# Patient Record
Sex: MALE | Race: WHITE | Employment: OTHER | URBAN - METROPOLITAN AREA
[De-identification: names, ages, dates, MRNs, and addresses within clinical notes are randomized per-mention and may not be internally consistent; named-entity substitution may affect disease eponyms.]

---

## 2019-09-06 ENCOUNTER — HOSPITAL ENCOUNTER (EMERGENCY)
Age: 74
Discharge: HOME | End: 2019-09-06
Payer: MEDICARE

## 2019-09-06 ENCOUNTER — APPOINTMENT (OUTPATIENT)
Dept: GENERAL RADIOLOGY | Age: 74
End: 2019-09-06
Payer: MEDICARE

## 2019-09-06 VITALS
SYSTOLIC BLOOD PRESSURE: 136 MMHG | BODY MASS INDEX: 38.6 KG/M2 | TEMPERATURE: 97.7 F | OXYGEN SATURATION: 95 % | HEART RATE: 88 BPM | WEIGHT: 285 LBS | HEIGHT: 72 IN | DIASTOLIC BLOOD PRESSURE: 88 MMHG | RESPIRATION RATE: 16 BRPM

## 2019-09-06 DIAGNOSIS — M25.462 EFFUSION OF LEFT KNEE JOINT: ICD-10-CM

## 2019-09-06 DIAGNOSIS — M25.562 ACUTE PAIN OF LEFT KNEE: Primary | ICD-10-CM

## 2019-09-06 DIAGNOSIS — R03.0 ELEVATED BLOOD PRESSURE READING: ICD-10-CM

## 2019-09-06 PROCEDURE — 6370000000 HC RX 637 (ALT 250 FOR IP): Performed by: NURSE PRACTITIONER

## 2019-09-06 PROCEDURE — 99283 EMERGENCY DEPT VISIT LOW MDM: CPT

## 2019-09-06 PROCEDURE — 73564 X-RAY EXAM KNEE 4 OR MORE: CPT

## 2019-09-06 RX ORDER — ATORVASTATIN CALCIUM 40 MG/1
40 TABLET, FILM COATED ORAL NIGHTLY
COMMUNITY

## 2019-09-06 RX ORDER — LOPERAMIDE HYDROCHLORIDE 2 MG/1
2 CAPSULE ORAL 4 TIMES DAILY PRN
COMMUNITY
End: 2022-01-01

## 2019-09-06 RX ORDER — INSULIN GLARGINE 100 [IU]/ML
40 INJECTION, SOLUTION SUBCUTANEOUS NIGHTLY
Status: ON HOLD | COMMUNITY
End: 2022-01-01 | Stop reason: HOSPADM

## 2019-09-06 RX ORDER — IBUPROFEN 600 MG/1
600 TABLET ORAL EVERY 8 HOURS PRN
Qty: 15 TABLET | Refills: 0 | Status: SHIPPED | OUTPATIENT
Start: 2019-09-06 | End: 2019-09-11

## 2019-09-06 RX ORDER — PANTOPRAZOLE SODIUM 40 MG/1
40 TABLET, DELAYED RELEASE ORAL DAILY
COMMUNITY

## 2019-09-06 RX ORDER — METOPROLOL SUCCINATE 25 MG/1
25 TABLET, EXTENDED RELEASE ORAL DAILY
COMMUNITY
End: 2022-01-01 | Stop reason: SDUPTHER

## 2019-09-06 RX ORDER — IBUPROFEN 800 MG/1
800 TABLET ORAL ONCE
Status: COMPLETED | OUTPATIENT
Start: 2019-09-06 | End: 2019-09-06

## 2019-09-06 RX ADMIN — IBUPROFEN 800 MG: 800 TABLET, FILM COATED ORAL at 11:44

## 2019-09-06 SDOH — HEALTH STABILITY: MENTAL HEALTH: HOW OFTEN DO YOU HAVE A DRINK CONTAINING ALCOHOL?: NEVER

## 2019-09-06 ASSESSMENT — PAIN SCALES - GENERAL
PAINLEVEL_OUTOF10: 5
PAINLEVEL_OUTOF10: 5

## 2019-09-06 ASSESSMENT — PAIN - FUNCTIONAL ASSESSMENT: PAIN_FUNCTIONAL_ASSESSMENT: PREVENTS OR INTERFERES SOME ACTIVE ACTIVITIES AND ADLS

## 2019-09-06 ASSESSMENT — PAIN DESCRIPTION - ORIENTATION: ORIENTATION: LEFT

## 2019-09-06 ASSESSMENT — PAIN DESCRIPTION - LOCATION: LOCATION: KNEE

## 2019-09-06 ASSESSMENT — PAIN DESCRIPTION - DESCRIPTORS: DESCRIPTORS: THROBBING

## 2019-09-06 ASSESSMENT — PAIN DESCRIPTION - FREQUENCY: FREQUENCY: CONTINUOUS

## 2019-09-06 ASSESSMENT — PAIN DESCRIPTION - PAIN TYPE: TYPE: ACUTE PAIN

## 2019-09-06 ASSESSMENT — PAIN DESCRIPTION - PROGRESSION: CLINICAL_PROGRESSION: GRADUALLY WORSENING

## 2020-10-19 ENCOUNTER — APPOINTMENT (OUTPATIENT)
Dept: CT IMAGING | Age: 75
End: 2020-10-19
Payer: MEDICARE

## 2020-10-19 ENCOUNTER — HOSPITAL ENCOUNTER (EMERGENCY)
Age: 75
Discharge: HOME | End: 2020-10-19
Attending: EMERGENCY MEDICINE
Payer: MEDICARE

## 2020-10-19 VITALS
BODY MASS INDEX: 34.65 KG/M2 | HEIGHT: 74 IN | WEIGHT: 270 LBS | HEART RATE: 82 BPM | DIASTOLIC BLOOD PRESSURE: 56 MMHG | SYSTOLIC BLOOD PRESSURE: 116 MMHG | TEMPERATURE: 98.2 F | RESPIRATION RATE: 18 BRPM | OXYGEN SATURATION: 95 %

## 2020-10-19 LAB — METER GLUCOSE: 193 MG/DL (ref 74–99)

## 2020-10-19 PROCEDURE — 99283 EMERGENCY DEPT VISIT LOW MDM: CPT

## 2020-10-19 PROCEDURE — 99284 EMERGENCY DEPT VISIT MOD MDM: CPT

## 2020-10-19 PROCEDURE — 70450 CT HEAD/BRAIN W/O DYE: CPT

## 2020-10-19 PROCEDURE — 82962 GLUCOSE BLOOD TEST: CPT

## 2020-10-19 RX ORDER — ACYCLOVIR 400 MG/1
400 TABLET ORAL
Qty: 50 TABLET | Refills: 0 | Status: SHIPPED | OUTPATIENT
Start: 2020-10-19 | End: 2020-10-29

## 2020-10-19 RX ORDER — POLYVINYL ALCOHOL 14 MG/ML
2 SOLUTION/ DROPS OPHTHALMIC PRN
Qty: 1 BOTTLE | Refills: 4 | Status: SHIPPED | OUTPATIENT
Start: 2020-10-19 | End: 2020-11-18

## 2020-10-19 ASSESSMENT — PAIN DESCRIPTION - LOCATION: LOCATION: NECK

## 2020-10-19 ASSESSMENT — PAIN DESCRIPTION - FREQUENCY: FREQUENCY: INTERMITTENT

## 2020-10-19 ASSESSMENT — PAIN DESCRIPTION - DESCRIPTORS: DESCRIPTORS: SORE

## 2020-10-19 ASSESSMENT — PAIN DESCRIPTION - ORIENTATION: ORIENTATION: RIGHT

## 2020-10-19 ASSESSMENT — PAIN DESCRIPTION - PAIN TYPE: TYPE: ACUTE PAIN

## 2020-10-19 ASSESSMENT — PAIN DESCRIPTION - PROGRESSION: CLINICAL_PROGRESSION: NOT CHANGED

## 2020-10-19 ASSESSMENT — PAIN SCALES - GENERAL: PAINLEVEL_OUTOF10: 5

## 2020-10-20 NOTE — ED NOTES
Dr. Darlyn Stewart at bedside with pt     [facility]  10/19/20 2018

## 2020-10-20 NOTE — ED PROVIDER NOTES
HPI:  10/19/20, Time: 8:23 PM EDT         Cameron Alvarez is a 67 y.o. male presenting to the ED for right sided facial droop, beginning 4 days ago. It includes right eye eyelid lag and drying of eye. The complaint has been persistent, moderate in severity, and worsened by nothing. He has history of lymphoma, has been in remission since 2015 and is concerned it has returned. Lymphoma was in his head. Today he tried to drink milk and it dribbled out of his mouth. Patient denies fever/chills, cough, congestion, chest pain, shortness of breath, edema, headache, visual disturbances, focal paresthesias, focal weakness, abdominal pain, nausea, vomiting, diarrhea, constipation, dysuria, hematuria, trauma, neck or back pain or other complaints. ROS:   Pertinent positives and negatives are stated within HPI, all other systems reviewed and are negative.      --------------------------------------------- PAST HISTORY ---------------------------------------------  Past Medical History:  has a past medical history of Acid reflux, [facility] COPD (chronic obstructive pulmonary disease) ([facility] Depression, Diabetes mellitus ([facility] Hyperlipidemia, Hypertension, MI (myocardial infarction) ([facility] Polio, and Sleep apnea. Past Surgical History:  has a past surgical history that includes Coronary angioplasty with stent and Cardiac pacemaker placement. Social History:  reports that he has quit smoking. He has never used smokeless tobacco. He reports that he does not drink alcohol. Family History: family history is not on file. The patients home medications have been reviewed.     Allergies: Penicillins        ---------------------------------------------------PHYSICAL EXAM--------------------------------------    Constitutional:  Well developed, well nourished, no acute distress, non-toxic appearance   Eyes:  PERRL, conjunctiva normal, EOMI  HENT:  Atraumatic, external ears normal, nose normal, oropharynx moist. Neck- normal range of motion, no nuchal rigidity. Dentition intact without evidence of infection. M is clear and intact bilaterally. Airway patent without pharyngeal redness, exudates or swelling. Respiratory:  No respiratory distress, normal breath sounds, no rales, no wheezing   Cardiovascular:  Normal rate, normal rhythm, no murmurs, no gallops, no rubs. Radial pulses 2+ bilaterally. GI:  Soft, nondistended, normal bowel sounds, nontender, nno rebound, no guarding   :  No costovertebral angle tenderness   Musculoskeletal:  No edema, no tenderness, no deformities. Back- no tenderness  Integument:  Well hydrated, no rash. Adequate perfusion. Lymphatic:  No cervical lymphadenopathy noted   Neurologic:  Alert & oriented x 3, CN 2-12 normal except right peripheral CN7 (complete right facial droop that include flattening of forehead and right eyelid lag with blinking), normal gaition, no focal deficits noted. Psychiatric:  Speech and behavior appropriate       -------------------------------------------------- RESULTS -------------------------------------------------  I have personally reviewed all laboratory and imaging results for this patient. Results are listed below. LABS:  Results for orders placed or performed during the hospital encounter of 10/19/20   POCT Glucose   Result Value Ref Range    Meter Glucose 193 (H) 74 - 99 mg/dL       RADIOLOGY:  Interpreted by Radiologist.  [address]   Final Result   1. No evidence of acute intracranial hemorrhage or edema. 2.  Chronic lacunar stroke is seen at level of right basal ganglia.       3.  If clinical concern persists for acute stroke, MRI brain with   diffusion-weighted imaging could be helpful for further evaluation.             ------------------------- NURSING NOTES AND VITALS REVIEWED ---------------------------   The nursing notes within the ED encounter and vital signs as below have been reviewed by myself. BP (!) 116/56   Pulse 82   Temp 98.2 °F (36.8 °C) (Oral)   Resp 18   Ht 6' 2\" (1.88 m)   Wt 270 lb (122.5 kg)   SpO2 95%   BMI 34.67 kg/m²   Oxygen Saturation Interpretation: Normal    The patients available past medical records and past encounters were reviewed. ------------------------------ ED COURSE/MEDICAL DECISION MAKING----------------------  Medications - No data to display        Procedures:  none      Medical Decision Making:    Neg acute on head CT. Will treat for Bell's Palsy, no concern for Lyme disease. Rx Acyclovir   Patient was explicitly instructed on specific signs and symptoms on which to return to the emergency room for. Patient was instructed to return to the ER for any new or worsening symptoms. Additional discharge instructions were given verbally. All questions were answered. Patient is comfortable and agreeable with discharge plan. Patient in no acute distress and non-toxic in appearance. F/u with PCP. Advised to take eye closed at night   Advised to eat/drink on left side of his mouth until resolution of symptoms (due to choking/aspiration risk)    This patient's ED course included: re-evaluation prior to disposition and a personal history and physicial eaxmination    This patient has remained hemodynamically stable, remained unchanged and been closely monitored during their ED course. Re-Evaluations:             Time: 9:18 PM EDT  Re-evaluation. Patients symptoms show no change  Repeat physical examination is not changed        Consultations:             none    Critical Care: none        Counseling: The emergency provider has spoken with the patient and spouse/SO and discussed todays results, in addition to providing specific details for the plan of care and counseling regarding the diagnosis and prognosis.   Questions are answered at this time and they are agreeable with the plan.       --------------------------------- IMPRESSION AND DISPOSITION ---------------------------------    IMPRESSION  1.  Bell's palsy        DISPOSITION  Disposition: Discharge to home  Patient condition is stable                 Ricarda Patches, DO  10/19/20 2118       Lingttahsan Mckay, DO  10/19/20 2119

## 2021-01-01 ENCOUNTER — HOSPITAL ENCOUNTER (OUTPATIENT)
Dept: ULTRASOUND IMAGING | Age: 76
Discharge: HOME | End: 2021-06-25
Payer: MEDICARE

## 2021-01-01 DIAGNOSIS — N18.4 CHRONIC KIDNEY DISEASE, STAGE IV (SEVERE) (HCC): ICD-10-CM

## 2021-01-01 PROCEDURE — 76770 US EXAM ABDO BACK WALL COMP: CPT

## 2021-04-08 ENCOUNTER — HOSPITAL ENCOUNTER (OUTPATIENT)
Age: 76
Discharge: HOME | End: 2021-04-10

## 2021-04-08 PROCEDURE — 89055 LEUKOCYTE ASSESSMENT FECAL: CPT

## 2021-04-08 PROCEDURE — 88342 IMHCHEM/IMCYTCHM 1ST ANTB: CPT

## 2021-04-08 PROCEDURE — 87329 GIARDIA AG IA: CPT

## 2021-04-08 PROCEDURE — 87449 NOS EACH ORGANISM AG IA: CPT

## 2021-04-08 PROCEDURE — 87324 CLOSTRIDIUM AG IA: CPT

## 2021-04-08 PROCEDURE — 87045 FECES CULTURE AEROBIC BACT: CPT

## 2021-04-08 PROCEDURE — 83986 ASSAY PH BODY FLUID NOS: CPT

## 2021-04-08 PROCEDURE — 88305 TISSUE EXAM BY PATHOLOGIST: CPT

## 2021-04-08 PROCEDURE — 89190 NASAL SMEAR FOR EOSINOPHILS: CPT

## 2021-04-08 PROCEDURE — 82705 FATS/LIPIDS FECES QUAL: CPT

## 2021-04-09 LAB
C DIFF TOXIN/ANTIGEN: NORMAL
EOSINOPHIL SMEAR OTHER: NORMAL
GIARDIA ANTIGEN STOOL: NORMAL
WHITE BLOOD CELLS (WBC), STOOL: NORMAL

## 2021-04-10 LAB
CULTURE, STOOL: NORMAL
FECAL NEUTRAL FAT: NORMAL
FECAL PH: 7 (ref 5–8.5)
FECAL SPLIT FATS: NORMAL

## 2022-01-01 ENCOUNTER — APPOINTMENT (OUTPATIENT)
Dept: GENERAL RADIOLOGY | Age: 77
DRG: 177 | End: 2022-01-01
Payer: MEDICARE

## 2022-01-01 ENCOUNTER — APPOINTMENT (OUTPATIENT)
Dept: CT IMAGING | Age: 77
DRG: 291 | End: 2022-01-01
Payer: MEDICARE

## 2022-01-01 ENCOUNTER — APPOINTMENT (OUTPATIENT)
Dept: GENERAL RADIOLOGY | Age: 77
DRG: 291 | End: 2022-01-01
Payer: MEDICARE

## 2022-01-01 ENCOUNTER — HOSPITAL ENCOUNTER (OUTPATIENT)
Dept: OTHER | Age: 77
Setting detail: THERAPIES SERIES
Discharge: HOME | End: 2022-02-07
Payer: MEDICARE

## 2022-01-01 ENCOUNTER — APPOINTMENT (OUTPATIENT)
Dept: ULTRASOUND IMAGING | Age: 77
DRG: 177 | End: 2022-01-01
Payer: MEDICARE

## 2022-01-01 ENCOUNTER — APPOINTMENT (OUTPATIENT)
Dept: ULTRASOUND IMAGING | Age: 77
DRG: 291 | End: 2022-01-01
Payer: MEDICARE

## 2022-01-01 ENCOUNTER — TELEPHONE (OUTPATIENT)
Dept: CARDIOLOGY CLINIC | Age: 77
End: 2022-01-01

## 2022-01-01 ENCOUNTER — APPOINTMENT (OUTPATIENT)
Dept: CT IMAGING | Age: 77
DRG: 177 | End: 2022-01-01
Payer: MEDICARE

## 2022-01-01 ENCOUNTER — HOSPITAL ENCOUNTER (INPATIENT)
Age: 77
LOS: 15 days | Discharge: OTHER INSTITUTION | DRG: 291 | End: 2022-03-10
Attending: STUDENT IN AN ORGANIZED HEALTH CARE EDUCATION/TRAINING PROGRAM | Admitting: FAMILY MEDICINE
Payer: MEDICARE

## 2022-01-01 ENCOUNTER — ANESTHESIA (OUTPATIENT)
Dept: OPERATING ROOM | Age: 77
DRG: 291 | End: 2022-01-01
Payer: MEDICARE

## 2022-01-01 ENCOUNTER — TELEPHONE (OUTPATIENT)
Dept: PULMONOLOGY | Age: 77
End: 2022-01-01

## 2022-01-01 ENCOUNTER — OFFICE VISIT (OUTPATIENT)
Dept: CARDIOLOGY CLINIC | Age: 77
End: 2022-01-01
Payer: MEDICARE

## 2022-01-01 ENCOUNTER — HOSPITAL ENCOUNTER (INPATIENT)
Age: 77
LOS: 4 days | Discharge: ANOTHER HOSPITAL | DRG: 291 | End: 2022-04-12
Attending: EMERGENCY MEDICINE | Admitting: FAMILY MEDICINE
Payer: MEDICARE

## 2022-01-01 ENCOUNTER — HOSPITAL ENCOUNTER (INPATIENT)
Age: 77
LOS: 9 days | Discharge: OTHER INSTITUTION | DRG: 177 | End: 2022-05-13
Attending: STUDENT IN AN ORGANIZED HEALTH CARE EDUCATION/TRAINING PROGRAM | Admitting: FAMILY MEDICINE
Payer: MEDICARE

## 2022-01-01 ENCOUNTER — ANESTHESIA EVENT (OUTPATIENT)
Dept: OPERATING ROOM | Age: 77
DRG: 291 | End: 2022-01-01
Payer: MEDICARE

## 2022-01-01 ENCOUNTER — HOSPITAL ENCOUNTER (OUTPATIENT)
Dept: OTHER | Age: 77
Setting detail: THERAPIES SERIES
Discharge: HOME | End: 2022-02-22
Payer: MEDICARE

## 2022-01-01 ENCOUNTER — HOSPITAL ENCOUNTER (OUTPATIENT)
Dept: OTHER | Age: 77
Setting detail: THERAPIES SERIES
Discharge: HOME | End: 2022-02-14
Payer: MEDICARE

## 2022-01-01 ENCOUNTER — HOSPITAL ENCOUNTER (INPATIENT)
Age: 77
LOS: 12 days | Discharge: NURSING, REHAB OR LONG-TERM CARE | DRG: 291 | End: 2022-02-02
Attending: EMERGENCY MEDICINE | Admitting: FAMILY MEDICINE
Payer: MEDICARE

## 2022-01-01 ENCOUNTER — HOSPITAL ENCOUNTER (INPATIENT)
Age: 77
LOS: 4 days | Discharge: NURSING, REHAB OR LONG-TERM CARE | DRG: 177 | End: 2022-04-29
Attending: EMERGENCY MEDICINE | Admitting: FAMILY MEDICINE
Payer: MEDICARE

## 2022-01-01 ENCOUNTER — HOSPITAL ENCOUNTER (INPATIENT)
Age: 77
LOS: 8 days | Discharge: NURSING, REHAB OR LONG-TERM CARE | DRG: 291 | End: 2022-03-22
Attending: EMERGENCY MEDICINE | Admitting: FAMILY MEDICINE
Payer: MEDICARE

## 2022-01-01 ENCOUNTER — TELEPHONE (OUTPATIENT)
Dept: OTHER | Facility: CLINIC | Age: 77
End: 2022-01-01

## 2022-01-01 ENCOUNTER — HOSPITAL ENCOUNTER (INPATIENT)
Age: 77
LOS: 9 days | Discharge: OTHER INSTITUTION | DRG: 291 | End: 2022-04-06
Attending: EMERGENCY MEDICINE | Admitting: FAMILY MEDICINE
Payer: MEDICARE

## 2022-01-01 VITALS
OXYGEN SATURATION: 95 % | HEIGHT: 69 IN | TEMPERATURE: 97.3 F | BODY MASS INDEX: 39.12 KG/M2 | RESPIRATION RATE: 18 BRPM | HEART RATE: 96 BPM | SYSTOLIC BLOOD PRESSURE: 105 MMHG | WEIGHT: 264.11 LBS | DIASTOLIC BLOOD PRESSURE: 71 MMHG

## 2022-01-01 VITALS
SYSTOLIC BLOOD PRESSURE: 128 MMHG | RESPIRATION RATE: 22 BRPM | HEART RATE: 98 BPM | BODY MASS INDEX: 41.18 KG/M2 | HEIGHT: 69 IN | DIASTOLIC BLOOD PRESSURE: 88 MMHG | WEIGHT: 278 LBS | OXYGEN SATURATION: 99 %

## 2022-01-01 VITALS
SYSTOLIC BLOOD PRESSURE: 99 MMHG | HEIGHT: 69 IN | TEMPERATURE: 97.4 F | DIASTOLIC BLOOD PRESSURE: 75 MMHG | WEIGHT: 263.89 LBS | OXYGEN SATURATION: 96 % | HEART RATE: 94 BPM | BODY MASS INDEX: 39.09 KG/M2 | RESPIRATION RATE: 20 BRPM

## 2022-01-01 VITALS
WEIGHT: 274 LBS | BODY MASS INDEX: 40.46 KG/M2 | OXYGEN SATURATION: 97 % | RESPIRATION RATE: 18 BRPM | DIASTOLIC BLOOD PRESSURE: 69 MMHG | HEART RATE: 98 BPM | SYSTOLIC BLOOD PRESSURE: 134 MMHG

## 2022-01-01 VITALS
BODY MASS INDEX: 39.99 KG/M2 | DIASTOLIC BLOOD PRESSURE: 67 MMHG | RESPIRATION RATE: 20 BRPM | SYSTOLIC BLOOD PRESSURE: 105 MMHG | TEMPERATURE: 96.7 F | OXYGEN SATURATION: 98 % | HEIGHT: 69 IN | HEART RATE: 77 BPM | WEIGHT: 270 LBS

## 2022-01-01 VITALS
DIASTOLIC BLOOD PRESSURE: 57 MMHG | HEART RATE: 97 BPM | SYSTOLIC BLOOD PRESSURE: 121 MMHG | OXYGEN SATURATION: 95 % | TEMPERATURE: 97.3 F | RESPIRATION RATE: 18 BRPM | HEIGHT: 69 IN | WEIGHT: 281.56 LBS | BODY MASS INDEX: 41.7 KG/M2

## 2022-01-01 VITALS
OXYGEN SATURATION: 88 % | RESPIRATION RATE: 21 BRPM | TEMPERATURE: 97.9 F | HEIGHT: 69 IN | SYSTOLIC BLOOD PRESSURE: 109 MMHG | WEIGHT: 245 LBS | DIASTOLIC BLOOD PRESSURE: 77 MMHG | HEART RATE: 97 BPM | BODY MASS INDEX: 36.29 KG/M2

## 2022-01-01 VITALS — HEART RATE: 95 BPM | RESPIRATION RATE: 16 BRPM | DIASTOLIC BLOOD PRESSURE: 78 MMHG | SYSTOLIC BLOOD PRESSURE: 128 MMHG

## 2022-01-01 VITALS
SYSTOLIC BLOOD PRESSURE: 128 MMHG | RESPIRATION RATE: 18 BRPM | TEMPERATURE: 97.6 F | OXYGEN SATURATION: 98 % | DIASTOLIC BLOOD PRESSURE: 83 MMHG | HEART RATE: 70 BPM | WEIGHT: 272 LBS | HEIGHT: 69 IN | BODY MASS INDEX: 40.29 KG/M2

## 2022-01-01 VITALS
DIASTOLIC BLOOD PRESSURE: 62 MMHG | HEIGHT: 69 IN | WEIGHT: 268.74 LBS | RESPIRATION RATE: 18 BRPM | SYSTOLIC BLOOD PRESSURE: 101 MMHG | TEMPERATURE: 98.2 F | OXYGEN SATURATION: 100 % | BODY MASS INDEX: 39.8 KG/M2 | HEART RATE: 82 BPM

## 2022-01-01 VITALS — SYSTOLIC BLOOD PRESSURE: 102 MMHG | OXYGEN SATURATION: 98 % | DIASTOLIC BLOOD PRESSURE: 65 MMHG

## 2022-01-01 DIAGNOSIS — U07.1 COVID: ICD-10-CM

## 2022-01-01 DIAGNOSIS — I10 PRIMARY HYPERTENSION: ICD-10-CM

## 2022-01-01 DIAGNOSIS — G47.33 OSA (OBSTRUCTIVE SLEEP APNEA): ICD-10-CM

## 2022-01-01 DIAGNOSIS — I50.33 ACUTE ON CHRONIC DIASTOLIC CHF (CONGESTIVE HEART FAILURE) (HCC): Primary | ICD-10-CM

## 2022-01-01 DIAGNOSIS — I50.43 CHF (CONGESTIVE HEART FAILURE), NYHA CLASS I, ACUTE ON CHRONIC, COMBINED (HCC): ICD-10-CM

## 2022-01-01 DIAGNOSIS — I25.10 CORONARY ARTERY DISEASE INVOLVING NATIVE CORONARY ARTERY OF NATIVE HEART WITHOUT ANGINA PECTORIS: ICD-10-CM

## 2022-01-01 DIAGNOSIS — J18.9 PNEUMONIA OF RIGHT LOWER LOBE DUE TO INFECTIOUS ORGANISM: Primary | ICD-10-CM

## 2022-01-01 DIAGNOSIS — I50.9 ACUTE ON CHRONIC CONGESTIVE HEART FAILURE, UNSPECIFIED HEART FAILURE TYPE (HCC): Primary | ICD-10-CM

## 2022-01-01 DIAGNOSIS — N28.9 ACUTE RENAL INSUFFICIENCY: ICD-10-CM

## 2022-01-01 DIAGNOSIS — J96.11 CHRONIC RESPIRATORY FAILURE WITH HYPOXIA (HCC): ICD-10-CM

## 2022-01-01 DIAGNOSIS — R09.02 HYPOXIA: Primary | ICD-10-CM

## 2022-01-01 DIAGNOSIS — R79.89 ELEVATED D-DIMER: ICD-10-CM

## 2022-01-01 DIAGNOSIS — I50.33 ACUTE ON CHRONIC HEART FAILURE WITH PRESERVED EJECTION FRACTION (HCC): ICD-10-CM

## 2022-01-01 DIAGNOSIS — J96.21 ACUTE ON CHRONIC RESPIRATORY FAILURE WITH HYPOXIA (HCC): Primary | ICD-10-CM

## 2022-01-01 DIAGNOSIS — J90 PLEURAL EFFUSION, RIGHT: ICD-10-CM

## 2022-01-01 DIAGNOSIS — I51.7 CARDIOMEGALY: ICD-10-CM

## 2022-01-01 DIAGNOSIS — I50.9 ACUTE ON CHRONIC CONGESTIVE HEART FAILURE, UNSPECIFIED HEART FAILURE TYPE (HCC): ICD-10-CM

## 2022-01-01 DIAGNOSIS — J90 PLEURAL EFFUSION: ICD-10-CM

## 2022-01-01 DIAGNOSIS — I50.33 ACUTE ON CHRONIC HEART FAILURE WITH PRESERVED EJECTION FRACTION (HCC): Primary | ICD-10-CM

## 2022-01-01 DIAGNOSIS — N17.9 ACUTE KIDNEY INJURY SUPERIMPOSED ON CKD (HCC): ICD-10-CM

## 2022-01-01 DIAGNOSIS — J18.9 PNEUMONIA OF RIGHT LOWER LOBE DUE TO INFECTIOUS ORGANISM: ICD-10-CM

## 2022-01-01 DIAGNOSIS — Z95.0 CARDIAC PACEMAKER IN SITU: ICD-10-CM

## 2022-01-01 DIAGNOSIS — E87.5 HYPERKALEMIA: ICD-10-CM

## 2022-01-01 DIAGNOSIS — N17.9 AKI (ACUTE KIDNEY INJURY) (HCC): ICD-10-CM

## 2022-01-01 DIAGNOSIS — N18.9 ACUTE KIDNEY INJURY SUPERIMPOSED ON CKD (HCC): ICD-10-CM

## 2022-01-01 DIAGNOSIS — N18.9 CHRONIC KIDNEY DISEASE, UNSPECIFIED CKD STAGE: ICD-10-CM

## 2022-01-01 DIAGNOSIS — J44.1 COPD EXACERBATION (HCC): ICD-10-CM

## 2022-01-01 DIAGNOSIS — I50.9 CONGESTIVE HEART FAILURE, UNSPECIFIED HF CHRONICITY, UNSPECIFIED HEART FAILURE TYPE (HCC): Primary | ICD-10-CM

## 2022-01-01 DIAGNOSIS — I50.23 ACUTE ON CHRONIC SYSTOLIC CHF (CONGESTIVE HEART FAILURE) (HCC): Primary | ICD-10-CM

## 2022-01-01 LAB
AADO2: 497.3 MMHG
AADO2: 543.1 MMHG
ABO/RH: NORMAL
ADENOVIRUS BY PCR: NOT DETECTED
ALBUMIN SERPL-MCNC: 3.1 G/DL (ref 3.5–5.2)
ALBUMIN SERPL-MCNC: 3.2 G/DL (ref 3.5–5.2)
ALBUMIN SERPL-MCNC: 3.4 G/DL (ref 3.5–5.2)
ALBUMIN SERPL-MCNC: 3.4 G/DL (ref 3.5–5.2)
ALBUMIN SERPL-MCNC: 3.5 G/DL (ref 3.5–5.2)
ALBUMIN SERPL-MCNC: 3.6 G/DL (ref 3.5–5.2)
ALBUMIN SERPL-MCNC: 3.9 G/DL (ref 3.5–5.2)
ALBUMIN SERPL-MCNC: 3.9 G/DL (ref 3.5–5.2)
ALP BLD-CCNC: 106 U/L (ref 40–129)
ALP BLD-CCNC: 106 U/L (ref 40–129)
ALP BLD-CCNC: 113 U/L (ref 40–129)
ALP BLD-CCNC: 119 U/L (ref 40–129)
ALP BLD-CCNC: 127 U/L (ref 40–129)
ALP BLD-CCNC: 129 U/L (ref 40–129)
ALP BLD-CCNC: 90 U/L (ref 40–129)
ALT SERPL-CCNC: 21 U/L (ref 0–40)
ALT SERPL-CCNC: 23 U/L (ref 0–40)
ALT SERPL-CCNC: 28 U/L (ref 0–40)
ALT SERPL-CCNC: 35 U/L (ref 0–40)
ALT SERPL-CCNC: 35 U/L (ref 0–40)
ALT SERPL-CCNC: 42 U/L (ref 0–40)
ALT SERPL-CCNC: 53 U/L (ref 0–40)
ANION GAP SERPL CALCULATED.3IONS-SCNC: 10 MMOL/L (ref 7–16)
ANION GAP SERPL CALCULATED.3IONS-SCNC: 11 MMOL/L (ref 7–16)
ANION GAP SERPL CALCULATED.3IONS-SCNC: 12 MMOL/L (ref 7–16)
ANION GAP SERPL CALCULATED.3IONS-SCNC: 13 MMOL/L (ref 7–16)
ANION GAP SERPL CALCULATED.3IONS-SCNC: 14 MMOL/L (ref 7–16)
ANION GAP SERPL CALCULATED.3IONS-SCNC: 15 MMOL/L (ref 7–16)
ANION GAP SERPL CALCULATED.3IONS-SCNC: 16 MMOL/L (ref 7–16)
ANION GAP SERPL CALCULATED.3IONS-SCNC: 17 MMOL/L (ref 7–16)
ANION GAP SERPL CALCULATED.3IONS-SCNC: 19 MMOL/L (ref 7–16)
ANION GAP SERPL CALCULATED.3IONS-SCNC: 19 MMOL/L (ref 7–16)
ANISOCYTOSIS: ABNORMAL
ANTIBODY SCREEN: NORMAL
APPEARANCE FLUID: NORMAL
APTT: 35.5 SEC (ref 24.5–35.1)
AST SERPL-CCNC: 23 U/L (ref 0–39)
AST SERPL-CCNC: 24 U/L (ref 0–39)
AST SERPL-CCNC: 28 U/L (ref 0–39)
AST SERPL-CCNC: 29 U/L (ref 0–39)
AST SERPL-CCNC: 32 U/L (ref 0–39)
AST SERPL-CCNC: 32 U/L (ref 0–39)
AST SERPL-CCNC: 47 U/L (ref 0–39)
B.E.: -0.8 MMOL/L (ref -3–3)
B.E.: 0.4 MMOL/L (ref -3–3)
B.E.: 0.8 MMOL/L (ref -3–3)
B.E.: 1.3 MMOL/L (ref -3–3)
B.E.: 2.1 MMOL/L (ref -3–3)
BACTERIA: ABNORMAL /HPF
BACTERIA: ABNORMAL /HPF
BACTERIA: NORMAL /HPF
BASOPHILS ABSOLUTE: 0.02 E9/L (ref 0–0.2)
BASOPHILS ABSOLUTE: 0.03 E9/L (ref 0–0.2)
BASOPHILS ABSOLUTE: 0.05 E9/L (ref 0–0.2)
BASOPHILS ABSOLUTE: 0.06 E9/L (ref 0–0.2)
BASOPHILS ABSOLUTE: 0.06 E9/L (ref 0–0.2)
BASOPHILS ABSOLUTE: 0.07 E9/L (ref 0–0.2)
BASOPHILS RELATIVE PERCENT: 0.2 % (ref 0–2)
BASOPHILS RELATIVE PERCENT: 0.3 % (ref 0–2)
BASOPHILS RELATIVE PERCENT: 0.4 % (ref 0–2)
BASOPHILS RELATIVE PERCENT: 0.5 % (ref 0–2)
BILIRUB SERPL-MCNC: 0.4 MG/DL (ref 0–1.2)
BILIRUB SERPL-MCNC: 0.5 MG/DL (ref 0–1.2)
BILIRUB SERPL-MCNC: 0.5 MG/DL (ref 0–1.2)
BILIRUB SERPL-MCNC: 0.6 MG/DL (ref 0–1.2)
BILIRUB SERPL-MCNC: 0.6 MG/DL (ref 0–1.2)
BILIRUB SERPL-MCNC: 0.7 MG/DL (ref 0–1.2)
BILIRUB SERPL-MCNC: 0.8 MG/DL (ref 0–1.2)
BILIRUBIN URINE: NEGATIVE
BLOOD CULTURE, ROUTINE: NORMAL
BLOOD CULTURE, ROUTINE: NORMAL
BLOOD, URINE: ABNORMAL
BLOOD, URINE: NEGATIVE
BLOOD, URINE: NEGATIVE
BODY FLUID CULTURE, STERILE: NORMAL
BORDETELLA PARAPERTUSSIS BY PCR: NOT DETECTED
BORDETELLA PERTUSSIS BY PCR: NOT DETECTED
BUN BLDV-MCNC: 25 MG/DL (ref 6–23)
BUN BLDV-MCNC: 25 MG/DL (ref 6–23)
BUN BLDV-MCNC: 26 MG/DL (ref 6–23)
BUN BLDV-MCNC: 28 MG/DL (ref 6–23)
BUN BLDV-MCNC: 29 MG/DL (ref 6–23)
BUN BLDV-MCNC: 30 MG/DL (ref 6–23)
BUN BLDV-MCNC: 31 MG/DL (ref 6–23)
BUN BLDV-MCNC: 31 MG/DL (ref 6–23)
BUN BLDV-MCNC: 32 MG/DL (ref 6–23)
BUN BLDV-MCNC: 33 MG/DL (ref 6–23)
BUN BLDV-MCNC: 34 MG/DL (ref 6–23)
BUN BLDV-MCNC: 35 MG/DL (ref 6–23)
BUN BLDV-MCNC: 38 MG/DL (ref 6–23)
BUN BLDV-MCNC: 39 MG/DL (ref 6–23)
BUN BLDV-MCNC: 39 MG/DL (ref 6–23)
BUN BLDV-MCNC: 40 MG/DL (ref 6–23)
BUN BLDV-MCNC: 41 MG/DL (ref 6–23)
BUN BLDV-MCNC: 41 MG/DL (ref 6–23)
BUN BLDV-MCNC: 42 MG/DL (ref 6–23)
BUN BLDV-MCNC: 43 MG/DL (ref 6–23)
BUN BLDV-MCNC: 43 MG/DL (ref 6–23)
BUN BLDV-MCNC: 47 MG/DL (ref 6–23)
BUN BLDV-MCNC: 48 MG/DL (ref 6–23)
BUN BLDV-MCNC: 49 MG/DL (ref 6–23)
BUN BLDV-MCNC: 50 MG/DL (ref 6–23)
BUN BLDV-MCNC: 51 MG/DL (ref 6–23)
BUN BLDV-MCNC: 51 MG/DL (ref 6–23)
BUN BLDV-MCNC: 52 MG/DL (ref 6–23)
BUN BLDV-MCNC: 53 MG/DL (ref 6–23)
BUN BLDV-MCNC: 54 MG/DL (ref 6–23)
BUN BLDV-MCNC: 54 MG/DL (ref 6–23)
BUN BLDV-MCNC: 56 MG/DL (ref 6–23)
BUN BLDV-MCNC: 56 MG/DL (ref 6–23)
BUN BLDV-MCNC: 57 MG/DL (ref 6–23)
BUN BLDV-MCNC: 59 MG/DL (ref 6–23)
BUN BLDV-MCNC: 59 MG/DL (ref 6–23)
BUN BLDV-MCNC: 60 MG/DL (ref 6–23)
BUN BLDV-MCNC: 60 MG/DL (ref 6–23)
BUN BLDV-MCNC: 62 MG/DL (ref 6–23)
BUN BLDV-MCNC: 63 MG/DL (ref 6–23)
BUN BLDV-MCNC: 64 MG/DL (ref 6–23)
BUN BLDV-MCNC: 65 MG/DL (ref 6–23)
BUN BLDV-MCNC: 65 MG/DL (ref 6–23)
BUN BLDV-MCNC: 69 MG/DL (ref 6–23)
BUN BLDV-MCNC: 69 MG/DL (ref 6–23)
BUN BLDV-MCNC: 70 MG/DL (ref 6–23)
BUN BLDV-MCNC: 73 MG/DL (ref 6–23)
BUN BLDV-MCNC: 74 MG/DL (ref 6–23)
BUN BLDV-MCNC: 75 MG/DL (ref 6–23)
BUN BLDV-MCNC: 78 MG/DL (ref 6–23)
BUN BLDV-MCNC: 83 MG/DL (ref 6–23)
BUN BLDV-MCNC: 84 MG/DL (ref 6–23)
BUN BLDV-MCNC: 88 MG/DL (ref 6–23)
BURR CELLS: ABNORMAL
C-REACTIVE PROTEIN: 1.4 MG/DL (ref 0–0.4)
C-REACTIVE PROTEIN: 1.4 MG/DL (ref 0–0.4)
C-REACTIVE PROTEIN: 5.5 MG/DL (ref 0–0.4)
CALCIUM IONIZED: 1.09 MMOL/L (ref 1.15–1.33)
CALCIUM IONIZED: 1.09 MMOL/L (ref 1.15–1.33)
CALCIUM IONIZED: 1.17 MMOL/L (ref 1.15–1.33)
CALCIUM SERPL-MCNC: 7.9 MG/DL (ref 8.6–10.2)
CALCIUM SERPL-MCNC: 8 MG/DL (ref 8.6–10.2)
CALCIUM SERPL-MCNC: 8.1 MG/DL (ref 8.6–10.2)
CALCIUM SERPL-MCNC: 8.1 MG/DL (ref 8.6–10.2)
CALCIUM SERPL-MCNC: 8.2 MG/DL (ref 8.6–10.2)
CALCIUM SERPL-MCNC: 8.3 MG/DL (ref 8.6–10.2)
CALCIUM SERPL-MCNC: 8.4 MG/DL (ref 8.6–10.2)
CALCIUM SERPL-MCNC: 8.5 MG/DL (ref 8.6–10.2)
CALCIUM SERPL-MCNC: 8.6 MG/DL (ref 8.6–10.2)
CALCIUM SERPL-MCNC: 8.7 MG/DL (ref 8.6–10.2)
CALCIUM SERPL-MCNC: 8.8 MG/DL (ref 8.6–10.2)
CALCIUM SERPL-MCNC: 8.9 MG/DL (ref 8.6–10.2)
CALCIUM SERPL-MCNC: 9 MG/DL (ref 8.6–10.2)
CALCIUM SERPL-MCNC: 9.1 MG/DL (ref 8.6–10.2)
CELL COUNT FLUID TYPE: NORMAL
CHLAMYDOPHILIA PNEUMONIAE BY PCR: NOT DETECTED
CHLORIDE BLD-SCNC: 100 MMOL/L (ref 98–107)
CHLORIDE BLD-SCNC: 101 MMOL/L (ref 98–107)
CHLORIDE BLD-SCNC: 102 MMOL/L (ref 98–107)
CHLORIDE BLD-SCNC: 103 MMOL/L (ref 98–107)
CHLORIDE BLD-SCNC: 104 MMOL/L (ref 98–107)
CHLORIDE BLD-SCNC: 104 MMOL/L (ref 98–107)
CHLORIDE BLD-SCNC: 107 MMOL/L (ref 98–107)
CHLORIDE BLD-SCNC: 107 MMOL/L (ref 98–107)
CHLORIDE BLD-SCNC: 87 MMOL/L (ref 98–107)
CHLORIDE BLD-SCNC: 87 MMOL/L (ref 98–107)
CHLORIDE BLD-SCNC: 88 MMOL/L (ref 98–107)
CHLORIDE BLD-SCNC: 89 MMOL/L (ref 98–107)
CHLORIDE BLD-SCNC: 89 MMOL/L (ref 98–107)
CHLORIDE BLD-SCNC: 90 MMOL/L (ref 98–107)
CHLORIDE BLD-SCNC: 91 MMOL/L (ref 98–107)
CHLORIDE BLD-SCNC: 93 MMOL/L (ref 98–107)
CHLORIDE BLD-SCNC: 94 MMOL/L (ref 98–107)
CHLORIDE BLD-SCNC: 95 MMOL/L (ref 98–107)
CHLORIDE BLD-SCNC: 95 MMOL/L (ref 98–107)
CHLORIDE BLD-SCNC: 96 MMOL/L (ref 98–107)
CHLORIDE BLD-SCNC: 97 MMOL/L (ref 98–107)
CHLORIDE BLD-SCNC: 98 MMOL/L (ref 98–107)
CHLORIDE BLD-SCNC: 99 MMOL/L (ref 98–107)
CHLORIDE URINE RANDOM: 84 MMOL/L
CHOLESTEROL, TOTAL: 76 MG/DL (ref 0–199)
CLARITY: ABNORMAL
CLARITY: CLEAR
CLARITY: CLEAR
CO2: 20 MMOL/L (ref 22–29)
CO2: 21 MMOL/L (ref 22–29)
CO2: 22 MMOL/L (ref 22–29)
CO2: 23 MMOL/L (ref 22–29)
CO2: 23 MMOL/L (ref 22–29)
CO2: 24 MMOL/L (ref 22–29)
CO2: 24 MMOL/L (ref 22–29)
CO2: 25 MMOL/L (ref 22–29)
CO2: 26 MMOL/L (ref 22–29)
CO2: 27 MMOL/L (ref 22–29)
CO2: 28 MMOL/L (ref 22–29)
CO2: 29 MMOL/L (ref 22–29)
CO2: 30 MMOL/L (ref 22–29)
CO2: 31 MMOL/L (ref 22–29)
CO2: 32 MMOL/L (ref 22–29)
CO2: 33 MMOL/L (ref 22–29)
CO2: 34 MMOL/L (ref 22–29)
CO2: 35 MMOL/L (ref 22–29)
CO2: 35 MMOL/L (ref 22–29)
CO2: 38 MMOL/L (ref 22–29)
COHB: 0.1 % (ref 0–1.5)
COHB: 0.1 % (ref 0–1.5)
COHB: 0.3 % (ref 0–1.5)
COHB: 0.3 % (ref 0–1.5)
COHB: 0.6 % (ref 0–1.5)
COLOR FLUID: NORMAL
COLOR: YELLOW
COMMENT: ABNORMAL
CORONAVIRUS 229E BY PCR: NOT DETECTED
CORONAVIRUS HKU1 BY PCR: NOT DETECTED
CORONAVIRUS NL63 BY PCR: NOT DETECTED
CORONAVIRUS OC43 BY PCR: NOT DETECTED
CREAT SERPL-MCNC: 2 MG/DL (ref 0.7–1.2)
CREAT SERPL-MCNC: 2 MG/DL (ref 0.7–1.2)
CREAT SERPL-MCNC: 2.2 MG/DL (ref 0.7–1.2)
CREAT SERPL-MCNC: 2.3 MG/DL (ref 0.7–1.2)
CREAT SERPL-MCNC: 2.4 MG/DL (ref 0.7–1.2)
CREAT SERPL-MCNC: 2.5 MG/DL (ref 0.7–1.2)
CREAT SERPL-MCNC: 2.6 MG/DL (ref 0.7–1.2)
CREAT SERPL-MCNC: 2.7 MG/DL (ref 0.7–1.2)
CREAT SERPL-MCNC: 2.8 MG/DL (ref 0.7–1.2)
CREAT SERPL-MCNC: 2.9 MG/DL (ref 0.7–1.2)
CREAT SERPL-MCNC: 2.9 MG/DL (ref 0.7–1.2)
CREAT SERPL-MCNC: 3 MG/DL (ref 0.7–1.2)
CREAT SERPL-MCNC: 3.1 MG/DL (ref 0.7–1.2)
CREAT SERPL-MCNC: 3.2 MG/DL (ref 0.7–1.2)
CREAT SERPL-MCNC: 3.7 MG/DL (ref 0.7–1.2)
CREAT SERPL-MCNC: 3.8 MG/DL (ref 0.7–1.2)
CREAT SERPL-MCNC: 4 MG/DL (ref 0.7–1.2)
CREAT SERPL-MCNC: 4.2 MG/DL (ref 0.7–1.2)
CREAT SERPL-MCNC: 4.6 MG/DL (ref 0.7–1.2)
CREAT SERPL-MCNC: 5.1 MG/DL (ref 0.7–1.2)
CREAT SERPL-MCNC: 5.2 MG/DL (ref 0.7–1.2)
CREAT SERPL-MCNC: 5.3 MG/DL (ref 0.7–1.2)
CREAT SERPL-MCNC: 5.5 MG/DL (ref 0.7–1.2)
CREAT SERPL-MCNC: 6 MG/DL (ref 0.7–1.2)
CREAT SERPL-MCNC: 6.1 MG/DL (ref 0.7–1.2)
CREATININE URINE: 54 MG/DL (ref 40–278)
CREATININE URINE: 56 MG/DL (ref 40–278)
CRITICAL: ABNORMAL
CRITICAL: NORMAL
CRITICAL: NORMAL
CRYSTALS, UA: ABNORMAL /HPF
CULTURE, BLOOD 2: NORMAL
CULTURE, BLOOD 2: NORMAL
D DIMER: 1802 NG/ML DDU
D DIMER: 698 NG/ML DDU
D DIMER: 741 NG/ML DDU
D DIMER: 912 NG/ML DDU
DATE ANALYZED: ABNORMAL
DATE ANALYZED: NORMAL
DATE ANALYZED: NORMAL
DATE OF COLLECTION: ABNORMAL
DATE OF COLLECTION: NORMAL
DATE OF COLLECTION: NORMAL
EKG ATRIAL RATE: 111 BPM
EKG ATRIAL RATE: 74 BPM
EKG ATRIAL RATE: 76 BPM
EKG ATRIAL RATE: 89 BPM
EKG ATRIAL RATE: 92 BPM
EKG ATRIAL RATE: 93 BPM
EKG ATRIAL RATE: 94 BPM
EKG ATRIAL RATE: 94 BPM
EKG P AXIS: 29 DEGREES
EKG P AXIS: 45 DEGREES
EKG P AXIS: 50 DEGREES
EKG P AXIS: 59 DEGREES
EKG P AXIS: 60 DEGREES
EKG P AXIS: 62 DEGREES
EKG P AXIS: 63 DEGREES
EKG P AXIS: 72 DEGREES
EKG P-R INTERVAL: 132 MS
EKG P-R INTERVAL: 138 MS
EKG P-R INTERVAL: 144 MS
EKG P-R INTERVAL: 154 MS
EKG P-R INTERVAL: 156 MS
EKG P-R INTERVAL: 156 MS
EKG P-R INTERVAL: 160 MS
EKG P-R INTERVAL: 166 MS
EKG Q-T INTERVAL: 370 MS
EKG Q-T INTERVAL: 376 MS
EKG Q-T INTERVAL: 394 MS
EKG Q-T INTERVAL: 396 MS
EKG Q-T INTERVAL: 404 MS
EKG Q-T INTERVAL: 408 MS
EKG Q-T INTERVAL: 426 MS
EKG Q-T INTERVAL: 442 MS
EKG QRS DURATION: 110 MS
EKG QRS DURATION: 110 MS
EKG QRS DURATION: 112 MS
EKG QRS DURATION: 114 MS
EKG QRS DURATION: 122 MS
EKG QRS DURATION: 124 MS
EKG QTC CALCULATION (BAZETT): 470 MS
EKG QTC CALCULATION (BAZETT): 479 MS
EKG QTC CALCULATION (BAZETT): 489 MS
EKG QTC CALCULATION (BAZETT): 490 MS
EKG QTC CALCULATION (BAZETT): 492 MS
EKG QTC CALCULATION (BAZETT): 496 MS
EKG QTC CALCULATION (BAZETT): 502 MS
EKG QTC CALCULATION (BAZETT): 503 MS
EKG R AXIS: 57 DEGREES
EKG R AXIS: 64 DEGREES
EKG R AXIS: 64 DEGREES
EKG R AXIS: 67 DEGREES
EKG R AXIS: 67 DEGREES
EKG R AXIS: 73 DEGREES
EKG R AXIS: 76 DEGREES
EKG R AXIS: 86 DEGREES
EKG T AXIS: 24 DEGREES
EKG T AXIS: 52 DEGREES
EKG T AXIS: 55 DEGREES
EKG T AXIS: 57 DEGREES
EKG T AXIS: 59 DEGREES
EKG T AXIS: 75 DEGREES
EKG T AXIS: 85 DEGREES
EKG VENTRICULAR RATE: 111 BPM
EKG VENTRICULAR RATE: 74 BPM
EKG VENTRICULAR RATE: 76 BPM
EKG VENTRICULAR RATE: 89 BPM
EKG VENTRICULAR RATE: 92 BPM
EKG VENTRICULAR RATE: 93 BPM
EKG VENTRICULAR RATE: 94 BPM
EKG VENTRICULAR RATE: 94 BPM
EOSINOPHILS ABSOLUTE: 0.08 E9/L (ref 0.05–0.5)
EOSINOPHILS ABSOLUTE: 0.1 E9/L (ref 0.05–0.5)
EOSINOPHILS ABSOLUTE: 0.11 E9/L (ref 0.05–0.5)
EOSINOPHILS ABSOLUTE: 0.28 E9/L (ref 0.05–0.5)
EOSINOPHILS ABSOLUTE: 0.3 E9/L (ref 0.05–0.5)
EOSINOPHILS ABSOLUTE: 0.3 E9/L (ref 0.05–0.5)
EOSINOPHILS ABSOLUTE: 0.32 E9/L (ref 0.05–0.5)
EOSINOPHILS ABSOLUTE: 0.36 E9/L (ref 0.05–0.5)
EOSINOPHILS RELATIVE PERCENT: 0.8 % (ref 0–6)
EOSINOPHILS RELATIVE PERCENT: 1 % (ref 0–6)
EOSINOPHILS RELATIVE PERCENT: 1 % (ref 0–6)
EOSINOPHILS RELATIVE PERCENT: 1.7 % (ref 0–6)
EOSINOPHILS RELATIVE PERCENT: 2.3 % (ref 0–6)
EOSINOPHILS RELATIVE PERCENT: 2.4 % (ref 0–6)
EOSINOPHILS RELATIVE PERCENT: 2.9 % (ref 0–6)
EOSINOPHILS RELATIVE PERCENT: 3.2 % (ref 0–6)
FERRITIN: 537 NG/ML
FERRITIN: 795 NG/ML
FIO2: 100 %
FIO2: 60 %
FIO2: 90 %
FLUID TYPE: NORMAL
FUNGUS (MYCOLOGY) CULTURE: NORMAL
FUNGUS STAIN: NORMAL
GFR AFRICAN AMERICAN: 11
GFR AFRICAN AMERICAN: 11
GFR AFRICAN AMERICAN: 12
GFR AFRICAN AMERICAN: 13
GFR AFRICAN AMERICAN: 15
GFR AFRICAN AMERICAN: 17
GFR AFRICAN AMERICAN: 18
GFR AFRICAN AMERICAN: 19
GFR AFRICAN AMERICAN: 19
GFR AFRICAN AMERICAN: 23
GFR AFRICAN AMERICAN: 24
GFR AFRICAN AMERICAN: 25
GFR AFRICAN AMERICAN: 26
GFR AFRICAN AMERICAN: 26
GFR AFRICAN AMERICAN: 27
GFR AFRICAN AMERICAN: 28
GFR AFRICAN AMERICAN: 29
GFR AFRICAN AMERICAN: 30
GFR AFRICAN AMERICAN: 32
GFR AFRICAN AMERICAN: 33
GFR AFRICAN AMERICAN: 35
GFR AFRICAN AMERICAN: 39
GFR AFRICAN AMERICAN: 39
GFR NON-AFRICAN AMERICAN: 10 ML/MIN/1.73
GFR NON-AFRICAN AMERICAN: 11 ML/MIN/1.73
GFR NON-AFRICAN AMERICAN: 12 ML/MIN/1.73
GFR NON-AFRICAN AMERICAN: 14 ML/MIN/1.73
GFR NON-AFRICAN AMERICAN: 15 ML/MIN/1.73
GFR NON-AFRICAN AMERICAN: 15 ML/MIN/1.73
GFR NON-AFRICAN AMERICAN: 16 ML/MIN/1.73
GFR NON-AFRICAN AMERICAN: 19 ML/MIN/1.73
GFR NON-AFRICAN AMERICAN: 20 ML/MIN/1.73
GFR NON-AFRICAN AMERICAN: 21 ML/MIN/1.73
GFR NON-AFRICAN AMERICAN: 21 ML/MIN/1.73
GFR NON-AFRICAN AMERICAN: 22 ML/MIN/1.73
GFR NON-AFRICAN AMERICAN: 23 ML/MIN/1.73
GFR NON-AFRICAN AMERICAN: 24 ML/MIN/1.73
GFR NON-AFRICAN AMERICAN: 25 ML/MIN/1.73
GFR NON-AFRICAN AMERICAN: 26 ML/MIN/1.73
GFR NON-AFRICAN AMERICAN: 28 ML/MIN/1.73
GFR NON-AFRICAN AMERICAN: 29 ML/MIN/1.73
GFR NON-AFRICAN AMERICAN: 32 ML/MIN/1.73
GFR NON-AFRICAN AMERICAN: 32 ML/MIN/1.73
GFR NON-AFRICAN AMERICAN: 9 ML/MIN/1.73
GFR NON-AFRICAN AMERICAN: 9 ML/MIN/1.73
GLUCOSE BLD-MCNC: 100 MG/DL (ref 74–99)
GLUCOSE BLD-MCNC: 102 MG/DL (ref 74–99)
GLUCOSE BLD-MCNC: 104 MG/DL (ref 74–99)
GLUCOSE BLD-MCNC: 106 MG/DL (ref 74–99)
GLUCOSE BLD-MCNC: 109 MG/DL (ref 74–99)
GLUCOSE BLD-MCNC: 109 MG/DL (ref 74–99)
GLUCOSE BLD-MCNC: 111 MG/DL (ref 74–99)
GLUCOSE BLD-MCNC: 112 MG/DL (ref 74–99)
GLUCOSE BLD-MCNC: 112 MG/DL (ref 74–99)
GLUCOSE BLD-MCNC: 113 MG/DL (ref 74–99)
GLUCOSE BLD-MCNC: 116 MG/DL (ref 74–99)
GLUCOSE BLD-MCNC: 117 MG/DL (ref 74–99)
GLUCOSE BLD-MCNC: 121 MG/DL (ref 74–99)
GLUCOSE BLD-MCNC: 122 MG/DL (ref 74–99)
GLUCOSE BLD-MCNC: 125 MG/DL (ref 74–99)
GLUCOSE BLD-MCNC: 127 MG/DL (ref 74–99)
GLUCOSE BLD-MCNC: 130 MG/DL (ref 74–99)
GLUCOSE BLD-MCNC: 132 MG/DL (ref 74–99)
GLUCOSE BLD-MCNC: 133 MG/DL (ref 74–99)
GLUCOSE BLD-MCNC: 146 MG/DL (ref 74–99)
GLUCOSE BLD-MCNC: 154 MG/DL (ref 74–99)
GLUCOSE BLD-MCNC: 164 MG/DL (ref 74–99)
GLUCOSE BLD-MCNC: 179 MG/DL (ref 74–99)
GLUCOSE BLD-MCNC: 210 MG/DL (ref 74–99)
GLUCOSE BLD-MCNC: 220 MG/DL (ref 74–99)
GLUCOSE BLD-MCNC: 28 MG/DL (ref 74–99)
GLUCOSE BLD-MCNC: 41 MG/DL (ref 74–99)
GLUCOSE BLD-MCNC: 50 MG/DL (ref 74–99)
GLUCOSE BLD-MCNC: 50 MG/DL (ref 74–99)
GLUCOSE BLD-MCNC: 56 MG/DL (ref 74–99)
GLUCOSE BLD-MCNC: 59 MG/DL (ref 74–99)
GLUCOSE BLD-MCNC: 61 MG/DL (ref 74–99)
GLUCOSE BLD-MCNC: 62 MG/DL (ref 74–99)
GLUCOSE BLD-MCNC: 63 MG/DL (ref 74–99)
GLUCOSE BLD-MCNC: 65 MG/DL (ref 74–99)
GLUCOSE BLD-MCNC: 69 MG/DL (ref 74–99)
GLUCOSE BLD-MCNC: 69 MG/DL (ref 74–99)
GLUCOSE BLD-MCNC: 70 MG/DL (ref 74–99)
GLUCOSE BLD-MCNC: 71 MG/DL (ref 74–99)
GLUCOSE BLD-MCNC: 71 MG/DL (ref 74–99)
GLUCOSE BLD-MCNC: 73 MG/DL (ref 74–99)
GLUCOSE BLD-MCNC: 74 MG/DL (ref 74–99)
GLUCOSE BLD-MCNC: 75 MG/DL (ref 74–99)
GLUCOSE BLD-MCNC: 75 MG/DL (ref 74–99)
GLUCOSE BLD-MCNC: 77 MG/DL (ref 74–99)
GLUCOSE BLD-MCNC: 78 MG/DL (ref 74–99)
GLUCOSE BLD-MCNC: 79 MG/DL (ref 74–99)
GLUCOSE BLD-MCNC: 79 MG/DL (ref 74–99)
GLUCOSE BLD-MCNC: 80 MG/DL (ref 74–99)
GLUCOSE BLD-MCNC: 81 MG/DL (ref 74–99)
GLUCOSE BLD-MCNC: 83 MG/DL (ref 74–99)
GLUCOSE BLD-MCNC: 83 MG/DL (ref 74–99)
GLUCOSE BLD-MCNC: 84 MG/DL (ref 74–99)
GLUCOSE BLD-MCNC: 84 MG/DL (ref 74–99)
GLUCOSE BLD-MCNC: 85 MG/DL (ref 74–99)
GLUCOSE BLD-MCNC: 85 MG/DL (ref 74–99)
GLUCOSE BLD-MCNC: 86 MG/DL (ref 74–99)
GLUCOSE BLD-MCNC: 86 MG/DL (ref 74–99)
GLUCOSE BLD-MCNC: 89 MG/DL (ref 74–99)
GLUCOSE BLD-MCNC: 90 MG/DL (ref 74–99)
GLUCOSE BLD-MCNC: 93 MG/DL (ref 74–99)
GLUCOSE BLD-MCNC: 94 MG/DL (ref 74–99)
GLUCOSE BLD-MCNC: 94 MG/DL (ref 74–99)
GLUCOSE BLD-MCNC: 95 MG/DL (ref 74–99)
GLUCOSE BLD-MCNC: 97 MG/DL (ref 74–99)
GLUCOSE URINE: NEGATIVE MG/DL
GLUCOSE, FLUID: 143 MG/DL
GLUCOSE, FLUID: 150 MG/DL
GLUCOSE, FLUID: 86 MG/DL
GRAM STAIN ORDERABLE: NORMAL
GRAM STAIN RESULT: NORMAL
HAV IGM SER IA-ACNC: NORMAL
HBA1C MFR BLD: 6.4 % (ref 4–5.6)
HBV SURFACE AB TITR SER: NORMAL {TITER}
HCO3: 23.3 MMOL/L (ref 22–26)
HCO3: 24.7 MMOL/L (ref 22–26)
HCO3: 25.2 MMOL/L (ref 22–26)
HCO3: 25.7 MMOL/L (ref 22–26)
HCO3: 26.3 MMOL/L (ref 22–26)
HCT VFR BLD CALC: 30 % (ref 37–54)
HCT VFR BLD CALC: 30.9 % (ref 37–54)
HCT VFR BLD CALC: 31.1 % (ref 37–54)
HCT VFR BLD CALC: 31.2 % (ref 37–54)
HCT VFR BLD CALC: 31.2 % (ref 37–54)
HCT VFR BLD CALC: 31.4 % (ref 37–54)
HCT VFR BLD CALC: 31.5 % (ref 37–54)
HCT VFR BLD CALC: 31.6 % (ref 37–54)
HCT VFR BLD CALC: 31.7 % (ref 37–54)
HCT VFR BLD CALC: 31.8 % (ref 37–54)
HCT VFR BLD CALC: 31.9 % (ref 37–54)
HCT VFR BLD CALC: 32.6 % (ref 37–54)
HCT VFR BLD CALC: 32.9 % (ref 37–54)
HCT VFR BLD CALC: 33.4 % (ref 37–54)
HCT VFR BLD CALC: 33.6 % (ref 37–54)
HCT VFR BLD CALC: 33.7 % (ref 37–54)
HCT VFR BLD CALC: 33.8 % (ref 37–54)
HCT VFR BLD CALC: 33.9 % (ref 37–54)
HCT VFR BLD CALC: 34 % (ref 37–54)
HCT VFR BLD CALC: 34.1 % (ref 37–54)
HCT VFR BLD CALC: 34.3 % (ref 37–54)
HCT VFR BLD CALC: 34.4 % (ref 37–54)
HCT VFR BLD CALC: 34.5 % (ref 37–54)
HCT VFR BLD CALC: 34.6 % (ref 37–54)
HCT VFR BLD CALC: 34.7 % (ref 37–54)
HCT VFR BLD CALC: 34.8 % (ref 37–54)
HCT VFR BLD CALC: 35.1 % (ref 37–54)
HCT VFR BLD CALC: 35.2 % (ref 37–54)
HCT VFR BLD CALC: 35.4 % (ref 37–54)
HCT VFR BLD CALC: 35.5 % (ref 37–54)
HCT VFR BLD CALC: 35.8 % (ref 37–54)
HCT VFR BLD CALC: 35.8 % (ref 37–54)
HCT VFR BLD CALC: 36 % (ref 37–54)
HCT VFR BLD CALC: 36 % (ref 37–54)
HCT VFR BLD CALC: 36.1 % (ref 37–54)
HCT VFR BLD CALC: 36.2 % (ref 37–54)
HCT VFR BLD CALC: 36.2 % (ref 37–54)
HCT VFR BLD CALC: 36.3 % (ref 37–54)
HCT VFR BLD CALC: 36.7 % (ref 37–54)
HCT VFR BLD CALC: 36.8 % (ref 37–54)
HCT VFR BLD CALC: 36.8 % (ref 37–54)
HCT VFR BLD CALC: 37 % (ref 37–54)
HCT VFR BLD CALC: 37.2 % (ref 37–54)
HCT VFR BLD CALC: 37.3 % (ref 37–54)
HCT VFR BLD CALC: 37.6 % (ref 37–54)
HCT VFR BLD CALC: 38 % (ref 37–54)
HCT VFR BLD CALC: 38.1 % (ref 37–54)
HCT VFR BLD CALC: 38.2 % (ref 37–54)
HCT VFR BLD CALC: 38.4 % (ref 37–54)
HCT VFR BLD CALC: 38.8 % (ref 37–54)
HCT VFR BLD CALC: 38.9 % (ref 37–54)
HCT VFR BLD CALC: 39 % (ref 37–54)
HCT VFR BLD CALC: 39.1 % (ref 37–54)
HCT VFR BLD CALC: 39.3 % (ref 37–54)
HCT VFR BLD CALC: 39.3 % (ref 37–54)
HCT VFR BLD CALC: 39.9 % (ref 37–54)
HCT VFR BLD CALC: 41.2 % (ref 37–54)
HCT VFR BLD CALC: 43.5 % (ref 37–54)
HDLC SERPL-MCNC: 24 MG/DL
HEMOGLOBIN: 10 G/DL (ref 12.5–16.5)
HEMOGLOBIN: 10.2 G/DL (ref 12.5–16.5)
HEMOGLOBIN: 10.2 G/DL (ref 12.5–16.5)
HEMOGLOBIN: 10.3 G/DL (ref 12.5–16.5)
HEMOGLOBIN: 10.3 G/DL (ref 12.5–16.5)
HEMOGLOBIN: 10.5 G/DL (ref 12.5–16.5)
HEMOGLOBIN: 10.5 G/DL (ref 12.5–16.5)
HEMOGLOBIN: 10.6 G/DL (ref 12.5–16.5)
HEMOGLOBIN: 10.7 G/DL (ref 12.5–16.5)
HEMOGLOBIN: 10.9 G/DL (ref 12.5–16.5)
HEMOGLOBIN: 11 G/DL (ref 12.5–16.5)
HEMOGLOBIN: 11.1 G/DL (ref 12.5–16.5)
HEMOGLOBIN: 11.2 G/DL (ref 12.5–16.5)
HEMOGLOBIN: 11.3 G/DL (ref 12.5–16.5)
HEMOGLOBIN: 11.5 G/DL (ref 12.5–16.5)
HEMOGLOBIN: 11.6 G/DL (ref 12.5–16.5)
HEMOGLOBIN: 11.7 G/DL (ref 12.5–16.5)
HEMOGLOBIN: 11.9 G/DL (ref 12.5–16.5)
HEMOGLOBIN: 12.1 G/DL (ref 12.5–16.5)
HEMOGLOBIN: 12.2 G/DL (ref 12.5–16.5)
HEMOGLOBIN: 12.2 G/DL (ref 12.5–16.5)
HEMOGLOBIN: 12.4 G/DL (ref 12.5–16.5)
HEMOGLOBIN: 12.4 G/DL (ref 12.5–16.5)
HEMOGLOBIN: 12.8 G/DL (ref 12.5–16.5)
HEMOGLOBIN: 13.4 G/DL (ref 12.5–16.5)
HEMOGLOBIN: 9.3 G/DL (ref 12.5–16.5)
HEMOGLOBIN: 9.3 G/DL (ref 12.5–16.5)
HEMOGLOBIN: 9.4 G/DL (ref 12.5–16.5)
HEMOGLOBIN: 9.4 G/DL (ref 12.5–16.5)
HEMOGLOBIN: 9.5 G/DL (ref 12.5–16.5)
HEMOGLOBIN: 9.5 G/DL (ref 12.5–16.5)
HEMOGLOBIN: 9.6 G/DL (ref 12.5–16.5)
HEMOGLOBIN: 9.6 G/DL (ref 12.5–16.5)
HEMOGLOBIN: 9.7 G/DL (ref 12.5–16.5)
HEMOGLOBIN: 9.7 G/DL (ref 12.5–16.5)
HEMOGLOBIN: 9.8 G/DL (ref 12.5–16.5)
HEMOGLOBIN: 9.9 G/DL (ref 12.5–16.5)
HEMOGLOBIN: 9.9 G/DL (ref 12.5–16.5)
HEPATITIS B CORE IGM ANTIBODY: NORMAL
HEPATITIS B SURFACE ANTIGEN INTERPRETATION: NORMAL
HEPATITIS C ANTIBODY INTERPRETATION: NORMAL
HHB: 0.9 % (ref 0–5)
HHB: 2.3 % (ref 0–5)
HHB: 4 % (ref 0–5)
HHB: 4.2 % (ref 0–5)
HHB: 8.8 % (ref 0–5)
HUMAN METAPNEUMOVIRUS BY PCR: NOT DETECTED
HUMAN RHINOVIRUS/ENTEROVIRUS BY PCR: NOT DETECTED
HYPOCHROMIA: ABNORMAL
IGG: 1134 MG/DL (ref 700–1600)
IMMATURE GRANULOCYTES #: 0.07 E9/L
IMMATURE GRANULOCYTES #: 0.09 E9/L
IMMATURE GRANULOCYTES #: 0.13 E9/L
IMMATURE GRANULOCYTES #: 0.16 E9/L
IMMATURE GRANULOCYTES #: 0.2 E9/L
IMMATURE GRANULOCYTES #: 0.23 E9/L
IMMATURE GRANULOCYTES #: 0.28 E9/L
IMMATURE GRANULOCYTES #: 0.7 E9/L
IMMATURE GRANULOCYTES %: 0.7 % (ref 0–5)
IMMATURE GRANULOCYTES %: 0.8 % (ref 0–5)
IMMATURE GRANULOCYTES %: 0.8 % (ref 0–5)
IMMATURE GRANULOCYTES %: 1.5 % (ref 0–5)
IMMATURE GRANULOCYTES %: 2 % (ref 0–5)
IMMATURE GRANULOCYTES %: 2 % (ref 0–5)
IMMATURE GRANULOCYTES %: 2.8 % (ref 0–5)
IMMATURE GRANULOCYTES %: 4 % (ref 0–5)
INFLUENZA A BY PCR: NOT DETECTED
INFLUENZA B BY PCR: NOT DETECTED
INR BLD: 1.1
KETONES, URINE: NEGATIVE MG/DL
L. PNEUMOPHILA SEROGP 1 UR AG: NORMAL
L. PNEUMOPHILA SEROGP 1 UR AG: NORMAL
LAB: ABNORMAL
LAB: NORMAL
LAB: NORMAL
LACTIC ACID, SEPSIS: 0.9 MMOL/L (ref 0.5–1.9)
LACTIC ACID, SEPSIS: 1.2 MMOL/L (ref 0.5–1.9)
LACTIC ACID, SEPSIS: 1.3 MMOL/L (ref 0.5–1.9)
LACTIC ACID: 1.3 MMOL/L (ref 0.5–2.2)
LACTIC ACID: 1.6 MMOL/L (ref 0.5–2.2)
LD, FLUID: 126 U/L
LD, FLUID: 129 U/L
LD, FLUID: 129 U/L
LDL CHOLESTEROL CALCULATED: 24 MG/DL (ref 0–99)
LEUKOCYTE ESTERASE, URINE: ABNORMAL
LEUKOCYTE ESTERASE, URINE: ABNORMAL
LEUKOCYTE ESTERASE, URINE: NEGATIVE
LIPASE: 25 U/L (ref 13–60)
LV EF: 70 %
LVEF MODALITY: NORMAL
LYMPHOCYTES ABSOLUTE: 1.48 E9/L (ref 1.5–4)
LYMPHOCYTES ABSOLUTE: 1.82 E9/L (ref 1.5–4)
LYMPHOCYTES ABSOLUTE: 1.89 E9/L (ref 1.5–4)
LYMPHOCYTES ABSOLUTE: 2.23 E9/L (ref 1.5–4)
LYMPHOCYTES ABSOLUTE: 2.45 E9/L (ref 1.5–4)
LYMPHOCYTES ABSOLUTE: 2.58 E9/L (ref 1.5–4)
LYMPHOCYTES ABSOLUTE: 2.77 E9/L (ref 1.5–4)
LYMPHOCYTES ABSOLUTE: 2.87 E9/L (ref 1.5–4)
LYMPHOCYTES RELATIVE PERCENT: 12.9 % (ref 20–42)
LYMPHOCYTES RELATIVE PERCENT: 16 % (ref 20–42)
LYMPHOCYTES RELATIVE PERCENT: 16.7 % (ref 20–42)
LYMPHOCYTES RELATIVE PERCENT: 18.2 % (ref 20–42)
LYMPHOCYTES RELATIVE PERCENT: 19.9 % (ref 20–42)
LYMPHOCYTES RELATIVE PERCENT: 20.3 % (ref 20–42)
LYMPHOCYTES RELATIVE PERCENT: 24.3 % (ref 20–42)
LYMPHOCYTES RELATIVE PERCENT: 24.5 % (ref 20–42)
Lab: ABNORMAL
Lab: NORMAL
MAGNESIUM: 2 MG/DL (ref 1.6–2.6)
MAGNESIUM: 2 MG/DL (ref 1.6–2.6)
MAGNESIUM: 2.1 MG/DL (ref 1.6–2.6)
MAGNESIUM: 2.1 MG/DL (ref 1.6–2.6)
MAGNESIUM: 2.2 MG/DL (ref 1.6–2.6)
MAGNESIUM: 2.2 MG/DL (ref 1.6–2.6)
MAGNESIUM: 2.3 MG/DL (ref 1.6–2.6)
MAGNESIUM: 2.4 MG/DL (ref 1.6–2.6)
MAGNESIUM: 2.5 MG/DL (ref 1.6–2.6)
MCH RBC QN AUTO: 28.1 PG (ref 26–35)
MCH RBC QN AUTO: 28.1 PG (ref 26–35)
MCH RBC QN AUTO: 28.2 PG (ref 26–35)
MCH RBC QN AUTO: 28.2 PG (ref 26–35)
MCH RBC QN AUTO: 28.3 PG (ref 26–35)
MCH RBC QN AUTO: 28.4 PG (ref 26–35)
MCH RBC QN AUTO: 28.5 PG (ref 26–35)
MCH RBC QN AUTO: 28.6 PG (ref 26–35)
MCH RBC QN AUTO: 28.7 PG (ref 26–35)
MCH RBC QN AUTO: 28.8 PG (ref 26–35)
MCH RBC QN AUTO: 28.9 PG (ref 26–35)
MCH RBC QN AUTO: 29 PG (ref 26–35)
MCH RBC QN AUTO: 29.1 PG (ref 26–35)
MCH RBC QN AUTO: 29.2 PG (ref 26–35)
MCH RBC QN AUTO: 29.3 PG (ref 26–35)
MCH RBC QN AUTO: 29.3 PG (ref 26–35)
MCH RBC QN AUTO: 29.4 PG (ref 26–35)
MCH RBC QN AUTO: 29.5 PG (ref 26–35)
MCH RBC QN AUTO: 29.5 PG (ref 26–35)
MCH RBC QN AUTO: 29.6 PG (ref 26–35)
MCH RBC QN AUTO: 29.8 PG (ref 26–35)
MCHC RBC AUTO-ENTMCNC: 27.8 % (ref 32–34.5)
MCHC RBC AUTO-ENTMCNC: 28.5 % (ref 32–34.5)
MCHC RBC AUTO-ENTMCNC: 28.9 % (ref 32–34.5)
MCHC RBC AUTO-ENTMCNC: 29.6 % (ref 32–34.5)
MCHC RBC AUTO-ENTMCNC: 29.8 % (ref 32–34.5)
MCHC RBC AUTO-ENTMCNC: 29.8 % (ref 32–34.5)
MCHC RBC AUTO-ENTMCNC: 30.1 % (ref 32–34.5)
MCHC RBC AUTO-ENTMCNC: 30.2 % (ref 32–34.5)
MCHC RBC AUTO-ENTMCNC: 30.3 % (ref 32–34.5)
MCHC RBC AUTO-ENTMCNC: 30.4 % (ref 32–34.5)
MCHC RBC AUTO-ENTMCNC: 30.5 % (ref 32–34.5)
MCHC RBC AUTO-ENTMCNC: 30.6 % (ref 32–34.5)
MCHC RBC AUTO-ENTMCNC: 30.7 % (ref 32–34.5)
MCHC RBC AUTO-ENTMCNC: 30.8 % (ref 32–34.5)
MCHC RBC AUTO-ENTMCNC: 30.9 % (ref 32–34.5)
MCHC RBC AUTO-ENTMCNC: 31 % (ref 32–34.5)
MCHC RBC AUTO-ENTMCNC: 31.1 % (ref 32–34.5)
MCHC RBC AUTO-ENTMCNC: 31.2 % (ref 32–34.5)
MCHC RBC AUTO-ENTMCNC: 31.3 % (ref 32–34.5)
MCHC RBC AUTO-ENTMCNC: 31.4 % (ref 32–34.5)
MCHC RBC AUTO-ENTMCNC: 31.5 % (ref 32–34.5)
MCHC RBC AUTO-ENTMCNC: 31.6 % (ref 32–34.5)
MCHC RBC AUTO-ENTMCNC: 31.7 % (ref 32–34.5)
MCHC RBC AUTO-ENTMCNC: 31.8 % (ref 32–34.5)
MCHC RBC AUTO-ENTMCNC: 31.9 % (ref 32–34.5)
MCHC RBC AUTO-ENTMCNC: 32 % (ref 32–34.5)
MCHC RBC AUTO-ENTMCNC: 32 % (ref 32–34.5)
MCHC RBC AUTO-ENTMCNC: 32.1 % (ref 32–34.5)
MCV RBC AUTO: 101.9 FL (ref 80–99.9)
MCV RBC AUTO: 107 FL (ref 80–99.9)
MCV RBC AUTO: 88.7 FL (ref 80–99.9)
MCV RBC AUTO: 89.5 FL (ref 80–99.9)
MCV RBC AUTO: 89.6 FL (ref 80–99.9)
MCV RBC AUTO: 89.7 FL (ref 80–99.9)
MCV RBC AUTO: 89.8 FL (ref 80–99.9)
MCV RBC AUTO: 89.9 FL (ref 80–99.9)
MCV RBC AUTO: 89.9 FL (ref 80–99.9)
MCV RBC AUTO: 90.2 FL (ref 80–99.9)
MCV RBC AUTO: 90.3 FL (ref 80–99.9)
MCV RBC AUTO: 90.5 FL (ref 80–99.9)
MCV RBC AUTO: 90.6 FL (ref 80–99.9)
MCV RBC AUTO: 90.7 FL (ref 80–99.9)
MCV RBC AUTO: 91.1 FL (ref 80–99.9)
MCV RBC AUTO: 91.1 FL (ref 80–99.9)
MCV RBC AUTO: 91.3 FL (ref 80–99.9)
MCV RBC AUTO: 91.4 FL (ref 80–99.9)
MCV RBC AUTO: 91.5 FL (ref 80–99.9)
MCV RBC AUTO: 91.6 FL (ref 80–99.9)
MCV RBC AUTO: 91.6 FL (ref 80–99.9)
MCV RBC AUTO: 91.7 FL (ref 80–99.9)
MCV RBC AUTO: 91.8 FL (ref 80–99.9)
MCV RBC AUTO: 92 FL (ref 80–99.9)
MCV RBC AUTO: 92.1 FL (ref 80–99.9)
MCV RBC AUTO: 92.2 FL (ref 80–99.9)
MCV RBC AUTO: 92.4 FL (ref 80–99.9)
MCV RBC AUTO: 92.5 FL (ref 80–99.9)
MCV RBC AUTO: 92.7 FL (ref 80–99.9)
MCV RBC AUTO: 92.8 FL (ref 80–99.9)
MCV RBC AUTO: 92.9 FL (ref 80–99.9)
MCV RBC AUTO: 93.1 FL (ref 80–99.9)
MCV RBC AUTO: 93.4 FL (ref 80–99.9)
MCV RBC AUTO: 93.5 FL (ref 80–99.9)
MCV RBC AUTO: 93.6 FL (ref 80–99.9)
MCV RBC AUTO: 93.7 FL (ref 80–99.9)
MCV RBC AUTO: 93.8 FL (ref 80–99.9)
MCV RBC AUTO: 93.8 FL (ref 80–99.9)
MCV RBC AUTO: 93.9 FL (ref 80–99.9)
MCV RBC AUTO: 93.9 FL (ref 80–99.9)
MCV RBC AUTO: 94 FL (ref 80–99.9)
MCV RBC AUTO: 94.3 FL (ref 80–99.9)
MCV RBC AUTO: 95.2 FL (ref 80–99.9)
MCV RBC AUTO: 95.3 FL (ref 80–99.9)
MCV RBC AUTO: 95.5 FL (ref 80–99.9)
MCV RBC AUTO: 95.7 FL (ref 80–99.9)
MCV RBC AUTO: 96.3 FL (ref 80–99.9)
MCV RBC AUTO: 96.5 FL (ref 80–99.9)
MCV RBC AUTO: 96.8 FL (ref 80–99.9)
MCV RBC AUTO: 97 FL (ref 80–99.9)
MCV RBC AUTO: 97.8 FL (ref 80–99.9)
MCV RBC AUTO: 98 FL (ref 80–99.9)
MCV RBC AUTO: 99.4 FL (ref 80–99.9)
METER GLUCOSE: 100 MG/DL (ref 74–99)
METER GLUCOSE: 101 MG/DL (ref 74–99)
METER GLUCOSE: 102 MG/DL (ref 74–99)
METER GLUCOSE: 103 MG/DL (ref 74–99)
METER GLUCOSE: 104 MG/DL (ref 74–99)
METER GLUCOSE: 105 MG/DL (ref 74–99)
METER GLUCOSE: 105 MG/DL (ref 74–99)
METER GLUCOSE: 106 MG/DL (ref 74–99)
METER GLUCOSE: 106 MG/DL (ref 74–99)
METER GLUCOSE: 107 MG/DL (ref 74–99)
METER GLUCOSE: 107 MG/DL (ref 74–99)
METER GLUCOSE: 108 MG/DL (ref 74–99)
METER GLUCOSE: 109 MG/DL (ref 74–99)
METER GLUCOSE: 110 MG/DL (ref 74–99)
METER GLUCOSE: 111 MG/DL (ref 74–99)
METER GLUCOSE: 112 MG/DL (ref 74–99)
METER GLUCOSE: 112 MG/DL (ref 74–99)
METER GLUCOSE: 113 MG/DL (ref 74–99)
METER GLUCOSE: 113 MG/DL (ref 74–99)
METER GLUCOSE: 114 MG/DL (ref 74–99)
METER GLUCOSE: 116 MG/DL (ref 74–99)
METER GLUCOSE: 117 MG/DL (ref 74–99)
METER GLUCOSE: 118 MG/DL (ref 74–99)
METER GLUCOSE: 118 MG/DL (ref 74–99)
METER GLUCOSE: 119 MG/DL (ref 74–99)
METER GLUCOSE: 119 MG/DL (ref 74–99)
METER GLUCOSE: 120 MG/DL (ref 74–99)
METER GLUCOSE: 121 MG/DL (ref 74–99)
METER GLUCOSE: 123 MG/DL (ref 74–99)
METER GLUCOSE: 124 MG/DL (ref 74–99)
METER GLUCOSE: 124 MG/DL (ref 74–99)
METER GLUCOSE: 125 MG/DL (ref 74–99)
METER GLUCOSE: 125 MG/DL (ref 74–99)
METER GLUCOSE: 126 MG/DL (ref 74–99)
METER GLUCOSE: 127 MG/DL (ref 74–99)
METER GLUCOSE: 127 MG/DL (ref 74–99)
METER GLUCOSE: 128 MG/DL (ref 74–99)
METER GLUCOSE: 129 MG/DL (ref 74–99)
METER GLUCOSE: 131 MG/DL (ref 74–99)
METER GLUCOSE: 131 MG/DL (ref 74–99)
METER GLUCOSE: 132 MG/DL (ref 74–99)
METER GLUCOSE: 132 MG/DL (ref 74–99)
METER GLUCOSE: 133 MG/DL (ref 74–99)
METER GLUCOSE: 134 MG/DL (ref 74–99)
METER GLUCOSE: 135 MG/DL (ref 74–99)
METER GLUCOSE: 136 MG/DL (ref 74–99)
METER GLUCOSE: 138 MG/DL (ref 74–99)
METER GLUCOSE: 139 MG/DL (ref 74–99)
METER GLUCOSE: 140 MG/DL (ref 74–99)
METER GLUCOSE: 140 MG/DL (ref 74–99)
METER GLUCOSE: 141 MG/DL (ref 74–99)
METER GLUCOSE: 142 MG/DL (ref 74–99)
METER GLUCOSE: 143 MG/DL (ref 74–99)
METER GLUCOSE: 144 MG/DL (ref 74–99)
METER GLUCOSE: 145 MG/DL (ref 74–99)
METER GLUCOSE: 146 MG/DL (ref 74–99)
METER GLUCOSE: 147 MG/DL (ref 74–99)
METER GLUCOSE: 149 MG/DL (ref 74–99)
METER GLUCOSE: 151 MG/DL (ref 74–99)
METER GLUCOSE: 152 MG/DL (ref 74–99)
METER GLUCOSE: 153 MG/DL (ref 74–99)
METER GLUCOSE: 155 MG/DL (ref 74–99)
METER GLUCOSE: 155 MG/DL (ref 74–99)
METER GLUCOSE: 156 MG/DL (ref 74–99)
METER GLUCOSE: 158 MG/DL (ref 74–99)
METER GLUCOSE: 159 MG/DL (ref 74–99)
METER GLUCOSE: 159 MG/DL (ref 74–99)
METER GLUCOSE: 161 MG/DL (ref 74–99)
METER GLUCOSE: 163 MG/DL (ref 74–99)
METER GLUCOSE: 163 MG/DL (ref 74–99)
METER GLUCOSE: 164 MG/DL (ref 74–99)
METER GLUCOSE: 164 MG/DL (ref 74–99)
METER GLUCOSE: 165 MG/DL (ref 74–99)
METER GLUCOSE: 166 MG/DL (ref 74–99)
METER GLUCOSE: 168 MG/DL (ref 74–99)
METER GLUCOSE: 168 MG/DL (ref 74–99)
METER GLUCOSE: 169 MG/DL (ref 74–99)
METER GLUCOSE: 169 MG/DL (ref 74–99)
METER GLUCOSE: 170 MG/DL (ref 74–99)
METER GLUCOSE: 170 MG/DL (ref 74–99)
METER GLUCOSE: 171 MG/DL (ref 74–99)
METER GLUCOSE: 175 MG/DL (ref 74–99)
METER GLUCOSE: 176 MG/DL (ref 74–99)
METER GLUCOSE: 176 MG/DL (ref 74–99)
METER GLUCOSE: 178 MG/DL (ref 74–99)
METER GLUCOSE: 178 MG/DL (ref 74–99)
METER GLUCOSE: 183 MG/DL (ref 74–99)
METER GLUCOSE: 184 MG/DL (ref 74–99)
METER GLUCOSE: 187 MG/DL (ref 74–99)
METER GLUCOSE: 189 MG/DL (ref 74–99)
METER GLUCOSE: 189 MG/DL (ref 74–99)
METER GLUCOSE: 191 MG/DL (ref 74–99)
METER GLUCOSE: 195 MG/DL (ref 74–99)
METER GLUCOSE: 197 MG/DL (ref 74–99)
METER GLUCOSE: 202 MG/DL (ref 74–99)
METER GLUCOSE: 202 MG/DL (ref 74–99)
METER GLUCOSE: 203 MG/DL (ref 74–99)
METER GLUCOSE: 204 MG/DL (ref 74–99)
METER GLUCOSE: 205 MG/DL (ref 74–99)
METER GLUCOSE: 207 MG/DL (ref 74–99)
METER GLUCOSE: 208 MG/DL (ref 74–99)
METER GLUCOSE: 210 MG/DL (ref 74–99)
METER GLUCOSE: 215 MG/DL (ref 74–99)
METER GLUCOSE: 221 MG/DL (ref 74–99)
METER GLUCOSE: 222 MG/DL (ref 74–99)
METER GLUCOSE: 222 MG/DL (ref 74–99)
METER GLUCOSE: 236 MG/DL (ref 74–99)
METER GLUCOSE: 239 MG/DL (ref 74–99)
METER GLUCOSE: 247 MG/DL (ref 74–99)
METER GLUCOSE: 263 MG/DL (ref 74–99)
METER GLUCOSE: 301 MG/DL (ref 74–99)
METER GLUCOSE: 43 MG/DL (ref 74–99)
METER GLUCOSE: 52 MG/DL (ref 74–99)
METER GLUCOSE: 52 MG/DL (ref 74–99)
METER GLUCOSE: 53 MG/DL (ref 74–99)
METER GLUCOSE: 55 MG/DL (ref 74–99)
METER GLUCOSE: 55 MG/DL (ref 74–99)
METER GLUCOSE: 56 MG/DL (ref 74–99)
METER GLUCOSE: 56 MG/DL (ref 74–99)
METER GLUCOSE: 57 MG/DL (ref 74–99)
METER GLUCOSE: 58 MG/DL (ref 74–99)
METER GLUCOSE: 62 MG/DL (ref 74–99)
METER GLUCOSE: 63 MG/DL (ref 74–99)
METER GLUCOSE: 65 MG/DL (ref 74–99)
METER GLUCOSE: 66 MG/DL (ref 74–99)
METER GLUCOSE: 68 MG/DL (ref 74–99)
METER GLUCOSE: 69 MG/DL (ref 74–99)
METER GLUCOSE: 69 MG/DL (ref 74–99)
METER GLUCOSE: 70 MG/DL (ref 74–99)
METER GLUCOSE: 72 MG/DL (ref 74–99)
METER GLUCOSE: 74 MG/DL (ref 74–99)
METER GLUCOSE: 75 MG/DL (ref 74–99)
METER GLUCOSE: 76 MG/DL (ref 74–99)
METER GLUCOSE: 76 MG/DL (ref 74–99)
METER GLUCOSE: 78 MG/DL (ref 74–99)
METER GLUCOSE: 79 MG/DL (ref 74–99)
METER GLUCOSE: 80 MG/DL (ref 74–99)
METER GLUCOSE: 81 MG/DL (ref 74–99)
METER GLUCOSE: 82 MG/DL (ref 74–99)
METER GLUCOSE: 83 MG/DL (ref 74–99)
METER GLUCOSE: 84 MG/DL (ref 74–99)
METER GLUCOSE: 85 MG/DL (ref 74–99)
METER GLUCOSE: 86 MG/DL (ref 74–99)
METER GLUCOSE: 87 MG/DL (ref 74–99)
METER GLUCOSE: 88 MG/DL (ref 74–99)
METER GLUCOSE: 89 MG/DL (ref 74–99)
METER GLUCOSE: 89 MG/DL (ref 74–99)
METER GLUCOSE: 90 MG/DL (ref 74–99)
METER GLUCOSE: 91 MG/DL (ref 74–99)
METER GLUCOSE: 92 MG/DL (ref 74–99)
METER GLUCOSE: 93 MG/DL (ref 74–99)
METER GLUCOSE: 93 MG/DL (ref 74–99)
METER GLUCOSE: 94 MG/DL (ref 74–99)
METER GLUCOSE: 96 MG/DL (ref 74–99)
METER GLUCOSE: 97 MG/DL (ref 74–99)
METER GLUCOSE: 98 MG/DL (ref 74–99)
METER GLUCOSE: 98 MG/DL (ref 74–99)
METER GLUCOSE: 99 MG/DL (ref 74–99)
METHB: 0 % (ref 0–1.5)
METHB: 0.2 % (ref 0–1.5)
METHB: 0.4 % (ref 0–1.5)
MODE: ABNORMAL
MONOCYTE, FLUID: 62 %
MONOCYTE, FLUID: 78 %
MONOCYTE, FLUID: 92 %
MONOCYTES ABSOLUTE: 1 E9/L (ref 0.1–0.95)
MONOCYTES ABSOLUTE: 1.09 E9/L (ref 0.1–0.95)
MONOCYTES ABSOLUTE: 1.1 E9/L (ref 0.1–0.95)
MONOCYTES ABSOLUTE: 1.24 E9/L (ref 0.1–0.95)
MONOCYTES ABSOLUTE: 1.39 E9/L (ref 0.1–0.95)
MONOCYTES ABSOLUTE: 1.52 E9/L (ref 0.1–0.95)
MONOCYTES ABSOLUTE: 1.58 E9/L (ref 0.1–0.95)
MONOCYTES ABSOLUTE: 1.65 E9/L (ref 0.1–0.95)
MONOCYTES RELATIVE PERCENT: 10 % (ref 2–12)
MONOCYTES RELATIVE PERCENT: 10.2 % (ref 2–12)
MONOCYTES RELATIVE PERCENT: 10.6 % (ref 2–12)
MONOCYTES RELATIVE PERCENT: 10.8 % (ref 2–12)
MONOCYTES RELATIVE PERCENT: 11.6 % (ref 2–12)
MONOCYTES RELATIVE PERCENT: 12.6 % (ref 2–12)
MONOCYTES RELATIVE PERCENT: 13.2 % (ref 2–12)
MONOCYTES RELATIVE PERCENT: 9.5 % (ref 2–12)
MYCOPLASMA PNEUMONIAE BY PCR: NOT DETECTED
NEUTROPHIL, FLUID: 22 %
NEUTROPHIL, FLUID: 38 %
NEUTROPHIL, FLUID: 9 %
NEUTROPHILS ABSOLUTE: 10.74 E9/L (ref 1.8–7.3)
NEUTROPHILS ABSOLUTE: 11.85 E9/L (ref 1.8–7.3)
NEUTROPHILS ABSOLUTE: 6.12 E9/L (ref 1.8–7.3)
NEUTROPHILS ABSOLUTE: 6.21 E9/L (ref 1.8–7.3)
NEUTROPHILS ABSOLUTE: 6.77 E9/L (ref 1.8–7.3)
NEUTROPHILS ABSOLUTE: 6.85 E9/L (ref 1.8–7.3)
NEUTROPHILS ABSOLUTE: 7.19 E9/L (ref 1.8–7.3)
NEUTROPHILS ABSOLUTE: 8.14 E9/L (ref 1.8–7.3)
NEUTROPHILS RELATIVE PERCENT: 61.3 % (ref 43–80)
NEUTROPHILS RELATIVE PERCENT: 61.7 % (ref 43–80)
NEUTROPHILS RELATIVE PERCENT: 62.2 % (ref 43–80)
NEUTROPHILS RELATIVE PERCENT: 64.3 % (ref 43–80)
NEUTROPHILS RELATIVE PERCENT: 67.9 % (ref 43–80)
NEUTROPHILS RELATIVE PERCENT: 68.4 % (ref 43–80)
NEUTROPHILS RELATIVE PERCENT: 69.6 % (ref 43–80)
NEUTROPHILS RELATIVE PERCENT: 70.6 % (ref 43–80)
NITRITE, URINE: NEGATIVE
NUCLEATED CELLS FLUID: 359 /UL
NUCLEATED CELLS FLUID: 515 /UL
NUCLEATED CELLS FLUID: 718 /UL
O2 CONTENT: 14 ML/DL
O2 CONTENT: 14.2 ML/DL
O2 CONTENT: 15.8 ML/DL
O2 SATURATION: 91.1 % (ref 92–98.5)
O2 SATURATION: 95.8 % (ref 92–98.5)
O2 SATURATION: 96.4 % (ref 92–98.5)
O2 SATURATION: 98 % (ref 92–98.5)
O2 SATURATION: 99.1 % (ref 92–98.5)
O2HB: 90.4 % (ref 94–97)
O2HB: 95.7 % (ref 94–97)
O2HB: 95.7 % (ref 94–97)
O2HB: 97.2 % (ref 94–97)
O2HB: 98.4 % (ref 94–97)
OPERATOR ID: 1394
OPERATOR ID: 2060
OPERATOR ID: 2067
OPERATOR ID: 2962
OPERATOR ID: 366
OPERATOR ID: 5100
OPERATOR ID: ABNORMAL
ORGANISM: ABNORMAL
OSMOLALITY URINE: 351 MOSM/KG (ref 300–900)
OVALOCYTES: ABNORMAL
OVALOCYTES: ABNORMAL
PARAINFLUENZA VIRUS 1 BY PCR: NOT DETECTED
PARAINFLUENZA VIRUS 2 BY PCR: NOT DETECTED
PARAINFLUENZA VIRUS 3 BY PCR: NOT DETECTED
PARAINFLUENZA VIRUS 4 BY PCR: NOT DETECTED
PARATHYROID HORMONE INTACT: 104 PG/ML (ref 15–65)
PARATHYROID HORMONE INTACT: 112 PG/ML (ref 15–65)
PARATHYROID HORMONE INTACT: 118 PG/ML (ref 15–65)
PATIENT TEMP: 37 C
PCO2: 36.1 MMHG (ref 35–45)
PCO2: 36.8 MMHG (ref 35–45)
PCO2: 38.2 MMHG (ref 35–45)
PCO2: 39.3 MMHG (ref 35–45)
PCO2: 43 MMHG (ref 35–45)
PDW BLD-RTO: 14.5 FL (ref 11.5–15)
PDW BLD-RTO: 14.7 FL (ref 11.5–15)
PDW BLD-RTO: 14.8 FL (ref 11.5–15)
PDW BLD-RTO: 14.8 FL (ref 11.5–15)
PDW BLD-RTO: 14.9 FL (ref 11.5–15)
PDW BLD-RTO: 15 FL (ref 11.5–15)
PDW BLD-RTO: 15 FL (ref 11.5–15)
PDW BLD-RTO: 15.1 FL (ref 11.5–15)
PDW BLD-RTO: 15.2 FL (ref 11.5–15)
PDW BLD-RTO: 15.2 FL (ref 11.5–15)
PDW BLD-RTO: 15.3 FL (ref 11.5–15)
PDW BLD-RTO: 15.9 FL (ref 11.5–15)
PDW BLD-RTO: 16.3 FL (ref 11.5–15)
PDW BLD-RTO: 16.4 FL (ref 11.5–15)
PDW BLD-RTO: 16.4 FL (ref 11.5–15)
PDW BLD-RTO: 16.5 FL (ref 11.5–15)
PDW BLD-RTO: 16.6 FL (ref 11.5–15)
PDW BLD-RTO: 16.7 FL (ref 11.5–15)
PDW BLD-RTO: 16.8 FL (ref 11.5–15)
PDW BLD-RTO: 16.9 FL (ref 11.5–15)
PDW BLD-RTO: 17.1 FL (ref 11.5–15)
PDW BLD-RTO: 17.2 FL (ref 11.5–15)
PDW BLD-RTO: 17.3 FL (ref 11.5–15)
PDW BLD-RTO: 17.4 FL (ref 11.5–15)
PDW BLD-RTO: 17.5 FL (ref 11.5–15)
PDW BLD-RTO: 17.5 FL (ref 11.5–15)
PDW BLD-RTO: 17.6 FL (ref 11.5–15)
PDW BLD-RTO: 17.7 FL (ref 11.5–15)
PDW BLD-RTO: 17.8 FL (ref 11.5–15)
PDW BLD-RTO: 17.9 FL (ref 11.5–15)
PDW BLD-RTO: 17.9 FL (ref 11.5–15)
PDW BLD-RTO: 18 FL (ref 11.5–15)
PDW BLD-RTO: 18.1 FL (ref 11.5–15)
PDW BLD-RTO: 18.1 FL (ref 11.5–15)
PDW BLD-RTO: 18.2 FL (ref 11.5–15)
PDW BLD-RTO: 18.2 FL (ref 11.5–15)
PEEP/CPAP: 8 CMH2O
PFO2: 0.92 MMHG/%
PFO2: 1.06 MMHG/%
PFO2: 3.46 MMHG/%
PH BLOOD GAS: 7.4 (ref 7.35–7.45)
PH BLOOD GAS: 7.42 (ref 7.35–7.45)
PH BLOOD GAS: 7.42 (ref 7.35–7.45)
PH BLOOD GAS: 7.43 (ref 7.35–7.45)
PH BLOOD GAS: 7.47 (ref 7.35–7.45)
PH FLUID: 7.46
PH FLUID: 7.47
PH UA: 5.5 (ref 5–9)
PHOSPHORUS: 3.7 MG/DL (ref 2.5–4.5)
PHOSPHORUS: 3.8 MG/DL (ref 2.5–4.5)
PHOSPHORUS: 3.9 MG/DL (ref 2.5–4.5)
PHOSPHORUS: 4.1 MG/DL (ref 2.5–4.5)
PHOSPHORUS: 4.2 MG/DL (ref 2.5–4.5)
PHOSPHORUS: 4.2 MG/DL (ref 2.5–4.5)
PHOSPHORUS: 4.4 MG/DL (ref 2.5–4.5)
PHOSPHORUS: 4.4 MG/DL (ref 2.5–4.5)
PHOSPHORUS: 4.5 MG/DL (ref 2.5–4.5)
PHOSPHORUS: 5.8 MG/DL (ref 2.5–4.5)
PIP: 14 CMH2O
PLATELET # BLD: 180 E9/L (ref 130–450)
PLATELET # BLD: 197 E9/L (ref 130–450)
PLATELET # BLD: 202 E9/L (ref 130–450)
PLATELET # BLD: 202 E9/L (ref 130–450)
PLATELET # BLD: 204 E9/L (ref 130–450)
PLATELET # BLD: 213 E9/L (ref 130–450)
PLATELET # BLD: 214 E9/L (ref 130–450)
PLATELET # BLD: 216 E9/L (ref 130–450)
PLATELET # BLD: 218 E9/L (ref 130–450)
PLATELET # BLD: 219 E9/L (ref 130–450)
PLATELET # BLD: 219 E9/L (ref 130–450)
PLATELET # BLD: 225 E9/L (ref 130–450)
PLATELET # BLD: 225 E9/L (ref 130–450)
PLATELET # BLD: 229 E9/L (ref 130–450)
PLATELET # BLD: 233 E9/L (ref 130–450)
PLATELET # BLD: 236 E9/L (ref 130–450)
PLATELET # BLD: 236 E9/L (ref 130–450)
PLATELET # BLD: 237 E9/L (ref 130–450)
PLATELET # BLD: 240 E9/L (ref 130–450)
PLATELET # BLD: 243 E9/L (ref 130–450)
PLATELET # BLD: 245 E9/L (ref 130–450)
PLATELET # BLD: 248 E9/L (ref 130–450)
PLATELET # BLD: 251 E9/L (ref 130–450)
PLATELET # BLD: 252 E9/L (ref 130–450)
PLATELET # BLD: 254 E9/L (ref 130–450)
PLATELET # BLD: 255 E9/L (ref 130–450)
PLATELET # BLD: 257 E9/L (ref 130–450)
PLATELET # BLD: 257 E9/L (ref 130–450)
PLATELET # BLD: 259 E9/L (ref 130–450)
PLATELET # BLD: 259 E9/L (ref 130–450)
PLATELET # BLD: 263 E9/L (ref 130–450)
PLATELET # BLD: 264 E9/L (ref 130–450)
PLATELET # BLD: 264 E9/L (ref 130–450)
PLATELET # BLD: 265 E9/L (ref 130–450)
PLATELET # BLD: 267 E9/L (ref 130–450)
PLATELET # BLD: 267 E9/L (ref 130–450)
PLATELET # BLD: 271 E9/L (ref 130–450)
PLATELET # BLD: 273 E9/L (ref 130–450)
PLATELET # BLD: 276 E9/L (ref 130–450)
PLATELET # BLD: 281 E9/L (ref 130–450)
PLATELET # BLD: 281 E9/L (ref 130–450)
PLATELET # BLD: 282 E9/L (ref 130–450)
PLATELET # BLD: 282 E9/L (ref 130–450)
PLATELET # BLD: 283 E9/L (ref 130–450)
PLATELET # BLD: 287 E9/L (ref 130–450)
PLATELET # BLD: 296 E9/L (ref 130–450)
PLATELET # BLD: 297 E9/L (ref 130–450)
PLATELET # BLD: 310 E9/L (ref 130–450)
PLATELET # BLD: 313 E9/L (ref 130–450)
PLATELET # BLD: 322 E9/L (ref 130–450)
PLATELET # BLD: 322 E9/L (ref 130–450)
PLATELET # BLD: 323 E9/L (ref 130–450)
PLATELET # BLD: 336 E9/L (ref 130–450)
PLATELET # BLD: 345 E9/L (ref 130–450)
PLATELET # BLD: 346 E9/L (ref 130–450)
PLATELET # BLD: 350 E9/L (ref 130–450)
PLATELET # BLD: 378 E9/L (ref 130–450)
PLATELET # BLD: 409 E9/L (ref 130–450)
PLATELET # BLD: 412 E9/L (ref 130–450)
PLATELET # BLD: 415 E9/L (ref 130–450)
PLATELET # BLD: 436 E9/L (ref 130–450)
PLATELET # BLD: 441 E9/L (ref 130–450)
PLATELET # BLD: 446 E9/L (ref 130–450)
PLATELET # BLD: 453 E9/L (ref 130–450)
PLATELET # BLD: 462 E9/L (ref 130–450)
PLATELET # BLD: 471 E9/L (ref 130–450)
PLATELET # BLD: 473 E9/L (ref 130–450)
PLATELET # BLD: 486 E9/L (ref 130–450)
PMV BLD AUTO: 10 FL (ref 7–12)
PMV BLD AUTO: 10.1 FL (ref 7–12)
PMV BLD AUTO: 10.2 FL (ref 7–12)
PMV BLD AUTO: 10.3 FL (ref 7–12)
PMV BLD AUTO: 10.3 FL (ref 7–12)
PMV BLD AUTO: 10.5 FL (ref 7–12)
PMV BLD AUTO: 10.9 FL (ref 7–12)
PMV BLD AUTO: 8.8 FL (ref 7–12)
PMV BLD AUTO: 9 FL (ref 7–12)
PMV BLD AUTO: 9.1 FL (ref 7–12)
PMV BLD AUTO: 9.2 FL (ref 7–12)
PMV BLD AUTO: 9.3 FL (ref 7–12)
PMV BLD AUTO: 9.4 FL (ref 7–12)
PMV BLD AUTO: 9.5 FL (ref 7–12)
PMV BLD AUTO: 9.6 FL (ref 7–12)
PMV BLD AUTO: 9.7 FL (ref 7–12)
PMV BLD AUTO: 9.8 FL (ref 7–12)
PMV BLD AUTO: 9.9 FL (ref 7–12)
PO2: 105.6 MMHG (ref 75–100)
PO2: 207.7 MMHG (ref 75–100)
PO2: 59.8 MMHG (ref 75–100)
PO2: 82.8 MMHG (ref 75–100)
PO2: 89.9 MMHG (ref 75–100)
POC BASE EXCESS: -0.3 MMOL/L (ref -3–3)
POC CPB: NO
POC DELIVERY SYSTEM: ABNORMAL
POC DEVICE ID: ABNORMAL
POC FIO2: 21
POC HCO3: 23.9 MMOL/L (ref 22–26)
POC O2 SATURATION: 99.8 % (ref 92–98.5)
POC OPERATOR ID: ABNORMAL
POC PCO2: 36.9 MMHG (ref 35–45)
POC PH: 7.42 (ref 7.35–7.45)
POC PO2: 208 MMHG (ref 60–80)
POC SAMPLE TYPE: ABNORMAL
POIKILOCYTES: ABNORMAL
POLYCHROMASIA: ABNORMAL
POTASSIUM REFLEX MAGNESIUM: 3.3 MMOL/L (ref 3.5–5)
POTASSIUM REFLEX MAGNESIUM: 3.7 MMOL/L (ref 3.5–5)
POTASSIUM REFLEX MAGNESIUM: 3.7 MMOL/L (ref 3.5–5)
POTASSIUM REFLEX MAGNESIUM: 3.8 MMOL/L (ref 3.5–5)
POTASSIUM REFLEX MAGNESIUM: 3.8 MMOL/L (ref 3.5–5)
POTASSIUM REFLEX MAGNESIUM: 3.9 MMOL/L (ref 3.5–5)
POTASSIUM REFLEX MAGNESIUM: 4 MMOL/L (ref 3.5–5)
POTASSIUM REFLEX MAGNESIUM: 4 MMOL/L (ref 3.5–5)
POTASSIUM REFLEX MAGNESIUM: 4.1 MMOL/L (ref 3.5–5)
POTASSIUM REFLEX MAGNESIUM: 4.1 MMOL/L (ref 3.5–5)
POTASSIUM REFLEX MAGNESIUM: 4.2 MMOL/L (ref 3.5–5)
POTASSIUM REFLEX MAGNESIUM: 4.3 MMOL/L (ref 3.5–5)
POTASSIUM REFLEX MAGNESIUM: 4.3 MMOL/L (ref 3.5–5)
POTASSIUM REFLEX MAGNESIUM: 4.4 MMOL/L (ref 3.5–5)
POTASSIUM REFLEX MAGNESIUM: 4.4 MMOL/L (ref 3.5–5)
POTASSIUM REFLEX MAGNESIUM: 4.5 MMOL/L (ref 3.5–5)
POTASSIUM REFLEX MAGNESIUM: 4.8 MMOL/L (ref 3.5–5)
POTASSIUM REFLEX MAGNESIUM: 5 MMOL/L (ref 3.5–5)
POTASSIUM REFLEX MAGNESIUM: 5.5 MMOL/L (ref 3.5–5)
POTASSIUM SERPL-SCNC: 3.1 MMOL/L (ref 3.5–5)
POTASSIUM SERPL-SCNC: 3.1 MMOL/L (ref 3.5–5)
POTASSIUM SERPL-SCNC: 3.2 MMOL/L (ref 3.5–5)
POTASSIUM SERPL-SCNC: 3.2 MMOL/L (ref 3.5–5)
POTASSIUM SERPL-SCNC: 3.3 MMOL/L (ref 3.5–5)
POTASSIUM SERPL-SCNC: 3.4 MMOL/L (ref 3.5–5)
POTASSIUM SERPL-SCNC: 3.4 MMOL/L (ref 3.5–5)
POTASSIUM SERPL-SCNC: 3.5 MMOL/L (ref 3.5–5)
POTASSIUM SERPL-SCNC: 3.6 MMOL/L (ref 3.5–5)
POTASSIUM SERPL-SCNC: 3.7 MMOL/L (ref 3.5–5)
POTASSIUM SERPL-SCNC: 3.8 MMOL/L (ref 3.5–5)
POTASSIUM SERPL-SCNC: 3.9 MMOL/L (ref 3.5–5)
POTASSIUM SERPL-SCNC: 4 MMOL/L (ref 3.5–5)
POTASSIUM SERPL-SCNC: 4.1 MMOL/L (ref 3.5–5)
POTASSIUM SERPL-SCNC: 4.2 MMOL/L (ref 3.5–5)
POTASSIUM SERPL-SCNC: 4.3 MMOL/L (ref 3.5–5)
POTASSIUM SERPL-SCNC: 4.4 MMOL/L (ref 3.5–5)
POTASSIUM SERPL-SCNC: 4.6 MMOL/L (ref 3.5–5)
POTASSIUM SERPL-SCNC: 4.7 MMOL/L (ref 3.5–5)
POTASSIUM SERPL-SCNC: 4.7 MMOL/L (ref 3.5–5)
POTASSIUM SERPL-SCNC: 4.9 MMOL/L (ref 3.5–5)
POTASSIUM SERPL-SCNC: 5.2 MMOL/L (ref 3.5–5)
POTASSIUM SERPL-SCNC: 5.3 MMOL/L (ref 3.5–5)
POTASSIUM SERPL-SCNC: 5.9 MMOL/L (ref 3.5–5)
POTASSIUM SERPL-SCNC: 6.1 MMOL/L (ref 3.5–5)
PREALBUMIN: 15 MG/DL (ref 20–40)
PRO-BNP: 1478 PG/ML (ref 0–450)
PRO-BNP: 1832 PG/ML (ref 0–450)
PRO-BNP: 1910 PG/ML (ref 0–450)
PRO-BNP: 2357 PG/ML (ref 0–450)
PRO-BNP: 2419 PG/ML (ref 0–450)
PRO-BNP: 2428 PG/ML (ref 0–450)
PRO-BNP: 2543 PG/ML (ref 0–450)
PRO-BNP: 3338 PG/ML (ref 0–450)
PRO-BNP: 5643 PG/ML (ref 0–450)
PRO-BNP: 9247 PG/ML (ref 0–450)
PROCALCITONIN: 0.12 NG/ML (ref 0–0.08)
PROCALCITONIN: 0.14 NG/ML (ref 0–0.08)
PROCALCITONIN: 0.14 NG/ML (ref 0–0.08)
PROCALCITONIN: 0.15 NG/ML (ref 0–0.08)
PROCALCITONIN: 0.23 NG/ML (ref 0–0.08)
PROCALCITONIN: 0.42 NG/ML (ref 0–0.08)
PROTEIN FLUID: 2.8 G/DL
PROTEIN FLUID: 3 G/DL
PROTEIN FLUID: 3.1 G/DL
PROTEIN PROTEIN: 14 MG/DL (ref 0–12)
PROTEIN PROTEIN: 43 MG/DL (ref 0–12)
PROTEIN UA: 30 MG/DL
PROTEIN UA: NEGATIVE MG/DL
PROTEIN UA: NEGATIVE MG/DL
PROTEIN/CREAT RATIO: 0.3
PROTEIN/CREAT RATIO: 0.3 (ref 0–0.2)
PROTHROMBIN TIME: 12.1 SEC (ref 9.3–12.4)
RBC # BLD: 3.19 E12/L (ref 3.8–5.8)
RBC # BLD: 3.2 E12/L (ref 3.8–5.8)
RBC # BLD: 3.23 E12/L (ref 3.8–5.8)
RBC # BLD: 3.28 E12/L (ref 3.8–5.8)
RBC # BLD: 3.29 E12/L (ref 3.8–5.8)
RBC # BLD: 3.29 E12/L (ref 3.8–5.8)
RBC # BLD: 3.3 E12/L (ref 3.8–5.8)
RBC # BLD: 3.31 E12/L (ref 3.8–5.8)
RBC # BLD: 3.32 E12/L (ref 3.8–5.8)
RBC # BLD: 3.35 E12/L (ref 3.8–5.8)
RBC # BLD: 3.38 E12/L (ref 3.8–5.8)
RBC # BLD: 3.38 E12/L (ref 3.8–5.8)
RBC # BLD: 3.4 E12/L (ref 3.8–5.8)
RBC # BLD: 3.45 E12/L (ref 3.8–5.8)
RBC # BLD: 3.46 E12/L (ref 3.8–5.8)
RBC # BLD: 3.55 E12/L (ref 3.8–5.8)
RBC # BLD: 3.55 E12/L (ref 3.8–5.8)
RBC # BLD: 3.58 E12/L (ref 3.8–5.8)
RBC # BLD: 3.61 E12/L (ref 3.8–5.8)
RBC # BLD: 3.7 E12/L (ref 3.8–5.8)
RBC # BLD: 3.71 E12/L (ref 3.8–5.8)
RBC # BLD: 3.71 E12/L (ref 3.8–5.8)
RBC # BLD: 3.73 E12/L (ref 3.8–5.8)
RBC # BLD: 3.74 E12/L (ref 3.8–5.8)
RBC # BLD: 3.75 E12/L (ref 3.8–5.8)
RBC # BLD: 3.75 E12/L (ref 3.8–5.8)
RBC # BLD: 3.76 E12/L (ref 3.8–5.8)
RBC # BLD: 3.77 E12/L (ref 3.8–5.8)
RBC # BLD: 3.78 E12/L (ref 3.8–5.8)
RBC # BLD: 3.8 E12/L (ref 3.8–5.8)
RBC # BLD: 3.8 E12/L (ref 3.8–5.8)
RBC # BLD: 3.81 E12/L (ref 3.8–5.8)
RBC # BLD: 3.82 E12/L (ref 3.8–5.8)
RBC # BLD: 3.83 E12/L (ref 3.8–5.8)
RBC # BLD: 3.84 E12/L (ref 3.8–5.8)
RBC # BLD: 3.87 E12/L (ref 3.8–5.8)
RBC # BLD: 3.88 E12/L (ref 3.8–5.8)
RBC # BLD: 3.89 E12/L (ref 3.8–5.8)
RBC # BLD: 3.91 E12/L (ref 3.8–5.8)
RBC # BLD: 3.91 E12/L (ref 3.8–5.8)
RBC # BLD: 3.92 E12/L (ref 3.8–5.8)
RBC # BLD: 3.95 E12/L (ref 3.8–5.8)
RBC # BLD: 3.96 E12/L (ref 3.8–5.8)
RBC # BLD: 3.97 E12/L (ref 3.8–5.8)
RBC # BLD: 3.98 E12/L (ref 3.8–5.8)
RBC # BLD: 3.99 E12/L (ref 3.8–5.8)
RBC # BLD: 4.01 E12/L (ref 3.8–5.8)
RBC # BLD: 4.01 E12/L (ref 3.8–5.8)
RBC # BLD: 4.02 E12/L (ref 3.8–5.8)
RBC # BLD: 4.02 E12/L (ref 3.8–5.8)
RBC # BLD: 4.03 E12/L (ref 3.8–5.8)
RBC # BLD: 4.06 E12/L (ref 3.8–5.8)
RBC # BLD: 4.07 E12/L (ref 3.8–5.8)
RBC # BLD: 4.08 E12/L (ref 3.8–5.8)
RBC # BLD: 4.08 E12/L (ref 3.8–5.8)
RBC # BLD: 4.1 E12/L (ref 3.8–5.8)
RBC # BLD: 4.11 E12/L (ref 3.8–5.8)
RBC # BLD: 4.15 E12/L (ref 3.8–5.8)
RBC # BLD: 4.16 E12/L (ref 3.8–5.8)
RBC # BLD: 4.18 E12/L (ref 3.8–5.8)
RBC # BLD: 4.22 E12/L (ref 3.8–5.8)
RBC # BLD: 4.23 E12/L (ref 3.8–5.8)
RBC # BLD: 4.24 E12/L (ref 3.8–5.8)
RBC # BLD: 4.27 E12/L (ref 3.8–5.8)
RBC # BLD: 4.3 E12/L (ref 3.8–5.8)
RBC # BLD: 4.31 E12/L (ref 3.8–5.8)
RBC # BLD: 4.46 E12/L (ref 3.8–5.8)
RBC # BLD: 4.75 E12/L (ref 3.8–5.8)
RBC FLUID: NORMAL /UL
RBC UA: ABNORMAL /HPF (ref 0–2)
RBC UA: ABNORMAL /HPF (ref 0–2)
RBC UA: NORMAL /HPF (ref 0–2)
REASON FOR REJECTION: NORMAL
REJECTED TEST: NORMAL
REPORT: NORMAL
RESPIRATORY SYNCYTIAL VIRUS BY PCR: NOT DETECTED
RI(T): 5.14
RI(T): 6.01
SARS-COV-2 ANTIBODY, TOTAL: NORMAL
SARS-COV-2, NAAT: DETECTED
SARS-COV-2, NAAT: DETECTED
SARS-COV-2, NAAT: NOT DETECTED
SARS-COV-2, PCR: NOT DETECTED
SCHISTOCYTES: ABNORMAL
SODIUM BLD-SCNC: 134 MMOL/L (ref 132–146)
SODIUM BLD-SCNC: 134 MMOL/L (ref 132–146)
SODIUM BLD-SCNC: 135 MMOL/L (ref 132–146)
SODIUM BLD-SCNC: 135 MMOL/L (ref 132–146)
SODIUM BLD-SCNC: 136 MMOL/L (ref 132–146)
SODIUM BLD-SCNC: 137 MMOL/L (ref 132–146)
SODIUM BLD-SCNC: 138 MMOL/L (ref 132–146)
SODIUM BLD-SCNC: 139 MMOL/L (ref 132–146)
SODIUM BLD-SCNC: 140 MMOL/L (ref 132–146)
SODIUM BLD-SCNC: 141 MMOL/L (ref 132–146)
SODIUM BLD-SCNC: 142 MMOL/L (ref 132–146)
SODIUM BLD-SCNC: 143 MMOL/L (ref 132–146)
SODIUM BLD-SCNC: 144 MMOL/L (ref 132–146)
SODIUM BLD-SCNC: 145 MMOL/L (ref 132–146)
SODIUM BLD-SCNC: 145 MMOL/L (ref 132–146)
SODIUM URINE: 81 MMOL/L
SOURCE, BLOOD GAS: ABNORMAL
SOURCE, BLOOD GAS: NORMAL
SOURCE, BLOOD GAS: NORMAL
SPECIFIC GRAVITY UA: 1.01 (ref 1–1.03)
SPECIFIC GRAVITY UA: 1.02 (ref 1–1.03)
SPECIFIC GRAVITY UA: 1.02 (ref 1–1.03)
STREP PNEUMONIAE ANTIGEN, URINE: NORMAL
STREP PNEUMONIAE ANTIGEN, URINE: NORMAL
T4 FREE: 1.19 NG/DL (ref 0.93–1.7)
TEAR DROP CELLS: ABNORMAL
THB: 10.5 G/DL (ref 11.5–16.5)
THB: 10.7 G/DL (ref 11.5–16.5)
THB: 11 G/DL (ref 11.5–16.5)
THB: 11.1 G/DL (ref 11.5–16.5)
THB: 11.3 G/DL (ref 11.5–16.5)
THIS TEST SENT TO: NORMAL
TIME ANALYZED: 1100
TIME ANALYZED: 1125
TIME ANALYZED: 1751
TIME ANALYZED: 1911
TIME ANALYZED: 1951
TIME ANALYZED: 2337
TIME ANALYZED: 925
TOTAL PROTEIN: 6.3 G/DL (ref 6.4–8.3)
TOTAL PROTEIN: 6.4 G/DL (ref 6.4–8.3)
TOTAL PROTEIN: 6.5 G/DL (ref 6.4–8.3)
TOTAL PROTEIN: 6.8 G/DL (ref 6.4–8.3)
TOTAL PROTEIN: 6.9 G/DL (ref 6.4–8.3)
TOTAL PROTEIN: 7 G/DL (ref 6.4–8.3)
TOTAL PROTEIN: 7 G/DL (ref 6.4–8.3)
TRIGL SERPL-MCNC: 140 MG/DL (ref 0–149)
TROPONIN, HIGH SENSITIVITY: 20 NG/L (ref 0–11)
TROPONIN, HIGH SENSITIVITY: 21 NG/L (ref 0–11)
TROPONIN, HIGH SENSITIVITY: 24 NG/L (ref 0–11)
TROPONIN, HIGH SENSITIVITY: 25 NG/L (ref 0–11)
TROPONIN, HIGH SENSITIVITY: 34 NG/L (ref 0–11)
TROPONIN, HIGH SENSITIVITY: 36 NG/L (ref 0–11)
TROPONIN, HIGH SENSITIVITY: 38 NG/L (ref 0–11)
TROPONIN, HIGH SENSITIVITY: 39 NG/L (ref 0–11)
TROPONIN, HIGH SENSITIVITY: 55 NG/L (ref 0–11)
TROPONIN, HIGH SENSITIVITY: 63 NG/L (ref 0–11)
TROPONIN, HIGH SENSITIVITY: 65 NG/L (ref 0–11)
TSH SERPL DL<=0.05 MIU/L-ACNC: 2.31 UIU/ML (ref 0.27–4.2)
URINE CULTURE, ROUTINE: ABNORMAL
URINE CULTURE, ROUTINE: NORMAL
UROBILINOGEN, URINE: 0.2 E.U./DL
VLDLC SERPL CALC-MCNC: 28 MG/DL
WBC # BLD: 10 E9/L (ref 4.5–11.5)
WBC # BLD: 10 E9/L (ref 4.5–11.5)
WBC # BLD: 10.1 E9/L (ref 4.5–11.5)
WBC # BLD: 10.3 E9/L (ref 4.5–11.5)
WBC # BLD: 10.3 E9/L (ref 4.5–11.5)
WBC # BLD: 10.4 E9/L (ref 4.5–11.5)
WBC # BLD: 10.4 E9/L (ref 4.5–11.5)
WBC # BLD: 10.5 E9/L (ref 4.5–11.5)
WBC # BLD: 10.6 E9/L (ref 4.5–11.5)
WBC # BLD: 10.7 E9/L (ref 4.5–11.5)
WBC # BLD: 10.7 E9/L (ref 4.5–11.5)
WBC # BLD: 10.9 E9/L (ref 4.5–11.5)
WBC # BLD: 10.9 E9/L (ref 4.5–11.5)
WBC # BLD: 11 E9/L (ref 4.5–11.5)
WBC # BLD: 11.1 E9/L (ref 4.5–11.5)
WBC # BLD: 11.2 E9/L (ref 4.5–11.5)
WBC # BLD: 11.2 E9/L (ref 4.5–11.5)
WBC # BLD: 11.3 E9/L (ref 4.5–11.5)
WBC # BLD: 11.4 E9/L (ref 4.5–11.5)
WBC # BLD: 11.5 E9/L (ref 4.5–11.5)
WBC # BLD: 11.5 E9/L (ref 4.5–11.5)
WBC # BLD: 11.6 E9/L (ref 4.5–11.5)
WBC # BLD: 11.7 E9/L (ref 4.5–11.5)
WBC # BLD: 11.8 E9/L (ref 4.5–11.5)
WBC # BLD: 12.1 E9/L (ref 4.5–11.5)
WBC # BLD: 12.2 E9/L (ref 4.5–11.5)
WBC # BLD: 12.7 E9/L (ref 4.5–11.5)
WBC # BLD: 12.8 E9/L (ref 4.5–11.5)
WBC # BLD: 12.9 E9/L (ref 4.5–11.5)
WBC # BLD: 13 E9/L (ref 4.5–11.5)
WBC # BLD: 13.2 E9/L (ref 4.5–11.5)
WBC # BLD: 13.3 E9/L (ref 4.5–11.5)
WBC # BLD: 13.6 E9/L (ref 4.5–11.5)
WBC # BLD: 13.8 E9/L (ref 4.5–11.5)
WBC # BLD: 13.9 E9/L (ref 4.5–11.5)
WBC # BLD: 14.1 E9/L (ref 4.5–11.5)
WBC # BLD: 14.2 E9/L (ref 4.5–11.5)
WBC # BLD: 14.4 E9/L (ref 4.5–11.5)
WBC # BLD: 15.1 E9/L (ref 4.5–11.5)
WBC # BLD: 15.5 E9/L (ref 4.5–11.5)
WBC # BLD: 15.6 E9/L (ref 4.5–11.5)
WBC # BLD: 15.7 E9/L (ref 4.5–11.5)
WBC # BLD: 17.3 E9/L (ref 4.5–11.5)
WBC # BLD: 18.6 E9/L (ref 4.5–11.5)
WBC # BLD: 21.3 E9/L (ref 4.5–11.5)
WBC # BLD: 7.5 E9/L (ref 4.5–11.5)
WBC # BLD: 8.2 E9/L (ref 4.5–11.5)
WBC # BLD: 8.3 E9/L (ref 4.5–11.5)
WBC # BLD: 8.4 E9/L (ref 4.5–11.5)
WBC # BLD: 9 E9/L (ref 4.5–11.5)
WBC # BLD: 9.1 E9/L (ref 4.5–11.5)
WBC # BLD: 9.4 E9/L (ref 4.5–11.5)
WBC # BLD: 9.5 E9/L (ref 4.5–11.5)
WBC # BLD: 9.5 E9/L (ref 4.5–11.5)
WBC # BLD: 9.6 E9/L (ref 4.5–11.5)
WBC UA: >20 /HPF (ref 0–5)
WBC UA: ABNORMAL /HPF (ref 0–5)
WBC UA: NORMAL /HPF (ref 0–5)

## 2022-01-01 PROCEDURE — S5553 INSULIN LONG ACTING 5 U: HCPCS | Performed by: FAMILY MEDICINE

## 2022-01-01 PROCEDURE — 94660 CPAP INITIATION&MGMT: CPT

## 2022-01-01 PROCEDURE — 6370000000 HC RX 637 (ALT 250 FOR IP): Performed by: FAMILY MEDICINE

## 2022-01-01 PROCEDURE — 82803 BLOOD GASES ANY COMBINATION: CPT

## 2022-01-01 PROCEDURE — 2700000000 HC OXYGEN THERAPY PER DAY

## 2022-01-01 PROCEDURE — 82330 ASSAY OF CALCIUM: CPT

## 2022-01-01 PROCEDURE — 82962 GLUCOSE BLOOD TEST: CPT

## 2022-01-01 PROCEDURE — 88184 FLOWCYTOMETRY/ TC 1 MARKER: CPT

## 2022-01-01 PROCEDURE — 97530 THERAPEUTIC ACTIVITIES: CPT

## 2022-01-01 PROCEDURE — 99285 EMERGENCY DEPT VISIT HI MDM: CPT

## 2022-01-01 PROCEDURE — 2140000000 HC CCU INTERMEDIATE R&B

## 2022-01-01 PROCEDURE — 85027 COMPLETE CBC AUTOMATED: CPT

## 2022-01-01 PROCEDURE — 36415 COLL VENOUS BLD VENIPUNCTURE: CPT

## 2022-01-01 PROCEDURE — 6360000002 HC RX W HCPCS: Performed by: INTERNAL MEDICINE

## 2022-01-01 PROCEDURE — 82728 ASSAY OF FERRITIN: CPT

## 2022-01-01 PROCEDURE — 83880 ASSAY OF NATRIURETIC PEPTIDE: CPT

## 2022-01-01 PROCEDURE — 6370000000 HC RX 637 (ALT 250 FOR IP): Performed by: INTERNAL MEDICINE

## 2022-01-01 PROCEDURE — 71045 X-RAY EXAM CHEST 1 VIEW: CPT

## 2022-01-01 PROCEDURE — 87102 FUNGUS ISOLATION CULTURE: CPT

## 2022-01-01 PROCEDURE — 6360000002 HC RX W HCPCS: Performed by: SURGERY

## 2022-01-01 PROCEDURE — 6360000002 HC RX W HCPCS: Performed by: FAMILY MEDICINE

## 2022-01-01 PROCEDURE — 1200000000 HC SEMI PRIVATE

## 2022-01-01 PROCEDURE — 85378 FIBRIN DEGRADE SEMIQUANT: CPT

## 2022-01-01 PROCEDURE — 2500000003 HC RX 250 WO HCPCS: Performed by: EMERGENCY MEDICINE

## 2022-01-01 PROCEDURE — 87635 SARS-COV-2 COVID-19 AMP PRB: CPT

## 2022-01-01 PROCEDURE — 6370000000 HC RX 637 (ALT 250 FOR IP): Performed by: NURSE PRACTITIONER

## 2022-01-01 PROCEDURE — 80053 COMPREHEN METABOLIC PANEL: CPT

## 2022-01-01 PROCEDURE — 94664 DEMO&/EVAL PT USE INHALER: CPT

## 2022-01-01 PROCEDURE — 94640 AIRWAY INHALATION TREATMENT: CPT

## 2022-01-01 PROCEDURE — 2580000003 HC RX 258: Performed by: INTERNAL MEDICINE

## 2022-01-01 PROCEDURE — 2500000003 HC RX 250 WO HCPCS: Performed by: FAMILY MEDICINE

## 2022-01-01 PROCEDURE — 2500000003 HC RX 250 WO HCPCS: Performed by: INTERNAL MEDICINE

## 2022-01-01 PROCEDURE — 99233 SBSQ HOSP IP/OBS HIGH 50: CPT | Performed by: INTERNAL MEDICINE

## 2022-01-01 PROCEDURE — 93010 ELECTROCARDIOGRAM REPORT: CPT | Performed by: INTERNAL MEDICINE

## 2022-01-01 PROCEDURE — 85025 COMPLETE CBC W/AUTO DIFF WBC: CPT

## 2022-01-01 PROCEDURE — 2060000000 HC ICU INTERMEDIATE R&B

## 2022-01-01 PROCEDURE — 93308 TTE F-UP OR LMTD: CPT

## 2022-01-01 PROCEDURE — 87015 SPECIMEN INFECT AGNT CONCNTJ: CPT

## 2022-01-01 PROCEDURE — 86850 RBC ANTIBODY SCREEN: CPT

## 2022-01-01 PROCEDURE — 84134 ASSAY OF PREALBUMIN: CPT

## 2022-01-01 PROCEDURE — 99221 1ST HOSP IP/OBS SF/LOW 40: CPT | Performed by: INTERNAL MEDICINE

## 2022-01-01 PROCEDURE — 93970 EXTREMITY STUDY: CPT

## 2022-01-01 PROCEDURE — 96374 THER/PROPH/DIAG INJ IV PUSH: CPT

## 2022-01-01 PROCEDURE — 84145 PROCALCITONIN (PCT): CPT

## 2022-01-01 PROCEDURE — 81001 URINALYSIS AUTO W/SCOPE: CPT

## 2022-01-01 PROCEDURE — 1036F TOBACCO NON-USER: CPT | Performed by: NURSE PRACTITIONER

## 2022-01-01 PROCEDURE — 99223 1ST HOSP IP/OBS HIGH 75: CPT | Performed by: INTERNAL MEDICINE

## 2022-01-01 PROCEDURE — 83970 ASSAY OF PARATHORMONE: CPT

## 2022-01-01 PROCEDURE — 82784 ASSAY IGA/IGD/IGG/IGM EACH: CPT

## 2022-01-01 PROCEDURE — 7100000001 HC PACU RECOVERY - ADDTL 15 MIN: Performed by: SURGERY

## 2022-01-01 PROCEDURE — 90935 HEMODIALYSIS ONE EVALUATION: CPT | Performed by: INTERNAL MEDICINE

## 2022-01-01 PROCEDURE — 6370000000 HC RX 637 (ALT 250 FOR IP)

## 2022-01-01 PROCEDURE — 2580000003 HC RX 258: Performed by: EMERGENCY MEDICINE

## 2022-01-01 PROCEDURE — 84100 ASSAY OF PHOSPHORUS: CPT

## 2022-01-01 PROCEDURE — 86900 BLOOD TYPING SEROLOGIC ABO: CPT

## 2022-01-01 PROCEDURE — 2580000003 HC RX 258: Performed by: FAMILY MEDICINE

## 2022-01-01 PROCEDURE — 36600 WITHDRAWAL OF ARTERIAL BLOOD: CPT

## 2022-01-01 PROCEDURE — 6370000000 HC RX 637 (ALT 250 FOR IP): Performed by: SURGERY

## 2022-01-01 PROCEDURE — 99232 SBSQ HOSP IP/OBS MODERATE 35: CPT | Performed by: INTERNAL MEDICINE

## 2022-01-01 PROCEDURE — 80048 BASIC METABOLIC PNL TOTAL CA: CPT

## 2022-01-01 PROCEDURE — 99231 SBSQ HOSP IP/OBS SF/LOW 25: CPT | Performed by: INTERNAL MEDICINE

## 2022-01-01 PROCEDURE — 1111F DSCHRG MED/CURRENT MED MERGE: CPT | Performed by: NURSE PRACTITIONER

## 2022-01-01 PROCEDURE — 97161 PT EVAL LOW COMPLEX 20 MIN: CPT

## 2022-01-01 PROCEDURE — 99214 OFFICE O/P EST MOD 30 MIN: CPT | Performed by: NURSE PRACTITIONER

## 2022-01-01 PROCEDURE — 82947 ASSAY GLUCOSE BLOOD QUANT: CPT

## 2022-01-01 PROCEDURE — 97530 THERAPEUTIC ACTIVITIES: CPT | Performed by: PHYSICAL THERAPIST

## 2022-01-01 PROCEDURE — 6370000000 HC RX 637 (ALT 250 FOR IP): Performed by: EMERGENCY MEDICINE

## 2022-01-01 PROCEDURE — 6370000000 HC RX 637 (ALT 250 FOR IP): Performed by: STUDENT IN AN ORGANIZED HEALTH CARE EDUCATION/TRAINING PROGRAM

## 2022-01-01 PROCEDURE — 7100000000 HC PACU RECOVERY - FIRST 15 MIN: Performed by: SURGERY

## 2022-01-01 PROCEDURE — 3700000000 HC ANESTHESIA ATTENDED CARE: Performed by: SURGERY

## 2022-01-01 PROCEDURE — 2580000003 HC RX 258

## 2022-01-01 PROCEDURE — 83735 ASSAY OF MAGNESIUM: CPT

## 2022-01-01 PROCEDURE — 83986 ASSAY PH BODY FLUID NOS: CPT

## 2022-01-01 PROCEDURE — 88112 CYTOPATH CELL ENHANCE TECH: CPT

## 2022-01-01 PROCEDURE — 3600000003 HC SURGERY LEVEL 3 BASE: Performed by: SURGERY

## 2022-01-01 PROCEDURE — 87205 SMEAR GRAM STAIN: CPT

## 2022-01-01 PROCEDURE — 32555 ASPIRATE PLEURA W/ IMAGING: CPT | Performed by: INTERNAL MEDICINE

## 2022-01-01 PROCEDURE — 86140 C-REACTIVE PROTEIN: CPT

## 2022-01-01 PROCEDURE — 0W9B3ZZ DRAINAGE OF LEFT PLEURAL CAVITY, PERCUTANEOUS APPROACH: ICD-10-PCS | Performed by: INTERNAL MEDICINE

## 2022-01-01 PROCEDURE — C1729 CATH, DRAINAGE: HCPCS

## 2022-01-01 PROCEDURE — 97535 SELF CARE MNGMENT TRAINING: CPT

## 2022-01-01 PROCEDURE — 85730 THROMBOPLASTIN TIME PARTIAL: CPT

## 2022-01-01 PROCEDURE — 99284 EMERGENCY DEPT VISIT MOD MDM: CPT

## 2022-01-01 PROCEDURE — 87449 NOS EACH ORGANISM AG IA: CPT

## 2022-01-01 PROCEDURE — 88185 FLOWCYTOMETRY/TC ADD-ON: CPT

## 2022-01-01 PROCEDURE — 93005 ELECTROCARDIOGRAM TRACING: CPT | Performed by: NURSE PRACTITIONER

## 2022-01-01 PROCEDURE — 88305 TISSUE EXAM BY PATHOLOGIST: CPT

## 2022-01-01 PROCEDURE — 6360000002 HC RX W HCPCS

## 2022-01-01 PROCEDURE — 86769 SARS-COV-2 COVID-19 ANTIBODY: CPT

## 2022-01-01 PROCEDURE — 84484 ASSAY OF TROPONIN QUANT: CPT

## 2022-01-01 PROCEDURE — 93005 ELECTROCARDIOGRAM TRACING: CPT | Performed by: EMERGENCY MEDICINE

## 2022-01-01 PROCEDURE — P9047 ALBUMIN (HUMAN), 25%, 50ML: HCPCS | Performed by: INTERNAL MEDICINE

## 2022-01-01 PROCEDURE — 96372 THER/PROPH/DIAG INJ SC/IM: CPT

## 2022-01-01 PROCEDURE — 82436 ASSAY OF URINE CHLORIDE: CPT

## 2022-01-01 PROCEDURE — 6360000004 HC RX CONTRAST MEDICATION: Performed by: RADIOLOGY

## 2022-01-01 PROCEDURE — 84157 ASSAY OF PROTEIN OTHER: CPT

## 2022-01-01 PROCEDURE — 6360000004 HC RX CONTRAST MEDICATION: Performed by: SURGERY

## 2022-01-01 PROCEDURE — 84443 ASSAY THYROID STIM HORMONE: CPT

## 2022-01-01 PROCEDURE — 0202U NFCT DS 22 TRGT SARS-COV-2: CPT

## 2022-01-01 PROCEDURE — 93000 ELECTROCARDIOGRAM COMPLETE: CPT | Performed by: INTERNAL MEDICINE

## 2022-01-01 PROCEDURE — 83605 ASSAY OF LACTIC ACID: CPT

## 2022-01-01 PROCEDURE — 97166 OT EVAL MOD COMPLEX 45 MIN: CPT

## 2022-01-01 PROCEDURE — APPSS60 APP SPLIT SHARED TIME 46-60 MINUTES: Performed by: NURSE PRACTITIONER

## 2022-01-01 PROCEDURE — 89051 BODY FLUID CELL COUNT: CPT

## 2022-01-01 PROCEDURE — 2500000003 HC RX 250 WO HCPCS

## 2022-01-01 PROCEDURE — 90935 HEMODIALYSIS ONE EVALUATION: CPT

## 2022-01-01 PROCEDURE — 5A1D70Z PERFORMANCE OF URINARY FILTRATION, INTERMITTENT, LESS THAN 6 HOURS PER DAY: ICD-10-PCS | Performed by: FAMILY MEDICINE

## 2022-01-01 PROCEDURE — 97165 OT EVAL LOW COMPLEX 30 MIN: CPT

## 2022-01-01 PROCEDURE — 87088 URINE BACTERIA CULTURE: CPT

## 2022-01-01 PROCEDURE — 51798 US URINE CAPACITY MEASURE: CPT

## 2022-01-01 PROCEDURE — S5553 INSULIN LONG ACTING 5 U: HCPCS | Performed by: SURGERY

## 2022-01-01 PROCEDURE — 6360000002 HC RX W HCPCS: Performed by: EMERGENCY MEDICINE

## 2022-01-01 PROCEDURE — 93005 ELECTROCARDIOGRAM TRACING: CPT | Performed by: STUDENT IN AN ORGANIZED HEALTH CARE EDUCATION/TRAINING PROGRAM

## 2022-01-01 PROCEDURE — 82570 ASSAY OF URINE CREATININE: CPT

## 2022-01-01 PROCEDURE — 93005 ELECTROCARDIOGRAM TRACING: CPT | Performed by: GENERAL PRACTICE

## 2022-01-01 PROCEDURE — 83615 LACTATE (LD) (LDH) ENZYME: CPT

## 2022-01-01 PROCEDURE — 87070 CULTURE OTHR SPECIMN AEROBIC: CPT

## 2022-01-01 PROCEDURE — C1894 INTRO/SHEATH, NON-LASER: HCPCS | Performed by: SURGERY

## 2022-01-01 PROCEDURE — 87206 SMEAR FLUORESCENT/ACID STAI: CPT

## 2022-01-01 PROCEDURE — 93005 ELECTROCARDIOGRAM TRACING: CPT

## 2022-01-01 PROCEDURE — C1769 GUIDE WIRE: HCPCS | Performed by: SURGERY

## 2022-01-01 PROCEDURE — 36558 INSERT TUNNELED CV CATH: CPT | Performed by: SURGERY

## 2022-01-01 PROCEDURE — 32555 ASPIRATE PLEURA W/ IMAGING: CPT

## 2022-01-01 PROCEDURE — 99222 1ST HOSP IP/OBS MODERATE 55: CPT | Performed by: INTERNAL MEDICINE

## 2022-01-01 PROCEDURE — 84156 ASSAY OF PROTEIN URINE: CPT

## 2022-01-01 PROCEDURE — G8427 DOCREV CUR MEDS BY ELIG CLIN: HCPCS | Performed by: NURSE PRACTITIONER

## 2022-01-01 PROCEDURE — 82805 BLOOD GASES W/O2 SATURATION: CPT

## 2022-01-01 PROCEDURE — C1881 DIALYSIS ACCESS SYSTEM: HCPCS | Performed by: SURGERY

## 2022-01-01 PROCEDURE — 85610 PROTHROMBIN TIME: CPT

## 2022-01-01 PROCEDURE — 93971 EXTREMITY STUDY: CPT | Performed by: RADIOLOGY

## 2022-01-01 PROCEDURE — 82040 ASSAY OF SERUM ALBUMIN: CPT

## 2022-01-01 PROCEDURE — 71046 X-RAY EXAM CHEST 2 VIEWS: CPT

## 2022-01-01 PROCEDURE — 99204 OFFICE O/P NEW MOD 45 MIN: CPT

## 2022-01-01 PROCEDURE — 80061 LIPID PANEL: CPT

## 2022-01-01 PROCEDURE — 80074 ACUTE HEPATITIS PANEL: CPT

## 2022-01-01 PROCEDURE — 97162 PT EVAL MOD COMPLEX 30 MIN: CPT

## 2022-01-01 PROCEDURE — 02HV33Z INSERTION OF INFUSION DEVICE INTO SUPERIOR VENA CAVA, PERCUTANEOUS APPROACH: ICD-10-PCS | Performed by: SURGERY

## 2022-01-01 PROCEDURE — 71275 CT ANGIOGRAPHY CHEST: CPT

## 2022-01-01 PROCEDURE — 99223 1ST HOSP IP/OBS HIGH 75: CPT | Performed by: NURSE PRACTITIONER

## 2022-01-01 PROCEDURE — 86706 HEP B SURFACE ANTIBODY: CPT

## 2022-01-01 PROCEDURE — 4040F PNEUMOC VAC/ADMIN/RCVD: CPT | Performed by: NURSE PRACTITIONER

## 2022-01-01 PROCEDURE — 6360000002 HC RX W HCPCS: Performed by: GENERAL PRACTICE

## 2022-01-01 PROCEDURE — 76770 US EXAM ABDO BACK WALL COMP: CPT

## 2022-01-01 PROCEDURE — G8484 FLU IMMUNIZE NO ADMIN: HCPCS | Performed by: NURSE PRACTITIONER

## 2022-01-01 PROCEDURE — 87040 BLOOD CULTURE FOR BACTERIA: CPT

## 2022-01-01 PROCEDURE — 93306 TTE W/DOPPLER COMPLETE: CPT

## 2022-01-01 PROCEDURE — 83935 ASSAY OF URINE OSMOLALITY: CPT

## 2022-01-01 PROCEDURE — 87116 MYCOBACTERIA CULTURE: CPT

## 2022-01-01 PROCEDURE — 2580000003 HC RX 258: Performed by: SURGERY

## 2022-01-01 PROCEDURE — 6360000002 HC RX W HCPCS: Performed by: NURSE PRACTITIONER

## 2022-01-01 PROCEDURE — 2500000003 HC RX 250 WO HCPCS: Performed by: NURSE PRACTITIONER

## 2022-01-01 PROCEDURE — 84439 ASSAY OF FREE THYROXINE: CPT

## 2022-01-01 PROCEDURE — 5A1D70Z PERFORMANCE OF URINARY FILTRATION, INTERMITTENT, LESS THAN 6 HOURS PER DAY: ICD-10-PCS | Performed by: INTERNAL MEDICINE

## 2022-01-01 PROCEDURE — 83036 HEMOGLOBIN GLYCOSYLATED A1C: CPT

## 2022-01-01 PROCEDURE — 6360000002 HC RX W HCPCS: Performed by: STUDENT IN AN ORGANIZED HEALTH CARE EDUCATION/TRAINING PROGRAM

## 2022-01-01 PROCEDURE — 1123F ACP DISCUSS/DSCN MKR DOCD: CPT | Performed by: NURSE PRACTITIONER

## 2022-01-01 PROCEDURE — APPSS180 APP SPLIT SHARED TIME > 60 MINUTES: Performed by: NURSE PRACTITIONER

## 2022-01-01 PROCEDURE — G8417 CALC BMI ABV UP PARAM F/U: HCPCS | Performed by: NURSE PRACTITIONER

## 2022-01-01 PROCEDURE — 0W993ZZ DRAINAGE OF RIGHT PLEURAL CAVITY, PERCUTANEOUS APPROACH: ICD-10-PCS | Performed by: INTERNAL MEDICINE

## 2022-01-01 PROCEDURE — 87186 SC STD MICRODIL/AGAR DIL: CPT

## 2022-01-01 PROCEDURE — 83690 ASSAY OF LIPASE: CPT

## 2022-01-01 PROCEDURE — 3700000001 HC ADD 15 MINUTES (ANESTHESIA): Performed by: SURGERY

## 2022-01-01 PROCEDURE — 86901 BLOOD TYPING SEROLOGIC RH(D): CPT

## 2022-01-01 PROCEDURE — 87210 SMEAR WET MOUNT SALINE/INK: CPT

## 2022-01-01 PROCEDURE — 3E0333Z INTRODUCTION OF ANTI-INFLAMMATORY INTO PERIPHERAL VEIN, PERCUTANEOUS APPROACH: ICD-10-PCS | Performed by: FAMILY MEDICINE

## 2022-01-01 PROCEDURE — 2500000003 HC RX 250 WO HCPCS: Performed by: SURGERY

## 2022-01-01 PROCEDURE — 74176 CT ABD & PELVIS W/O CONTRAST: CPT

## 2022-01-01 PROCEDURE — 84300 ASSAY OF URINE SODIUM: CPT

## 2022-01-01 PROCEDURE — 2709999900 HC NON-CHARGEABLE SUPPLY: Performed by: SURGERY

## 2022-01-01 PROCEDURE — 3209999900 FLUORO FOR SURGICAL PROCEDURES

## 2022-01-01 PROCEDURE — A4217 STERILE WATER/SALINE, 500 ML: HCPCS | Performed by: SURGERY

## 2022-01-01 PROCEDURE — APPSS45 APP SPLIT SHARED TIME 31-45 MINUTES: Performed by: NURSE PRACTITIONER

## 2022-01-01 PROCEDURE — 87077 CULTURE AEROBIC IDENTIFY: CPT

## 2022-01-01 PROCEDURE — 3600000013 HC SURGERY LEVEL 3 ADDTL 15MIN: Performed by: SURGERY

## 2022-01-01 PROCEDURE — 99231 SBSQ HOSP IP/OBS SF/LOW 25: CPT | Performed by: PHYSICIAN ASSISTANT

## 2022-01-01 RX ORDER — GUAIFENESIN/DEXTROMETHORPHAN 100-10MG/5
5 SYRUP ORAL EVERY 4 HOURS PRN
Status: DISCONTINUED | OUTPATIENT
Start: 2022-01-01 | End: 2022-01-01 | Stop reason: HOSPADM

## 2022-01-01 RX ORDER — METOPROLOL SUCCINATE 25 MG/1
25 TABLET, EXTENDED RELEASE ORAL 2 TIMES DAILY
Status: DISCONTINUED | OUTPATIENT
Start: 2022-01-01 | End: 2022-01-01 | Stop reason: HOSPADM

## 2022-01-01 RX ORDER — DEXTROSE MONOHYDRATE 25 G/50ML
12.5 INJECTION, SOLUTION INTRAVENOUS PRN
Status: DISCONTINUED | OUTPATIENT
Start: 2022-01-01 | End: 2022-01-01 | Stop reason: HOSPADM

## 2022-01-01 RX ORDER — ASCORBIC ACID 500 MG
500 TABLET ORAL 2 TIMES DAILY
Status: DISCONTINUED | OUTPATIENT
Start: 2022-01-01 | End: 2022-01-01 | Stop reason: HOSPADM

## 2022-01-01 RX ORDER — LOPERAMIDE HYDROCHLORIDE 2 MG/1
2 CAPSULE ORAL 4 TIMES DAILY PRN
Status: DISCONTINUED | OUTPATIENT
Start: 2022-01-01 | End: 2022-01-01 | Stop reason: HOSPADM

## 2022-01-01 RX ORDER — DOCUSATE SODIUM 100 MG/1
100 CAPSULE, LIQUID FILLED ORAL 2 TIMES DAILY
Status: DISCONTINUED | OUTPATIENT
Start: 2022-01-01 | End: 2022-01-01 | Stop reason: HOSPADM

## 2022-01-01 RX ORDER — ZINC SULFATE 50(220)MG
50 CAPSULE ORAL DAILY
Status: DISCONTINUED | OUTPATIENT
Start: 2022-01-01 | End: 2022-01-01 | Stop reason: HOSPADM

## 2022-01-01 RX ORDER — BUPROPION HYDROCHLORIDE 300 MG/1
300 TABLET ORAL EVERY MORNING
Status: DISCONTINUED | OUTPATIENT
Start: 2022-01-01 | End: 2022-01-01 | Stop reason: HOSPADM

## 2022-01-01 RX ORDER — INSULIN GLARGINE-YFGN 100 [IU]/ML
50 INJECTION, SOLUTION SUBCUTANEOUS NIGHTLY
Status: DISCONTINUED | OUTPATIENT
Start: 2022-01-01 | End: 2022-01-01 | Stop reason: HOSPADM

## 2022-01-01 RX ORDER — LANOLIN ALCOHOL/MO/W.PET/CERES
3 CREAM (GRAM) TOPICAL NIGHTLY
Status: DISCONTINUED | OUTPATIENT
Start: 2022-01-01 | End: 2022-01-01 | Stop reason: HOSPADM

## 2022-01-01 RX ORDER — SODIUM CHLORIDE 0.9 % (FLUSH) 0.9 %
SYRINGE (ML) INJECTION
Status: COMPLETED
Start: 2022-01-01 | End: 2022-01-01

## 2022-01-01 RX ORDER — LORAZEPAM 2 MG/ML
0.5 INJECTION INTRAMUSCULAR
Status: DISCONTINUED | OUTPATIENT
Start: 2022-01-01 | End: 2022-01-01 | Stop reason: HOSPADM

## 2022-01-01 RX ORDER — HALOPERIDOL 5 MG/ML
1 INJECTION INTRAMUSCULAR
Status: DISCONTINUED | OUTPATIENT
Start: 2022-01-01 | End: 2022-01-01 | Stop reason: HOSPADM

## 2022-01-01 RX ORDER — LOPERAMIDE HYDROCHLORIDE 2 MG/1
2 CAPSULE ORAL 4 TIMES DAILY PRN
COMMUNITY

## 2022-01-01 RX ORDER — LIDOCAINE HYDROCHLORIDE 20 MG/ML
INJECTION, SOLUTION INTRAVENOUS PRN
Status: DISCONTINUED | OUTPATIENT
Start: 2022-01-01 | End: 2022-01-01 | Stop reason: SDUPTHER

## 2022-01-01 RX ORDER — DEXTROSE MONOHYDRATE 50 MG/ML
100 INJECTION, SOLUTION INTRAVENOUS PRN
Status: DISCONTINUED | OUTPATIENT
Start: 2022-01-01 | End: 2022-01-01 | Stop reason: HOSPADM

## 2022-01-01 RX ORDER — ZINC GLUCONATE 50 MG
50 TABLET ORAL DAILY
Status: ON HOLD | COMMUNITY
End: 2022-01-01 | Stop reason: HOSPADM

## 2022-01-01 RX ORDER — LANOLIN ALCOHOL/MO/W.PET/CERES
3 CREAM (GRAM) TOPICAL NIGHTLY
COMMUNITY

## 2022-01-01 RX ORDER — MEPERIDINE HYDROCHLORIDE 25 MG/ML
12.5 INJECTION INTRAMUSCULAR; INTRAVENOUS; SUBCUTANEOUS EVERY 5 MIN PRN
Status: DISCONTINUED | OUTPATIENT
Start: 2022-01-01 | End: 2022-01-01 | Stop reason: HOSPADM

## 2022-01-01 RX ORDER — BUDESONIDE 0.25 MG/2ML
250 INHALANT ORAL 2 TIMES DAILY
Status: DISCONTINUED | OUTPATIENT
Start: 2022-01-01 | End: 2022-01-01 | Stop reason: HOSPADM

## 2022-01-01 RX ORDER — FOLIC ACID 1 MG/1
1 TABLET ORAL DAILY
Status: DISCONTINUED | OUTPATIENT
Start: 2022-01-01 | End: 2022-01-01 | Stop reason: HOSPADM

## 2022-01-01 RX ORDER — HYDROCODONE BITARTRATE AND ACETAMINOPHEN 5; 325 MG/1; MG/1
1 TABLET ORAL EVERY 4 HOURS PRN
Status: DISCONTINUED | OUTPATIENT
Start: 2022-01-01 | End: 2022-01-01 | Stop reason: HOSPADM

## 2022-01-01 RX ORDER — BUMETANIDE 1 MG/1
2 TABLET ORAL 2 TIMES DAILY
Status: DISCONTINUED | OUTPATIENT
Start: 2022-01-01 | End: 2022-01-01 | Stop reason: HOSPADM

## 2022-01-01 RX ORDER — LEVOFLOXACIN 5 MG/ML
500 INJECTION, SOLUTION INTRAVENOUS EVERY 24 HOURS
Status: DISCONTINUED | OUTPATIENT
Start: 2022-01-01 | End: 2022-01-01

## 2022-01-01 RX ORDER — HEPARIN SODIUM 10000 [USP'U]/ML
5000 INJECTION, SOLUTION INTRAVENOUS; SUBCUTANEOUS EVERY 8 HOURS
Status: DISCONTINUED | OUTPATIENT
Start: 2022-01-01 | End: 2022-01-01 | Stop reason: HOSPADM

## 2022-01-01 RX ORDER — IPRATROPIUM BROMIDE AND ALBUTEROL SULFATE 2.5; .5 MG/3ML; MG/3ML
1 SOLUTION RESPIRATORY (INHALATION)
Status: DISCONTINUED | OUTPATIENT
Start: 2022-01-01 | End: 2022-01-01 | Stop reason: HOSPADM

## 2022-01-01 RX ORDER — PANTOPRAZOLE SODIUM 40 MG/1
40 TABLET, DELAYED RELEASE ORAL
Status: DISCONTINUED | OUTPATIENT
Start: 2022-01-01 | End: 2022-01-01 | Stop reason: HOSPADM

## 2022-01-01 RX ORDER — BUMETANIDE 0.25 MG/ML
2 INJECTION, SOLUTION INTRAMUSCULAR; INTRAVENOUS 2 TIMES DAILY
Status: DISCONTINUED | OUTPATIENT
Start: 2022-01-01 | End: 2022-01-01

## 2022-01-01 RX ORDER — BUMETANIDE 2 MG/1
2 TABLET ORAL DAILY
Qty: 30 TABLET | Refills: 3 | Status: SHIPPED | OUTPATIENT
Start: 2022-01-01

## 2022-01-01 RX ORDER — LEVOFLOXACIN 5 MG/ML
250 INJECTION, SOLUTION INTRAVENOUS
Status: DISCONTINUED | OUTPATIENT
Start: 2022-01-01 | End: 2022-01-01 | Stop reason: ALTCHOICE

## 2022-01-01 RX ORDER — MIDODRINE HYDROCHLORIDE 5 MG/1
10 TABLET ORAL 3 TIMES DAILY
Status: DISCONTINUED | OUTPATIENT
Start: 2022-01-01 | End: 2022-01-01 | Stop reason: HOSPADM

## 2022-01-01 RX ORDER — METHYLPREDNISOLONE SODIUM SUCCINATE 40 MG/ML
40 INJECTION, POWDER, LYOPHILIZED, FOR SOLUTION INTRAMUSCULAR; INTRAVENOUS EVERY 12 HOURS
Status: DISCONTINUED | OUTPATIENT
Start: 2022-01-01 | End: 2022-01-01 | Stop reason: HOSPADM

## 2022-01-01 RX ORDER — HEPARIN SODIUM (PORCINE) LOCK FLUSH IV SOLN 100 UNIT/ML 100 UNIT/ML
SOLUTION INTRAVENOUS PRN
Status: DISCONTINUED | OUTPATIENT
Start: 2022-01-01 | End: 2022-01-01 | Stop reason: HOSPADM

## 2022-01-01 RX ORDER — SODIUM CHLORIDE 9 MG/ML
INJECTION, SOLUTION INTRAVENOUS PRN
Status: DISCONTINUED | OUTPATIENT
Start: 2022-01-01 | End: 2022-01-01 | Stop reason: HOSPADM

## 2022-01-01 RX ORDER — SODIUM CHLORIDE 0.9 % (FLUSH) 0.9 %
5-40 SYRINGE (ML) INJECTION PRN
Status: DISCONTINUED | OUTPATIENT
Start: 2022-01-01 | End: 2022-01-01 | Stop reason: HOSPADM

## 2022-01-01 RX ORDER — INSULIN GLARGINE 100 [IU]/ML
50 INJECTION, SOLUTION SUBCUTANEOUS NIGHTLY
COMMUNITY

## 2022-01-01 RX ORDER — EZETIMIBE 10 MG/1
10 TABLET ORAL NIGHTLY
Status: DISCONTINUED | OUTPATIENT
Start: 2022-01-01 | End: 2022-01-01 | Stop reason: HOSPADM

## 2022-01-01 RX ORDER — POTASSIUM CHLORIDE 20 MEQ/1
40 TABLET, EXTENDED RELEASE ORAL ONCE
Status: COMPLETED | OUTPATIENT
Start: 2022-01-01 | End: 2022-01-01

## 2022-01-01 RX ORDER — MIDODRINE HYDROCHLORIDE 10 MG/1
10 TABLET ORAL PRN
Status: DISCONTINUED | OUTPATIENT
Start: 2022-01-01 | End: 2022-01-01

## 2022-01-01 RX ORDER — ASPIRIN 81 MG/1
81 TABLET ORAL DAILY
Status: DISCONTINUED | OUTPATIENT
Start: 2022-01-01 | End: 2022-01-01 | Stop reason: HOSPADM

## 2022-01-01 RX ORDER — GUAIFENESIN 400 MG/1
400 TABLET ORAL 4 TIMES DAILY PRN
Status: DISCONTINUED | OUTPATIENT
Start: 2022-01-01 | End: 2022-01-01 | Stop reason: HOSPADM

## 2022-01-01 RX ORDER — LABETALOL HYDROCHLORIDE 5 MG/ML
10 INJECTION, SOLUTION INTRAVENOUS
Status: DISCONTINUED | OUTPATIENT
Start: 2022-01-01 | End: 2022-01-01 | Stop reason: HOSPADM

## 2022-01-01 RX ORDER — CHOLECALCIFEROL (VITAMIN D3) 50 MCG
2000 TABLET ORAL DAILY
Status: DISCONTINUED | OUTPATIENT
Start: 2022-01-01 | End: 2022-01-01 | Stop reason: HOSPADM

## 2022-01-01 RX ORDER — METOPROLOL SUCCINATE 25 MG/1
25 TABLET, EXTENDED RELEASE ORAL 2 TIMES DAILY
Qty: 30 TABLET | Refills: 3 | Status: SHIPPED | OUTPATIENT
Start: 2022-01-01

## 2022-01-01 RX ORDER — NICOTINE POLACRILEX 4 MG
15 LOZENGE BUCCAL PRN
Status: DISCONTINUED | OUTPATIENT
Start: 2022-01-01 | End: 2022-01-01 | Stop reason: HOSPADM

## 2022-01-01 RX ORDER — OXYCODONE HYDROCHLORIDE 5 MG/1
5 TABLET ORAL
Status: DISCONTINUED | OUTPATIENT
Start: 2022-01-01 | End: 2022-01-01 | Stop reason: HOSPADM

## 2022-01-01 RX ORDER — POTASSIUM CHLORIDE 750 MG/1
20 TABLET, EXTENDED RELEASE ORAL DAILY
COMMUNITY

## 2022-01-01 RX ORDER — ONDANSETRON 4 MG/1
4 TABLET, ORALLY DISINTEGRATING ORAL EVERY 8 HOURS PRN
Status: DISCONTINUED | OUTPATIENT
Start: 2022-01-01 | End: 2022-01-01 | Stop reason: HOSPADM

## 2022-01-01 RX ORDER — BUMETANIDE 1 MG/1
1 TABLET ORAL 2 TIMES DAILY
Qty: 30 TABLET | Refills: 3 | Status: ON HOLD | OUTPATIENT
Start: 2022-01-01 | End: 2022-01-01 | Stop reason: HOSPADM

## 2022-01-01 RX ORDER — MIDODRINE HYDROCHLORIDE 10 MG/1
10 TABLET ORAL
Status: DISCONTINUED | OUTPATIENT
Start: 2022-01-01 | End: 2022-01-01

## 2022-01-01 RX ORDER — LORAZEPAM 2 MG/ML
0.25 INJECTION INTRAMUSCULAR
Status: DISCONTINUED | OUTPATIENT
Start: 2022-01-01 | End: 2022-01-01 | Stop reason: HOSPADM

## 2022-01-01 RX ORDER — MIDODRINE HYDROCHLORIDE 5 MG/1
10 TABLET ORAL ONCE
Status: COMPLETED | OUTPATIENT
Start: 2022-01-01 | End: 2022-01-01

## 2022-01-01 RX ORDER — POTASSIUM CHLORIDE 20 MEQ/1
20 TABLET, EXTENDED RELEASE ORAL DAILY
Status: DISCONTINUED | OUTPATIENT
Start: 2022-01-01 | End: 2022-01-01

## 2022-01-01 RX ORDER — LANOLIN ALCOHOL/MO/W.PET/CERES
1000 CREAM (GRAM) TOPICAL DAILY
Status: DISCONTINUED | OUTPATIENT
Start: 2022-01-01 | End: 2022-01-01 | Stop reason: HOSPADM

## 2022-01-01 RX ORDER — INSULIN LISPRO 100 [IU]/ML
0-3 INJECTION, SOLUTION INTRAVENOUS; SUBCUTANEOUS NIGHTLY
Status: DISCONTINUED | OUTPATIENT
Start: 2022-01-01 | End: 2022-01-01 | Stop reason: HOSPADM

## 2022-01-01 RX ORDER — METOPROLOL SUCCINATE 25 MG/1
25 TABLET, EXTENDED RELEASE ORAL DAILY
Status: DISCONTINUED | OUTPATIENT
Start: 2022-01-01 | End: 2022-01-01

## 2022-01-01 RX ORDER — ACETAMINOPHEN 325 MG/1
650 TABLET ORAL EVERY 4 HOURS PRN
COMMUNITY

## 2022-01-01 RX ORDER — MIDODRINE HYDROCHLORIDE 2.5 MG/1
5 TABLET ORAL
Status: DISCONTINUED | OUTPATIENT
Start: 2022-01-01 | End: 2022-01-01

## 2022-01-01 RX ORDER — ACETAMINOPHEN 325 MG/1
650 TABLET ORAL EVERY 4 HOURS PRN
Status: DISCONTINUED | OUTPATIENT
Start: 2022-01-01 | End: 2022-01-01 | Stop reason: HOSPADM

## 2022-01-01 RX ORDER — INSULIN LISPRO 100 [IU]/ML
0-10 INJECTION, SOLUTION INTRAVENOUS; SUBCUTANEOUS
COMMUNITY

## 2022-01-01 RX ORDER — POTASSIUM CHLORIDE 20 MEQ/1
20 TABLET, EXTENDED RELEASE ORAL DAILY
Status: DISCONTINUED | OUTPATIENT
Start: 2022-01-01 | End: 2022-01-01 | Stop reason: HOSPADM

## 2022-01-01 RX ORDER — CHLOROTHIAZIDE SODIUM 500 MG/1
250 INJECTION INTRAVENOUS EVERY 12 HOURS
Status: COMPLETED | OUTPATIENT
Start: 2022-01-01 | End: 2022-01-01

## 2022-01-01 RX ORDER — SODIUM CHLORIDE 0.9 % (FLUSH) 0.9 %
5-40 SYRINGE (ML) INJECTION EVERY 12 HOURS SCHEDULED
Status: DISCONTINUED | OUTPATIENT
Start: 2022-01-01 | End: 2022-01-01 | Stop reason: HOSPADM

## 2022-01-01 RX ORDER — BUMETANIDE 0.25 MG/ML
1 INJECTION, SOLUTION INTRAMUSCULAR; INTRAVENOUS ONCE
Status: COMPLETED | OUTPATIENT
Start: 2022-01-01 | End: 2022-01-01

## 2022-01-01 RX ORDER — ASPIRIN 81 MG/1
81 TABLET ORAL DAILY
Qty: 30 TABLET | Refills: 3 | Status: SHIPPED | OUTPATIENT
Start: 2022-01-01

## 2022-01-01 RX ORDER — ATORVASTATIN CALCIUM 40 MG/1
40 TABLET, FILM COATED ORAL NIGHTLY
Status: DISCONTINUED | OUTPATIENT
Start: 2022-01-01 | End: 2022-01-01 | Stop reason: HOSPADM

## 2022-01-01 RX ORDER — SODIUM CHLORIDE 0.9 % (FLUSH) 0.9 %
10 SYRINGE (ML) INJECTION
Status: DISPENSED | OUTPATIENT
Start: 2022-01-01 | End: 2022-01-01

## 2022-01-01 RX ORDER — DIPHENHYDRAMINE HYDROCHLORIDE 50 MG/ML
12.5 INJECTION INTRAMUSCULAR; INTRAVENOUS
Status: DISCONTINUED | OUTPATIENT
Start: 2022-01-01 | End: 2022-01-01 | Stop reason: HOSPADM

## 2022-01-01 RX ORDER — MIDODRINE HYDROCHLORIDE 10 MG/1
10 TABLET ORAL
Qty: 90 TABLET | Refills: 0 | Status: ON HOLD | OUTPATIENT
Start: 2022-01-01 | End: 2022-01-01 | Stop reason: HOSPADM

## 2022-01-01 RX ORDER — POTASSIUM CHLORIDE 20 MEQ/1
20 TABLET, EXTENDED RELEASE ORAL ONCE
Status: COMPLETED | OUTPATIENT
Start: 2022-01-01 | End: 2022-01-01

## 2022-01-01 RX ORDER — IPRATROPIUM BROMIDE AND ALBUTEROL SULFATE 2.5; .5 MG/3ML; MG/3ML
1 SOLUTION RESPIRATORY (INHALATION) EVERY 4 HOURS PRN
Status: DISCONTINUED | OUTPATIENT
Start: 2022-01-01 | End: 2022-01-01 | Stop reason: HOSPADM

## 2022-01-01 RX ORDER — EZETIMIBE 10 MG/1
10 TABLET ORAL DAILY
COMMUNITY
End: 2022-01-01

## 2022-01-01 RX ORDER — FUROSEMIDE 10 MG/ML
40 INJECTION INTRAMUSCULAR; INTRAVENOUS 2 TIMES DAILY
Status: DISCONTINUED | OUTPATIENT
Start: 2022-01-01 | End: 2022-01-01

## 2022-01-01 RX ORDER — FUROSEMIDE 10 MG/ML
40 INJECTION INTRAMUSCULAR; INTRAVENOUS ONCE
Status: COMPLETED | OUTPATIENT
Start: 2022-01-01 | End: 2022-01-01

## 2022-01-01 RX ORDER — BUMETANIDE 2 MG/1
2 TABLET ORAL 2 TIMES DAILY
Qty: 30 TABLET | Refills: 3 | Status: ON HOLD | OUTPATIENT
Start: 2022-01-01 | End: 2022-01-01 | Stop reason: HOSPADM

## 2022-01-01 RX ORDER — MIDODRINE HYDROCHLORIDE 10 MG/1
10 TABLET ORAL PRN
Qty: 90 TABLET | Refills: 3 | Status: SHIPPED | OUTPATIENT
Start: 2022-01-01

## 2022-01-01 RX ORDER — ALBUMIN (HUMAN) 12.5 G/50ML
25 SOLUTION INTRAVENOUS ONCE
Status: COMPLETED | OUTPATIENT
Start: 2022-01-01 | End: 2022-01-01

## 2022-01-01 RX ORDER — ATORVASTATIN CALCIUM 40 MG/1
40 TABLET, FILM COATED ORAL DAILY
Status: DISCONTINUED | OUTPATIENT
Start: 2022-01-01 | End: 2022-01-01 | Stop reason: HOSPADM

## 2022-01-01 RX ORDER — ZINC SULFATE 50(220)MG
50 CAPSULE ORAL DAILY
COMMUNITY
End: 2022-01-01

## 2022-01-01 RX ORDER — INSULIN LISPRO 100 [IU]/ML
0-6 INJECTION, SOLUTION INTRAVENOUS; SUBCUTANEOUS
Status: DISCONTINUED | OUTPATIENT
Start: 2022-01-01 | End: 2022-01-01 | Stop reason: HOSPADM

## 2022-01-01 RX ORDER — PANTOPRAZOLE SODIUM 40 MG/1
40 TABLET, DELAYED RELEASE ORAL DAILY
Status: DISCONTINUED | OUTPATIENT
Start: 2022-01-01 | End: 2022-01-01 | Stop reason: HOSPADM

## 2022-01-01 RX ORDER — MIDODRINE HYDROCHLORIDE 5 MG/1
10 TABLET ORAL PRN
Status: DISCONTINUED | OUTPATIENT
Start: 2022-01-01 | End: 2022-01-01 | Stop reason: HOSPADM

## 2022-01-01 RX ORDER — IPRATROPIUM BROMIDE AND ALBUTEROL SULFATE 2.5; .5 MG/3ML; MG/3ML
3 SOLUTION RESPIRATORY (INHALATION)
Qty: 360 ML | Refills: 0
Start: 2022-01-01 | End: 2022-01-01

## 2022-01-01 RX ORDER — ASCORBIC ACID 500 MG
1000 TABLET ORAL DAILY
Status: ON HOLD | COMMUNITY
End: 2022-01-01 | Stop reason: HOSPADM

## 2022-01-01 RX ORDER — IPRATROPIUM BROMIDE AND ALBUTEROL SULFATE 2.5; .5 MG/3ML; MG/3ML
1 SOLUTION RESPIRATORY (INHALATION) EVERY 4 HOURS PRN
COMMUNITY

## 2022-01-01 RX ORDER — BUMETANIDE 1 MG/1
2 TABLET ORAL DAILY
Status: DISCONTINUED | OUTPATIENT
Start: 2022-01-01 | End: 2022-01-01

## 2022-01-01 RX ORDER — INSULIN GLARGINE-YFGN 100 [IU]/ML
50 INJECTION, SOLUTION SUBCUTANEOUS NIGHTLY
Status: DISCONTINUED | OUTPATIENT
Start: 2022-01-01 | End: 2022-01-01

## 2022-01-01 RX ORDER — SODIUM CHLORIDE 9 MG/ML
INJECTION, SOLUTION INTRAVENOUS CONTINUOUS PRN
Status: DISCONTINUED | OUTPATIENT
Start: 2022-01-01 | End: 2022-01-01 | Stop reason: SDUPTHER

## 2022-01-01 RX ORDER — NICOTINE POLACRILEX 4 MG
15 LOZENGE BUCCAL PRN
Status: DISCONTINUED | OUTPATIENT
Start: 2022-01-01 | End: 2022-01-01

## 2022-01-01 RX ORDER — BUMETANIDE 0.25 MG/ML
2 INJECTION, SOLUTION INTRAMUSCULAR; INTRAVENOUS ONCE
Status: COMPLETED | OUTPATIENT
Start: 2022-01-01 | End: 2022-01-01

## 2022-01-01 RX ORDER — LEVOFLOXACIN 5 MG/ML
500 INJECTION, SOLUTION INTRAVENOUS
Status: DISCONTINUED | OUTPATIENT
Start: 2022-01-01 | End: 2022-01-01

## 2022-01-01 RX ORDER — ARFORMOTEROL TARTRATE 15 UG/2ML
15 SOLUTION RESPIRATORY (INHALATION) 2 TIMES DAILY
Status: DISCONTINUED | OUTPATIENT
Start: 2022-01-01 | End: 2022-01-01 | Stop reason: HOSPADM

## 2022-01-01 RX ORDER — VITAMIN E 268 MG
400 CAPSULE ORAL DAILY
Status: ON HOLD | COMMUNITY
End: 2022-01-01 | Stop reason: HOSPADM

## 2022-01-01 RX ORDER — MIDAZOLAM HYDROCHLORIDE 1 MG/ML
INJECTION INTRAMUSCULAR; INTRAVENOUS PRN
Status: DISCONTINUED | OUTPATIENT
Start: 2022-01-01 | End: 2022-01-01 | Stop reason: SDUPTHER

## 2022-01-01 RX ORDER — MIDODRINE HYDROCHLORIDE 10 MG/1
10 TABLET ORAL
Status: DISCONTINUED | OUTPATIENT
Start: 2022-01-01 | End: 2022-01-01 | Stop reason: HOSPADM

## 2022-01-01 RX ORDER — HYDRALAZINE HYDROCHLORIDE 20 MG/ML
10 INJECTION INTRAMUSCULAR; INTRAVENOUS
Status: DISCONTINUED | OUTPATIENT
Start: 2022-01-01 | End: 2022-01-01 | Stop reason: HOSPADM

## 2022-01-01 RX ORDER — METHYLPREDNISOLONE SODIUM SUCCINATE 40 MG/ML
40 INJECTION, POWDER, LYOPHILIZED, FOR SOLUTION INTRAMUSCULAR; INTRAVENOUS ONCE
Status: COMPLETED | OUTPATIENT
Start: 2022-01-01 | End: 2022-01-01

## 2022-01-01 RX ORDER — CHOLECALCIFEROL (VITAMIN D3) 50 MCG
2000 TABLET ORAL DAILY
COMMUNITY

## 2022-01-01 RX ORDER — POTASSIUM CHLORIDE 1.5 G/1.77G
20 POWDER, FOR SOLUTION ORAL DAILY
COMMUNITY
End: 2022-01-01

## 2022-01-01 RX ORDER — EZETIMIBE 10 MG/1
10 TABLET ORAL NIGHTLY
COMMUNITY

## 2022-01-01 RX ORDER — BUMETANIDE 1 MG/1
2 TABLET ORAL DAILY
Status: DISCONTINUED | OUTPATIENT
Start: 2022-01-01 | End: 2022-01-01 | Stop reason: HOSPADM

## 2022-01-01 RX ORDER — METOLAZONE 5 MG/1
5 TABLET ORAL DAILY
Status: DISCONTINUED | OUTPATIENT
Start: 2022-01-01 | End: 2022-01-01

## 2022-01-01 RX ORDER — RIBOFLAVIN (VITAMIN B2) 100 MG
50 TABLET ORAL 2 TIMES DAILY
Status: DISCONTINUED | OUTPATIENT
Start: 2022-01-01 | End: 2022-01-01 | Stop reason: SDUPTHER

## 2022-01-01 RX ORDER — ONDANSETRON 2 MG/ML
4 INJECTION INTRAMUSCULAR; INTRAVENOUS EVERY 6 HOURS PRN
Status: DISCONTINUED | OUTPATIENT
Start: 2022-01-01 | End: 2022-01-01 | Stop reason: HOSPADM

## 2022-01-01 RX ORDER — TRAMADOL HYDROCHLORIDE 50 MG/1
25 TABLET ORAL EVERY 8 HOURS PRN
COMMUNITY
End: 2022-01-01 | Stop reason: ALTCHOICE

## 2022-01-01 RX ORDER — INSULIN GLARGINE-YFGN 100 [IU]/ML
25 INJECTION, SOLUTION SUBCUTANEOUS NIGHTLY
Status: DISCONTINUED | OUTPATIENT
Start: 2022-01-01 | End: 2022-01-01 | Stop reason: HOSPADM

## 2022-01-01 RX ORDER — PROPOFOL 10 MG/ML
INJECTION, EMULSION INTRAVENOUS CONTINUOUS PRN
Status: DISCONTINUED | OUTPATIENT
Start: 2022-01-01 | End: 2022-01-01 | Stop reason: SDUPTHER

## 2022-01-01 RX ORDER — EZETIMIBE 10 MG/1
10 TABLET ORAL DAILY
Status: DISCONTINUED | OUTPATIENT
Start: 2022-01-01 | End: 2022-01-01 | Stop reason: HOSPADM

## 2022-01-01 RX ORDER — ASCORBIC ACID 500 MG
500 TABLET ORAL 2 TIMES DAILY
COMMUNITY

## 2022-01-01 RX ORDER — INSULIN GLARGINE-YFGN 100 [IU]/ML
40 INJECTION, SOLUTION SUBCUTANEOUS NIGHTLY
Status: DISCONTINUED | OUTPATIENT
Start: 2022-01-01 | End: 2022-01-01 | Stop reason: HOSPADM

## 2022-01-01 RX ORDER — GUAIFENESIN/DEXTROMETHORPHAN 100-10MG/5
5 SYRUP ORAL EVERY 4 HOURS PRN
Qty: 120 ML | Refills: 0 | Status: SHIPPED | OUTPATIENT
Start: 2022-01-01 | End: 2022-01-01

## 2022-01-01 RX ORDER — ETOMIDATE 2 MG/ML
INJECTION INTRAVENOUS PRN
Status: DISCONTINUED | OUTPATIENT
Start: 2022-01-01 | End: 2022-01-01 | Stop reason: SDUPTHER

## 2022-01-01 RX ORDER — NICOTINE POLACRILEX 4 MG
15 LOZENGE BUCCAL PRN
Status: DISCONTINUED | OUTPATIENT
Start: 2022-01-01 | End: 2022-01-01 | Stop reason: CLARIF

## 2022-01-01 RX ORDER — POTASSIUM CHLORIDE 20 MEQ/1
20 TABLET, EXTENDED RELEASE ORAL
Status: DISCONTINUED | OUTPATIENT
Start: 2022-01-01 | End: 2022-01-01 | Stop reason: HOSPADM

## 2022-01-01 RX ORDER — BENZONATATE 100 MG/1
100 CAPSULE ORAL 3 TIMES DAILY
Status: DISCONTINUED | OUTPATIENT
Start: 2022-01-01 | End: 2022-01-01 | Stop reason: HOSPADM

## 2022-01-01 RX ORDER — FOLIC ACID 1 MG/1
1 TABLET ORAL DAILY
COMMUNITY

## 2022-01-01 RX ORDER — FUROSEMIDE 10 MG/ML
20 INJECTION INTRAMUSCULAR; INTRAVENOUS DAILY
Status: DISCONTINUED | OUTPATIENT
Start: 2022-01-01 | End: 2022-01-01

## 2022-01-01 RX ORDER — BUPROPION HYDROCHLORIDE 300 MG/1
300 TABLET ORAL EVERY MORNING
COMMUNITY

## 2022-01-01 RX ORDER — INSULIN GLARGINE-YFGN 100 [IU]/ML
50 INJECTION, SOLUTION SUBCUTANEOUS NIGHTLY
Qty: 1 EACH | Refills: 0
Start: 2022-01-01 | End: 2022-01-01

## 2022-01-01 RX ORDER — RIBOFLAVIN (VITAMIN B2) 100 MG
50 TABLET ORAL 2 TIMES DAILY
Status: ON HOLD | COMMUNITY
End: 2022-01-01 | Stop reason: HOSPADM

## 2022-01-01 RX ORDER — POTASSIUM CHLORIDE 20 MEQ/1
20 TABLET, EXTENDED RELEASE ORAL DAILY
COMMUNITY
End: 2022-01-01

## 2022-01-01 RX ORDER — LEVALBUTEROL INHALATION SOLUTION 0.63 MG/3ML
0.63 SOLUTION RESPIRATORY (INHALATION) EVERY 8 HOURS
Status: DISCONTINUED | OUTPATIENT
Start: 2022-01-01 | End: 2022-01-01 | Stop reason: HOSPADM

## 2022-01-01 RX ORDER — ONDANSETRON 4 MG/1
4 TABLET, ORALLY DISINTEGRATING ORAL EVERY 8 HOURS PRN
Qty: 60 TABLET | Refills: 0 | Status: SHIPPED | OUTPATIENT
Start: 2022-01-01

## 2022-01-01 RX ORDER — METOPROLOL SUCCINATE 25 MG/1
25 TABLET, EXTENDED RELEASE ORAL DAILY
Status: DISCONTINUED | OUTPATIENT
Start: 2022-01-01 | End: 2022-01-01 | Stop reason: HOSPADM

## 2022-01-01 RX ORDER — GUAIFENESIN 400 MG/1
400 TABLET ORAL 4 TIMES DAILY PRN
COMMUNITY

## 2022-01-01 RX ORDER — GUAIFENESIN AND DEXTROMETHORPHAN HYDROBROMIDE 100; 10 MG/5ML; MG/5ML
5 SOLUTION ORAL EVERY 4 HOURS PRN
Status: ON HOLD | COMMUNITY
End: 2022-01-01

## 2022-01-01 RX ORDER — DEXAMETHASONE SODIUM PHOSPHATE 10 MG/ML
6 INJECTION, SOLUTION INTRAMUSCULAR; INTRAVENOUS ONCE
Status: COMPLETED | OUTPATIENT
Start: 2022-01-01 | End: 2022-01-01

## 2022-01-01 RX ORDER — BUMETANIDE 1 MG/1
1 TABLET ORAL 2 TIMES DAILY
Status: DISCONTINUED | OUTPATIENT
Start: 2022-01-01 | End: 2022-01-01 | Stop reason: HOSPADM

## 2022-01-01 RX ORDER — BUMETANIDE 0.25 MG/ML
1 INJECTION, SOLUTION INTRAMUSCULAR; INTRAVENOUS 2 TIMES DAILY
Status: DISCONTINUED | OUTPATIENT
Start: 2022-01-01 | End: 2022-01-01

## 2022-01-01 RX ORDER — CODEINE PHOSPHATE AND GUAIFENESIN 10; 100 MG/5ML; MG/5ML
5 SOLUTION ORAL EVERY 4 HOURS PRN
Status: DISCONTINUED | OUTPATIENT
Start: 2022-01-01 | End: 2022-01-01 | Stop reason: HOSPADM

## 2022-01-01 RX ORDER — DOCUSATE SODIUM 100 MG/1
100 CAPSULE, LIQUID FILLED ORAL 2 TIMES DAILY
Qty: 60 CAPSULE | Refills: 0 | Status: SHIPPED | OUTPATIENT
Start: 2022-01-01

## 2022-01-01 RX ORDER — GLYCOPYRROLATE 0.2 MG/ML
0.4 INJECTION INTRAMUSCULAR; INTRAVENOUS
Status: DISCONTINUED | OUTPATIENT
Start: 2022-01-01 | End: 2022-01-01 | Stop reason: HOSPADM

## 2022-01-01 RX ORDER — DEXAMETHASONE SODIUM PHOSPHATE 10 MG/ML
6 INJECTION INTRAMUSCULAR; INTRAVENOUS EVERY 24 HOURS
Status: DISCONTINUED | OUTPATIENT
Start: 2022-01-01 | End: 2022-01-01

## 2022-01-01 RX ORDER — DEXAMETHASONE SODIUM PHOSPHATE 10 MG/ML
10 INJECTION, SOLUTION INTRAMUSCULAR; INTRAVENOUS ONCE
Status: COMPLETED | OUTPATIENT
Start: 2022-01-01 | End: 2022-01-01

## 2022-01-01 RX ORDER — IPRATROPIUM BROMIDE AND ALBUTEROL SULFATE 2.5; .5 MG/3ML; MG/3ML
1 SOLUTION RESPIRATORY (INHALATION) ONCE
Status: COMPLETED | OUTPATIENT
Start: 2022-01-01 | End: 2022-01-01

## 2022-01-01 RX ORDER — ACETAMINOPHEN 325 MG/1
650 TABLET ORAL EVERY 4 HOURS PRN
Status: DISCONTINUED | OUTPATIENT
Start: 2022-01-01 | End: 2022-01-01 | Stop reason: SDUPTHER

## 2022-01-01 RX ORDER — GUAIFENESIN 400 MG/1
400 TABLET ORAL 4 TIMES DAILY PRN
Qty: 56 TABLET | Refills: 0 | Status: SHIPPED | OUTPATIENT
Start: 2022-01-01 | End: 2022-01-01

## 2022-01-01 RX ADMIN — METOPROLOL SUCCINATE 25 MG: 25 TABLET, FILM COATED, EXTENDED RELEASE ORAL at 08:51

## 2022-01-01 RX ADMIN — GUAIFENESIN 400 MG: 400 TABLET ORAL at 20:02

## 2022-01-01 RX ADMIN — ATORVASTATIN CALCIUM 40 MG: 40 TABLET, FILM COATED ORAL at 23:14

## 2022-01-01 RX ADMIN — HYDROCODONE BITARTRATE AND ACETAMINOPHEN 1 TABLET: 5; 325 TABLET ORAL at 12:47

## 2022-01-01 RX ADMIN — ATORVASTATIN CALCIUM 40 MG: 40 TABLET, FILM COATED ORAL at 21:00

## 2022-01-01 RX ADMIN — INSULIN LISPRO 1 UNITS: 100 INJECTION, SOLUTION INTRAVENOUS; SUBCUTANEOUS at 11:38

## 2022-01-01 RX ADMIN — INSULIN GLARGINE-YFGN 50 UNITS: 100 INJECTION, SOLUTION SUBCUTANEOUS at 20:42

## 2022-01-01 RX ADMIN — HEPARIN SODIUM 5000 UNITS: 10000 INJECTION INTRAVENOUS; SUBCUTANEOUS at 23:56

## 2022-01-01 RX ADMIN — ASPIRIN 81 MG: 81 TABLET, COATED ORAL at 09:52

## 2022-01-01 RX ADMIN — INSULIN GLARGINE-YFGN 50 UNITS: 100 INJECTION, SOLUTION SUBCUTANEOUS at 21:52

## 2022-01-01 RX ADMIN — LORAZEPAM 0.5 MG: 2 INJECTION INTRAMUSCULAR; INTRAVENOUS at 14:23

## 2022-01-01 RX ADMIN — HEPARIN SODIUM 5000 UNITS: 10000 INJECTION INTRAVENOUS; SUBCUTANEOUS at 10:00

## 2022-01-01 RX ADMIN — POTASSIUM CHLORIDE 40 MEQ: 20 TABLET, EXTENDED RELEASE ORAL at 09:41

## 2022-01-01 RX ADMIN — IPRATROPIUM BROMIDE AND ALBUTEROL SULFATE 1 AMPULE: .5; 2.5 SOLUTION RESPIRATORY (INHALATION) at 11:43

## 2022-01-01 RX ADMIN — CYANOCOBALAMIN TAB 1000 MCG 1000 MCG: 1000 TAB at 09:57

## 2022-01-01 RX ADMIN — INSULIN LISPRO 1 UNITS: 100 INJECTION, SOLUTION INTRAVENOUS; SUBCUTANEOUS at 17:00

## 2022-01-01 RX ADMIN — ARFORMOTEROL TARTRATE 15 MCG: 15 SOLUTION RESPIRATORY (INHALATION) at 09:12

## 2022-01-01 RX ADMIN — POTASSIUM CHLORIDE 20 MEQ: 20 TABLET, EXTENDED RELEASE ORAL at 12:35

## 2022-01-01 RX ADMIN — CEFTRIAXONE 1000 MG: 1 INJECTION, POWDER, FOR SOLUTION INTRAMUSCULAR; INTRAVENOUS at 20:50

## 2022-01-01 RX ADMIN — Medication 3 MG: at 20:00

## 2022-01-01 RX ADMIN — BUPROPION HYDROCHLORIDE 300 MG: 300 TABLET, FILM COATED, EXTENDED RELEASE ORAL at 10:57

## 2022-01-01 RX ADMIN — Medication 2000 UNITS: at 09:00

## 2022-01-01 RX ADMIN — Medication 50 MG: at 09:36

## 2022-01-01 RX ADMIN — METOPROLOL SUCCINATE 25 MG: 25 TABLET, EXTENDED RELEASE ORAL at 21:01

## 2022-01-01 RX ADMIN — OXYCODONE HYDROCHLORIDE AND ACETAMINOPHEN 500 MG: 500 TABLET ORAL at 21:39

## 2022-01-01 RX ADMIN — EZETIMIBE 10 MG: 10 TABLET ORAL at 19:49

## 2022-01-01 RX ADMIN — IPRATROPIUM BROMIDE AND ALBUTEROL SULFATE 1 AMPULE: .5; 2.5 SOLUTION RESPIRATORY (INHALATION) at 08:49

## 2022-01-01 RX ADMIN — DEXTROSE MONOHYDRATE 12.5 G: 25 INJECTION, SOLUTION INTRAVENOUS at 12:40

## 2022-01-01 RX ADMIN — ASPIRIN 81 MG: 81 TABLET, COATED ORAL at 09:30

## 2022-01-01 RX ADMIN — ATORVASTATIN CALCIUM 40 MG: 40 TABLET, FILM COATED ORAL at 09:56

## 2022-01-01 RX ADMIN — PANTOPRAZOLE SODIUM 40 MG: 40 TABLET, DELAYED RELEASE ORAL at 06:44

## 2022-01-01 RX ADMIN — INSULIN LISPRO 1 UNITS: 100 INJECTION, SOLUTION INTRAVENOUS; SUBCUTANEOUS at 12:10

## 2022-01-01 RX ADMIN — METOPROLOL SUCCINATE 25 MG: 25 TABLET, FILM COATED, EXTENDED RELEASE ORAL at 08:33

## 2022-01-01 RX ADMIN — ATORVASTATIN CALCIUM 40 MG: 40 TABLET, FILM COATED ORAL at 19:44

## 2022-01-01 RX ADMIN — Medication 2000 MG: at 10:02

## 2022-01-01 RX ADMIN — ATORVASTATIN CALCIUM 40 MG: 40 TABLET, FILM COATED ORAL at 09:57

## 2022-01-01 RX ADMIN — PANTOPRAZOLE SODIUM 40 MG: 40 TABLET, DELAYED RELEASE ORAL at 09:29

## 2022-01-01 RX ADMIN — CYANOCOBALAMIN TAB 1000 MCG 1000 MCG: 1000 TAB at 08:47

## 2022-01-01 RX ADMIN — OXYCODONE HYDROCHLORIDE AND ACETAMINOPHEN 500 MG: 500 TABLET ORAL at 08:27

## 2022-01-01 RX ADMIN — IPRATROPIUM BROMIDE 0.5 MG: 0.5 SOLUTION RESPIRATORY (INHALATION) at 08:02

## 2022-01-01 RX ADMIN — IPRATROPIUM BROMIDE 0.5 MG: 0.5 SOLUTION RESPIRATORY (INHALATION) at 19:35

## 2022-01-01 RX ADMIN — BUPROPION HYDROCHLORIDE 300 MG: 300 TABLET, FILM COATED, EXTENDED RELEASE ORAL at 09:42

## 2022-01-01 RX ADMIN — Medication 3 MG: at 21:18

## 2022-01-01 RX ADMIN — OXYCODONE HYDROCHLORIDE AND ACETAMINOPHEN 500 MG: 500 TABLET ORAL at 09:01

## 2022-01-01 RX ADMIN — GUAIFENESIN 400 MG: 400 TABLET ORAL at 06:05

## 2022-01-01 RX ADMIN — CYANOCOBALAMIN TAB 1000 MCG 1000 MCG: 1000 TAB at 09:00

## 2022-01-01 RX ADMIN — BUPROPION HYDROCHLORIDE 300 MG: 300 TABLET, FILM COATED, EXTENDED RELEASE ORAL at 08:56

## 2022-01-01 RX ADMIN — GUAIFENESIN SYRUP AND DEXTROMETHORPHAN 5 ML: 100; 10 SYRUP ORAL at 14:54

## 2022-01-01 RX ADMIN — INSULIN GLARGINE-YFGN 50 UNITS: 100 INJECTION, SOLUTION SUBCUTANEOUS at 22:00

## 2022-01-01 RX ADMIN — HEPARIN SODIUM 5000 UNITS: 10000 INJECTION INTRAVENOUS; SUBCUTANEOUS at 23:26

## 2022-01-01 RX ADMIN — IPRATROPIUM BROMIDE AND ALBUTEROL SULFATE 1 AMPULE: .5; 2.5 SOLUTION RESPIRATORY (INHALATION) at 13:52

## 2022-01-01 RX ADMIN — CYANOCOBALAMIN TAB 1000 MCG 1000 MCG: 1000 TAB at 09:07

## 2022-01-01 RX ADMIN — MIDAZOLAM 2 MG: 1 INJECTION INTRAMUSCULAR; INTRAVENOUS at 09:55

## 2022-01-01 RX ADMIN — BUMETANIDE 2 MG: 1 TABLET ORAL at 08:21

## 2022-01-01 RX ADMIN — Medication 3 MG: at 20:43

## 2022-01-01 RX ADMIN — PANTOPRAZOLE SODIUM 40 MG: 40 TABLET, DELAYED RELEASE ORAL at 05:09

## 2022-01-01 RX ADMIN — Medication 2000 UNITS: at 09:08

## 2022-01-01 RX ADMIN — Medication 50 MG: at 09:53

## 2022-01-01 RX ADMIN — IPRATROPIUM BROMIDE AND ALBUTEROL SULFATE 1 AMPULE: .5; 2.5 SOLUTION RESPIRATORY (INHALATION) at 12:15

## 2022-01-01 RX ADMIN — Medication 50 MG: at 09:31

## 2022-01-01 RX ADMIN — IPRATROPIUM BROMIDE AND ALBUTEROL SULFATE 1 AMPULE: .5; 2.5 SOLUTION RESPIRATORY (INHALATION) at 16:16

## 2022-01-01 RX ADMIN — GUAIFENESIN AND CODEINE PHOSPHATE 5 ML: 10; 100 LIQUID ORAL at 21:16

## 2022-01-01 RX ADMIN — CYANOCOBALAMIN TAB 1000 MCG 1000 MCG: 1000 TAB at 09:36

## 2022-01-01 RX ADMIN — HEPARIN SODIUM 5000 UNITS: 10000 INJECTION INTRAVENOUS; SUBCUTANEOUS at 15:03

## 2022-01-01 RX ADMIN — Medication 10 ML: at 21:40

## 2022-01-01 RX ADMIN — IPRATROPIUM BROMIDE 0.5 MG: 0.5 SOLUTION RESPIRATORY (INHALATION) at 16:39

## 2022-01-01 RX ADMIN — DOCUSATE SODIUM 100 MG: 100 CAPSULE, LIQUID FILLED ORAL at 11:20

## 2022-01-01 RX ADMIN — ATORVASTATIN CALCIUM 40 MG: 40 TABLET, FILM COATED ORAL at 20:42

## 2022-01-01 RX ADMIN — GUAIFENESIN SYRUP AND DEXTROMETHORPHAN 5 ML: 100; 10 SYRUP ORAL at 02:13

## 2022-01-01 RX ADMIN — PANTOPRAZOLE SODIUM 40 MG: 40 TABLET, DELAYED RELEASE ORAL at 06:01

## 2022-01-01 RX ADMIN — LEVALBUTEROL 0.63 MG: 0.63 SOLUTION RESPIRATORY (INHALATION) at 12:07

## 2022-01-01 RX ADMIN — INSULIN GLARGINE-YFGN 50 UNITS: 100 INJECTION, SOLUTION SUBCUTANEOUS at 21:51

## 2022-01-01 RX ADMIN — Medication 2000 UNITS: at 09:38

## 2022-01-01 RX ADMIN — Medication 2000 UNITS: at 09:58

## 2022-01-01 RX ADMIN — OXYCODONE HYDROCHLORIDE AND ACETAMINOPHEN 500 MG: 500 TABLET ORAL at 10:57

## 2022-01-01 RX ADMIN — BUPROPION HYDROCHLORIDE 300 MG: 300 TABLET, FILM COATED, EXTENDED RELEASE ORAL at 08:44

## 2022-01-01 RX ADMIN — DOCUSATE SODIUM 100 MG: 100 CAPSULE, LIQUID FILLED ORAL at 09:42

## 2022-01-01 RX ADMIN — INSULIN GLARGINE-YFGN 50 UNITS: 100 INJECTION, SOLUTION SUBCUTANEOUS at 20:08

## 2022-01-01 RX ADMIN — ASPIRIN 81 MG: 81 TABLET, COATED ORAL at 12:34

## 2022-01-01 RX ADMIN — ASPIRIN 81 MG: 81 TABLET, COATED ORAL at 09:57

## 2022-01-01 RX ADMIN — IPRATROPIUM BROMIDE 0.5 MG: 0.5 SOLUTION RESPIRATORY (INHALATION) at 12:30

## 2022-01-01 RX ADMIN — ASPIRIN 81 MG: 81 TABLET, COATED ORAL at 09:42

## 2022-01-01 RX ADMIN — OXYCODONE HYDROCHLORIDE AND ACETAMINOPHEN 500 MG: 500 TABLET ORAL at 21:08

## 2022-01-01 RX ADMIN — ASPIRIN 81 MG: 81 TABLET, COATED ORAL at 08:05

## 2022-01-01 RX ADMIN — ASPIRIN 81 MG: 81 TABLET, COATED ORAL at 08:22

## 2022-01-01 RX ADMIN — BENZONATATE 100 MG: 100 CAPSULE ORAL at 10:08

## 2022-01-01 RX ADMIN — GUAIFENESIN SYRUP AND DEXTROMETHORPHAN 5 ML: 100; 10 SYRUP ORAL at 23:12

## 2022-01-01 RX ADMIN — ATORVASTATIN CALCIUM 40 MG: 40 TABLET, FILM COATED ORAL at 09:37

## 2022-01-01 RX ADMIN — ATORVASTATIN CALCIUM 40 MG: 40 TABLET, FILM COATED ORAL at 21:55

## 2022-01-01 RX ADMIN — INSULIN LISPRO 1 UNITS: 100 INJECTION, SOLUTION INTRAVENOUS; SUBCUTANEOUS at 12:00

## 2022-01-01 RX ADMIN — IPRATROPIUM BROMIDE AND ALBUTEROL SULFATE 1 AMPULE: .5; 2.5 SOLUTION RESPIRATORY (INHALATION) at 15:46

## 2022-01-01 RX ADMIN — HYDROCODONE BITARTRATE AND ACETAMINOPHEN 1 TABLET: 5; 325 TABLET ORAL at 13:22

## 2022-01-01 RX ADMIN — Medication 3 MG: at 20:24

## 2022-01-01 RX ADMIN — BUMETANIDE 2 MG/HR: 0.25 INJECTION INTRAMUSCULAR; INTRAVENOUS at 10:00

## 2022-01-01 RX ADMIN — GUAIFENESIN 400 MG: 400 TABLET ORAL at 21:16

## 2022-01-01 RX ADMIN — HYDROMORPHONE HYDROCHLORIDE 0.5 MG: 1 INJECTION, SOLUTION INTRAMUSCULAR; INTRAVENOUS; SUBCUTANEOUS at 18:53

## 2022-01-01 RX ADMIN — HEPARIN SODIUM 5000 UNITS: 10000 INJECTION INTRAVENOUS; SUBCUTANEOUS at 15:00

## 2022-01-01 RX ADMIN — OXYCODONE HYDROCHLORIDE AND ACETAMINOPHEN 500 MG: 500 TABLET ORAL at 21:16

## 2022-01-01 RX ADMIN — HEPARIN SODIUM 5000 UNITS: 10000 INJECTION INTRAVENOUS; SUBCUTANEOUS at 14:38

## 2022-01-01 RX ADMIN — ETOMIDATE 2 MG: 20 INJECTION, SOLUTION INTRAVENOUS at 10:01

## 2022-01-01 RX ADMIN — OXYCODONE HYDROCHLORIDE AND ACETAMINOPHEN 500 MG: 500 TABLET ORAL at 20:47

## 2022-01-01 RX ADMIN — EZETIMIBE 10 MG: 10 TABLET ORAL at 21:00

## 2022-01-01 RX ADMIN — OXYCODONE HYDROCHLORIDE AND ACETAMINOPHEN 500 MG: 500 TABLET ORAL at 20:49

## 2022-01-01 RX ADMIN — DOCUSATE SODIUM 100 MG: 100 CAPSULE, LIQUID FILLED ORAL at 20:19

## 2022-01-01 RX ADMIN — BUMETANIDE 2 MG: 0.25 INJECTION INTRAMUSCULAR; INTRAVENOUS at 23:57

## 2022-01-01 RX ADMIN — OXYCODONE HYDROCHLORIDE AND ACETAMINOPHEN 500 MG: 500 TABLET ORAL at 10:53

## 2022-01-01 RX ADMIN — OXYCODONE HYDROCHLORIDE AND ACETAMINOPHEN 500 MG: 500 TABLET ORAL at 08:05

## 2022-01-01 RX ADMIN — Medication 3 MG: at 20:38

## 2022-01-01 RX ADMIN — FOLIC ACID 1 MG: 1 TABLET ORAL at 09:13

## 2022-01-01 RX ADMIN — CYANOCOBALAMIN TAB 1000 MCG 1000 MCG: 1000 TAB at 11:19

## 2022-01-01 RX ADMIN — INSULIN LISPRO 1 UNITS: 100 INJECTION, SOLUTION INTRAVENOUS; SUBCUTANEOUS at 21:51

## 2022-01-01 RX ADMIN — ATORVASTATIN CALCIUM 40 MG: 40 TABLET, FILM COATED ORAL at 08:36

## 2022-01-01 RX ADMIN — EZETIMIBE 10 MG: 10 TABLET ORAL at 08:47

## 2022-01-01 RX ADMIN — DOCUSATE SODIUM 100 MG: 100 CAPSULE, LIQUID FILLED ORAL at 12:34

## 2022-01-01 RX ADMIN — OXYCODONE HYDROCHLORIDE AND ACETAMINOPHEN 500 MG: 500 TABLET ORAL at 20:53

## 2022-01-01 RX ADMIN — BUPROPION HYDROCHLORIDE 300 MG: 300 TABLET, FILM COATED, EXTENDED RELEASE ORAL at 09:36

## 2022-01-01 RX ADMIN — METHYLPREDNISOLONE SODIUM SUCCINATE 40 MG: 40 INJECTION, POWDER, FOR SOLUTION INTRAMUSCULAR; INTRAVENOUS at 10:56

## 2022-01-01 RX ADMIN — BUPROPION HYDROCHLORIDE 300 MG: 300 TABLET, FILM COATED, EXTENDED RELEASE ORAL at 08:27

## 2022-01-01 RX ADMIN — FOLIC ACID 1 MG: 1 TABLET ORAL at 10:57

## 2022-01-01 RX ADMIN — PANTOPRAZOLE SODIUM 40 MG: 40 TABLET, DELAYED RELEASE ORAL at 06:24

## 2022-01-01 RX ADMIN — INSULIN GLARGINE-YFGN 50 UNITS: 100 INJECTION, SOLUTION SUBCUTANEOUS at 20:21

## 2022-01-01 RX ADMIN — ATORVASTATIN CALCIUM 40 MG: 40 TABLET, FILM COATED ORAL at 22:41

## 2022-01-01 RX ADMIN — Medication 50 MG: at 10:08

## 2022-01-01 RX ADMIN — MIDODRINE HYDROCHLORIDE 10 MG: 10 TABLET ORAL at 16:39

## 2022-01-01 RX ADMIN — IPRATROPIUM BROMIDE 0.5 MG: 0.5 SOLUTION RESPIRATORY (INHALATION) at 20:17

## 2022-01-01 RX ADMIN — INSULIN GLARGINE-YFGN 50 UNITS: 100 INJECTION, SOLUTION SUBCUTANEOUS at 20:59

## 2022-01-01 RX ADMIN — METOPROLOL SUCCINATE 25 MG: 25 TABLET, EXTENDED RELEASE ORAL at 09:00

## 2022-01-01 RX ADMIN — IPRATROPIUM BROMIDE AND ALBUTEROL SULFATE 1 AMPULE: .5; 2.5 SOLUTION RESPIRATORY (INHALATION) at 10:08

## 2022-01-01 RX ADMIN — OXYCODONE HYDROCHLORIDE AND ACETAMINOPHEN 500 MG: 500 TABLET ORAL at 08:45

## 2022-01-01 RX ADMIN — HYDROCODONE BITARTRATE AND ACETAMINOPHEN 1 TABLET: 5; 325 TABLET ORAL at 11:35

## 2022-01-01 RX ADMIN — Medication 2000 UNITS: at 12:36

## 2022-01-01 RX ADMIN — OXYCODONE HYDROCHLORIDE AND ACETAMINOPHEN 500 MG: 500 TABLET ORAL at 20:24

## 2022-01-01 RX ADMIN — BUPROPION HYDROCHLORIDE 300 MG: 300 TABLET, FILM COATED, EXTENDED RELEASE ORAL at 10:21

## 2022-01-01 RX ADMIN — ATORVASTATIN CALCIUM 40 MG: 40 TABLET, FILM COATED ORAL at 22:20

## 2022-01-01 RX ADMIN — ASPIRIN 81 MG: 81 TABLET ORAL at 11:44

## 2022-01-01 RX ADMIN — IPRATROPIUM BROMIDE 0.5 MG: 0.5 SOLUTION RESPIRATORY (INHALATION) at 12:11

## 2022-01-01 RX ADMIN — FOLIC ACID 1 MG: 1 TABLET ORAL at 10:21

## 2022-01-01 RX ADMIN — HYDROCODONE BITARTRATE AND ACETAMINOPHEN 1 TABLET: 5; 325 TABLET ORAL at 09:30

## 2022-01-01 RX ADMIN — OXYCODONE HYDROCHLORIDE AND ACETAMINOPHEN 500 MG: 500 TABLET ORAL at 21:00

## 2022-01-01 RX ADMIN — METOPROLOL SUCCINATE 25 MG: 25 TABLET, EXTENDED RELEASE ORAL at 20:20

## 2022-01-01 RX ADMIN — ASPIRIN 81 MG: 81 TABLET, COATED ORAL at 09:21

## 2022-01-01 RX ADMIN — HEPARIN SODIUM 5000 UNITS: 10000 INJECTION INTRAVENOUS; SUBCUTANEOUS at 06:42

## 2022-01-01 RX ADMIN — ATORVASTATIN CALCIUM 40 MG: 40 TABLET, FILM COATED ORAL at 21:15

## 2022-01-01 RX ADMIN — ASPIRIN 81 MG: 81 TABLET ORAL at 08:49

## 2022-01-01 RX ADMIN — GUAIFENESIN SYRUP AND DEXTROMETHORPHAN 5 ML: 100; 10 SYRUP ORAL at 02:03

## 2022-01-01 RX ADMIN — ASPIRIN 81 MG: 81 TABLET, COATED ORAL at 11:19

## 2022-01-01 RX ADMIN — HEPARIN SODIUM 5000 UNITS: 10000 INJECTION INTRAVENOUS; SUBCUTANEOUS at 06:55

## 2022-01-01 RX ADMIN — BUMETANIDE 1 MG/HR: 0.25 INJECTION, SOLUTION INTRAMUSCULAR; INTRAVENOUS at 01:41

## 2022-01-01 RX ADMIN — BUMETANIDE 2 MG: 1 TABLET ORAL at 10:08

## 2022-01-01 RX ADMIN — DOCUSATE SODIUM 100 MG: 100 CAPSULE, LIQUID FILLED ORAL at 11:43

## 2022-01-01 RX ADMIN — Medication 10 ML: at 09:47

## 2022-01-01 RX ADMIN — SODIUM CHLORIDE, PRESERVATIVE FREE 10 ML: 5 INJECTION INTRAVENOUS at 14:37

## 2022-01-01 RX ADMIN — ATORVASTATIN CALCIUM 40 MG: 40 TABLET, FILM COATED ORAL at 19:50

## 2022-01-01 RX ADMIN — METOPROLOL SUCCINATE 25 MG: 25 TABLET, EXTENDED RELEASE ORAL at 08:27

## 2022-01-01 RX ADMIN — EZETIMIBE 10 MG: 10 TABLET ORAL at 08:36

## 2022-01-01 RX ADMIN — ASPIRIN 81 MG: 81 TABLET, COATED ORAL at 09:36

## 2022-01-01 RX ADMIN — METOPROLOL SUCCINATE 25 MG: 25 TABLET, EXTENDED RELEASE ORAL at 12:35

## 2022-01-01 RX ADMIN — Medication 1000 MCG: at 09:38

## 2022-01-01 RX ADMIN — EZETIMIBE 10 MG: 10 TABLET ORAL at 20:20

## 2022-01-01 RX ADMIN — GUAIFENESIN SYRUP AND DEXTROMETHORPHAN 5 ML: 100; 10 SYRUP ORAL at 09:30

## 2022-01-01 RX ADMIN — GUAIFENESIN 400 MG: 400 TABLET ORAL at 11:31

## 2022-01-01 RX ADMIN — BUMETANIDE 2 MG: 1 TABLET ORAL at 09:38

## 2022-01-01 RX ADMIN — BUMETANIDE 2 MG/HR: 0.25 INJECTION INTRAMUSCULAR; INTRAVENOUS at 16:44

## 2022-01-01 RX ADMIN — EZETIMIBE 10 MG: 10 TABLET ORAL at 09:21

## 2022-01-01 RX ADMIN — Medication 2000 UNITS: at 10:08

## 2022-01-01 RX ADMIN — Medication 50 MG: at 10:21

## 2022-01-01 RX ADMIN — IPRATROPIUM BROMIDE 0.5 MG: 0.5 SOLUTION RESPIRATORY (INHALATION) at 20:19

## 2022-01-01 RX ADMIN — PANTOPRAZOLE SODIUM 40 MG: 40 TABLET, DELAYED RELEASE ORAL at 08:51

## 2022-01-01 RX ADMIN — HEPARIN SODIUM 5000 UNITS: 10000 INJECTION INTRAVENOUS; SUBCUTANEOUS at 06:04

## 2022-01-01 RX ADMIN — MICONAZOLE NITRATE: 20.6 POWDER TOPICAL at 21:57

## 2022-01-01 RX ADMIN — IPRATROPIUM BROMIDE AND ALBUTEROL SULFATE 1 AMPULE: .5; 2.5 SOLUTION RESPIRATORY (INHALATION) at 12:37

## 2022-01-01 RX ADMIN — ATORVASTATIN CALCIUM 40 MG: 40 TABLET, FILM COATED ORAL at 21:17

## 2022-01-01 RX ADMIN — POTASSIUM CHLORIDE 40 MEQ: 20 TABLET, EXTENDED RELEASE ORAL at 10:52

## 2022-01-01 RX ADMIN — OXYCODONE HYDROCHLORIDE AND ACETAMINOPHEN 500 MG: 500 TABLET ORAL at 20:38

## 2022-01-01 RX ADMIN — HYDROCODONE BITARTRATE AND ACETAMINOPHEN 1 TABLET: 5; 325 TABLET ORAL at 17:47

## 2022-01-01 RX ADMIN — EZETIMIBE 10 MG: 10 TABLET ORAL at 21:59

## 2022-01-01 RX ADMIN — IPRATROPIUM BROMIDE 0.5 MG: 0.5 SOLUTION RESPIRATORY (INHALATION) at 08:14

## 2022-01-01 RX ADMIN — LEVALBUTEROL 0.63 MG: 0.63 SOLUTION RESPIRATORY (INHALATION) at 12:49

## 2022-01-01 RX ADMIN — HYDROCODONE BITARTRATE AND ACETAMINOPHEN 1 TABLET: 5; 325 TABLET ORAL at 08:31

## 2022-01-01 RX ADMIN — HYDROCODONE BITARTRATE AND ACETAMINOPHEN 1 TABLET: 5; 325 TABLET ORAL at 22:12

## 2022-01-01 RX ADMIN — ATORVASTATIN CALCIUM 40 MG: 40 TABLET, FILM COATED ORAL at 21:39

## 2022-01-01 RX ADMIN — ASPIRIN 81 MG: 81 TABLET, COATED ORAL at 09:28

## 2022-01-01 RX ADMIN — HEPARIN SODIUM 5000 UNITS: 10000 INJECTION INTRAVENOUS; SUBCUTANEOUS at 21:17

## 2022-01-01 RX ADMIN — FOLIC ACID 1 MG: 1 TABLET ORAL at 09:00

## 2022-01-01 RX ADMIN — IPRATROPIUM BROMIDE AND ALBUTEROL SULFATE 1 AMPULE: .5; 2.5 SOLUTION RESPIRATORY (INHALATION) at 20:06

## 2022-01-01 RX ADMIN — OXYCODONE HYDROCHLORIDE AND ACETAMINOPHEN 500 MG: 500 TABLET ORAL at 23:03

## 2022-01-01 RX ADMIN — ASPIRIN 81 MG: 81 TABLET ORAL at 11:00

## 2022-01-01 RX ADMIN — EZETIMIBE 10 MG: 10 TABLET ORAL at 21:17

## 2022-01-01 RX ADMIN — EZETIMIBE 10 MG: 10 TABLET ORAL at 22:13

## 2022-01-01 RX ADMIN — POTASSIUM CHLORIDE 20 MEQ: 20 TABLET, EXTENDED RELEASE ORAL at 08:22

## 2022-01-01 RX ADMIN — HEPARIN SODIUM 5000 UNITS: 10000 INJECTION INTRAVENOUS; SUBCUTANEOUS at 15:41

## 2022-01-01 RX ADMIN — BUMETANIDE 2 MG/HR: 0.25 INJECTION INTRAMUSCULAR; INTRAVENOUS at 00:10

## 2022-01-01 RX ADMIN — LEVALBUTEROL 0.63 MG: 0.63 SOLUTION RESPIRATORY (INHALATION) at 19:50

## 2022-01-01 RX ADMIN — HYDROCODONE BITARTRATE AND ACETAMINOPHEN 1 TABLET: 5; 325 TABLET ORAL at 02:13

## 2022-01-01 RX ADMIN — CEFTRIAXONE 1000 MG: 1 INJECTION, POWDER, FOR SOLUTION INTRAMUSCULAR; INTRAVENOUS at 21:00

## 2022-01-01 RX ADMIN — FOLIC ACID 1 MG: 1 TABLET ORAL at 08:17

## 2022-01-01 RX ADMIN — POTASSIUM CHLORIDE 20 MEQ: 20 TABLET, EXTENDED RELEASE ORAL at 08:31

## 2022-01-01 RX ADMIN — IPRATROPIUM BROMIDE AND ALBUTEROL SULFATE 1 AMPULE: .5; 2.5 SOLUTION RESPIRATORY (INHALATION) at 09:46

## 2022-01-01 RX ADMIN — DOCUSATE SODIUM 100 MG: 100 CAPSULE, LIQUID FILLED ORAL at 23:57

## 2022-01-01 RX ADMIN — LEVOFLOXACIN 500 MG: 5 INJECTION, SOLUTION INTRAVENOUS at 02:26

## 2022-01-01 RX ADMIN — CYANOCOBALAMIN TAB 1000 MCG 1000 MCG: 1000 TAB at 09:42

## 2022-01-01 RX ADMIN — BUMETANIDE 1 MG/HR: 0.25 INJECTION INTRAMUSCULAR; INTRAVENOUS at 12:28

## 2022-01-01 RX ADMIN — BUMETANIDE 2 MG: 0.25 INJECTION, SOLUTION INTRAMUSCULAR; INTRAVENOUS at 06:01

## 2022-01-01 RX ADMIN — Medication 3 MG: at 19:51

## 2022-01-01 RX ADMIN — PANTOPRAZOLE SODIUM 40 MG: 40 TABLET, DELAYED RELEASE ORAL at 11:01

## 2022-01-01 RX ADMIN — BUMETANIDE 2 MG/HR: 0.25 INJECTION INTRAMUSCULAR; INTRAVENOUS at 02:43

## 2022-01-01 RX ADMIN — IPRATROPIUM BROMIDE AND ALBUTEROL SULFATE 1 AMPULE: .5; 2.5 SOLUTION RESPIRATORY (INHALATION) at 07:48

## 2022-01-01 RX ADMIN — GUAIFENESIN AND CODEINE PHOSPHATE 5 ML: 10; 100 LIQUID ORAL at 01:56

## 2022-01-01 RX ADMIN — HEPARIN SODIUM 5000 UNITS: 10000 INJECTION INTRAVENOUS; SUBCUTANEOUS at 14:27

## 2022-01-01 RX ADMIN — IPRATROPIUM BROMIDE 0.5 MG: 0.5 SOLUTION RESPIRATORY (INHALATION) at 11:24

## 2022-01-01 RX ADMIN — BUPROPION HYDROCHLORIDE 300 MG: 300 TABLET, FILM COATED, EXTENDED RELEASE ORAL at 18:21

## 2022-01-01 RX ADMIN — CEFTRIAXONE 1000 MG: 1 INJECTION, POWDER, FOR SOLUTION INTRAMUSCULAR; INTRAVENOUS at 20:57

## 2022-01-01 RX ADMIN — GUAIFENESIN 400 MG: 400 TABLET ORAL at 19:58

## 2022-01-01 RX ADMIN — GUAIFENESIN 400 MG: 400 TABLET ORAL at 17:51

## 2022-01-01 RX ADMIN — IPRATROPIUM BROMIDE 0.5 MG: 0.5 SOLUTION RESPIRATORY (INHALATION) at 12:25

## 2022-01-01 RX ADMIN — OXYCODONE HYDROCHLORIDE AND ACETAMINOPHEN 500 MG: 500 TABLET ORAL at 20:19

## 2022-01-01 RX ADMIN — OXYCODONE HYDROCHLORIDE AND ACETAMINOPHEN 500 MG: 500 TABLET ORAL at 20:39

## 2022-01-01 RX ADMIN — INSULIN LISPRO 3 UNITS: 100 INJECTION, SOLUTION INTRAVENOUS; SUBCUTANEOUS at 16:50

## 2022-01-01 RX ADMIN — BUPROPION HYDROCHLORIDE 300 MG: 300 TABLET, FILM COATED, EXTENDED RELEASE ORAL at 08:22

## 2022-01-01 RX ADMIN — INSULIN LISPRO 1 UNITS: 100 INJECTION, SOLUTION INTRAVENOUS; SUBCUTANEOUS at 11:59

## 2022-01-01 RX ADMIN — PANTOPRAZOLE SODIUM 40 MG: 40 TABLET, DELAYED RELEASE ORAL at 08:17

## 2022-01-01 RX ADMIN — ASPIRIN 81 MG: 81 TABLET, COATED ORAL at 10:11

## 2022-01-01 RX ADMIN — IPRATROPIUM BROMIDE AND ALBUTEROL SULFATE 1 AMPULE: .5; 2.5 SOLUTION RESPIRATORY (INHALATION) at 08:01

## 2022-01-01 RX ADMIN — IPRATROPIUM BROMIDE AND ALBUTEROL SULFATE 1 AMPULE: .5; 2.5 SOLUTION RESPIRATORY (INHALATION) at 09:20

## 2022-01-01 RX ADMIN — ATORVASTATIN CALCIUM 40 MG: 40 TABLET, FILM COATED ORAL at 08:48

## 2022-01-01 RX ADMIN — BUPROPION HYDROCHLORIDE 300 MG: 300 TABLET, FILM COATED, EXTENDED RELEASE ORAL at 10:31

## 2022-01-01 RX ADMIN — METOPROLOL SUCCINATE 25 MG: 25 TABLET, EXTENDED RELEASE ORAL at 19:45

## 2022-01-01 RX ADMIN — PANTOPRAZOLE SODIUM 40 MG: 40 TABLET, DELAYED RELEASE ORAL at 05:57

## 2022-01-01 RX ADMIN — Medication 3 MG: at 20:59

## 2022-01-01 RX ADMIN — PANTOPRAZOLE SODIUM 40 MG: 40 TABLET, DELAYED RELEASE ORAL at 06:52

## 2022-01-01 RX ADMIN — GUAIFENESIN 400 MG: 400 TABLET ORAL at 08:50

## 2022-01-01 RX ADMIN — GUAIFENESIN SYRUP AND DEXTROMETHORPHAN 5 ML: 100; 10 SYRUP ORAL at 18:41

## 2022-01-01 RX ADMIN — Medication 3 MG: at 22:20

## 2022-01-01 RX ADMIN — EZETIMIBE 10 MG: 10 TABLET ORAL at 20:23

## 2022-01-01 RX ADMIN — GUAIFENESIN 400 MG: 400 TABLET ORAL at 22:37

## 2022-01-01 RX ADMIN — IPRATROPIUM BROMIDE AND ALBUTEROL SULFATE 1 AMPULE: .5; 2.5 SOLUTION RESPIRATORY (INHALATION) at 21:00

## 2022-01-01 RX ADMIN — METOPROLOL SUCCINATE 25 MG: 25 TABLET, EXTENDED RELEASE ORAL at 09:07

## 2022-01-01 RX ADMIN — DOCUSATE SODIUM 100 MG: 100 CAPSULE, LIQUID FILLED ORAL at 12:43

## 2022-01-01 RX ADMIN — INSULIN LISPRO 1 UNITS: 100 INJECTION, SOLUTION INTRAVENOUS; SUBCUTANEOUS at 22:20

## 2022-01-01 RX ADMIN — FOLIC ACID 1 MG: 1 TABLET ORAL at 08:45

## 2022-01-01 RX ADMIN — DOCUSATE SODIUM 100 MG: 100 CAPSULE, LIQUID FILLED ORAL at 09:07

## 2022-01-01 RX ADMIN — IPRATROPIUM BROMIDE AND ALBUTEROL SULFATE 1 AMPULE: .5; 2.5 SOLUTION RESPIRATORY (INHALATION) at 12:00

## 2022-01-01 RX ADMIN — IPRATROPIUM BROMIDE AND ALBUTEROL SULFATE 1 AMPULE: .5; 2.5 SOLUTION RESPIRATORY (INHALATION) at 21:08

## 2022-01-01 RX ADMIN — PHENYLEPHRINE HYDROCHLORIDE 100 MCG: 10 INJECTION INTRAVENOUS at 10:07

## 2022-01-01 RX ADMIN — OXYCODONE HYDROCHLORIDE AND ACETAMINOPHEN 500 MG: 500 TABLET ORAL at 09:57

## 2022-01-01 RX ADMIN — BUPROPION HYDROCHLORIDE 300 MG: 300 TABLET, FILM COATED, EXTENDED RELEASE ORAL at 08:17

## 2022-01-01 RX ADMIN — INSULIN GLARGINE-YFGN 50 UNITS: 100 INJECTION, SOLUTION SUBCUTANEOUS at 19:51

## 2022-01-01 RX ADMIN — FOLIC ACID 1 MG: 1 TABLET ORAL at 09:52

## 2022-01-01 RX ADMIN — METOPROLOL SUCCINATE 25 MG: 25 TABLET, EXTENDED RELEASE ORAL at 21:19

## 2022-01-01 RX ADMIN — HEPARIN SODIUM 5000 UNITS: 10000 INJECTION INTRAVENOUS; SUBCUTANEOUS at 05:57

## 2022-01-01 RX ADMIN — GUAIFENESIN 400 MG: 400 TABLET ORAL at 15:06

## 2022-01-01 RX ADMIN — DEXTROSE MONOHYDRATE 12.5 G: 25 INJECTION, SOLUTION INTRAVENOUS at 17:55

## 2022-01-01 RX ADMIN — INSULIN LISPRO 1 UNITS: 100 INJECTION, SOLUTION INTRAVENOUS; SUBCUTANEOUS at 17:11

## 2022-01-01 RX ADMIN — IPRATROPIUM BROMIDE AND ALBUTEROL SULFATE 1 AMPULE: .5; 2.5 SOLUTION RESPIRATORY (INHALATION) at 15:55

## 2022-01-01 RX ADMIN — HEPARIN SODIUM 5000 UNITS: 10000 INJECTION INTRAVENOUS; SUBCUTANEOUS at 06:14

## 2022-01-01 RX ADMIN — METOPROLOL SUCCINATE 25 MG: 25 TABLET, EXTENDED RELEASE ORAL at 20:03

## 2022-01-01 RX ADMIN — PANTOPRAZOLE SODIUM 40 MG: 40 TABLET, DELAYED RELEASE ORAL at 06:30

## 2022-01-01 RX ADMIN — GUAIFENESIN 400 MG: 400 TABLET ORAL at 14:38

## 2022-01-01 RX ADMIN — METOPROLOL SUCCINATE 25 MG: 25 TABLET, EXTENDED RELEASE ORAL at 16:58

## 2022-01-01 RX ADMIN — FOLIC ACID 1 MG: 1 TABLET ORAL at 08:31

## 2022-01-01 RX ADMIN — BUMETANIDE 1 MG: 0.25 INJECTION, SOLUTION INTRAMUSCULAR; INTRAVENOUS at 10:00

## 2022-01-01 RX ADMIN — BUMETANIDE 1 MG/HR: 0.25 INJECTION, SOLUTION INTRAMUSCULAR; INTRAVENOUS at 03:00

## 2022-01-01 RX ADMIN — PANTOPRAZOLE SODIUM 40 MG: 40 TABLET, DELAYED RELEASE ORAL at 09:36

## 2022-01-01 RX ADMIN — Medication 3 MG: at 21:52

## 2022-01-01 RX ADMIN — GUAIFENESIN 400 MG: 400 TABLET ORAL at 15:20

## 2022-01-01 RX ADMIN — METOPROLOL SUCCINATE 25 MG: 25 TABLET, FILM COATED, EXTENDED RELEASE ORAL at 07:55

## 2022-01-01 RX ADMIN — POTASSIUM CHLORIDE 40 MEQ: 1500 TABLET, EXTENDED RELEASE ORAL at 13:22

## 2022-01-01 RX ADMIN — INSULIN GLARGINE-YFGN 50 UNITS: 100 INJECTION, SOLUTION SUBCUTANEOUS at 21:28

## 2022-01-01 RX ADMIN — POTASSIUM CHLORIDE 20 MEQ: 20 TABLET, EXTENDED RELEASE ORAL at 08:27

## 2022-01-01 RX ADMIN — ATORVASTATIN CALCIUM 40 MG: 40 TABLET, FILM COATED ORAL at 20:41

## 2022-01-01 RX ADMIN — ASPIRIN 81 MG: 81 TABLET, COATED ORAL at 12:35

## 2022-01-01 RX ADMIN — LEVOFLOXACIN 250 MG: 5 INJECTION, SOLUTION INTRAVENOUS at 09:25

## 2022-01-01 RX ADMIN — SODIUM CHLORIDE, PRESERVATIVE FREE: 5 INJECTION INTRAVENOUS at 22:17

## 2022-01-01 RX ADMIN — Medication 2000 UNITS: at 08:48

## 2022-01-01 RX ADMIN — HEPARIN SODIUM 5000 UNITS: 10000 INJECTION INTRAVENOUS; SUBCUTANEOUS at 14:57

## 2022-01-01 RX ADMIN — DOCUSATE SODIUM 100 MG: 100 CAPSULE, LIQUID FILLED ORAL at 08:48

## 2022-01-01 RX ADMIN — ATORVASTATIN CALCIUM 40 MG: 40 TABLET, FILM COATED ORAL at 19:48

## 2022-01-01 RX ADMIN — Medication 1000 MCG: at 08:56

## 2022-01-01 RX ADMIN — ASPIRIN 81 MG: 81 TABLET, COATED ORAL at 08:39

## 2022-01-01 RX ADMIN — Medication 50 MG: at 09:38

## 2022-01-01 RX ADMIN — IPRATROPIUM BROMIDE 0.5 MG: 0.5 SOLUTION RESPIRATORY (INHALATION) at 13:04

## 2022-01-01 RX ADMIN — OXYCODONE HYDROCHLORIDE AND ACETAMINOPHEN 500 MG: 500 TABLET ORAL at 20:51

## 2022-01-01 RX ADMIN — ASPIRIN 81 MG: 81 TABLET, COATED ORAL at 10:31

## 2022-01-01 RX ADMIN — METOPROLOL SUCCINATE 25 MG: 25 TABLET, EXTENDED RELEASE ORAL at 20:24

## 2022-01-01 RX ADMIN — Medication 1000 MCG: at 09:42

## 2022-01-01 RX ADMIN — Medication 50 MG: at 11:02

## 2022-01-01 RX ADMIN — Medication 2000 UNITS: at 08:20

## 2022-01-01 RX ADMIN — BUMETANIDE 2 MG: 1 TABLET ORAL at 09:42

## 2022-01-01 RX ADMIN — POTASSIUM CHLORIDE 40 MEQ: 20 TABLET, EXTENDED RELEASE ORAL at 09:57

## 2022-01-01 RX ADMIN — IPRATROPIUM BROMIDE 0.5 MG: 0.5 SOLUTION RESPIRATORY (INHALATION) at 18:56

## 2022-01-01 RX ADMIN — IPRATROPIUM BROMIDE AND ALBUTEROL SULFATE 1 AMPULE: .5; 2.5 SOLUTION RESPIRATORY (INHALATION) at 13:40

## 2022-01-01 RX ADMIN — GUAIFENESIN SYRUP AND DEXTROMETHORPHAN 5 ML: 100; 10 SYRUP ORAL at 22:02

## 2022-01-01 RX ADMIN — DEXAMETHASONE SODIUM PHOSPHATE 6 MG: 10 INJECTION INTRAMUSCULAR; INTRAVENOUS at 08:32

## 2022-01-01 RX ADMIN — OXYCODONE HYDROCHLORIDE AND ACETAMINOPHEN 500 MG: 500 TABLET ORAL at 08:40

## 2022-01-01 RX ADMIN — FUROSEMIDE 40 MG: 10 INJECTION, SOLUTION INTRAMUSCULAR; INTRAVENOUS at 17:21

## 2022-01-01 RX ADMIN — CYANOCOBALAMIN TAB 1000 MCG 1000 MCG: 1000 TAB at 10:52

## 2022-01-01 RX ADMIN — FOLIC ACID 1 MG: 1 TABLET ORAL at 12:35

## 2022-01-01 RX ADMIN — BUPROPION HYDROCHLORIDE 300 MG: 300 TABLET, FILM COATED, EXTENDED RELEASE ORAL at 09:39

## 2022-01-01 RX ADMIN — Medication 1000 MCG: at 09:14

## 2022-01-01 RX ADMIN — HEPARIN SODIUM 5000 UNITS: 10000 INJECTION INTRAVENOUS; SUBCUTANEOUS at 15:02

## 2022-01-01 RX ADMIN — INSULIN LISPRO 1 UNITS: 100 INJECTION, SOLUTION INTRAVENOUS; SUBCUTANEOUS at 19:51

## 2022-01-01 RX ADMIN — INSULIN LISPRO 2 UNITS: 100 INJECTION, SOLUTION INTRAVENOUS; SUBCUTANEOUS at 20:16

## 2022-01-01 RX ADMIN — OXYCODONE HYDROCHLORIDE AND ACETAMINOPHEN 500 MG: 500 TABLET ORAL at 21:04

## 2022-01-01 RX ADMIN — IPRATROPIUM BROMIDE AND ALBUTEROL SULFATE 1 AMPULE: .5; 2.5 SOLUTION RESPIRATORY (INHALATION) at 12:12

## 2022-01-01 RX ADMIN — HEPARIN SODIUM 5000 UNITS: 10000 INJECTION INTRAVENOUS; SUBCUTANEOUS at 15:06

## 2022-01-01 RX ADMIN — METOPROLOL SUCCINATE 25 MG: 25 TABLET, FILM COATED, EXTENDED RELEASE ORAL at 10:23

## 2022-01-01 RX ADMIN — Medication 2000 UNITS: at 08:27

## 2022-01-01 RX ADMIN — ASPIRIN 81 MG: 81 TABLET, COATED ORAL at 09:12

## 2022-01-01 RX ADMIN — ATORVASTATIN CALCIUM 40 MG: 40 TABLET, FILM COATED ORAL at 09:21

## 2022-01-01 RX ADMIN — EZETIMIBE 10 MG: 10 TABLET ORAL at 21:15

## 2022-01-01 RX ADMIN — CEFTRIAXONE 1000 MG: 1 INJECTION, POWDER, FOR SOLUTION INTRAMUSCULAR; INTRAVENOUS at 20:51

## 2022-01-01 RX ADMIN — GUAIFENESIN 400 MG: 400 TABLET ORAL at 20:47

## 2022-01-01 RX ADMIN — IPRATROPIUM BROMIDE AND ALBUTEROL SULFATE 1 AMPULE: .5; 2.5 SOLUTION RESPIRATORY (INHALATION) at 16:22

## 2022-01-01 RX ADMIN — MIDODRINE HYDROCHLORIDE 10 MG: 10 TABLET ORAL at 13:02

## 2022-01-01 RX ADMIN — Medication 1000 MCG: at 10:57

## 2022-01-01 RX ADMIN — LEVALBUTEROL 0.63 MG: 0.63 SOLUTION RESPIRATORY (INHALATION) at 05:33

## 2022-01-01 RX ADMIN — DOCUSATE SODIUM 100 MG: 100 CAPSULE, LIQUID FILLED ORAL at 10:30

## 2022-01-01 RX ADMIN — OXYCODONE HYDROCHLORIDE AND ACETAMINOPHEN 500 MG: 500 TABLET ORAL at 10:31

## 2022-01-01 RX ADMIN — FOLIC ACID 1 MG: 1 TABLET ORAL at 10:30

## 2022-01-01 RX ADMIN — DOCUSATE SODIUM 100 MG: 100 CAPSULE, LIQUID FILLED ORAL at 20:22

## 2022-01-01 RX ADMIN — METOPROLOL SUCCINATE 25 MG: 25 TABLET, EXTENDED RELEASE ORAL at 10:53

## 2022-01-01 RX ADMIN — HYDROCODONE BITARTRATE AND ACETAMINOPHEN 1 TABLET: 5; 325 TABLET ORAL at 21:54

## 2022-01-01 RX ADMIN — FOLIC ACID 1 MG: 1 TABLET ORAL at 10:00

## 2022-01-01 RX ADMIN — LEVALBUTEROL 0.63 MG: 0.63 SOLUTION RESPIRATORY (INHALATION) at 19:08

## 2022-01-01 RX ADMIN — OXYCODONE HYDROCHLORIDE AND ACETAMINOPHEN 500 MG: 500 TABLET ORAL at 11:44

## 2022-01-01 RX ADMIN — ASPIRIN 81 MG: 81 TABLET, COATED ORAL at 10:09

## 2022-01-01 RX ADMIN — BUMETANIDE 2 MG/HR: 0.25 INJECTION INTRAMUSCULAR; INTRAVENOUS at 09:24

## 2022-01-01 RX ADMIN — INSULIN LISPRO 3 UNITS: 100 INJECTION, SOLUTION INTRAVENOUS; SUBCUTANEOUS at 12:01

## 2022-01-01 RX ADMIN — MIDODRINE HYDROCHLORIDE 10 MG: 10 TABLET ORAL at 17:17

## 2022-01-01 RX ADMIN — HEPARIN SODIUM 5000 UNITS: 10000 INJECTION INTRAVENOUS; SUBCUTANEOUS at 16:47

## 2022-01-01 RX ADMIN — METOPROLOL SUCCINATE 25 MG: 25 TABLET, EXTENDED RELEASE ORAL at 09:43

## 2022-01-01 RX ADMIN — BENZONATATE 100 MG: 100 CAPSULE ORAL at 22:14

## 2022-01-01 RX ADMIN — EZETIMIBE 10 MG: 10 TABLET ORAL at 21:19

## 2022-01-01 RX ADMIN — MICONAZOLE NITRATE: 20.6 POWDER TOPICAL at 10:07

## 2022-01-01 RX ADMIN — CHLOROTHIAZIDE SODIUM 250 MG: 500 INJECTION INTRAVENOUS at 16:00

## 2022-01-01 RX ADMIN — EZETIMIBE 10 MG: 10 TABLET ORAL at 09:44

## 2022-01-01 RX ADMIN — MICONAZOLE NITRATE: 20.6 POWDER TOPICAL at 21:09

## 2022-01-01 RX ADMIN — MICONAZOLE NITRATE: 20.6 POWDER TOPICAL at 09:37

## 2022-01-01 RX ADMIN — ETOMIDATE 8 MG: 20 INJECTION, SOLUTION INTRAVENOUS at 09:57

## 2022-01-01 RX ADMIN — BUMETANIDE 2 MG/HR: 0.25 INJECTION INTRAMUSCULAR; INTRAVENOUS at 03:19

## 2022-01-01 RX ADMIN — METOPROLOL SUCCINATE 25 MG: 25 TABLET, EXTENDED RELEASE ORAL at 21:08

## 2022-01-01 RX ADMIN — FOLIC ACID 1 MG: 1 TABLET ORAL at 08:36

## 2022-01-01 RX ADMIN — IPRATROPIUM BROMIDE AND ALBUTEROL SULFATE 1 AMPULE: .5; 2.5 SOLUTION RESPIRATORY (INHALATION) at 21:02

## 2022-01-01 RX ADMIN — GUAIFENESIN 400 MG: 400 TABLET ORAL at 09:08

## 2022-01-01 RX ADMIN — Medication 2000 UNITS: at 08:05

## 2022-01-01 RX ADMIN — GUAIFENESIN 400 MG: 400 TABLET ORAL at 08:46

## 2022-01-01 RX ADMIN — INSULIN LISPRO 1 UNITS: 100 INJECTION, SOLUTION INTRAVENOUS; SUBCUTANEOUS at 22:00

## 2022-01-01 RX ADMIN — IPRATROPIUM BROMIDE AND ALBUTEROL SULFATE 1 AMPULE: .5; 2.5 SOLUTION RESPIRATORY (INHALATION) at 12:27

## 2022-01-01 RX ADMIN — IPRATROPIUM BROMIDE AND ALBUTEROL SULFATE 1 AMPULE: .5; 2.5 SOLUTION RESPIRATORY (INHALATION) at 19:48

## 2022-01-01 RX ADMIN — INSULIN LISPRO 1 UNITS: 100 INJECTION, SOLUTION INTRAVENOUS; SUBCUTANEOUS at 19:53

## 2022-01-01 RX ADMIN — METOPROLOL SUCCINATE 25 MG: 25 TABLET, FILM COATED, EXTENDED RELEASE ORAL at 07:38

## 2022-01-01 RX ADMIN — Medication 3 MG: at 20:39

## 2022-01-01 RX ADMIN — INSULIN GLARGINE-YFGN 50 UNITS: 100 INJECTION, SOLUTION SUBCUTANEOUS at 23:28

## 2022-01-01 RX ADMIN — Medication 2000 UNITS: at 09:44

## 2022-01-01 RX ADMIN — ASPIRIN 81 MG: 81 TABLET, COATED ORAL at 09:29

## 2022-01-01 RX ADMIN — HEPARIN SODIUM 5000 UNITS: 10000 INJECTION INTRAVENOUS; SUBCUTANEOUS at 21:53

## 2022-01-01 RX ADMIN — Medication 50 MG: at 09:43

## 2022-01-01 RX ADMIN — DOCUSATE SODIUM 100 MG: 100 CAPSULE, LIQUID FILLED ORAL at 22:20

## 2022-01-01 RX ADMIN — IPRATROPIUM BROMIDE AND ALBUTEROL SULFATE 1 AMPULE: .5; 2.5 SOLUTION RESPIRATORY (INHALATION) at 08:57

## 2022-01-01 RX ADMIN — PANTOPRAZOLE SODIUM 40 MG: 40 TABLET, DELAYED RELEASE ORAL at 08:23

## 2022-01-01 RX ADMIN — GUAIFENESIN 400 MG: 400 TABLET ORAL at 20:38

## 2022-01-01 RX ADMIN — BUMETANIDE 2 MG: 1 TABLET ORAL at 20:50

## 2022-01-01 RX ADMIN — IPRATROPIUM BROMIDE AND ALBUTEROL SULFATE 1 AMPULE: .5; 2.5 SOLUTION RESPIRATORY (INHALATION) at 20:57

## 2022-01-01 RX ADMIN — MIDODRINE HYDROCHLORIDE 10 MG: 5 TABLET ORAL at 14:55

## 2022-01-01 RX ADMIN — POTASSIUM CHLORIDE 20 MEQ: 20 TABLET, EXTENDED RELEASE ORAL at 14:14

## 2022-01-01 RX ADMIN — IPRATROPIUM BROMIDE AND ALBUTEROL SULFATE 1 AMPULE: .5; 2.5 SOLUTION RESPIRATORY (INHALATION) at 08:12

## 2022-01-01 RX ADMIN — PANTOPRAZOLE SODIUM 40 MG: 40 TABLET, DELAYED RELEASE ORAL at 05:56

## 2022-01-01 RX ADMIN — HEPARIN SODIUM 5000 UNITS: 10000 INJECTION INTRAVENOUS; SUBCUTANEOUS at 06:08

## 2022-01-01 RX ADMIN — DOCUSATE SODIUM 100 MG: 100 CAPSULE, LIQUID FILLED ORAL at 11:17

## 2022-01-01 RX ADMIN — ATORVASTATIN CALCIUM 40 MG: 40 TABLET, FILM COATED ORAL at 20:22

## 2022-01-01 RX ADMIN — Medication 50 MG: at 08:46

## 2022-01-01 RX ADMIN — DOCUSATE SODIUM 100 MG: 100 CAPSULE, LIQUID FILLED ORAL at 21:15

## 2022-01-01 RX ADMIN — Medication 2000 UNITS: at 09:35

## 2022-01-01 RX ADMIN — INSULIN GLARGINE-YFGN 50 UNITS: 100 INJECTION, SOLUTION SUBCUTANEOUS at 21:07

## 2022-01-01 RX ADMIN — POTASSIUM CHLORIDE 20 MEQ: 20 TABLET, EXTENDED RELEASE ORAL at 20:56

## 2022-01-01 RX ADMIN — BUMETANIDE 1 MG: 0.25 INJECTION, SOLUTION INTRAMUSCULAR; INTRAVENOUS at 20:36

## 2022-01-01 RX ADMIN — LEVALBUTEROL 0.63 MG: 0.63 SOLUTION RESPIRATORY (INHALATION) at 05:54

## 2022-01-01 RX ADMIN — GUAIFENESIN SYRUP AND DEXTROMETHORPHAN 5 ML: 100; 10 SYRUP ORAL at 11:33

## 2022-01-01 RX ADMIN — BUMETANIDE 2 MG/HR: 0.25 INJECTION INTRAMUSCULAR; INTRAVENOUS at 03:50

## 2022-01-01 RX ADMIN — POTASSIUM CHLORIDE 20 MEQ: 20 TABLET, EXTENDED RELEASE ORAL at 11:38

## 2022-01-01 RX ADMIN — PANTOPRAZOLE SODIUM 40 MG: 40 TABLET, DELAYED RELEASE ORAL at 06:10

## 2022-01-01 RX ADMIN — BUMETANIDE 1 MG/HR: 0.25 INJECTION INTRAMUSCULAR; INTRAVENOUS at 05:21

## 2022-01-01 RX ADMIN — PANTOPRAZOLE SODIUM 40 MG: 40 TABLET, DELAYED RELEASE ORAL at 10:21

## 2022-01-01 RX ADMIN — IPRATROPIUM BROMIDE 0.5 MG: 0.5 SOLUTION RESPIRATORY (INHALATION) at 09:19

## 2022-01-01 RX ADMIN — BUPROPION HYDROCHLORIDE 300 MG: 300 TABLET, FILM COATED, EXTENDED RELEASE ORAL at 12:34

## 2022-01-01 RX ADMIN — IPRATROPIUM BROMIDE AND ALBUTEROL SULFATE 1 AMPULE: .5; 2.5 SOLUTION RESPIRATORY (INHALATION) at 13:11

## 2022-01-01 RX ADMIN — OXYCODONE HYDROCHLORIDE AND ACETAMINOPHEN 500 MG: 500 TABLET ORAL at 09:21

## 2022-01-01 RX ADMIN — PANTOPRAZOLE SODIUM 40 MG: 40 TABLET, DELAYED RELEASE ORAL at 05:48

## 2022-01-01 RX ADMIN — INSULIN GLARGINE-YFGN 50 UNITS: 100 INJECTION, SOLUTION SUBCUTANEOUS at 22:19

## 2022-01-01 RX ADMIN — SODIUM CHLORIDE, PRESERVATIVE FREE 10 ML: 5 INJECTION INTRAVENOUS at 12:15

## 2022-01-01 RX ADMIN — OXYCODONE HYDROCHLORIDE AND ACETAMINOPHEN 500 MG: 500 TABLET ORAL at 20:13

## 2022-01-01 RX ADMIN — FOLIC ACID 1 MG: 1 TABLET ORAL at 09:39

## 2022-01-01 RX ADMIN — METOPROLOL SUCCINATE 25 MG: 25 TABLET, FILM COATED, EXTENDED RELEASE ORAL at 09:30

## 2022-01-01 RX ADMIN — IPRATROPIUM BROMIDE AND ALBUTEROL SULFATE 1 AMPULE: .5; 2.5 SOLUTION RESPIRATORY (INHALATION) at 16:03

## 2022-01-01 RX ADMIN — GUAIFENESIN 400 MG: 400 TABLET ORAL at 14:17

## 2022-01-01 RX ADMIN — CYANOCOBALAMIN TAB 1000 MCG 1000 MCG: 1000 TAB at 12:43

## 2022-01-01 RX ADMIN — BUMETANIDE 2 MG/HR: 0.25 INJECTION INTRAMUSCULAR; INTRAVENOUS at 18:53

## 2022-01-01 RX ADMIN — IPRATROPIUM BROMIDE AND ALBUTEROL SULFATE 1 AMPULE: .5; 2.5 SOLUTION RESPIRATORY (INHALATION) at 19:50

## 2022-01-01 RX ADMIN — HEPARIN SODIUM 5000 UNITS: 10000 INJECTION INTRAVENOUS; SUBCUTANEOUS at 06:34

## 2022-01-01 RX ADMIN — EZETIMIBE 10 MG: 10 TABLET ORAL at 19:58

## 2022-01-01 RX ADMIN — MIDODRINE HYDROCHLORIDE 10 MG: 10 TABLET ORAL at 13:23

## 2022-01-01 RX ADMIN — DOCUSATE SODIUM 100 MG: 100 CAPSULE, LIQUID FILLED ORAL at 08:56

## 2022-01-01 RX ADMIN — BUMETANIDE 2 MG: 1 TABLET ORAL at 09:53

## 2022-01-01 RX ADMIN — PROPOFOL 20 MCG/KG/MIN: 10 INJECTION, EMULSION INTRAVENOUS at 09:57

## 2022-01-01 RX ADMIN — HEPARIN SODIUM 5000 UNITS: 10000 INJECTION INTRAVENOUS; SUBCUTANEOUS at 23:47

## 2022-01-01 RX ADMIN — METOPROLOL SUCCINATE 25 MG: 25 TABLET, EXTENDED RELEASE ORAL at 19:51

## 2022-01-01 RX ADMIN — GUAIFENESIN 400 MG: 400 TABLET ORAL at 05:50

## 2022-01-01 RX ADMIN — ATORVASTATIN CALCIUM 40 MG: 40 TABLET, FILM COATED ORAL at 21:08

## 2022-01-01 RX ADMIN — HEPARIN SODIUM 5000 UNITS: 10000 INJECTION INTRAVENOUS; SUBCUTANEOUS at 06:09

## 2022-01-01 RX ADMIN — OXYCODONE HYDROCHLORIDE AND ACETAMINOPHEN 500 MG: 500 TABLET ORAL at 21:43

## 2022-01-01 RX ADMIN — LOPERAMIDE HYDROCHLORIDE 2 MG: 2 CAPSULE ORAL at 16:57

## 2022-01-01 RX ADMIN — EZETIMIBE 10 MG: 10 TABLET ORAL at 21:39

## 2022-01-01 RX ADMIN — BUMETANIDE 0.5 MG/HR: 0.25 INJECTION INTRAMUSCULAR; INTRAVENOUS at 16:24

## 2022-01-01 RX ADMIN — ASPIRIN 81 MG: 81 TABLET, COATED ORAL at 07:39

## 2022-01-01 RX ADMIN — CYANOCOBALAMIN TAB 1000 MCG 1000 MCG: 1000 TAB at 08:36

## 2022-01-01 RX ADMIN — ENOXAPARIN SODIUM 40 MG: 100 INJECTION SUBCUTANEOUS at 11:00

## 2022-01-01 RX ADMIN — MIDODRINE HYDROCHLORIDE 10 MG: 10 TABLET ORAL at 16:52

## 2022-01-01 RX ADMIN — Medication 50 MG: at 09:21

## 2022-01-01 RX ADMIN — BENZONATATE 100 MG: 100 CAPSULE ORAL at 16:37

## 2022-01-01 RX ADMIN — Medication 50 MG: at 08:31

## 2022-01-01 RX ADMIN — ASPIRIN 81 MG: 81 TABLET, COATED ORAL at 09:01

## 2022-01-01 RX ADMIN — BUPROPION HYDROCHLORIDE 300 MG: 300 TABLET, FILM COATED, EXTENDED RELEASE ORAL at 12:43

## 2022-01-01 RX ADMIN — IPRATROPIUM BROMIDE 0.5 MG: 0.5 SOLUTION RESPIRATORY (INHALATION) at 09:40

## 2022-01-01 RX ADMIN — PANTOPRAZOLE SODIUM 40 MG: 40 TABLET, DELAYED RELEASE ORAL at 08:33

## 2022-01-01 RX ADMIN — Medication 3 MG: at 21:01

## 2022-01-01 RX ADMIN — ATORVASTATIN CALCIUM 40 MG: 40 TABLET, FILM COATED ORAL at 20:20

## 2022-01-01 RX ADMIN — BUPROPION HYDROCHLORIDE 300 MG: 300 TABLET, FILM COATED, EXTENDED RELEASE ORAL at 08:31

## 2022-01-01 RX ADMIN — OXYCODONE HYDROCHLORIDE AND ACETAMINOPHEN 500 MG: 500 TABLET ORAL at 20:00

## 2022-01-01 RX ADMIN — OXYCODONE HYDROCHLORIDE AND ACETAMINOPHEN 500 MG: 500 TABLET ORAL at 09:32

## 2022-01-01 RX ADMIN — OXYCODONE HYDROCHLORIDE AND ACETAMINOPHEN 500 MG: 500 TABLET ORAL at 19:51

## 2022-01-01 RX ADMIN — HYDROMORPHONE HYDROCHLORIDE 0.5 MG: 1 INJECTION, SOLUTION INTRAMUSCULAR; INTRAVENOUS; SUBCUTANEOUS at 10:11

## 2022-01-01 RX ADMIN — BUMETANIDE 2 MG/HR: 0.25 INJECTION INTRAMUSCULAR; INTRAVENOUS at 06:09

## 2022-01-01 RX ADMIN — Medication 3 MG: at 21:28

## 2022-01-01 RX ADMIN — ASPIRIN 81 MG: 81 TABLET ORAL at 12:43

## 2022-01-01 RX ADMIN — METOPROLOL SUCCINATE 25 MG: 25 TABLET, EXTENDED RELEASE ORAL at 21:29

## 2022-01-01 RX ADMIN — CYANOCOBALAMIN TAB 1000 MCG 1000 MCG: 1000 TAB at 09:44

## 2022-01-01 RX ADMIN — DEXAMETHASONE SODIUM PHOSPHATE 6 MG: 10 INJECTION, SOLUTION INTRAMUSCULAR; INTRAVENOUS at 11:12

## 2022-01-01 RX ADMIN — FOLIC ACID 1 MG: 1 TABLET ORAL at 09:31

## 2022-01-01 RX ADMIN — EZETIMIBE 10 MG: 10 TABLET ORAL at 09:29

## 2022-01-01 RX ADMIN — OXYCODONE HYDROCHLORIDE AND ACETAMINOPHEN 500 MG: 500 TABLET ORAL at 11:19

## 2022-01-01 RX ADMIN — GUAIFENESIN SYRUP AND DEXTROMETHORPHAN 5 ML: 100; 10 SYRUP ORAL at 22:11

## 2022-01-01 RX ADMIN — ATORVASTATIN CALCIUM 40 MG: 40 TABLET, FILM COATED ORAL at 22:12

## 2022-01-01 RX ADMIN — OXYCODONE HYDROCHLORIDE AND ACETAMINOPHEN 500 MG: 500 TABLET ORAL at 12:43

## 2022-01-01 RX ADMIN — OXYCODONE HYDROCHLORIDE AND ACETAMINOPHEN 500 MG: 500 TABLET ORAL at 09:30

## 2022-01-01 RX ADMIN — BUPROPION HYDROCHLORIDE 300 MG: 300 TABLET, FILM COATED, EXTENDED RELEASE ORAL at 08:40

## 2022-01-01 RX ADMIN — ALBUMIN (HUMAN) 25 G: 0.25 INJECTION, SOLUTION INTRAVENOUS at 16:03

## 2022-01-01 RX ADMIN — IPRATROPIUM BROMIDE AND ALBUTEROL SULFATE 1 AMPULE: .5; 2.5 SOLUTION RESPIRATORY (INHALATION) at 15:42

## 2022-01-01 RX ADMIN — SODIUM CHLORIDE: 9 INJECTION, SOLUTION INTRAVENOUS at 09:49

## 2022-01-01 RX ADMIN — BENZONATATE 100 MG: 100 CAPSULE ORAL at 21:30

## 2022-01-01 RX ADMIN — IPRATROPIUM BROMIDE 0.5 MG: 0.5 SOLUTION RESPIRATORY (INHALATION) at 13:09

## 2022-01-01 RX ADMIN — ATORVASTATIN CALCIUM 40 MG: 40 TABLET, FILM COATED ORAL at 10:00

## 2022-01-01 RX ADMIN — Medication 3 MG: at 20:41

## 2022-01-01 RX ADMIN — Medication 3 MG: at 20:53

## 2022-01-01 RX ADMIN — ASPIRIN 81 MG: 81 TABLET, COATED ORAL at 09:35

## 2022-01-01 RX ADMIN — MIDODRINE HYDROCHLORIDE 10 MG: 10 TABLET ORAL at 12:43

## 2022-01-01 RX ADMIN — Medication 2000 UNITS: at 08:55

## 2022-01-01 RX ADMIN — INSULIN LISPRO 1 UNITS: 100 INJECTION, SOLUTION INTRAVENOUS; SUBCUTANEOUS at 20:43

## 2022-01-01 RX ADMIN — Medication 2000 UNITS: at 09:56

## 2022-01-01 RX ADMIN — Medication 2000 UNITS: at 08:34

## 2022-01-01 RX ADMIN — GUAIFENESIN SYRUP AND DEXTROMETHORPHAN 5 ML: 100; 10 SYRUP ORAL at 12:47

## 2022-01-01 RX ADMIN — HEPARIN SODIUM 5000 UNITS: 10000 INJECTION INTRAVENOUS; SUBCUTANEOUS at 23:10

## 2022-01-01 RX ADMIN — METOPROLOL SUCCINATE 25 MG: 25 TABLET, EXTENDED RELEASE ORAL at 09:21

## 2022-01-01 RX ADMIN — MIDODRINE HYDROCHLORIDE 10 MG: 10 TABLET ORAL at 11:56

## 2022-01-01 RX ADMIN — INSULIN GLARGINE-YFGN 50 UNITS: 100 INJECTION, SOLUTION SUBCUTANEOUS at 20:30

## 2022-01-01 RX ADMIN — HEPARIN SODIUM 5000 UNITS: 10000 INJECTION INTRAVENOUS; SUBCUTANEOUS at 06:53

## 2022-01-01 RX ADMIN — INSULIN GLARGINE-YFGN 50 UNITS: 100 INJECTION, SOLUTION SUBCUTANEOUS at 20:23

## 2022-01-01 RX ADMIN — CYANOCOBALAMIN TAB 1000 MCG 1000 MCG: 1000 TAB at 08:45

## 2022-01-01 RX ADMIN — CHLOROTHIAZIDE SODIUM 250 MG: 500 INJECTION INTRAVENOUS at 03:38

## 2022-01-01 RX ADMIN — LEVALBUTEROL 0.63 MG: 0.63 SOLUTION RESPIRATORY (INHALATION) at 12:18

## 2022-01-01 RX ADMIN — Medication 2000 UNITS: at 09:01

## 2022-01-01 RX ADMIN — ATORVASTATIN CALCIUM 40 MG: 40 TABLET, FILM COATED ORAL at 21:30

## 2022-01-01 RX ADMIN — BUMETANIDE 2 MG: 1 TABLET ORAL at 08:27

## 2022-01-01 RX ADMIN — DOCUSATE SODIUM 100 MG: 100 CAPSULE, LIQUID FILLED ORAL at 08:17

## 2022-01-01 RX ADMIN — OXYCODONE HYDROCHLORIDE AND ACETAMINOPHEN 500 MG: 500 TABLET ORAL at 08:31

## 2022-01-01 RX ADMIN — BUMETANIDE 0.5 MG/HR: 0.25 INJECTION INTRAMUSCULAR; INTRAVENOUS at 17:15

## 2022-01-01 RX ADMIN — ACETAMINOPHEN 650 MG: 325 TABLET ORAL at 10:52

## 2022-01-01 RX ADMIN — FOLIC ACID 1 MG: 1 TABLET ORAL at 08:21

## 2022-01-01 RX ADMIN — INSULIN LISPRO 1 UNITS: 100 INJECTION, SOLUTION INTRAVENOUS; SUBCUTANEOUS at 21:00

## 2022-01-01 RX ADMIN — BUMETANIDE 2 MG: 1 TABLET ORAL at 21:28

## 2022-01-01 RX ADMIN — LEVALBUTEROL 0.63 MG: 0.63 SOLUTION RESPIRATORY (INHALATION) at 19:24

## 2022-01-01 RX ADMIN — IPRATROPIUM BROMIDE AND ALBUTEROL SULFATE 3 ML: 2.5; .5 SOLUTION RESPIRATORY (INHALATION) at 01:27

## 2022-01-01 RX ADMIN — INSULIN GLARGINE-YFGN 50 UNITS: 100 INJECTION, SOLUTION SUBCUTANEOUS at 21:17

## 2022-01-01 RX ADMIN — POTASSIUM CHLORIDE 20 MEQ: 20 TABLET, EXTENDED RELEASE ORAL at 11:00

## 2022-01-01 RX ADMIN — INSULIN LISPRO 1 UNITS: 100 INJECTION, SOLUTION INTRAVENOUS; SUBCUTANEOUS at 18:23

## 2022-01-01 RX ADMIN — Medication 2000 UNITS: at 08:17

## 2022-01-01 RX ADMIN — OXYCODONE HYDROCHLORIDE AND ACETAMINOPHEN 500 MG: 500 TABLET ORAL at 09:00

## 2022-01-01 RX ADMIN — FOLIC ACID 1 MG: 1 TABLET ORAL at 08:48

## 2022-01-01 RX ADMIN — GUAIFENESIN 400 MG: 400 TABLET ORAL at 06:06

## 2022-01-01 RX ADMIN — GLYCOPYRROLATE 0.4 MG: 0.2 INJECTION INTRAMUSCULAR; INTRAVENOUS at 10:11

## 2022-01-01 RX ADMIN — GUAIFENESIN SYRUP AND DEXTROMETHORPHAN 5 ML: 100; 10 SYRUP ORAL at 16:43

## 2022-01-01 RX ADMIN — OXYCODONE HYDROCHLORIDE AND ACETAMINOPHEN 500 MG: 500 TABLET ORAL at 20:41

## 2022-01-01 RX ADMIN — BUMETANIDE 1 MG/HR: 0.25 INJECTION, SOLUTION INTRAMUSCULAR; INTRAVENOUS at 14:36

## 2022-01-01 RX ADMIN — Medication 2000 UNITS: at 11:19

## 2022-01-01 RX ADMIN — INSULIN LISPRO 1 UNITS: 100 INJECTION, SOLUTION INTRAVENOUS; SUBCUTANEOUS at 20:24

## 2022-01-01 RX ADMIN — BUPROPION HYDROCHLORIDE 300 MG: 300 TABLET, FILM COATED, EXTENDED RELEASE ORAL at 10:00

## 2022-01-01 RX ADMIN — PANTOPRAZOLE SODIUM 40 MG: 40 TABLET, DELAYED RELEASE ORAL at 06:08

## 2022-01-01 RX ADMIN — EZETIMIBE 10 MG: 10 TABLET ORAL at 22:15

## 2022-01-01 RX ADMIN — EZETIMIBE 10 MG: 10 TABLET ORAL at 20:52

## 2022-01-01 RX ADMIN — FOLIC ACID 1 MG: 1 TABLET ORAL at 09:57

## 2022-01-01 RX ADMIN — DOCUSATE SODIUM 100 MG: 100 CAPSULE, LIQUID FILLED ORAL at 21:52

## 2022-01-01 RX ADMIN — EZETIMIBE 10 MG: 10 TABLET ORAL at 21:56

## 2022-01-01 RX ADMIN — PANTOPRAZOLE SODIUM 40 MG: 40 TABLET, DELAYED RELEASE ORAL at 08:47

## 2022-01-01 RX ADMIN — DOCUSATE SODIUM 100 MG: 100 CAPSULE, LIQUID FILLED ORAL at 21:04

## 2022-01-01 RX ADMIN — IPRATROPIUM BROMIDE AND ALBUTEROL SULFATE 1 AMPULE: .5; 2.5 SOLUTION RESPIRATORY (INHALATION) at 10:10

## 2022-01-01 RX ADMIN — ASPIRIN 81 MG: 81 TABLET, COATED ORAL at 08:55

## 2022-01-01 RX ADMIN — IPRATROPIUM BROMIDE AND ALBUTEROL SULFATE 1 AMPULE: .5; 2.5 SOLUTION RESPIRATORY (INHALATION) at 09:19

## 2022-01-01 RX ADMIN — IPRATROPIUM BROMIDE AND ALBUTEROL SULFATE 1 AMPULE: .5; 2.5 SOLUTION RESPIRATORY (INHALATION) at 20:04

## 2022-01-01 RX ADMIN — Medication 1000 MCG: at 09:35

## 2022-01-01 RX ADMIN — INSULIN LISPRO 1 UNITS: 100 INJECTION, SOLUTION INTRAVENOUS; SUBCUTANEOUS at 22:34

## 2022-01-01 RX ADMIN — POTASSIUM CHLORIDE 40 MEQ: 20 TABLET, EXTENDED RELEASE ORAL at 10:02

## 2022-01-01 RX ADMIN — PANTOPRAZOLE SODIUM 40 MG: 40 TABLET, DELAYED RELEASE ORAL at 09:08

## 2022-01-01 RX ADMIN — IPRATROPIUM BROMIDE 0.5 MG: 0.5 SOLUTION RESPIRATORY (INHALATION) at 19:28

## 2022-01-01 RX ADMIN — POTASSIUM CHLORIDE 20 MEQ: 20 TABLET, EXTENDED RELEASE ORAL at 08:17

## 2022-01-01 RX ADMIN — INSULIN LISPRO 2 UNITS: 100 INJECTION, SOLUTION INTRAVENOUS; SUBCUTANEOUS at 20:30

## 2022-01-01 RX ADMIN — PHENYLEPHRINE HYDROCHLORIDE 100 MCG: 10 INJECTION INTRAVENOUS at 10:24

## 2022-01-01 RX ADMIN — IPRATROPIUM BROMIDE AND ALBUTEROL SULFATE 1 AMPULE: .5; 2.5 SOLUTION RESPIRATORY (INHALATION) at 12:54

## 2022-01-01 RX ADMIN — METOPROLOL SUCCINATE 25 MG: 25 TABLET, EXTENDED RELEASE ORAL at 20:51

## 2022-01-01 RX ADMIN — METOLAZONE 5 MG: 5 TABLET ORAL at 09:54

## 2022-01-01 RX ADMIN — HEPARIN SODIUM 5000 UNITS: 10000 INJECTION INTRAVENOUS; SUBCUTANEOUS at 00:13

## 2022-01-01 RX ADMIN — METOPROLOL SUCCINATE 25 MG: 25 TABLET, EXTENDED RELEASE ORAL at 09:29

## 2022-01-01 RX ADMIN — HEPARIN SODIUM 5000 UNITS: 10000 INJECTION INTRAVENOUS; SUBCUTANEOUS at 06:36

## 2022-01-01 RX ADMIN — Medication 50 MG: at 10:53

## 2022-01-01 RX ADMIN — OXYCODONE HYDROCHLORIDE AND ACETAMINOPHEN 500 MG: 500 TABLET ORAL at 09:12

## 2022-01-01 RX ADMIN — IPRATROPIUM BROMIDE AND ALBUTEROL SULFATE 1 AMPULE: .5; 2.5 SOLUTION RESPIRATORY (INHALATION) at 16:18

## 2022-01-01 RX ADMIN — Medication 50 MG: at 08:37

## 2022-01-01 RX ADMIN — BUPROPION HYDROCHLORIDE 300 MG: 300 TABLET, FILM COATED, EXTENDED RELEASE ORAL at 08:05

## 2022-01-01 RX ADMIN — CYANOCOBALAMIN TAB 1000 MCG 1000 MCG: 1000 TAB at 09:31

## 2022-01-01 RX ADMIN — INSULIN GLARGINE-YFGN 25 UNITS: 100 INJECTION, SOLUTION SUBCUTANEOUS at 21:07

## 2022-01-01 RX ADMIN — LEVOFLOXACIN 250 MG: 5 INJECTION, SOLUTION INTRAVENOUS at 10:23

## 2022-01-01 RX ADMIN — IPRATROPIUM BROMIDE 0.5 MG: 0.5 SOLUTION RESPIRATORY (INHALATION) at 17:15

## 2022-01-01 RX ADMIN — Medication 10 ML: at 21:19

## 2022-01-01 RX ADMIN — BUPROPION HYDROCHLORIDE 300 MG: 300 TABLET, FILM COATED, EXTENDED RELEASE ORAL at 11:32

## 2022-01-01 RX ADMIN — FOLIC ACID 1 MG: 1 TABLET ORAL at 09:44

## 2022-01-01 RX ADMIN — ASPIRIN 81 MG: 81 TABLET, COATED ORAL at 09:00

## 2022-01-01 RX ADMIN — INSULIN GLARGINE-YFGN 50 UNITS: 100 INJECTION, SOLUTION SUBCUTANEOUS at 20:43

## 2022-01-01 RX ADMIN — Medication 2000 UNITS: at 11:49

## 2022-01-01 RX ADMIN — INSULIN GLARGINE-YFGN 50 UNITS: 100 INJECTION, SOLUTION SUBCUTANEOUS at 20:55

## 2022-01-01 RX ADMIN — INSULIN LISPRO 1 UNITS: 100 INJECTION, SOLUTION INTRAVENOUS; SUBCUTANEOUS at 20:42

## 2022-01-01 RX ADMIN — OXYCODONE HYDROCHLORIDE AND ACETAMINOPHEN 500 MG: 500 TABLET ORAL at 12:34

## 2022-01-01 RX ADMIN — METOPROLOL SUCCINATE 25 MG: 25 TABLET, FILM COATED, EXTENDED RELEASE ORAL at 07:54

## 2022-01-01 RX ADMIN — HYDROCODONE BITARTRATE AND ACETAMINOPHEN 1 TABLET: 5; 325 TABLET ORAL at 05:10

## 2022-01-01 RX ADMIN — GUAIFENESIN 400 MG: 400 TABLET ORAL at 03:51

## 2022-01-01 RX ADMIN — GUAIFENESIN SYRUP AND DEXTROMETHORPHAN 5 ML: 100; 10 SYRUP ORAL at 17:33

## 2022-01-01 RX ADMIN — HYDROCODONE BITARTRATE AND ACETAMINOPHEN 1 TABLET: 5; 325 TABLET ORAL at 22:02

## 2022-01-01 RX ADMIN — METOPROLOL SUCCINATE 25 MG: 25 TABLET, EXTENDED RELEASE ORAL at 19:58

## 2022-01-01 RX ADMIN — INSULIN LISPRO 1 UNITS: 100 INJECTION, SOLUTION INTRAVENOUS; SUBCUTANEOUS at 20:40

## 2022-01-01 RX ADMIN — POTASSIUM CHLORIDE 20 MEQ: 20 TABLET, EXTENDED RELEASE ORAL at 09:38

## 2022-01-01 RX ADMIN — HEPARIN SODIUM 5000 UNITS: 10000 INJECTION INTRAVENOUS; SUBCUTANEOUS at 15:40

## 2022-01-01 RX ADMIN — INSULIN LISPRO 1 UNITS: 100 INJECTION, SOLUTION INTRAVENOUS; SUBCUTANEOUS at 06:20

## 2022-01-01 RX ADMIN — HEPARIN SODIUM 5000 UNITS: 10000 INJECTION INTRAVENOUS; SUBCUTANEOUS at 06:10

## 2022-01-01 RX ADMIN — ACETAMINOPHEN 650 MG: 325 TABLET ORAL at 10:00

## 2022-01-01 RX ADMIN — BUMETANIDE 1 MG/HR: 0.25 INJECTION INTRAMUSCULAR; INTRAVENOUS at 16:08

## 2022-01-01 RX ADMIN — METOPROLOL SUCCINATE 25 MG: 25 TABLET, EXTENDED RELEASE ORAL at 21:15

## 2022-01-01 RX ADMIN — CHLOROTHIAZIDE SODIUM 250 MG: 500 INJECTION INTRAVENOUS at 15:50

## 2022-01-01 RX ADMIN — IPRATROPIUM BROMIDE AND ALBUTEROL SULFATE 1 AMPULE: .5; 2.5 SOLUTION RESPIRATORY (INHALATION) at 09:01

## 2022-01-01 RX ADMIN — OXYCODONE HYDROCHLORIDE AND ACETAMINOPHEN 500 MG: 500 TABLET ORAL at 08:36

## 2022-01-01 RX ADMIN — INSULIN GLARGINE-YFGN 50 UNITS: 100 INJECTION, SOLUTION SUBCUTANEOUS at 21:00

## 2022-01-01 RX ADMIN — GUAIFENESIN SYRUP AND DEXTROMETHORPHAN 5 ML: 100; 10 SYRUP ORAL at 13:22

## 2022-01-01 RX ADMIN — IPRATROPIUM BROMIDE AND ALBUTEROL SULFATE 1 AMPULE: .5; 2.5 SOLUTION RESPIRATORY (INHALATION) at 12:51

## 2022-01-01 RX ADMIN — BUMETANIDE 2 MG: 0.25 INJECTION, SOLUTION INTRAMUSCULAR; INTRAVENOUS at 20:05

## 2022-01-01 RX ADMIN — OXYCODONE HYDROCHLORIDE AND ACETAMINOPHEN 500 MG: 500 TABLET ORAL at 08:17

## 2022-01-01 RX ADMIN — GUAIFENESIN 400 MG: 400 TABLET ORAL at 09:36

## 2022-01-01 RX ADMIN — PANTOPRAZOLE SODIUM 40 MG: 40 TABLET, DELAYED RELEASE ORAL at 08:03

## 2022-01-01 RX ADMIN — INSULIN LISPRO 1 UNITS: 100 INJECTION, SOLUTION INTRAVENOUS; SUBCUTANEOUS at 11:33

## 2022-01-01 RX ADMIN — Medication 50 MG: at 10:00

## 2022-01-01 RX ADMIN — HEPARIN SODIUM 5000 UNITS: 10000 INJECTION INTRAVENOUS; SUBCUTANEOUS at 07:10

## 2022-01-01 RX ADMIN — Medication 2000 UNITS: at 12:34

## 2022-01-01 RX ADMIN — HEPARIN SODIUM 5000 UNITS: 10000 INJECTION INTRAVENOUS; SUBCUTANEOUS at 05:38

## 2022-01-01 RX ADMIN — GUAIFENESIN 400 MG: 400 TABLET ORAL at 16:54

## 2022-01-01 RX ADMIN — HEPARIN SODIUM 5000 UNITS: 10000 INJECTION INTRAVENOUS; SUBCUTANEOUS at 05:56

## 2022-01-01 RX ADMIN — ACETAMINOPHEN 650 MG: 325 TABLET ORAL at 15:20

## 2022-01-01 RX ADMIN — ATORVASTATIN CALCIUM 40 MG: 40 TABLET, FILM COATED ORAL at 21:59

## 2022-01-01 RX ADMIN — Medication 3 MG: at 20:50

## 2022-01-01 RX ADMIN — IPRATROPIUM BROMIDE 0.5 MG: 0.5 SOLUTION RESPIRATORY (INHALATION) at 15:54

## 2022-01-01 RX ADMIN — IPRATROPIUM BROMIDE 0.5 MG: 0.5 SOLUTION RESPIRATORY (INHALATION) at 08:32

## 2022-01-01 RX ADMIN — METOPROLOL SUCCINATE 25 MG: 25 TABLET, EXTENDED RELEASE ORAL at 09:38

## 2022-01-01 RX ADMIN — DOCUSATE SODIUM 100 MG: 100 CAPSULE, LIQUID FILLED ORAL at 10:21

## 2022-01-01 RX ADMIN — OXYCODONE HYDROCHLORIDE AND ACETAMINOPHEN 500 MG: 500 TABLET ORAL at 21:55

## 2022-01-01 RX ADMIN — METOLAZONE 5 MG: 5 TABLET ORAL at 15:41

## 2022-01-01 RX ADMIN — ASPIRIN 81 MG: 81 TABLET, COATED ORAL at 09:53

## 2022-01-01 RX ADMIN — OXYCODONE HYDROCHLORIDE AND ACETAMINOPHEN 500 MG: 500 TABLET ORAL at 08:55

## 2022-01-01 RX ADMIN — ASPIRIN 81 MG: 81 TABLET, COATED ORAL at 08:27

## 2022-01-01 RX ADMIN — OXYCODONE HYDROCHLORIDE AND ACETAMINOPHEN 500 MG: 500 TABLET ORAL at 09:38

## 2022-01-01 RX ADMIN — OXYCODONE HYDROCHLORIDE AND ACETAMINOPHEN 500 MG: 500 TABLET ORAL at 10:21

## 2022-01-01 RX ADMIN — Medication 2000 UNITS: at 08:24

## 2022-01-01 RX ADMIN — Medication 3 MG: at 21:14

## 2022-01-01 RX ADMIN — METOPROLOL SUCCINATE 25 MG: 25 TABLET, EXTENDED RELEASE ORAL at 20:41

## 2022-01-01 RX ADMIN — DOCUSATE SODIUM 100 MG: 100 CAPSULE, LIQUID FILLED ORAL at 09:32

## 2022-01-01 RX ADMIN — Medication 50 MG: at 09:46

## 2022-01-01 RX ADMIN — Medication 2000 UNITS: at 09:53

## 2022-01-01 RX ADMIN — OXYCODONE HYDROCHLORIDE AND ACETAMINOPHEN 500 MG: 500 TABLET ORAL at 21:28

## 2022-01-01 RX ADMIN — ATORVASTATIN CALCIUM 40 MG: 40 TABLET, FILM COATED ORAL at 20:49

## 2022-01-01 RX ADMIN — BUMETANIDE 2 MG: 0.25 INJECTION INTRAMUSCULAR; INTRAVENOUS at 14:20

## 2022-01-01 RX ADMIN — FOLIC ACID 1 MG: 1 TABLET ORAL at 09:58

## 2022-01-01 RX ADMIN — METOPROLOL SUCCINATE 25 MG: 25 TABLET, EXTENDED RELEASE ORAL at 08:32

## 2022-01-01 RX ADMIN — MIDODRINE HYDROCHLORIDE 10 MG: 5 TABLET ORAL at 11:17

## 2022-01-01 RX ADMIN — DOCUSATE SODIUM 100 MG: 100 CAPSULE, LIQUID FILLED ORAL at 21:39

## 2022-01-01 RX ADMIN — FOLIC ACID 1 MG: 1 TABLET ORAL at 10:53

## 2022-01-01 RX ADMIN — POTASSIUM CHLORIDE 40 MEQ: 20 TABLET, EXTENDED RELEASE ORAL at 10:30

## 2022-01-01 RX ADMIN — MICONAZOLE NITRATE: 20.6 POWDER TOPICAL at 09:33

## 2022-01-01 RX ADMIN — PANTOPRAZOLE SODIUM 40 MG: 40 TABLET, DELAYED RELEASE ORAL at 08:31

## 2022-01-01 RX ADMIN — MIDODRINE HYDROCHLORIDE 10 MG: 10 TABLET ORAL at 17:29

## 2022-01-01 RX ADMIN — Medication 2000 UNITS: at 09:41

## 2022-01-01 RX ADMIN — Medication 50 MG: at 09:44

## 2022-01-01 RX ADMIN — HYDROCODONE BITARTRATE AND ACETAMINOPHEN 1 TABLET: 5; 325 TABLET ORAL at 06:04

## 2022-01-01 RX ADMIN — OXYCODONE HYDROCHLORIDE AND ACETAMINOPHEN 500 MG: 500 TABLET ORAL at 20:03

## 2022-01-01 RX ADMIN — Medication 2000 UNITS: at 12:43

## 2022-01-01 RX ADMIN — GUAIFENESIN 400 MG: 400 TABLET ORAL at 09:01

## 2022-01-01 RX ADMIN — PANTOPRAZOLE SODIUM 40 MG: 40 TABLET, DELAYED RELEASE ORAL at 06:05

## 2022-01-01 RX ADMIN — BUMETANIDE 2 MG: 1 TABLET ORAL at 09:31

## 2022-01-01 RX ADMIN — CYANOCOBALAMIN TAB 1000 MCG 1000 MCG: 1000 TAB at 09:58

## 2022-01-01 RX ADMIN — Medication 50 MG: at 12:43

## 2022-01-01 RX ADMIN — DOCUSATE SODIUM 100 MG: 100 CAPSULE, LIQUID FILLED ORAL at 21:00

## 2022-01-01 RX ADMIN — BUPROPION HYDROCHLORIDE 300 MG: 300 TABLET, FILM COATED, EXTENDED RELEASE ORAL at 09:01

## 2022-01-01 RX ADMIN — Medication 50 MG: at 08:48

## 2022-01-01 RX ADMIN — BUMETANIDE 2 MG: 1 TABLET ORAL at 10:57

## 2022-01-01 RX ADMIN — CYANOCOBALAMIN TAB 1000 MCG 1000 MCG: 1000 TAB at 09:53

## 2022-01-01 RX ADMIN — Medication 10 ML: at 10:21

## 2022-01-01 RX ADMIN — Medication 1000 MCG: at 08:27

## 2022-01-01 RX ADMIN — BUMETANIDE 1 MG/HR: 0.25 INJECTION INTRAMUSCULAR; INTRAVENOUS at 03:39

## 2022-01-01 RX ADMIN — METOPROLOL SUCCINATE 25 MG: 25 TABLET, EXTENDED RELEASE ORAL at 09:36

## 2022-01-01 RX ADMIN — GUAIFENESIN SYRUP AND DEXTROMETHORPHAN 5 ML: 100; 10 SYRUP ORAL at 07:54

## 2022-01-01 RX ADMIN — ASPIRIN 81 MG: 81 TABLET, COATED ORAL at 08:51

## 2022-01-01 RX ADMIN — ATORVASTATIN CALCIUM 40 MG: 40 TABLET, FILM COATED ORAL at 09:44

## 2022-01-01 RX ADMIN — INSULIN LISPRO 1 UNITS: 100 INJECTION, SOLUTION INTRAVENOUS; SUBCUTANEOUS at 21:20

## 2022-01-01 RX ADMIN — HEPARIN SODIUM 5000 UNITS: 10000 INJECTION INTRAVENOUS; SUBCUTANEOUS at 23:14

## 2022-01-01 RX ADMIN — ATORVASTATIN CALCIUM 40 MG: 40 TABLET, FILM COATED ORAL at 22:14

## 2022-01-01 RX ADMIN — IPRATROPIUM BROMIDE AND ALBUTEROL SULFATE 1 AMPULE: .5; 2.5 SOLUTION RESPIRATORY (INHALATION) at 19:21

## 2022-01-01 RX ADMIN — Medication 1000 MCG: at 08:45

## 2022-01-01 RX ADMIN — BUMETANIDE 1 MG/HR: 0.25 INJECTION INTRAMUSCULAR; INTRAVENOUS at 08:35

## 2022-01-01 RX ADMIN — MIDODRINE HYDROCHLORIDE 10 MG: 10 TABLET ORAL at 09:38

## 2022-01-01 RX ADMIN — ATORVASTATIN CALCIUM 40 MG: 40 TABLET, FILM COATED ORAL at 09:52

## 2022-01-01 RX ADMIN — Medication 3 MG: at 20:20

## 2022-01-01 RX ADMIN — MIDODRINE HYDROCHLORIDE 10 MG: 5 TABLET ORAL at 08:46

## 2022-01-01 RX ADMIN — INSULIN GLARGINE-YFGN 50 UNITS: 100 INJECTION, SOLUTION SUBCUTANEOUS at 21:45

## 2022-01-01 RX ADMIN — GUAIFENESIN 400 MG: 400 TABLET ORAL at 09:41

## 2022-01-01 RX ADMIN — POTASSIUM CHLORIDE 20 MEQ: 20 TABLET, EXTENDED RELEASE ORAL at 09:41

## 2022-01-01 RX ADMIN — HEPARIN SODIUM 5000 UNITS: 10000 INJECTION INTRAVENOUS; SUBCUTANEOUS at 14:51

## 2022-01-01 RX ADMIN — FUROSEMIDE 40 MG: 10 INJECTION, SOLUTION INTRAMUSCULAR; INTRAVENOUS at 01:13

## 2022-01-01 RX ADMIN — EZETIMIBE 10 MG: 10 TABLET ORAL at 20:41

## 2022-01-01 RX ADMIN — ATORVASTATIN CALCIUM 40 MG: 40 TABLET, FILM COATED ORAL at 09:29

## 2022-01-01 RX ADMIN — MICONAZOLE NITRATE: 20.6 POWDER TOPICAL at 10:04

## 2022-01-01 RX ADMIN — METOPROLOL SUCCINATE 25 MG: 25 TABLET, EXTENDED RELEASE ORAL at 08:49

## 2022-01-01 RX ADMIN — CYANOCOBALAMIN TAB 1000 MCG 1000 MCG: 1000 TAB at 08:21

## 2022-01-01 RX ADMIN — BUMETANIDE 2 MG: 1 TABLET ORAL at 22:14

## 2022-01-01 RX ADMIN — OXYCODONE HYDROCHLORIDE AND ACETAMINOPHEN 500 MG: 500 TABLET ORAL at 08:47

## 2022-01-01 RX ADMIN — GUAIFENESIN 400 MG: 400 TABLET ORAL at 14:45

## 2022-01-01 RX ADMIN — FOLIC ACID 1 MG: 1 TABLET ORAL at 09:01

## 2022-01-01 RX ADMIN — FOLIC ACID 1 MG: 1 TABLET ORAL at 11:44

## 2022-01-01 RX ADMIN — PANTOPRAZOLE SODIUM 40 MG: 40 TABLET, DELAYED RELEASE ORAL at 12:43

## 2022-01-01 RX ADMIN — INSULIN GLARGINE-YFGN 50 UNITS: 100 INJECTION, SOLUTION SUBCUTANEOUS at 21:20

## 2022-01-01 RX ADMIN — FOLIC ACID 1 MG: 1 TABLET ORAL at 09:42

## 2022-01-01 RX ADMIN — MICONAZOLE NITRATE: 20.6 POWDER TOPICAL at 21:30

## 2022-01-01 RX ADMIN — OXYCODONE HYDROCHLORIDE AND ACETAMINOPHEN 500 MG: 500 TABLET ORAL at 21:29

## 2022-01-01 RX ADMIN — IPRATROPIUM BROMIDE 0.5 MG: 0.5 SOLUTION RESPIRATORY (INHALATION) at 18:32

## 2022-01-01 RX ADMIN — SODIUM CHLORIDE, PRESERVATIVE FREE: 5 INJECTION INTRAVENOUS at 21:00

## 2022-01-01 RX ADMIN — PANTOPRAZOLE SODIUM 40 MG: 40 TABLET, DELAYED RELEASE ORAL at 09:31

## 2022-01-01 RX ADMIN — INSULIN GLARGINE-YFGN 50 UNITS: 100 INJECTION, SOLUTION SUBCUTANEOUS at 20:24

## 2022-01-01 RX ADMIN — HEPARIN SODIUM 5000 UNITS: 10000 INJECTION INTRAVENOUS; SUBCUTANEOUS at 23:00

## 2022-01-01 RX ADMIN — PANTOPRAZOLE SODIUM 40 MG: 40 TABLET, DELAYED RELEASE ORAL at 11:19

## 2022-01-01 RX ADMIN — EZETIMIBE 10 MG: 10 TABLET ORAL at 09:58

## 2022-01-01 RX ADMIN — BUMETANIDE 2 MG: 1 TABLET ORAL at 20:03

## 2022-01-01 RX ADMIN — BUMETANIDE 2 MG: 1 TABLET ORAL at 09:56

## 2022-01-01 RX ADMIN — IPRATROPIUM BROMIDE AND ALBUTEROL SULFATE 1 AMPULE: .5; 2.5 SOLUTION RESPIRATORY (INHALATION) at 11:35

## 2022-01-01 RX ADMIN — FOLIC ACID 1 MG: 1 TABLET ORAL at 08:41

## 2022-01-01 RX ADMIN — INSULIN LISPRO 1 UNITS: 100 INJECTION, SOLUTION INTRAVENOUS; SUBCUTANEOUS at 22:56

## 2022-01-01 RX ADMIN — DOCUSATE SODIUM 100 MG: 100 CAPSULE, LIQUID FILLED ORAL at 11:02

## 2022-01-01 RX ADMIN — ACETAMINOPHEN 650 MG: 325 TABLET ORAL at 14:48

## 2022-01-01 RX ADMIN — Medication 3 MG: at 21:53

## 2022-01-01 RX ADMIN — BUMETANIDE 2 MG: 1 TABLET ORAL at 11:44

## 2022-01-01 RX ADMIN — Medication 3 MG: at 21:43

## 2022-01-01 RX ADMIN — OXYCODONE HYDROCHLORIDE AND ACETAMINOPHEN 500 MG: 500 TABLET ORAL at 09:37

## 2022-01-01 RX ADMIN — BENZONATATE 100 MG: 100 CAPSULE ORAL at 16:12

## 2022-01-01 RX ADMIN — PANTOPRAZOLE SODIUM 40 MG: 40 TABLET, DELAYED RELEASE ORAL at 06:56

## 2022-01-01 RX ADMIN — BUMETANIDE 1 MG/HR: 0.25 INJECTION INTRAMUSCULAR; INTRAVENOUS at 09:30

## 2022-01-01 RX ADMIN — INSULIN LISPRO 2 UNITS: 100 INJECTION, SOLUTION INTRAVENOUS; SUBCUTANEOUS at 13:00

## 2022-01-01 RX ADMIN — METOPROLOL SUCCINATE 25 MG: 25 TABLET, EXTENDED RELEASE ORAL at 09:01

## 2022-01-01 RX ADMIN — GUAIFENESIN SYRUP AND DEXTROMETHORPHAN 5 ML: 100; 10 SYRUP ORAL at 21:18

## 2022-01-01 RX ADMIN — INSULIN LISPRO 1 UNITS: 100 INJECTION, SOLUTION INTRAVENOUS; SUBCUTANEOUS at 18:00

## 2022-01-01 RX ADMIN — METOLAZONE 5 MG: 5 TABLET ORAL at 09:00

## 2022-01-01 RX ADMIN — GUAIFENESIN 400 MG: 400 TABLET ORAL at 05:08

## 2022-01-01 RX ADMIN — METOPROLOL SUCCINATE 25 MG: 25 TABLET, EXTENDED RELEASE ORAL at 08:44

## 2022-01-01 RX ADMIN — MIDODRINE HYDROCHLORIDE 10 MG: 10 TABLET ORAL at 11:04

## 2022-01-01 RX ADMIN — GUAIFENESIN 400 MG: 400 TABLET ORAL at 13:48

## 2022-01-01 RX ADMIN — IPRATROPIUM BROMIDE 0.5 MG: 0.5 SOLUTION RESPIRATORY (INHALATION) at 15:17

## 2022-01-01 RX ADMIN — EZETIMIBE 10 MG: 10 TABLET ORAL at 10:53

## 2022-01-01 RX ADMIN — ATORVASTATIN CALCIUM 40 MG: 40 TABLET, FILM COATED ORAL at 22:33

## 2022-01-01 RX ADMIN — FOLIC ACID 1 MG: 1 TABLET ORAL at 09:59

## 2022-01-01 RX ADMIN — DEXAMETHASONE SODIUM PHOSPHATE 10 MG: 10 INJECTION, SOLUTION INTRAMUSCULAR; INTRAVENOUS at 21:01

## 2022-01-01 RX ADMIN — ATORVASTATIN CALCIUM 40 MG: 40 TABLET, FILM COATED ORAL at 21:18

## 2022-01-01 RX ADMIN — PANTOPRAZOLE SODIUM 40 MG: 40 TABLET, DELAYED RELEASE ORAL at 09:42

## 2022-01-01 RX ADMIN — HEPARIN SODIUM 5000 UNITS: 10000 INJECTION INTRAVENOUS; SUBCUTANEOUS at 15:36

## 2022-01-01 RX ADMIN — INSULIN GLARGINE-YFGN 50 UNITS: 100 INJECTION, SOLUTION SUBCUTANEOUS at 20:40

## 2022-01-01 RX ADMIN — ASPIRIN 81 MG: 81 TABLET, COATED ORAL at 09:32

## 2022-01-01 RX ADMIN — IPRATROPIUM BROMIDE AND ALBUTEROL SULFATE 1 AMPULE: .5; 2.5 SOLUTION RESPIRATORY (INHALATION) at 16:25

## 2022-01-01 RX ADMIN — FOLIC ACID 1 MG: 1 TABLET ORAL at 08:23

## 2022-01-01 RX ADMIN — HEPARIN SODIUM 5000 UNITS: 10000 INJECTION INTRAVENOUS; SUBCUTANEOUS at 06:01

## 2022-01-01 RX ADMIN — METOPROLOL SUCCINATE 25 MG: 25 TABLET, FILM COATED, EXTENDED RELEASE ORAL at 08:03

## 2022-01-01 RX ADMIN — ASPIRIN 81 MG: 81 TABLET ORAL at 09:43

## 2022-01-01 RX ADMIN — POTASSIUM CHLORIDE 20 MEQ: 20 TABLET, EXTENDED RELEASE ORAL at 16:58

## 2022-01-01 RX ADMIN — FOLIC ACID 1 MG: 1 TABLET ORAL at 08:27

## 2022-01-01 RX ADMIN — IPRATROPIUM BROMIDE 0.5 MG: 0.5 SOLUTION RESPIRATORY (INHALATION) at 08:09

## 2022-01-01 RX ADMIN — FOLIC ACID 1 MG: 1 TABLET ORAL at 11:16

## 2022-01-01 RX ADMIN — ATORVASTATIN CALCIUM 40 MG: 40 TABLET, FILM COATED ORAL at 20:25

## 2022-01-01 RX ADMIN — BUMETANIDE 1 MG/HR: 0.25 INJECTION INTRAMUSCULAR; INTRAVENOUS at 20:54

## 2022-01-01 RX ADMIN — GUAIFENESIN 400 MG: 400 TABLET ORAL at 09:21

## 2022-01-01 RX ADMIN — CHLOROTHIAZIDE SODIUM 250 MG: 500 INJECTION INTRAVENOUS at 12:30

## 2022-01-01 RX ADMIN — HEPARIN SODIUM 5000 UNITS: 10000 INJECTION INTRAVENOUS; SUBCUTANEOUS at 23:40

## 2022-01-01 RX ADMIN — Medication 10 ML: at 21:52

## 2022-01-01 RX ADMIN — GUAIFENESIN 400 MG: 400 TABLET ORAL at 21:30

## 2022-01-01 RX ADMIN — DOCUSATE SODIUM 100 MG: 100 CAPSULE, LIQUID FILLED ORAL at 20:41

## 2022-01-01 RX ADMIN — Medication 1000 MCG: at 08:34

## 2022-01-01 RX ADMIN — Medication 2000 UNITS: at 09:21

## 2022-01-01 RX ADMIN — INSULIN GLARGINE-YFGN 50 UNITS: 100 INJECTION, SOLUTION SUBCUTANEOUS at 22:04

## 2022-01-01 RX ADMIN — OXYCODONE HYDROCHLORIDE AND ACETAMINOPHEN 500 MG: 500 TABLET ORAL at 21:01

## 2022-01-01 RX ADMIN — IPRATROPIUM BROMIDE AND ALBUTEROL SULFATE 1 AMPULE: .5; 2.5 SOLUTION RESPIRATORY (INHALATION) at 16:19

## 2022-01-01 RX ADMIN — METOPROLOL SUCCINATE 25 MG: 25 TABLET, FILM COATED, EXTENDED RELEASE ORAL at 09:33

## 2022-01-01 RX ADMIN — POTASSIUM CHLORIDE 20 MEQ: 20 TABLET, EXTENDED RELEASE ORAL at 08:55

## 2022-01-01 RX ADMIN — BUPROPION HYDROCHLORIDE 300 MG: 300 TABLET, FILM COATED, EXTENDED RELEASE ORAL at 08:23

## 2022-01-01 RX ADMIN — Medication 50 MG: at 09:14

## 2022-01-01 RX ADMIN — PANTOPRAZOLE SODIUM 40 MG: 40 TABLET, DELAYED RELEASE ORAL at 10:08

## 2022-01-01 RX ADMIN — ENOXAPARIN SODIUM 40 MG: 100 INJECTION SUBCUTANEOUS at 12:43

## 2022-01-01 RX ADMIN — GUAIFENESIN 400 MG: 400 TABLET ORAL at 20:52

## 2022-01-01 RX ADMIN — Medication 50 MG: at 10:30

## 2022-01-01 RX ADMIN — ATORVASTATIN CALCIUM 40 MG: 40 TABLET, FILM COATED ORAL at 22:15

## 2022-01-01 RX ADMIN — INSULIN GLARGINE-YFGN 40 UNITS: 100 INJECTION, SOLUTION SUBCUTANEOUS at 22:23

## 2022-01-01 RX ADMIN — PANTOPRAZOLE SODIUM 40 MG: 40 TABLET, DELAYED RELEASE ORAL at 05:43

## 2022-01-01 RX ADMIN — EZETIMIBE 10 MG: 10 TABLET ORAL at 22:41

## 2022-01-01 RX ADMIN — METOPROLOL SUCCINATE 25 MG: 25 TABLET, EXTENDED RELEASE ORAL at 21:04

## 2022-01-01 RX ADMIN — IPRATROPIUM BROMIDE 0.5 MG: 0.5 SOLUTION RESPIRATORY (INHALATION) at 11:59

## 2022-01-01 RX ADMIN — HEPARIN SODIUM 5000 UNITS: 10000 INJECTION INTRAVENOUS; SUBCUTANEOUS at 14:56

## 2022-01-01 RX ADMIN — HEPARIN SODIUM 5000 UNITS: 10000 INJECTION INTRAVENOUS; SUBCUTANEOUS at 06:24

## 2022-01-01 RX ADMIN — OXYCODONE HYDROCHLORIDE AND ACETAMINOPHEN 500 MG: 500 TABLET ORAL at 22:20

## 2022-01-01 RX ADMIN — HEPARIN SODIUM 5000 UNITS: 10000 INJECTION INTRAVENOUS; SUBCUTANEOUS at 20:24

## 2022-01-01 RX ADMIN — Medication 1000 MCG: at 08:22

## 2022-01-01 RX ADMIN — BUMETANIDE 2 MG: 1 TABLET ORAL at 17:51

## 2022-01-01 RX ADMIN — PANTOPRAZOLE SODIUM 40 MG: 40 TABLET, DELAYED RELEASE ORAL at 11:44

## 2022-01-01 RX ADMIN — GUAIFENESIN 400 MG: 400 TABLET ORAL at 19:33

## 2022-01-01 RX ADMIN — CYANOCOBALAMIN TAB 1000 MCG 1000 MCG: 1000 TAB at 10:01

## 2022-01-01 RX ADMIN — IPRATROPIUM BROMIDE 0.5 MG: 0.5 SOLUTION RESPIRATORY (INHALATION) at 15:35

## 2022-01-01 RX ADMIN — GUAIFENESIN 400 MG: 400 TABLET ORAL at 07:11

## 2022-01-01 RX ADMIN — IPRATROPIUM BROMIDE 0.5 MG: 0.5 SOLUTION RESPIRATORY (INHALATION) at 12:21

## 2022-01-01 RX ADMIN — OXYCODONE HYDROCHLORIDE AND ACETAMINOPHEN 500 MG: 500 TABLET ORAL at 21:15

## 2022-01-01 RX ADMIN — BUMETANIDE 2 MG: 1 TABLET ORAL at 20:20

## 2022-01-01 RX ADMIN — IPRATROPIUM BROMIDE AND ALBUTEROL SULFATE 1 AMPULE: .5; 2.5 SOLUTION RESPIRATORY (INHALATION) at 08:26

## 2022-01-01 RX ADMIN — BUMETANIDE 2 MG: 1 TABLET ORAL at 12:43

## 2022-01-01 RX ADMIN — GUAIFENESIN 400 MG: 400 TABLET ORAL at 09:00

## 2022-01-01 RX ADMIN — Medication 50 MG: at 08:34

## 2022-01-01 RX ADMIN — BENZONATATE 100 MG: 100 CAPSULE ORAL at 17:31

## 2022-01-01 RX ADMIN — IPRATROPIUM BROMIDE AND ALBUTEROL SULFATE 1 AMPULE: .5; 2.5 SOLUTION RESPIRATORY (INHALATION) at 19:33

## 2022-01-01 RX ADMIN — BUPROPION HYDROCHLORIDE 300 MG: 300 TABLET, FILM COATED, EXTENDED RELEASE ORAL at 11:43

## 2022-01-01 RX ADMIN — DEXAMETHASONE SODIUM PHOSPHATE 6 MG: 10 INJECTION INTRAMUSCULAR; INTRAVENOUS at 08:17

## 2022-01-01 RX ADMIN — PANTOPRAZOLE SODIUM 40 MG: 40 TABLET, DELAYED RELEASE ORAL at 06:36

## 2022-01-01 RX ADMIN — EZETIMIBE 10 MG: 10 TABLET ORAL at 22:20

## 2022-01-01 RX ADMIN — IPRATROPIUM BROMIDE 0.5 MG: 0.5 SOLUTION RESPIRATORY (INHALATION) at 08:39

## 2022-01-01 RX ADMIN — INSULIN LISPRO 1 UNITS: 100 INJECTION, SOLUTION INTRAVENOUS; SUBCUTANEOUS at 20:09

## 2022-01-01 RX ADMIN — Medication 3 MG: at 21:59

## 2022-01-01 RX ADMIN — CEFTRIAXONE 1000 MG: 1 INJECTION, POWDER, FOR SOLUTION INTRAMUSCULAR; INTRAVENOUS at 21:58

## 2022-01-01 RX ADMIN — GUAIFENESIN 400 MG: 400 TABLET ORAL at 18:05

## 2022-01-01 RX ADMIN — DOCUSATE SODIUM 100 MG: 100 CAPSULE, LIQUID FILLED ORAL at 08:27

## 2022-01-01 RX ADMIN — BUMETANIDE 2 MG: 1 TABLET ORAL at 12:02

## 2022-01-01 RX ADMIN — GUAIFENESIN 400 MG: 400 TABLET ORAL at 20:13

## 2022-01-01 RX ADMIN — GUAIFENESIN SYRUP AND DEXTROMETHORPHAN 5 ML: 100; 10 SYRUP ORAL at 18:08

## 2022-01-01 RX ADMIN — OXYCODONE HYDROCHLORIDE AND ACETAMINOPHEN 500 MG: 500 TABLET ORAL at 08:48

## 2022-01-01 RX ADMIN — POTASSIUM CHLORIDE 20 MEQ: 20 TABLET, EXTENDED RELEASE ORAL at 09:36

## 2022-01-01 RX ADMIN — BUMETANIDE 1 MG/HR: 0.25 INJECTION INTRAMUSCULAR; INTRAVENOUS at 22:00

## 2022-01-01 RX ADMIN — GUAIFENESIN SYRUP AND DEXTROMETHORPHAN 5 ML: 100; 10 SYRUP ORAL at 06:03

## 2022-01-01 RX ADMIN — PANTOPRAZOLE SODIUM 40 MG: 40 TABLET, DELAYED RELEASE ORAL at 10:01

## 2022-01-01 RX ADMIN — INSULIN LISPRO 1 UNITS: 100 INJECTION, SOLUTION INTRAVENOUS; SUBCUTANEOUS at 22:23

## 2022-01-01 RX ADMIN — METOPROLOL SUCCINATE 25 MG: 25 TABLET, EXTENDED RELEASE ORAL at 09:53

## 2022-01-01 RX ADMIN — ASPIRIN 81 MG: 81 TABLET, COATED ORAL at 07:55

## 2022-01-01 RX ADMIN — PANTOPRAZOLE SODIUM 40 MG: 40 TABLET, DELAYED RELEASE ORAL at 05:06

## 2022-01-01 RX ADMIN — ACETAMINOPHEN 650 MG: 325 TABLET ORAL at 17:28

## 2022-01-01 RX ADMIN — METOPROLOL SUCCINATE 25 MG: 25 TABLET, EXTENDED RELEASE ORAL at 08:37

## 2022-01-01 RX ADMIN — ASPIRIN 81 MG: 81 TABLET, COATED ORAL at 08:48

## 2022-01-01 RX ADMIN — METOPROLOL SUCCINATE 25 MG: 25 TABLET, EXTENDED RELEASE ORAL at 22:04

## 2022-01-01 RX ADMIN — BUMETANIDE 2 MG: 1 TABLET ORAL at 21:00

## 2022-01-01 RX ADMIN — HEPARIN SODIUM 5000 UNITS: 10000 INJECTION INTRAVENOUS; SUBCUTANEOUS at 00:08

## 2022-01-01 RX ADMIN — INSULIN LISPRO 1 UNITS: 100 INJECTION, SOLUTION INTRAVENOUS; SUBCUTANEOUS at 22:05

## 2022-01-01 RX ADMIN — IPRATROPIUM BROMIDE 0.5 MG: 0.5 SOLUTION RESPIRATORY (INHALATION) at 20:34

## 2022-01-01 RX ADMIN — INSULIN LISPRO 1 UNITS: 100 INJECTION, SOLUTION INTRAVENOUS; SUBCUTANEOUS at 19:55

## 2022-01-01 RX ADMIN — IPRATROPIUM BROMIDE AND ALBUTEROL SULFATE 1 AMPULE: .5; 2.5 SOLUTION RESPIRATORY (INHALATION) at 20:38

## 2022-01-01 RX ADMIN — BUMETANIDE 2 MG: 0.25 INJECTION, SOLUTION INTRAMUSCULAR; INTRAVENOUS at 16:54

## 2022-01-01 RX ADMIN — EZETIMIBE 10 MG: 10 TABLET ORAL at 21:52

## 2022-01-01 RX ADMIN — HEPARIN SODIUM 5000 UNITS: 10000 INJECTION INTRAVENOUS; SUBCUTANEOUS at 06:22

## 2022-01-01 RX ADMIN — HYDROCODONE BITARTRATE AND ACETAMINOPHEN 1 TABLET: 5; 325 TABLET ORAL at 08:53

## 2022-01-01 RX ADMIN — IPRATROPIUM BROMIDE AND ALBUTEROL SULFATE 1 AMPULE: .5; 2.5 SOLUTION RESPIRATORY (INHALATION) at 08:42

## 2022-01-01 RX ADMIN — HYDROCODONE BITARTRATE AND ACETAMINOPHEN 1 TABLET: 5; 325 TABLET ORAL at 23:37

## 2022-01-01 RX ADMIN — METOPROLOL SUCCINATE 25 MG: 25 TABLET, EXTENDED RELEASE ORAL at 21:43

## 2022-01-01 RX ADMIN — Medication 50 MG: at 09:41

## 2022-01-01 RX ADMIN — FOLIC ACID 1 MG: 1 TABLET ORAL at 11:01

## 2022-01-01 RX ADMIN — PANTOPRAZOLE SODIUM 40 MG: 40 TABLET, DELAYED RELEASE ORAL at 09:33

## 2022-01-01 RX ADMIN — Medication 50 MG: at 08:41

## 2022-01-01 RX ADMIN — EZETIMIBE 10 MG: 10 TABLET ORAL at 09:42

## 2022-01-01 RX ADMIN — Medication 50 MG: at 11:18

## 2022-01-01 RX ADMIN — Medication 50 MG: at 08:05

## 2022-01-01 RX ADMIN — BUMETANIDE 2 MG/HR: 0.25 INJECTION INTRAMUSCULAR; INTRAVENOUS at 03:15

## 2022-01-01 RX ADMIN — INSULIN GLARGINE-YFGN 50 UNITS: 100 INJECTION, SOLUTION SUBCUTANEOUS at 21:04

## 2022-01-01 RX ADMIN — BUMETANIDE 2 MG: 1 TABLET ORAL at 12:38

## 2022-01-01 RX ADMIN — Medication 50 MG: at 09:09

## 2022-01-01 RX ADMIN — BENZONATATE 100 MG: 100 CAPSULE ORAL at 11:20

## 2022-01-01 RX ADMIN — GUAIFENESIN SYRUP AND DEXTROMETHORPHAN 5 ML: 100; 10 SYRUP ORAL at 23:37

## 2022-01-01 RX ADMIN — INSULIN LISPRO 2 UNITS: 100 INJECTION, SOLUTION INTRAVENOUS; SUBCUTANEOUS at 11:37

## 2022-01-01 RX ADMIN — Medication 3 MG: at 20:47

## 2022-01-01 RX ADMIN — BUDESONIDE 250 MCG: 0.25 SUSPENSION RESPIRATORY (INHALATION) at 09:12

## 2022-01-01 RX ADMIN — PANTOPRAZOLE SODIUM 40 MG: 40 TABLET, DELAYED RELEASE ORAL at 06:22

## 2022-01-01 RX ADMIN — OXYCODONE HYDROCHLORIDE AND ACETAMINOPHEN 500 MG: 500 TABLET ORAL at 19:43

## 2022-01-01 RX ADMIN — BUMETANIDE 1 MG/HR: 0.25 INJECTION INTRAMUSCULAR; INTRAVENOUS at 21:39

## 2022-01-01 RX ADMIN — OXYCODONE HYDROCHLORIDE AND ACETAMINOPHEN 500 MG: 500 TABLET ORAL at 21:18

## 2022-01-01 RX ADMIN — INSULIN LISPRO 1 UNITS: 100 INJECTION, SOLUTION INTRAVENOUS; SUBCUTANEOUS at 21:16

## 2022-01-01 RX ADMIN — LEVALBUTEROL 0.63 MG: 0.63 SOLUTION RESPIRATORY (INHALATION) at 14:34

## 2022-01-01 RX ADMIN — EZETIMIBE 10 MG: 10 TABLET ORAL at 21:29

## 2022-01-01 RX ADMIN — GUAIFENESIN SYRUP AND DEXTROMETHORPHAN 5 ML: 100; 10 SYRUP ORAL at 02:52

## 2022-01-01 RX ADMIN — OXYCODONE HYDROCHLORIDE AND ACETAMINOPHEN 500 MG: 500 TABLET ORAL at 09:08

## 2022-01-01 RX ADMIN — POTASSIUM CHLORIDE 20 MEQ: 20 TABLET, EXTENDED RELEASE ORAL at 09:17

## 2022-01-01 RX ADMIN — IPRATROPIUM BROMIDE 0.5 MG: 0.5 SOLUTION RESPIRATORY (INHALATION) at 13:21

## 2022-01-01 RX ADMIN — LEVALBUTEROL 0.63 MG: 0.63 SOLUTION RESPIRATORY (INHALATION) at 04:36

## 2022-01-01 RX ADMIN — DOCUSATE SODIUM 100 MG: 100 CAPSULE, LIQUID FILLED ORAL at 08:41

## 2022-01-01 RX ADMIN — PANTOPRAZOLE SODIUM 40 MG: 40 TABLET, DELAYED RELEASE ORAL at 07:54

## 2022-01-01 RX ADMIN — IPRATROPIUM BROMIDE AND ALBUTEROL SULFATE 1 AMPULE: .5; 2.5 SOLUTION RESPIRATORY (INHALATION) at 13:23

## 2022-01-01 RX ADMIN — Medication 3 MG: at 21:15

## 2022-01-01 RX ADMIN — GUAIFENESIN AND CODEINE PHOSPHATE 5 ML: 10; 100 LIQUID ORAL at 11:18

## 2022-01-01 RX ADMIN — INSULIN GLARGINE-YFGN 50 UNITS: 100 INJECTION, SOLUTION SUBCUTANEOUS at 22:05

## 2022-01-01 RX ADMIN — MIDODRINE HYDROCHLORIDE 5 MG: 2.5 TABLET ORAL at 18:23

## 2022-01-01 RX ADMIN — HEPARIN SODIUM 5000 UNITS: 10000 INJECTION INTRAVENOUS; SUBCUTANEOUS at 22:00

## 2022-01-01 RX ADMIN — BUMETANIDE 2 MG: 1 TABLET ORAL at 08:17

## 2022-01-01 RX ADMIN — HYDROCODONE BITARTRATE AND ACETAMINOPHEN 1 TABLET: 5; 325 TABLET ORAL at 18:08

## 2022-01-01 RX ADMIN — OXYCODONE HYDROCHLORIDE AND ACETAMINOPHEN 500 MG: 500 TABLET ORAL at 08:34

## 2022-01-01 RX ADMIN — LEVALBUTEROL 0.63 MG: 0.63 SOLUTION RESPIRATORY (INHALATION) at 05:43

## 2022-01-01 RX ADMIN — GUAIFENESIN 400 MG: 400 TABLET ORAL at 02:30

## 2022-01-01 RX ADMIN — PANTOPRAZOLE SODIUM 40 MG: 40 TABLET, DELAYED RELEASE ORAL at 09:30

## 2022-01-01 RX ADMIN — Medication 2000 UNITS: at 10:31

## 2022-01-01 RX ADMIN — FOLIC ACID 1 MG: 1 TABLET ORAL at 10:08

## 2022-01-01 RX ADMIN — MIDODRINE HYDROCHLORIDE 10 MG: 10 TABLET ORAL at 09:13

## 2022-01-01 RX ADMIN — HEPARIN SODIUM 5000 UNITS: 10000 INJECTION INTRAVENOUS; SUBCUTANEOUS at 15:17

## 2022-01-01 RX ADMIN — Medication 2000 UNITS: at 10:57

## 2022-01-01 RX ADMIN — Medication 3 MG: at 21:30

## 2022-01-01 RX ADMIN — Medication 2000 UNITS: at 09:14

## 2022-01-01 RX ADMIN — ATORVASTATIN CALCIUM 40 MG: 40 TABLET, FILM COATED ORAL at 21:07

## 2022-01-01 RX ADMIN — Medication 2000 UNITS: at 08:36

## 2022-01-01 RX ADMIN — BENZONATATE 100 MG: 100 CAPSULE ORAL at 18:31

## 2022-01-01 RX ADMIN — HEPARIN SODIUM 5000 UNITS: 10000 INJECTION INTRAVENOUS; SUBCUTANEOUS at 15:09

## 2022-01-01 RX ADMIN — PANTOPRAZOLE SODIUM 40 MG: 40 TABLET, DELAYED RELEASE ORAL at 08:56

## 2022-01-01 RX ADMIN — IPRATROPIUM BROMIDE AND ALBUTEROL SULFATE 1 AMPULE: .5; 2.5 SOLUTION RESPIRATORY (INHALATION) at 15:49

## 2022-01-01 RX ADMIN — IPRATROPIUM BROMIDE 0.5 MG: 0.5 SOLUTION RESPIRATORY (INHALATION) at 08:44

## 2022-01-01 RX ADMIN — ATORVASTATIN CALCIUM 40 MG: 40 TABLET, FILM COATED ORAL at 21:02

## 2022-01-01 RX ADMIN — SODIUM CHLORIDE, PRESERVATIVE FREE 10 ML: 5 INJECTION INTRAVENOUS at 08:42

## 2022-01-01 RX ADMIN — Medication 3 MG: at 19:45

## 2022-01-01 RX ADMIN — IPRATROPIUM BROMIDE AND ALBUTEROL SULFATE 1 AMPULE: .5; 2.5 SOLUTION RESPIRATORY (INHALATION) at 21:10

## 2022-01-01 RX ADMIN — HYDROMORPHONE HYDROCHLORIDE 0.5 MG: 1 INJECTION, SOLUTION INTRAMUSCULAR; INTRAVENOUS; SUBCUTANEOUS at 19:36

## 2022-01-01 RX ADMIN — FOLIC ACID 1 MG: 1 TABLET ORAL at 09:43

## 2022-01-01 RX ADMIN — HYDROCODONE BITARTRATE AND ACETAMINOPHEN 1 TABLET: 5; 325 TABLET ORAL at 23:12

## 2022-01-01 RX ADMIN — DOXYCYCLINE 100 MG: 100 INJECTION, POWDER, LYOPHILIZED, FOR SOLUTION INTRAVENOUS at 12:08

## 2022-01-01 RX ADMIN — HYDROCODONE BITARTRATE AND ACETAMINOPHEN 1 TABLET: 5; 325 TABLET ORAL at 04:21

## 2022-01-01 RX ADMIN — EZETIMIBE 10 MG: 10 TABLET ORAL at 09:45

## 2022-01-01 RX ADMIN — METOPROLOL SUCCINATE 25 MG: 25 TABLET, EXTENDED RELEASE ORAL at 22:41

## 2022-01-01 RX ADMIN — ATORVASTATIN CALCIUM 40 MG: 40 TABLET, FILM COATED ORAL at 21:52

## 2022-01-01 RX ADMIN — HYDROCODONE BITARTRATE AND ACETAMINOPHEN 1 TABLET: 5; 325 TABLET ORAL at 13:02

## 2022-01-01 RX ADMIN — HYDROCODONE BITARTRATE AND ACETAMINOPHEN 1 TABLET: 5; 325 TABLET ORAL at 07:55

## 2022-01-01 RX ADMIN — ASPIRIN 81 MG: 81 TABLET, COATED ORAL at 08:23

## 2022-01-01 RX ADMIN — Medication 50 MG: at 10:57

## 2022-01-01 RX ADMIN — DOCUSATE SODIUM 100 MG: 100 CAPSULE, LIQUID FILLED ORAL at 09:01

## 2022-01-01 RX ADMIN — Medication 3 MG: at 21:00

## 2022-01-01 RX ADMIN — SODIUM ZIRCONIUM CYCLOSILICATE 10 G: 10 POWDER, FOR SUSPENSION ORAL at 11:30

## 2022-01-01 RX ADMIN — Medication 3 MG: at 20:51

## 2022-01-01 RX ADMIN — PANTOPRAZOLE SODIUM 40 MG: 40 TABLET, DELAYED RELEASE ORAL at 05:46

## 2022-01-01 RX ADMIN — Medication 50 MG: at 11:19

## 2022-01-01 RX ADMIN — SODIUM CHLORIDE, PRESERVATIVE FREE 10 ML: 5 INJECTION INTRAVENOUS at 20:19

## 2022-01-01 RX ADMIN — ENOXAPARIN SODIUM 40 MG: 100 INJECTION SUBCUTANEOUS at 09:43

## 2022-01-01 RX ADMIN — ATORVASTATIN CALCIUM 40 MG: 40 TABLET, FILM COATED ORAL at 09:42

## 2022-01-01 RX ADMIN — BUPROPION HYDROCHLORIDE 300 MG: 300 TABLET, FILM COATED, EXTENDED RELEASE ORAL at 09:13

## 2022-01-01 RX ADMIN — METOPROLOL SUCCINATE 25 MG: 25 TABLET, EXTENDED RELEASE ORAL at 08:41

## 2022-01-01 RX ADMIN — EZETIMIBE 10 MG: 10 TABLET ORAL at 21:18

## 2022-01-01 RX ADMIN — GUAIFENESIN 400 MG: 400 TABLET ORAL at 10:32

## 2022-01-01 RX ADMIN — INSULIN LISPRO 1 UNITS: 100 INJECTION, SOLUTION INTRAVENOUS; SUBCUTANEOUS at 21:01

## 2022-01-01 RX ADMIN — Medication 3 MG: at 21:39

## 2022-01-01 RX ADMIN — OXYCODONE HYDROCHLORIDE AND ACETAMINOPHEN 500 MG: 500 TABLET ORAL at 09:52

## 2022-01-01 RX ADMIN — OXYCODONE HYDROCHLORIDE AND ACETAMINOPHEN 500 MG: 500 TABLET ORAL at 21:54

## 2022-01-01 RX ADMIN — ATORVASTATIN CALCIUM 40 MG: 40 TABLET, FILM COATED ORAL at 09:00

## 2022-01-01 RX ADMIN — EZETIMIBE 10 MG: 10 TABLET ORAL at 09:00

## 2022-01-01 RX ADMIN — LORAZEPAM 0.5 MG: 2 INJECTION INTRAMUSCULAR; INTRAVENOUS at 18:20

## 2022-01-01 RX ADMIN — BUMETANIDE 2 MG/HR: 0.25 INJECTION INTRAMUSCULAR; INTRAVENOUS at 11:40

## 2022-01-01 RX ADMIN — Medication 2000 UNITS: at 09:36

## 2022-01-01 RX ADMIN — Medication 50 MG: at 08:17

## 2022-01-01 RX ADMIN — OXYCODONE HYDROCHLORIDE AND ACETAMINOPHEN 500 MG: 500 TABLET ORAL at 09:42

## 2022-01-01 RX ADMIN — BUPROPION HYDROCHLORIDE 300 MG: 300 TABLET, FILM COATED, EXTENDED RELEASE ORAL at 17:51

## 2022-01-01 RX ADMIN — Medication 50 MG: at 12:36

## 2022-01-01 RX ADMIN — Medication 10 ML: at 12:43

## 2022-01-01 RX ADMIN — ASPIRIN 81 MG: 81 TABLET ORAL at 09:08

## 2022-01-01 RX ADMIN — GUAIFENESIN 400 MG: 400 TABLET ORAL at 10:00

## 2022-01-01 RX ADMIN — DOCUSATE SODIUM 100 MG: 100 CAPSULE, LIQUID FILLED ORAL at 08:20

## 2022-01-01 RX ADMIN — OXYCODONE HYDROCHLORIDE AND ACETAMINOPHEN 500 MG: 500 TABLET ORAL at 11:18

## 2022-01-01 RX ADMIN — GUAIFENESIN 400 MG: 400 TABLET ORAL at 08:33

## 2022-01-01 RX ADMIN — HEPARIN SODIUM 5000 UNITS: 10000 INJECTION INTRAVENOUS; SUBCUTANEOUS at 14:26

## 2022-01-01 RX ADMIN — Medication 3 MG: at 20:13

## 2022-01-01 RX ADMIN — HEPARIN SODIUM 5000 UNITS: 10000 INJECTION INTRAVENOUS; SUBCUTANEOUS at 22:57

## 2022-01-01 RX ADMIN — INSULIN GLARGINE-YFGN 50 UNITS: 100 INJECTION, SOLUTION SUBCUTANEOUS at 20:25

## 2022-01-01 RX ADMIN — MICONAZOLE NITRATE: 20.6 POWDER TOPICAL at 19:59

## 2022-01-01 RX ADMIN — IPRATROPIUM BROMIDE 0.5 MG: 0.5 SOLUTION RESPIRATORY (INHALATION) at 08:23

## 2022-01-01 RX ADMIN — Medication 50 MG: at 09:57

## 2022-01-01 RX ADMIN — MAGNESIUM HYDROXIDE 30 ML: 1200 LIQUID ORAL at 09:15

## 2022-01-01 RX ADMIN — DOCUSATE SODIUM 100 MG: 100 CAPSULE, LIQUID FILLED ORAL at 09:36

## 2022-01-01 RX ADMIN — ACETAMINOPHEN 650 MG: 325 TABLET ORAL at 10:01

## 2022-01-01 RX ADMIN — CEFTRIAXONE 1000 MG: 1 INJECTION, POWDER, FOR SOLUTION INTRAMUSCULAR; INTRAVENOUS at 20:18

## 2022-01-01 RX ADMIN — INSULIN LISPRO 1 UNITS: 100 INJECTION, SOLUTION INTRAVENOUS; SUBCUTANEOUS at 06:22

## 2022-01-01 RX ADMIN — HEPARIN SODIUM 5000 UNITS: 10000 INJECTION INTRAVENOUS; SUBCUTANEOUS at 14:14

## 2022-01-01 RX ADMIN — INSULIN GLARGINE-YFGN 50 UNITS: 100 INJECTION, SOLUTION SUBCUTANEOUS at 21:02

## 2022-01-01 RX ADMIN — GUAIFENESIN 400 MG: 400 TABLET ORAL at 10:08

## 2022-01-01 RX ADMIN — INSULIN LISPRO 1 UNITS: 100 INJECTION, SOLUTION INTRAVENOUS; SUBCUTANEOUS at 20:59

## 2022-01-01 RX ADMIN — BUMETANIDE 2 MG: 1 TABLET ORAL at 09:36

## 2022-01-01 RX ADMIN — GUAIFENESIN 400 MG: 400 TABLET ORAL at 08:47

## 2022-01-01 RX ADMIN — FOLIC ACID 1 MG: 1 TABLET ORAL at 09:30

## 2022-01-01 RX ADMIN — HEPARIN SODIUM 5000 UNITS: 10000 INJECTION INTRAVENOUS; SUBCUTANEOUS at 05:49

## 2022-01-01 RX ADMIN — LEVALBUTEROL 0.63 MG: 0.63 SOLUTION RESPIRATORY (INHALATION) at 20:12

## 2022-01-01 RX ADMIN — Medication 50 MG: at 08:21

## 2022-01-01 RX ADMIN — IPRATROPIUM BROMIDE AND ALBUTEROL SULFATE 1 AMPULE: .5; 2.5 SOLUTION RESPIRATORY (INHALATION) at 16:30

## 2022-01-01 RX ADMIN — PHENYLEPHRINE HYDROCHLORIDE 100 MCG: 10 INJECTION INTRAVENOUS at 09:59

## 2022-01-01 RX ADMIN — Medication 50 MG: at 09:52

## 2022-01-01 RX ADMIN — EZETIMIBE 10 MG: 10 TABLET ORAL at 22:55

## 2022-01-01 RX ADMIN — METOPROLOL SUCCINATE 25 MG: 25 TABLET, EXTENDED RELEASE ORAL at 21:40

## 2022-01-01 RX ADMIN — ASPIRIN 81 MG: 81 TABLET, COATED ORAL at 09:44

## 2022-01-01 RX ADMIN — HYDROCODONE BITARTRATE AND ACETAMINOPHEN 1 TABLET: 5; 325 TABLET ORAL at 09:36

## 2022-01-01 RX ADMIN — IPRATROPIUM BROMIDE AND ALBUTEROL SULFATE 1 AMPULE: .5; 2.5 SOLUTION RESPIRATORY (INHALATION) at 08:29

## 2022-01-01 RX ADMIN — HEPARIN SODIUM 5000 UNITS: 10000 INJECTION INTRAVENOUS; SUBCUTANEOUS at 22:32

## 2022-01-01 RX ADMIN — ENOXAPARIN SODIUM 40 MG: 100 INJECTION SUBCUTANEOUS at 11:49

## 2022-01-01 RX ADMIN — ASPIRIN 81 MG: 81 TABLET, COATED ORAL at 10:52

## 2022-01-01 RX ADMIN — EZETIMIBE 10 MG: 10 TABLET ORAL at 21:09

## 2022-01-01 RX ADMIN — HEPARIN SODIUM 5000 UNITS: 10000 INJECTION INTRAVENOUS; SUBCUTANEOUS at 06:18

## 2022-01-01 RX ADMIN — EZETIMIBE 10 MG: 10 TABLET ORAL at 21:44

## 2022-01-01 RX ADMIN — ATORVASTATIN CALCIUM 40 MG: 40 TABLET, FILM COATED ORAL at 21:04

## 2022-01-01 RX ADMIN — Medication 2000 UNITS: at 08:49

## 2022-01-01 RX ADMIN — BUMETANIDE 2 MG: 1 TABLET ORAL at 08:37

## 2022-01-01 RX ADMIN — GUAIFENESIN AND CODEINE PHOSPHATE 5 ML: 10; 100 LIQUID ORAL at 12:44

## 2022-01-01 RX ADMIN — MIDODRINE HYDROCHLORIDE 10 MG: 10 TABLET ORAL at 18:39

## 2022-01-01 RX ADMIN — IPRATROPIUM BROMIDE AND ALBUTEROL SULFATE 1 AMPULE: .5; 2.5 SOLUTION RESPIRATORY (INHALATION) at 08:47

## 2022-01-01 RX ADMIN — IPRATROPIUM BROMIDE 0.5 MG: 0.5 SOLUTION RESPIRATORY (INHALATION) at 13:24

## 2022-01-01 RX ADMIN — CYANOCOBALAMIN TAB 1000 MCG 1000 MCG: 1000 TAB at 10:21

## 2022-01-01 RX ADMIN — MIDODRINE HYDROCHLORIDE 10 MG: 5 TABLET ORAL at 06:27

## 2022-01-01 RX ADMIN — Medication 2000 UNITS: at 09:31

## 2022-01-01 RX ADMIN — Medication 12.5 G: at 06:36

## 2022-01-01 RX ADMIN — SODIUM CHLORIDE, PRESERVATIVE FREE 10 ML: 5 INJECTION INTRAVENOUS at 16:08

## 2022-01-01 RX ADMIN — Medication 2000 UNITS: at 09:42

## 2022-01-01 RX ADMIN — INSULIN GLARGINE-YFGN 50 UNITS: 100 INJECTION, SOLUTION SUBCUTANEOUS at 20:03

## 2022-01-01 RX ADMIN — CYANOCOBALAMIN TAB 1000 MCG 1000 MCG: 1000 TAB at 09:21

## 2022-01-01 RX ADMIN — INSULIN GLARGINE-YFGN 50 UNITS: 100 INJECTION, SOLUTION SUBCUTANEOUS at 22:08

## 2022-01-01 RX ADMIN — GUAIFENESIN 400 MG: 400 TABLET ORAL at 09:31

## 2022-01-01 RX ADMIN — ASPIRIN 81 MG: 81 TABLET, COATED ORAL at 08:41

## 2022-01-01 RX ADMIN — IPRATROPIUM BROMIDE AND ALBUTEROL SULFATE 1 AMPULE: .5; 2.5 SOLUTION RESPIRATORY (INHALATION) at 20:00

## 2022-01-01 RX ADMIN — Medication 1000 MCG: at 08:40

## 2022-01-01 RX ADMIN — Medication 1000 MCG: at 12:34

## 2022-01-01 RX ADMIN — FUROSEMIDE 40 MG: 10 INJECTION, SOLUTION INTRAMUSCULAR; INTRAVENOUS at 10:23

## 2022-01-01 RX ADMIN — ASPIRIN 81 MG: 81 TABLET, COATED ORAL at 08:56

## 2022-01-01 RX ADMIN — DOCUSATE SODIUM 100 MG: 100 CAPSULE, LIQUID FILLED ORAL at 08:31

## 2022-01-01 RX ADMIN — MIDODRINE HYDROCHLORIDE 10 MG: 10 TABLET ORAL at 13:43

## 2022-01-01 RX ADMIN — BUPROPION HYDROCHLORIDE 300 MG: 300 TABLET, FILM COATED, EXTENDED RELEASE ORAL at 10:08

## 2022-01-01 RX ADMIN — Medication 10 ML: at 22:55

## 2022-01-01 RX ADMIN — GUAIFENESIN 400 MG: 400 TABLET ORAL at 09:34

## 2022-01-01 RX ADMIN — IPRATROPIUM BROMIDE AND ALBUTEROL SULFATE 1 AMPULE: .5; 2.5 SOLUTION RESPIRATORY (INHALATION) at 09:41

## 2022-01-01 RX ADMIN — BUPROPION HYDROCHLORIDE 300 MG: 300 TABLET, FILM COATED, EXTENDED RELEASE ORAL at 16:58

## 2022-01-01 RX ADMIN — IPRATROPIUM BROMIDE 0.5 MG: 0.5 SOLUTION RESPIRATORY (INHALATION) at 20:30

## 2022-01-01 RX ADMIN — EZETIMIBE 10 MG: 10 TABLET ORAL at 09:51

## 2022-01-01 RX ADMIN — Medication 2000 UNITS: at 11:02

## 2022-01-01 RX ADMIN — INSULIN GLARGINE-YFGN 50 UNITS: 100 INJECTION, SOLUTION SUBCUTANEOUS at 21:06

## 2022-01-01 RX ADMIN — GUAIFENESIN SYRUP AND DEXTROMETHORPHAN 5 ML: 100; 10 SYRUP ORAL at 17:47

## 2022-01-01 RX ADMIN — GUAIFENESIN 400 MG: 400 TABLET ORAL at 12:55

## 2022-01-01 RX ADMIN — LEVALBUTEROL 0.63 MG: 0.63 SOLUTION RESPIRATORY (INHALATION) at 19:32

## 2022-01-01 RX ADMIN — HEPARIN SODIUM 5000 UNITS: 10000 INJECTION INTRAVENOUS; SUBCUTANEOUS at 22:20

## 2022-01-01 RX ADMIN — INSULIN GLARGINE-YFGN 50 UNITS: 100 INJECTION, SOLUTION SUBCUTANEOUS at 21:16

## 2022-01-01 RX ADMIN — OXYCODONE HYDROCHLORIDE AND ACETAMINOPHEN 500 MG: 500 TABLET ORAL at 09:44

## 2022-01-01 RX ADMIN — IPRATROPIUM BROMIDE AND ALBUTEROL SULFATE 3 ML: 2.5; .5 SOLUTION RESPIRATORY (INHALATION) at 18:10

## 2022-01-01 RX ADMIN — Medication 50 MG: at 08:23

## 2022-01-01 RX ADMIN — PANTOPRAZOLE SODIUM 40 MG: 40 TABLET, DELAYED RELEASE ORAL at 09:01

## 2022-01-01 RX ADMIN — FOLIC ACID 1 MG: 1 TABLET ORAL at 12:36

## 2022-01-01 RX ADMIN — BUMETANIDE 2 MG: 1 TABLET ORAL at 10:21

## 2022-01-01 RX ADMIN — CYANOCOBALAMIN TAB 1000 MCG 1000 MCG: 1000 TAB at 09:43

## 2022-01-01 RX ADMIN — PANTOPRAZOLE SODIUM 40 MG: 40 TABLET, DELAYED RELEASE ORAL at 06:14

## 2022-01-01 RX ADMIN — Medication 2000 UNITS: at 08:45

## 2022-01-01 RX ADMIN — ATORVASTATIN CALCIUM 40 MG: 40 TABLET, FILM COATED ORAL at 20:52

## 2022-01-01 RX ADMIN — LEVALBUTEROL 0.63 MG: 0.63 SOLUTION RESPIRATORY (INHALATION) at 20:11

## 2022-01-01 RX ADMIN — BUMETANIDE 2 MG/HR: 0.25 INJECTION INTRAMUSCULAR; INTRAVENOUS at 21:45

## 2022-01-01 RX ADMIN — IOPAMIDOL 75 ML: 755 INJECTION, SOLUTION INTRAVENOUS at 23:06

## 2022-01-01 RX ADMIN — PANTOPRAZOLE SODIUM 40 MG: 40 TABLET, DELAYED RELEASE ORAL at 05:40

## 2022-01-01 RX ADMIN — ASPIRIN 81 MG: 81 TABLET, COATED ORAL at 11:17

## 2022-01-01 RX ADMIN — MIDODRINE HYDROCHLORIDE 10 MG: 10 TABLET ORAL at 09:41

## 2022-01-01 RX ADMIN — EZETIMIBE 10 MG: 10 TABLET ORAL at 20:24

## 2022-01-01 RX ADMIN — Medication 3 MG: at 22:33

## 2022-01-01 RX ADMIN — DEXTROSE MONOHYDRATE 12.5 G: 25 INJECTION, SOLUTION INTRAVENOUS at 18:37

## 2022-01-01 RX ADMIN — GUAIFENESIN SYRUP AND DEXTROMETHORPHAN 5 ML: 100; 10 SYRUP ORAL at 06:20

## 2022-01-01 RX ADMIN — BENZONATATE 100 MG: 100 CAPSULE ORAL at 09:31

## 2022-01-01 RX ADMIN — DOCUSATE SODIUM 100 MG: 100 CAPSULE, LIQUID FILLED ORAL at 21:08

## 2022-01-01 RX ADMIN — IPRATROPIUM BROMIDE 0.5 MG: 0.5 SOLUTION RESPIRATORY (INHALATION) at 09:41

## 2022-01-01 RX ADMIN — HEPARIN SODIUM 5000 UNITS: 10000 INJECTION INTRAVENOUS; SUBCUTANEOUS at 14:17

## 2022-01-01 RX ADMIN — POTASSIUM CHLORIDE 20 MEQ: 20 TABLET, EXTENDED RELEASE ORAL at 09:32

## 2022-01-01 RX ADMIN — INSULIN GLARGINE-YFGN 50 UNITS: 100 INJECTION, SOLUTION SUBCUTANEOUS at 20:39

## 2022-01-01 RX ADMIN — DOCUSATE SODIUM 100 MG: 100 CAPSULE, LIQUID FILLED ORAL at 09:41

## 2022-01-01 RX ADMIN — Medication 2000 UNITS: at 10:21

## 2022-01-01 RX ADMIN — OXYCODONE HYDROCHLORIDE AND ACETAMINOPHEN 500 MG: 500 TABLET ORAL at 12:35

## 2022-01-01 RX ADMIN — INSULIN LISPRO 1 UNITS: 100 INJECTION, SOLUTION INTRAVENOUS; SUBCUTANEOUS at 17:17

## 2022-01-01 RX ADMIN — CYANOCOBALAMIN TAB 1000 MCG 1000 MCG: 1000 TAB at 08:31

## 2022-01-01 RX ADMIN — MIDODRINE HYDROCHLORIDE 10 MG: 10 TABLET ORAL at 17:42

## 2022-01-01 RX ADMIN — OXYCODONE HYDROCHLORIDE AND ACETAMINOPHEN 500 MG: 500 TABLET ORAL at 09:36

## 2022-01-01 RX ADMIN — Medication 2000 UNITS: at 08:31

## 2022-01-01 RX ADMIN — INSULIN LISPRO 1 UNITS: 100 INJECTION, SOLUTION INTRAVENOUS; SUBCUTANEOUS at 12:30

## 2022-01-01 RX ADMIN — GUAIFENESIN SYRUP AND DEXTROMETHORPHAN 5 ML: 100; 10 SYRUP ORAL at 17:05

## 2022-01-01 RX ADMIN — IPRATROPIUM BROMIDE AND ALBUTEROL SULFATE 1 AMPULE: .5; 2.5 SOLUTION RESPIRATORY (INHALATION) at 21:30

## 2022-01-01 RX ADMIN — ASPIRIN 81 MG: 81 TABLET, COATED ORAL at 08:21

## 2022-01-01 RX ADMIN — METOPROLOL SUCCINATE 25 MG: 25 TABLET, EXTENDED RELEASE ORAL at 20:23

## 2022-01-01 RX ADMIN — BUMETANIDE 2 MG: 1 TABLET ORAL at 08:47

## 2022-01-01 RX ADMIN — IPRATROPIUM BROMIDE AND ALBUTEROL SULFATE 1 AMPULE: .5; 2.5 SOLUTION RESPIRATORY (INHALATION) at 11:30

## 2022-01-01 RX ADMIN — INSULIN GLARGINE-YFGN 50 UNITS: 100 INJECTION, SOLUTION SUBCUTANEOUS at 19:48

## 2022-01-01 RX ADMIN — INSULIN LISPRO 1 UNITS: 100 INJECTION, SOLUTION INTRAVENOUS; SUBCUTANEOUS at 11:58

## 2022-01-01 RX ADMIN — BENZONATATE 100 MG: 100 CAPSULE ORAL at 10:01

## 2022-01-01 RX ADMIN — METOPROLOL SUCCINATE 25 MG: 25 TABLET, EXTENDED RELEASE ORAL at 11:18

## 2022-01-01 RX ADMIN — IPRATROPIUM BROMIDE 0.5 MG: 0.5 SOLUTION RESPIRATORY (INHALATION) at 16:15

## 2022-01-01 RX ADMIN — BUMETANIDE 2 MG: 1 TABLET ORAL at 19:51

## 2022-01-01 RX ADMIN — EZETIMIBE 10 MG: 10 TABLET ORAL at 10:00

## 2022-01-01 RX ADMIN — BUMETANIDE 2 MG/HR: 0.25 INJECTION INTRAMUSCULAR; INTRAVENOUS at 22:11

## 2022-01-01 RX ADMIN — FOLIC ACID 1 MG: 1 TABLET ORAL at 09:41

## 2022-01-01 RX ADMIN — PETROLATUM: 420 OINTMENT TOPICAL at 08:56

## 2022-01-01 RX ADMIN — CYANOCOBALAMIN TAB 1000 MCG 1000 MCG: 1000 TAB at 11:44

## 2022-01-01 RX ADMIN — BUMETANIDE 1 MG/HR: 0.25 INJECTION INTRAMUSCULAR; INTRAVENOUS at 01:49

## 2022-01-01 RX ADMIN — Medication 2000 UNITS: at 10:54

## 2022-01-01 RX ADMIN — OXYCODONE HYDROCHLORIDE AND ACETAMINOPHEN 500 MG: 500 TABLET ORAL at 23:14

## 2022-01-01 RX ADMIN — BUMETANIDE 2 MG: 1 TABLET ORAL at 20:13

## 2022-01-01 RX ADMIN — DOCUSATE SODIUM 100 MG: 100 CAPSULE, LIQUID FILLED ORAL at 21:56

## 2022-01-01 RX ADMIN — GUAIFENESIN 400 MG: 400 TABLET ORAL at 09:59

## 2022-01-01 RX ADMIN — CYANOCOBALAMIN TAB 1000 MCG 1000 MCG: 1000 TAB at 09:29

## 2022-01-01 RX ADMIN — HYDROCODONE BITARTRATE AND ACETAMINOPHEN 1 TABLET: 5; 325 TABLET ORAL at 16:43

## 2022-01-01 RX ADMIN — ASPIRIN 81 MG: 81 TABLET, COATED ORAL at 09:58

## 2022-01-01 RX ADMIN — METOPROLOL SUCCINATE 25 MG: 25 TABLET, EXTENDED RELEASE ORAL at 09:57

## 2022-01-01 RX ADMIN — FOLIC ACID 1 MG: 1 TABLET ORAL at 08:34

## 2022-01-01 RX ADMIN — METOPROLOL SUCCINATE 25 MG: 25 TABLET, EXTENDED RELEASE ORAL at 20:59

## 2022-01-01 RX ADMIN — BUPROPION HYDROCHLORIDE 300 MG: 300 TABLET, FILM COATED, EXTENDED RELEASE ORAL at 11:19

## 2022-01-01 RX ADMIN — Medication 2000 UNITS: at 08:41

## 2022-01-01 RX ADMIN — FUROSEMIDE 40 MG: 10 INJECTION, SOLUTION INTRAMUSCULAR; INTRAVENOUS at 18:10

## 2022-01-01 RX ADMIN — ASPIRIN 81 MG: 81 TABLET, COATED ORAL at 09:41

## 2022-01-01 RX ADMIN — GUAIFENESIN 400 MG: 400 TABLET ORAL at 16:06

## 2022-01-01 RX ADMIN — CYANOCOBALAMIN TAB 1000 MCG 1000 MCG: 1000 TAB at 08:17

## 2022-01-01 RX ADMIN — BUPROPION HYDROCHLORIDE 300 MG: 300 TABLET, FILM COATED, EXTENDED RELEASE ORAL at 08:34

## 2022-01-01 RX ADMIN — METOPROLOL SUCCINATE 25 MG: 25 TABLET, EXTENDED RELEASE ORAL at 21:59

## 2022-01-01 RX ADMIN — METOPROLOL SUCCINATE 25 MG: 25 TABLET, FILM COATED, EXTENDED RELEASE ORAL at 09:29

## 2022-01-01 RX ADMIN — Medication 2000 UNITS: at 09:30

## 2022-01-01 RX ADMIN — ETOMIDATE 4 MG: 20 INJECTION, SOLUTION INTRAVENOUS at 10:17

## 2022-01-01 RX ADMIN — BUDESONIDE 250 MCG: 0.25 SUSPENSION RESPIRATORY (INHALATION) at 19:24

## 2022-01-01 RX ADMIN — POTASSIUM CHLORIDE 20 MEQ: 20 TABLET, EXTENDED RELEASE ORAL at 09:50

## 2022-01-01 RX ADMIN — FOLIC ACID 1 MG: 1 TABLET ORAL at 09:37

## 2022-01-01 RX ADMIN — MIDODRINE HYDROCHLORIDE 10 MG: 10 TABLET ORAL at 12:28

## 2022-01-01 RX ADMIN — Medication 2000 UNITS: at 10:00

## 2022-01-01 RX ADMIN — GUAIFENESIN 400 MG: 400 TABLET ORAL at 17:22

## 2022-01-01 RX ADMIN — DOCUSATE SODIUM 100 MG: 100 CAPSULE, LIQUID FILLED ORAL at 22:55

## 2022-01-01 RX ADMIN — Medication 3 MG: at 21:09

## 2022-01-01 RX ADMIN — IPRATROPIUM BROMIDE 0.5 MG: 0.5 SOLUTION RESPIRATORY (INHALATION) at 15:45

## 2022-01-01 RX ADMIN — BUMETANIDE 2 MG: 1 TABLET ORAL at 11:19

## 2022-01-01 RX ADMIN — ASPIRIN 81 MG: 81 TABLET, COATED ORAL at 08:34

## 2022-01-01 RX ADMIN — BENZONATATE 100 MG: 100 CAPSULE ORAL at 23:14

## 2022-01-01 RX ADMIN — FUROSEMIDE 20 MG: 10 INJECTION INTRAMUSCULAR; INTRAVENOUS at 07:54

## 2022-01-01 RX ADMIN — GUAIFENESIN 400 MG: 400 TABLET ORAL at 10:40

## 2022-01-01 RX ADMIN — GUAIFENESIN 400 MG: 400 TABLET ORAL at 11:19

## 2022-01-01 RX ADMIN — BUMETANIDE 0.5 MG/HR: 0.25 INJECTION INTRAMUSCULAR; INTRAVENOUS at 10:23

## 2022-01-01 RX ADMIN — Medication 2000 UNITS: at 10:01

## 2022-01-01 RX ADMIN — FOLIC ACID 1 MG: 1 TABLET ORAL at 08:55

## 2022-01-01 RX ADMIN — IPRATROPIUM BROMIDE AND ALBUTEROL SULFATE 1 AMPULE: .5; 2.5 SOLUTION RESPIRATORY (INHALATION) at 19:09

## 2022-01-01 RX ADMIN — ATORVASTATIN CALCIUM 40 MG: 40 TABLET, FILM COATED ORAL at 23:04

## 2022-01-01 RX ADMIN — IPRATROPIUM BROMIDE 0.5 MG: 0.5 SOLUTION RESPIRATORY (INHALATION) at 07:50

## 2022-01-01 RX ADMIN — MIDODRINE HYDROCHLORIDE 10 MG: 10 TABLET ORAL at 16:51

## 2022-01-01 RX ADMIN — IPRATROPIUM BROMIDE AND ALBUTEROL SULFATE 1 AMPULE: .5; 2.5 SOLUTION RESPIRATORY (INHALATION) at 20:19

## 2022-01-01 RX ADMIN — LEVALBUTEROL 0.63 MG: 0.63 SOLUTION RESPIRATORY (INHALATION) at 22:02

## 2022-01-01 RX ADMIN — GUAIFENESIN SYRUP AND DEXTROMETHORPHAN 5 ML: 100; 10 SYRUP ORAL at 08:23

## 2022-01-01 RX ADMIN — Medication 50 MG: at 08:45

## 2022-01-01 RX ADMIN — IPRATROPIUM BROMIDE AND ALBUTEROL SULFATE 1 AMPULE: .5; 2.5 SOLUTION RESPIRATORY (INHALATION) at 08:34

## 2022-01-01 RX ADMIN — IPRATROPIUM BROMIDE AND ALBUTEROL SULFATE 1 AMPULE: .5; 2.5 SOLUTION RESPIRATORY (INHALATION) at 12:46

## 2022-01-01 RX ADMIN — OXYCODONE HYDROCHLORIDE AND ACETAMINOPHEN 500 MG: 500 TABLET ORAL at 19:58

## 2022-01-01 RX ADMIN — GUAIFENESIN 400 MG: 400 TABLET ORAL at 21:07

## 2022-01-01 RX ADMIN — METOPROLOL SUCCINATE 25 MG: 25 TABLET, EXTENDED RELEASE ORAL at 21:54

## 2022-01-01 RX ADMIN — Medication 3 MG: at 22:55

## 2022-01-01 RX ADMIN — GUAIFENESIN 400 MG: 400 TABLET ORAL at 23:14

## 2022-01-01 RX ADMIN — ASPIRIN 81 MG: 81 TABLET, COATED ORAL at 10:00

## 2022-01-01 RX ADMIN — GUAIFENESIN 400 MG: 400 TABLET ORAL at 20:25

## 2022-01-01 RX ADMIN — GUAIFENESIN AND CODEINE PHOSPHATE 5 ML: 10; 100 LIQUID ORAL at 06:06

## 2022-01-01 RX ADMIN — INSULIN GLARGINE-YFGN 50 UNITS: 100 INJECTION, SOLUTION SUBCUTANEOUS at 21:01

## 2022-01-01 RX ADMIN — OXYCODONE HYDROCHLORIDE AND ACETAMINOPHEN 500 MG: 500 TABLET ORAL at 08:21

## 2022-01-01 RX ADMIN — EZETIMIBE 10 MG: 10 TABLET ORAL at 09:53

## 2022-01-01 RX ADMIN — INSULIN GLARGINE-YFGN 50 UNITS: 100 INJECTION, SOLUTION SUBCUTANEOUS at 20:13

## 2022-01-01 RX ADMIN — DOCUSATE SODIUM 100 MG: 100 CAPSULE, LIQUID FILLED ORAL at 21:19

## 2022-01-01 RX ADMIN — Medication 3 MG: at 20:03

## 2022-01-01 RX ADMIN — IPRATROPIUM BROMIDE AND ALBUTEROL SULFATE 1 AMPULE: .5; 2.5 SOLUTION RESPIRATORY (INHALATION) at 16:20

## 2022-01-01 RX ADMIN — POTASSIUM CHLORIDE 20 MEQ: 20 TABLET, EXTENDED RELEASE ORAL at 11:17

## 2022-01-01 RX ADMIN — MIDODRINE HYDROCHLORIDE 10 MG: 10 TABLET ORAL at 08:34

## 2022-01-01 RX ADMIN — LEVALBUTEROL 0.63 MG: 0.63 SOLUTION RESPIRATORY (INHALATION) at 06:47

## 2022-01-01 RX ADMIN — BUMETANIDE 1 MG/HR: 0.25 INJECTION, SOLUTION INTRAMUSCULAR; INTRAVENOUS at 13:05

## 2022-01-01 RX ADMIN — BUDESONIDE 250 MCG: 0.25 SUSPENSION RESPIRATORY (INHALATION) at 22:02

## 2022-01-01 RX ADMIN — SODIUM CHLORIDE, PRESERVATIVE FREE 10 ML: 5 INJECTION INTRAVENOUS at 05:12

## 2022-01-01 RX ADMIN — INSULIN GLARGINE-YFGN 50 UNITS: 100 INJECTION, SOLUTION SUBCUTANEOUS at 22:56

## 2022-01-01 RX ADMIN — MIDODRINE HYDROCHLORIDE 10 MG: 10 TABLET ORAL at 12:19

## 2022-01-01 RX ADMIN — IPRATROPIUM BROMIDE 0.5 MG: 0.5 SOLUTION RESPIRATORY (INHALATION) at 19:59

## 2022-01-01 RX ADMIN — BUMETANIDE 2 MG: 1 TABLET ORAL at 20:37

## 2022-01-01 RX ADMIN — MICONAZOLE NITRATE: 20.6 POWDER TOPICAL at 11:18

## 2022-01-01 RX ADMIN — OXYCODONE HYDROCHLORIDE AND ACETAMINOPHEN 500 MG: 500 TABLET ORAL at 21:19

## 2022-01-01 RX ADMIN — FOLIC ACID 1 MG: 1 TABLET ORAL at 09:36

## 2022-01-01 RX ADMIN — METOPROLOL SUCCINATE 25 MG: 25 TABLET, EXTENDED RELEASE ORAL at 08:56

## 2022-01-01 RX ADMIN — GUAIFENESIN SYRUP AND DEXTROMETHORPHAN 5 ML: 100; 10 SYRUP ORAL at 21:54

## 2022-01-01 RX ADMIN — IPRATROPIUM BROMIDE 0.5 MG: 0.5 SOLUTION RESPIRATORY (INHALATION) at 15:52

## 2022-01-01 RX ADMIN — MIDODRINE HYDROCHLORIDE 10 MG: 5 TABLET ORAL at 08:20

## 2022-01-01 RX ADMIN — IOPAMIDOL 90 ML: 755 INJECTION, SOLUTION INTRAVENOUS at 15:46

## 2022-01-01 RX ADMIN — GUAIFENESIN 400 MG: 400 TABLET ORAL at 20:40

## 2022-01-01 RX ADMIN — GUAIFENESIN 400 MG: 400 TABLET ORAL at 08:05

## 2022-01-01 RX ADMIN — IPRATROPIUM BROMIDE AND ALBUTEROL SULFATE 1 AMPULE: .5; 2.5 SOLUTION RESPIRATORY (INHALATION) at 16:40

## 2022-01-01 RX ADMIN — GUAIFENESIN SYRUP AND DEXTROMETHORPHAN 5 ML: 100; 10 SYRUP ORAL at 11:34

## 2022-01-01 RX ADMIN — OXYCODONE HYDROCHLORIDE AND ACETAMINOPHEN 500 MG: 500 TABLET ORAL at 20:23

## 2022-01-01 RX ADMIN — EZETIMIBE 10 MG: 10 TABLET ORAL at 23:58

## 2022-01-01 RX ADMIN — METOPROLOL SUCCINATE 25 MG: 25 TABLET, FILM COATED, EXTENDED RELEASE ORAL at 08:23

## 2022-01-01 RX ADMIN — Medication 1000 MCG: at 08:05

## 2022-01-01 RX ADMIN — GUAIFENESIN AND CODEINE PHOSPHATE 5 ML: 10; 100 LIQUID ORAL at 17:31

## 2022-01-01 RX ADMIN — Medication 2000 UNITS: at 09:43

## 2022-01-01 RX ADMIN — ARFORMOTEROL TARTRATE 15 MCG: 15 SOLUTION RESPIRATORY (INHALATION) at 19:24

## 2022-01-01 RX ADMIN — DEXTROSE MONOHYDRATE 12.5 G: 25 INJECTION, SOLUTION INTRAVENOUS at 18:31

## 2022-01-01 RX ADMIN — BUMETANIDE 2 MG: 0.25 INJECTION, SOLUTION INTRAMUSCULAR; INTRAVENOUS at 05:09

## 2022-01-01 RX ADMIN — Medication 1000 MCG: at 11:18

## 2022-01-01 RX ADMIN — HYDROCODONE BITARTRATE AND ACETAMINOPHEN 1 TABLET: 5; 325 TABLET ORAL at 18:41

## 2022-01-01 RX ADMIN — Medication 1000 MCG: at 10:31

## 2022-01-01 RX ADMIN — ASPIRIN 81 MG: 81 TABLET, COATED ORAL at 08:45

## 2022-01-01 RX ADMIN — DOCUSATE SODIUM 100 MG: 100 CAPSULE, LIQUID FILLED ORAL at 21:29

## 2022-01-01 RX ADMIN — METOPROLOL SUCCINATE 25 MG: 25 TABLET, EXTENDED RELEASE ORAL at 11:02

## 2022-01-01 RX ADMIN — HEPARIN SODIUM 5000 UNITS: 10000 INJECTION INTRAVENOUS; SUBCUTANEOUS at 21:15

## 2022-01-01 RX ADMIN — BUMETANIDE 2 MG: 1 TABLET ORAL at 08:30

## 2022-01-01 RX ADMIN — Medication 3 MG: at 21:04

## 2022-01-01 RX ADMIN — GUAIFENESIN 400 MG: 400 TABLET ORAL at 19:51

## 2022-01-01 RX ADMIN — MIDODRINE HYDROCHLORIDE 10 MG: 10 TABLET ORAL at 16:58

## 2022-01-01 RX ADMIN — GUAIFENESIN SYRUP AND DEXTROMETHORPHAN 5 ML: 100; 10 SYRUP ORAL at 04:21

## 2022-01-01 RX ADMIN — IPRATROPIUM BROMIDE AND ALBUTEROL SULFATE 1 AMPULE: .5; 2.5 SOLUTION RESPIRATORY (INHALATION) at 12:02

## 2022-01-01 RX ADMIN — CEFTRIAXONE 1000 MG: 1 INJECTION, POWDER, FOR SOLUTION INTRAMUSCULAR; INTRAVENOUS at 22:13

## 2022-01-01 RX ADMIN — Medication 50 MG: at 11:43

## 2022-01-01 RX ADMIN — PANTOPRAZOLE SODIUM 40 MG: 40 TABLET, DELAYED RELEASE ORAL at 07:55

## 2022-01-01 RX ADMIN — ACETAMINOPHEN 650 MG: 325 TABLET ORAL at 09:57

## 2022-01-01 RX ADMIN — INSULIN LISPRO 1 UNITS: 100 INJECTION, SOLUTION INTRAVENOUS; SUBCUTANEOUS at 20:52

## 2022-01-01 RX ADMIN — BUMETANIDE 1 MG/HR: 0.25 INJECTION INTRAMUSCULAR; INTRAVENOUS at 07:39

## 2022-01-01 RX ADMIN — METOPROLOL SUCCINATE 25 MG: 25 TABLET, EXTENDED RELEASE ORAL at 12:43

## 2022-01-01 RX ADMIN — ASPIRIN 81 MG: 81 TABLET, COATED ORAL at 07:54

## 2022-01-01 RX ADMIN — IPRATROPIUM BROMIDE AND ALBUTEROL SULFATE 1 AMPULE: .5; 2.5 SOLUTION RESPIRATORY (INHALATION) at 12:07

## 2022-01-01 RX ADMIN — CYANOCOBALAMIN TAB 1000 MCG 1000 MCG: 1000 TAB at 10:08

## 2022-01-01 RX ADMIN — ATORVASTATIN CALCIUM 40 MG: 40 TABLET, FILM COATED ORAL at 10:52

## 2022-01-01 RX ADMIN — IPRATROPIUM BROMIDE AND ALBUTEROL SULFATE 1 AMPULE: .5; 2.5 SOLUTION RESPIRATORY (INHALATION) at 20:18

## 2022-01-01 RX ADMIN — EZETIMIBE 10 MG: 10 TABLET ORAL at 21:04

## 2022-01-01 RX ADMIN — ATORVASTATIN CALCIUM 40 MG: 40 TABLET, FILM COATED ORAL at 20:24

## 2022-01-01 RX ADMIN — GUAIFENESIN 400 MG: 400 TABLET ORAL at 16:35

## 2022-01-01 RX ADMIN — PANTOPRAZOLE SODIUM 40 MG: 40 TABLET, DELAYED RELEASE ORAL at 08:21

## 2022-01-01 RX ADMIN — PANTOPRAZOLE SODIUM 40 MG: 40 TABLET, DELAYED RELEASE ORAL at 05:38

## 2022-01-01 RX ADMIN — ATORVASTATIN CALCIUM 40 MG: 40 TABLET, FILM COATED ORAL at 20:11

## 2022-01-01 RX ADMIN — HEPARIN SODIUM 5000 UNITS: 10000 INJECTION INTRAVENOUS; SUBCUTANEOUS at 22:29

## 2022-01-01 RX ADMIN — MAGNESIUM HYDROXIDE 30 ML: 1200 LIQUID ORAL at 13:40

## 2022-01-01 RX ADMIN — BUMETANIDE 2 MG: 1 TABLET ORAL at 16:58

## 2022-01-01 RX ADMIN — IPRATROPIUM BROMIDE AND ALBUTEROL SULFATE 1 AMPULE: .5; 2.5 SOLUTION RESPIRATORY (INHALATION) at 13:12

## 2022-01-01 RX ADMIN — Medication 50 MG: at 09:00

## 2022-01-01 RX ADMIN — IPRATROPIUM BROMIDE AND ALBUTEROL SULFATE 1 AMPULE: .5; 2.5 SOLUTION RESPIRATORY (INHALATION) at 16:50

## 2022-01-01 RX ADMIN — Medication 50 MG: at 12:35

## 2022-01-01 RX ADMIN — IPRATROPIUM BROMIDE 0.5 MG: 0.5 SOLUTION RESPIRATORY (INHALATION) at 15:20

## 2022-01-01 RX ADMIN — GUAIFENESIN SYRUP AND DEXTROMETHORPHAN 5 ML: 100; 10 SYRUP ORAL at 05:10

## 2022-01-01 RX ADMIN — OXYCODONE HYDROCHLORIDE AND ACETAMINOPHEN 500 MG: 500 TABLET ORAL at 10:00

## 2022-01-01 RX ADMIN — BUMETANIDE 2 MG: 1 TABLET ORAL at 11:01

## 2022-01-01 RX ADMIN — METOPROLOL SUCCINATE 25 MG: 25 TABLET, EXTENDED RELEASE ORAL at 08:06

## 2022-01-01 RX ADMIN — MIDODRINE HYDROCHLORIDE 10 MG: 10 TABLET ORAL at 08:27

## 2022-01-01 RX ADMIN — Medication 50 MG: at 08:55

## 2022-01-01 RX ADMIN — OXYCODONE HYDROCHLORIDE AND ACETAMINOPHEN 500 MG: 500 TABLET ORAL at 08:23

## 2022-01-01 RX ADMIN — GUAIFENESIN 400 MG: 400 TABLET ORAL at 22:33

## 2022-01-01 RX ADMIN — METOPROLOL SUCCINATE 25 MG: 25 TABLET, EXTENDED RELEASE ORAL at 11:43

## 2022-01-01 RX ADMIN — IPRATROPIUM BROMIDE 0.5 MG: 0.5 SOLUTION RESPIRATORY (INHALATION) at 07:58

## 2022-01-01 RX ADMIN — INSULIN LISPRO 1 UNITS: 100 INJECTION, SOLUTION INTRAVENOUS; SUBCUTANEOUS at 12:42

## 2022-01-01 RX ADMIN — LEVALBUTEROL 0.63 MG: 0.63 SOLUTION RESPIRATORY (INHALATION) at 03:30

## 2022-01-01 RX ADMIN — BENZONATATE 100 MG: 100 CAPSULE ORAL at 08:20

## 2022-01-01 RX ADMIN — ACETAMINOPHEN 650 MG: 325 TABLET ORAL at 20:47

## 2022-01-01 RX ADMIN — CYANOCOBALAMIN TAB 1000 MCG 1000 MCG: 1000 TAB at 10:00

## 2022-01-01 RX ADMIN — FOLIC ACID 1 MG: 1 TABLET ORAL at 08:06

## 2022-01-01 RX ADMIN — Medication 3 MG: at 22:41

## 2022-01-01 RX ADMIN — GUAIFENESIN 400 MG: 400 TABLET ORAL at 09:14

## 2022-01-01 RX ADMIN — GUAIFENESIN 400 MG: 400 TABLET ORAL at 21:01

## 2022-01-01 RX ADMIN — POTASSIUM CHLORIDE 20 MEQ: 20 TABLET, EXTENDED RELEASE ORAL at 10:57

## 2022-01-01 RX ADMIN — IPRATROPIUM BROMIDE AND ALBUTEROL SULFATE 1 AMPULE: .5; 2.5 SOLUTION RESPIRATORY (INHALATION) at 20:36

## 2022-01-01 RX ADMIN — ENOXAPARIN SODIUM 120 MG: 150 INJECTION SUBCUTANEOUS at 12:09

## 2022-01-01 RX ADMIN — FOLIC ACID 1 MG: 1 TABLET ORAL at 09:21

## 2022-01-01 RX ADMIN — FOLIC ACID 1 MG: 1 TABLET ORAL at 12:43

## 2022-01-01 RX ADMIN — ASPIRIN 81 MG: 81 TABLET, COATED ORAL at 08:31

## 2022-01-01 RX ADMIN — OXYCODONE HYDROCHLORIDE AND ACETAMINOPHEN 500 MG: 500 TABLET ORAL at 10:09

## 2022-01-01 RX ADMIN — GUAIFENESIN 400 MG: 400 TABLET ORAL at 10:53

## 2022-01-01 RX ADMIN — ATORVASTATIN CALCIUM 40 MG: 40 TABLET, FILM COATED ORAL at 20:51

## 2022-01-01 RX ADMIN — ASPIRIN 81 MG: 81 TABLET, COATED ORAL at 08:17

## 2022-01-01 RX ADMIN — DOCUSATE SODIUM 100 MG: 100 CAPSULE, LIQUID FILLED ORAL at 21:17

## 2022-01-01 RX ADMIN — GUAIFENESIN 400 MG: 400 TABLET ORAL at 15:10

## 2022-01-01 RX ADMIN — BENZONATATE 100 MG: 100 CAPSULE ORAL at 21:53

## 2022-01-01 RX ADMIN — Medication 2000 UNITS: at 09:57

## 2022-01-01 RX ADMIN — BUMETANIDE 2 MG/HR: 0.25 INJECTION INTRAMUSCULAR; INTRAVENOUS at 20:43

## 2022-01-01 RX ADMIN — METOPROLOL SUCCINATE 25 MG: 25 TABLET, EXTENDED RELEASE ORAL at 08:17

## 2022-01-01 RX ADMIN — FOLIC ACID 1 MG: 1 TABLET ORAL at 11:19

## 2022-01-01 RX ADMIN — METOPROLOL SUCCINATE 25 MG: 25 TABLET, EXTENDED RELEASE ORAL at 21:16

## 2022-01-01 RX ADMIN — HYDROCODONE BITARTRATE AND ACETAMINOPHEN 1 TABLET: 5; 325 TABLET ORAL at 02:00

## 2022-01-01 RX ADMIN — ASPIRIN 81 MG: 81 TABLET ORAL at 10:21

## 2022-01-01 RX ADMIN — PHENYLEPHRINE HYDROCHLORIDE 100 MCG: 10 INJECTION INTRAVENOUS at 10:11

## 2022-01-01 RX ADMIN — ARFORMOTEROL TARTRATE 15 MCG: 15 SOLUTION RESPIRATORY (INHALATION) at 22:02

## 2022-01-01 RX ADMIN — DOCUSATE SODIUM 100 MG: 100 CAPSULE, LIQUID FILLED ORAL at 08:47

## 2022-01-01 RX ADMIN — Medication 10 ML: at 11:49

## 2022-01-01 RX ADMIN — METHYLPREDNISOLONE SODIUM SUCCINATE 40 MG: 40 INJECTION, POWDER, FOR SOLUTION INTRAMUSCULAR; INTRAVENOUS at 22:30

## 2022-01-01 RX ADMIN — ASPIRIN 81 MG: 81 TABLET, COATED ORAL at 10:57

## 2022-01-01 RX ADMIN — EZETIMIBE 10 MG: 10 TABLET ORAL at 20:50

## 2022-01-01 RX ADMIN — ASPIRIN 81 MG: 81 TABLET, COATED ORAL at 10:23

## 2022-01-01 RX ADMIN — BUMETANIDE 2 MG: 1 TABLET ORAL at 18:21

## 2022-01-01 RX ADMIN — Medication 3 MG: at 22:15

## 2022-01-01 RX ADMIN — IPRATROPIUM BROMIDE AND ALBUTEROL SULFATE 1 AMPULE: .5; 2.5 SOLUTION RESPIRATORY (INHALATION) at 20:29

## 2022-01-01 RX ADMIN — ACETAMINOPHEN 650 MG: 325 TABLET ORAL at 06:11

## 2022-01-01 RX ADMIN — Medication 50 MG: at 09:29

## 2022-01-01 RX ADMIN — HEPARIN SODIUM 5000 UNITS: 10000 INJECTION INTRAVENOUS; SUBCUTANEOUS at 20:55

## 2022-01-01 RX ADMIN — MIDODRINE HYDROCHLORIDE 10 MG: 5 TABLET ORAL at 07:14

## 2022-01-01 RX ADMIN — LIDOCAINE HYDROCHLORIDE 30 MG: 20 INJECTION, SOLUTION INTRAVENOUS at 09:57

## 2022-01-01 RX ADMIN — GUAIFENESIN 400 MG: 400 TABLET ORAL at 21:15

## 2022-01-01 RX ADMIN — INSULIN GLARGINE-YFGN 50 UNITS: 100 INJECTION, SOLUTION SUBCUTANEOUS at 20:50

## 2022-01-01 RX ADMIN — HYDROMORPHONE HYDROCHLORIDE 0.5 MG: 1 INJECTION, SOLUTION INTRAMUSCULAR; INTRAVENOUS; SUBCUTANEOUS at 01:01

## 2022-01-01 RX ADMIN — CYANOCOBALAMIN TAB 1000 MCG 1000 MCG: 1000 TAB at 09:54

## 2022-01-01 RX ADMIN — FUROSEMIDE 20 MG: 10 INJECTION INTRAMUSCULAR; INTRAVENOUS at 08:02

## 2022-01-01 RX ADMIN — FOLIC ACID 1 MG: 1 TABLET ORAL at 09:09

## 2022-01-01 RX ADMIN — GUAIFENESIN 400 MG: 400 TABLET ORAL at 12:41

## 2022-01-01 RX ADMIN — Medication 16 G: at 17:49

## 2022-01-01 RX ADMIN — INSULIN LISPRO 1 UNITS: 100 INJECTION, SOLUTION INTRAVENOUS; SUBCUTANEOUS at 12:28

## 2022-01-01 RX ADMIN — Medication 50 MG: at 08:27

## 2022-01-01 RX ADMIN — BUMETANIDE 2 MG: 0.25 INJECTION, SOLUTION INTRAMUSCULAR; INTRAVENOUS at 05:38

## 2022-01-01 RX ADMIN — INSULIN LISPRO 1 UNITS: 100 INJECTION, SOLUTION INTRAVENOUS; SUBCUTANEOUS at 20:08

## 2022-01-01 RX ADMIN — Medication 3 MG: at 21:17

## 2022-01-01 RX ADMIN — METOPROLOL SUCCINATE 25 MG: 25 TABLET, EXTENDED RELEASE ORAL at 20:39

## 2022-01-01 RX ADMIN — PANTOPRAZOLE SODIUM 40 MG: 40 TABLET, DELAYED RELEASE ORAL at 10:23

## 2022-01-01 RX ADMIN — OXYCODONE HYDROCHLORIDE AND ACETAMINOPHEN 500 MG: 500 TABLET ORAL at 20:43

## 2022-01-01 RX ADMIN — BUMETANIDE 1 MG/HR: 0.25 INJECTION INTRAMUSCULAR; INTRAVENOUS at 00:17

## 2022-01-01 RX ADMIN — IPRATROPIUM BROMIDE AND ALBUTEROL SULFATE 1 AMPULE: .5; 2.5 SOLUTION RESPIRATORY (INHALATION) at 10:36

## 2022-01-01 RX ADMIN — METOPROLOL SUCCINATE 25 MG: 25 TABLET, EXTENDED RELEASE ORAL at 09:42

## 2022-01-01 RX ADMIN — BUPROPION HYDROCHLORIDE 300 MG: 300 TABLET, FILM COATED, EXTENDED RELEASE ORAL at 09:31

## 2022-01-01 RX ADMIN — Medication 1000 MCG: at 14:17

## 2022-01-01 RX ADMIN — IPRATROPIUM BROMIDE 0.5 MG: 0.5 SOLUTION RESPIRATORY (INHALATION) at 12:14

## 2022-01-01 RX ADMIN — PANTOPRAZOLE SODIUM 40 MG: 40 TABLET, DELAYED RELEASE ORAL at 05:41

## 2022-01-01 RX ADMIN — BENZONATATE 100 MG: 100 CAPSULE ORAL at 19:58

## 2022-01-01 RX ADMIN — HEPARIN SODIUM 5000 UNITS: 10000 INJECTION INTRAVENOUS; SUBCUTANEOUS at 05:46

## 2022-01-01 RX ADMIN — EZETIMIBE 10 MG: 10 TABLET ORAL at 09:57

## 2022-01-01 RX ADMIN — ASPIRIN 81 MG: 81 TABLET, COATED ORAL at 09:33

## 2022-01-01 RX ADMIN — HYDROCODONE BITARTRATE AND ACETAMINOPHEN 1 TABLET: 5; 325 TABLET ORAL at 11:51

## 2022-01-01 RX ADMIN — ATORVASTATIN CALCIUM 40 MG: 40 TABLET, FILM COATED ORAL at 19:58

## 2022-01-01 RX ADMIN — MIDODRINE HYDROCHLORIDE 10 MG: 10 TABLET ORAL at 11:55

## 2022-01-01 RX ADMIN — BUMETANIDE 2 MG/HR: 0.25 INJECTION INTRAMUSCULAR; INTRAVENOUS at 14:43

## 2022-01-01 RX ADMIN — GUAIFENESIN SYRUP AND DEXTROMETHORPHAN 5 ML: 100; 10 SYRUP ORAL at 11:51

## 2022-01-01 RX ADMIN — PANTOPRAZOLE SODIUM 40 MG: 40 TABLET, DELAYED RELEASE ORAL at 07:09

## 2022-01-01 RX ADMIN — IPRATROPIUM BROMIDE AND ALBUTEROL SULFATE 1 AMPULE: .5; 2.5 SOLUTION RESPIRATORY (INHALATION) at 08:55

## 2022-01-01 RX ADMIN — OXYCODONE HYDROCHLORIDE AND ACETAMINOPHEN 500 MG: 500 TABLET ORAL at 22:33

## 2022-01-01 RX ADMIN — Medication 3 MG: at 22:14

## 2022-01-01 RX ADMIN — Medication 3 MG: at 19:58

## 2022-01-01 RX ADMIN — IPRATROPIUM BROMIDE AND ALBUTEROL SULFATE 1 AMPULE: .5; 2.5 SOLUTION RESPIRATORY (INHALATION) at 19:00

## 2022-01-01 RX ADMIN — Medication 3 MG: at 20:23

## 2022-01-01 RX ADMIN — LEVALBUTEROL 0.63 MG: 0.63 SOLUTION RESPIRATORY (INHALATION) at 10:38

## 2022-01-01 RX ADMIN — INSULIN GLARGINE-YFGN 50 UNITS: 100 INJECTION, SOLUTION SUBCUTANEOUS at 22:35

## 2022-01-01 RX ADMIN — MIDODRINE HYDROCHLORIDE 10 MG: 5 TABLET ORAL at 11:48

## 2022-01-01 RX ADMIN — ASPIRIN 81 MG: 81 TABLET, COATED ORAL at 08:33

## 2022-01-01 RX ADMIN — OXYCODONE HYDROCHLORIDE AND ACETAMINOPHEN 500 MG: 500 TABLET ORAL at 21:59

## 2022-01-01 RX ADMIN — GUAIFENESIN 400 MG: 400 TABLET ORAL at 20:24

## 2022-01-01 RX ADMIN — HEPARIN SODIUM 5000 UNITS: 10000 INJECTION INTRAVENOUS; SUBCUTANEOUS at 22:37

## 2022-01-01 RX ADMIN — OXYCODONE HYDROCHLORIDE AND ACETAMINOPHEN 500 MG: 500 TABLET ORAL at 11:01

## 2022-01-01 RX ADMIN — OXYCODONE HYDROCHLORIDE AND ACETAMINOPHEN 500 MG: 500 TABLET ORAL at 22:14

## 2022-01-01 RX ADMIN — OXYCODONE HYDROCHLORIDE AND ACETAMINOPHEN 500 MG: 500 TABLET ORAL at 21:52

## 2022-01-01 RX ADMIN — Medication 2000 UNITS: at 11:17

## 2022-01-01 RX ADMIN — HEPARIN SODIUM 5000 UNITS: 10000 INJECTION INTRAVENOUS; SUBCUTANEOUS at 06:43

## 2022-01-01 RX ADMIN — BENZONATATE 100 MG: 100 CAPSULE ORAL at 21:08

## 2022-01-01 RX ADMIN — INSULIN GLARGINE-YFGN 50 UNITS: 100 INJECTION, SOLUTION SUBCUTANEOUS at 19:53

## 2022-01-01 RX ADMIN — Medication 3 MG: at 23:14

## 2022-01-01 RX ADMIN — BUMETANIDE 2 MG: 1 TABLET ORAL at 10:01

## 2022-01-01 RX ADMIN — BENZONATATE 100 MG: 100 CAPSULE ORAL at 09:36

## 2022-01-01 RX ADMIN — POTASSIUM CHLORIDE 20 MEQ: 20 TABLET, EXTENDED RELEASE ORAL at 08:33

## 2022-01-01 RX ADMIN — BUMETANIDE 1 MG/HR: 0.25 INJECTION INTRAMUSCULAR; INTRAVENOUS at 13:05

## 2022-01-01 RX ADMIN — ASPIRIN 81 MG: 81 TABLET, COATED ORAL at 09:38

## 2022-01-01 RX ADMIN — PANTOPRAZOLE SODIUM 40 MG: 40 TABLET, DELAYED RELEASE ORAL at 06:26

## 2022-01-01 RX ADMIN — Medication 50 MG: at 09:01

## 2022-01-01 RX ADMIN — BUPROPION HYDROCHLORIDE 300 MG: 300 TABLET, FILM COATED, EXTENDED RELEASE ORAL at 12:36

## 2022-01-01 RX ADMIN — PANTOPRAZOLE SODIUM 40 MG: 40 TABLET, DELAYED RELEASE ORAL at 07:39

## 2022-01-01 RX ADMIN — Medication 1000 MCG: at 09:01

## 2022-01-01 RX ADMIN — GUAIFENESIN AND CODEINE PHOSPHATE 5 ML: 10; 100 LIQUID ORAL at 18:31

## 2022-01-01 SDOH — ECONOMIC STABILITY: TRANSPORTATION INSECURITY
IN THE PAST 12 MONTHS, HAS THE LACK OF TRANSPORTATION KEPT YOU FROM MEDICAL APPOINTMENTS OR FROM GETTING MEDICATIONS?: NO

## 2022-01-01 SDOH — ECONOMIC STABILITY: TRANSPORTATION INSECURITY
IN THE PAST 12 MONTHS, HAS LACK OF TRANSPORTATION KEPT YOU FROM MEETINGS, WORK, OR FROM GETTING THINGS NEEDED FOR DAILY LIVING?: NO

## 2022-01-01 SDOH — ECONOMIC STABILITY: INCOME INSECURITY: IN THE LAST 12 MONTHS, WAS THERE A TIME WHEN YOU WERE NOT ABLE TO PAY THE MORTGAGE OR RENT ON TIME?: NO

## 2022-01-01 SDOH — ECONOMIC STABILITY: FOOD INSECURITY: WITHIN THE PAST 12 MONTHS, THE FOOD YOU BOUGHT JUST DIDN'T LAST AND YOU DIDN'T HAVE MONEY TO GET MORE.: NEVER TRUE

## 2022-01-01 SDOH — ECONOMIC STABILITY: FOOD INSECURITY: WITHIN THE PAST 12 MONTHS, YOU WORRIED THAT YOUR FOOD WOULD RUN OUT BEFORE YOU GOT MONEY TO BUY MORE.: NEVER TRUE

## 2022-01-01 SDOH — ECONOMIC STABILITY: HOUSING INSECURITY
IN THE LAST 12 MONTHS, WAS THERE A TIME WHEN YOU DID NOT HAVE A STEADY PLACE TO SLEEP OR SLEPT IN A SHELTER (INCLUDING NOW)?: NO

## 2022-01-01 ASSESSMENT — ENCOUNTER SYMPTOMS
SHORTNESS OF BREATH: 1
DIARRHEA: 0
ABDOMINAL DISTENTION: 0
SHORTNESS OF BREATH: 1
DIARRHEA: 0
NAUSEA: 0
BACK PAIN: 0
SHORTNESS OF BREATH: 1
SHORTNESS OF BREATH: 1
RHINORRHEA: 0
SINUS PRESSURE: 0
ABDOMINAL PAIN: 0
SHORTNESS OF BREATH: 1
WHEEZING: 0
EYE ITCHING: 0
ABDOMINAL PAIN: 0
SHORTNESS OF BREATH: 1
SHORTNESS OF BREATH: 1
ABDOMINAL PAIN: 0
SHORTNESS OF BREATH: 1
VOICE CHANGE: 0
ABDOMINAL PAIN: 0
ABDOMINAL PAIN: 0
SHORTNESS OF BREATH: 1
SHORTNESS OF BREATH: 1
COUGH: 1
ABDOMINAL PAIN: 0
ABDOMINAL PAIN: 0
SHORTNESS OF BREATH: 1
SHORTNESS OF BREATH: 1
ABDOMINAL PAIN: 0
FACIAL SWELLING: 0
SHORTNESS OF BREATH: 1
ABDOMINAL PAIN: 0
ABDOMINAL PAIN: 1
TROUBLE SWALLOWING: 0
ABDOMINAL PAIN: 0
SHORTNESS OF BREATH: 1
NAUSEA: 0
BACK PAIN: 0
ABDOMINAL DISTENTION: 0
ABDOMINAL PAIN: 0
SHORTNESS OF BREATH: 1
SHORTNESS OF BREATH: 1
ABDOMINAL PAIN: 0
SHORTNESS OF BREATH: 1
CHEST TIGHTNESS: 1
SHORTNESS OF BREATH: 1
SORE THROAT: 0
CHEST TIGHTNESS: 0
ABDOMINAL PAIN: 0
SHORTNESS OF BREATH: 1
ABDOMINAL PAIN: 0
EYE REDNESS: 0
ABDOMINAL PAIN: 0
ABDOMINAL PAIN: 0
NAUSEA: 0
SHORTNESS OF BREATH: 1
ABDOMINAL PAIN: 0
SHORTNESS OF BREATH: 0
ABDOMINAL PAIN: 0
SHORTNESS OF BREATH: 1
CONSTIPATION: 0
SHORTNESS OF BREATH: 1
COUGH: 0
ABDOMINAL PAIN: 0
ABDOMINAL PAIN: 0
EYE REDNESS: 0
ABDOMINAL PAIN: 0
COUGH: 0
SHORTNESS OF BREATH: 1
PHOTOPHOBIA: 0
SHORTNESS OF BREATH: 1
SHORTNESS OF BREATH: 1
WHEEZING: 0
ABDOMINAL PAIN: 0
EYE DISCHARGE: 0
ABDOMINAL PAIN: 0
VOMITING: 0
SHORTNESS OF BREATH: 1
ABDOMINAL PAIN: 0
VOMITING: 0
ABDOMINAL PAIN: 0
SHORTNESS OF BREATH: 1
SHORTNESS OF BREATH: 1
BACK PAIN: 0
ABDOMINAL PAIN: 0
ABDOMINAL PAIN: 0
BACK PAIN: 0
ABDOMINAL PAIN: 0
TROUBLE SWALLOWING: 0
SHORTNESS OF BREATH: 1
SHORTNESS OF BREATH: 1
ABDOMINAL PAIN: 0
EYE REDNESS: 0
ABDOMINAL PAIN: 0
PHOTOPHOBIA: 0
TROUBLE SWALLOWING: 0
ABDOMINAL PAIN: 0
SHORTNESS OF BREATH: 1
RHINORRHEA: 0
ABDOMINAL PAIN: 0
SHORTNESS OF BREATH: 0
ABDOMINAL PAIN: 0
SHORTNESS OF BREATH: 1
DIARRHEA: 0
ABDOMINAL PAIN: 0
SHORTNESS OF BREATH: 1
ABDOMINAL PAIN: 0
ABDOMINAL PAIN: 0
SHORTNESS OF BREATH: 1
ABDOMINAL PAIN: 0
SHORTNESS OF BREATH: 0
ABDOMINAL PAIN: 0
SHORTNESS OF BREATH: 1
EYE DISCHARGE: 0
ABDOMINAL PAIN: 0
COUGH: 0
ABDOMINAL PAIN: 0
SHORTNESS OF BREATH: 1
SHORTNESS OF BREATH: 1
ABDOMINAL PAIN: 0
SHORTNESS OF BREATH: 1
VOMITING: 0
ABDOMINAL PAIN: 0
NAUSEA: 0
ABDOMINAL PAIN: 0
SHORTNESS OF BREATH: 1
DIARRHEA: 0
DIARRHEA: 0
SHORTNESS OF BREATH: 0
ABDOMINAL PAIN: 0
ABDOMINAL PAIN: 0
SHORTNESS OF BREATH: 1
VOMITING: 0
ABDOMINAL PAIN: 0
ABDOMINAL PAIN: 0
WHEEZING: 0
SHORTNESS OF BREATH: 1
SHORTNESS OF BREATH: 1
BACK PAIN: 0
SHORTNESS OF BREATH: 1
ABDOMINAL PAIN: 0
SHORTNESS OF BREATH: 1
NAUSEA: 0
SHORTNESS OF BREATH: 1
EYE PAIN: 0
ABDOMINAL PAIN: 0
ABDOMINAL PAIN: 0
SHORTNESS OF BREATH: 1
ABDOMINAL PAIN: 0
CHEST TIGHTNESS: 0
ABDOMINAL PAIN: 0
SHORTNESS OF BREATH: 1
VOMITING: 0
SHORTNESS OF BREATH: 0
SHORTNESS OF BREATH: 1
ABDOMINAL PAIN: 0
SHORTNESS OF BREATH: 1

## 2022-01-01 ASSESSMENT — PAIN SCALES - WONG BAKER

## 2022-01-01 ASSESSMENT — PAIN SCALES - GENERAL
PAINLEVEL_OUTOF10: 0
PAINLEVEL_OUTOF10: 0
PAINLEVEL_OUTOF10: 8
PAINLEVEL_OUTOF10: 7
PAINLEVEL_OUTOF10: 7
PAINLEVEL_OUTOF10: 0
PAINLEVEL_OUTOF10: 8
PAINLEVEL_OUTOF10: 6
PAINLEVEL_OUTOF10: 0
PAINLEVEL_OUTOF10: 6
PAINLEVEL_OUTOF10: 0
PAINLEVEL_OUTOF10: 7
PAINLEVEL_OUTOF10: 3
PAINLEVEL_OUTOF10: 0
PAINLEVEL_OUTOF10: 6
PAINLEVEL_OUTOF10: 0
PAINLEVEL_OUTOF10: 3
PAINLEVEL_OUTOF10: 0
PAINLEVEL_OUTOF10: 7
PAINLEVEL_OUTOF10: 0
PAINLEVEL_OUTOF10: 4
PAINLEVEL_OUTOF10: 6
PAINLEVEL_OUTOF10: 0
PAINLEVEL_OUTOF10: 4
PAINLEVEL_OUTOF10: 0
PAINLEVEL_OUTOF10: 8
PAINLEVEL_OUTOF10: 0
PAINLEVEL_OUTOF10: 8
PAINLEVEL_OUTOF10: 0
PAINLEVEL_OUTOF10: 7
PAINLEVEL_OUTOF10: 0
PAINLEVEL_OUTOF10: 7
PAINLEVEL_OUTOF10: 0
PAINLEVEL_OUTOF10: 8
PAINLEVEL_OUTOF10: 0
PAINLEVEL_OUTOF10: 2
PAINLEVEL_OUTOF10: 5
PAINLEVEL_OUTOF10: 0
PAINLEVEL_OUTOF10: 8
PAINLEVEL_OUTOF10: 0
PAINLEVEL_OUTOF10: 7
PAINLEVEL_OUTOF10: 6
PAINLEVEL_OUTOF10: 0
PAINLEVEL_OUTOF10: 6
PAINLEVEL_OUTOF10: 0
PAINLEVEL_OUTOF10: 5
PAINLEVEL_OUTOF10: 6
PAINLEVEL_OUTOF10: 0
PAINLEVEL_OUTOF10: 0
PAINLEVEL_OUTOF10: 8
PAINLEVEL_OUTOF10: 0
PAINLEVEL_OUTOF10: 7
PAINLEVEL_OUTOF10: 0
PAINLEVEL_OUTOF10: 0
PAINLEVEL_OUTOF10: 3
PAINLEVEL_OUTOF10: 7
PAINLEVEL_OUTOF10: 8
PAINLEVEL_OUTOF10: 10
PAINLEVEL_OUTOF10: 0
PAINLEVEL_OUTOF10: 7
PAINLEVEL_OUTOF10: 0
PAINLEVEL_OUTOF10: 0
PAINLEVEL_OUTOF10: 10
PAINLEVEL_OUTOF10: 0
PAINLEVEL_OUTOF10: 7
PAINLEVEL_OUTOF10: 0
PAINLEVEL_OUTOF10: 0
PAINLEVEL_OUTOF10: 10
PAINLEVEL_OUTOF10: 0
PAINLEVEL_OUTOF10: 6
PAINLEVEL_OUTOF10: 6
PAINLEVEL_OUTOF10: 0
PAINLEVEL_OUTOF10: 4
PAINLEVEL_OUTOF10: 0
PAINLEVEL_OUTOF10: 3
PAINLEVEL_OUTOF10: 0
PAINLEVEL_OUTOF10: 6
PAINLEVEL_OUTOF10: 0
PAINLEVEL_OUTOF10: 5
PAINLEVEL_OUTOF10: 0
PAINLEVEL_OUTOF10: 0
PAINLEVEL_OUTOF10: 7
PAINLEVEL_OUTOF10: 3
PAINLEVEL_OUTOF10: 0
PAINLEVEL_OUTOF10: 8
PAINLEVEL_OUTOF10: 0
PAINLEVEL_OUTOF10: 3
PAINLEVEL_OUTOF10: 2
PAINLEVEL_OUTOF10: 0
PAINLEVEL_OUTOF10: 6
PAINLEVEL_OUTOF10: 0
PAINLEVEL_OUTOF10: 9
PAINLEVEL_OUTOF10: 0

## 2022-01-01 ASSESSMENT — PAIN DESCRIPTION - DESCRIPTORS
DESCRIPTORS: PRESSURE
DESCRIPTORS: ACHING;SORE;DISCOMFORT
DESCRIPTORS: ACHING;SORE;DISCOMFORT
DESCRIPTORS: ACHING
DESCRIPTORS: ACHING;DISCOMFORT;CONSTANT

## 2022-01-01 ASSESSMENT — PAIN DESCRIPTION - LOCATION
LOCATION: FOOT
LOCATION: LEG
LOCATION: FOOT
LOCATION: LEG
LOCATION: FOOT
LOCATION: TOE (COMMENT WHICH ONE)
LOCATION: LEG

## 2022-01-01 ASSESSMENT — SOCIAL DETERMINANTS OF HEALTH (SDOH)
WITHIN THE LAST YEAR, HAVE YOU BEEN HUMILIATED OR EMOTIONALLY ABUSED IN OTHER WAYS BY YOUR PARTNER OR EX-PARTNER?: NO
HOW HARD IS IT FOR YOU TO PAY FOR THE VERY BASICS LIKE FOOD, HOUSING, MEDICAL CARE, AND HEATING?: NOT HARD AT ALL
WITHIN THE LAST YEAR, HAVE TO BEEN RAPED OR FORCED TO HAVE ANY KIND OF SEXUAL ACTIVITY BY YOUR PARTNER OR EX-PARTNER?: NO
WITHIN THE LAST YEAR, HAVE YOU BEEN KICKED, HIT, SLAPPED, OR OTHERWISE PHYSICALLY HURT BY YOUR PARTNER OR EX-PARTNER?: NO
WITHIN THE LAST YEAR, HAVE YOU BEEN AFRAID OF YOUR PARTNER OR EX-PARTNER?: NO

## 2022-01-01 ASSESSMENT — PAIN - FUNCTIONAL ASSESSMENT
PAIN_FUNCTIONAL_ASSESSMENT: PREVENTS OR INTERFERES SOME ACTIVE ACTIVITIES AND ADLS
PAIN_FUNCTIONAL_ASSESSMENT: NONE - DENIES PAIN

## 2022-01-01 ASSESSMENT — PAIN DESCRIPTION - PAIN TYPE
TYPE: ACUTE PAIN
TYPE: ACUTE PAIN;CHRONIC PAIN
TYPE: CHRONIC PAIN
TYPE: ACUTE PAIN

## 2022-01-01 ASSESSMENT — PAIN DESCRIPTION - ORIENTATION
ORIENTATION: LEFT
ORIENTATION: RIGHT;LEFT
ORIENTATION: LEFT
ORIENTATION: LEFT;RIGHT
ORIENTATION: LEFT
ORIENTATION: LEFT
ORIENTATION: RIGHT;LEFT
ORIENTATION: LEFT
ORIENTATION: RIGHT;LEFT
ORIENTATION: LEFT

## 2022-01-01 ASSESSMENT — EJECTION FRACTION: EF_SOURCE: 2D ECHO

## 2022-01-01 ASSESSMENT — PAIN DESCRIPTION - ONSET
ONSET: ON-GOING

## 2022-01-01 ASSESSMENT — LIFESTYLE VARIABLES
HOW MANY STANDARD DRINKS CONTAINING ALCOHOL DO YOU HAVE ON A TYPICAL DAY: 1 OR 2
HOW OFTEN DO YOU HAVE A DRINK CONTAINING ALCOHOL: NEVER
HOW OFTEN DO YOU HAVE A DRINK CONTAINING ALCOHOL: NEVER
HOW MANY STANDARD DRINKS CONTAINING ALCOHOL DO YOU HAVE ON A TYPICAL DAY: 1 OR 2

## 2022-01-01 ASSESSMENT — PAIN DESCRIPTION - FREQUENCY
FREQUENCY: CONTINUOUS
FREQUENCY: INTERMITTENT
FREQUENCY: INTERMITTENT

## 2022-01-01 ASSESSMENT — PATIENT HEALTH QUESTIONNAIRE - PHQ9
SUM OF ALL RESPONSES TO PHQ9 QUESTIONS 1 & 2: 0
1. LITTLE INTEREST OR PLEASURE IN DOING THINGS: NOT AT ALL
2. FEELING DOWN, DEPRESSED OR HOPELESS: NOT AT ALL
DEPRESSION UNABLE TO ASSESS: YES

## 2022-01-01 ASSESSMENT — PAIN DESCRIPTION - PROGRESSION
CLINICAL_PROGRESSION: GRADUALLY WORSENING
CLINICAL_PROGRESSION: GRADUALLY WORSENING

## 2022-01-01 ASSESSMENT — COPD QUESTIONNAIRES: CAT_SEVERITY: MILD

## 2022-01-21 PROBLEM — J18.9 PNA (PNEUMONIA): Status: ACTIVE | Noted: 2022-01-01

## 2022-01-21 NOTE — LETTER
[address] Encounter Date/Time: 2022 [address] Account: [de-identified]    MRN: [id number]    Patient: Kathy Murrieta    Contact Serial #: [id number]      ENCOUNTER          Patient Class: I Private Enc? No Unit MICHAEL [facility] 2970/0462-A   Hospital Service: Intermediate   Encounter DX: Cardiomegaly [I51.7]   ADM Provider: Ene Venegas MD   Procedure:     ATT Provider: Ene Venegas MD   REF Provider:        Admission DX: Cardiomegaly, Acute renal insufficiency, Pleural effusion, right, PNA (pneumonia), Elevated d-dimer, Pneumonia of right lower lobe due to infectious organism, Acute on chronic congestive heart failure, unspecified heart failure type ([facility] and DX codes: I51.7, N28.9, J90, J18.9, R79.89, J18.9, I50.9      PATIENT                 Name: Kathy Murrieta : 1945 (77 yrs)   Address: [address] [place] [address] Sex: [place]   [facility] [address]         Marital Status: Single   Employer: RETIRED         Restoration:  [facility]   Primary Care Provider: Ene Venegas, *         Primary Phone: [phone]   EMERGENCY CONTACT   Contact Name Legal Guardian? Relationship to Patient Home Phone Work Phone   1. angélica muñoz  2. *No Contact Specified* No    Brother/Sister    [phone]                 GUARANTOR            Guarantor: Kathy Murrieta     : 1945   Address: [address] [place] [address] Sex: Male   [facility] [address]     Relation to Patient: Self       Home Phone: [phone]   Guarantor ID: [id number]       Work Phone:     Guarantor Employer: RETIRED         Status: RETIRED      COVERAGE        PRIMARY INSURANCE   Payor: [employer] MEDICARE Plan: [facility] MEDICARE COMPLETE   Payor Address: ,          Group Number: [id number] Insurance Type: [address] Name: Adeline Serrano : 1945   Subscriber ID: [id number] Pat.  Rel. to Sub: Self   SECONDARY INSURANCE   Payor:   Plan:     Payor Address:  ,           Group Number:   Insurance Type:     Subscriber   [place] Department Medicaid  CERTIFICATION OF NECESSITY  FOR NON-EMERGENCY TRANSPORTATION   BY GROUND AMBULANCE      Individual Information   1. Name: Jody Ellison 2. [place] Medicaid Billing Number:    3. Address: [address] [facility]      Transportation Provider Information      5. [place] Medicaid Provider Number:  National Provider Identifier (NPI):      Certification  7. Criteria:  During transport, this individual requires:  [x] Medical treatment or continuous     supervision by an EMT. [x] The administration or regulation of oxygen by another person. [] Supervised protective restraint. 8. Period Beginning Date: 22   9. Length  [x] Not more than 10 day(s)  [] One Year     Additional Information Relevant to Certification   10. Comments or Explanations, If Necessary or Appropriate     Max assist X2 for bed mobility, weak, deconditioned, unable to stand and help pivot, CHF, COPD, 4LO2     Certifying Practitioner Information   11. Name of Practitioner:    12. [place] Medicaid Provider Number, If Applicable:  [address] Provider Identifier (NPI):      Signature Information   14. Date of Signature: 22 15. Name of Person Signing: Ted ANDREA   16. Signature and Professional Designation: Electronically signed by JABIER Adams, ZULLY on 2022 at 2:00 PM       OD [id number]  Rev. 2015 Name:   Subscriber :     Subscriber ID:   Pat.  Rel. to Sub:             CSN: [id number]

## 2022-01-21 NOTE — ED NOTES
Called access line   awaiting call back   Kristen Cortes  01/21/22 [address] [facility]  01/21/22 1147

## 2022-01-21 NOTE — ED PROVIDER NOTES
HPI:  1/21/22, Time: 9:56 AM DORI Koch is a 65 y.o. male presenting to the ED for shortness of breath and increased lower extremity edema with decreased urine output, beginning one month ago back in mid-December. Patient attributes it to starting Ozempic on Dec 16h because the symptoms started right afterwards. He was switched because of cost of the drug per the [facility]. He states that 3 days later he was changed back to Victoza instead of Ozempic. He is also on a diuretic daily but states it does not work as well lately. His past symptoms include congestive heart failure, COPD, diabetes, agent orange exposure and pacemaker. He currently denies chest pain, cough or congestion, fever. He denies worsening of his dyspnea with laying flat (because he wears a CPAP) but his dyspnea do worsen with exertion. The complaint has been persistent, moderate in severity, and worsened by light exertion. Hi edema worsens with dependence. He is not on blood thinners. Patient denies fever/chills, sore throat, cough, congestion, chest pain, headache, visual disturbances, focal paresthesias, focal weakness, abdominal pain, nausea, vomiting, diarrhea, constipation, dysuria, hematuria, trauma, neck or back pain, rash or other complaints. ROS:   A complete review of systems was performed and all pertinent positives and negatives are stated within HPI, all other systems reviewed and are negative.      --------------------------------------------- PAST HISTORY ---------------------------------------------  Past Medical History:  has a past medical history of Acid reflux, Agent orange exposure, Congestive heart failure (CHF) (HCC), COPD (chronic obstructive pulmonary disease) ([facility] Depression, [facility] mellitus [facility] Hyperlipidemia, Hypertension, MI (myocardial infarction) ([facility] Polio, and Sleep apnea.     Past Surgical History:  has a past surgical history that includes Coronary angioplasty with stent; Cardiac pacemaker placement; and pacemaker placement. Social History:  reports that he has quit smoking. He has never used smokeless tobacco. He reports that he does not drink alcohol. Family History: family history is not on file. The patients home medications have been reviewed. Allergies: Penicillins        ----------------------------------------PHYSICAL EXAM--------------------------------------  Constitutional:  Well developed, well nourished, morbidly obese, no acute distress, non-toxic appearance   Eyes:  PERRL, conjunctiva normal, EOMI  HENT:  Atraumatic, external ears normal, nose normal, oropharynx moist, no pharyngeal exudates. Neck- normal range of motion, no nuchal rigidity   Respiratory:  No respiratory distress, diffusely diminshed breath sounds, no rales, mild expiratory wheezing diffusely   Cardiovascular:  Normal rate, normal rhythm, no murmurs, no gallops, no rubs. Radial and DP pulses 2+ bilaterally. GI:  Soft, nondistended, normal bowel sounds, nontender, no organomegaly, no mass, no rebound, no guarding   :  No costovertebral angle tenderness   Musculoskeletal:  3+ bilateral lower extremity pitting edema, no tenderness, no deformities. Back- no tenderness  Integument:  Well hydrated, no visible rash. Adequate perfusion. Lymphatic:  No cervical lymphadenopathy noted   Neurologic:  Alert & oriented x 3, CN 2-12 normal, no focal deficits noted. Normal gait. Psychiatric:  Speech and behavior appropriate       -------------------------------------------------- RESULTS -------------------------------------------------  I have personally reviewed all laboratory and imaging results for this patient. Results are listed below.      LABS:  Results for orders placed or performed during the hospital encounter of 01/21/22   COVID-19, Rapid    Specimen: Nasopharyngeal Swab   Result Value Ref Range    SARS-CoV-2, NAAT Not Detected Not Detected   Urinalysis with Microscopic   Result Value Ref Range    Color, UA Yellow Straw/Yellow    Clarity, UA Clear Clear    Glucose, Ur Negative Negative mg/dL    Bilirubin Urine Negative Negative    Ketones, Urine Negative Negative mg/dL    Specific Gravity, UA 1.020 1.005 - 1.030    Blood, Urine Negative Negative    pH, UA 5.5 5.0 - 9.0    Protein, UA Negative Negative mg/dL    Urobilinogen, Urine 0.2 <2.0 E.U./dL    Nitrite, Urine Negative Negative    Leukocyte Esterase, Urine Negative Negative   CBC auto differential   Result Value Ref Range    WBC 11.7 (H) 4.5 - 11.5 E9/L    RBC 4.23 3.80 - 5.80 E12/L    Hemoglobin 12.4 (L) 12.5 - 16.5 g/dL    Hematocrit 39.0 37.0 - 54.0 %    MCV 92.2 80.0 - 99.9 fL    MCH 29.3 26.0 - 35.0 pg    MCHC 31.8 (L) 32.0 - 34.5 %    RDW 15.1 (H) 11.5 - 15.0 fL    Platelets 969 715 - 118 E9/L    MPV 9.5 7.0 - 12.0 fL    Neutrophils % 61.3 43.0 - 80.0 %    Immature Granulocytes % 0.8 0.0 - 5.0 %    Lymphocytes % 24.5 20.0 - 42.0 %    Monocytes % 10.6 2.0 - 12.0 %    Eosinophils % 2.4 0.0 - 6.0 %    Basophils % 0.4 0.0 - 2.0 %    Neutrophils Absolute 7.19 1.80 - 7.30 E9/L    Immature Granulocytes # 0.09 E9/L    Lymphocytes Absolute 2.87 1.50 - 4.00 E9/L    Monocytes Absolute 1.24 (H) 0.10 - 0.95 E9/L    Eosinophils Absolute 0.28 0.05 - 0.50 E9/L    Basophils Absolute 0.05 0.00 - 0.20 E9/L   Comprehensive Metabolic Panel   Result Value Ref Range    Sodium 143 132 - 146 mmol/L    Potassium 4.2 3.5 - 5.0 mmol/L    Chloride 107 98 - 107 mmol/L    CO2 22 22 - 29 mmol/L    Anion Gap 14 7 - 16 mmol/L    Glucose 94 74 - 99 mg/dL    BUN 35 (H) 6 - 23 mg/dL    CREATININE 2.6 (H) 0.7 - 1.2 mg/dL    GFR Non-African American 24 >=60 mL/min/1.73    GFR African American 29     Calcium 9.1 8.6 - 10.2 mg/dL    Total Protein 6.9 6.4 - 8.3 g/dL    Albumin 3.9 3.5 - 5.2 g/dL    Total Bilirubin 0.6 0.0 - 1.2 mg/dL    Alkaline Phosphatase 127 40 - 129 U/L    ALT 42 (H) 0 - 40 U/L    AST 32 0 - 39 U/L   Lactic Acid, Plasma   Result Value Ref Range    Lactic Acid 1.6 0.5 - 2.2 mmol/L   Brain Natriuretic Peptide   Result Value Ref Range    Pro-BNP 2,419 (H) 0 - 450 pg/mL   Troponin   Result Value Ref Range    Troponin, High Sensitivity 20 (H) 0 - 11 ng/L   D-Dimer, Quantitative   Result Value Ref Range    D-Dimer, Quant 912 ng/mL DDU   Troponin   Result Value Ref Range    Troponin, High Sensitivity 21 (H) 0 - 11 ng/L   POCT blood gases   Result Value Ref Range    Sample Type Arterial     FIO2 21.0     POC pH 7.420 7.350 - 7.450    POC pCO2 36.9 35.0 - 45.0 mmHg    POC PO2 208.0 (H) 60.0 - 80.0 mmHg    POC HCO3 23.9 22.0 - 26.0 mmol/L    POC Base Excess -0.3 -3.0 - 3.0 mmol/L    POC O2 SAT 99.8 (H) 92.0 - 98.5 %    POC CPB No     POC  ID 41,623     POC Device ID [id number]     POC Delivery System RoomAir    EKG 12 Lead   Result Value Ref Range    Ventricular Rate 92 BPM    Atrial Rate 92 BPM    P-R Interval 166 ms    QRS Duration 110 ms    Q-T Interval 396 ms    QTc Calculation (Bazett) 489 ms    P Axis 72 degrees    R Axis 76 degrees       RADIOLOGY:  Interpreted by Radiologist.  US DUP LOWER EXTREMITIES BILATERAL VENOUS   Final Result   No evidence of DVT in either lower extremity. RECOMMENDATIONS:   Unavailable         XR CHEST PORTABLE   Final Result   1. Right lower lobe pneumonia   2. Trace right pleural effusion   3. Cardiomegaly               EKG Interpretation  Time: 10:02 AM EDT  Rhythm: normal sinus   Rate: 92  Axis: normal  Conduction: right bundle branch block (incomplete)  ST Segments: no acute change  T Waves: no acute change  Clinical Impression: non-specific EKG  Comparison to prior EKG: no previous EKG      ------------------------- NURSING NOTES AND VITALS REVIEWED ---------------------------  The nursing notes within the ED encounter and vital signs as below have been reviewed by myself.     /76   Pulse 97   Temp 97.1 °F (36.2 °C) (Infrared)   Resp 22   Ht 6' 2\" (1.88 m)   Wt 270 lb (122.5 kg)   SpO2 95%   BMI 34.67 kg/m² Oxygen Saturation Interpretation: Normal      The patients available past medical records and past encounters were reviewed. ------------------------------ ED COURSE/MEDICAL DECISION MAKING----------------------  Medications   doxycycline (VIBRAMYCIN) 100 mg in dextrose 5 % 100 mL IVPB (100 mg IntraVENous New Bag 1/21/22 1208)   ipratropium-albuterol (DUONEB) nebulizer solution 1 ampule (1 ampule Inhalation Given 1/21/22 1036)   enoxaparin (LOVENOX) injection 120 mg (120 mg SubCUTAneous Given 1/21/22 1209)           Procedures:   none      Medical Decision Making:    Acute on chronic CHF in setting of renal insuffficiency, RLL PNA and trace pleural effusion and COPD. Ultrasounds negative for DVT but D-dimer 900 and cannot get CTA pulm to officially rule out PE so we will give 1 dose of therapeutic Lovenox here just in case. For pneumonia we will treat with IV doxycycline for now given his anaphylactic allergy to penicillin will defer to ID for further antibiotics. Blood culture sent, lactic acid normal, white count normal, urine Legionella and strep pneumo antigens ordered. COVID-negative. DuoNeb given for COPD with significant improvement. We will hold off on steroids for now given he appears well, oxygenating without need for nasal cannula and is comfortable. Will likely need nephrology consult, states has seen Dr. Viet Monreal in the past.  We will also likely need some diuresis and fluid balance management in the setting of acute renal insufficiency and Lasix use. This patient's ED course included: multiple bedside re-evaluations, IV medications, cardiac monitoring, continuous pulse oximetry, complex medical decision making and emergency management and a personal history and physicial eaxmination    This patient has remained hemodynamically stable, improved and been closely monitored during their ED course. Re-Evaluations:  Time: 12:13 PM EST   Re-evaluation.   Patients symptoms are improving  Repeat physical examination is improved      Consultations:   12:09 PM EST  Spoke with Dr Oniel Rodriguez, discussed case, accepts admission to [address] [place] [address] Street: none    I, Mike Chan, DO, am the Primary Provider of Record    Counseling: The emergency provider has spoken with the patient and sister and discussed todays results, in addition to providing specific details for the plan of care and counseling regarding the diagnosis and prognosis. Questions are answered at this time and they are agreeable with the plan.    --------------------------- IMPRESSION AND DISPOSITION ---------------------------------    IMPRESSION  1. Pneumonia of right lower lobe due to infectious organism    2. Acute on chronic congestive heart failure, unspecified heart failure type ([facility]    3. Acute renal insufficiency    4. Cardiomegaly    5. Elevated d-dimer    6.  Pleural effusion, right        DISPOSITION  Disposition: Admit to telemetry  Patient condition is stable             Omid Corona DO  01/21/22 1213

## 2022-01-22 NOTE — CONSULTS
Inpatient Cardiology Consultation      Reason for Consult:  HF    Consulting Physician: Dr Emerita Rabago    Requesting Physician:  Dr Hakan Lopez    Date of Consultation: 1/22/2022    HISTORY OF PRESENT ILLNESS: 66 yo  male previously known to cardiology at [facility] in [place], [place]. Primary and CV care at [place]. PMH: T2DM insulin requiring, HTN, HLD, CKD stage IV, COPD, CAD s/p PCI (specfics not known),  PPM, GERD,  SPEEDY compliant with C-pap, Polio, HFpEF (treated at [facility]), and ex smoker quit in 1986.    12/2021 after starting Ozempic and developed BLE swelling progressively gotten worse, +MOREL. + early satiety. + Dizziness with sudden change in position is an ongoing problem. Denies CP, palpitations. Endorses decreased urine output from Lasix. [facility] 1/21/2022 BP upon arrival 126/76 HR 90's and SR, afebrile, and O2 saturation 94% on RA. CXR RLL infiltrate, trace R pleural effusion. Cardiomegaly. BLE Dopplers: No DVT. Na 143-->141, K+ 4.0, Bun/Cr 35/2.6-->33/2.6, pro-calcitonin 0.14, p-BNP 2419, troponin 20-->21, ALT 42, WBC 11.7-->10.1, Hgb 12.4-->11.0, Plt 271, BC x 2 drawn, COVID NEGATIVE, UA negative. IV ATB, Lovenox 120 mg Sub-Q, Duoneb given in ED. Lasix 20 mg IV QD ordered    Currently /79 HR 90's SR, afebrile, O2 saturation 95% on RA. Toprol 25 QD, Lipitor 40 mg QD re-ordered    Pulmonary, Nephrology and Cardiology consulted      Please note: past medical records were reviewed per electronic medical record (EMR) - see detailed reports under Past Medical/ Surgical History. Past Medical History:    1. Polio as a child (two week)  2. 35 pack years quit in 1986  3. Agent orange exposure  4. HTN  5. HLD  6. T2DM insulin requiring with nephropathy  7. CKD follows with Dr Jovanny Jenkins  8. COPD  9. GERD  10. 1/2003 CAD s/p PCI RCA (Express stent)  11. 6/2010 PPM BSCI  12.  SPEEDY compliant with C-pap  13. 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 2015 PUD/GIB required 2 units PRBC (ASA was stopped @ that time)  15. October 2021 TTE @ [facility] (will attempt to get records)    Past Surgical History: Tonsillectomy, hernia, cataracts      Medications Prior to admit:  Prior to Admission medications    Medication Sig Start Date End Date Taking?  Authorizing Provider   ezetimibe (ZETIA) 10 MG tablet Take 10 mg by mouth daily   Yes Historical Provider, MD   folic acid (FOLVITE) 1 MG tablet Take 1 mg by mouth daily   Yes Historical Provider, MD   vitamin D (CHOLECALCIFEROL) 25 MCG (1000 UT) TABS tablet Take 2,000 Units by mouth daily   Yes Historical Provider, MD   vitamin E 400 UNIT capsule Take 400 Units by mouth daily   Yes Historical Provider, MD   cyanocobalamin 1000 MCG tablet Take 1,000 mcg by mouth daily   Yes Historical Provider, MD   zinc gluconate 50 MG tablet Take 50 mg by mouth daily   Yes Historical Provider, MD   ascorbic acid (VITAMIN C) 500 MG tablet Take 1,000 mg by mouth daily   Yes Historical Provider, MD   buPROPion HCl (WELLBUTRIN PO) Take by mouth   Yes Historical Provider, MD   Furosemide (LASIX PO) Take by mouth   Yes Historical Provider, MD   ATORVASTATIN CALCIUM PO Take by mouth   Yes Historical Provider, MD   pantoprazole (PROTONIX) 40 MG tablet Take 40 mg by mouth daily   Yes Historical Provider, MD   metoprolol succinate (TOPROL XL) 25 MG extended release tablet Take 25 mg by mouth daily   Yes Historical Provider, MD   insulin glargine (LANTUS) 100 UNIT/ML injection vial Inject 40 Units into the skin nightly    Yes Historical Provider, MD   Liraglutide (VICTOZA SC) Inject into the skin   Yes Historical Provider, MD   loperamide (IMODIUM) 2 MG capsule Take 2 mg by mouth 4 times daily as needed for Diarrhea    Historical Provider, MD       Current Medications:    Current Facility-Administered Medications: insulin glargine-yfgn (SEMGLEE-YFGN) injection vial 50 Units, 50 Units, SubCUTAneous, Nightly  loperamide (IMODIUM) capsule 2 mg, 2 mg, Oral, 4x Daily PRN  metoprolol succinate (TOPROL XL) extended release tablet 25 mg, 25 mg, Oral, Daily  pantoprazole (PROTONIX) tablet 40 mg, 40 mg, Oral, Daily  atorvastatin (LIPITOR) tablet 40 mg, 40 mg, Oral, Nightly  furosemide (LASIX) injection 20 mg, 20 mg, IntraVENous, Daily  levoFLOXacin (LEVAQUIN) 500 MG/100ML infusion 500 mg, 500 mg, IntraVENous, Q24H  insulin lispro (HUMALOG) injection vial 0-6 Units, 0-6 Units, SubCUTAneous, TID WC  insulin lispro (HUMALOG) injection vial 0-3 Units, 0-3 Units, SubCUTAneous, Nightly  glucose (GLUTOSE) 40 % oral gel 15 g, 15 g, Oral, PRN  dextrose 50 % IV solution, 12.5 g, IntraVENous, PRN  glucagon (rDNA) injection 1 mg, 1 mg, IntraMUSCular, PRN  dextrose 5 % solution, 100 mL/hr, IntraVENous, PRN  ipratropium (ATROVENT) 0.02 % nebulizer solution 0.5 mg, 0.5 mg, Nebulization, 4x daily    Allergies:  Penicillins: anaphylaxis     Social History:    16 pack years quit in 2916  Denies ETOH/Illicit Drugs  Activity: Live alone 1 story home with basement. [place] snot use cane/walker. + Drives. Retired . Sister does laundry (in basement). [facility] Sister [facility]  Code Status: Full Code      Family History: Non contributory secondary to age      REVIEW OF SYSTEMS:     · Constitutional: Denies fatigue, fevers, chills or night sweats  · Eyes: Denies visual changes or drainage  · ENT: Denies headaches or hearing loss. No mouth sores or sore throat. No epistaxis   · Cardiovascular: Denies chest pain, pressure or palpitations. + BLE swelling. · Respiratory: + BENTON. Denies cough, orthopnea or PND. No hemoptysis   · Gastrointestinal: + early satiety. Denies hematemesis or anorexia. No hematochezia or melena    · Genitourinary: Denies urgency, dysuria or hematuria. · Musculoskeletal: Denies gait disturbance, weakness or joint complaints  · Integumentary: Denies rash, hives or pruritis   · Neurological: Denies dizziness, headaches or seizures.  No numbness or tingling  · Psychiatric: Denies anxiety or depression. · Endocrine: Denies temperature intolerance. No recent weight change. .  · Hematologic/Lymphatic: Denies abnormal bruising or bleeding. No swollen lymph nodes    PHYSICAL EXAM:   /79   Pulse 93   Temp 96.4 °F (35.8 °C) (Temporal)   Resp 20   Ht 6' 2\" (1.88 m)   Wt 270 lb (122.5 kg)   SpO2 95%   BMI 34.67 kg/m²   CONST:  Well developed,  male who appears of stated age. Awake, alert and cooperative. No apparent distress. HEENT:   Head- Normocephalic, atraumatic   Eyes- Conjunctivae pink, anicteric  Throat- Oral mucosa pink and moist  Neck-  Thick. No stridor, trachea midline, no jugular venous distention. No carotid bruit. CHEST: Chest symmetrical and non-tender to palpation. No accessory muscle use or intercostal retractions  RESPIRATORY: Lung sounds - diminished in RLL, otherwise clear, on RA   CARDIOVASCULAR:     Heart Ausculation- Regular rate and rhythm, no murmur heard. PV: 2+-3+ BLE edema. No varicosities. Pedal pulses palpable, no clubbing or cyanosis   ABDOMEN: Semi firm, obesenon-tender to light palpation. Bowel sounds present. No palpable masses no organomegaly; no abdominal bruit  MS: Good muscle strength and tone. No atrophy or abnormal movements. : Deferred  SKIN: Pale, warm and dry no statis dermatitis or ulcers   NEURO / PSYCH: Oriented to person, place and time. Speech clear and appropriate. Follows all commands.  Pleasant affect     DATA:    ECG as per Dr Deshawn Sanches interpretation  Tele strips: SR    Diagnostic:    See [facility]    Intake/Output Summary (Last 24 hours) at 1/22/2022 0853  Last data filed at 1/21/2022 1308  Gross per 24 hour   Intake 100 ml   Output    Net 100 ml       Labs:   CBC:   Recent Labs     01/21/22  1018 01/22/22  0604   WBC 11.7* 10.1   HGB 12.4* 11.0*   HCT 39.0 36.3*    271     BMP:   Recent Labs     01/21/22  1018 01/22/22  0604    141   K 4.2 4.0   CO2 22 22   BUN 35* 33*   CREATININE 2.6* 2.6* LABGLOM 24 24   CALCIUM 9.1 8.7     proBNP:   Recent Labs     01/21/22  1018   PROBNP 2,419*     CARDIAC ENZYMES:  Recent Labs     01/21/22  1018 01/21/22  1144   TROPHS 20* 21*     LIVER PROFILE:  Recent Labs     01/21/22  1018   AST 32   ALT 42*   LABALBU 3.9   A&P per Dr Naheed Trujillo  Electronically signed by [facility] President. OLVIN Arias on 1/22/2022 at 8:53 AM      I have personally seen and evaluated the patient. I personally obtained the history and performed the physical exam.  I personally reviewed all of the above labs, history, review of systems, and data. All of the assessments and recommendations are from me. All of the above cardiac medical decisions are from me. Please see my additional contributions to the history, physical exam, assessment, and recommendations below. History of chief complaint:  He states that last month his diabetic medications were changed and he then developed lower extremity edema and shortness of breath. He also stopped responding to his home Lasix. His medications were eventually changed back however, he continued to have the same symptoms. He denies any chest discomfort. Review of systems:     Heart: as above   Lungs: as above   Eyes: denies changes in vision or discharge. Ears: denies changes in hearing or pain. Nose: denies epistaxis or masses   Throat: denies sore throat or trouble swallowing. Neuro: denies numbness, tingling, tremors. Skin: denies rashes or itching. : denies hematuria, dysuria   GI: denies vomiting, diarrhea   Psych: denies mood changed, anxiety, depression. Physical exam:  /79   Pulse 93   Temp 96.4 °F (35.8 °C) (Temporal)   Resp 20   Ht 5' 9\" (1.753 m)   Wt 292 lb 7 oz (132.6 kg)   SpO2 95%   BMI 43.19 kg/m²   Constitutional: A&O x3, communicates well, no acute distress. Eyes: extraocular muscles intact, PERRL. Normal lids & conjunctiva. No icterus. ENT: clear, no bleeding. No external masses.   Lips normal formation. Neck: supple, full ROM, difficult to examine but probable JVD, no bruits, no lymphadenopathy. No masses. trachea midline. Heart: Distant to auscultation regular rate & rhythm, normal S1 & S2, I/VI systolic murmur, S4 gallop. No heave. Lungs: Poor air movement. Mild left right lower lobe rales. No accessory muscles. Abd: soft, non-tender. Normal bowel sounds. Morbidly obese  Neuro: Full ROM X 4, EOMI, no tremors. EXT: Severe bilateral lower extremity edema  Skin: warm, dry, intact. Good turgor. Psych: A&O x 3, normal behavior, not anxious. Patient seen and examined. Chart, labs & data reviewed. A:  1. Acute renal insufficiency. 2. Right lower lobe pneumonia  3. Hypervolemia. I will evaluate for cardiac etiologies. 4. Morbid obesity. 5. Diabetes. 6. COPD. 7. Sleep apnea. Uses a CPAP machine. 8. Pacemaker. 9. Lymphoma  10. CAD. Stenting in an outside institution in 2003 of the RCA. Rec:  1. Echocardiogram  2. Diuresis. Will defer to nephrology. 3. Further recommendations based on the echo. 4. Comorbidities per others. Electronically signed by Lexy Matias DO on 1/22/2022 at 2:41 PM    Note: This report was completed using computerized voice recognition software. Every effort has been made to ensure accuracy, however; and invert and computerized transcription errors may be present.

## 2022-01-22 NOTE — ED NOTES
Called [facility] who advised they are attempting to outsource transport and will advise us.  Spoke w/ Nunu Benito RN  01/21/22 2025 breath sounds equal/clear to auscultation bilaterally

## 2022-01-22 NOTE — H&P
HISTORY AND PHYSICAL             Date: 1/22/2022        Patient Name: Deepti Collier     YOB: 1945      Age:  65 y.o. Chief Complaint     Chief Complaint   Patient presents with    Shortness of Breath     since Dec. 16th. Also bilateral feet swelling. Decreased urine output since December. History Obtained From   patient    History of Present Illness   Patient came to the ER with shortness of breath and leg swelling. He denies any fevers, or chills. Past Medical History     Past Medical History:   Diagnosis Date    Acid reflux     Agent orange exposure     [facility]     COPD (chronic obstructive pulmonary disease) (HCC)     Depression     Diabetes mellitus (HCC)     Hyperlipidemia     Hypertension     MI [facility] infarction) ([facility] [address]     Polio     Sleep apnea         Past Surgical History     Past Surgical History:   Procedure Laterality Date    CARDIAC PACEMAKER PLACEMENT      CORONARY ANGIOPLASTY WITH STENT PLACEMENT      PACEMAKER PLACEMENT          Medications Prior to Admission     Prior to Admission medications    Medication Sig Start Date End Date Taking?  Authorizing Provider   ezetimibe (ZETIA) 10 MG tablet Take 10 mg by mouth daily   Yes Historical Provider, MD   folic acid (FOLVITE) 1 MG tablet Take 1 mg by mouth daily   Yes Historical Provider, MD   vitamin D (CHOLECALCIFEROL) 25 MCG (1000 UT) TABS tablet Take 2,000 Units by mouth daily   Yes Historical Provider, MD   vitamin E 400 UNIT capsule Take 400 Units by mouth daily   Yes Historical Provider, MD   cyanocobalamin 1000 MCG tablet Take 1,000 mcg by mouth daily   Yes Historical Provider, MD   zinc gluconate 50 MG tablet Take 50 mg by mouth daily   Yes Historical Provider, MD   ascorbic acid (VITAMIN C) 500 MG tablet Take 1,000 mg by mouth daily   Yes Historical Provider, MD   buPROPion HCl (WELLBUTRIN PO) Take by mouth   Yes Historical Provider, MD   Furosemide (LASIX PO) Take by mouth   Yes Historical Provider, MD   ATORVASTATIN CALCIUM PO Take by mouth   Yes Historical Provider, MD   pantoprazole (PROTONIX) 40 MG tablet Take 40 mg by mouth daily   Yes Historical Provider, MD   metoprolol succinate (TOPROL XL) 25 MG extended release tablet Take 25 mg by mouth daily   Yes Historical Provider, MD   insulin glargine (LANTUS) 100 UNIT/ML injection vial Inject 40 Units into the skin nightly    Yes Historical Provider, MD   Liraglutide (VICTOZA SC) Inject into the skin   Yes Historical Provider, MD   loperamide (IMODIUM) 2 MG capsule Take 2 mg by mouth 4 times daily as needed for Diarrhea    Historical Provider, MD        Allergies   Penicillins    Social History     Social History     Tobacco History     Smoking Status  Former Smoker    Smokeless Tobacco Use  Never Used    Tobacco Comment  quit in 1985          Alcohol History     Alcohol Use Status  Never          Drug Use     Drug Use Status  Never          Sexual Activity     Sexually Active  Not Asked                Family History   History reviewed. No pertinent family history. Review of Systems   Review of Systems   Constitutional: Positive for activity change. Negative for fever. HENT: Positive for congestion. Respiratory: Positive for shortness of breath. Cardiovascular: Negative for chest pain. Gastrointestinal: Negative for abdominal pain. Genitourinary: Negative for difficulty urinating. Neurological: Negative for dizziness. Physical Exam   /79   Pulse 93   Temp 97.3 °F (36.3 °C) (Oral)   Resp 21   Ht 6' 2\" (1.88 m)   Wt 270 lb (122.5 kg)   SpO2 95%   BMI 34.67 kg/m²     Physical Exam  HENT:      Head: Normocephalic. Mouth/Throat:      Mouth: Mucous membranes are moist.   Eyes:      Pupils: Pupils are equal, round, and reactive to light. Cardiovascular:      Rate and Rhythm: Normal rate. Pulses: Normal pulses.    Pulmonary:      Effort: Pulmonary effort is normal.      Breath sounds: No wheezing. Abdominal:      General: Abdomen is flat. Bowel sounds are normal. There is no distension. Musculoskeletal:      Cervical back: Normal range of motion. Right lower leg: Edema present. Skin:     Capillary Refill: Capillary refill takes less than 2 seconds. Neurological:      General: No focal deficit present. Mental Status: He is alert and oriented to person, place, and time.    Psychiatric:         Mood and Affect: Mood normal.         Behavior: Behavior normal.         Labs      Recent Results (from the past 24 hour(s))   EKG 12 Lead    Collection Time: 01/21/22 10:02 AM   Result Value Ref Range    Ventricular Rate 92 BPM    Atrial Rate 92 BPM    P-R Interval 166 ms    QRS Duration 110 ms    Q-T Interval 396 ms    QTc Calculation (Bazett) 489 ms    P Axis 72 degrees    R Axis 76 degrees   CBC auto differential    Collection Time: 01/21/22 10:18 AM   Result Value Ref Range    WBC 11.7 (H) 4.5 - 11.5 E9/L    RBC 4.23 3.80 - 5.80 E12/L    Hemoglobin 12.4 (L) 12.5 - 16.5 g/dL    Hematocrit 39.0 37.0 - 54.0 %    MCV 92.2 80.0 - 99.9 fL    MCH 29.3 26.0 - 35.0 pg    MCHC 31.8 (L) 32.0 - 34.5 %    RDW 15.1 (H) 11.5 - 15.0 fL    Platelets 363 205 - 198 E9/L    MPV 9.5 7.0 - 12.0 fL    Neutrophils % 61.3 43.0 - 80.0 %    Immature Granulocytes % 0.8 0.0 - 5.0 %    Lymphocytes % 24.5 20.0 - 42.0 %    Monocytes % 10.6 2.0 - 12.0 %    Eosinophils % 2.4 0.0 - 6.0 %    Basophils % 0.4 0.0 - 2.0 %    Neutrophils Absolute 7.19 1.80 - 7.30 E9/L    Immature Granulocytes # 0.09 E9/L    Lymphocytes Absolute 2.87 1.50 - 4.00 E9/L    Monocytes Absolute 1.24 (H) 0.10 - 0.95 E9/L    Eosinophils Absolute 0.28 0.05 - 0.50 E9/L    Basophils Absolute 0.05 0.00 - 0.20 E9/L   Comprehensive Metabolic Panel    Collection Time: 01/21/22 10:18 AM   Result Value Ref Range    Sodium 143 132 - 146 mmol/L    Potassium 4.2 3.5 - 5.0 mmol/L    Chloride 107 98 - 107 mmol/L    CO2 22 22 - 29 mmol/L    Anion Gap 14 7 - 16 mmol/L    Glucose 94 74 - 99 mg/dL    BUN 35 (H) 6 - 23 mg/dL    CREATININE 2.6 (H) 0.7 - 1.2 mg/dL    GFR Non-African American 24 >=60 mL/min/1.73    GFR African American 29     Calcium 9.1 8.6 - 10.2 mg/dL    Total Protein 6.9 6.4 - 8.3 g/dL    Albumin 3.9 3.5 - 5.2 g/dL    Total Bilirubin 0.6 0.0 - 1.2 mg/dL    Alkaline Phosphatase 127 40 - 129 U/L    ALT 42 (H) 0 - 40 U/L    AST 32 0 - 39 U/L   Lactic Acid, Plasma    Collection Time: 01/21/22 10:18 AM   Result Value Ref Range    Lactic Acid 1.6 0.5 - 2.2 mmol/L   Brain Natriuretic Peptide    Collection Time: 01/21/22 10:18 AM   Result Value Ref Range    Pro-BNP 2,419 (H) 0 - 450 pg/mL   Troponin    Collection Time: 01/21/22 10:18 AM   Result Value Ref Range    Troponin, High Sensitivity 20 (H) 0 - 11 ng/L   D-Dimer, Quantitative    Collection Time: 01/21/22 10:18 AM   Result Value Ref Range    D-Dimer, Quant 912 ng/mL DDU   POCT blood gases    Collection Time: 01/21/22 11:07 AM   Result Value Ref Range    Sample Type Arterial     FIO2 21.0     POC pH 7.420 7.350 - 7.450    POC pCO2 36.9 35.0 - 45.0 mmHg    POC PO2 208.0 (H) 60.0 - 80.0 mmHg    POC HCO3 23.9 22.0 - 26.0 mmol/L    POC Base Excess -0.3 -3.0 - 3.0 mmol/L    POC O2 SAT 99.8 (H) 92.0 - 98.5 %    POC CPB No     POC  ID 41,623     POC Device ID [id number]     POC Delivery System RoomAir    Procalcitonin    Collection Time: 01/21/22 11:15 AM   Result Value Ref Range    Procalcitonin 0.14 (H) 0.00 - 0.08 ng/mL   COVID-19, Rapid    Collection Time: 01/21/22 11:44 AM    Specimen: Nasopharyngeal Swab   Result Value Ref Range    SARS-CoV-2, NAAT Not Detected Not Detected   Troponin    Collection Time: 01/21/22 11:44 AM   Result Value Ref Range    Troponin, High Sensitivity 21 (H) 0 - 11 ng/L   Urinalysis with Microscopic    Collection Time: 01/21/22 12:06 PM   Result Value Ref Range    Color, UA Yellow Straw/Yellow    Clarity, UA Clear Clear    Glucose, Ur Negative Negative mg/dL Bilirubin Urine Negative Negative    Ketones, Urine Negative Negative mg/dL    Specific Gravity, UA 1.020 1.005 - 1.030    Blood, Urine Negative Negative    pH, UA 5.5 5.0 - 9.0    Protein, UA Negative Negative mg/dL    Urobilinogen, Urine 0.2 <2.0 E.U./dL    Nitrite, Urine Negative Negative    Leukocyte Esterase, Urine Negative Negative    WBC, UA NONE 0 - 5 /HPF    RBC, UA NONE 0 - 2 /HPF    Bacteria, UA NONE SEEN None Seen /HPF   CBC    Collection Time: 01/22/22  6:04 AM   Result Value Ref Range    WBC 10.1 4.5 - 11.5 E9/L    RBC 3.80 3.80 - 5.80 E12/L    Hemoglobin 11.0 (L) 12.5 - 16.5 g/dL    Hematocrit 36.3 (L) 37.0 - 54.0 %    MCV 95.5 80.0 - 99.9 fL    MCH 28.9 26.0 - 35.0 pg    MCHC 30.3 (L) 32.0 - 34.5 %    RDW 15.2 (H) 11.5 - 15.0 fL    Platelets 221 768 - 938 E9/L    MPV 9.9 7.0 - 12.0 fL        Imaging/Diagnostics Last 24 Hours   XR CHEST PORTABLE    Result Date: 1/21/2022  EXAMINATION: ONE XRAY VIEW OF THE CHEST 1/21/2022 10:02 am COMPARISON: None. HISTORY: ORDERING SYSTEM PROVIDED HISTORY: dyspnea TECHNOLOGIST PROVIDED HISTORY: Reason for exam:->dyspnea FINDINGS: The heart is enlarged. There is a dual lead cardiac pacer on the left There is an infiltrate seen within the right lung base. The left lung is clear. There is a trace right pleural effusion. There is no left pleural effusion. 1. Right lower lobe pneumonia 2. Trace right pleural effusion 3. Cardiomegaly     US DUP LOWER EXTREMITIES BILATERAL VENOUS    Result Date: 1/21/2022  EXAMINATION: DUPLEX VENOUS ULTRASOUND OF THE BILATERAL LOWER EXTREMITIES1/21/2022 10:15 am TECHNIQUE: Duplex ultrasound using B-mode/gray scaled imaging, Doppler spectral analysis and color flow Doppler was obtained of the deep venous structures of the lower bilateral extremities. COMPARISON: None.  HISTORY: ORDERING SYSTEM PROVIDED HISTORY: discomfort TECHNOLOGIST PROVIDED HISTORY: Reason for exam:->discomfort What reading provider will be dictating this exam?->CRC FINDINGS: The visualized veins of the bilateral lower extremities are patent and free of echogenic thrombus. The veins demonstrate good compressibility with normal color flow study and spectral analysis. No evidence of DVT in either lower extremity. RECOMMENDATIONS: Unavailable       Assessment      Hospital Problems           Last Modified POA    PNA (pneumonia) 1/21/2022 Yes      CHF  Pleural effusion  Acute renal insfficnecy  DM  COPD  HTN  Hyperlipidemia        Plan   IV Abx. Cautiously diurese  Monitor labs and exam.  Continue rest of meds. Renal, Pulmonary, and Cardiology to see.     Consultations Ordered:  IP CONSULT TO PRIMARY CARE PROVIDER  IP CONSULT TO NEPHROLOGY  IP CONSULT TO PULMONOLOGY  IP CONSULT TO RESPIRATORY CARE    Electronically signed by Micha Gaxiola MD on 1/22/22 at 7:27 AM EST

## 2022-01-22 NOTE — CONSULTS
[place]  Department of Internal Medicine  Division of Pulmonary, Critical Care and Sleep Medicine  Consult Note    Renetta Kelsey DO, MPH, Sapna Mcmahon, Laura Castellanos MD, [address] DO, [facility]      Patient: Jennifer Garcia  MRN: [id number]  : 1945    Encounter Time: 2:43 PM     Date of Admission: 2022  9:41 AM    Primary Care Physician: Maria C Diehl MD    Reason for Consultation: Respiratory Failure     HISTORY OF PRESENT ILLNESS : Jennifer Garcia 65 y.o. male was seen in consultation regarding the above chief compliant. 66 yo  male previously known to cardiology at [facility] in [place], [place]. Primary and CV care at [place].      PMH: T2DM insulin requiring, HTN, HLD, CKD stage IV, COPD, CAD s/p PCI (specfics not known),  PPM, GERD,  SPEEDY compliant with C-pap, Polio, HFpEF (treated at [facility]), and ex smoker quit in .     2021 after starting Ozempic and developed BLE swelling progressively gotten worse, +MOREL. + early satiety. + Dizziness with sudden change in position is an ongoing problem. Denies CP, palpitations. Endorses decreased urine output from Lasix.     [facility] 2022 BP upon arrival 126/76 HR 90's and SR, afebrile, and O2 saturation 94% on RA. PAST MEDICAL HISTORY:  has a past medical history of Acid reflux, Agent orange exposure, Congestive heart failure (CHF) ([facility]), COPD (chronic obstructive pulmonary disease) ([facility] [address] Depression, Diabetes mellitus ([facility] [address] Hyperlipidemia, Hypertension, MI (myocardial infarction) ([facility] [address] Polio, and Sleep apnea. SURGICAL HISTORY:  has a past surgical history that includes Coronary angioplasty with stent; Cardiac pacemaker placement; and pacemaker placement. SOCIAL HISTORY:  reports that he has quit smoking. He has never used smokeless tobacco. He reports that he does not drink alcohol and does not use drugs. FAMILY  HISTORY: family history is not on file. MEDICATIONS:    Prior to Admission medications    Medication Sig Start Date End Date Taking? Authorizing Provider   ezetimibe (ZETIA) 10 MG tablet Take 10 mg by mouth daily   Yes Historical Provider, MD   folic acid (FOLVITE) 1 MG tablet Take 1 mg by mouth daily   Yes Historical Provider, MD   vitamin D (CHOLECALCIFEROL) 25 MCG (1000 UT) TABS tablet Take 2,000 Units by mouth daily   Yes Historical Provider, MD   vitamin E 400 UNIT capsule Take 400 Units by mouth daily   Yes Historical Provider, MD   cyanocobalamin 1000 MCG tablet Take 1,000 mcg by mouth daily   Yes Historical Provider, MD   zinc gluconate 50 MG tablet Take 50 mg by mouth daily   Yes Historical Provider, MD   ascorbic acid (VITAMIN C) 500 MG tablet Take 1,000 mg by mouth daily   Yes Historical Provider, MD   buPROPion HCl (WELLBUTRIN PO) Take by mouth   Yes Historical Provider, MD   Furosemide (LASIX PO) Take by mouth   Yes Historical Provider, MD   ATORVASTATIN CALCIUM PO Take by mouth   Yes Historical Provider, MD   pantoprazole (PROTONIX) 40 MG tablet Take 40 mg by mouth daily   Yes Historical Provider, MD   metoprolol succinate (TOPROL XL) 25 MG extended release tablet Take 25 mg by mouth daily   Yes Historical Provider, MD   insulin glargine (LANTUS) 100 UNIT/ML injection vial Inject 40 Units into the skin nightly    Yes Historical Provider, MD   Liraglutide (VICTOZA SC) Inject into the skin   Yes Historical Provider, MD   loperamide (IMODIUM) 2 MG capsule Take 2 mg by mouth 4 times daily as needed for Diarrhea    Historical Provider, MD       ALLERGIES: Penicillins       REVIEW OF SYSTEMS:  Otherwise negative if not reported or listed below  Constitutional:  No unanticipated weight loss. No change in sleep pattern or activity. No fevers, chills or rigors. Eyes:    No visual changes or diplopia. No scleral icterus. ENT:    No Headaches, hearing loss or vertigo. No mouth sores or sore throat.         Cardiovascular:  + chest discomfort, palpitations. Respiratory:  + cough, No wheezing      No sputum production. No hemoptysis, pleuritic pain. Gastrointestinal:  No abdominal pain, appetite loss, blood in stools. No hematemesis  Genitourinary:  No dysuria, trouble voiding or hematuria. No nocturia. Musculoskeletal:   No weakness or joint complaints. Integumentary: No rashes or pruritis. Neurological:  No headache, numbness or tingling. Psychiatric:   No effect on mood, memory, mentation, or behavior. No anxiety or depression. Endocrine:   No excessive thirst, fluid intake, or urination. No tremor. Hematologic: No abnormal bruising or bleeding. Lymphatic:  No swollen lymph nodes. Immunologic:  No hives or hx of anaphaxsis. OBJECTIVE:     PHYSICAL EXAM:   VITALS:   Vitals:    01/22/22 0714 01/22/22 0826 01/22/22 1000 01/22/22 1125   BP: 119/79      Pulse: 93      Resp: 20 20     Temp: 96.4 °F (35.8 °C)      TempSrc: Temporal      SpO2: 95% 95%  95%   Weight:   292 lb 7 oz (132.6 kg)    Height:   5' 9\" (1.753 m)         Intake/Output Summary (Last 24 hours) at 1/22/2022 1443  Last data filed at 1/22/2022 1309  Gross per 24 hour   Intake 380 ml   Output 150 ml   Net 230 ml        CONSTITUTIONAL:    A&O x 3, NAD  SKIN:     No rash, No suspicious lesions or skin discoloration  HEENT:     EOMI, MMM, No thrush  NECK:    No bruits, No JVP apprechiated  CV:      Sinus,  No murmur, No rubs, No gallops  PULMONARY:   Couse BS,  No Wheezing, No Rales, No Rhonchi      No noted egophony  ABDOMEN:     Soft, non-tender. BS normal. No R/R/G  EXT:    No deformities . No clubbing.       ++ lower extremity edema, No venous stasis  PULSE:   Appears equal and palpable.   PSYCHIATRIC:  Seems appropriate, No acute psycosis  MS:    No fractures, No gross weakness  NEUROLOGIC:   The clinical assessment is non-focal     DATA: IMAGING & TESTING:     LABORATORY TESTS:    CBC:   Lab Results   Component Value Date    WBC 10.1 01/22/2022    RBC 3.80 01/22/2022    HGB 11.0 01/22/2022    HCT 36.3 01/22/2022    MCV 95.5 01/22/2022    MCH 28.9 01/22/2022    MCHC 30.3 01/22/2022    RDW 15.2 01/22/2022     01/22/2022    MPV 9.9 01/22/2022     CMP:    Lab Results   Component Value Date     01/22/2022    K 4.0 01/22/2022     01/22/2022    CO2 22 01/22/2022    BUN 33 01/22/2022    CREATININE 2.6 01/22/2022    GFRAA 29 01/22/2022    LABGLOM 24 01/22/2022    GLUCOSE 210 01/22/2022    PROT 6.9 01/21/2022    LABALBU 3.9 01/21/2022    CALCIUM 8.7 01/22/2022    BILITOT 0.6 01/21/2022    ALKPHOS 127 01/21/2022    AST 32 01/21/2022    ALT 42 01/21/2022     TSH:    Lab Results   Component Value Date    TSH 2.310 01/22/2022        PRO-BNP:   Lab Results   Component Value Date    PROBNP 2,419 (H) 01/21/2022      ABGs: No results found for: PH, PO2, PCO2  Hemoglobin A1C: No components found for: HGBA1C    IMAGING:  Imaging tests were completed and reviewed and discussed radiology and care team involved and reveals   XR CHEST PORTABLE    Result Date: 1/21/2022  EXAMINATION: ONE XRAY VIEW OF THE CHEST 1/21/2022 10:02 am COMPARISON: None. HISTORY: ORDERING SYSTEM PROVIDED HISTORY: dyspnea TECHNOLOGIST PROVIDED HISTORY: Reason for exam:->dyspnea FINDINGS: The heart is enlarged. There is a dual lead cardiac pacer on the left There is an infiltrate seen within the right lung base. The left lung is clear. There is a trace right pleural effusion. There is no left pleural effusion. 1. Right lower lobe pneumonia 2. Trace right pleural effusion 3. Cardiomegaly     US DUP LOWER EXTREMITIES BILATERAL VENOUS    Result Date: 1/21/2022  No evidence of DVT in either lower extremity. RECOMMENDATIONS: Unavailable       Assessment:   1. Respiratory Failure  2. Polio as a child (two week)  3. 35 pack years quit in 1986  4. Agent orange exposure  5. HTN  6. HLD  7. T2DM insulin requiring with nephropathy  8.  CKD follows with  [facility]  9. COPD  8. GERD  11. 1/2003 CAD s/p PCI RCA (Express stent)  12. 6/2010 PPM BSCI  13. SPEEDY compliant with C-pap  14. 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 2015 PUD/GIB required 2 units PRBC (ASA was stopped @ that time)  16. October 2021 TTE @ [facility] (will attempt to get records)  1. Plan:   1. CPAP at night  2. Bronchodilators  3. Defer to cardiology   4. Doubt pnumonia - check cultures, + Atelectasis  5. HF   6.  COPD by report - get PFTs         Barbara Angulo DO DO, MPH, Pastora Chapman  Professor of Internal Medicine  Pulmonary, Critical Care and Sleep Medicine

## 2022-01-22 NOTE — ED NOTES
0030  Notified that Jasen Hernández will be transporting, ETA 45-60 minutes     Laisha Argueta RN  01/22/22 7629

## 2022-01-22 NOTE — PROGRESS NOTES
Dr. Clifford Cabrales MD,    Your patient is on a medication that requires a renal dose adjustment. Renal Function Assessment:    Date Body Weight IBW  Adjusted BW SCr  CrCl Dialysis status   1/22/2022 292 lb 7 oz (132.6 kg)  Ideal body weight: 70.7 kg (155 lb 13.8 oz)  Adjusted ideal body weight: 95.5 kg (210 lb 7.9 oz) Serum creatinine: 2.6 mg/dL (H) 01/22/22 0604  Estimated creatinine clearance: 32 mL/min (A) N/a       Pharmacy has renally dose-adjusted the following medication(s):    Date Original Order Renally Adjusted Order   1/22/2022 Levaquin 500mg q24H Levaquin 500mg q48H       These changes were made per protocol according to the [facility] Renal Function-Based Dose Adjustments Policy    *Please note this dose may need readjusted if your patient's renal function significantly improves. Please contact pharmacy with any questions regarding these changes.     LAUREN Perkins [facility]  1/22/2022  11:10 AM

## 2022-01-22 NOTE — CONSULTS
[facility] MD Henrietta Jackson, MD Tania Russell, MD Esperanza Mac, MD Shelly Orozco, BONNIE Olivia, RUTH  Consultation  1/22/2022    Thank you for consult  Mr. Bettina Whaley is followed by his nephrologist at the [facility], and also locally by Dr. Tracey Hendricks. Will defer this consult to [facility].     Henrietta Cerrato MD, MD

## 2022-01-23 NOTE — PROGRESS NOTES
Date: 1/23/2022    Time: 12:17 AM    Patient Placed On BIPAP/CPAP/ Non-Invasive Ventilation? Yes    If no must comment. Facial area red/color change? No           If YES are Blister/Lesion present? No   If yes must notify nursing staff  BIPAP/CPAP skin barrier?   Yes    Skin barrier type:mepilexlite       Comments:        Celso Shelton RCP

## 2022-01-23 NOTE — CONSULTS
Nephrology Consult  [facility]  Hector Simmons MD    CC:   ckd    HPI:   The pt is a 66 yo male who was seen in our office in June 2021 for ckd. Cr was 2.8 without previous values. He has a pmh of htn, pacer, lymphoma hyperlipidemia, dm, copd, cad sp pci rca, gerd, mame on cpap, HFpEF. He is admitted now for le edema and sob. He said it started after starting ozempic in dec. He received iv lasix in the er. cxr is showing rll pneumonia. He has been started on iv lasix. He has live in [facility] and [facility] and we dont have records here. He said he didn't know he had ckd until the [facility] us in June 2021. PMH:    Past Medical History:   Diagnosis Date    Acid reflux     Agent orange exposure     Congestive heart failure (CHF) (HCC)     COPD (chronic obstructive pulmonary disease) (HCC)     Depression     Diabetes mellitus (HCC)     Hyperlipidemia     Hypertension     [facility]) [facility] [address]     Polio     Sleep apnea        Patient Active Problem List   Diagnosis    PNA (pneumonia)       Meds:     insulin glargine-yfgn  50 Units SubCUTAneous Nightly    metoprolol succinate  25 mg Oral Daily    pantoprazole  40 mg Oral Daily    atorvastatin  40 mg Oral Nightly    furosemide  20 mg IntraVENous Daily    insulin lispro  0-6 Units SubCUTAneous TID WC    insulin lispro  0-3 Units SubCUTAneous Nightly    ipratropium  0.5 mg Nebulization 4x daily    aspirin  81 mg Oral Daily    [START ON 1/24/2022] levofloxacin  250 mg IntraVENous Q48H        dextrose         Meds prn:     guaiFENesin, loperamide, glucose, dextrose, glucagon (rDNA), dextrose, perflutren lipid microspheres    Meds prior to admission:     No current facility-administered medications on file prior to encounter.      Current Outpatient Medications on File Prior to Encounter   Medication Sig Dispense Refill    ezetimibe (ZETIA) 10 MG tablet Take 10 mg by mouth daily      folic acid (FOLVITE) 1 MG tablet Take 1 mg by mouth daily      vitamin D (CHOLECALCIFEROL) 25 MCG (1000 UT) TABS tablet Take 2,000 Units by mouth daily      vitamin E 400 UNIT capsule Take 400 Units by mouth daily      cyanocobalamin 1000 MCG tablet Take 1,000 mcg by mouth daily      zinc gluconate 50 MG tablet Take 50 mg by mouth daily      ascorbic acid (VITAMIN C) 500 MG tablet Take 1,000 mg by mouth daily      buPROPion HCl (WELLBUTRIN PO) Take by mouth      Furosemide (LASIX PO) Take by mouth      ATORVASTATIN CALCIUM PO Take by mouth      pantoprazole (PROTONIX) 40 MG tablet Take 40 mg by mouth daily      metoprolol succinate (TOPROL XL) 25 MG extended release tablet Take 25 mg by mouth daily      insulin glargine (LANTUS) 100 UNIT/ML injection vial Inject 40 Units into the skin nightly       Liraglutide (VICTOZA SC) Inject into the skin      loperamide (IMODIUM) 2 MG capsule Take 2 mg by mouth 4 times daily as needed for Diarrhea         Allergies:    Penicillins    Social History:     reports that he has quit smoking. He has never used smokeless tobacco. He reports that he does not drink alcohol and does not use drugs. Family History:     History reviewed. No pertinent family history.     ROS:     General: no fever, chills   Heent: no nasal congestion, sore throat   Resp: no cough, sob , hemoptysis  Cardiac: co sob, edema  Gi: no nausea, vomiting, melena, abd pain, hematemesis  Gu: no hematuria, dysuria   Neruo: no numbness, weakness, headache, blurry vision   Endocrine:  no h/o dm  Derm: no rash , petechia  Heme: no epistaxis, bruising  All other sx negative     Physical Exam:      Patient Vitals for the past 24 hrs:   BP Temp Temp src Pulse Resp SpO2 Weight   01/23/22 0750     21 94 %    01/23/22 0745 125/83   97 20 100 %    01/23/22 0526       295 lb 1.4 oz (133.8 kg)   01/23/22 0016     21     01/23/22 0000 122/65 97.7 °F (36.5 °C) Temporal 98 19 94 %    01/22/22 2015 120/77 97.7 °F (36.5 °C) Temporal 98 18 98 %    01/22/22 1521      95 %    01/22/22 1506 126/87 97.9 °F (36.6 °C) Axillary 94 20 97 %          Intake/Output Summary (Last 24 hours) at 1/23/2022 1125  Last data filed at 1/23/2022 0904  Gross per 24 hour   Intake 620 ml   Output 550 ml   Net 70 ml       Constitutional: Patient in no acute distress   Head: normocephalic, atraumatic   Neck: supple, no jvd  Cardiovascular: regular rate and rhythm, no murmurs, gallops, or rubs   Respiratory: Clear, no rales, rhochi, or wheezes,   Gastrointestinal: soft, nontender, nondistended, no hepatosplenomegaly  Ext: edema  Neuro: aaox3  Skin: dry, no rash   Back: nontender    Data:    Recent Labs     01/21/22  1018 01/22/22  0604 01/23/22  0612   WBC 11.7* 10.1 10.9   HGB 12.4* 11.0* 11.6*   HCT 39.0 36.3* 38.9   MCV 92.2 95.5 96.8    271 252       Recent Labs     01/21/22  1018 01/22/22  0604 01/23/22  0612    141 143   K 4.2 4.0 3.8    103 107   CO2 22 22 20*   CREATININE 2.6* 2.6* 2.6*   BUN 35* 33* 35*   LABGLOM 24 24 24   GLUCOSE 94 210* 121*   CALCIUM 9.1 8.7 8.7       No results found for: VITD25    No results found for: PTH    Recent Labs     01/21/22  1018   ALT 42*   AST 32   ALKPHOS 127   BILITOT 0.6       Recent Labs     01/21/22  1018   LABALBU 3.9       No results found for: FERRITIN, IRON, TIBC    No results found for: ESNIGOXB51    No results found for: FOLATE      Lab Results   Component Value Date    COLORU Yellow 01/21/2022    NITRU Negative 01/21/2022    GLUCOSEU Negative 01/21/2022    KETUA Negative 01/21/2022    UROBILINOGEN 0.2 01/21/2022    BILIRUBINUR Negative 01/21/2022       Lab Results   Component Value Date/Time    OSMOU 351 01/22/2022 11:24 AM       No components found for: URIC    No results found for: LIPIDPAN      Assessment and Plan:    Full note to follow      1. ckd 4  Cr at 2.6  Last cr was 2.8 in June 2021  Not much records here, is from [address] [place] [address] p  Start hco3 if hco3 < 20    2.  Volume overload  Increase lasix to 40 mg iv q 12  For echo as well  H/o stent to rca 2013    3. htn  On bb    4. Copd    5. Dm  Screen for proteinuria  No ace arb with ckd 4    6. Anemia  Dose beni if hgb <10          Hector Doan.  Camelia Pitts MD

## 2022-01-23 NOTE — PROGRESS NOTES
Progress Note  Date:2022       [id number]  Patient Adali Parks     YOB: 1945     Age:77 y.o. Patient says he is still short of breath and his leg swelling is unchanged. Subjective    Subjective:  Symptoms:  Stable. He reports shortness of breath. No chest pain. Diet:  Adequate intake. Activity level: Impaired due to weakness. Pain:  He reports no pain. Review of Systems   Constitutional: Negative for activity change and fever. HENT: Negative for congestion. Respiratory: Positive for shortness of breath. Cardiovascular: Positive for leg swelling. Negative for chest pain. Gastrointestinal: Negative for abdominal pain. Genitourinary: Negative for difficulty urinating. Neurological: Negative for dizziness. Objective         Vitals Last 24 Hours:  TEMPERATURE:  Temp  Av.8 °F (36.6 °C)  Min: 97.7 °F (36.5 °C)  Max: 97.9 °F (36.6 °C)  RESPIRATIONS RANGE: Resp  Av.9  Min: 18  Max: 21  PULSE OXIMETRY RANGE: SpO2  Av %  Min: 94 %  Max: 100 %  PULSE RANGE: Pulse  Av.8  Min: 94  Max: 98  BLOOD PRESSURE RANGE: Systolic (07PDW), POB:537 , Min:120 , AGC:879   ; Diastolic (54JEN), GBR:48, Min:65, Max:87    I/O (24Hr): Intake/Output Summary (Last 24 hours) at 2022 0752  Last data filed at 2022 0544  Gross per 24 hour   Intake 380 ml   Output 700 ml   Net -320 ml     Objective:  General Appearance:  Comfortable. Vital signs: (most recent): Blood pressure (P) 125/83, pulse (P) 97, temperature 97.7 °F (36.5 °C), temperature source Temporal, resp. rate 21, height 5' 9\" (1.753 m), weight 295 lb 1.4 oz (133.8 kg), SpO2 94 %. No fever. Lungs:  Normal effort and normal respiratory rate. Breath sounds clear to auscultation. Heart: Normal rate. Regular rhythm. S1 normal and S2 normal.    Extremities: There is local swelling.       Labs/Imaging/Diagnostics    Labs:  CBC:  Recent Labs     22  1018 22  0604 01/23/22  0612   WBC 11.7* 10.1 10.9   RBC 4.23 3.80 4.02   HGB 12.4* 11.0* 11.6*   HCT 39.0 36.3* 38.9   MCV 92.2 95.5 96.8   RDW 15.1* 15.2* 15.0    271 252     CHEMISTRIES:  Recent Labs     01/21/22  1018 01/22/22  0604 01/23/22  0612    141 143   K 4.2 4.0 3.8    103 107   CO2 22 22 20*   BUN 35* 33* 35*   CREATININE 2.6* 2.6* 2.6*   GLUCOSE 94 210* 121*     PT/INR:No results for input(s): PROTIME, INR in the last 72 hours. APTT:No results for input(s): APTT in the last 72 hours. LIVER PROFILE:  Recent Labs     01/21/22  1018   AST 32   ALT 42*   BILITOT 0.6   ALKPHOS 127       Imaging Last 24 Hours:  XR CHEST PORTABLE    Result Date: 1/21/2022  EXAMINATION: ONE XRAY VIEW OF THE CHEST 1/21/2022 10:02 am COMPARISON: None. HISTORY: ORDERING SYSTEM PROVIDED HISTORY: dyspnea TECHNOLOGIST PROVIDED HISTORY: Reason for exam:->dyspnea FINDINGS: The heart is enlarged. There is a dual lead cardiac pacer on the left There is an infiltrate seen within the right lung base. The left lung is clear. There is a trace right pleural effusion. There is no left pleural effusion. 1. Right lower lobe pneumonia 2. Trace right pleural effusion 3. Cardiomegaly     US DUP LOWER EXTREMITIES BILATERAL VENOUS    Result Date: 1/21/2022  EXAMINATION: DUPLEX VENOUS ULTRASOUND OF THE BILATERAL LOWER EXTREMITIES1/21/2022 10:15 am TECHNIQUE: Duplex ultrasound using B-mode/gray scaled imaging, Doppler spectral analysis and color flow Doppler was obtained of the deep venous structures of the lower bilateral extremities. COMPARISON: None. HISTORY: ORDERING SYSTEM PROVIDED HISTORY: discomfort TECHNOLOGIST PROVIDED HISTORY: Reason for exam:->discomfort What reading provider will be dictating this exam?->CRC FINDINGS: The visualized veins of the bilateral lower extremities are patent and free of echogenic thrombus. The veins demonstrate good compressibility with normal color flow study and spectral analysis.      No evidence of DVT in either lower extremity. RECOMMENDATIONS: Unavailable     Assessment//Plan           Hospital Problems           Last Modified POA    PNA (pneumonia) 1/21/2022 Yes        Assessment:  ( PNA (pneumonia) 1/21/2022 Yes     CHF  Pleural effusion  Acute renal insfficnecy  DM  COPD  HTN  Hyperlipidemia     ). Plan:   (Renal for diureses  Pulmonary following, feels less likely pneumonia  Follows cultures  Cardiology to do ECHO  Monitor labs and output. Continue rest of meds. ).        Electronically signed by Kameron Mayo MD on 1/23/22 at 7:52 AM EST

## 2022-01-23 NOTE — PROGRESS NOTES
PROGRESS NOTE     CARDIOLOGY    Chief complaint: Seen today for follow up, management & recommendations for coronary artery disease. He denies chest pain. He feels that his shortness of breath is slowly increasing today. He also feels that his lower extremity edema has now increased up to above his knees today. Wt Readings from Last 3 Encounters:   01/23/22 295 lb 1.4 oz (133.8 kg)   10/19/20 270 lb (122.5 kg)   09/06/19 285 lb (129.3 kg)     Temp Readings from Last 3 Encounters:   01/23/22 97.7 °F (36.5 °C) (Temporal)   10/19/20 98.2 °F (36.8 °C) (Oral)   09/06/19 97.7 °F (36.5 °C) (Oral)     BP Readings from Last 3 Encounters:   01/23/22 125/83   10/19/20 (!) 116/56   09/06/19 136/88     Pulse Readings from Last 3 Encounters:   01/23/22 97   10/19/20 82   09/06/19 88         Intake/Output Summary (Last 24 hours) at 1/23/2022 1347  Last data filed at 1/23/2022 1344  Gross per 24 hour   Intake 720 ml   Output 550 ml   Net 170 ml       Recent Labs     01/21/22  1018 01/22/22  0604 01/23/22  0612   WBC 11.7* 10.1 10.9   HGB 12.4* 11.0* 11.6*   HCT 39.0 36.3* 38.9   MCV 92.2 95.5 96.8    271 252     Recent Labs     01/21/22  1018 01/22/22  0604 01/23/22  0612    141 143   K 4.2 4.0 3.8    103 107   CO2 22 22 20*   BUN 35* 33* 35*   CREATININE 2.6* 2.6* 2.6*     No results for input(s): PROTIME, INR in the last 72 hours. No results for input(s): CKTOTAL, CKMB, CKMBINDEX, TROPONINI in the last 72 hours. No results for input(s): BNP in the last 72 hours.   Recent Labs     01/22/22  0604   CHOL 76   HDL 24   TRIG 140     Recent Labs     01/21/22  1018 01/21/22  1144   TROPHS 20* 21*         guaiFENesin tablet 400 mg, 4x Daily PRN  furosemide (LASIX) injection 40 mg, BID  insulin glargine-yfgn (SEMGLEE-YFGN) injection vial 50 Units, Nightly  loperamide (IMODIUM) capsule 2 mg, 4x Daily PRN  metoprolol succinate (TOPROL XL) extended release tablet 25 mg, Daily  pantoprazole (PROTONIX) tablet 40 mg, Daily  atorvastatin (LIPITOR) tablet 40 mg, Nightly  insulin lispro (HUMALOG) injection vial 0-6 Units, TID WC  insulin lispro (HUMALOG) injection vial 0-3 Units, Nightly  glucose (GLUTOSE) 40 % oral gel 15 g, PRN  dextrose 50 % IV solution, PRN  glucagon (rDNA) injection 1 mg, PRN  dextrose 5 % solution, PRN  ipratropium (ATROVENT) 0.02 % nebulizer solution 0.5 mg, 4x daily  aspirin EC tablet 81 mg, Daily  perflutren lipid microspheres (DEFINITY) injection 1.65 mg, ONCE PRN  [START ON 1/24/2022] levoFLOXacin (LEVAQUIN) 250 MG/50ML infusion 250 mg, Q48H        Review of systems:     Heart: as above   Lungs: as above   Eyes: denies changes in vision or discharge. Ears: denies changes in hearing or pain. Nose: denies epistaxis or masses   Throat: denies sore throat or trouble swallowing. Neuro: denies numbness, tingling, tremors. Skin: denies rashes or itching. : denies hematuria, dysuria   GI: denies vomiting, diarrhea   Psych: denies mood changed, anxiety, depression. Physical exam:    Constitutional: A&O x3, communicates well, no acute distress. Eyes: extraocular muscles intact, PERRL. Normal lids & conjunctiva. No icterus. ENT: clear, no bleeding. No external masses. Lips normal formation. Neck: supple, full ROM, + JVD, no bruits, no lymphadenopathy. No masses. trachea midline. Heart: regular rate & rhythm, normal S1 & S2, .  Lungs: Poor air movement. No accessory muscles. Possible right basilar rales  Abd: soft, non-tender. Normal bowel sounds. Neuro: Full ROM X 4, EOMI, no tremors. EXT: Severe bilateral lower extremity edema  Skin: warm, dry, intact. Good turgor. Psych: A&O x 3, normal behavior, not anxious. Assessment/Recommendations  1. Acute renal insufficiency  2. Right lower lobe pneumonia  3. Severe volume overload. Probably due to the above but I will evaluate for cardiac etiologies. Echo is pending. His volume status is worsening.   I will reach out to

## 2022-01-23 NOTE — PROGRESS NOTES
[facility]  Department of Internal Medicine  Division of Pulmonary, Critical Care and Sleep Medicine  Progress Note    Karlie Levi DO, MPH, Sampson Kong, Jarad Johnston MD, [facility]  [facility]      Patient: Marian Koch  MRN: [id number]  : 1945    Encounter Time: 3:31 PM     Date of Admission: 2022  9:41 AM    Primary Care Physician: Brayden Bridges MD    Reason for Consultation: Respiratory Failure     SUBJECTIVE:    More edema  Significant orthopnea noted      OBJECTIVE:     PHYSICAL EXAM:   VITALS:   Vitals:    22 0745 22 0750 22 1222 22 1517   BP: 125/83      Pulse: 97      Resp:    Temp:       TempSrc:       SpO2: 100% 94% 94% 95%   Weight:       Height:            Intake/Output Summary (Last 24 hours) at 2022 1531  Last data filed at 2022 1421  Gross per 24 hour   Intake 720 ml   Output 1000 ml   Net -280 ml        CONSTITUTIONAL:    Alert 3, NAD  SKIN:     No rash, Skin discoloration  HEENT:     EOMI, MMM, No thrush  NECK:    No bruits, No JVP apprechiated  CV:      Sinus,  No murmur, No rubs, No gallops  PULMONARY:   Couse BS,  No Wheezing, No Rales, No Rhonchi      No noted egophony  ABDOMEN:     Soft, non-tender. BS normal. No R/R/G  EXT:    No deformities . No clubbing.       ++ lower extremity edema, + venous stasis  PULSE:   Diminished palpable.   PSYCHIATRIC:  Seems appropriate, No acute psycosis  MS:    No fractures, No gross weakness  NEUROLOGIC:   Non-focal     DATA: IMAGING & TESTING:     LABORATORY TESTS:    CBC:   Lab Results   Component Value Date    WBC 10.9 2022    RBC 4.02 2022    HGB 11.6 2022    HCT 38.9 2022    MCV 96.8 2022    MCH 28.9 2022    MCHC 29.8 2022    RDW 15.0 2022     2022    MPV 9.8 2022     CMP:    Lab Results   Component Value Date     2022    K 3.8 2022     01/23/2022    CO2 20 01/23/2022    BUN 35 01/23/2022    CREATININE 2.6 01/23/2022    GFRAA 29 01/23/2022    LABGLOM 24 01/23/2022    GLUCOSE 121 01/23/2022    PROT 6.9 01/21/2022    LABALBU 3.9 01/21/2022    CALCIUM 8.7 01/23/2022    BILITOT 0.6 01/21/2022    ALKPHOS 127 01/21/2022    AST 32 01/21/2022    ALT 42 01/21/2022     TSH:    Lab Results   Component Value Date    TSH 2.310 01/22/2022        PRO-BNP:   Lab Results   Component Value Date    PROBNP 2,357 (H) 01/23/2022    PROBNP 2,419 (H) 01/21/2022      ABGs: No results found for: PH, PO2, PCO2  Hemoglobin A1C: No components found for: HGBA1C    IMAGING:  Imaging tests were completed and reviewed and discussed radiology and care team involved and reveals   XR CHEST PORTABLE    Result Date: 1/21/2022  EXAMINATION: ONE XRAY VIEW OF THE CHEST 1/21/2022 10:02 am COMPARISON: None. HISTORY: ORDERING SYSTEM PROVIDED HISTORY: dyspnea TECHNOLOGIST PROVIDED HISTORY: Reason for exam:->dyspnea FINDINGS: The heart is enlarged. There is a dual lead cardiac pacer on the left There is an infiltrate seen within the right lung base. The left lung is clear. There is a trace right pleural effusion. There is no left pleural effusion. 1. Right lower lobe pneumonia 2. Trace right pleural effusion 3. Cardiomegaly     US DUP LOWER EXTREMITIES BILATERAL VENOUS    Result Date: 1/21/2022  No evidence of DVT in either lower extremity. RECOMMENDATIONS: Unavailable       Assessment:   1. Respiratory Failure  2. Polio as a child (two week)  3. 35 pack years quit in 1986  4. Agent orange exposure  5. Pacer  6. DM  7. HTN  8. HLD  9. T2DM insulin requiring with nephropathy  10. CKD follows with Dr Pipo Wynn  6. COPD  15. GERD  13. 1/2003 CAD s/p PCI RCA (Express stent)  14. 6/2010 PPM BSCI  15. SPEEDY compliant with C-pap  16. 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 2015 PUD/GIB required 2 units PRBC (ASA was stopped @ that time)  18.  October 2021 TTE @ [facility] (will attempt to get records)      Plan:   1. CPAP at night, may use home machine  2. Bronchodilators for COPD   3. Needs more fluid removal  4. Doubt pnumonia - check cultures, + Atelectasis, Abx remain. 5. HF   6.  COPD by report - get PFTs         Frederick Joiner DO DO, MPH, Esther Ny  Professor of Internal Medicine  Pulmonary, Critical Care and Sleep Medicine

## 2022-01-24 NOTE — PLAN OF CARE
Patient's chart updated to reflect:      . - HF care plan, HF education points and HF discharge instructions.  -Orders: 2 gram sodium diet, daily weights, I/O.  -PCP and cardiology follow up appointments to be scheduled within 7 days of hospital discharge. -CHF education session will be provided to the patient prior to hospital discharge.     Krystyna Wick RN RN, BSN  Heart Failure Navigator

## 2022-01-24 NOTE — CARE COORDINATION
Met with pt to discuss discharge planning/transition of care. Pt lives alone in a one story home with ramp or 3 steps to enter. Pt has walker, cane, and cpap at home thru the [facility]. Pt is an active , but hasn't driven in a month due to him not feeling well. Dr. Ivett John and Dr. Raghu Christensen at the [facility] are his physicians and [facility] on [address] is his pharmacy. Pt's plan is to return home, sister to transport home. Discussed [facility], pt would like nursing to come into home to check on him, possible therapy is needed.  Referral to [address] [place] [address]

## 2022-01-24 NOTE — PROGRESS NOTES
Progress Note  Date:2022       NUFS:[id number]  Patient Modesta Blanco     YOB: 1945     Age:77 y.o. Patient says he is still short of breath and his leg swelling is unchanged. Subjective    Subjective:  Symptoms:  Stable. He reports shortness of breath. No chest pain. Diet:  Adequate intake. Activity level: Impaired due to weakness. Pain:  He reports no pain. Review of Systems   Constitutional: Negative for activity change and fever. HENT: Negative for congestion. Respiratory: Positive for shortness of breath. Cardiovascular: Positive for leg swelling. Negative for chest pain. Gastrointestinal: Negative for abdominal pain. Genitourinary: Negative for difficulty urinating. Neurological: Negative for dizziness. Objective         Vitals Last 24 Hours:  TEMPERATURE:  Temp  Av.4 °F (36.3 °C)  Min: 97.3 °F (36.3 °C)  Max: 97.4 °F (36.3 °C)  RESPIRATIONS RANGE: Resp  Av  Min: 18  Max: 21  PULSE OXIMETRY RANGE: SpO2  Av.4 %  Min: 93 %  Max: 100 %  PULSE RANGE: Pulse  Av.5  Min: 92  Max: 97  BLOOD PRESSURE RANGE: Systolic (22MPB), JAW:811 , Min:113 , WXU:150   ; Diastolic (43QCX), ZUH:04, Min:60, Max:83    I/O (24Hr): Intake/Output Summary (Last 24 hours) at 2022 0650  Last data filed at 2022 2330  Gross per 24 hour   Intake 840 ml   Output 1550 ml   Net -710 ml     Objective:  General Appearance:  Comfortable. Vital signs: (most recent): Blood pressure 134/74, pulse 96, temperature 97.4 °F (36.3 °C), temperature source Temporal, resp. rate 19, height 5' 9\" (1.753 m), weight 295 lb 4 oz (133.9 kg), SpO2 93 %. No fever. Lungs:  Normal effort and normal respiratory rate. Breath sounds clear to auscultation. Heart: Normal rate. Regular rhythm. S1 normal and S2 normal.    Extremities: There is local swelling.       Labs/Imaging/Diagnostics    Labs:  CBC:  Recent Labs     22  1018 22  0604 22  2597 WBC 11.7* 10.1 10.9   RBC 4.23 3.80 4.02   HGB 12.4* 11.0* 11.6*   HCT 39.0 36.3* 38.9   MCV 92.2 95.5 96.8   RDW 15.1* 15.2* 15.0    271 252     CHEMISTRIES:  Recent Labs     01/21/22  1018 01/22/22  0604 01/23/22  0612    141 143   K 4.2 4.0 3.8    103 107   CO2 22 22 20*   BUN 35* 33* 35*   CREATININE 2.6* 2.6* 2.6*   GLUCOSE 94 210* 121*     PT/INR:No results for input(s): PROTIME, INR in the last 72 hours. APTT:No results for input(s): APTT in the last 72 hours. LIVER PROFILE:  Recent Labs     01/21/22  1018   AST 32   ALT 42*   BILITOT 0.6   ALKPHOS 127       Imaging Last 24 Hours:  XR CHEST PORTABLE    Result Date: 1/21/2022  EXAMINATION: ONE XRAY VIEW OF THE CHEST 1/21/2022 10:02 am COMPARISON: None. HISTORY: ORDERING SYSTEM PROVIDED HISTORY: dyspnea TECHNOLOGIST PROVIDED HISTORY: Reason for exam:->dyspnea FINDINGS: The heart is enlarged. There is a dual lead cardiac pacer on the left There is an infiltrate seen within the right lung base. The left lung is clear. There is a trace right pleural effusion. There is no left pleural effusion. 1. Right lower lobe pneumonia 2. Trace right pleural effusion 3. Cardiomegaly     US DUP LOWER EXTREMITIES BILATERAL VENOUS    Result Date: 1/21/2022  EXAMINATION: DUPLEX VENOUS ULTRASOUND OF THE BILATERAL LOWER EXTREMITIES1/21/2022 10:15 am TECHNIQUE: Duplex ultrasound using B-mode/gray scaled imaging, Doppler spectral analysis and color flow Doppler was obtained of the deep venous structures of the lower bilateral extremities. COMPARISON: None. HISTORY: ORDERING SYSTEM PROVIDED HISTORY: discomfort TECHNOLOGIST PROVIDED HISTORY: Reason for exam:->discomfort What reading provider will be dictating this exam?->CRC FINDINGS: The visualized veins of the bilateral lower extremities are patent and free of echogenic thrombus. The veins demonstrate good compressibility with normal color flow study and spectral analysis.      No evidence of DVT in either lower extremity. RECOMMENDATIONS: Unavailable     Assessment//Plan           Hospital Problems           Last Modified POA    PNA (pneumonia) 1/21/2022 Yes        Assessment:  ( PNA (pneumonia) 1/21/2022 Yes     CHF  Pleural effusion  Acute renal insfficnecy  DM  COPD  HTN  Hyperlipidemia     ). Plan:   (Renal for diureses  Pulmonary following, feels less likely pneumonia  Follows cultures  Cardiology to do ECHO  Monitor labs and output. Continue rest of meds. ).

## 2022-01-24 NOTE — PROGRESS NOTES
Date: 1/23/2022    Time: 11:54 PM    Patient Placed On BIPAP/CPAP/ Non-Invasive Ventilation? Yes    If no must comment. Facial area red/color change? No           If YES are Blister/Lesion present? No   If yes must notify nursing staff  BIPAP/CPAP skin barrier?   Yes    Skin barrier type:mepilexlite       Comments:        Isidro Leonard RCP

## 2022-01-24 NOTE — PROGRESS NOTES
01/24/2022     CMP:    Lab Results   Component Value Date     01/24/2022    K 3.8 01/24/2022     01/24/2022    CO2 21 01/24/2022    BUN 35 01/24/2022    CREATININE 2.5 01/24/2022    GFRAA 30 01/24/2022    LABGLOM 25 01/24/2022    GLUCOSE 85 01/24/2022    PROT 6.9 01/21/2022    LABALBU 3.9 01/21/2022    CALCIUM 9.0 01/24/2022    BILITOT 0.6 01/21/2022    ALKPHOS 127 01/21/2022    AST 32 01/21/2022    ALT 42 01/21/2022     TSH:    Lab Results   Component Value Date    TSH 2.310 01/22/2022        PRO-BNP:   Lab Results   Component Value Date    PROBNP 2,357 (H) 01/23/2022    PROBNP 2,419 (H) 01/21/2022      ABGs: No results found for: PH, PO2, PCO2  Hemoglobin A1C: No components found for: HGBA1C    IMAGING:  Imaging tests were completed and reviewed and discussed radiology and care team involved and reveals   XR CHEST PORTABLE    Result Date: 1/21/2022  EXAMINATION: ONE XRAY VIEW OF THE CHEST 1/21/2022 10:02 am COMPARISON: None. HISTORY: ORDERING SYSTEM PROVIDED HISTORY: dyspnea TECHNOLOGIST PROVIDED HISTORY: Reason for exam:->dyspnea FINDINGS: The heart is enlarged. There is a dual lead cardiac pacer on the left There is an infiltrate seen within the right lung base. The left lung is clear. There is a trace right pleural effusion. There is no left pleural effusion. 1. Right lower lobe pneumonia 2. Trace right pleural effusion 3. Cardiomegaly     US DUP LOWER EXTREMITIES BILATERAL VENOUS    Result Date: 1/21/2022  No evidence of DVT in either lower extremity. RECOMMENDATIONS: Unavailable       Assessment:   1. Respiratory Failure  2. Polio as a child (two week)  3. 35 pack years quit in 1986  4. Agent orange exposure  5. Pacer  6. DM  7. HTN  8. HLD  9. T2DM insulin requiring with nephropathy  10. CKD follows with Dr Natalie Riedel  6. COPD  15. GERD  13. 1/2003 CAD s/p PCI RCA (Express stent)  14. 6/2010 PPM BSCI  15.  SPEEDY compliant with C-pap  16. 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 2015 PUD/GIB required 2 units PRBC (ASA was stopped @ that time)  18. October 2021 [facility]TE @ [facility] (will attempt to get records)      Plan:   1. CPAP at night, may use home machine  2. Bronchodilators for COPD   3. Needs more fluid removal  4. Doubt pnumonia - check cultures, + Atelectasis, Abx remain. 5. HF   6. COPD by report - get PFTs   7.  Awaiting testing and outside information        Nicole Diana DO DO, MPH, Isi Cantor  Professor of Internal Medicine  Pulmonary, Critical Care and Sleep Medicine

## 2022-01-24 NOTE — PROGRESS NOTES
Inpatient Cardiology Progress note     PATIENT IS BEING FOLLOWED FOR: CAD    Peyton Abbott is a 65year old  male who is previously known to cardiology at [facility] in [place], [place]. Primary and CV care at [facility]. He was seen in initial consultation with Dr. Vasiliy Real on 01/22/2022. SUBJECTIVE: Complains of MOREL and a productive cough with brown/milky colored sputum. States \"it hurts my chest when I cough\". Complains of improving orthopnea, PND, and edema to BLE. Denies chest pain. OBJECTIVE: No apparent distress     ROS:  Consist: Denies fevers, chills or night sweats  Heart: Denies chest pain, palpitations, lightheadedness, dizziness or syncope  Lungs: Denies wheezing  GI: Denies abdominal pain, vomiting or diarrhea    PHYSICAL EXAM:   /71   Pulse 97   Temp 97.9 °F (36.6 °C) (Temporal)   Resp 20   Ht 5' 9\" (1.753 m)   Wt 295 lb 4 oz (133.9 kg)   SpO2 94%   BMI 43.60 kg/m²    CONST: Well developed, morbidly obese, elderly  male who appears younger than his stated age. Awake, alert and cooperative. No apparent distress  HEENT:   Head- Normocephalic, atraumatic   Eyes- Conjunctivae pink, anicteric  Throat- Oral mucosa pink and moist  Neck-  No stridor, trachea midline, + jugular venous distention sitting up in a chair. No carotid bruit  CHEST: Chest symmetrical and non-tender to palpation. No accessory muscle use or intercostal retractions  RESPIRATORY:  Lung sounds -diminished throughout fields   CARDIOVASCULAR:     Heart Inspection- shows no noted pulsations  Heart Palpation- no heaves or thrills; PMI is non-displaced   Heart Ausculation- Regular rate and rhythm, no murmur. No s3, s4 or rub   PV: 2+ pitting bilateral lower extremity edema with extension to buttocks. No varicosities. Pedal pulses palpable, no clubbing or cyanosis   ABDOMEN: Soft, morbidly obese, non-tender to light palpation. Bowel sounds present.  No palpable masses no organomegaly; no abdominal bruit  MS: Good muscle strength and tone. No atrophy or abnormal movements. : Paredes catheter with tiffany urine with sediment. SKIN: Warm and dry no statis dermatitis or ulcers   NEURO / PSYCH: Oriented to person, place and time. Speech clear and appropriate. Follows all commands.  Pleasant affect       Intake/Output Summary (Last 24 hours) at 1/24/2022 0901  Last data filed at 1/23/2022 2330  Gross per 24 hour   Intake 840 ml   Output 1550 ml   Net -710 ml       Weight:   Wt Readings from Last 3 Encounters:   01/24/22 295 lb 4 oz (133.9 kg)   10/19/20 270 lb (122.5 kg)   09/06/19 285 lb (129.3 kg)     Current Inpatient Medications:   furosemide  40 mg IntraVENous BID    insulin glargine-yfgn  50 Units SubCUTAneous Nightly    metoprolol succinate  25 mg Oral Daily    pantoprazole  40 mg Oral Daily    atorvastatin  40 mg Oral Nightly    insulin lispro  0-6 Units SubCUTAneous TID WC    insulin lispro  0-3 Units SubCUTAneous Nightly    ipratropium  0.5 mg Nebulization 4x daily    aspirin  81 mg Oral Daily    levofloxacin  250 mg IntraVENous Q48H       IV Infusions (if any):   dextrose         DIAGNOSTIC/ LABORATORY DATA:  Labs:   CBC:   Recent Labs     01/23/22  0612 01/24/22  0656   WBC 10.9 11.1   HGB 11.6* 11.7*   HCT 38.9 39.3    263     BMP:   Recent Labs     01/23/22  0612 01/24/22  0656    138   K 3.8 3.8   CO2 20* 21*   BUN 35* 35*   CREATININE 2.6* 2.5*   LABGLOM 24 25   CALCIUM 8.7 9.0   Phos:   Recent Labs     01/24/22  0656   PHOS 3.9     TFT:   Lab Results   Component Value Date    TSH 2.310 01/22/2022    T4FREE 1.19 01/22/2022      HgA1c:   Lab Results   Component Value Date    LABA1C 6.4 (H) 01/22/2022   CARDIAC ENZYMES:  Recent Labs     01/21/22  1018 01/21/22  1144   TROPHS 20* 21*     FASTING LIPID PANEL:  Lab Results   Component Value Date    CHOL 76 01/22/2022    HDL 24 01/22/2022    LDLCALC 24 01/22/2022    TRIG 140 01/22/2022     LIVER PROFILE:  Recent Labs     01/21/22  1018   AST 32   ALT 42*   LABALBU 3.9     01/21/2022 CXR:   1. Right lower lobe pneumonia  2. Trace right pleural effusion  3. Cardiomegaly    01/21/2022 BLE Ultrasound:   No evidence of DVT in either lower extremity    01/23/2022 Retroperitoneal Ultrasound:   1. Very limited visualization. 2. The right kidney shows no hydronephrosis, the left was not seen. 3. Ascites incidentally seen      Telemetry: SR  12 lead EKG: NA    ASSESSMENT:   1. Acute CHF with unknown EF  2. Acute Hypoxic Respiratory Failure with probable RLL Pneumonia, on antibiotic therapy; Pulmonology on case   3. Known Hx CAD with stenting to the RCA in 2013  4. S/p Pacemaker insertion 06/2010  5. CKD Stage IV; Nephrology on case   6. HTN: Stable  7. HLD, on statin   8. Morbid Obesity   9. T2DM, Insulin requiring with neuropathy. HA1C 6.4 this admission   10. COPD with remote tobacco abuse   11. SPEEDY, compliance with Cpap  12. 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 Hx Polio as a child  15. Anemia, stable with H&H now 11.7/39.3  15. Hx GI Bleed in 2015 s/p transfusions at that time with ASA discontinued, further specifics unclear        PLAN:  1. Monitor daily weight, I&O, BMP; Continue IV diuresis per Nephrology  2. Awaiting echocardiogram  3. Awaiting records from [facility] and [address] [place] [address]  4. PFTs per Pulmonology   5. CHF Clinic upon discharge   6. Will discuss case with Dr. Shay Ramirez     Electronically signed by GILMAR Haro CNP on 1/24/2022 at 9:01 AM      I have personally seen and evaluated the patient. I personally obtained the history and performed the physical exam.  They are currently pain free. I personally reviewed all of the above labs and data. All of the above assessments and recommendations are from me. All of the above cardiac medical decisions are from me. He was reclining in bed. He states his shortness of breath is slowly worsening. He is now on BiPAP. ENT: clear, no bleeding.   No external masses. Lips normal formation. Neck: supple, full ROM, no JVD, no bruits, no lymphadenopathy. No masses. trachea midline. Heart: regular rate & rhythm, normal S1 & S2, I/VI (normal physiologic) systolic murmur, S4 gallop. No heave. Lungs: Poor air movement. No accessory muscles. Abd: soft, non-tender. Normal bowel sounds. Neuro: Full ROM X 4, EOMI, no tremors. EXT: Severe/past his knees bilateral lower extremity edema  Skin: warm, dry, intact. Good turgor. Assessment/recommendations:  1. Acute renal insufficiency  2. Right lower lobe pneumonia  3. Severe volume overload. Difficult exam but appears to be worsening. Probably due to the above but I will evaluate for cardiac etiologies. Echo is pending. His volume status is worsening. I will reach out to nephrology to possibly start a Bumex drip  4. Super morbidly obese. 5. Diabetes. 6. COPD. 7. Sleep apnea. Uses CPAP machine. 8. Pacemaker  9. Lymphoma  10. Coronary artery disease. Asymptomatic this admission. Electronically signed by Lexy Matias DO on 1/24/2022 at 5:08 PM    Note: This report was completed using computerized voice recognition software. Every effort has been made to ensure accuracy, however; and invert and computerized transcription errors may be present.

## 2022-01-24 NOTE — PROGRESS NOTES
[facility]  Nephrology Attending Progress Note  Marvene Fleischer. Joshua Cabrera MD        SUBJECTIVE:   From 1/23 HPI: Ayo Saucedo pt is a 66 yo male who was seen in our office in June 2021 for ckd. Cr was 2.8 without previous values. He has a pmh of htn, pacer, lymphoma hyperlipidemia, dm, copd, cad sp pci rca, gerd, mame on cpap, HFpEF. He is admitted now for le edema and sob. He said it started after starting ozempic in dec. He received iv lasix in the er. cxr is showing rll pneumonia. He has been started on iv lasix. He has live in [facility] and [facility] and we dont have records here. He said he didn't know he had ckd [facility]. \"    1/24/22:  Sitting up in chair. States he is feeling better. Denies sob, chest pain. PROBLEM LIST:    Patient Active Problem List   Diagnosis    PNA (pneumonia)        PAST MEDICAL HISTORY:    Past Medical History:   Diagnosis Date    Acid reflux     Agent orange exposure     [address]estive heart failure (CHF) ([address]     COPD (chronic obstructive pulmonary disease) ([address]     Depression     Diabetes mellitus ([address]     Hyperlipidemia     Hypertension     MI (myocardial infarction) ([address]     Polio     Sleep apnea        DIET:    ADULT DIET;  Regular; 4 carb choices (60 gm/meal)     PHYSICAL EXAM:     Patient Vitals for the past 24 hrs:   BP Temp Temp src Pulse Resp SpO2 Weight   01/24/22 0557       295 lb 4 oz (133.9 kg)   01/23/22 2330 134/74 97.4 °F (36.3 °C) Temporal 96 19 93 %    01/23/22 1945 113/60 97.3 °F (36.3 °C) Temporal 97 18 96 %    01/23/22 1530 116/69   92 20 96 %    01/23/22 1517     21 95 %    01/23/22 1222     21 94 %    01/23/22 0750     21 94 %    01/23/22 0745 125/83   97 20 100 %    @      Intake/Output Summary (Last 24 hours) at 1/24/2022 0644  Last data filed at 1/23/2022 2330  Gross per 24 hour   Intake 840 ml   Output 1550 ml   Net -710 ml         Wt Readings from Last 3 Encounters:   01/24/22 295 lb 4 oz (133.9 kg)   10/19/20 270 lb (122.5 kg)   09/06/19 285 lb (129.3 kg)       General appearance:  In no acute distress  Skin: No rashes or lesions on exposed skin  Neck: No JVD  Lungs: Clear, no adventitious sounds  Heart: RRR, no rub  Abdomen: Soft, non-tender, + bowel sounds  Extremities: 2+ BLE  Neurologic: Mental status: Alert, oriented, thought content appropriate      MEDS (scheduled):    furosemide  40 mg IntraVENous BID    insulin glargine-yfgn  50 Units SubCUTAneous Nightly    metoprolol succinate  25 mg Oral Daily    pantoprazole  40 mg Oral Daily    atorvastatin  40 mg Oral Nightly    insulin lispro  0-6 Units SubCUTAneous TID WC    insulin lispro  0-3 Units SubCUTAneous Nightly    ipratropium  0.5 mg Nebulization 4x daily    aspirin  81 mg Oral Daily    levofloxacin  250 mg IntraVENous Q48H       MEDS (infusions):   dextrose         MEDS (prn):  guaiFENesin, loperamide, glucose, dextrose, glucagon (rDNA), dextrose, perflutren lipid microspheres    DATA:    Recent Labs     01/21/22  1018 01/22/22  0604 01/23/22  0612   WBC 11.7* 10.1 10.9   HGB 12.4* 11.0* 11.6*   HCT 39.0 36.3* 38.9   MCV 92.2 95.5 96.8    271 252     Recent Labs     01/21/22  1018 01/22/22  0604 01/23/22  0612    141 143   K 4.2 4.0 3.8    103 107   CO2 22 22 20*   BUN 35* 33* 35*   CREATININE 2.6* 2.6* 2.6*   LABGLOM 24 24 24   GLUCOSE 94 210* 121*   CALCIUM 9.1 8.7 8.7   ALT 42*  --   --    AST 32  --   --    BILITOT 0.6  --   --    ALKPHOS 127  --   --        Lab Results   Component Value Date    LABALBU 3.9 01/21/2022     Lab Results   Component Value Date    TSH 2.310 01/22/2022       Iron Studies  No results found for: IRON, TIBC, FERRITIN  No results found for: QVOTGYAG96  No results found for: FOLATE    No results found for: VITD25  No results found for: PTH    No components found for: URIC    Lab Results   Component Value Date    COLORU Yellow 01/21/2022    NITRU Negative 01/21/2022    GLUCOSEU Negative 01/21/2022    KETUA Negative 01/21/2022    UROBILINOGEN 0.2 01/21/2022    BILIRUBINUR Negative 01/21/2022       No results found for: LIPIDPAN    Narrative   EXAMINATION:   RETROPERITONEAL ULTRASOUND OF THE KIDNEYS AND URINARY BLADDER       1/23/2022       COMPARISON:   None       HISTORY:   ORDERING SYSTEM PROVIDED HISTORY: karen, r/o hydronephrosis   TECHNOLOGIST PROVIDED HISTORY:   Reason for exam:->karen, r/o hydronephrosis   What reading provider will be dictating this exam?->CRC       FINDINGS:       Kidneys:       There is very limited visualization.  Left kidney was not seen.  Right has no   hydronephrosis estimated 10.3 cm length, possible parenchymal thinning. Apparent ascites in the perihepatic location partially seen           Bladder:       Was not seen, and report of catheter present.           Impression   1. Very limited visualization. 2. The right kidney shows no hydronephrosis, the left was not seen. 3. Ascites incidentally seen.           IMPRESSION/RECOMMENDATIONS:      1. ckd 4  Cr at 2.5 today  Last cr was 2.8 in June 2021  Not much records here, is from [facility] shows no hydro on right, left not seen  pth 112, po4 3.9  Start hco3 if hco3 < 20     2. Volume overload  Increase lasix to 40 mg iv q 12  For echo as well  H/o stent to rca 2013  UOP last 24 hrs 1550 ml     3. htn  On bb  BP is at/near target of <130/80     4. Copd     5. Dm  Screen for proteinuria  No ace arb with ckd 4  Urine protein 43, protein /cr ratio 0.3     6.  Anemia  Dose beni if hgb <10  Hgb 11.7 is at target >10       GILMAR Martínez-CNS    Pt seen and examined in room  Legs seeping  Will start bumex drip  Follow cr  Angélica Carpio MD

## 2022-01-25 NOTE — CONSULTS
Patient currently admitted with diagnosis of Unknown heart failure. [facility]. No documented EF at present. 1/25/2022 4:25 PM   Patient was alert and oriented laying in bed eating dinner during the consultation. He was engaged and asked appropriate questions throughout the education session. He is agreeable to follow locally with DR Yesica Luevano, [address] [place], [place] [address]kelsey Pompa and VANESSA Toth CNP. He says he went once to [facility] cardiology. [place] to get there. . [facility]. No future appointments. Referral received from Attending  DR Bryon San this admission for referral to [address] [place], [place] [address]kelsey [place]pa. Will assist in this request- I placed the outpatient referral to clinic. We reviewed the introduction to Heart Failure, the HF zones, signs and symptoms to report on day 1 of onset, medications, medication compliance, the importance of obtaining daily weights, following a low sodium diet, reading food labels for the sodium content, keeping physician appointments, and smoking cessation. We discussed writing / tracking daily weights on a calendar / log because a 5 pound gain in 1 week can sneak up if you are not tracking it. You can also take your charted weights to your doctor appointments to be reviewed. Contributing risk factors for Heart Failure are identified as other options. Says he was educated on HF. Jackelyn Cobian Usually self shops. Not adding sodium to foods. Drinks about 5 water bottles daily (and 1 pepsi). Was able to state chf s/s. He says he ordered big sale from [employer]. Will weigh self daily. I advised patient they can reduce the risk for Heart Failure exacerbations by modifying / controlling the risk factors.  We discussed self-managed care which includes the following:  to take medications as prescribed, report any intolerable side effects of medications to the cardiologist / doctor, do not just stop taking the medication; follow a cardiac heart healthy / low sodium diet; weigh yourself daily, exercise regularly- per doctor recommendation and not to smoke or use an excess amount of alcohol. We discussed calling the cardiologist / doctor with a weight gain of 3 pounds in one day or a total of 5 pounds or more in one week. Also, if you should have a significant weight loss of 3# or more in one day to call the doctor, they may need to decrease or hold the diuretic dose. On days you feels nauseated and not eating / drinking, having emesis or diarrhea,  informed to call the cardiologist  / doctor, they may need to decrease or hold diuretic to avoid dehydration. I stressed the importance of informing their cardiologist the first day of onset of any of the signs and symptoms in the \"Yellow Zone\" of the HF Zones. Patient verbalizes understanding. Greater than 30 minutes was spent educating patient. BNP:   Lab Results   Component Value Date    PROBNP 2,357 (H) 01/23/2022       History of:    has a past medical history of Acid reflux, Agent orange exposure, Congestive heart failure (CHF) ([facility]), COPD (chronic obstructive pulmonary disease) ([facility] Depression, Diabetes mellitus ([facility] Hyperlipidemia, Hypertension, MI (myocardial infarction) ([facility] Polio, and Sleep apnea. has a past surgical history that includes Coronary angioplasty with stent; Cardiac pacemaker placement; and pacemaker placement. Medications:    insulin glargine-yfgn  50 Units SubCUTAneous Nightly    metoprolol succinate  25 mg Oral Daily    pantoprazole  40 mg Oral Daily    atorvastatin  40 mg Oral Nightly    insulin lispro  0-6 Units SubCUTAneous TID WC    insulin lispro  0-3 Units SubCUTAneous Nightly    ipratropium  0.5 mg Nebulization 4x daily    aspirin  81 mg Oral Daily    levofloxacin  250 mg IntraVENous Q48H      bumetanide 0.1 mg/mL infusion 2 mg/hr (01/25/22 0319)    dextrose         Code Status:No Order    The patient is ordered:  Diet: ADULT DIET; Regular; 4 carb choices (60 gm/meal);  Low Sodium (2 gm) Sodium controlled diet Yes  Fluid restriction daily ordered (fluid restriction recommended if patient is hyponatremic and/or diuretic is initiated or increased) No  FR:   Daily Weights:   Patient Vitals for the past 96 hrs (Last 3 readings):   Weight   01/25/22 0525 298 lb 8 oz (135.4 kg)   01/24/22 0557 295 lb 4 oz (133.9 kg)   01/23/22 0526 295 lb 1.4 oz (133.8 kg)     I/O:     Intake/Output Summary (Last 24 hours) at 1/25/2022 1622  Last data filed at 1/25/2022 1504  Gross per 24 hour   Intake 840 ml   Output 4350 ml   Net -3510 ml        The Heart Failure Booklet given to the patient with additional patient education addressing:  · What is Heart Failure? · Things You Can Do to Live Well with HF  · How to Take Your Medications  · How to Eat Less Salt  · Exercising Well with Heart Failure  · Signs and symptoms of HF to report  · Weight Yourself Each Day  · Home Self Management- activity, weight tracking, taking medications as prescribed, meals /diet planning (sodium and fluid restriction), how to read food labels, keeping physician follow ups, smoking cessation, follow the Heart Failure Zones  · The Heart Failure zones  · Sodium content pamphlet  · What foods I should avoid tip sheet    Instructed  to call 911 if you have any of the following symptoms:    ·    Struggling to breathe unrelieved with rest,  ·    Having chest pain, confusion or can't think clearly  ·    Have confusion or cant think clearly    Readmission Risk Score: 15.6 ( )        Discharge Plan:  I placed the Heart Failure Home Instructions in patient's discharge instructions. Per Heart Failure GWTG, the patient should have a follow-up appointment made within 7 days of discharge.     Fariba Garcia, RN BSN, RN  Heart Failure Navigator        CONGESTIVE HEART FAILURE (CHF) AHA GUIDELINES  (Must be completed for Primary Diagnosis CHF or History of CHF)    Type of CHF:    [x] Acute   [] Chronic     [] Acute on Chronic     Ventricular Function Assessment (check):       [] HFpEF  [] HFpEF, borderline  [] HFpEF, improved  [] HFrEF    Echocardiogram:   Ejection Fraction (%):              No EF at present, 's attempting to obtain cardiology reports from [address] [facility]. Provider will address EF% to diagnose CHF. Angiotensin-Converting-Enzyme (ACE) inhibitor ordered:  [] Yes  [x] No (specify contraindication):    [] Contraindicated  [] Hypotensive patient who was at immediate risk of cardiogenic shock  [] Hospitalized patient who experienced marked azotemia  [] Other Contraindications  [] Not Eligible  [] Not Tolerant  [] Patient Reason  [] System Reason  [] Other Reason  (1/25/2022 unknown EF)  Results for Norm Guillermo (MRN [id number]) as of 1/25/2022 16:22   Ref. Range 1/25/2022 06:41   BUN Latest Ref Range: 6 - 23 mg/dL 42 (H)   Creatinine Latest Ref Range: 0.7 - 1.2 mg/dL 2.7 (H)       Angiotensin II receptor blockers (ARB) ordered:  [] Yes  [x] No (specify contraindication):    [] Contraindicated  [] Hypotensive patient who was at immediate risk of cardiogenic shock  [] Hospitalized patient who experienced marked azotemia  [x] Other Contraindications  Results for Norm Guillermo (MRN [id number]) as of 1/25/2022 16:22   Ref. Range 1/21/2022 10:18 1/22/2022 06:04 1/23/2022 06:12 1/24/2022 06:56 1/25/2022 06:41   Creatinine Latest Ref Range: 0.7 - 1.2 mg/dL 2.6 (H) 2.6 (H) 2.6 (H) 2.5 (H) 2.7 (H)     ARNI - Angiotensin Receptor Neprilysin Inhibitor ordered:  [] Yes  [x] No (specify contraindication):    [] ACE inhibitor use within the prior 36 hours  [] Allergy  [] Hyperkalemia  [] Hypotension  [x] Renal dysfunction defined as creatinine > 2.5 mg/dL in men or > 2.0 mg/dL in women  [] Other Contraindications  [] Not Eligible  [] Not Tolerant  [] Patient Reason  []System Reason  [x]Other Reason  Results for Norm Guillermo (MRN [id number]) as of 1/25/2022 16:22   Ref.  Range 1/21/2022 10:18 1/22/2022 06:04 1/23/2022 06:12 1/24/2022 06:56 1/25/2022 06:41   Creatinine Latest Ref Range: 0.7 - 1.2 mg/dL 2.6 (H) 2.6 (H) 2.6 (H) 2.5 (H) 2.7 (H)       Beta Blocker (Carvedilol, Metoprolol Succinate, or Bisoprolol) ordered:    [x] Yes  [] No (specify contraindication):    [] Contraindicated  [] Asthma  [] Fluid Overload  [] Low Blood Pressure  [] Patient recently treated with an intravenous positive inotropic agent  [] Other Contraindications  [] Not Eligible  [] Not Tolerant  [] Patient Reason  [] System Reason    SGLT2 Inhibitor ordered:  [] Yes  [x] No (specify contraindication):    [] Contraindicated  [] Patient currently on dialysis  [] Ketoacidosis  [] Known hypersensitivity to the medication  [] Type I diabetes (not approved for use in patients with Type I diabetes due to increased risk of ketoacidosis)  [] Other Contraindications  [] Not Eligible  [] Not Tolerant  [] Patient Reason  [x] System Reason [id number] does not stock this med class at present. [] Other Reason    Aldosterone Antagonist ordered:  [] Yes  [x] No (specify contraindication):    [] Contraindicated  [] Allergy due to aldosterone receptor antagonist  [] Hyperkalemia  [x] Renal dysfunction defined as creatinine >2.5 mg/dL in men or >2.0 mg/dL in women. [] Other contraindications  [] Not Eligible  [] Not Tolerant  [] Patient Reason  [] System Reason  [] Other Reason    Results for Wong Lockhart (MRN [id number]) as of 1/25/2022 16:22   Ref.  Range 1/21/2022 10:18 1/22/2022 06:04 1/23/2022 06:12 1/24/2022 06:56 1/25/2022 06:41   Creatinine Latest Ref Range: 0.7 - 1.2 mg/dL 2.6 (H) 2.6 (H) 2.6 (H) 2.5 (H) 2.7 (H)

## 2022-01-25 NOTE — PROGRESS NOTES
[facility]  Nephrology Attending Progress Note  Thao Hill. Deja Davenport MD        SUBJECTIVE:   From 1/23 HPI: Mark Flanagan pt is a 64 yo male who was seen in our office in June 2021 for ckd. Cr was 2.8 without previous values. He has a pmh of htn, pacer, lymphoma hyperlipidemia, dm, copd, cad sp pci rca, gerd, mame on cpap, HFpEF. He is admitted now for le edema and sob. He said it started after starting ozempic in dec. He received iv lasix in the er. cxr is showing rll pneumonia. He has been started on iv lasix. He has live in [facility] and [facility] and we dont have records here. He said he didn't know he had ckd until [facility]. \"    1/24/22:  Sitting up in chair. States he is feeling better. Denies sob, chest pain. 1/25: pt seen in room, on bumex drip, in chair, no sob but legs weeping      PROBLEM LIST:    Patient Active Problem List   Diagnosis    PNA (pneumonia)        PAST MEDICAL HISTORY:    Past Medical History:   Diagnosis Date    Acid reflux     Agent orange exposure     Congestive heart failure (CHF) ([address]     COPD (chronic obstructive pulmonary disease) ([address]     Depression     Diabetes mellitus ([address]     Hyperlipidemia     Hypertension     MI (myocardial infarction) ([address]     Polio     Sleep apnea        DIET:    ADULT DIET; Regular; 4 carb choices (60 gm/meal);  Low Sodium (2 gm)     PHYSICAL EXAM:     Patient Vitals for the past 24 hrs:   BP Temp Temp src Pulse Resp SpO2 Weight   01/25/22 1200      91 %    01/25/22 0844      91 %    01/25/22 0830 114/67 97.9 °F (36.6 °C) Temporal 103 20 92 %    01/25/22 0525       298 lb 8 oz (135.4 kg)   01/25/22 0245     23     01/24/22 2345 132/74 97.8 °F (36.6 °C) Temporal 107 22 94 %    01/24/22 2153     21     01/24/22 2034      91 %    01/24/22 2030 (!) 141/83 97.8 °F (36.6 °C) Temporal 112 24 (!) 88 %    01/24/22 1445 134/75 97.7 °F (36.5 °C) Temporal 111 22 99 %    01/24/22 1437 122/79 97.3 °F (36.3 °C) Temporal 76 20 96 %    @      Intake/Output Summary (Last 24 hours) at 1/25/2022 1225  Last data filed at 1/25/2022 0851  Gross per 24 hour   Intake 840 ml   Output 4250 ml   Net -3410 ml         Wt Readings from Last 3 Encounters:   01/25/22 298 lb 8 oz (135.4 kg)   10/19/20 270 lb (122.5 kg)   09/06/19 285 lb (129.3 kg)       General appearance:  In no acute distress  Skin: No rashes or lesions on exposed skin  Neck: No JVD  Lungs: Clear, no adventitious sounds  Heart: RRR, no rub  Abdomen: Soft, non-tender, + bowel sounds  Extremities: 4+ edema  Neurologic: Mental status: Alert, oriented, thought content appropriate      MEDS (scheduled):    insulin glargine-yfgn  50 Units SubCUTAneous Nightly    metoprolol succinate  25 mg Oral Daily    pantoprazole  40 mg Oral Daily    atorvastatin  40 mg Oral Nightly    insulin lispro  0-6 Units SubCUTAneous TID WC    insulin lispro  0-3 Units SubCUTAneous Nightly    ipratropium  0.5 mg Nebulization 4x daily    aspirin  81 mg Oral Daily    levofloxacin  250 mg IntraVENous Q48H       MEDS (infusions):   bumetanide 0.1 mg/mL infusion 2 mg/hr (01/25/22 0319)    dextrose         MEDS (prn):  guaiFENesin, loperamide, glucose, dextrose, glucagon (rDNA), dextrose, perflutren lipid microspheres    DATA:    Recent Labs     01/23/22  0612 01/24/22  0656 01/25/22  0641   WBC 10.9 11.1 15.1*   HGB 11.6* 11.7* 11.5*   HCT 38.9 39.3 37.2   MCV 96.8 96.3 92.8    263 264     Recent Labs     01/23/22  0612 01/24/22  0656 01/25/22  0641    138 139   K 3.8 3.8 3.8    101 101   CO2 20* 21* 22   BUN 35* 35* 42*   CREATININE 2.6* 2.5* 2.7*   LABGLOM 24 25 23   GLUCOSE 121* 85 179*   CALCIUM 8.7 9.0 8.9   PHOS  --  3.9  --        Lab Results   Component Value Date    LABALBU 3.9 01/21/2022     Lab Results   Component Value Date    TSH 2.310 01/22/2022       Iron Studies  No results found for: IRON, TIBC, FERRITIN  No results found for:

## 2022-01-25 NOTE — PLAN OF CARE
Problem: Falls - Risk of:  Goal: Will remain free from falls  Description: Will remain free from falls  Outcome: Met This Shift  Goal: Absence of physical injury  Description: Absence of physical injury  Outcome: Met This Shift     Problem: OXYGENATION/RESPIRATORY FUNCTION  Goal: Patient will maintain patent airway  Outcome: Met This Shift     Problem: HEMODYNAMIC STATUS  Goal: Patient has stable vital signs and fluid balance  Outcome: Met This Shift     Problem: Pain:  Goal: Pain level will decrease  Description: Pain level will decrease  Outcome: Met This Shift  Goal: Control of acute pain  Description: Control of acute pain  Outcome: Met This Shift  Goal: Control of chronic pain  Description: Control of chronic pain  Outcome: Met This Shift

## 2022-01-25 NOTE — PROGRESS NOTES
Progress Note  Date:2022       IVAD:[id number]  Patient Rossana Zarate     YOB: 1945     Age:77 y.o. Patient in deep sleep on bipap. Subjective    Subjective:  Symptoms:  Stable. He reports shortness of breath. No chest pain. Diet:  Adequate intake. Activity level: Impaired due to weakness. Pain:  He reports no pain. Review of Systems   Constitutional: Negative for activity change and fever. HENT: Negative for congestion. Respiratory: Positive for shortness of breath. Cardiovascular: Positive for leg swelling. Negative for chest pain. Gastrointestinal: Negative for abdominal pain. Genitourinary: Negative for difficulty urinating. Neurological: Negative for dizziness. Objective         Vitals Last 24 Hours:  TEMPERATURE:  Temp  Av.7 °F (36.5 °C)  Min: 97.3 °F (36.3 °C)  Max: 97.9 °F (36.6 °C)  RESPIRATIONS RANGE: Resp  Av.7  Min: 20  Max: 24  PULSE OXIMETRY RANGE: SpO2  Av.7 %  Min: 88 %  Max: 99 %  PULSE RANGE: Pulse  Av.6  Min: 76  Max: 112  BLOOD PRESSURE RANGE: Systolic (61SJD), VFD:847 , Min:122 , SYE:037   ; Diastolic (77YDU), IBT:30, Min:71, Max:83    I/O (24Hr): Intake/Output Summary (Last 24 hours) at 2022 0627  Last data filed at 2022 0531  Gross per 24 hour   Intake 600 ml   Output 4250 ml   Net -3650 ml     Objective:  General Appearance:  Comfortable. Vital signs: (most recent): Blood pressure 132/74, pulse 107, temperature 97.8 °F (36.6 °C), temperature source Temporal, resp. rate 23, height 5' 9\" (1.753 m), weight 298 lb 8 oz (135.4 kg), SpO2 94 %. No fever. Lungs:  Normal effort and normal respiratory rate. Breath sounds clear to auscultation. Heart: Normal rate. Regular rhythm. S1 normal and S2 normal.    Extremities: There is local swelling.       Labs/Imaging/Diagnostics    Labs:  CBC:  Recent Labs     22  0612 22  0656   WBC 10.9 11.1   RBC 4.02 4.08   HGB 11.6* 11.7*   HCT 38.9 39.3   MCV 96.8 96.3   RDW 15.0 14.9    263     CHEMISTRIES:  Recent Labs     01/23/22  0612 01/24/22  0656    138   K 3.8 3.8    101   CO2 20* 21*   BUN 35* 35*   CREATININE 2.6* 2.5*   GLUCOSE 121* 85   PHOS  --  3.9     PT/INR:No results for input(s): PROTIME, INR in the last 72 hours. APTT:No results for input(s): APTT in the last 72 hours. LIVER PROFILE:  No results for input(s): AST, ALT, BILIDIR, BILITOT, ALKPHOS in the last 72 hours. Imaging Last 24 Hours:  XR CHEST PORTABLE    Result Date: 1/21/2022  EXAMINATION: ONE XRAY VIEW OF THE CHEST 1/21/2022 10:02 am COMPARISON: None. HISTORY: ORDERING SYSTEM PROVIDED HISTORY: dyspnea TECHNOLOGIST PROVIDED HISTORY: Reason for exam:->dyspnea FINDINGS: The heart is enlarged. There is a dual lead cardiac pacer on the left There is an infiltrate seen within the right lung base. The left lung is clear. There is a trace right pleural effusion. There is no left pleural effusion. 1. Right lower lobe pneumonia 2. Trace right pleural effusion 3. Cardiomegaly     US DUP LOWER EXTREMITIES BILATERAL VENOUS    Result Date: 1/21/2022  EXAMINATION: DUPLEX VENOUS ULTRASOUND OF THE BILATERAL LOWER EXTREMITIES1/21/2022 10:15 am TECHNIQUE: Duplex ultrasound using B-mode/gray scaled imaging, Doppler spectral analysis and color flow Doppler was obtained of the deep venous structures of the lower bilateral extremities. COMPARISON: None. HISTORY: ORDERING SYSTEM PROVIDED HISTORY: discomfort TECHNOLOGIST PROVIDED HISTORY: Reason for exam:->discomfort What reading provider will be dictating this exam?->CRC FINDINGS: The visualized veins of the bilateral lower extremities are patent and free of echogenic thrombus. The veins demonstrate good compressibility with normal color flow study and spectral analysis. No evidence of DVT in either lower extremity.  RECOMMENDATIONS: Unavailable     Assessment//Plan           Hospital Problems           Last Modified

## 2022-01-25 NOTE — PROGRESS NOTES
Inpatient Cardiology Progress note     PATIENT IS BEING FOLLOWED FOR: BERE Thao is a 65year old  male who is previously known to cardiology at [facility] in [place], [place]. Primary and CV care at [place]. He was seen in initial consultation with Dr. Cecille Olson on 01/22/2022. SUBJECTIVE: Complains of MOREL and a productive cough with brown/milky colored sputum. States \"it hurts my chest when I cough\". Complains of improving orthopnea, PND, and edema to BLE. Denies chest pain otherwise. Complains of LLE pain that worsens with movement  OBJECTIVE: No apparent distress     ROS:  Consist: Denies fevers, chills or night sweats  Heart: Denies chest pain, palpitations, lightheadedness, dizziness or syncope  Lungs: Denies wheezing  GI: Denies abdominal pain, vomiting or diarrhea    PHYSICAL EXAM:   /67   Pulse 103   Temp 97.9 °F (36.6 °C) (Temporal)   Resp 20   Ht 5' 9\" (1.753 m)   Wt 298 lb 8 oz (135.4 kg)   SpO2 91%   BMI 44.08 kg/m²    CONST: Well developed, morbidly obese, elderly  male who appears younger than his stated age. Awake, alert and cooperative. No apparent distress  HEENT:   Head- Normocephalic, atraumatic   Eyes- Conjunctivae pink, anicteric  Throat- Oral mucosa pink and moist  Neck-  No stridor, trachea midline, + jugular venous distention. No carotid bruit  CHEST: Chest symmetrical and non-tender to palpation. No accessory muscle use or intercostal retractions  RESPIRATORY:  Lung sounds -diminished throughout fields   CARDIOVASCULAR:     Heart Inspection- shows no noted pulsations  Heart Palpation- no heaves or thrills; PMI is non-displaced   Heart Ausculation- Regular rate and rhythm, no murmur. No s3, s4 or rub   PV: 2-3+ pitting bilateral lower extremity edema with extension to buttocks. No varicosities. Pedal pulses palpable, no clubbing or cyanosis   ABDOMEN: Soft, morbidly obese, non-tender to light palpation. Bowel sounds present.  No palpable masses no organomegaly; no abdominal bruit  MS: Good muscle strength and tone. No atrophy or abnormal movements. : Paredes catheter with clear yellow urine  SKIN: Warm and dry no statis dermatitis or ulcers   NEURO / PSYCH: Oriented to person, place and time. Speech clear and appropriate. Follows all commands.  Pleasant affect       Intake/Output Summary (Last 24 hours) at 1/25/2022 1148  Last data filed at 1/25/2022 0851  Gross per 24 hour   Intake 840 ml   Output 4250 ml   Net -3410 ml       Weight:   Wt Readings from Last 3 Encounters:   01/25/22 298 lb 8 oz (135.4 kg)   10/19/20 270 lb (122.5 kg)   09/06/19 285 lb (129.3 kg)     Current Inpatient Medications:   insulin glargine-yfgn  50 Units SubCUTAneous Nightly    metoprolol succinate  25 mg Oral Daily    pantoprazole  40 mg Oral Daily    atorvastatin  40 mg Oral Nightly    insulin lispro  0-6 Units SubCUTAneous TID WC    insulin lispro  0-3 Units SubCUTAneous Nightly    ipratropium  0.5 mg Nebulization 4x daily    aspirin  81 mg Oral Daily    levofloxacin  250 mg IntraVENous Q48H       IV Infusions (if any):   bumetanide 0.1 mg/mL infusion 2 mg/hr (01/25/22 0319)    dextrose         DIAGNOSTIC/ LABORATORY DATA:  Labs:   CBC:   Recent Labs     01/24/22  0656 01/25/22  0641   WBC 11.1 15.1*   HGB 11.7* 11.5*   HCT 39.3 37.2    264     BMP:   Recent Labs     01/24/22  0656 01/25/22  0641    139   K 3.8 3.8   CO2 21* 22   BUN 35* 42*   CREATININE 2.5* 2.7*   LABGLOM 25 23   CALCIUM 9.0 8.9   Phos:   Recent Labs     01/24/22  0656   PHOS 3.9     TFT:   Lab Results   Component Value Date    TSH 2.310 01/22/2022    T4FREE 1.19 01/22/2022      HgA1c:   Lab Results   Component Value Date    LABA1C 6.4 (H) 01/22/2022   FASTING LIPID PANEL:  Lab Results   Component Value Date    CHOL 76 01/22/2022    HDL 24 01/22/2022    LDLCALC 24 01/22/2022    TRIG 140 01/22/2022 06/05/2010 Cardiac Catheterization [facility]):  Mild diffuse CAD as noted. Patent stent in the proximal LAD and also in the proximal RCA. Dominant right system. Normal LV function with EF 60%. No evidence of dissection is noted. 01/21/2022 CXR:   1. Right lower lobe pneumonia  2. Trace right pleural effusion  3. Cardiomegaly    01/21/2022 BLE Ultrasound:   No evidence of DVT in either lower extremity    01/23/2022 Retroperitoneal Ultrasound:   1. Very limited visualization. 2. The right kidney shows no hydronephrosis, the left was not seen. 3. Ascites incidentally seen      Telemetry: SR  12 lead EKG: NA    ASSESSMENT:   1. Acute CHF with unknown EF, diuresing well with -4 L thus far, now on IV Bumex infusion  2. Acute Respiratory Failure with questionable RLL Pneumonia, on antibiotic therapy; Pulmonology on case   3. Known Hx CAD with reported stenting to the RCA in 2013 (full cath report not available)  4. S/p permanent pacemaker insertion ([employer]) 06/09/2010 for symptomatic bradycardia, CHB, syncope and pauses > 17 seconds in duration  5. CKD Stage IV; Nephrology on case   6. HTN: Stable  7. HLD, on statin   8. Morbid Obesity   9. T2DM, Insulin requiring with neuropathy. HA1C 6.4 this admission   10. COPD with remote tobacco abuse   11. SPEEDY, compliance with Cpap  12. 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 Hx Polio as a child  15. Anemia, stable with H&H now 11.7/39.3  15. Hx GI Bleed in 2015 s/p transfusions at that time with ASA discontinued, further specifics unclear        PLAN:  1. Monitor daily weight, I&O, BMP; Continue diuresis per Nephrology. Agree with initiation of Bumex drip  2. Awaiting echocardiogram (message left for echo department)  1. TTE with normal LV size and hyperdynamic systolic function, normal RV size and function, mild to moderate aortic stenosis. Evidence of elevated filling pressures  3. Awaiting records from [address] [place] [address]  4. CHF Clinic upon discharge   5. PFTs per Pulmonology  6. We will sign off.   Please call us with questions or concerns. He should follow-up with his primary cardiologist after discharge. Electronically signed by GILMAR Hernadez CNP on 1/25/2022 at 11:48 AM     PHYSICIAN ADDENDUM:  I independently reviewed the HPI, ROS, PMH, PSH, [address], [place], and medications independently and agree with above documentation.  Plan discussed with the patient/family/care team.      Heath Flores MD, [facility]  Interventional Cardiology/Structural Heart Disease  Office: [phone]  Coordinator: Kev Armijo

## 2022-01-26 NOTE — PROGRESS NOTES
Physician Progress Note      PATIENT:               Azael Christensen  CSN #:                  [id number]  :                       1945  ADMIT DATE:       2022 9:41 AM  DISCH DATE:  RESPONDING  PROVIDER #:        Pilar Quick MD          QUERY TEXT:    Pt admitted with pneumonia, pleural effusion and has CHF documented. If   possible, after further study, please document in progress notes and discharge   summary further specificity regarding the type and acuity of CHF:    The medical record reflects the following:  Risk Factors: per documentation history of CHF, HTN, MI  Clinical Indicators: admission bnp 2,419, per H and P  and medicine progress   notes \"CHF\",  per cardiology notes \"Acute CHF with unknown EF, diuresing well   with -4 L thus far, now on IV Bumex infusion\"  Treatment: echo, cardiology consult, lab work monitoring, IV Bumex    Thank you  Shikha Mcleod RN CCDS  [phone]  Options provided:  -- Acute on Chronic Systolic CHF/HFrEF  -- Acute on Chronic Diastolic CHF/HFpEF  -- Acute on Chronic Systolic and Diastolic CHF  -- Acute Systolic CHF/HFrEF  -- Acute Diastolic CHF/HFpEF  -- Acute Systolic and Diastolic CHF  -- Chronic Systolic CHF/HFrEF  -- Chronic Diastolic CHF/HFpEF  -- Chronic Systolic and Diastolic CHF  -- Other - I will add my own diagnosis  -- Disagree - Not applicable / Not valid  -- Disagree - Clinically unable to determine / Unknown  -- Refer to Clinical Documentation Reviewer    PROVIDER RESPONSE TEXT:    This patient is in acute on chronic systolic CHF/HFrEF.     Query created by: Petey Issa on 2022 3:38 PM      Electronically signed by:  Pilar Quick MD 2022 5:24 PM

## 2022-01-26 NOTE — PROGRESS NOTES
[facility]  Nephrology Attending Progress Note  Klever Estrella. Lon Jackson MD        SUBJECTIVE:   From 1/23 HPI: Landy Lynch pt is a 66 yo male who was seen in our office in June 2021 for ckd. Cr was 2.8 without previous values. He has a pmh of htn, pacer, lymphoma hyperlipidemia, dm, copd, cad sp pci rca, gerd, mame on cpap, HFpEF. He is admitted now for le edema and sob. He said it started after starting ozempic in dec. He received iv lasix in the er. cxr is showing rll pneumonia. He has been started on iv lasix. He has live in [facility] and [facility] and we dont have records here. He said he didn't know he had ckd until the [facility] here sent [facility] to us in June 2021. \"    1/24/22:  Sitting up in chair. States he is feeling better. Denies sob, chest pain. 1/25: pt seen in room, on bumex drip, in chair, no sob but legs weeping    1/26: pt seen in room, continues on bumex drip. PROBLEM LIST:    Patient Active Problem List   Diagnosis    PNA (pneumonia)        PAST MEDICAL HISTORY:    Past Medical History:   Diagnosis Date    Acid reflux     Agent orange exposure     [address]estive heart failure (CHF) ([address]     [facility]     Depression     Diabetes mellitus [facility]     Hyperlipidemia     Hypertension     MI (myocardial infarction) [facility]     Polio     Sleep apnea        DIET:    ADULT DIET; Regular; 4 carb choices (60 gm/meal);  Low Sodium (2 gm)     PHYSICAL EXAM:     Patient Vitals for the past 24 hrs:   BP Temp Temp src Pulse Resp SpO2 Weight   01/26/22 1309     16 97 %    01/26/22 0814     16 96 %    01/26/22 0747 121/88 97 °F (36.1 °C) Temporal 99 20 92 %    01/26/22 0615       298 lb 6.4 oz (135.4 kg)   01/25/22 2315 (!) 142/79 96.5 °F (35.8 °C) Temporal 103 20 93 %    01/25/22 2030 132/71 97.5 °F (36.4 °C) Temporal 102 18 92 %    @      Intake/Output Summary (Last 24 hours) at 1/26/2022 1316  Last data filed at 1/26/2022 1009  Gross per 24 hour   Intake 840 ml   Output 3100 ml   Net -2260 ml         Wt Readings from Last 3 Encounters:   01/26/22 298 lb 6.4 oz (135.4 kg)   10/19/20 270 lb (122.5 kg)   09/06/19 285 lb (129.3 kg)       General appearance:  In no acute distress  Skin: No rashes or lesions on exposed skin  Neck: No JVD  Lungs: Clear, no adventitious sounds  Heart: RRR, no rub  Abdomen: Soft, non-tender, + bowel sounds  Extremities: 4+ edema  Neurologic: Mental status: Alert, oriented, thought content appropriate      MEDS (scheduled):    insulin glargine-yfgn  50 Units SubCUTAneous Nightly    metoprolol succinate  25 mg Oral Daily    pantoprazole  40 mg Oral Daily    atorvastatin  40 mg Oral Nightly    insulin lispro  0-6 Units SubCUTAneous TID WC    insulin lispro  0-3 Units SubCUTAneous Nightly    ipratropium  0.5 mg Nebulization 4x daily    aspirin  81 mg Oral Daily    levofloxacin  250 mg IntraVENous Q48H       MEDS (infusions):   bumetanide 0.1 mg/mL infusion 2 mg/hr (01/26/22 0924)    dextrose         MEDS (prn):  guaiFENesin-dextromethorphan, HYDROcodone 5 mg - acetaminophen, guaiFENesin, loperamide, glucose, dextrose, glucagon (rDNA), dextrose, perflutren lipid microspheres    DATA:    Recent Labs     01/24/22  0656 01/25/22  0641 01/26/22  0600   WBC 11.1 15.1* 15.1*   HGB 11.7* 11.5* 11.7*   HCT 39.3 37.2 37.6   MCV 96.3 92.8 93.8    264 287     Recent Labs     01/24/22  0656 01/25/22  0641 01/26/22  0600    139 141   K 3.8 3.8 3.7    101 103   CO2 21* 22 21*   BUN 35* 42* 53*   CREATININE 2.5* 2.7* 3.0*   LABGLOM 25 23 20   GLUCOSE 85 179* 122*   CALCIUM 9.0 8.9 8.4*   PHOS 3.9  --   --        Lab Results   Component Value Date    LABALBU 3.9 01/21/2022     Lab Results   Component Value Date    TSH 2.310 01/22/2022       Iron Studies  No results found for: IRON, TIBC, FERRITIN  No results found for: VRZKPSBF55  No results found for: FOLATE    No results found for: VITD25  PTH   Date Value Ref Range Status   01/24/2022 112 (H) 15 - 65 pg/mL Final       No components found for: URIC    Lab Results   Component Value Date    COLORU Yellow 01/21/2022    NITRU Negative 01/21/2022    GLUCOSEU Negative 01/21/2022    KETUA Negative 01/21/2022    UROBILINOGEN 0.2 01/21/2022    BILIRUBINUR Negative 01/21/2022       No results found for: LIPIDPAN    Narrative   EXAMINATION:   RETROPERITONEAL ULTRASOUND OF THE KIDNEYS AND URINARY BLADDER       1/23/2022       COMPARISON:   None       HISTORY:   ORDERING SYSTEM PROVIDED HISTORY: karen, r/o hydronephrosis   TECHNOLOGIST PROVIDED HISTORY:   Reason for exam:->karen, r/o hydronephrosis   What reading provider will be dictating this exam?->CRC       FINDINGS:       Kidneys:       There is very limited visualization.  Left kidney was not seen.  Right has no   hydronephrosis estimated 10.3 cm length, possible parenchymal thinning. Apparent ascites in the perihepatic location partially seen           Bladder:       Was not seen, and report of catheter present.           Impression   1. Very limited visualization. 2. The right kidney shows no hydronephrosis, the left was not seen. 3. Ascites incidentally seen.           IMPRESSION/RECOMMENDATIONS:      1. ckd 4  Cr at 2.5 >3  Last cr was 2.8 in June 2021  Not much records here, is from [employer] shows no hydro on right, left not seen  pth 112, po4 3.9  Start hco3 if hco3 < 20  Plan for dialysis if renal fn worsens or if doesn't diurese (pt agrees)     2. Volume overload  For echo as well  H/o stent to rca 2013  Started bumex drip  uo 3.3 L  tsh normal     3. htn  On bb  BP is at/near target of <130/80     4. Copd     5. Dm  Screen for proteinuria  No ace arb with ckd 4  Urine protein 43, protein /cr ratio 0.3  Not accounting for severe edema and alb 3.9     6.  Anemia  Dose beni if hgb <10  Hgb 11.7 is at target >10       Veto Mcclure MD

## 2022-01-26 NOTE — PLAN OF CARE
Problem: Falls - Risk of:  Goal: Will remain free from falls  Description: Will remain free from falls  1/26/2022 1210 by Katie Thompson RN  Outcome: Not Met This Shift  1/26/2022 0107 by Елена Escobar RN  Outcome: Met This Shift  Goal: Absence of physical injury  Description: Absence of physical injury  1/26/2022 1210 by Katie Thompson RN  Outcome: Not Met This Shift  1/26/2022 0107 by Елена Escobar RN  Outcome: Met This Shift     Problem: OXYGENATION/RESPIRATORY FUNCTION  Goal: Patient will maintain patent airway  1/26/2022 1210 by Katie Thompson RN  Outcome: Not Met This Shift  1/26/2022 0107 by Елена Escobar RN  Outcome: Met This Shift     Problem: HEMODYNAMIC STATUS  Goal: Patient has stable vital signs and fluid balance  1/26/2022 1210 by Katie Thompson RN  Outcome: Not Met This Shift  1/26/2022 0107 by Елена Escobar RN  Outcome: Met This Shift     Problem: Pain:  Goal: Pain level will decrease  Description: Pain level will decrease  1/26/2022 1210 by Katie Thompson RN  Outcome: Not Met This Shift  1/26/2022 0107 by Елена Escobar RN  Outcome: Met This Shift  Goal: Control of acute pain  Description: Control of acute pain  1/26/2022 1210 by Katie Thompson RN  Outcome: Not Met This Shift  1/26/2022 0107 by Елена Escobar RN  Outcome: Met This Shift  Goal: Control of chronic pain  Description: Control of chronic pain  1/26/2022 1210 by Katie Thompson RN  Outcome: Not Met This Shift  1/26/2022 0107 by Елена Escobar RN  Outcome: Met This Shift     Problem: Skin Integrity:  Goal: Will show no infection signs and symptoms  Description: Will show no infection signs and symptoms  1/26/2022 1210 by Katie Thompson RN  Outcome: Not Met This Shift  1/26/2022 0107 by Елена Escobar RN  Outcome: Met This Shift  Goal: Absence of new skin breakdown  Description: Absence of new skin breakdown  1/26/2022 1210 by Katie Thompson RN  Outcome: Not Met This Shift  1/26/2022 0107 by Елена Escobar RN  Outcome: Met This Shift

## 2022-01-26 NOTE — PROGRESS NOTES
Educated patient that he needed to be weighed on standing scale. Pt states, \"I'm not comfortable doing that, my toes are numb and I am in pain. \" Pt refusing standing weight at this time.     Diane Buenrostro RN  6:21 AM

## 2022-01-26 NOTE — PROGRESS NOTES
Progress Note  Date:2022       LDST:1930/3255-A  Patient Yeni Nguyen     YOB: 1945     Age:77 y.o. Patient in deep sleep on bipap. Subjective    Subjective:  Symptoms:  Stable. He reports shortness of breath. No chest pain. Diet:  Adequate intake. Activity level: Impaired due to weakness. Pain:  He reports no pain. Review of Systems   Constitutional: Negative for activity change and fever. HENT: Negative for congestion. Respiratory: Positive for shortness of breath. Cardiovascular: Positive for leg swelling. Negative for chest pain. Gastrointestinal: Negative for abdominal pain. Genitourinary: Negative for difficulty urinating. Neurological: Negative for dizziness. Objective         Vitals Last 24 Hours:  TEMPERATURE:  Temp  Av.3 °F (36.3 °C)  Min: 96.5 °F (35.8 °C)  Max: 97.9 °F (36.6 °C)  RESPIRATIONS RANGE: Resp  Av.3  Min: 18  Max: 20  PULSE OXIMETRY RANGE: SpO2  Av.8 %  Min: 91 %  Max: 93 %  PULSE RANGE: Pulse  Av.7  Min: 102  Max: 103  BLOOD PRESSURE RANGE: Systolic (09UXI), JEU:987 , Min:114 , VR   ; Diastolic (28YEQ), JVW:01, Min:67, Max:79    I/O (24Hr): Intake/Output Summary (Last 24 hours) at 2022 0659  Last data filed at 2022 3563  Gross per 24 hour   Intake 840 ml   Output 3100 ml   Net -2260 ml     Objective:  General Appearance:  Comfortable. Vital signs: (most recent): Blood pressure (!) 142/79, pulse 103, temperature 96.5 °F (35.8 °C), temperature source Temporal, resp. rate 20, height 5' 9\" (1.753 m), weight 298 lb 6.4 oz (135.4 kg), SpO2 93 %. No fever. Lungs:  Normal effort and normal respiratory rate. Breath sounds clear to auscultation. Heart: Normal rate. Regular rhythm. S1 normal and S2 normal.    Extremities: There is local swelling.       Labs/Imaging/Diagnostics    Labs:  CBC:  Recent Labs     22  0656 22  0641   WBC 11.1 15.1*   RBC 4.08 4.01   HGB 11.7* 11.5*   HCT 39.3 37.2   MCV 96.3 92.8   RDW 14.9 14.8    264     CHEMISTRIES:  Recent Labs     01/24/22  0656 01/25/22  0641    139   K 3.8 3.8    101   CO2 21* 22   BUN 35* 42*   CREATININE 2.5* 2.7*   GLUCOSE 85 179*   PHOS 3.9  --      PT/INR:No results for input(s): PROTIME, INR in the last 72 hours. APTT:No results for input(s): APTT in the last 72 hours. LIVER PROFILE:  No results for input(s): AST, ALT, BILIDIR, BILITOT, ALKPHOS in the last 72 hours. Imaging Last 24 Hours:  XR CHEST PORTABLE    Result Date: 1/21/2022  EXAMINATION: ONE XRAY VIEW OF THE CHEST 1/21/2022 10:02 am COMPARISON: None. HISTORY: ORDERING SYSTEM PROVIDED HISTORY: dyspnea TECHNOLOGIST PROVIDED HISTORY: Reason for exam:->dyspnea FINDINGS: The heart is enlarged. There is a dual lead cardiac pacer on the left There is an infiltrate seen within the right lung base. The left lung is clear. There is a trace right pleural effusion. There is no left pleural effusion. 1. Right lower lobe pneumonia 2. Trace right pleural effusion 3. Cardiomegaly     US DUP LOWER EXTREMITIES BILATERAL VENOUS    Result Date: 1/21/2022  EXAMINATION: DUPLEX VENOUS ULTRASOUND OF THE BILATERAL LOWER EXTREMITIES1/21/2022 10:15 am TECHNIQUE: Duplex ultrasound using B-mode/gray scaled imaging, Doppler spectral analysis and color flow Doppler was obtained of the deep venous structures of the lower bilateral extremities. COMPARISON: None. HISTORY: ORDERING SYSTEM PROVIDED HISTORY: discomfort TECHNOLOGIST PROVIDED HISTORY: Reason for exam:->discomfort What reading provider will be dictating this exam?->CRC FINDINGS: The visualized veins of the bilateral lower extremities are patent and free of echogenic thrombus. The veins demonstrate good compressibility with normal color flow study and spectral analysis. No evidence of DVT in either lower extremity.  RECOMMENDATIONS: Unavailable     Assessment//Plan           Hospital Problems Last Modified POA    PNA (pneumonia) 1/21/2022 Yes        Assessment:  ( PNA (pneumonia) 1/21/2022 Yes     CHF  Pleural effusion  Acute renal insfficnecy  DM  COPD  HTN  Hyperlipidemia     ). Plan:   (Renal for diureses. Now on bumex gtt. Pulmonary following, feels less likely pneumonia  Follows cultures  Monitor labs and output. Continue rest of meds. ECHO reviewed. ).

## 2022-01-26 NOTE — PROGRESS NOTES
[place]  Department of Internal Medicine  Division of Pulmonary, Critical Care and Sleep Medicine  Progress Note    Corinne General, DO, MPH, [facility], FACOI, [address] Navneet Read MD, [facility]  [facility] FOR [facility]      Patient: Jody Ellison  MRN: [id number]  : 1945    Encounter Time: 3:38 PM     Date of Admission: 2022  9:41 AM    Primary Care Physician: Anna Orozco MD    Reason for Consultation: Respiratory Failure     SUBJECTIVE:    Echo noted  Still the same clinically dispite diuresis of 4 liters        OBJECTIVE:     PHYSICAL EXAM:   VITALS:   Vitals:    22 0747 22 0814 22 1309 22 1345   BP: 121/88   125/78   Pulse: 99   92   Resp: 20 16 16 16   Temp: 97 °F (36.1 °C)   97 °F (36.1 °C)   TempSrc: Temporal   Temporal   SpO2: 92% 96% 97%    Weight:       Height:            Intake/Output Summary (Last 24 hours) at 2022 1538  Last data filed at 2022 1431  Gross per 24 hour   Intake 600 ml   Output 3350 ml   Net -2750 ml        CONSTITUTIONAL:    Alert X 3, NAD  SKIN:     No rash, Skin discoloration  HEENT:     EOMI, MMM, No thrush  NECK:    No bruits, No JVP apprechiated  CV:      Sinus,  No murmur, No rubs, No gallops  PULMONARY:   Couse BS,  No Wheezing, No Rales, No Rhonchi      No noted egophony  ABDOMEN:     Soft, non-tender. BS normal. No R/R/G  EXT:    No deformities . No clubbing.       ++ lower extremity edema, + venous stasis  PULSE:   Diminished palpable.   PSYCHIATRIC:  Seems appropriate, No acute psycosis  MS:    No fractures, No gross weakness  NEUROLOGIC:   Non-focal     DATA: IMAGING & TESTING:     LABORATORY TESTS:    CBC:   Lab Results   Component Value Date    WBC 15.1 2022    RBC 4.01 2022    HGB 11.7 2022    HCT 37.6 2022    MCV 93.8 2022    MCH 29.2 2022    MCHC 31.1 2022    RDW 15.0 2022     2022    MPV 10.0 2022     CMP:    Lab Results   Component Value Date     01/26/2022    K 3.7 01/26/2022     01/26/2022    CO2 21 01/26/2022    BUN 53 01/26/2022    CREATININE 3.0 01/26/2022    GFRAA 25 01/26/2022    LABGLOM 20 01/26/2022    GLUCOSE 122 01/26/2022    PROT 6.9 01/21/2022    LABALBU 3.9 01/21/2022    CALCIUM 8.4 01/26/2022    BILITOT 0.6 01/21/2022    ALKPHOS 127 01/21/2022    AST 32 01/21/2022    ALT 42 01/21/2022     TSH:    Lab Results   Component Value Date    TSH 2.310 01/22/2022        PRO-BNP:   Lab Results   Component Value Date    PROBNP 2,357 (H) 01/23/2022    PROBNP 2,419 (H) 01/21/2022      ABGs: No results found for: PH, PO2, PCO2  Hemoglobin A1C: No components found for: HGBA1C    IMAGING:  Imaging tests were completed and reviewed and discussed radiology and care team involved and reveals   XR CHEST PORTABLE    Result Date: 1/21/2022  EXAMINATION: ONE XRAY VIEW OF THE CHEST 1/21/2022 10:02 am COMPARISON: None. HISTORY: ORDERING SYSTEM PROVIDED HISTORY: dyspnea TECHNOLOGIST PROVIDED HISTORY: Reason for exam:->dyspnea FINDINGS: The heart is enlarged. There is a dual lead cardiac pacer on the left There is an infiltrate seen within the right lung base. The left lung is clear. There is a trace right pleural effusion. There is no left pleural effusion. 1. Right lower lobe pneumonia 2. Trace right pleural effusion 3. Cardiomegaly     US DUP LOWER EXTREMITIES BILATERAL VENOUS    Result Date: 1/21/2022  No evidence of DVT in either lower extremity. RECOMMENDATIONS: Unavailable       Assessment:   1. Respiratory Failure  2. Polio as a child (two week)  3. 35 pack years quit in 1986  4. Agent orange exposure  5. Pacer  6. DM  7. HTN  8. HLD  9. T2DM insulin requiring with nephropathy  10. CKD follows with Dr Shamika Armstrong  6. COPD  15. GERD  13. 1/2003 CAD s/p PCI RCA (Express stent)  14. 6/2010 PPM BSCI  15.  SPEEDY compliant with C-pap  16. 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 2015 PUD/GIB required 2 units PRBC (ASA was stopped @ that time)  18. October 2021 TTE @ [facility] (will attempt to get records)      Plan:   1. CPAP at night, may use home machine  2. Bronchodilators for COPD   3. Needs more fluid removal, bumex gtt  4. Doubt pnumonia - check cultures, + Atelectasis, Abx remain. All cultures negative will stop Abx  5. HF clinic on discharge  6. COPD by report - get PFTs pending  7. Consider RHC for assessment of filling pressures  8.  Awaiting testing and outside information        Abena Chávez DO DO, MPH, [facility]  Professor of Internal Medicine  Pulmonary, Critical Care and Sleep Medicine

## 2022-01-27 NOTE — PLAN OF CARE
Problem: Falls - Risk of:  Goal: Will remain free from falls  Description: Will remain free from falls  1/27/2022 0118 by Fernando Brownlee RN  Outcome: Met This Shift  1/26/2022 1210 by Isaias Corral RN  Outcome: Not Met This Shift  Goal: Absence of physical injury  Description: Absence of physical injury  1/27/2022 0118 by Fernando Brownlee RN  Outcome: Met This Shift  1/26/2022 1210 by Isaias Corral RN  Outcome: Not Met This Shift     Problem: OXYGENATION/RESPIRATORY FUNCTION  Goal: Patient will maintain patent airway  1/27/2022 0118 by Fernando Brownlee RN  Outcome: Met This Shift  1/26/2022 1210 by Isaias Corral RN  Outcome: Not Met This Shift     Problem: HEMODYNAMIC STATUS  Goal: Patient has stable vital signs and fluid balance  1/27/2022 0118 by Fernando Brownlee RN  Outcome: Met This Shift  1/26/2022 1210 by Isaias Corral RN  Outcome: Not Met This Shift     Problem: Pain:  Goal: Pain level will decrease  Description: Pain level will decrease  1/27/2022 0118 by Fernando Brownlee RN  Outcome: Met This Shift  1/26/2022 1210 by Isaias Corral RN  Outcome: Not Met This Shift  Goal: Control of acute pain  Description: Control of acute pain  1/27/2022 0118 by Fernando Brownlee RN  Outcome: Met This Shift  1/26/2022 1210 by Isaias Corral RN  Outcome: Not Met This Shift  Goal: Control of chronic pain  Description: Control of chronic pain  1/27/2022 0118 by Fernando Brownlee RN  Outcome: Met This Shift  1/26/2022 1210 by Isaias Corral RN  Outcome: Not Met This Shift

## 2022-01-27 NOTE — CARE COORDINATION
Pt continued on Bumex gtt, IV Levaquin Q 48 hours, 4 liters oxygen with sat of 96%, BUN 56, Cr 2.8 today; discharge plan is to return home with sister to transport per SW/RD; therapy ordered today to verify discharge plan.

## 2022-01-27 NOTE — PROGRESS NOTES
Physical Therapy  Physical Therapy Initial Assessment     Name: Joaquina Rasheed  : 1945  MRN: [id number]      Date of Service: 2022    Evaluating PT:  Sandra Ahuja, PT, DPT    Room #:  5591/4594-U  Diagnosis:  Cardiomegaly [I51.7]  Acute renal insufficiency [N28.9]  Pleural effusion, right [J90]  PNA (pneumonia) [J18.9]  Elevated d-dimer [R79.89]  Pneumonia of right lower lobe due to infectious organism [J18.9]  Acute on chronic congestive heart failure, unspecified heart failure type ([facility] [address] [I50.9]  PMHx/PSHx:  CHF, COPD, diabetes, agent orange exposure, pacemaker, polio, SPEEDY  Procedure/Surgery:  N/A  Precautions:  Fall risk, O2  Equipment Needs:  TBD    SUBJECTIVE:    Pt lives alone in a 1 story home with ramp entry. Bed/bath is on 1st floor. Pt ambulated with no AD PTA. OBJECTIVE:   Initial Evaluation  Date: 22 Treatment Short Term/ Long Term   Goals   AM-PAC 6 Clicks      Was pt agreeable to Eval/treatment? yes     Does pt have pain?  8/10 BLE     Bed Mobility  Rolling: max A  Supine to sit: max A  Sit to supine: max A  Scooting: max A  Rolling: mod I  Supine to sit: mod I  Sit to supine: mod I  Scooting: mod I   Transfers Sit to stand: unable to clear bed during attempt  Stand to sit: NT  Stand pivot: NT  Sit to stand: mod I  Stand to sit: mod I  Stand pivot: mod I with AAD   Ambulation    NT  50'+ with AAD mod I   Stair negotiation: ascended and descended  NT  make stair goal as appropriate     Strength/ROM:   BLE grossly 2/5  BLE AROM limited by pain and swelling    Balance:   Static Sitting: SBA  Dynamic Sitting: CGA  Static Standing: NT  Dynamic Standing: NT    Pt is A & O x 3  Sensation:  Pt denies numbness and tingling to extremities  Edema:  BLE 2+    Vitals:  SpO2 at bedside 91-93% on 4L     Therapeutic Exercises:    Bed mobility: supine<>sit, cued for EOB positioning  BLE AROM    Patient education  Pt educated on role of PT, importance of functional mobility during hospital stay, safety with bed mobility    Patient response to education:   Pt verbalized understanding Pt demonstrated skill Pt requires further education in this area   yes partial yes     ASSESSMENT:    Conditions Requiring Skilled Therapeutic Intervention:    [x]Decreased strength     [x]Decreased ROM  [x]Decreased functional mobility  [x]Decreased balance   [x]Decreased endurance   []Decreased posture  []Decreased sensation  []Decreased coordination   []Decreased vision  [x]Decreased safety awareness   [x]Increased pain       Comments:    Pt supine in bed upon entering, agreeable to participate. Pt reporting increased pain in BLE, L more sensitive than R. Pt assisted to EOB with support of BLE, increased time required 2/2 pt's pain, pt required significant assist with trunk, difficulty engaging abdominal musculature to assist with transfer. Pt sitting upright with good static sitting balance, initially SOB which improved with short rest period. Pt agreeable to attempt to stand after sitting at EOB for ~10'. Pt cued for hand placement and BLE positioning. Pt unable to clear bed with bed at lowest height and elevated for 2nd attempt. Pt was assisted back to supine, positioned for comfort. Pt's call bell was in recah and all needs met prior to exiting. Treatment:  Patient practiced and was instructed in the following treatment:     Bed mobility training - pt given verbal and tactile cues to facilitate proper sequencing and safety during rolling and supine<>sit as well as provided with physical assistance to complete task    Skilled positioning - Pt placed in the chair position with pillows utilized to facilitate upright posture, joint and skin integrity, and interaction with environment. Pt's/ family goals   1. Return home    Prognosis is fair for reaching above PT goals. Patient and or family understand(s) diagnosis, prognosis, and plan of care.   yes    PHYSICAL THERAPY PLAN OF CARE:    PT POC is established based on physician order and patient diagnosis     Referring provider/PT Order:  Toy Carpio MD  Diagnosis:  Cardiomegaly [I51.7]  Acute renal insufficiency [N28.9]  Pleural effusion, right [J90]  PNA (pneumonia) [J18.9]  Elevated d-dimer [R79.89]  Pneumonia of right lower lobe due to infectious organism [J18.9]  Acute on chronic congestive heart failure, unspecified heart failure type ([address] [I50.9]  Specific instructions for next treatment:  Progress as tolerated, bed mobility and transfer training    Current Treatment Recommendations:     [x] Strengthening to improve independence with functional mobility   [x] ROM to improve independence with functional mobility   [x] Balance Training to improve static/dynamic balance and to reduce fall risk  [x] Endurance Training to improve activity tolerance during functional mobility   [x] Transfer Training to improve safety and independence with all functional transfers   [x] Gait Training to improve gait mechanics, endurance and assess need for appropriate assistive device  [] Stair Training in preparation for safe discharge home and/or into the community   [x] Positioning to prevent skin breakdown and contractures  [x] Safety and Education Training   [x] Patient/Caregiver Education   [] HEP  [] Other     PT long term treatment goals are located in above grid    Frequency of treatments: 2-5x/week x 1-2 weeks. Time in  1310  Time out  1343    Total Treatment Time  23 minutes     Evaluation Time includes thorough review of current medical information, gathering information on past medical history/social history and prior level of function, completion of standardized testing/informal observation of tasks, assessment of data and education on plan of care and goals.     CPT codes:  [x] Low Complexity PT evaluation 15283  [] Moderate Complexity PT evaluation 58489  [] High Complexity PT evaluation 92079  [] PT Re-evaluation 38201  [] Gait training 41880 -- minutes  [] Manual therapy 49379 -- minutes  [x] Therapeutic activities 42888 23 minutes  [] Therapeutic exercises 06924 -- minutes  [] Neuromuscular reeducation 08976 -- minutes     Raquel Marcelino, PT, DPT  [id number]

## 2022-01-27 NOTE — PROGRESS NOTES
Progress Note  Date:2022       [facility]:[id number]  Patient Suzy Campo     YOB: 1945     Age:77 y.o. Patient in deep sleep on bipap. Subjective    Subjective:  Symptoms:  Stable. He reports shortness of breath. No chest pain. Diet:  Adequate intake. Activity level: Impaired due to weakness. Pain:  He reports no pain. Review of Systems   Constitutional: Negative for activity change and fever. HENT: Negative for congestion. Respiratory: Positive for shortness of breath. Cardiovascular: Positive for leg swelling. Negative for chest pain. Gastrointestinal: Negative for abdominal pain. Genitourinary: Negative for difficulty urinating. Neurological: Negative for dizziness. Objective         Vitals Last 24 Hours:  TEMPERATURE:  Temp  Av.3 °F (36.3 °C)  Min: 97 °F (36.1 °C)  Max: 97.7 °F (36.5 °C)  RESPIRATIONS RANGE: Resp  Av.9  Min: 16  Max: 24  PULSE OXIMETRY RANGE: SpO2  Av.8 %  Min: 9 %  Max: 97 %  PULSE RANGE: Pulse  Av  Min: 92  Max: 103  BLOOD PRESSURE RANGE: Systolic (85VGH), UCS:475 , Min:104 , JOHN:489   ; Diastolic (21DFQ), OFD:00, Min:78, Max:92    I/O (24Hr): Intake/Output Summary (Last 24 hours) at 2022 0645  Last data filed at 2022 0612  Gross per 24 hour   Intake 360 ml   Output 4875 ml   Net -4515 ml     Objective:  General Appearance:  Comfortable. Vital signs: (most recent): Blood pressure (!) 127/92, pulse 102, temperature 97.6 °F (36.4 °C), temperature source Temporal, resp. rate 18, height 5' 9\" (1.753 m), weight 293 lb 14.4 oz (133.3 kg), SpO2 96 %. No fever. Lungs:  Normal effort and normal respiratory rate. Breath sounds clear to auscultation. Heart: Normal rate. Regular rhythm. S1 normal and S2 normal.    Extremities: There is local swelling.       Labs/Imaging/Diagnostics    Labs:  CBC:  Recent Labs     22  0656 22  0641 22  0600   WBC 11.1 15.1* 15.1*   RBC 4.08 4.01 4.01   HGB 11.7* 11.5* 11.7*   HCT 39.3 37.2 37.6   MCV 96.3 92.8 93.8   RDW 14.9 14.8 15.0    264 287     CHEMISTRIES:  Recent Labs     01/24/22  0656 01/25/22  0641 01/26/22  0600    139 141   K 3.8 3.8 3.7    101 103   CO2 21* 22 21*   BUN 35* 42* 53*   CREATININE 2.5* 2.7* 3.0*   GLUCOSE 85 179* 122*   PHOS 3.9  --   --      PT/INR:No results for input(s): PROTIME, INR in the last 72 hours. APTT:No results for input(s): APTT in the last 72 hours. LIVER PROFILE:  No results for input(s): AST, ALT, BILIDIR, BILITOT, ALKPHOS in the last 72 hours. Imaging Last 24 Hours:  XR CHEST PORTABLE    Result Date: 1/21/2022  EXAMINATION: ONE XRAY VIEW OF THE CHEST 1/21/2022 10:02 am COMPARISON: None. HISTORY: ORDERING SYSTEM PROVIDED HISTORY: dyspnea TECHNOLOGIST PROVIDED HISTORY: Reason for exam:->dyspnea FINDINGS: The heart is enlarged. There is a dual lead cardiac pacer on the left There is an infiltrate seen within the right lung base. The left lung is clear. There is a trace right pleural effusion. There is no left pleural effusion. 1. Right lower lobe pneumonia 2. Trace right pleural effusion 3. Cardiomegaly     US DUP LOWER EXTREMITIES BILATERAL VENOUS    Result Date: 1/21/2022  EXAMINATION: DUPLEX VENOUS ULTRASOUND OF THE BILATERAL LOWER EXTREMITIES1/21/2022 10:15 am TECHNIQUE: Duplex ultrasound using B-mode/gray scaled imaging, Doppler spectral analysis and color flow Doppler was obtained of the deep venous structures of the lower bilateral extremities. COMPARISON: None. HISTORY: ORDERING SYSTEM PROVIDED HISTORY: discomfort TECHNOLOGIST PROVIDED HISTORY: Reason for exam:->discomfort What reading provider will be dictating this exam?->CRC FINDINGS: The visualized veins of the bilateral lower extremities are patent and free of echogenic thrombus. The veins demonstrate good compressibility with normal color flow study and spectral analysis.      No evidence of DVT in either lower extremity. RECOMMENDATIONS: Unavailable     Assessment//Plan           Hospital Problems           Last Modified POA    PNA (pneumonia) 1/21/2022 Yes        Assessment:  ( PNA (pneumonia) 1/21/2022 Yes     CHF  Pleural effusion  Acute renal insfficnecy  DM  COPD  HTN  Hyperlipidemia     ). Plan:   (Renal for diureses. Now on bumex gtt. Considering dialysis as needed. Pulmonary following, feels less likely pneumonia  Follows cultures  Monitor labs and output. Continue rest of meds. ECHO reviewed. ).

## 2022-01-27 NOTE — PROGRESS NOTES
[place]  Department of Internal Medicine  Division of Pulmonary, Critical Care and Sleep Medicine  Progress Note    Sally Davison DO, MPH, FCCP, FACOI, FACP  Mauricio Stokes MD, [facility]  Dianne Irene DO, [facility]      Patient: Deysi Dickson  MRN: [id number]  : 1945    Encounter Time: 4:24 PM     Date of Admission: 2022  9:41 AM    Primary Care Physician: Emily Alonzo MD    Reason for Consultation: Respiratory Failure     SUBJECTIVE:    Echo noted  Still the same clinically dispite diuresis of - 11 liters now  Cr 2.8        OBJECTIVE:     PHYSICAL EXAM:   VITALS:   Vitals:    22 0615 22 0845 22 0941 22 1530   BP:  131/81  119/66   Pulse:  104  98   Resp:  20 16 18   Temp:  96.9 °F (36.1 °C)  96.9 °F (36.1 °C)   TempSrc:  Temporal  Temporal   SpO2:  92% 96% 92%   Weight: 293 lb 14.4 oz (133.3 kg)      Height:            Intake/Output Summary (Last 24 hours) at 2022 1624  Last data filed at 2022 1136  Gross per 24 hour   Intake 80 ml   Output 4075 ml   Net -3995 ml        CONSTITUTIONAL:    Alert X 3, NAD  SKIN:     No rash, Skin discoloration  HEENT:     EOMI, MMM, No thrush  NECK:    No bruits, No JVP apprechiated  CV:      Sinus,  No murmur, No rubs, No gallops  PULMONARY:   Couse BS,  No Wheezing, No Rales, No Rhonchi      No noted egophony  ABDOMEN:     Soft, non-tender. BS normal. No R/R/G  EXT:    No deformities . No clubbing.       ++ lower extremity edema, + venous stasis  PULSE:   Diminished palpable.   PSYCHIATRIC:  Seems appropriate, No acute psycosis  MS:    No fractures, No gross weakness  NEUROLOGIC:   Non-focal     DATA: IMAGING & TESTING:     LABORATORY TESTS:    CBC:   Lab Results   Component Value Date    WBC 13.0 2022    RBC 4.18 2022    HGB 11.9 2022    HCT 38.8 2022    MCV 92.8 2022    MCH 28.5 2022    MCHC 30.7 2022    RDW 15.3 2022     required XRT  17. 2015 PUD/GIB required 2 units PRBC (ASA was stopped @ that time)  18. October 2021 TTE @ [facility] (will attempt to get records)      Plan:   1. CPAP at night, may use home machine  2. Bronchodilators for COPD   3. Needs more fluid removal, bumex gtt  4. Doubt pnumonia - check cultures, + Atelectasis, Abx remain. All cultures negative will stop Abx  5. HF clinic on discharge  6. COPD by report - get PFTs pending  7. Consider RHC for assessment of filling pressures  8.  Awaiting testing and outside information        Eleonora Sena DO DO, MPH, [facility] Patient  Professor of Internal Medicine  Pulmonary, Critical Care and Sleep Medicine

## 2022-01-27 NOTE — PLAN OF CARE
Problem: OXYGENATION/RESPIRATORY FUNCTION  Goal: Patient will maintain patent airway  1/27/2022 0121 by Earnest Burk RN  Outcome: Met This Shift  1/27/2022 0118 by Earnest Burk RN  Outcome: Met This Shift  1/26/2022 1210 by Ronan Doherty RN  Outcome: Not Met This Shift     Problem: HEMODYNAMIC STATUS  Goal: Patient has stable vital signs and fluid balance  1/27/2022 0121 by Earnest Burk RN  Outcome: Met This Shift  1/27/2022 0118 by Earnest Burk RN  Outcome: Met This Shift  1/26/2022 1210 by Ronan Doherty RN  Outcome: Not Met This Shift     Problem: Pain:  Goal: Pain level will decrease  Description: Pain level will decrease  1/27/2022 0121 by Earnest Burk RN  Outcome: Met This Shift  1/27/2022 0118 by Earnest Burk RN  Outcome: Met This Shift  1/26/2022 1210 by Ronan Doherty RN  Outcome: Not Met This Shift  Goal: Control of acute pain  Description: Control of acute pain  1/27/2022 0121 by Earnest Burk RN  Outcome: Met This Shift  1/27/2022 0118 by Earnest Burk RN  Outcome: Met This Shift  1/26/2022 1210 by Ronan Doherty RN  Outcome: Not Met This Shift  Goal: Control of chronic pain  Description: Control of chronic pain  1/27/2022 0121 by Earnest Burk RN  Outcome: Met This Shift  1/27/2022 0118 by Earnest Burk RN  Outcome: Met This Shift  1/26/2022 1210 by Ronan Doherty RN  Outcome: Not Met This Shift     Problem: Skin Integrity:  Goal: Will show no infection signs and symptoms  Description: Will show no infection signs and symptoms  1/27/2022 0121 by Earnest Burk RN  Outcome: Met This Shift  1/27/2022 0118 by Earnest Burk RN  Outcome: Met This Shift  1/26/2022 1210 by Ronan Doherty RN  Outcome: Not Met This Shift  Goal: Absence of new skin breakdown  Description: Absence of new skin breakdown  1/27/2022 0121 by Earnest Burk RN  Outcome: Met This Shift  1/27/2022 0118 by Earnest Burk RN  Outcome: Met This Shift  1/26/2022 1210 by Ronan Doherty RN  Outcome: Not Met This Shift

## 2022-01-27 NOTE — PROGRESS NOTES
[address]       Date:2022                                                  Patient Name: Katiuska Ron  MRN: [id number]  : 1945  Room: [address] [place] [address]    Evaluating OT: Yanci Tovar OTR/L #[id number]    Referring Provider and Specific Provider Orders/Date:      22  OT eval and treat  Start:  22,   End:  22,   ONE TIME,   Standing Count:  1 Occurrences,   Nikolay Gama MD     Diagnosis: Cardiomegaly [I51.7]  Acute renal insufficiency [N28.9]  Pleural effusion, right [J90]  PNA (pneumonia) [J18.9]  Elevated d-dimer [R79.89]  Pneumonia of right lower lobe due to infectious organism [J18.9]  Acute on chronic congestive heart failure, unspecified heart failure type ([facility] [I50.9]     Surgery: None     Pertinent Medical History:  has a past medical history of Acid reflux, Agent orange exposure, Congestive heart failure (CHF) ([facility] COPD (chronic obstructive pulmonary disease) ([facility] Depression, Diabetes mellitus ([facility] Hyperlipidemia, Hypertension, MI (myocardial infarction) ([facility] Polio, and Sleep apnea.        Precautions:  Fall Risk, falls and alarm, BiPAP, O2     Assessment of current deficits   [x] Functional mobility  [x]ADLs  [x] Strength               []Cognition   [x] Functional transfers   [x] IADLs         [x] Safety Awareness   []Endurance   [] Fine Coordination              [x] Balance      [] Vision/perception   []Sensation    []Gross Motor Coordination  [] ROM  [] Delirium                   [] Motor Control     OT PLAN OF CARE   OT POC based on physician orders, patient diagnosis and results of clinical assessment    Frequency/Duration: 1-3 days/wk for 2 weeks PRN   Specific OT Treatment to include:   * Instruction/training on adapted ADL techniques and AE recommendations to increase functional independence within precautions       * Training on energy conservation strategies, correct breathing pattern and techniques to improve independence/tolerance for self-care routine  * Functional transfer/mobility training/DME recommendations for increased independence, safety, and fall prevention  * Patient/Family education to increase follow through with safety techniques and functional independence  * Recommendation of environmental modifications for increased safety with functional transfers/mobility and ADLs  * Therapeutic exercise to improve motor endurance, ROM, and functional strength for ADLs/functional transfers  * Therapeutic activities to facilitate/challenge dynamic balance, stand tolerance for increased safety and independence with ADLs  * Positioning to improve skin integrity, interaction with environment and functional independence  * Manual techniques for edema management    Recommended Adaptive Equipment: TBD      Home Living: Lives alone, single family home, 1 story, ramp to enter. Basement dwelling. Bathroom set-up: Walk-in shower with built-in seat        Equipment owned: wheeled walker, cane. Prior Level of Function: Pt reports being independent with ADLs at baseline, sister assists with most IADLs; ambulated independently without AD. Driving: N/a  Occupation: Retired   Enjoys: N/a     Pain Level: Pt reports pain in B LEs 2/2 edema; Nursing aware.       Cognition: A&O: 4/4; Follows 2-3 step directions   Memory: intact   Sequencing: fair    Problem solving: fair    Judgement/safety: fair     Functional Assessment:  AM-PAC Daily Activity Raw Score: 13/24   Initial Eval Status  Date: 1/27/22 Treatment Status  Date: STGs = LTGs  Time frame: 10-14 days   Feeding Independent     Independent    Grooming Minimal Assist   for denture/oral care; set-up provided    Supervision    UB Dressing Moderate Assist     Supervision    LB Dressing Dependent   LE edema present     Moderate Assist    Bathing Moderate/Maximal Assist     Minimal Assist    Toileting Dependent     Moderate Assist    Bed Mobility  Supine to sit: Pt declined   Sit to supine: Pt declined   Rolling: Maximal Assist x1-2  Supine to sit: Moderate Assist   Sit to supine: Moderate Assist    Functional Transfers  NT     Moderate Assist with use of walker      Functional Mobility  NT    Moderate Assist with use of walker    Balance Sitting:     Static: n/t    Dynamic: n/t  Standing: n/t     Sitting:     Static: SBA    Dynamic: SBA  Standing: Min/Mod A with walker   Activity Tolerance fair  minus; limited by pain   Increase standing tolerance >1-2 minutes for improved engagement with functional transfers and indep in ADLs   Visual/  Perceptual Glasses: Yes     Reports changes in vision since admission: No      NA      Hand Dominance: Right      AROM (PROM) Strength Additional Info:    RUE  WFL 4-/5 Good  and wfl FMC/dexterity noted during ADL tasks     LUE WFL 4-/5 Good  and wfl FMC/dexterity noted during ADL tasks       Hearing:  WFL   Sensation:   No c/o numbness or tingling  Tone:  WFL   Edema: B LEs     Comments: RN cleared patient for OT. Upon arrival patient in supine. Therapist facilitated and instructed pt on adapted  techniques & compensatory strategies to improve safety and independence with basic ADLs, bed mobility to allow pt to achieve highest level of independence and safely. Pt demonstrated fair understanding of education & follow through. Pt declined supine to sit d/t just working with PT and increase pain in B LEs. At end of session, patient was supine with call light and phone within reach, all lines and tubes intact. Overall, patient demonstrated  decreased independence and safety during completion of ADL tasks. Pt would benefit from continued skilled OT to increase safety and independence with completion of ADL tasks and functional mobility for improved quality of life.       Treatment: OT treatment provided this date includes:    Instruction/training on safety and adapted techniques for completion of ADLs; completed oral care/grooming while supine with HOB elevated   Proper Positioning/Alignment; discussed repositioning to prevent skin breakdown and improve interaction within environment '   Therapist facilitated bed mobility (rolling) for adjustment of chux pad/TAPS; assist x1-2 required     Rehab Potential: Good for established goals. LTG: maximize independence with ADLs to return to PLOF     Patient / Family Goal: Not stated       Patient and/or family were instructed on functional diagnosis, prognosis/goals and OT plan of care. Demonstrated fair understanding. Eval Complexity: Low    Time In: 2:00 PM   Time Out: 2:23 PM    Total Treatment Time: 8       Min Units   OT Eval Low 97165  X  1    OT Eval Medium 10848      OT Eval High 38495      OT Re-Eval 58244            ADL/Self Care 71920 8 1   Therapeutic Activities 77146       Therapeutic Ex 51215       Orthotic Management 72537       Manual 86392     Neuro Re-Ed 17648       Non-Billable Time        Evaluation Time additionally includes thorough review of current medical information, gathering information on past medical history/social history and prior level of function, interpretation of standardized testing/informal observation of tasks, assessment of data and development of plan of care and goals.         Evaluating OT: Candido Singh OTR/L #[id number]

## 2022-01-27 NOTE — PROGRESS NOTES
[facility]  Nephrology Attending Progress Note  Jose Angel Awkward. Mavis Galvez MD        SUBJECTIVE:   From 1/23 HPI: Evangelina Edwards pt is a 66 yo male who was seen in our office in June 2021 for ckd. Cr was 2.8 without previous values. He has a pmh of htn, pacer, lymphoma hyperlipidemia, dm, copd, cad sp pci rca, gerd, mame on cpap, HFpEF. He is admitted now for le edema and sob. He said it started after starting ozempic in dec. He received iv lasix in the er. cxr is showing rll pneumonia. He has been started on iv lasix. He has live in [facility] and [facility] and we dont have records here. He said he didn't know he had ckd [facility]. \"    1/24/22:  Sitting up in chair. States he is feeling better. Denies sob, chest pain. 1/25: pt seen in room, on bumex drip, in chair, no sob but legs weeping    1/26: pt seen in room, continues on bumex drip. 1/27: No adverse events overnight  - continues on Bumex gtt, IV Levaquin Q 48 hours, 4 liters oxygen with sat of 96%      PROBLEM LIST:    Patient Active Problem List   Diagnosis    PNA (pneumonia)        PAST MEDICAL HISTORY:    Past Medical History:   Diagnosis Date    Acid reflux     Agent orange exposure     Congestive heart failure (CHF) (HCC)     COPD (chronic obstructive pulmonary disease) (HCC)     Depression     Diabetes mellitus (HCC)     Hyperlipidemia     Hypertension     MI (myocardial infarction) ([facility]     Polio     Sleep apnea        DIET:    ADULT DIET; Regular; 4 carb choices (60 gm/meal);  Low Sodium (2 gm)     PHYSICAL EXAM:     Patient Vitals for the past 24 hrs:   BP Temp Temp src Pulse Resp SpO2 Weight   01/27/22 0941     16 96 %    01/27/22 0845 131/81 96.9 °F (36.1 °C) Temporal 104 20 92 %    01/27/22 0615       293 lb 14.4 oz (133.3 kg)   01/26/22 2340 (!) 127/92 97.6 °F (36.4 °C) Temporal 102 18 96 %    01/26/22 2212      (!) 9 %    01/26/22 2123 104/89 97.7 °F (36.5 °C)  103 17 94 %    01/26/22 5110 [facility]  24 94 %    01/26/22 1552     16 92 %    01/26/22 1345 125/78 97 °F (36.1 °C) Temporal 92 16     01/26/22 1309     16 97 %    @      Intake/Output Summary (Last 24 hours) at 1/27/2022 1010  Last data filed at 1/27/2022 0845  Gross per 24 hour   Intake 80 ml   Output 4875 ml   Net -4795 ml         Wt Readings from Last 3 Encounters:   01/27/22 293 lb 14.4 oz (133.3 kg)   10/19/20 270 lb (122.5 kg)   09/06/19 285 lb (129.3 kg)       General appearance:  In no acute distress  Skin: No rashes or lesions on exposed skin  Neck: No JVD  Lungs: Clear, no adventitious sounds  Heart: RRR, no rub  Abdomen: Soft, non-tender, + bowel sounds  Extremities: 4+ edema  Neurologic: Mental status: Alert, oriented, thought content appropriate      MEDS (scheduled):    insulin glargine-yfgn  50 Units SubCUTAneous Nightly    metoprolol succinate  25 mg Oral Daily    pantoprazole  40 mg Oral Daily    atorvastatin  40 mg Oral Nightly    insulin lispro  0-6 Units SubCUTAneous TID WC    insulin lispro  0-3 Units SubCUTAneous Nightly    ipratropium  0.5 mg Nebulization 4x daily    aspirin  81 mg Oral Daily    levofloxacin  250 mg IntraVENous Q48H       MEDS (infusions):   bumetanide 0.1 mg/mL infusion 2 mg/hr (01/27/22 0315)    dextrose         MEDS (prn):  guaiFENesin-dextromethorphan, HYDROcodone 5 mg - acetaminophen, guaiFENesin, loperamide, glucose, dextrose, glucagon (rDNA), dextrose, perflutren lipid microspheres    DATA:    Recent Labs     01/25/22  0641 01/26/22  0600 01/27/22  0532   WBC 15.1* 15.1* 13.0*   HGB 11.5* 11.7* 11.9*   HCT 37.2 37.6 38.8   MCV 92.8 93.8 92.8    287 323     Recent Labs     01/25/22  0641 01/26/22  0600 01/27/22  0532    141 140   K 3.8 3.7 3.5    103 98   CO2 22 21* 26   BUN 42* 53* 56*   CREATININE 2.7* 3.0* 2.8*   LABGLOM 23 20 22   GLUCOSE 179* 122* 117*   CALCIUM 8.9 8.4* 8.6       Lab Results   Component Value Date    [facility] 3.9 01/21/2022     Lab Results   Component Value Date    TSH 2.310 01/22/2022       Iron Studies  No results found for: IRON, TIBC, FERRITIN  No results found for: [id number]  No results found for: FOLATE    No results found for: VITD25  PTH   Date Value Ref Range Status   01/24/2022 112 (H) 15 - 65 pg/mL Final       No components found for: URIC    Lab Results   Component Value Date    COLORU Yellow 01/21/2022    NITRU Negative 01/21/2022    GLUCOSEU Negative 01/21/2022    KETUA Negative 01/21/2022    UROBILINOGEN 0.2 01/21/2022    BILIRUBINUR Negative 01/21/2022       No results found for: LIPIDPAN    Narrative   EXAMINATION:   RETROPERITONEAL ULTRASOUND OF THE KIDNEYS AND URINARY BLADDER       1/23/2022       COMPARISON:   None       HISTORY:   ORDERING SYSTEM PROVIDED HISTORY: karen, r/o hydronephrosis   TECHNOLOGIST PROVIDED HISTORY:   Reason for exam:->karen, r/o hydronephrosis   What reading provider will be dictating this exam?->CRC       FINDINGS:       Kidneys:       There is very limited visualization.  Left kidney was not seen.  Right has no   hydronephrosis estimated 10.3 cm length, possible parenchymal thinning. Apparent ascites in the perihepatic location partially seen           Bladder:       Was not seen, and report of catheter present.           Impression   1. Very limited visualization. 2. The right kidney shows no hydronephrosis, the left was not seen. 3. Ascites incidentally seen.           IMPRESSION/RECOMMENDATIONS:      1. ckd 4  Cr at 2.5 >3.0  Last cr was 2.8 in June 2021  Not much records here, is from [employer] shows no hydro on right, left not seen  pth 112, po4 3.9  Renal function remains steady in the setting of aggressive IV diuresis  Plan for dialysis if renal fn worsens or if doesn't diurese (pt agrees)  Continue to follow daily labs and strict intake and output     2. Volume overload  H/o stent to rca 2013  Currently on bumex drip  Net (-) 11 L since admission   Continue      3.  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 Copd     5. Dm  No ace arb with ckd 4  Urine protein 43, protein /cr ratio 0.3  Not accounting for severe edema and alb 3.9     6.  Anemia  Dose beni if hgb <10  Hgb 11.9 is at target >10       GILMAR Wetzel - CNP   Pt seen and examined agree with above  Cr at 2.8  Continue iv diuresis  Chino Flanagan MD

## 2022-01-28 NOTE — PROGRESS NOTES
Comprehensive Nutrition Assessment    Type and Reason for Visit:  Initial (LOS)    Nutrition Recommendations/Plan: No nutritional supplementation recommended at this time. Nutrition Assessment:  Patients po intake seems to be adequate, averaging % of meals served ; adm w/ SOB and cardiomegaly ; noted PNA and pleural effusion ; hx of lymphoma and agent orange exposure ; hx of DM/COPD/CHF/CKD ; no ONS recommended at this time    Malnutrition Assessment:  Malnutrition Status:  No malnutrition    Context:  Acute Illness     Findings of the 6 clinical characteristics of malnutrition:  Energy Intake:  No significant decrease in energy intake  Weight Loss:  No significant weight loss     Body Fat Loss:  No significant body fat loss     Muscle Mass Loss:  No significant muscle mass loss    Fluid Accumulation:  No significant fluid accumulation     Strength:  Not Performed    Estimated Daily Nutrient Needs:  Energy (kcal):  8520-1780 (REE 2015 x 1.1 SF); Weight Used for Energy Requirements:  Current     Protein (g):  110-130 (1.5-1.8g/kg IBW); Weight Used for Protein Requirements:  Ideal        Fluid (ml/day):  8268-2753; Method Used for Fluid Requirements:  1 ml/kcal      Nutrition Related Findings:  -I&Os (-15.8 L), 3+ edema, upper dentures, active BS, obesity, good appetite      Wounds:  None       Current Nutrition Therapies:    ADULT DIET; Regular; 4 carb choices (60 gm/meal);  Low Sodium (2 gm)    Anthropometric Measures:  · Height: 5' 9\" (175.3 cm)  · Current Body Weight: 287 lb (130.2 kg) (1/28, bedscale)   · Admission Body Weight: 292 lb (132.5 kg) (1/22, standing scale)    · Usual Body Weight:  (UTO ; no weights available in EMR hx from previous encounters)     · Ideal Body Weight: 160 lbs; % Ideal Body Weight 179.4 %   · BMI: 42.4  · BMI Categories: Obese Class 3 (BMI 40.0 or greater)       Nutrition Diagnosis:   No nutrition diagnosis at this time     Nutrition Interventions:   Food and/or Nutrient Delivery:  Continue Current Diet  Nutrition Education/Counseling:  Education not indicated   Coordination of Nutrition Care:  Continue to monitor while inpatient    Goals:  Patient will consume ~75% of meals served       Nutrition Monitoring and Evaluation:   Behavioral-Environmental Outcomes:  None Identified   Food/Nutrient Intake Outcomes:  Food and Nutrient Intake  Physical Signs/Symptoms Outcomes:  Biochemical Data,Chewing or Swallowing,GI Status,Meal Time Behavior,Hemodynamic Status,Fluid Status or Edema,Nutrition Focused Physical Findings,Skin,Weight     Discharge Planning:     Too soon to determine     Electronically signed by Miguel Altamirano RD, LD on 1/28/22 at 11:23 AM EST    Contact: [phone]

## 2022-01-28 NOTE — CARE COORDINATION
Met with pt to discuss discharge planning, reviewed therapy evals, recommending [facility]. Pt is agreeable, reviewed list 1. [facility]  2. [facility] or [facility] 3. [facility] 4. [facility]. Referral to [facility], not in network. Referral to [facility], no beds at either [facility]. Referral to [address] [place] [address], she will review. Referral to [facility], unsure if bed but took information. Envelope and ambulance form in soft chart. Jaiden Sears, MSW, LSW

## 2022-01-28 NOTE — PROGRESS NOTES
[facility]  Nephrology Attending Progress Note          SUBJECTIVE:     From 1/23 HPI: Mark Flanagan pt is a 64 yo male who was seen in our office in June 2021 for ckd. Cr was 2.8 without previous values. He has a pmh of htn, pacer, lymphoma hyperlipidemia, dm, copd, cad sp pci rca, gerd, mame on cpap, HFpEF. He is admitted now for le edema and sob. He said it started after starting ozempic in dec. He received iv lasix in the er. cxr is showing rll pneumonia. He has been started on iv lasix. He has live in [facility] and [facility] and we dont have records here. He said he didn't know he had ckd until the [facility] us in June 2021. \"      1/28: No adverse events overnight - did comply with wearing CPAP overnight  - continues on Bumex gtt with brisk response -harsh nonproductive cough -overall very weak      PROBLEM LIST:    Patient Active Problem List   Diagnosis    PNA (pneumonia)        PAST MEDICAL HISTORY:    Past Medical History:   Diagnosis Date    Acid reflux     Agent orange exposure     Congestive heart failure (CHF) (HCC)     [facility] disease) [facility]     Depression     Diabetes mellitus (HCC)     Hyperlipidemia     Hypertension     MI (myocardial infarction) ([facility]     Polio     Sleep apnea        DIET:    ADULT DIET; Regular; 4 carb choices (60 gm/meal);  Low Sodium (2 gm)     PHYSICAL EXAM:     Patient Vitals for the past 24 hrs:   BP Temp Temp src Pulse Resp SpO2 Height Weight   01/28/22 1115       5' 9\" (1.753 m)    01/28/22 0806 137/89 97.8 °F (36.6 °C) Temporal 98 20 98 %     01/28/22 0758      91 %     01/28/22 0600        287 lb 6.4 oz (130.4 kg)   01/27/22 2345 129/75 97 °F (36.1 °C) Temporal 102 18 92 %     01/27/22 2022 121/84 97.5 °F (36.4 °C) Oral 101 22 96 %     01/27/22 2015   Oral        01/27/22 1530 119/66 96.9 °F (36.1 °C) Temporal 98 18 92 %     @      Intake/Output Summary (Last 24 hours) at 1/28/2022 1205  Last data filed at 1/28/2022 0530  Gross per 24 hour   Intake 120 ml   Output 4025 ml   Net -3905 ml         Wt Readings from Last 3 Encounters:   01/28/22 287 lb 6.4 oz (130.4 kg)   10/19/20 270 lb (122.5 kg)   09/06/19 285 lb (129.3 kg)       General appearance:  In no acute distress  Skin: No rashes or lesions on exposed skin  Neck: No JVD  Lungs: Scattered rhonchi  Heart: RRR, no rub  Abdomen: Soft, non-tender, + bowel sounds  Extremities: 3+ edema  Neurologic: Mental status: Alert, oriented, thought content appropriate      MEDS (scheduled):    insulin glargine-yfgn  50 Units SubCUTAneous Nightly    metoprolol succinate  25 mg Oral Daily    pantoprazole  40 mg Oral Daily    atorvastatin  40 mg Oral Nightly    insulin lispro  0-6 Units SubCUTAneous TID WC    insulin lispro  0-3 Units SubCUTAneous Nightly    ipratropium  0.5 mg Nebulization 4x daily    aspirin  81 mg Oral Daily       MEDS (infusions):   bumetanide 0.1 mg/mL infusion 2 mg/hr (01/28/22 0609)    dextrose         MEDS (prn):  guaiFENesin-dextromethorphan, HYDROcodone 5 mg - acetaminophen, guaiFENesin, loperamide, glucose, dextrose, glucagon (rDNA), dextrose, perflutren lipid microspheres    DATA:    Recent Labs     01/26/22  0600 01/27/22  0532 01/28/22  0833   WBC 15.1* 13.0* 12.9*   HGB 11.7* 11.9* 12.4*   HCT 37.6 38.8 39.9   MCV 93.8 92.8 92.8    323 345     Recent Labs     01/26/22  0600 01/27/22  0532 01/27/22  1143 01/28/22  0833    140  --  142   K 3.7 3.5  --  3.8    98  --  99   CO2 21* 26  --  31*   BUN 53* 56*  --  59*   CREATININE 3.0* 2.8*  --  2.5*   LABGLOM 20 22  --  25   GLUCOSE 122* 117*  --  111*   CALCIUM 8.4* 8.6  --  8.5*   MG  --   --  2.0  --        Lab Results   Component Value Date    LABALBU 3.9 01/21/2022     Lab Results   Component Value Date    TSH 2.310 01/22/2022       Iron Studies  No results found for: IRON, TIBC, FERRITIN  No results found for: [id number]  No results found for: FOLATE    No results found for: VITD25  PTH   Date Value Ref Range Status   01/24/2022 112 (H) 15 - 65 pg/mL Final       No components found for: URIC    Lab Results   Component Value Date    COLORU Yellow 01/21/2022    NITRU Negative 01/21/2022    GLUCOSEU Negative 01/21/2022    KETUA Negative 01/21/2022    UROBILINOGEN 0.2 01/21/2022    BILIRUBINUR Negative 01/21/2022       No results found for: LIPIDPAN    Narrative   EXAMINATION:   RETROPERITONEAL ULTRASOUND OF THE KIDNEYS AND URINARY BLADDER       1/23/2022       COMPARISON:   None       HISTORY:   ORDERING SYSTEM PROVIDED HISTORY: karen, r/o hydronephrosis   TECHNOLOGIST PROVIDED HISTORY:   Reason for exam:->karen, r/o hydronephrosis   What reading provider will be dictating this exam?->CRC       FINDINGS:       Kidneys:       There is very limited visualization.  Left kidney was not seen.  Right has no   hydronephrosis estimated 10.3 cm length, possible parenchymal thinning. Apparent ascites in the perihepatic location partially seen           Bladder:       Was not seen, and report of catheter present.           Impression   1. Very limited visualization. 2. The right kidney shows no hydronephrosis, the left was not seen. 3. Ascites incidentally seen.           IMPRESSION/RECOMMENDATIONS:      1.  CKD stage 4  Baseline SCr was 2.8 in June 2021 (office visit)  No records here, he is from [facility] shows no hydro on right, left not seen  Renal function remains steady in the setting of aggressive IV diuresis  Plan for dialysis if renal fn worsens or if doesn't diurese (pt agrees)  Continue to follow daily labs and strict intake and output     2. Volume overload  H/o stent to rca 2013  Currently on bumex drip  Net (-) 15.8L since admission   Continue      3. 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 Respiratory failure  Management per pulmonary     5.  Dm  No ace arb with ckd 4  Urine protein 43, protein /cr ratio 0.3  Not accounting for severe edema and alb 3.9     6. Anemia  Dose beni if hgb <10  Hemoglobin remained stable      Martina Lehman, APRN - CNP     Patient seen and examined all key components of the physical performed independently , case discussed with NP, all pertinent labs and radiologic tests personally reviewed agree with above.       Earnest Lisa MD

## 2022-01-28 NOTE — PROGRESS NOTES
[facility]  Department of Internal Medicine  Division of Pulmonary, Critical Care and Sleep Medicine  Progress Note    Abena Chávez DO, MPH, [facility], FACOI, [address] Navneet Read MD, [facility]  [facility]      Patient: Katiuska Ron  MRN: [id number]  : 1945    Encounter Time: 3:40 PM     Date of Admission: 2022  9:41 AM    Primary Care Physician: Asia Jacobs MD    Reason for Consultation: Respiratory Failure     SUBJECTIVE:    Echo noted  Still the same clinically dispite diuresis of - 17 liters now  Cr 2.5        OBJECTIVE:     PHYSICAL EXAM:   VITALS:   Vitals:    22 0806 22 1115 22 1225 22 1515   BP: 137/89   113/67   Pulse: 98   100   Resp: 20   18   Temp: 97.8 °F (36.6 °C)   97 °F (36.1 °C)   TempSrc: Temporal   Temporal   SpO2: 98%  95% 95%   Weight:       Height:  5' 9\" (1.753 m)          Intake/Output Summary (Last 24 hours) at 2022 1540  Last data filed at 2022 1449  Gross per 24 hour   Intake 120 ml   Output 5875 ml   Net -5755 ml        CONSTITUTIONAL:    Alert X 3, NAD  SKIN:     No rash, Skin discoloration  HEENT:     EOMI, MMM, No thrush  NECK:    No bruits, No JVP apprechiated  CV:      Sinus,  No murmur, No rubs, No gallops  PULMONARY:   Couse BS,  No Wheezing, No Rales, No Rhonchi      No noted egophony  ABDOMEN:     Soft, non-tender. BS normal. No R/R/G  EXT:    No deformities . No clubbing.       + lower extremity edema, + venous stasis  PULSE:   Diminished palpable.   PSYCHIATRIC:  Seems appropriate, No acute psycosis  MS:    No fractures, No gross weakness  NEUROLOGIC:   Non-focal     DATA: IMAGING & TESTING:     LABORATORY TESTS:    CBC:   Lab Results   Component Value Date    WBC 12.9 2022    RBC 4.30 2022    HGB 12.4 2022    HCT 39.9 2022    MCV 92.8 2022    MCH 28.8 2022    MCHC 31.1 2022    RDW 15.2 2022     2022    MPV 9.7 01/28/2022     CMP:    Lab Results   Component Value Date     01/28/2022    K 3.8 01/28/2022    CL 99 01/28/2022    CO2 31 01/28/2022    BUN 59 01/28/2022    CREATININE 2.5 01/28/2022    GFRAA 30 01/28/2022    LABGLOM 25 01/28/2022    GLUCOSE 111 01/28/2022    PROT 6.9 01/21/2022    LABALBU 3.9 01/21/2022    CALCIUM 8.5 01/28/2022    BILITOT 0.6 01/21/2022    ALKPHOS 127 01/21/2022    AST 32 01/21/2022    ALT 42 01/21/2022     TSH:    Lab Results   Component Value Date    TSH 2.310 01/22/2022        PRO-BNP:   Lab Results   Component Value Date    PROBNP 2,357 (H) 01/23/2022    PROBNP 2,419 (H) 01/21/2022      ABGs: No results found for: PH, PO2, PCO2  Hemoglobin A1C: No components found for: HGBA1C    IMAGING:  Imaging tests were completed and reviewed and discussed radiology and care team involved and reveals   XR CHEST PORTABLE    Result Date: 1/21/2022  EXAMINATION: ONE XRAY VIEW OF THE CHEST 1/21/2022 10:02 am COMPARISON: None. HISTORY: ORDERING SYSTEM PROVIDED HISTORY: dyspnea TECHNOLOGIST PROVIDED HISTORY: Reason for exam:->dyspnea FINDINGS: The heart is enlarged. There is a dual lead cardiac pacer on the left There is an infiltrate seen within the right lung base. The left lung is clear. There is a trace right pleural effusion. There is no left pleural effusion. 1. Right lower lobe pneumonia 2. Trace right pleural effusion 3. Cardiomegaly     US DUP LOWER EXTREMITIES BILATERAL VENOUS    Result Date: 1/21/2022  No evidence of DVT in either lower extremity. RECOMMENDATIONS: Unavailable       Assessment:   1. Respiratory Failure  2. Polio as a child (two week)  3. 35 pack years quit in 1986  4. Agent orange exposure  5. Pacer  6. DM  7. HTN  8. HLD  9. T2DM insulin requiring with nephropathy  10. CKD follows with Dr Aurea Reyez  6. COPD  15. GERD  13. 1/2003 CAD s/p PCI RCA (Express stent)  14. 6/2010 PPM BSCI  15.  SPEEDY compliant with C-pap  16. 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 2015 PUD/GIB required 2 units PRBC (ASA was stopped @ that time)  18. October 2021 TTE @ [facility] (will attempt to get records)      Plan:   1. CPAP at night, may use home machine  2. Bronchodilators for COPD   3. Needs more fluid removal, bumex gtt  4. Doubt pnumonia - check cultures, + Atelectasis, Abx remain. All cultures negative will stop Abx  5. HF clinic on discharge  6. COPD by report - get PFTs pending  7. Consider RHC for assessment of filling pressures  8.  Awaiting testing and outside information        Sally Davison DO DO, MPH, Rich Booth  Professor of Internal Medicine  Pulmonary, Critical Care and Sleep Medicine

## 2022-01-29 NOTE — PROGRESS NOTES
[place]  Department of Internal Medicine  Division of Pulmonary, Critical Care and Sleep Medicine  Progress Note    Abena Chávez DO, MPH, FCCP, FACOI, FACP  Mauricio Stokes MD, [facility]  [facility]      Patient: Katiuska Ron  MRN: [id number]  : 1945    Encounter Time: 3:56 PM     Date of Admission: 2022  9:41 AM    Primary Care Physician: Asia Jacobs MD    Reason for Consultation: Respiratory Failure     SUBJECTIVE:    Echo noted  Still the same clinically dispite diuresis of - 21 liters now!!!!!!!!!!!!!!!!!!!!!!!!  Cr 2.5        OBJECTIVE:     PHYSICAL EXAM:   VITALS:   Vitals:    22 1715 22 1943 22 0537 22 0930   BP:  109/62  111/64   Pulse:  101  98   Resp:  20  18   Temp:  97.9 °F (36.6 °C)  98.1 °F (36.7 °C)   TempSrc:  Oral  Oral   SpO2: 94% 96%  96%   Weight:   281 lb 4.8 oz (127.6 kg)    Height:            Intake/Output Summary (Last 24 hours) at 2022 1556  Last data filed at 2022 1534  Gross per 24 hour   Intake 360 ml   Output 4050 ml   Net -3690 ml        CONSTITUTIONAL:    Alert X 3, NAD  SKIN:     No rash, Skin discoloration  HEENT:     EOMI, MMM, No thrush  NECK:    No bruits, No JVP apprechiated  CV:      Sinus,  No murmur, No rubs, No gallops  PULMONARY:   Couse BS,  No Wheezing, No Rales, No Rhonchi      No noted egophony  ABDOMEN:     Soft, non-tender. BS normal. No R/R/G  EXT:    No deformities . No clubbing.       + lower extremity edema, + venous stasis  PULSE:   Diminished palpable.   PSYCHIATRIC:  Seems appropriate, No acute psycosis  MS:    No fractures, No gross weakness  NEUROLOGIC:   Non-focal     DATA: IMAGING & TESTING:     LABORATORY TESTS:    CBC:   Lab Results   Component Value Date    WBC 14.1 2022    RBC 4.27 2022    HGB 12.2 2022    HCT 39.3 2022    MCV 92.0 2022    MCH 28.6 2022    MCHC 31.0 2022    RDW 14.9 2022  01/29/2022    MPV 9.4 01/29/2022     CMP:    Lab Results   Component Value Date     01/29/2022    K 3.7 01/29/2022    CL 94 01/29/2022    CO2 28 01/29/2022    BUN 56 01/29/2022    CREATININE 2.5 01/29/2022    GFRAA 30 01/29/2022    LABGLOM 25 01/29/2022    GLUCOSE 127 01/29/2022    PROT 6.9 01/21/2022    LABALBU 3.9 01/21/2022    CALCIUM 8.7 01/29/2022    BILITOT 0.6 01/21/2022    ALKPHOS 127 01/21/2022    AST 32 01/21/2022    ALT 42 01/21/2022     TSH:    Lab Results   Component Value Date    TSH 2.310 01/22/2022        PRO-BNP:   Lab Results   Component Value Date    PROBNP 2,357 (H) 01/23/2022    PROBNP 2,419 (H) 01/21/2022      ABGs: No results found for: PH, PO2, PCO2  Hemoglobin A1C: No components found for: HGBA1C    IMAGING:  Imaging tests were completed and reviewed and discussed radiology and care team involved and reveals   XR CHEST PORTABLE    Result Date: 1/21/2022  EXAMINATION: ONE XRAY VIEW OF THE CHEST 1/21/2022 10:02 am COMPARISON: None. HISTORY: ORDERING SYSTEM PROVIDED HISTORY: dyspnea TECHNOLOGIST PROVIDED HISTORY: Reason for exam:->dyspnea FINDINGS: The heart is enlarged. There is a dual lead cardiac pacer on the left There is an infiltrate seen within the right lung base. The left lung is clear. There is a trace right pleural effusion. There is no left pleural effusion. 1. Right lower lobe pneumonia 2. Trace right pleural effusion 3. Cardiomegaly     US DUP LOWER EXTREMITIES BILATERAL VENOUS    Result Date: 1/21/2022  No evidence of DVT in either lower extremity. RECOMMENDATIONS: Unavailable       Assessment:   1. Respiratory Failure  2. Polio as a child (two week)  3. 35 pack years quit in 1986  4. Agent orange exposure  5. Pacer  6. DM  7. HTN  8. HLD  9. T2DM insulin requiring with nephropathy  10. CKD follows with Dr Sumeet Lyman  6. COPD  15. GERD  13. 1/2003 CAD s/p PCI RCA (Express stent)  14. 6/2010 PPM BSCI  15.  SPEEDY compliant with C-pap  16. 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 2015 PUD/GIB required 2 units PRBC (ASA was stopped @ that time)  18. October 2021 TTE @ [employer] (will attempt to get records)      Plan:   1. CPAP at night, may use home machine  2. Bronchodilators for COPD   3. Needs more fluid removal, bumex gtt  4. Doubt pnumonia - check cultures, + Atelectasis, Abx remain. All cultures negative will stop Abx  5. HF clinic on discharge  6. COPD by report - get PFTs pending  7. Consider RHC for assessment of filling pressures  8.  PT OT needed zabrina Phillips DO DO, MPH, Shalonda Guido  Professor of Internal Medicine  Pulmonary, Critical Care and Sleep Medicine

## 2022-01-29 NOTE — PLAN OF CARE
Problem: Falls - Risk of:  Goal: Will remain free from falls  Description: Will remain free from falls  Outcome: Met This Shift     Problem: Falls - Risk of:  Goal: Absence of physical injury  Description: Absence of physical injury  Outcome: Met This Shift     Problem: OXYGENATION/RESPIRATORY FUNCTION  Goal: Patient will maintain patent airway  Outcome: Met This Shift     Problem: HEMODYNAMIC STATUS  Goal: Patient has stable vital signs and fluid balance  Outcome: Met This Shift     Problem: Pain:  Goal: Pain level will decrease  Description: Pain level will decrease  Outcome: Met This Shift  Goal: Control of acute pain  Description: Control of acute pain  Outcome: Met This Shift  Goal: Control of chronic pain  Description: Control of chronic pain  Outcome: Met This Shift

## 2022-01-29 NOTE — PLAN OF CARE
Problem: Falls - Risk of:  Goal: Will remain free from falls  Description: Will remain free from falls  1/29/2022 1100 by Denver Lewandowsky, RN  Outcome: Not Met This Shift  1/28/2022 2131 by Jerry Berger RN  Outcome: Met This Shift  Goal: Absence of physical injury  Description: Absence of physical injury  1/29/2022 1100 by Denver Lewandowsky, RN  Outcome: Not Met This Shift  1/28/2022 2131 by Jerry Berger RN  Outcome: Met This Shift     Problem: OXYGENATION/RESPIRATORY FUNCTION  Goal: Patient will maintain patent airway  1/29/2022 1100 by Denver Lewandowsky, RN  Outcome: Not Met This Shift  1/28/2022 2131 by Jerry Berger RN  Outcome: Met This Shift     Problem: HEMODYNAMIC STATUS  Goal: Patient has stable vital signs and fluid balance  1/29/2022 1100 by Denver Lewandowsky, RN  Outcome: Not Met This Shift  1/28/2022 2131 by Jerry Berger RN  Outcome: Met This Shift     Problem: Pain:  Goal: Pain level will decrease  Description: Pain level will decrease  1/29/2022 1100 by Denver Lewandowsky, RN  Outcome: Not Met This Shift  1/28/2022 2131 by Jerry Berger RN  Outcome: Met This Shift  Goal: Control of acute pain  Description: Control of acute pain  1/29/2022 1100 by Denver Lewandowsky, RN  Outcome: Not Met This Shift  1/28/2022 2131 by Jerry Berger RN  Outcome: Met This Shift  Goal: Control of chronic pain  Description: Control of chronic pain  1/29/2022 1100 by Denver Lewandowsky, RN  Outcome: Not Met This Shift  1/28/2022 2131 by Jerry Berger RN  Outcome: Met This Shift     Problem: Skin Integrity:  Goal: Will show no infection signs and symptoms  Description: Will show no infection signs and symptoms  1/29/2022 1100 by Denver Lewandowsky, RN  Outcome: Not Met This Shift  1/28/2022 2131 by Jerry Berger RN  Outcome: Met This Shift  Goal: Absence of new skin breakdown  Description: Absence of new skin breakdown  1/29/2022 1100 by Denver Lewandowsky, RN  Outcome: Not Met This Shift  1/28/2022 2131 by Jerry Berger RN  Outcome: Met This Shift

## 2022-01-29 NOTE — PROGRESS NOTES
[facility]  Nephrology Attending Progress Note          SUBJECTIVE:     From 1/23 HPI: Veronica Betancur pt is a 66 yo male who was seen in our office in June 2021 for ckd. Cr was 2.8 without previous values. He has a pmh of htn, pacer, lymphoma hyperlipidemia, dm, copd, cad sp pci rca, gerd, mame on cpap, HFpEF. He is admitted now for le edema and sob. He said it started after starting ozempic in dec. He received iv lasix in the er. cxr is showing rll pneumonia. He has been started on iv lasix. He has live in [facility] and [facility] and we dont have records here. He said he didn't know he had ckd until the [facility] here sent him to us in June 2021. \"      1/29/22- awake and alert. Feeling somewhat better, still with some cough      PROBLEM LIST:    Patient Active Problem List   Diagnosis    PNA (pneumonia)        PAST MEDICAL HISTORY:    Past Medical History:   Diagnosis Date    Acid reflux     Agent orange exposure     Congestive heart failure (CHF) (HCC)     COPD (chronic obstructive pulmonary disease) ([address]     Depression     Diabetes mellitus ([address]     Hyperlipidemia     Hypertension     [facility]     Polio     Sleep apnea        DIET:    ADULT DIET; Regular; 4 carb choices (60 gm/meal);  Low Sodium (2 gm)     PHYSICAL EXAM:     Patient Vitals for the past 24 hrs:   BP Temp Temp src Pulse Resp SpO2 Weight   01/29/22 0930 111/64 98.1 °F (36.7 °C) Oral 98 18 96 %    01/29/22 0537       281 lb 4.8 oz (127.6 kg)   01/28/22 1943 109/62 97.9 °F (36.6 °C) Oral 101 20 96 %    01/28/22 1715      94 %    01/28/22 1515 113/67 97 °F (36.1 °C) Temporal 100 18 95 %    @      Intake/Output Summary (Last 24 hours) at 1/29/2022 1315  Last data filed at 1/29/2022 1016  Gross per 24 hour   Intake 240 ml   Output 4500 ml   Net -4260 ml         Wt Readings from Last 3 Encounters:   01/29/22 281 lb 4.8 oz (127.6 kg)   10/19/20 270 lb (122.5 kg)   09/06/19 285 lb (129.3 kg)       General appearance: In no acute distress  Skin: No rashes or lesions on exposed skin  Neck: No JVD  Lungs: Scattered rhonchi  Heart: RRR, no rub  Abdomen: Soft, non-tender, + bowel sounds  Extremities: 3+ edema  Neurologic: Mental status: Alert, oriented, thought content appropriate      MEDS (scheduled):    insulin glargine-yfgn  50 Units SubCUTAneous Nightly    metoprolol succinate  25 mg Oral Daily    pantoprazole  40 mg Oral Daily    atorvastatin  40 mg Oral Nightly    insulin lispro  0-6 Units SubCUTAneous TID WC    insulin lispro  0-3 Units SubCUTAneous Nightly    ipratropium  0.5 mg Nebulization 4x daily    aspirin  81 mg Oral Daily       MEDS (infusions):   bumetanide 0.1 mg/mL infusion 2 mg/hr (01/29/22 0010)    dextrose         MEDS (prn):  guaiFENesin-dextromethorphan, HYDROcodone 5 mg - acetaminophen, guaiFENesin, loperamide, glucose, dextrose, glucagon (rDNA), dextrose, perflutren lipid microspheres    DATA:    Recent Labs     01/27/22  0532 01/28/22  0833 01/29/22  1040   WBC 13.0* 12.9* 14.1*   HGB 11.9* 12.4* 12.2*   HCT 38.8 39.9 39.3   MCV 92.8 92.8 92.0    345 336     Recent Labs     01/27/22  0532 01/27/22  1143 01/28/22  0833 01/29/22  1040     --  142 138   K 3.5  --  3.8 3.7   CL 98  --  99 94*   CO2 26  --  31* 28   BUN 56*  --  59* 56*   CREATININE 2.8*  --  2.5* 2.5*   LABGLOM 22  --  25 25   GLUCOSE 117*  --  111* 127*   CALCIUM 8.6  --  8.5* 8.7   MG  --  2.0  --  2.1   PHOS  --   --   --  4.2       Lab Results   Component Value Date    LABALBU 3.9 01/21/2022     Lab Results   Component Value Date    TSH 2.310 01/22/2022       Iron Studies  No results found for: IRON, TIBC, FERRITIN  No results found for: QHGZITHQ94  No results found for: FOLATE    No results found for: VITD25  PTH   Date Value Ref Range Status   01/24/2022 112 (H) 15 - 65 pg/mL Final       No components found for: URIC    Lab Results   Component Value Date    COLORU Yellow 01/21/2022    NITRU Negative 01/21/2022 GLUCOSEU Negative 01/21/2022    KETUA Negative 01/21/2022    UROBILINOGEN 0.2 01/21/2022    BILIRUBINUR Negative 01/21/2022       No results found for: LIPIDPAN    Narrative   EXAMINATION:   RETROPERITONEAL ULTRASOUND OF THE KIDNEYS AND URINARY BLADDER       1/23/2022       COMPARISON:   None       HISTORY:   ORDERING SYSTEM PROVIDED HISTORY: karen, r/o hydronephrosis   TECHNOLOGIST PROVIDED HISTORY:   Reason for exam:->karen, r/o hydronephrosis   What reading provider will be dictating this exam?->CRC       FINDINGS:       Kidneys:       There is very limited visualization.  Left kidney was not seen.  Right has no   hydronephrosis estimated 10.3 cm length, possible parenchymal thinning. Apparent ascites in the perihepatic location partially seen           Bladder:       Was not seen, and report of catheter present.           Impression   1. Very limited visualization. 2. The right kidney shows no hydronephrosis, the left was not seen. 3. Ascites incidentally seen.           IMPRESSION/RECOMMENDATIONS:      1.  CKD stage 4  Baseline SCr was 2.8 in June 2021 (office visit)  No records here, he is from [facility] shows no hydro on right, left not seen  Renal function remains steady in the setting of aggressive IV diuresis  UOP 3950 past 24 hours and 1500 today  Plan for dialysis if renal fn worsens or if doesn't diurese (pt agrees)  Continue to follow daily labs and strict intake and output     2. Volume overload  H/o stent to rca 2013  Currently on bumex drip  Net (-) 21L since admission   Continue      3. 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 Respiratory failure  Management per pulmonary     5. Dm  No ace arb with ckd 4  Urine protein 43, protein /cr ratio 0.3  Not accounting for severe edema and alb 3.9     6.  Anemia  Dose beni if hgb <10  Hemoglobin remained stable      Irl Otto, APRN - CNP         Patient seen and examined all key components of the physical performed independently , case discussed with NP, all pertinent labs and radiologic tests personally reviewed agree with above.       Sherrell Whitfield MD

## 2022-01-29 NOTE — PROGRESS NOTES
Progress Note  Date:2022       [facility]:[id number]  Patient Tanner Pendleton     YOB: 1945     Age:77 y.o. Patient says he feels improved today. Subjective    Subjective:  Symptoms:  Stable. No shortness of breath or chest pain. Diet:  Adequate intake. Activity level: Impaired due to weakness. Pain:  He reports no pain. Review of Systems   Constitutional: Negative for activity change and fever. HENT: Negative for congestion. Respiratory: Negative for shortness of breath. Cardiovascular: Positive for leg swelling. Negative for chest pain. Gastrointestinal: Negative for abdominal pain. Genitourinary: Negative for difficulty urinating. Neurological: Negative for dizziness. Objective         Vitals Last 24 Hours:  TEMPERATURE:  Temp  Av.6 °F (36.4 °C)  Min: 97 °F (36.1 °C)  Max: 97.9 °F (36.6 °C)  RESPIRATIONS RANGE: Resp  Av.3  Min: 18  Max: 20  PULSE OXIMETRY RANGE: SpO2  Av.8 %  Min: 91 %  Max: 98 %  PULSE RANGE: Pulse  Av.7  Min: 98  Max: 101  BLOOD PRESSURE RANGE: Systolic (43FKZ), VHT:517 , Min:109 , AGR:481   ; Diastolic (69NKC), TGC:35, Min:62, Max:89    I/O (24Hr): Intake/Output Summary (Last 24 hours) at 2022 0742  Last data filed at 2022 0537  Gross per 24 hour   Intake    Output 3950 ml   Net -3950 ml     Objective:  General Appearance:  Comfortable. Vital signs: (most recent): Blood pressure 109/62, pulse 101, temperature 97.9 °F (36.6 °C), temperature source Oral, resp. rate 20, height 5' 9\" (1.753 m), weight 281 lb 4.8 oz (127.6 kg), SpO2 96 %. No fever. Lungs:  Normal effort and normal respiratory rate. Breath sounds clear to auscultation. Heart: Normal rate. Regular rhythm. S1 normal and S2 normal.    Extremities: There is local swelling.       Labs/Imaging/Diagnostics    Labs:  CBC:  Recent Labs     22  0532 22  0833   WBC 13.0* 12.9*   RBC 4.18 4.30   HGB 11.9* 12.4*   HCT 38.8 39.9 MCV 92.8 92.8   RDW 15.3* 15.2*    345     CHEMISTRIES:  Recent Labs     01/27/22  0532 01/27/22  1143 01/28/22  0833     --  142   K 3.5  --  3.8   CL 98  --  99   CO2 26  --  31*   BUN 56*  --  59*   CREATININE 2.8*  --  2.5*   GLUCOSE 117*  --  111*   MG  --  2.0  --      PT/INR:No results for input(s): PROTIME, INR in the last 72 hours. APTT:No results for input(s): APTT in the last 72 hours. LIVER PROFILE:  No results for input(s): AST, ALT, BILIDIR, BILITOT, ALKPHOS in the last 72 hours. Imaging Last 24 Hours:  XR CHEST PORTABLE    Result Date: 1/21/2022  EXAMINATION: ONE XRAY VIEW OF THE CHEST 1/21/2022 10:02 am COMPARISON: None. HISTORY: ORDERING SYSTEM PROVIDED HISTORY: dyspnea TECHNOLOGIST PROVIDED HISTORY: Reason for exam:->dyspnea FINDINGS: The heart is enlarged. There is a dual lead cardiac pacer on the left There is an infiltrate seen within the right lung base. The left lung is clear. There is a trace right pleural effusion. There is no left pleural effusion. 1. Right lower lobe pneumonia 2. Trace right pleural effusion 3. Cardiomegaly     US DUP LOWER EXTREMITIES BILATERAL VENOUS    Result Date: 1/21/2022  EXAMINATION: DUPLEX VENOUS ULTRASOUND OF THE BILATERAL LOWER EXTREMITIES1/21/2022 10:15 am TECHNIQUE: Duplex ultrasound using B-mode/gray scaled imaging, Doppler spectral analysis and color flow Doppler was obtained of the deep venous structures of the lower bilateral extremities. COMPARISON: None. HISTORY: ORDERING SYSTEM PROVIDED HISTORY: discomfort TECHNOLOGIST PROVIDED HISTORY: Reason for exam:->discomfort What reading provider will be dictating this exam?->CRC FINDINGS: The visualized veins of the bilateral lower extremities are patent and free of echogenic thrombus. The veins demonstrate good compressibility with normal color flow study and spectral analysis. No evidence of DVT in either lower extremity.  RECOMMENDATIONS: Unavailable     Assessment//Plan Hospital Problems           Last Modified POA    PNA (pneumonia) 1/21/2022 Yes        Assessment:  ( PNA (pneumonia) 1/21/2022 Yes     CHF  Pleural effusion  Acute renal insfficnecy  DM  COPD  HTN  Hyperlipidemia     ). Plan:   (Renal for diureses. Now on bumex gtt. Considering dialysis as needed. Pulmonary following, feels less likely pneumonia  Follows cultures  Monitor labs and output. Continue rest of meds. ECHO reviewed. ).

## 2022-01-30 NOTE — PROGRESS NOTES
[place]  Department of Internal Medicine  Division of Pulmonary, Critical Care and Sleep Medicine  Progress Note    Isabelle Dolan DO, MPH, FCCP, FACOI, FACP  Mauricio Stokes MD, [facility]  [facility]      Patient: Roque Aiken  MRN: [id number]  : 1945    Encounter Time: 1:42 PM     Date of Admission: 2022  9:41 AM    Primary Care Physician: Rikki Healy MD    Reason for Consultation: Respiratory Failure     SUBJECTIVE:    Echo noted  Still the same clinically dispite diuresis of - 25 liters  Cr increased to 2.8        OBJECTIVE:     PHYSICAL EXAM:   VITALS:   Vitals:    22 1948 22 2311 22 0615 22 0750   BP: (!) 121/94 116/76  120/79   Pulse: 97 96  104   Resp: 18 18  22   Temp: 97.9 °F (36.6 °C) 97.3 °F (36.3 °C)  96.2 °F (35.7 °C)   TempSrc: Oral   Temporal   SpO2: 97% 98%     Weight:   279 lb 14.4 oz (127 kg)    Height:            Intake/Output Summary (Last 24 hours) at 2022 1342  Last data filed at 2022 1323  Gross per 24 hour   Intake 20 ml   Output 4650 ml   Net -4630 ml        CONSTITUTIONAL:    Alert X 3, NAD  SKIN:     No rash, Skin discoloration  HEENT:     EOMI, MMM, No thrush  NECK:    No bruits, No JVP apprechiated  CV:      Sinus,  No murmur, No rubs, No gallops  PULMONARY:   Couse BS,  No Wheezing, No Rales, No Rhonchi      No noted egophony  ABDOMEN:     Soft, non-tender. BS normal. No R/R/G  EXT:    No deformities . No clubbing.       + lower extremity edema, + venous stasis  PULSE:   Diminished palpable.   PSYCHIATRIC:  Seems appropriate, No acute psycosis  MS:    No fractures, No gross weakness  NEUROLOGIC:   Non-focal     DATA: IMAGING & TESTING:     LABORATORY TESTS:    CBC:   Lab Results   Component Value Date    WBC 14.1 2022    RBC 4.46 2022    HGB 12.8 2022    HCT 41.2 2022    MCV 92.4 2022    MCH 28.7 2022    MCHC 31.1 2022    RDW 14.9 01/30/2022     01/30/2022    MPV 9.7 01/30/2022     CMP:    Lab Results   Component Value Date     01/30/2022    K 3.4 01/30/2022    CL 95 01/30/2022    CO2 32 01/30/2022    BUN 59 01/30/2022    CREATININE 2.8 01/30/2022    GFRAA 27 01/30/2022    LABGLOM 22 01/30/2022    GLUCOSE 106 01/30/2022    PROT 6.9 01/21/2022    LABALBU 3.9 01/21/2022    CALCIUM 8.8 01/30/2022    BILITOT 0.6 01/21/2022    ALKPHOS 127 01/21/2022    AST 32 01/21/2022    ALT 42 01/21/2022     TSH:    Lab Results   Component Value Date    TSH 2.310 01/22/2022        PRO-BNP:   Lab Results   Component Value Date    PROBNP 2,357 (H) 01/23/2022    PROBNP 2,419 (H) 01/21/2022      ABGs: No results found for: PH, PO2, PCO2  Hemoglobin A1C: No components found for: HGBA1C    IMAGING:  Imaging tests were completed and reviewed and discussed radiology and care team involved and reveals   XR CHEST PORTABLE    Result Date: 1/21/2022  EXAMINATION: ONE XRAY VIEW OF THE CHEST 1/21/2022 10:02 am COMPARISON: None. HISTORY: ORDERING SYSTEM PROVIDED HISTORY: dyspnea TECHNOLOGIST PROVIDED HISTORY: Reason for exam:->dyspnea FINDINGS: The heart is enlarged. There is a dual lead cardiac pacer on the left There is an infiltrate seen within the right lung base. The left lung is clear. There is a trace right pleural effusion. There is no left pleural effusion. 1. Right lower lobe pneumonia 2. Trace right pleural effusion 3. Cardiomegaly     US DUP LOWER EXTREMITIES BILATERAL VENOUS    Result Date: 1/21/2022  No evidence of DVT in either lower extremity. RECOMMENDATIONS: Unavailable       Assessment:   1. Respiratory Failure  2. Polio as a child (two week)  3. 35 pack years quit in 1986  4. Agent orange exposure  5. Pacer  6. DM  7. HTN  8. HLD  9. T2DM insulin requiring with nephropathy  10. CKD follows with Dr Bronson Chávez  6. COPD  15. GERD  13. 1/2003 CAD s/p PCI RCA (Express stent)  14. 6/2010 PPM BSCI  15.  SPEEDY compliant with C-pap  16. 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 2015 PUD/GIB required 2 units PRBC (ASA was stopped @ that time)  18. October 2021 TTE @ [facility] (will attempt to get records)      Plan:   1. CPAP at night, may use home machine  2. Bronchodilators for COPD   3. Needs more fluid removal, bumex gtt  4. Doubt pnumonia - check cultures, + Atelectasis, Abx remain. All cultures negative will stop Abx  5. HF clinic on discharge  6. COPD by report - get PFTs pending  7. Consider RHC for assessment of filling pressures  8. PT OT noted  9. LTAC? SNF? [facility] ?           Michael Sapp DO DO, MPH, Alycia Myles  Professor of Internal Medicine  Pulmonary, Critical Care and Sleep Medicine

## 2022-01-30 NOTE — PROGRESS NOTES
[facility]  Nephrology Attending Progress Note          SUBJECTIVE:     From 1/23 HPI: Katt Christensen pt is a 66 yo male who was seen in our office in June 2021 for ckd. Cr was 2.8 without previous values. He has a pmh of htn, pacer, lymphoma hyperlipidemia, dm, copd, cad sp pci rca, gerd, mame on cpap, HFpEF. He is admitted now for le edema and sob. He said it started after starting ozempic in dec. He received iv lasix in the er. cxr is showing rll pneumonia. He has been started on iv lasix. He has live in [facility] and [facility] and we dont have records here. He said he didn't know he had ckd [facility]. \"      1/30/22- awake and alert. Feeling somewhat better, still with a cough, asking for codeine cough syrup. Family at the bedside. PROBLEM LIST:    Patient Active Problem List   Diagnosis    PNA (pneumonia)        PAST MEDICAL HISTORY:    Past Medical History:   Diagnosis Date    Acid reflux     Agent orange exposure     Congestive heart failure (CHF) ([address]     COPD (chronic obstructive pulmonary disease) ([address]     Depression     Diabetes mellitus ([address]     Hyperlipidemia     Hypertension     MI (myocardial infarction) ([address]     Polio     Sleep apnea        DIET:    ADULT DIET; Regular; 4 carb choices (60 gm/meal);  Low Sodium (2 gm)     PHYSICAL EXAM:     Patient Vitals for the past 24 hrs:   BP Temp Temp src Pulse Resp SpO2 Weight   01/30/22 0750 120/79 96.2 °F (35.7 °C) Temporal 104 22     01/30/22 0615       279 lb 14.4 oz (127 kg)   01/29/22 2311 116/76 97.3 °F (36.3 °C)  96 18 98 %    01/29/22 1948 (!) 121/94 97.9 °F (36.6 °C) Oral 97 18 97 %    01/29/22 1700 114/73 97.9 °F (36.6 °C)  99  95 %    @      Intake/Output Summary (Last 24 hours) at 1/30/2022 1224  Last data filed at 1/30/2022 1006  Gross per 24 hour   Intake 140 ml   Output 3250 ml   Net -3110 ml         Wt Readings from Last 3 Encounters:   01/30/22 279 lb 14.4 oz (127 kg)   10/19/20 270 lb (122.5 kg)   09/06/19 285 lb (129.3 kg)       General appearance:  In no acute distress  Skin: No rashes or lesions on exposed skin  Neck: No JVD  Lungs: Scattered rhonchi  Heart: RRR, no rub  Abdomen: Soft, non-tender, + bowel sounds  Extremities: 3+ edema  Neurologic: Mental status: Alert, oriented, thought content appropriate      MEDS (scheduled):    insulin glargine-yfgn  50 Units SubCUTAneous Nightly    metoprolol succinate  25 mg Oral Daily    pantoprazole  40 mg Oral Daily    atorvastatin  40 mg Oral Nightly    insulin lispro  0-6 Units SubCUTAneous TID WC    insulin lispro  0-3 Units SubCUTAneous Nightly    ipratropium  0.5 mg Nebulization 4x daily    aspirin  81 mg Oral Daily       MEDS (infusions):   bumetanide 0.1 mg/mL infusion 2 mg/hr (01/30/22 1140)    dextrose         MEDS (prn):  guaiFENesin-dextromethorphan, HYDROcodone 5 mg - acetaminophen, guaiFENesin, loperamide, glucose, dextrose, glucagon (rDNA), dextrose, perflutren lipid microspheres    DATA:    Recent Labs     01/28/22  0833 01/29/22  1040 01/30/22  0438   WBC 12.9* 14.1* 14.1*   HGB 12.4* 12.2* 12.8   HCT 39.9 39.3 41.2   MCV 92.8 92.0 92.4    336 378     Recent Labs     01/28/22  0833 01/29/22  1040 01/30/22  0438    138 142   K 3.8 3.7 3.4*   CL 99 94* 95*   CO2 31* 28 32*   BUN 59* 56* 59*   CREATININE 2.5* 2.5* 2.8*   LABGLOM 25 25 22   GLUCOSE 111* 127* 106*   CALCIUM 8.5* 8.7 8.8   MG  --  2.1 2.3   PHOS  --  4.2 4.1       Lab Results   Component Value Date    LABALBU 3.9 01/21/2022     Lab Results   Component Value Date    TSH 2.310 01/22/2022       Iron Studies  No results found for: IRON, TIBC, FERRITIN  No results found for: NFHNMZNQ17  No results found for: FOLATE    No results found for: VITD25  PTH   Date Value Ref Range Status   01/24/2022 112 (H) 15 - 65 pg/mL Final       No components found for: URIC    Lab Results   Component Value Date    COLORU Yellow 01/21/2022    NITRU Negative 01/21/2022 GLUCOSEU Negative 01/21/2022    KETUA Negative 01/21/2022    UROBILINOGEN 0.2 01/21/2022    BILIRUBINUR Negative 01/21/2022       No results found for: LIPIDPAN    Narrative   EXAMINATION:   RETROPERITONEAL ULTRASOUND OF THE KIDNEYS AND URINARY BLADDER       1/23/2022       COMPARISON:   None       HISTORY:   ORDERING SYSTEM PROVIDED HISTORY: karen, r/o hydronephrosis   TECHNOLOGIST PROVIDED HISTORY:   Reason for exam:->karen, r/o hydronephrosis   What reading provider will be dictating this exam?->CRC       FINDINGS:       Kidneys:       There is very limited visualization.  Left kidney was not seen.  Right has no   hydronephrosis estimated 10.3 cm length, possible parenchymal thinning. Apparent ascites in the perihepatic location partially seen           Bladder:       Was not seen, and report of catheter present.           Impression   1. Very limited visualization. 2. The right kidney shows no hydronephrosis, the left was not seen. 3. Ascites incidentally seen.           IMPRESSION/RECOMMENDATIONS:      1.  CKD stage 4  Baseline SCr was 2.8 in June 2021 (office visit)  No records here, he is from [facility] shows no hydro on right, left not seen  Renal function remains steady in the setting of aggressive IV diuresis  UOP 4750 past 24 hours   Continue to follow daily labs and strict intake and output     2. Volume overload  H/o stent to rca 2013  Currently on bumex drip  Net (-) 24L since admission   Continue      3. 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 Respiratory failure  Management per pulmonary     5. Dm  No ace arb with ckd 4  Urine protein 43, protein /cr ratio 0.3  Not accounting for severe edema and alb 3.9     6. Anemia  Dose beni if hgb <10  Hemoglobin remained stable    7.  Hypokalemia-  K+- 3.4  In the setting of diuresis  Replace and follow      Familia Baugh, APRN - CNP     Patient seen and examined all key components of the physical performed independently , case discussed with NP, all pertinent labs and radiologic tests personally reviewed agree with above.     Decrease bumex drip  Transition to oral next 24 hrs    Olman Hunter MD

## 2022-01-30 NOTE — PROGRESS NOTES
Physical Therapy    Physical Therapy Daily Treatment Note      Name: Key Regalado  : 1945  MRN: [id number]      Date of Service: 2022    Evaluating PT:  Mary Pinedo PT, DPT    Room #:  4830/5536-Q  Diagnosis:  Cardiomegaly [I51.7]  Acute renal insufficiency [N28.9]  Pleural effusion, right [J90]  PNA (pneumonia) [J18.9]  Elevated d-dimer [R79.89]  Pneumonia of right lower lobe due to infectious organism [J18.9]  Acute on chronic congestive heart failure, unspecified heart failure type ([facility] [I50.9]  PMHx/PSHx:  CHF, COPD, diabetes, agent orange exposure, pacemaker, polio, SPEEDY  Procedure/Surgery:  N/A  Precautions:  Fall risk, O2  Equipment Needs:  TBD    SUBJECTIVE:    Pt lives alone in a 1 story home with ramp entry. Bed/bath is on 1st floor. Pt ambulated with no AD PTA. OBJECTIVE:   Initial Evaluation  Date: 22 Treatment  22 Short Term/ Long Term   Goals   AM-PAC 6 Clicks     Was pt agreeable to Eval/treatment? yes yes    Does pt have pain? 8/10 BLE BLE pain, increased with mobility     Bed Mobility  Rolling: max A  Supine to sit: max A  Sit to supine: max A  Scooting: max A Rolling:  Max A  Supine to sit: Dep x2  Sit to supine: Dep x2  Scooting: Dep x2 Rolling: mod I  Supine to sit: mod I  Sit to supine: mod I  Scooting: mod I   Transfers Sit to stand: unable to clear bed during attempt  Stand to sit: NT  Stand pivot: NT it to stand: Multiple attempts - unable   Stand to sit: NT  Stand pivot: NT Sit to stand: mod I  Stand to sit: mod I  Stand pivot: mod I with AAD   Ambulation    NT NT 50'+ with AAD mod I   Stair negotiation: ascended and descended  NT NT make stair goal as appropriate     Strength/ROM:   BLE grossly 2/5  BLE AAROM limited by pain and swelling    Balance:   Static Sitting: SBA  Dynamic Sitting: CGA  Static Standing: Attempted - unable   Dynamic Standing: NT    Pt is A & O x 3  Sensation:  Pt denies numbness and tingling to extremities  Edema:  BLE edema noted     Vitals:  SpO2 to 85% at EOB -- increased O2, pt recovered to 93%    Therapeutic Exercises:    Bed mobility: supine<>sit, cued for EOB positioning  BLE AAROM    Patient education  Pt educated on role of PT, importance of functional mobility during hospital stay, safety with bed mobility    Patient response to education:   Pt verbalized understanding Pt demonstrated skill Pt requires further education in this area   yes partial yes     ASSESSMENT:    Conditions Requiring Skilled Therapeutic Intervention:    [x]Decreased strength     [x]Decreased ROM  [x]Decreased functional mobility  [x]Decreased balance   [x]Decreased endurance   []Decreased posture  []Decreased sensation  []Decreased coordination   []Decreased vision  [x]Decreased safety awareness   [x]Increased pain       Comments:    Pt supine in bed upon entering, agreeable to participate. Pt with a lot of pain and swelling in LE which limited mobility. Required encouragement to participate but he was agreeable. Required total assistance of LE and trunk today during supine>sit and pt actually appeared to be a bit resistive with his trunk during supine>sit. Eventually able to sit up on the EOB. Noted SOB at EOB so given time to recover. Attempted STS multiple attempts from EOB but pt not engaging in stand to assist PT and OT. Returned to supine. Scooted up in bed and placed in chair position with LE elevated on pillows.     Treatment:  Patient practiced and was instructed in the following treatment:     Bed mobility training - pt given verbal and tactile cues to facilitate proper sequencing and safety during rolling and supine<>sit as well as provided with physical assistance to complete task    Attempted STS training x2 reps -- pt unable to provide any assistance in stand attempts    Skilled positioning - Pt placed in the chair position with pillows utilized to facilitate upright posture, joint and skin integrity, and interaction with environment. PHYSICAL THERAPY PLAN OF CARE:  Pt is making progress towards established goals. Continue PT POC. Specific instructions for next treatment:  Progress as tolerated, bed mobility and transfer training    Time in  1055  Time out  1120    Total Treatment Time  25 minutes     Evaluation Time includes thorough review of current medical information, gathering information on past medical history/social history and prior level of function, completion of standardized testing/informal observation of tasks, assessment of data and education on plan of care and goals.     CPT codes:  [] Low Complexity PT evaluation 27215  [] Moderate Complexity PT evaluation 84296  [] High Complexity PT evaluation 45969  [] PT Re-evaluation 89849  [] Gait training 47704 -- minutes  [] Manual therapy 38915 -- minutes  [x] Therapeutic activities 22805 25 minutes  [] Therapeutic exercises 84236 -- minutes  [] Neuromuscular reeducation 53478 -- minutes     Jacquelin Adams, PT, DPT  [id number]

## 2022-01-30 NOTE — PLAN OF CARE
Problem: Falls - Risk of:  Goal: Absence of physical injury  Description: Absence of physical injury  1/30/2022 1351 by Kimberly Bocanegra RN  Outcome: Met This Shift  1/30/2022 0446 by Nathanael Barbosa RN  Outcome: Met This Shift     Problem: OXYGENATION/RESPIRATORY FUNCTION  Goal: Patient will maintain patent airway  1/30/2022 1351 by Kimberly Bocanegra RN  Outcome: Met This Shift  1/30/2022 0446 by Nathanael Barbosa RN  Outcome: Met This Shift     Problem: HEMODYNAMIC STATUS  Goal: Patient has stable vital signs and fluid balance  1/30/2022 1351 by Kimberly Bocanegra RN  Outcome: Met This Shift  1/30/2022 0446 by Nathanael Barbosa RN  Outcome: Met This Shift     Problem: Skin Integrity:  Goal: Will show no infection signs and symptoms  Description: Will show no infection signs and symptoms  1/30/2022 1351 by Kimberly Bocanegra RN  Outcome: Met This Shift  1/30/2022 0446 by Nathanael Barbosa RN  Outcome: Met This Shift  Goal: Absence of new skin breakdown  Description: Absence of new skin breakdown  1/30/2022 1351 by Kimberly Bocanegra RN  Outcome: Met This Shift  1/30/2022 0446 by Nathanael Barbosa RN  Outcome: Met This Shift

## 2022-01-30 NOTE — PLAN OF CARE
Problem: Falls - Risk of:  Goal: Will remain free from falls  Description: Will remain free from falls  Outcome: Met This Shift     Problem: Falls - Risk of:  Goal: Absence of physical injury  Description: Absence of physical injury  Outcome: Met This Shift     Problem: OXYGENATION/RESPIRATORY FUNCTION  Goal: Patient will maintain patent airway  Outcome: Met This Shift     Problem: HEMODYNAMIC STATUS  Goal: Patient has stable vital signs and fluid balance  Outcome: Met This Shift     Problem: Pain:  Goal: Pain level will decrease  Description: Pain level will decrease  Outcome: Met This Shift     Problem: Pain:  Goal: Control of acute pain  Description: Control of acute pain  Outcome: Met This Shift     Problem: Pain:  Goal: Control of chronic pain  Description: Control of chronic pain  Outcome: Met This Shift     Problem: Skin Integrity:  Goal: Will show no infection signs and symptoms  Description: Will show no infection signs and symptoms  Outcome: Met This Shift     Problem: Skin Integrity:  Goal: Absence of new skin breakdown  Description: Absence of new skin breakdown  Outcome: Met This Shift

## 2022-01-30 NOTE — PROGRESS NOTES
Progress Note  Date:2022       [id number]  Timothy Julian     YOB: 1945     Age:77 y.o. Patient says he feels improved today. Subjective    Subjective:  Symptoms:  Stable. No shortness of breath or chest pain. Diet:  Adequate intake. Activity level: Impaired due to weakness. Pain:  He reports no pain. Review of Systems   Constitutional: Negative for activity change and fever. HENT: Negative for congestion. Respiratory: Negative for shortness of breath. Cardiovascular: Positive for leg swelling. Negative for chest pain. Gastrointestinal: Negative for abdominal pain. Genitourinary: Negative for difficulty urinating. Neurological: Negative for dizziness. Objective         Vitals Last 24 Hours:  TEMPERATURE:  Temp  Av.8 °F (36.6 °C)  Min: 97.3 °F (36.3 °C)  Max: 98.1 °F (36.7 °C)  RESPIRATIONS RANGE: Resp  Av  Min: 18  Max: 18  PULSE OXIMETRY RANGE: SpO2  Av.5 %  Min: 95 %  Max: 98 %  PULSE RANGE: Pulse  Av.5  Min: 96  Max: 99  BLOOD PRESSURE RANGE: Systolic (48JMC), OHA:882 , Min:111 , EVJ:802   ; Diastolic (87DFM), TWE:66, Min:64, Max:94    I/O (24Hr): Intake/Output Summary (Last 24 hours) at 2022 0742  Last data filed at 2022 0554  Gross per 24 hour   Intake 380 ml   Output 4750 ml   Net -4370 ml     Objective:  General Appearance:  Comfortable. Vital signs: (most recent): Blood pressure 116/76, pulse 96, temperature 97.3 °F (36.3 °C), resp. rate 18, height 5' 9\" (1.753 m), weight 279 lb 14.4 oz (127 kg), SpO2 98 %. No fever. Lungs:  Normal effort and normal respiratory rate. Breath sounds clear to auscultation. Heart: Normal rate. Regular rhythm. S1 normal and S2 normal.    Extremities: There is local swelling.       Labs/Imaging/Diagnostics    Labs:  CBC:  Recent Labs     22  0833 22  1040 22  0438   WBC 12.9* 14.1* 14.1*   RBC 4.30 4.27 4.46   HGB 12.4* 12.2* 12.8   HCT 39.9 39.3 41.2   MCV 92.8 92.0 92.4   RDW 15.2* 14.9 14.9    336 378     CHEMISTRIES:  Recent Labs     01/27/22  1143 01/28/22  0833 01/29/22  1040 01/30/22  0438   NA  --  142 138 142   K  --  3.8 3.7 3.4*   CL  --  99 94* 95*   CO2  --  31* 28 32*   BUN  --  59* 56* 59*   CREATININE  --  2.5* 2.5* 2.8*   GLUCOSE  --  111* 127* 106*   PHOS  --   --  4.2 4.1   MG 2.0  --  2.1 2.3     PT/INR:No results for input(s): PROTIME, INR in the last 72 hours. APTT:No results for input(s): APTT in the last 72 hours. LIVER PROFILE:  No results for input(s): AST, ALT, BILIDIR, BILITOT, ALKPHOS in the last 72 hours. Imaging Last 24 Hours:  XR CHEST PORTABLE    Result Date: 1/21/2022  EXAMINATION: ONE XRAY VIEW OF THE CHEST 1/21/2022 10:02 am COMPARISON: None. HISTORY: ORDERING SYSTEM PROVIDED HISTORY: dyspnea TECHNOLOGIST PROVIDED HISTORY: Reason for exam:->dyspnea FINDINGS: The heart is enlarged. There is a dual lead cardiac pacer on the left There is an infiltrate seen within the right lung base. The left lung is clear. There is a trace right pleural effusion. There is no left pleural effusion. 1. Right lower lobe pneumonia 2. Trace right pleural effusion 3. Cardiomegaly     US DUP LOWER EXTREMITIES BILATERAL VENOUS    Result Date: 1/21/2022  EXAMINATION: DUPLEX VENOUS ULTRASOUND OF THE BILATERAL LOWER EXTREMITIES1/21/2022 10:15 am TECHNIQUE: Duplex ultrasound using B-mode/gray scaled imaging, Doppler spectral analysis and color flow Doppler was obtained of the deep venous structures of the lower bilateral extremities. COMPARISON: None. HISTORY: ORDERING SYSTEM PROVIDED HISTORY: discomfort TECHNOLOGIST PROVIDED HISTORY: Reason for exam:->discomfort What reading provider will be dictating this exam?->CRC FINDINGS: The visualized veins of the bilateral lower extremities are patent and free of echogenic thrombus. The veins demonstrate good compressibility with normal color flow study and spectral analysis. No evidence of DVT in either lower extremity. RECOMMENDATIONS: Unavailable     Assessment//Plan           Hospital Problems           Last Modified POA    PNA (pneumonia) 1/21/2022 Yes        Assessment:  ( PNA (pneumonia) 1/21/2022 Yes     CHF  Pleural effusion  Acute renal insfficnecy  DM  COPD  HTN  Hyperlipidemia     ). Plan:   (Renal for diureses. Now on bumex gtt. Considering dialysis as needed. Pulmonary following, feels less likely pneumonia  Follows cultures  Monitor labs and output. Continue rest of meds. ECHO reviewed. ).

## 2022-01-30 NOTE — PROGRESS NOTES
Occupational Therapy  OT BEDSIDE TREATMENT NOTE    [address] [address] [address]       Date:2022  Patient Name: Peyton Abbott  MRN: [id number]  : 1945  Room: [address] [place] [address]     Per OT Eval:    Evaluating OT: Mj White OTR/L #[id number]     Referring Provider and Specific Provider Orders/Date:      22   OT eval and treat  Start:  22,   End:  22,   ONE TIME,   Standing Count:  1 Occurrences,   R         Andrew Hernandez MD      Diagnosis: Cardiomegaly [I51.7]  Acute renal insufficiency [N28.9]  Pleural effusion, right [J90]  PNA (pneumonia) [J18.9]  Elevated d-dimer [R79.89]  Pneumonia of right lower lobe due to infectious organism [J18.9]  Acute on chronic congestive heart failure, unspecified heart failure type ([facility] [I50.9]      Surgery: None      Pertinent Medical History:  has a past medical history of Acid reflux, Agent orange exposure, Congestive heart failure (CHF) ([facility] [address] COPD (chronic obstructive pulmonary disease) ([facility] [address] Depression, Diabetes mellitus ([facility] [address] Hyperlipidemia, Hypertension, MI (myocardial infarction) ([facility] [address] Polio, and Sleep apnea. Precautions:  Fall Risk, falls and alarm, BiPAP, O2      Assessment of current deficits   [x]? Functional mobility           [x]? ADLs           [x]? Strength                  []?Cognition   [x]? Functional transfers         [x]? IADLs         [x]? Safety Awareness   []? Endurance   []? Fine Coordination                         [x]? Balance      []? Vision/perception   []? Sensation     []? Gross Motor Coordination             []? ROM           []?  Delirium                   []? Motor Control      OT PLAN OF CARE   OT POC based on physician orders, patient diagnosis and results of clinical assessment     Frequency/Duration: 1-3 days/wk for 2 weeks PRN   Specific OT Treatment to include:   * Instruction/training on adapted ADL techniques and AE recommendations to increase functional independence within precautions       * Training on energy conservation strategies, correct breathing pattern and techniques to improve independence/tolerance for self-care routine  * Functional transfer/mobility training/DME recommendations for increased independence, safety, and fall prevention  * Patient/Family education to increase follow through with safety techniques and functional independence  * Recommendation of environmental modifications for increased safety with functional transfers/mobility and ADLs  * Therapeutic exercise to improve motor endurance, ROM, and functional strength for ADLs/functional transfers  * Therapeutic activities to facilitate/challenge dynamic balance, stand tolerance for increased safety and independence with ADLs  * Positioning to improve skin integrity, interaction with environment and functional independence  * Manual techniques for edema management     Recommended Adaptive Equipment: TBD       Home Living: Lives alone, single family home, 1 story, ramp to enter. Basement dwelling. Bathroom set-up: Walk-in shower with built-in seat         Equipment owned: wheeled walker, cane.      Prior Level of Function: Pt reports being independent with ADLs at baseline, sister assists with most IADLs; ambulated independently without AD.      Driving: N/a  Occupation: Retired   Enjoys: N/a      Pain Level:  B LE pain (severe edema noted, seeping L LE noted) did not rate, nsg aware, educated pt he will likely have improvement after working with therapy which did happen, pt reported improvement comfort & decrease pain     Cognition: A&O: 4/4; Follows 1-2 step directions, pleasant & cooperative after encouragement & education on importance of participation, somewhat self limiting, minimal effort noted             Memory: intact             Sequencing: fair              Problem solving: fair              Judgement/safety: fair      Functional Assessment:  AM-PAC Daily Activity Raw Score: 13/24    Initial Eval Status  Date: 1/27/22 Treatment Status  Date:  1/30/22 STGs = LTGs  Time frame: 10-14 days   Feeding Independent      Mod Indep  Eating meals in bed Independent    Grooming Minimal Assist   for denture/oral care; set-up provided    Min A  Seated at EOB, simple grooming tasks this date  Supervision    UB Dressing Moderate Assist      Mod A  Managing gown seated at EOB, over shoulders & around back  Supervision    LB Dressing Dependent   LE edema present      Dep  Kelechi/doff socks bed level, limited ROM due to edema  Moderate Assist    Bathing Moderate/Maximal Assist      Max A   simulated Minimal Assist    Toileting Dependent      Dep  Paredes present Moderate Assist    Bed Mobility  Supine to sit: Pt declined   Sit to supine: Pt declined   Rolling: Maximal Assist x1-2 Dep A x 2  Supine < > sit   Pt was resistive, pushing back even with max cues to use UE & trunk  Supine to sit: Moderate Assist   Sit to supine: Moderate Assist    Functional Transfers  NT    NT unable   attempted x 2  But noting no effort given from pt, appeared resistive   Moderate Assist with use of walker       Functional Mobility  NT     NT Moderate Assist with use of walker    Balance Sitting:     Static: n/t    Dynamic: n/t  Standing: n/t      Sitting: SBA  Standing: unable/NT Sitting:     Static: SBA    Dynamic: SBA  Standing: Min/Mod A with walker   Activity Tolerance fair  minus; limited by pain   fair-  Self limiting as well as reporting pain in B LE  O2 85% on 5L at EOB  Increased 7L to recover 93%  Once returned to bed, returned O2 to 5L Increase standing tolerance >1-2 minutes for improved engagement with functional transfers and indep in ADLs   Visual/  Perceptual Glasses:  Yes      Reports changes in vision since admission: No       NA       Education:  Pt was educated through out treatment regarding proper technique & safety with bed mobility, importance of OOB activity, sitting up to EOB, increasing movement of B LE & UE due to edema & ADL compensatory strategies to ease tasks, improve safety & prevent falls to return home safely. Comments: Upon arrival pt was in bed & agreeable for therapy. At end of session pt was returned to bed, HOB upright, all lines and tubes intact, call light within reach. · Pt has made slow progress towards set goals. · Continue with current plan of care      Treatment Time In: 10:55            Treatment Time Out: 11:20           Treatment Charges: Mins Units   Ther Ex  [id number]     Manual Therapy [id number]     Thera Activities [id number] 15 1   ADL/Home Mgt [id number] 10 1   Neuro Re-ed [id number]     Group Therapy      Orthotic manage/training  [id number]     Non-Billable Time     Total Timed Treatment 25 2       Inge WYATT  [address] [place] [address] Drive, [address] [place] [address]

## 2022-01-31 NOTE — PROGRESS NOTES
[facility]  Department of Internal Medicine  Division of Pulmonary, Critical Care and Sleep Medicine  Progress Note    Alesia Clarke DO, MPH, FCCP, FACOI, FACP  Mauricio Stokes MD, [facility]  [facility]      Patient: Michael Gonsalez  MRN: [id number]  : 1945    Encounter Time: 6:51 PM     Date of Admission: 2022  9:41 AM    Primary Care Physician: Clifford Cabrales MD    Reason for Consultation: Respiratory Failure     SUBJECTIVE:    Echo noted  Still the same clinically dispite diuresis of - 28 liters  Cr increased to 2.8        OBJECTIVE:     PHYSICAL EXAM:   VITALS:   Vitals:    22 2338 22 0500 22 0735 22 1500   BP: 124/79  101/61 110/78   Pulse: 104  97 94   Resp: 20  20 16   Temp: 98.2 °F (36.8 °C)  97.4 °F (36.3 °C) 97.9 °F (36.6 °C)   TempSrc:   Temporal Temporal   SpO2: 97%  93%    Weight:  281 lb 9 oz (127.7 kg)     Height:            Intake/Output Summary (Last 24 hours) at 2022 1851  Last data filed at 2022 1808  Gross per 24 hour   Intake 785 ml   Output 3250 ml   Net -2465 ml        CONSTITUTIONAL:    Alert X 3, NAD  SKIN:     No rash, Skin discoloration  HEENT:     EOMI, MMM, No thrush  NECK:    No bruits, No JVP apprechiated  CV:      Sinus,  No murmur, No rubs, No gallops  PULMONARY:   Couse BS,  No Wheezing, No Rales, No Rhonchi      No noted egophony  ABDOMEN:     Soft, non-tender. BS normal. No R/R/G  EXT:    No deformities . No clubbing.       + lower extremity edema, + venous stasis  PULSE:   Diminished palpable.   PSYCHIATRIC:  Seems appropriate, No acute psycosis  MS:    No fractures, No gross weakness  NEUROLOGIC:   Non-focal     DATA: IMAGING & TESTING:     LABORATORY TESTS:    CBC:   Lab Results   Component Value Date    WBC 12.2 2022    RBC 4.75 2022    HGB 13.4 2022    HCT 43.5 2022    MCV 91.6 2022    MCH 28.2 2022    MCHC 30.8 2022    RDW 14.8 01/31/2022     01/31/2022    MPV 10.3 01/31/2022     CMP:    Lab Results   Component Value Date     01/31/2022    K 3.7 01/31/2022    CL 96 01/31/2022    CO2 31 01/31/2022    BUN 65 01/31/2022    CREATININE 2.6 01/31/2022    GFRAA 29 01/31/2022    LABGLOM 24 01/31/2022    GLUCOSE 109 01/31/2022    PROT 6.9 01/21/2022    LABALBU 3.9 01/21/2022    CALCIUM 8.4 01/31/2022    BILITOT 0.6 01/21/2022    ALKPHOS 127 01/21/2022    AST 32 01/21/2022    ALT 42 01/21/2022     TSH:    Lab Results   Component Value Date    TSH 2.310 01/22/2022        PRO-BNP:   Lab Results   Component Value Date    PROBNP 2,357 (H) 01/23/2022    PROBNP 2,419 (H) 01/21/2022      ABGs: No results found for: PH, PO2, PCO2  Hemoglobin A1C: No components found for: HGBA1C    IMAGING:  Imaging tests were completed and reviewed and discussed radiology and care team involved and reveals   XR CHEST PORTABLE    Result Date: 1/21/2022  EXAMINATION: ONE XRAY VIEW OF THE CHEST 1/21/2022 10:02 am COMPARISON: None. HISTORY: ORDERING SYSTEM PROVIDED HISTORY: dyspnea TECHNOLOGIST PROVIDED HISTORY: Reason for exam:->dyspnea FINDINGS: The heart is enlarged. There is a dual lead cardiac pacer on the left There is an infiltrate seen within the right lung base. The left lung is clear. There is a trace right pleural effusion. There is no left pleural effusion. 1. Right lower lobe pneumonia 2. Trace right pleural effusion 3. Cardiomegaly     US DUP LOWER EXTREMITIES BILATERAL VENOUS    Result Date: 1/21/2022  No evidence of DVT in either lower extremity. RECOMMENDATIONS: Unavailable       Assessment:   1. Respiratory Failure  2. Polio as a child (two week)  3. 35 pack years quit in 1986  4. Agent orange exposure  5. Pacer  6. DM  7. HTN  8. HLD  9. T2DM insulin requiring with nephropathy  10. CKD follows with Dr Cristian Fall  6. COPD  15. GERD  13. 1/2003 CAD s/p PCI RCA (Express stent)  14. 6/2010 PPM BSCI  15.  SPEEDY compliant with C-pap  16. 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 2015 PUD/GIB required 2 units PRBC (ASA was stopped @ that time)  18. October 2021 TTE @ [facility] (will attempt to get records)      Plan:   1. CPAP at night, may use home machine  2. Bronchodilators for COPD   3. Needs more fluid removal, bumex gtt  4. Doubt pnumonia - check cultures, + Atelectasis, Abx remain. All cultures negative will stop Abx  5. HF clinic on discharge  6. COPD by report - get PFTs pending  7. Consider RHC for assessment of filling pressures  8. PT OT noted  9. LTAC? SNF? [facility] ?  Soon           Ev King DO DO, MPH, [facility]  Professor of Internal Medicine  Pulmonary, Critical Care and Sleep Medicine

## 2022-01-31 NOTE — PLAN OF CARE
Problem: Falls - Risk of:  Goal: Will remain free from falls  Description: Will remain free from falls  1/31/2022 1453 by Denise Vidal RN  Outcome: Ongoing  1/31/2022 0238 by Anabel Robert RN  Outcome: Met This Shift  Goal: Absence of physical injury  Description: Absence of physical injury  1/31/2022 1453 by Denise Vidal RN  Outcome: Ongoing  1/31/2022 0238 by Anabel Robert RN  Outcome: Met This Shift     Problem: OXYGENATION/RESPIRATORY FUNCTION  Goal: Patient will maintain patent airway  1/31/2022 1453 by Denise Vidal RN  Outcome: Ongoing  1/31/2022 0238 by Anabel Robert RN  Outcome: Met This Shift     Problem: HEMODYNAMIC STATUS  Goal: Patient has stable vital signs and fluid balance  1/31/2022 1453 by Denise Vidal RN  Outcome: Ongoing  1/31/2022 0238 by Anabel Robert RN  Outcome: Met This Shift     Problem: Pain:  Goal: Pain level will decrease  Description: Pain level will decrease  1/31/2022 1453 by Denise Vidal RN  Outcome: Ongoing  1/31/2022 0238 by Anabel Robert RN  Outcome: Met This Shift  Goal: Control of acute pain  Description: Control of acute pain  1/31/2022 1453 by Denise Vidal RN  Outcome: Ongoing  1/31/2022 0238 by Anabel Robert RN  Outcome: Met This Shift  Goal: Control of chronic pain  Description: Control of chronic pain  1/31/2022 1453 by Denise Vidal RN  Outcome: Ongoing  1/31/2022 0238 by Anabel Robert RN  Outcome: Met This Shift     Problem: Skin Integrity:  Goal: Will show no infection signs and symptoms  Description: Will show no infection signs and symptoms  1/31/2022 1453 by Denise Vidal RN  Outcome: Ongoing  1/31/2022 0238 by Anabel Robert RN  Outcome: Met This Shift  Goal: Absence of new skin breakdown  Description: Absence of new skin breakdown  1/31/2022 1453 by Denise Vidal RN  Outcome: Ongoing  1/31/2022 0238 by Anabel Robert RN  Outcome: Met This Shift

## 2022-01-31 NOTE — PROGRESS NOTES
[facility]  Nephrology Attending Progress Note  Dino Okeefe. Kamar Mckinley MD        SUBJECTIVE:   From 1/23 HPI: Hema Robertson pt is a 66 yo male who was seen in our office in June 2021 for ckd. Cr was 2.8 without previous values. He has a pmh of htn, pacer, lymphoma hyperlipidemia, dm, copd, cad sp pci rca, gerd, mame on cpap, HFpEF. He is admitted now for le edema and sob. He said it started after starting ozempic in dec. He received iv lasix in the er. cxr is showing rll pneumonia. He has been started on iv lasix. He has live in [facility] and we dont have records here. He said he didn't know he had ckd until the [facility] June 2021. \"    1/31/22: Resting in bed states he feels a little better today. No complaints. PROBLEM LIST:    Patient Active Problem List   Diagnosis    PNA (pneumonia)        PAST MEDICAL HISTORY:    Past Medical History:   Diagnosis Date    Acid reflux     Agent orange exposure     Congestive heart failure (CHF) ([address]     COPD (chronic obstructive pulmonary disease) ([address]     Depression     Diabetes mellitus ([address]     Hyperlipidemia     Hypertension     MI (myocardial infarction) ([address]     Polio     Sleep apnea        DIET:    ADULT DIET; Regular; 4 carb choices (60 gm/meal);  Low Sodium (2 gm)     PHYSICAL EXAM:     Patient Vitals for the past 24 hrs:   BP Temp Temp src Pulse Resp SpO2 Weight   01/31/22 0735 101/61 97.4 °F (36.3 °C) Temporal 97 20     01/31/22 0500       281 lb 9 oz (127.7 kg)   01/30/22 2338 124/79 98.2 °F (36.8 °C)  104 20 97 %    01/30/22 1952 98/69 98.2 °F (36.8 °C)  113 18 92 %    01/30/22 1707 (!) 142/95 96.9 °F (36.1 °C) Temporal 114 20 94 %    @      Intake/Output Summary (Last 24 hours) at 1/31/2022 1444  Last data filed at 1/31/2022 0507  Gross per 24 hour   Intake 240 ml   Output 1925 ml   Net -1685 ml         Wt Readings from Last 3 Encounters:   01/31/22 281 lb 9 oz (127.7 kg)   10/19/20 270 lb (122.5 kg)   09/06/19 285 lb (129.3 kg)       General appearance:  In no acute distress  Skin: No rashes or lesions on exposed skin  Neck: No JVD  Lungs: Clear, no adventitious sounds  Heart: RRR, no rub  Abdomen: Soft, non-tender, + bowel sounds  Extremities: 2+ edema  Neurologic: Mental status: Alert, oriented, thought content appropriate      MEDS (scheduled):    insulin glargine-yfgn  50 Units SubCUTAneous Nightly    metoprolol succinate  25 mg Oral Daily    pantoprazole  40 mg Oral Daily    atorvastatin  40 mg Oral Nightly    insulin lispro  0-6 Units SubCUTAneous TID WC    insulin lispro  0-3 Units SubCUTAneous Nightly    ipratropium  0.5 mg Nebulization 4x daily    aspirin  81 mg Oral Daily       MEDS (infusions):   bumetanide 0.1 mg/mL infusion 1 mg/hr (01/31/22 0739)    dextrose         MEDS (prn):  guaiFENesin-dextromethorphan, HYDROcodone 5 mg - acetaminophen, guaiFENesin, loperamide, glucose, dextrose, glucagon (rDNA), dextrose, perflutren lipid microspheres    DATA:    Recent Labs     01/29/22  1040 01/30/22  0438 01/31/22  0518   WBC 14.1* 14.1* 12.2*   HGB 12.2* 12.8 13.4   HCT 39.3 41.2 43.5   MCV 92.0 92.4 91.6    378 313     Recent Labs     01/29/22  1040 01/30/22  0438 01/31/22  0801    142 137   K 3.7 3.4* 3.7   CL 94* 95* 96*   CO2 28 32* 31*   BUN 56* 59* 65*   CREATININE 2.5* 2.8* 2.6*   LABGLOM 25 22 24   GLUCOSE 127* 106* 109*   CALCIUM 8.7 8.8 8.4*   MG 2.1 2.3 2.4   PHOS 4.2 4.1 4.5       Lab Results   Component Value Date    LABALBU 3.9 01/21/2022     Lab Results   Component Value Date    TSH 2.310 01/22/2022       Iron Studies  No results found for: IRON, TIBC, FERRITIN  No results found for: WYZLMWSR19  No results found for: FOLATE    No results found for: VITD25  PTH   Date Value Ref Range Status   01/24/2022 112 (H) 15 - 65 pg/mL Final       No components found for: URIC    Lab Results   Component Value Date    COLORU Yellow 01/21/2022    NITRU Negative 01/21/2022    GLUCOSEU Negative 01/21/2022    KETUA Negative 01/21/2022    UROBILINOGEN 0.2 01/21/2022    BILIRUBINUR Negative 01/21/2022       No results found for: LIPIDPAN    Narrative   EXAMINATION:   RETROPERITONEAL ULTRASOUND OF THE KIDNEYS AND URINARY BLADDER       1/23/2022       COMPARISON:   None       HISTORY:   ORDERING SYSTEM PROVIDED HISTORY: karen, r/o hydronephrosis   TECHNOLOGIST PROVIDED HISTORY:   Reason for exam:->karen, r/o hydronephrosis   What reading provider will be dictating this exam?->CRC       FINDINGS:       Kidneys:       There is very limited visualization.  Left kidney was not seen.  Right has no   hydronephrosis estimated 10.3 cm length, possible parenchymal thinning. Apparent ascites in the perihepatic location partially seen           Bladder:       Was not seen, and report of catheter present.           Impression   1. Very limited visualization. 2. The right kidney shows no hydronephrosis, the left was not seen. 3. Ascites incidentally seen.           IMPRESSION/RECOMMENDATIONS:      1. ckd 4  Last cr was 2.8 in June 2021  No records here, is from [facility] shows no hydro on right, left not seen  Renal function remains steady in the setting of aggressive IV diuresis  Plan for dialysis if renal fn worsens or if doesn't diurese (pt agrees)  Continue to follow daily labs and strict intake and output  1/31 cr 2.6     2. Volume overload  H/o stent to rca 2013  Currently on bumex drip  UOP last 24 hrs 3325 ml  Net (-) 27 L since admission      3. 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 Copd     5. Dm  No ace arb with ckd 4  Urine protein 43, protein /cr ratio 0.3  Not accounting for severe edema and alb 3.9     6.  Anemia  Dose beni if hgb <10  Hgb 13.4 is at target >10       Durel Iba, APRN - CNS   Pt tolerating diuresis with bumex drip  -27 L  Still a ways to go  Cr stable  Joi Denney MD

## 2022-01-31 NOTE — PLAN OF CARE
Problem: Falls - Risk of:  Goal: Will remain free from falls  Description: Will remain free from falls  1/31/2022 0238 by Noah Landrum RN  Outcome: Met This Shift     Problem: Falls - Risk of:  Goal: Absence of physical injury  Description: Absence of physical injury  1/31/2022 0238 by Noah Landrum RN  Outcome: Met This Shift     Problem: OXYGENATION/RESPIRATORY FUNCTION  Goal: Patient will maintain patent airway  1/31/2022 0238 by Noah Landrum RN  Outcome: Met This Shift     Problem: HEMODYNAMIC STATUS  Goal: Patient has stable vital signs and fluid balance  1/31/2022 0238 by Noah Landrum RN  Outcome: Met This Shift     Problem: Pain:  Goal: Pain level will decrease  Description: Pain level will decrease  1/31/2022 0238 by Noah Landrum RN  Outcome: Met This Shift     Problem: Pain:  Goal: Control of acute pain  Description: Control of acute pain  1/31/2022 0238 by Noah Landrum RN  Outcome: Met This Shift     Problem: Pain:  Goal: Control of chronic pain  Description: Control of chronic pain  1/31/2022 0238 by Noah Landrum RN  Outcome: Met This Shift     Problem: Skin Integrity:  Goal: Will show no infection signs and symptoms  Description: Will show no infection signs and symptoms  1/31/2022 0238 by Noah Landrum RN  Outcome: Met This Shift     Problem: Skin Integrity:  Goal: Absence of new skin breakdown  Description: Absence of new skin breakdown  1/31/2022 0238 by Noah Landrum RN  Outcome: Met This Shift

## 2022-01-31 NOTE — PROGRESS NOTES
Progress Note  Date:2022       QFRP:[id number]  Patient Colin Cuellar     YOB: 1945     Age:77 y.o. Patient says he feels improved today. Subjective    Subjective:  Symptoms:  Stable. No shortness of breath or chest pain. Diet:  Adequate intake. Activity level: Impaired due to weakness. Pain:  He reports no pain. Review of Systems   Constitutional: Negative for activity change and fever. HENT: Negative for congestion. Respiratory: Negative for shortness of breath. Cardiovascular: Positive for leg swelling. Negative for chest pain. Gastrointestinal: Negative for abdominal pain. Genitourinary: Negative for difficulty urinating. Neurological: Negative for dizziness. Objective         Vitals Last 24 Hours:  TEMPERATURE:  Temp  Av.4 °F (36.3 °C)  Min: 96.2 °F (35.7 °C)  Max: 98.2 °F (36.8 °C)  RESPIRATIONS RANGE: Resp  Av  Min: 18  Max: 22  PULSE OXIMETRY RANGE: SpO2  Av.3 %  Min: 92 %  Max: 97 %  PULSE RANGE: Pulse  Av.8  Min: 104  Max: 114  BLOOD PRESSURE RANGE: Systolic (56HPG), PYC:847 , Min:98 , LRA:740   ; Diastolic (21FDB), GGX:35, Min:69, Max:95    I/O (24Hr): Intake/Output Summary (Last 24 hours) at 2022 0655  Last data filed at 2022 0507  Gross per 24 hour   Intake 240 ml   Output 3325 ml   Net -3085 ml     Objective:  General Appearance:  Comfortable. Vital signs: (most recent): Blood pressure 124/79, pulse 104, temperature 98.2 °F (36.8 °C), resp. rate 20, height 5' 9\" (1.753 m), weight 281 lb 9 oz (127.7 kg), SpO2 97 %. No fever. Lungs:  Normal effort and normal respiratory rate. Breath sounds clear to auscultation. Heart: Normal rate. Regular rhythm. S1 normal and S2 normal.    Extremities: There is local swelling.       Labs/Imaging/Diagnostics    Labs:  CBC:  Recent Labs     22  1040 22  0438 22  0518   WBC 14.1* 14.1* 12.2*   RBC 4.27 4.46 4.75   HGB 12.2* 12.8 13.4   HCT 39.3 41.2 43.5   MCV 92.0 92.4 91.6   RDW 14.9 14.9 14.8    378 313     CHEMISTRIES:  Recent Labs     01/28/22  0833 01/29/22  1040 01/30/22  0438    138 142   K 3.8 3.7 3.4*   CL 99 94* 95*   CO2 31* 28 32*   BUN 59* 56* 59*   CREATININE 2.5* 2.5* 2.8*   GLUCOSE 111* 127* 106*   PHOS  --  4.2 4.1   MG  --  2.1 2.3     PT/INR:No results for input(s): PROTIME, INR in the last 72 hours. APTT:No results for input(s): APTT in the last 72 hours. LIVER PROFILE:  No results for input(s): AST, ALT, BILIDIR, BILITOT, ALKPHOS in the last 72 hours. Imaging Last 24 Hours:  XR CHEST PORTABLE    Result Date: 1/21/2022  EXAMINATION: ONE XRAY VIEW OF THE CHEST 1/21/2022 10:02 am COMPARISON: None. HISTORY: ORDERING SYSTEM PROVIDED HISTORY: dyspnea TECHNOLOGIST PROVIDED HISTORY: Reason for exam:->dyspnea FINDINGS: The heart is enlarged. There is a dual lead cardiac pacer on the left There is an infiltrate seen within the right lung base. The left lung is clear. There is a trace right pleural effusion. There is no left pleural effusion. 1. Right lower lobe pneumonia 2. Trace right pleural effusion 3. Cardiomegaly     US DUP LOWER EXTREMITIES BILATERAL VENOUS    Result Date: 1/21/2022  EXAMINATION: DUPLEX VENOUS ULTRASOUND OF THE BILATERAL LOWER EXTREMITIES1/21/2022 10:15 am TECHNIQUE: Duplex ultrasound using B-mode/gray scaled imaging, Doppler spectral analysis and color flow Doppler was obtained of the deep venous structures of the lower bilateral extremities. COMPARISON: None. HISTORY: ORDERING SYSTEM PROVIDED HISTORY: discomfort TECHNOLOGIST PROVIDED HISTORY: Reason for exam:->discomfort What reading provider will be dictating this exam?->CRC FINDINGS: The visualized veins of the bilateral lower extremities are patent and free of echogenic thrombus. The veins demonstrate good compressibility with normal color flow study and spectral analysis. No evidence of DVT in either lower extremity. RECOMMENDATIONS: Unavailable     Assessment//Plan           Hospital Problems           Last Modified POA    PNA (pneumonia) 1/21/2022 Yes        Assessment:  ( PNA (pneumonia) 1/21/2022 Yes     CHF  Pleural effusion  Acute renal insfficnecy  DM  COPD  HTN  Hyperlipidemia     ). Plan:   (Renal for diureses. Now on bumex gtt. Considering dialysis as needed. Pulmonary following, feels less likely pneumonia  Follows cultures  Monitor labs and output. Continue rest of meds. ECHO reviewed. ).

## 2022-02-01 NOTE — PROGRESS NOTES
Progress Note  Date:2022       AWTU:[id number]  Patient Yeni Nguyen     YOB: 1945     Age:77 y.o. Patient says he feels improved today. Subjective    Subjective:  Symptoms:  Stable. No shortness of breath or chest pain. Diet:  Adequate intake. Activity level: Impaired due to weakness. Pain:  He reports no pain. Review of Systems   Constitutional: Negative for activity change and fever. HENT: Negative for congestion. Respiratory: Negative for shortness of breath. Cardiovascular: Positive for leg swelling. Negative for chest pain. Gastrointestinal: Negative for abdominal pain. Genitourinary: Negative for difficulty urinating. Neurological: Negative for dizziness. Objective         Vitals Last 24 Hours:  TEMPERATURE:  Temp  Av.4 °F (36.3 °C)  Min: 97 °F (36.1 °C)  Max: 97.9 °F (36.6 °C)  RESPIRATIONS RANGE: Resp  Av  Min: 15  Max: 20  PULSE OXIMETRY RANGE: SpO2  Av.3 %  Min: 93 %  Max: 97 %  PULSE RANGE: Pulse  Av.3  Min: 91  Max: 97  BLOOD PRESSURE RANGE: Systolic (75BYZ), VXA:257 , Min:101 , KVD:678   ; Diastolic (70BCI), CIR:34, Min:61, Max:78    I/O (24Hr): Intake/Output Summary (Last 24 hours) at 2022 0657  Last data filed at 2022 0543  Gross per 24 hour   Intake 785 ml   Output 3400 ml   Net -2615 ml     Objective:  General Appearance:  Comfortable. Vital signs: (most recent): Blood pressure 112/74, pulse 91, temperature 97.4 °F (36.3 °C), temperature source Temporal, resp. rate 17, height 5' 9\" (1.753 m), weight 281 lb 9 oz (127.7 kg), SpO2 97 %. No fever. Lungs:  Normal effort and normal respiratory rate. Breath sounds clear to auscultation. Heart: Normal rate. Regular rhythm. S1 normal and S2 normal.    Extremities: There is local swelling.       Labs/Imaging/Diagnostics    Labs:  CBC:  Recent Labs     22  0438 22  0518 22  0615   WBC 14.1* 12.2* 9.5   RBC 4.46 4.75 4.15   HGB 12.8 13.4 11.9*   HCT 41.2 43.5 37.2   MCV 92.4 91.6 89.6   RDW 14.9 14.8 14.7    313 297     CHEMISTRIES:  Recent Labs     01/29/22  1040 01/30/22  0438 01/31/22  0801    142 137   K 3.7 3.4* 3.7   CL 94* 95* 96*   CO2 28 32* 31*   BUN 56* 59* 65*   CREATININE 2.5* 2.8* 2.6*   GLUCOSE 127* 106* 109*   PHOS 4.2 4.1 4.5   MG 2.1 2.3 2.4     PT/INR:No results for input(s): PROTIME, INR in the last 72 hours. APTT:No results for input(s): APTT in the last 72 hours. LIVER PROFILE:  No results for input(s): AST, ALT, BILIDIR, BILITOT, ALKPHOS in the last 72 hours. Imaging Last 24 Hours:  XR CHEST PORTABLE    Result Date: 1/21/2022  EXAMINATION: ONE XRAY VIEW OF THE CHEST 1/21/2022 10:02 am COMPARISON: None. HISTORY: ORDERING SYSTEM PROVIDED HISTORY: dyspnea TECHNOLOGIST PROVIDED HISTORY: Reason for exam:->dyspnea FINDINGS: The heart is enlarged. There is a dual lead cardiac pacer on the left There is an infiltrate seen within the right lung base. The left lung is clear. There is a trace right pleural effusion. There is no left pleural effusion. 1. Right lower lobe pneumonia 2. Trace right pleural effusion 3. Cardiomegaly     US DUP LOWER EXTREMITIES BILATERAL VENOUS    Result Date: 1/21/2022  EXAMINATION: DUPLEX VENOUS ULTRASOUND OF THE BILATERAL LOWER EXTREMITIES1/21/2022 10:15 am TECHNIQUE: Duplex ultrasound using B-mode/gray scaled imaging, Doppler spectral analysis and color flow Doppler was obtained of the deep venous structures of the lower bilateral extremities. COMPARISON: None. HISTORY: ORDERING SYSTEM PROVIDED HISTORY: discomfort TECHNOLOGIST PROVIDED HISTORY: Reason for exam:->discomfort What reading provider will be dictating this exam?->CRC FINDINGS: The visualized veins of the bilateral lower extremities are patent and free of echogenic thrombus. The veins demonstrate good compressibility with normal color flow study and spectral analysis.      No evidence of DVT in either lower extremity. RECOMMENDATIONS: Unavailable     Assessment//Plan           Hospital Problems           Last Modified POA    PNA (pneumonia) 1/21/2022 Yes        Assessment:  ( PNA (pneumonia) 1/21/2022 Yes     CHF  Pleural effusion  Acute renal insfficnecy  DM  COPD  HTN  Hyperlipidemia     ). Plan:   (Renal for diureses. Considering dialysis as needed. Pulmonary following, feels less likely pneumonia  Follows cultures  Monitor labs and output. Continue rest of meds. Placement? Scott Brittle ).

## 2022-02-01 NOTE — PROGRESS NOTES
Nutrition Note    Recommend and start Glucerna supplement daily to help meet nutritional needs. See full nutrition assessment on 1/28.      Electronically signed by Shirley Marx RD, LD on 2/1/22 at 9:32 AM EST    Contact: [phone]

## 2022-02-01 NOTE — PROGRESS NOTES
[facility]  Nephrology Attending Progress Note  Stacie Pendleton. Noris Caballero MD        SUBJECTIVE:   From 1/23 HPI: Francis Lowry pt is a 66 yo male who was seen in our office in June 2021 for ckd. Cr was 2.8 without previous values. He has a pmh of htn, pacer, lymphoma hyperlipidemia, dm, copd, cad sp pci rca, gerd, mame on cpap, HFpEF. He is admitted now for le edema and sob. He said it started after starting ozempic in dec. He received iv lasix in the er. cxr is showing rll pneumonia. He has been started on iv lasix. He has live in [facility] and [facility] and we dont have records here. He said he didn't know he had ckd until the [facility] us in June 2021. \"    2/1/22: Resting in bed states he feels ok, joking has a good sense of humor. PROBLEM LIST:    Patient Active Problem List   Diagnosis    PNA (pneumonia)        PAST MEDICAL HISTORY:    Past Medical History:   Diagnosis Date    Acid reflux     Agent orange exposure     [address]estive heart failure (CHF) ([address]     COPD (chronic obstructive pulmonary disease) ([address]     Depression     Diabetes mellitus ([address]     Hyperlipidemia     Hypertension     MI (myocardial infarction) ([address]     Polio     Sleep apnea        DIET:    ADULT ORAL NUTRITION SUPPLEMENT; Lunch; Diabetic Oral Supplement  ADULT DIET; Regular; 4 carb choices (60 gm/meal);  Low Sodium (2 gm); 1500 ml     PHYSICAL EXAM:     Patient Vitals for the past 24 hrs:   BP Temp Temp src Pulse Resp SpO2 Weight   02/01/22 1449 108/67   90 18     02/01/22 1408 100/66         02/01/22 1401 (!) 98/59 96.4 °F (35.8 °C) Temporal 89 18 93 %    02/01/22 0930 131/70 97 °F (36.1 °C) Temporal 99 18 95 %    02/01/22 0833     16 92 %    02/01/22 0541       281 lb 9 oz (127.7 kg)   01/31/22 2315 112/74 97.4 °F (36.3 °C) Temporal 91 17 97 %    01/31/22 2045 110/76 97 °F (36.1 °C) Temporal 95 15 96 %    @      Intake/Output Summary (Last 24 hours) at 2/1/2022 1554  Last data filed at 2/1/2022 1401  Gross per 24 hour   Intake 1615 ml   Output 3725 ml   Net -2110 ml         Wt Readings from Last 3 Encounters:   02/01/22 281 lb 9 oz (127.7 kg)   10/19/20 270 lb (122.5 kg)   09/06/19 285 lb (129.3 kg)       General appearance:  In no acute distress  Skin: No rashes or lesions on exposed skin  Neck: No JVD  Lungs: Clear, no adventitious sounds  Heart: RRR, no rub  Abdomen: Soft, non-tender, + bowel sounds  Extremities: 2+ edema BLE, arm edema improved  Neurologic: Mental status: Alert, oriented, thought content appropriate      MEDS (scheduled):    bumetanide  2 mg Oral Daily    insulin glargine-yfgn  50 Units SubCUTAneous Nightly    metoprolol succinate  25 mg Oral Daily    pantoprazole  40 mg Oral Daily    atorvastatin  40 mg Oral Nightly    insulin lispro  0-6 Units SubCUTAneous TID WC    insulin lispro  0-3 Units SubCUTAneous Nightly    ipratropium  0.5 mg Nebulization 4x daily    aspirin  81 mg Oral Daily       MEDS (infusions):   dextrose         MEDS (prn):  guaiFENesin-dextromethorphan, HYDROcodone 5 mg - acetaminophen, guaiFENesin, loperamide, glucose, dextrose, glucagon (rDNA), dextrose, perflutren lipid microspheres    DATA:    Recent Labs     01/30/22  0438 01/31/22  0518 02/01/22  0615   WBC 14.1* 12.2* 9.5   HGB 12.8 13.4 11.9*   HCT 41.2 43.5 37.2   MCV 92.4 91.6 89.6    313 297     Recent Labs     01/30/22  0438 01/31/22  0801 02/01/22  0615    137 140   K 3.4* 3.7 3.5   CL 95* 96* 96*   CO2 32* 31* 30*   BUN 59* 65* 65*   CREATININE 2.8* 2.6* 2.6*   LABGLOM 22 24 24   GLUCOSE 106* 109* 125*   CALCIUM 8.8 8.4* 8.7   MG 2.3 2.4  --    PHOS 4.1 4.5  --        Lab Results   Component Value Date    LABALBU 3.9 01/21/2022     Lab Results   Component Value Date    TSH 2.310 01/22/2022       Iron Studies  No results found for: IRON, TIBC, FERRITIN  No results found for: TKZNAYPK28  No results found for: FOLATE    No results found for: VITD25  PTH   Date Value Ref Range Status 01/24/2022 112 (H) 15 - 65 pg/mL Final       No components found for: URIC    Lab Results   Component Value Date    COLORU Yellow 01/21/2022    NITRU Negative 01/21/2022    GLUCOSEU Negative 01/21/2022    KETUA Negative 01/21/2022    UROBILINOGEN 0.2 01/21/2022    BILIRUBINUR Negative 01/21/2022       No results found for: LIPIDPAN    Narrative   EXAMINATION:   RETROPERITONEAL ULTRASOUND OF THE KIDNEYS AND URINARY BLADDER       1/23/2022       COMPARISON:   None       HISTORY:   ORDERING SYSTEM PROVIDED HISTORY: karen, r/o hydronephrosis   TECHNOLOGIST PROVIDED HISTORY:   Reason for exam:->karen, r/o hydronephrosis   What reading provider will be dictating this exam?->CRC       FINDINGS:       Kidneys:       There is very limited visualization.  Left kidney was not seen.  Right has no   hydronephrosis estimated 10.3 cm length, possible parenchymal thinning. Apparent ascites in the perihepatic location partially seen           Bladder:       Was not seen, and report of catheter present.           Impression   1. Very limited visualization. 2. The right kidney shows no hydronephrosis, the left was not seen. 3. Ascites incidentally seen.           IMPRESSION/RECOMMENDATIONS:      1. ckd 4  Last cr was 2.8 in June 2021  No records here, is from [facility] shows no hydro on right, left not seen  Renal function remains steady in the setting of aggressive IV diuresis  Plan for dialysis if renal fn worsens or if doesn't diurese (pt agrees)  Continue to follow daily labs and strict intake and output  2/1 renal function stable with cr 2.6     2. Volume overload  H/o stent to rca 2013  UOP last 24 hrs 3400 ml  Net (-) 29.8 L since admission   Fluid restriction 1800 ml  2/1 Stop bumex drip, start po bumex     3. 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 Copd     5. Dm  No ace arb with ckd 4  Urine protein 43, protein /cr ratio 0.3  Not accounting for severe edema and alb 3.9     6.  Anemia  Dose beni if hgb <10  Hgb 13.4 is at target >10       Max Scale, APRN - CNS

## 2022-02-01 NOTE — PROGRESS NOTES
Sent for patient for PFT testing. CHRISTIANO Astudillo, notified me patient is unable to stand and ambulate at this time. Patient not brought down at this time, patient will need to come down in a wheelchair and be able to ambulate to the body box. Currently our bedside printer is non functional and is limited in testing capability. Dr. Riley higgins served, awaiting a response.

## 2022-02-01 NOTE — PROGRESS NOTES
Spoke with Gorge Miranda in the PFT lab about patient testing. This RN had concerns that the patient would not be able to physically stand up for the test, so when I called the PFT lab  Gorge Miranda stated she didn't think he would even fit in the machine. Requested a bedside PFT but she stated that the \"printer doesn't even work right now, so we'd only be able to do a limited test\". She is going to contact Dr. Sandra Anderson to discuss with him the best route and will call back.

## 2022-02-01 NOTE — PLAN OF CARE
Problem: Falls - Risk of:  Goal: Will remain free from falls  Description: Will remain free from falls  Outcome: Ongoing  Goal: Absence of physical injury  Description: Absence of physical injury  Outcome: Ongoing     Problem: OXYGENATION/RESPIRATORY FUNCTION  Goal: Patient will maintain patent airway  Outcome: Ongoing     Problem: HEMODYNAMIC STATUS  Goal: Patient has stable vital signs and fluid balance  Outcome: Ongoing     Problem: Pain:  Goal: Pain level will decrease  Description: Pain level will decrease  Outcome: Ongoing  Goal: Control of acute pain  Description: Control of acute pain  Outcome: Ongoing  Goal: Control of chronic pain  Description: Control of chronic pain  Outcome: Ongoing     Problem: Skin Integrity:  Goal: Will show no infection signs and symptoms  Description: Will show no infection signs and symptoms  Outcome: Ongoing  Goal: Absence of new skin breakdown  Description: Absence of new skin breakdown  Outcome: Ongoing

## 2022-02-01 NOTE — CARE COORDINATION
Spoke with [facility], they are able to accept as long as not on HD. Message out to [facility] to see if they can accept. Envelope and ambulance form in soft chart. Received message from [facility], they are able to accept. Vibha Mishra, MSW, LSW

## 2022-02-02 NOTE — PROGRESS NOTES
Occupational Therapy  OT BEDSIDE TREATMENT NOTE   [facility] [address]  [address] [place] [address]       Date:2022  Patient Name: Claudette Ramirez  MRN: [id number]  : 1945  Room: [address] [place] [address]     Per OT Eval:    Evaluating OT: Mir Prasad OTR/L #[id number]     Referring Provider and Specific Provider Orders/Date:      22   OT eval and treat  Start:  22,   End:  22,   ONE TIME,   Standing Count:  1 Occurrences,   Ramon Aguirre MD      Diagnosis: Cardiomegaly [I51.7]  Acute renal insufficiency [N28.9]  Pleural effusion, right [J90]  PNA (pneumonia) [J18.9]  Elevated d-dimer [R79.89]  Pneumonia of right lower lobe due to infectious organism [J18.9]  Acute on chronic congestive heart failure, unspecified heart failure type ([facility] [I50.9]      Surgery: None      Pertinent Medical History:  has a past medical history of Acid reflux, Agent orange exposure, Congestive heart failure (CHF) ([facility] COPD (chronic obstructive pulmonary disease) ([facility] Depression, Diabetes mellitus ([facility] Hyperlipidemia, Hypertension, MI (myocardial infarction) ([facility] Polio, and Sleep apnea.        Precautions:  Fall Risk, falls and alarm, BiPAP, O2      Assessment of current deficits   [x] Functional mobility           [x]ADLs           [x] Strength                  []Cognition   [x] Functional transfers         [x] IADLs         [x] Safety Awareness   []Endurance   [] Fine Coordination                         [x] Balance      [] Vision/perception   []Sensation     []Gross Motor Coordination             [] ROM           [] Delirium                   [] Motor Control      OT PLAN OF CARE   OT POC based on physician orders, patient diagnosis and results of clinical assessment     Frequency/Duration: 1-3 days/wk for 2 weeks PRN   Specific OT Treatment to include:   * Instruction/training on adapted ADL techniques and AE recommendations to increase functional independence within precautions       * Training on energy conservation strategies, correct breathing pattern and techniques to improve independence/tolerance for self-care routine  * Functional transfer/mobility training/DME recommendations for increased independence, safety, and fall prevention  * Patient/Family education to increase follow through with safety techniques and functional independence  * Recommendation of environmental modifications for increased safety with functional transfers/mobility and ADLs  * Therapeutic exercise to improve motor endurance, ROM, and functional strength for ADLs/functional transfers  * Therapeutic activities to facilitate/challenge dynamic balance, stand tolerance for increased safety and independence with ADLs  * Positioning to improve skin integrity, interaction with environment and functional independence  * Manual techniques for edema management     Recommended Adaptive Equipment: TBD       Home Living: Lives alone, single family home, 1 story, ramp to enter. Basement dwelling. Bathroom set-up: Walk-in shower with built-in seat         Equipment owned: wheeled walker, cane. Prior Level of Function: Pt reports being independent with ADLs at baseline, sister assists with most IADLs; ambulated independently without AD.       Driving: N/a  Occupation: Retired   Enjoys: N/a      Pain Level: Pt complained of BLE pain this session     Cognition: A&O: 4/4; Follows 1-2 step directions, pleasant & cooperative after encouragement & education on importance of participation, somewhat self limiting, minimal effort noted             Memory: intact             Sequencing: fair              Problem solving: fair              Judgement/safety: fair      Functional Assessment:  AM-PAC Daily Activity Raw Score: 13/24    Initial Eval Status  Date: 1/27/22 Treatment Status  Date:  2/2/22 STGs = LTGs  Time frame: 10-14 days   Feeding Independent      Mod Indep  Eating meals in bed Independent    Grooming Minimal Assist   for denture/oral care; set-up provided    SBA  Pt washed face, applied deodorant, combed hair seated EOB Supervision    UB Dressing Moderate Assist      modA  [facility] gown seated upright in bed Supervision    LB Dressing Dependent   LE edema present     dependent  [facility] socks, edema to BLE/feet  Moderate Assist    Bathing Moderate/Maximal Assist     maxA  Simulated Task    Pt able to wash of UB, assistance to wash of LB, buttocks and garrick area Minimal Assist    Toileting Dependent     Dependent  Pt incontinent of bowel during session, assistance with hygiene, pt having of piedra catherer Moderate Assist    Bed Mobility  Supine to sit: Pt declined   Sit to supine: Pt declined   Rolling: Maximal Assist x1-2 maxAx2  Supine<>EOB  EOB<>Supine Supine to sit: Moderate Assist   Sit to supine: Moderate Assist    Functional Transfers  NT    maxAx2  Sit to Stand  Stand to Sit  x2 stands  Poor safety/balance, flexed posture   Moderate Assist with use of walker       Functional Mobility  NT     DNT Moderate Assist with use of walker    Balance Sitting:     Static: n/t    Dynamic: n/t  Standing: n/t     Sitting EOB:  SBA    Standing:  maxAx2 Sitting:     Static: SBA    Dynamic: SBA  Standing: Min/Mod A with walker   Activity Tolerance fair  minus; limited by pain  Fair-   Increase standing tolerance >1-2 minutes for improved engagement with functional transfers and indep in ADLs   Visual/  Perceptual Glasses: Yes      Reports changes in vision since admission: No       NA       Education:  Pt was educated on role of OT, goals to be reached, importance of OOB activity, safety with bed mobility, safety and hand placement with transfers, compensatory strategies to assist with ADL tasks. Comments: Upon arrival pt supine in bed, agreeable to therapy.  Pt compelted of bed mobility, transfers, unsupported sitting balance at EOB and light ADL tasks this session. At end of session, pt seated upright in bed, all tubes and lines intact, call light within reach. PT present during session due to pt's current assist levels, activity tolerance, and for safety. Pt has made slow progress towards set goals.    Continue with current plan of care focusing on increasing of independency with transfers and ADL tasks      Treatment Time In: 11:06am          Treatment Time Out: 11:30am          Treatment Charges: Mins Units   Ther Ex  [id number]     Manual Therapy [id number]     Thera Activities 66685 16 1   ADL/Home Mgt 78314 8 1   Neuro Re-ed 60589     Group Therapy      Orthotic manage/training  10230     Non-Billable Time     Total Timed Treatment 24 2       Aarti Dawn WYLIE/L [id number]    Dilan Baker, [facility], OTR/L [id number]

## 2022-02-02 NOTE — DISCHARGE SUMMARY
tabletTake 10 mg by mouth dailyfolic acid (FOLVITE) 1 MG tabletTake 1 mg by mouth dailyvitamin D (CHOLECALCIFEROL) 25 MCG (1000 UT) TABS tabletTake 2,000 Units by mouth dailyvitamin E 400 UNIT capsuleTake 400 Units by mouth dailycyanocobalamin 1000 MCG tabletTake 1,000 mcg by mouth dailyzinc gluconate 50 MG tabletTake 50 mg by mouth dailyascorbic acid (VITAMIN C) 500 MG tabletTake 1,000 mg by mouth dailybuPROPion HCl (WELLBUTRIN PO)Take by mouthFurosemide (LASIX PO)Take by mouthATORVASTATIN CALCIUM POTake by mouthpantoprazole (PROTONIX) 40 MG tabletTake 40 mg by mouth dailymetoprolol succinate (TOPROL XL) 25 MG extended release tabletTake 25 mg by mouth dailyinsulin glargine (LANTUS) 100 UNIT/ML injection vialInject 40 Units into the skin nightly Liraglutide (VICTOZA SC)Inject into the skinloperamide (IMODIUM) 2 MG capsuleTake 2 mg by mouth 4 times daily as needed for Diarrhea    Current Discharge Medication ListSTOP taking these medicationsmetFORMIN (GLUCOPHAGE) 500 MG tabletComments:Reason for Stopping:    Time Spent on Discharge:1E] minutes were spent in patient examination, evaluation, counseling as well as medication reconciliation, prescriptions for required medications, discharge plan, and follow up.     Electronically signed by Zaida Bojorquez MD on 2/2/22 at 11:38 AM EST

## 2022-02-02 NOTE — PROGRESS NOTES
Progress Note  Date:2022       [facility]:[id number]  Patient Lavern Dpuree     YOB: 1945     Age:77 y.o. Patient says he feels improved today. Subjective    Subjective:  Symptoms:  Stable. No shortness of breath or chest pain. Diet:  Adequate intake. Activity level: Impaired due to weakness. Pain:  He reports no pain. Review of Systems   Constitutional: Negative for activity change and fever. HENT: Negative for congestion. Respiratory: Negative for shortness of breath. Cardiovascular: Positive for leg swelling. Negative for chest pain. Gastrointestinal: Negative for abdominal pain. Genitourinary: Negative for difficulty urinating. Neurological: Negative for dizziness. Objective         Vitals Last 24 Hours:  TEMPERATURE:  Temp  Av.3 °F (36.3 °C)  Min: 96.4 °F (35.8 °C)  Max: 98.2 °F (36.8 °C)  RESPIRATIONS RANGE: Resp  Av.9  Min: 16  Max: 18  PULSE OXIMETRY RANGE: SpO2  Av.3 %  Min: 92 %  Max: 97 %  PULSE RANGE: Pulse  Av.6  Min: 89  Max: 99  BLOOD PRESSURE RANGE: Systolic (66FES), ZNJ:452 , Min:98 , ACE:436   ; Diastolic (40PVE), TQL:08, Min:59, Max:70    I/O (24Hr): Intake/Output Summary (Last 24 hours) at 2022 0407  Last data filed at 2022 2234  Gross per 24 hour   Intake 1430 ml   Output 2175 ml   Net -745 ml     Objective:  General Appearance:  Comfortable. Vital signs: (most recent): Blood pressure 103/65, pulse 92, temperature 97.6 °F (36.4 °C), temperature source Temporal, resp. rate 16, height 5' 9\" (1.753 m), weight 281 lb 9 oz (127.7 kg), SpO2 96 %. No fever. Lungs:  Normal effort and normal respiratory rate. Breath sounds clear to auscultation. Heart: Normal rate. Regular rhythm. S1 normal and S2 normal.    Extremities: There is local swelling.       Labs/Imaging/Diagnostics    Labs:  CBC:  Recent Labs     22  0518 22  0615   WBC 12.2* 9.5   RBC 4.75 4.15   HGB 13.4 11.9*   HCT 43.5 37.2 MCV 91.6 89.6   RDW 14.8 14.7    297     CHEMISTRIES:  Recent Labs     01/31/22  0801 02/01/22  0615    140   K 3.7 3.5   CL 96* 96*   CO2 31* 30*   BUN 65* 65*   CREATININE 2.6* 2.6*   GLUCOSE 109* 125*   PHOS 4.5  --    MG 2.4  --      PT/INR:No results for input(s): PROTIME, INR in the last 72 hours. APTT:No results for input(s): APTT in the last 72 hours. LIVER PROFILE:  No results for input(s): AST, ALT, BILIDIR, BILITOT, ALKPHOS in the last 72 hours. Imaging Last 24 Hours:  XR CHEST PORTABLE    Result Date: 1/21/2022  EXAMINATION: ONE XRAY VIEW OF THE CHEST 1/21/2022 10:02 am COMPARISON: None. HISTORY: ORDERING SYSTEM PROVIDED HISTORY: dyspnea TECHNOLOGIST PROVIDED HISTORY: Reason for exam:->dyspnea FINDINGS: The heart is enlarged. There is a dual lead cardiac pacer on the left There is an infiltrate seen within the right lung base. The left lung is clear. There is a trace right pleural effusion. There is no left pleural effusion. 1. Right lower lobe pneumonia 2. Trace right pleural effusion 3. Cardiomegaly     US DUP LOWER EXTREMITIES BILATERAL VENOUS    Result Date: 1/21/2022  EXAMINATION: DUPLEX VENOUS ULTRASOUND OF THE BILATERAL LOWER EXTREMITIES1/21/2022 10:15 am TECHNIQUE: Duplex ultrasound using B-mode/gray scaled imaging, Doppler spectral analysis and color flow Doppler was obtained of the deep venous structures of the lower bilateral extremities. COMPARISON: None. HISTORY: ORDERING SYSTEM PROVIDED HISTORY: discomfort TECHNOLOGIST PROVIDED HISTORY: Reason for exam:->discomfort What reading provider will be dictating this exam?->CRC FINDINGS: The visualized veins of the bilateral lower extremities are patent and free of echogenic thrombus. The veins demonstrate good compressibility with normal color flow study and spectral analysis. No evidence of DVT in either lower extremity.  RECOMMENDATIONS: Unavailable     Assessment//Plan           Hospital Problems           Last Modified POA    PNA (pneumonia) 1/21/2022 Yes        Assessment:  ( PNA (pneumonia) 1/21/2022 Yes     CHF  Pleural effusion  Acute renal insfficnecy  DM  COPD  HTN  Hyperlipidemia     ). Plan:   (Renal for diureses. Considering dialysis as needed. Pulmonary following, feels less likely pneumonia  Follows cultures  Monitor labs and output. Continue rest of meds. Placement? [facility] ).

## 2022-02-02 NOTE — PROGRESS NOTES
Patient cleared for discharge from renal standpoint. Made Dr Lillie Martin aware. Text sent to pulmonary regarding discharge and PFTs.

## 2022-02-02 NOTE — CARE COORDINATION
Spoke with pt to discuss [employer], he would prefer [employer], message left for [employer]. Called #[phone] for STAT updates. Pt currently on PO bumex, 4LO2, and fluid restrictions. Envelope and ambulance form in soft chart. HENS started. Itzel Green, MSW, LSW

## 2022-02-02 NOTE — CARE COORDINATION
Pt discharging to [facility] today. Tess Salas for 2pm ambulance. Notified charge RN, bedside RN, and [employer] of discharge/time. COVID test given to bedside RN. Spoke with pt and notified him of discharge/time. Pt states he will call his sister to inform her of discharge/time. Yolette Jon, MSW, LSW

## 2022-02-02 NOTE — DISCHARGE INSTR - COC
Continuity of Care Form    Patient Name: Kristina Cuellar   :  1945  MRN:  [id number]    Admit date:  2022  Discharge date:  22    Code Status Order: Full Code   Advance Directives:      Admitting Physician:  Asa Samano MD  PCP: Asa Samano MD    Discharging Nurse: [address] [place] [address] Road Unit/Room#: 5951/3697-T  Discharging Unit Phone Number: [phone]    Emergency Contact:   Extended Emergency Contact Information  [facility] Emergency Contact[facility]  Home Phone: [phone]  Mobile Phone: [phone]  Relation: Brother/Sister   needed? No    Past Surgical History:  Past Surgical History:   Procedure Laterality Date    CARDIAC PACEMAKER PLACEMENT      CORONARY ANGIOPLASTY WITH STENT PLACEMENT      PACEMAKER PLACEMENT         Immunization History: There is no immunization history on file for this patient.     Active Problems:  Patient Active Problem List   Diagnosis Code    PNA (pneumonia) J18.9       Isolation/Infection:   Isolation            No Isolation          Patient Infection Status       Infection Onset Added Last Indicated Last Indicated By Review Planned Expiration Resolved Resolved By    None active    Resolved    COVID-19 (Rule Out) 22 Respiratory Panel, Molecular, with COVID-19 (Restricted: peds pts or suitable admitted adults) (Ordered)   22 Rule-Out Test Resulted    COVID-19 (Rule Out) 22 COVID-19, Rapid (Ordered)   22 Rule-Out Test Resulted    C-diff Rule Out 21 Clostridium difficile EIA (Ordered)   21 Rule-Out Test Resulted            Nurse Assessment:  Last Vital Signs: BP 97/68   Pulse 88   Temp 97.3 °F (36.3 °C) (Oral)   Resp 18   Ht 5' 9\" (1.753 m)   Wt 281 lb 9 oz (127.7 kg) Comment: Patient unable to stand  SpO2 95%   BMI 41.58 kg/m²     Last documented pain score (0-10 scale): Pain Level: 8  Last Weight:   Wt Readings from Last 1 Encounters:   02/02/22 281 lb 9 oz (127.7 kg)     Mental Status:  oriented, alert, thought processes intact, and able to concentrate and follow conversation    IV Access:  - None    Nursing Mobility/ADLs:  Walking   Dependent  Transfer  Dependent  Bathing  Dependent  Dressing  Assisted  [place] Delivery   whole    Wound Care Documentation and Therapy:        Elimination:  Continence: Bowel: No  Bladder: No  Urinary Catheter: None REMOVED 2/2 1300--DUE TO VOID AT 1900  Colostomy/Ileostomy/Ileal Conduit: No       Date of Last BM: 2/2/22    Intake/Output Summary (Last 24 hours) at 2/2/2022 1128  Last data filed at 2/2/2022 0600  Gross per 24 hour   Intake 1190 ml   Output 1950 ml   Net -760 ml     I/O last 3 completed shifts: In: [address] [P.O.:1670]  Out: [address] [Urine:4750]    Safety Concerns: At Risk for Falls    Impairments/Disabilities:      None    Nutrition Therapy:  Current Nutrition Therapy:   - Oral Diet:  Carb Control 4 carbs/meal (1800kcals/day), Cardiac, and Low Sodium (2gm)    Routes of Feeding: Oral  Liquids: Thin Liquids  Daily Fluid Restriction: yes - amount 1800  Last Modified Barium Swallow with Video (Video Swallowing Test): not done    Treatments at the Time of Hospital Discharge:   Respiratory Treatments: ***  Oxygen Therapy:  is on oxygen at 4 L/min per nasal cannula.   Ventilator:    - No ventilator support    Rehab Therapies: Physical Therapy and Occupational Therapy  Weight Bearing Status/Restrictions: No weight bearing restirctions  Other Medical Equipment (for information only, NOT a DME order):  bedside commode and hospital bed  Other Treatments: ***    Patient's personal belongings (please select all that are sent with patient):  {[facility] DME Belongings:335897762}    RN SIGNATURE:  Electronically signed by Minor Jackson RN on 2/2/22 at 1:24 PM EST    CASE MANAGEMENT/SOCIAL WORK SECTION    Inpatient Status Date: ***    Readmission Risk Assessment Score:  Readmission Risk              Risk of Unplanned Readmission:  19           Discharging to Facility/ Agency   Name:   Address:  Phone:  Fax:    Dialysis Facility (if applicable)   Name:  Address:  Dialysis Schedule:  Phone:  Fax:    / signature: {Esignature:021144236}    PHYSICIAN SECTION    Prognosis: {Prognosis:7201386125}    Condition at Discharge: [address] Patient Condition:468544336}    Rehab Potential (if transferring to Rehab): {Prognosis:5214552389}    Recommended Labs or Other Treatments After Discharge: ***    Physician Certification: I certify the above information and transfer of Kristina Cuellar  is necessary for the continuing treatment of the diagnosis listed and that he requires {Admit to Appropriate Level of Care:59185} for {GREATER/LESS:121381243} 30 days.      Update Admission H&P: {CHP DME Changes in EWQGD:684675785}    PHYSICIAN SIGNATURE:  Tonny Yeboah MD

## 2022-02-02 NOTE — PROGRESS NOTES
Physical Therapy  Treatment Note      Name: Brayan Courtney  : 1945  MRN: [id number]      Date of Service: 2022    Evaluating PT:  Latosha Johnson PT, DPT    Room #:  2990/0036-B  Diagnosis:  Cardiomegaly [I51.7]  Acute renal insufficiency [N28.9]  Pleural effusion, right [J90]  PNA (pneumonia) [J18.9]  Elevated d-dimer [R79.89]  Pneumonia of right lower lobe due to infectious organism [J18.9]  Acute on chronic congestive heart failure, unspecified heart failure type ([facility] [address] [I50.9]  PMHx/PSHx:  CHF, COPD, diabetes, agent orange exposure, pacemaker, polio, SPEEDY  Procedure/Surgery:  N/A  Precautions:  Fall risk, O2  Equipment Needs:  TBD    SUBJECTIVE:    Pt lives alone in a 1 story home with ramp entry. Bed/bath is on 1st floor. Pt ambulated with no AD PTA. OBJECTIVE:   Initial Evaluation  Date: 22 Treatment  22 Short Term/ Long Term   Goals   AM-PAC 6 Clicks 0/62     Was pt agreeable to Eval/treatment? yes yes    Does pt have pain?  8/10 BLE unrated BLE    Bed Mobility  Rolling: max A  Supine to sit: max A  Sit to supine: max A  Scooting: max A Rolling: mod A  Supine to sit: mod Ax2  Sit to supine: max Ax2  Scooting: mod A Rolling: mod I  Supine to sit: mod I  Sit to supine: mod I  Scooting: mod I   Transfers Sit to stand: unable to clear bed during attempt  Stand to sit: NT  Stand pivot: NT Sit to stand: max Ax2  Stand to sit: mod A  Stand pivot: NT Sit to stand: mod I  Stand to sit: mod I  Stand pivot: mod I with AAD   Ambulation    NT NT 50'+ with AAD mod I   Stair negotiation: ascended and descended  NT NT make stair goal as appropriate     Strength/ROM:   BLE grossly 2/5  BLE AAROM limited by pain and swelling    Balance:   Static Sitting: SBA  Dynamic Sitting: CGA  Static Standing: mod A with ww  Dynamic Standing: NT    Pt is A & O x 3  Sensation:  Pt denies numbness and tingling to extremities  Edema:  BLE edema noted     Therapeutic Exercises:    Bed mobility: supine<>sit, cued for EOB positioning  Transfers: STSx2, cued for hand placement  BLE AAROM    Patient education  Pt educated on role of PT, importance of functional mobility during hospital stay, safety with bed mobility and transfers    Patient response to education:   Pt verbalized understanding Pt demonstrated skill Pt requires further education in this area   yes partial yes     ASSESSMENT:    Comments:    Pt supine in bed upon entering, agreeable to participate. Pt stating that pain in BLE has improved since previous visit. Pt assisted with BLE and trunk to position appropriately at bedside. Pt sitting upright, demonstrating good static sitting balance. Pt reporting mild dizziness with position change, provided time to allow symptoms to subside. Pt agreeable to attempt stand. Pt cued for hand placement, and instructed to transfer from EOB. Pt able to achieve partial stand, unable to fully extend trunk, but was able to maintain position for ~10\". Pt was found to be incontinent, assist provided with hygiene during 2nd stand achieved from elevated bed. Pt able to maintain standing th 2nd time for ~1'. Pt returned to EOB and assisted with trunk and BLE back to supine. Pt made comfortable in bed, all needs met and call bell in reach prior to exiting. Treatment:  Patient practiced and was instructed in the following treatment:     Bed mobility training - pt given verbal and tactile cues to facilitate proper sequencing and safety during rolling and supine>sit as well as provided with physical assistance to complete task    STS transfer training - pt educated on proper hand and foot placement, safety and sequencing, and use of verbal and tactile cues to safely complete sit<>stand transfers with hands on assistance to complete task safely     PLAN:    Patient is making fair progress towards established goals. Will continue with current POC.       Time in  1105  Time out  1130    Total Treatment Time  25 minutes     CPT codes:  [] Gait training 97520 -- minutes  [] Manual therapy [id number] -- minutes  [x] Therapeutic activities 10067 25 minutes  [] Therapeutic exercises 76265 -- minutes  [] Neuromuscular reeducation 47268 -- minutes    Richie Boast, PT, DPT  [id number]

## 2022-02-07 NOTE — PROGRESS NOTES
Congestive Heart Failure [address]   1945          Referring Provider: Yanet Montalvo, CHF Advocate  Primary Care Physician:  [facility]  Cardiologist: Dr Yesica Luevano  Nephrologist: Dr Benton        History of Present Illness:     Ebony Rodas is a 65 y.o. male with a history of HFpEF, most recent EF 70% on 1-25-22. Patient Story: Today is his 1st CHF Visit    He does  have dyspnea with exertion, shortness of breath, or decline in overall functional capacity. He does not have orthopnea, PND, nocturnal cough or hemoptysis. He does not have abdominal distention or bloating, early satiety, anorexia/change in appetite. He does not has a good urinary response to  oral diuretic. States it used to be better. He does have slight lower extremity edema. He denies lightheadedness, dizziness. He denies palpitations, syncope or near syncope. He does not complain of chest pain, pressure, discomfort. Allergies   Allergen Reactions    Penicillins Anaphylaxis         Outpatient Medications Marked as Taking for the 2/7/22 encounter [facility] HOSPITAL Encounter) with [facility] CHF ROOM 1   Medication Sig Dispense Refill    potassium chloride (KLOR-CON M) 20 MEQ extended release tablet Take 20 mEq by mouth daily      traMADol (ULTRAM) 50 MG tablet Take 25 mg by mouth every 8 hours as needed for Pain.       acetaminophen (TYLENOL) 325 MG tablet Take 650 mg by mouth every 4 hours as needed for Pain For temp> 100.4 F,  And for pain      vitamin C (ASCORBIC ACID) 500 MG tablet Take 500 mg by mouth 2 times daily      Cholecalciferol (VITAMIN D) 50 MCG (2000 UT) CAPS capsule Take by mouth Pt takes 200 units daily      zinc sulfate (ZINCATE) 220 (50 Zn) MG capsule Take 50 mg by mouth daily      aspirin 81 MG EC tablet Take 1 tablet by mouth daily 30 tablet 3    ipratropium (ATROVENT) 0.02 % nebulizer solution Take 2.5 mLs by nebulization 4 times daily 2.5 mL 3    insulin glargine-yfgn (SEMGLEE-YFGN) 100 UNIT/ML injection vial Inject 50 Units into the skin nightly 1 each 0    guaiFENesin 400 MG tablet Take 1 tablet by mouth 4 times daily as needed for Cough 56 tablet 0    guaiFENesin-dextromethorphan (ROBITUSSIN DM) 100-10 MG/5ML syrup Take 5 mLs by mouth every 4 hours as needed for Cough 120 mL 0    bumetanide (BUMEX) 1 MG tablet Take 1 tablet by mouth 2 times daily 30 tablet 3    ezetimibe (ZETIA) 10 MG tablet Take 10 mg by mouth daily      folic acid (FOLVITE) 1 MG tablet Take 1 mg by mouth daily      cyanocobalamin 1000 MCG tablet Take 1,000 mcg by mouth daily      ATORVASTATIN CALCIUM PO Take by mouth      loperamide (IMODIUM) 2 MG capsule Take 2 mg by mouth 4 times daily as needed for Diarrhea      pantoprazole (PROTONIX) 40 MG tablet Take 40 mg by mouth daily      metoprolol succinate (TOPROL XL) 25 MG extended release tablet Take 25 mg by mouth daily             Guideline directed medical:  ARNI/ACE I/ARB: No  Beta blocker:   Yes  Aldosterone antagonist:  No        Physical Examination:     /69   Pulse 98   Resp 18   Wt 274 lb (124.3 kg)   SpO2 97%   BMI 40.46 kg/m²     Assessment  Charting Type: Shift assessment    Neurological  Level of Consciousness: Alert (0)  Orientation Level: Oriented X4  Cognition: Follows commands,Appropriate safety awareness,Appropriate attention/concentration  Language: Clear         HEENT  HEENT (WDL): Exceptions to WDL  Right Eye: Impaired vision  Left Eye: Impaired vision    Respiratory  Respiratory Pattern: Regular  Respiratory Depth: Normal  Respiratory Quality/Effort: Unlabored,Dyspnea with exertion  Chest Assessment: Chest expansion symmetrical  L Breath Sounds: Clear  R Breath Sounds: Diminished              Cardiac  Cardiac Regularity: Regular  Cardiac Rhythm: Sinus rhythm    Rhythm Interpretation  Pulse: 98         Gastrointestinal  Abdominal (WDL): Exceptions to WDL  Abdomen Inspection: Distended  Tenderness: Soft,No guarding,Nontender  RUQ Bowel Sounds: Active  LUQ Bowel Sounds: Active  RLQ Bowel Sounds: Active  LLQ Bowel Sounds: Active          Bowel Sounds  RUQ Bowel Sounds: Active  LUQ Bowel Sounds: Active  RLQ Bowel Sounds: Active  LLQ Bowel Sounds: Active    Peripheral Vascular  Peripheral Vascular (WDL): Exceptions to WDL  Edema: Right lower extremity,Left lower extremity  RLE Edema: Pitting,Trace  LLE Edema: Pitting,Trace              Musculoskeletal  RUE: Swelling,Limited movement  LUE: Swelling,Limited movement  RL Extremity: Swelling,Limited movement  LL Extremity: Swelling,Limited movement    Genitourinary  Genitourinary (WDL): Within Defined Limits (piedra)    Psychosocial  Psychosocial (WDL): Within Defined Limits                        Pulse: 98                     LAB DATA:    Last 3 BMP      Sodium (mmol/L)   Date Value   02/02/2022 139   02/01/2022 140   01/31/2022 137     Potassium (mmol/L)   Date Value   02/02/2022 3.3 (L)   02/01/2022 3.5   01/31/2022 3.7     Chloride (mmol/L)   Date Value   02/02/2022 96 (L)   02/01/2022 96 (L)   01/31/2022 96 (L)     CO2 (mmol/L)   Date Value   02/02/2022 33 (H)   02/01/2022 30 (H)   01/31/2022 31 (H)     BUN (mg/dL)   Date Value   02/02/2022 69 (H)   02/01/2022 65 (H)   01/31/2022 65 (H)     Glucose (mg/dL)   Date Value   02/02/2022 102 (H)   02/01/2022 125 (H)   01/31/2022 109 (H)     Calcium (mg/dL)   Date Value   02/02/2022 8.5 (L)   02/01/2022 8.7   01/31/2022 8.4 (L)       Last 3 BNP       Pro-BNP (pg/mL)   Date Value   01/23/2022 2,357 (H)   01/21/2022 2,419 (H)          CBC: No results for input(s): WBC, HGB, PLT in the last 72 hours. BMP:  No results for input(s): NA, K, CL, CO2, BUN, CREATININE, GLUCOSE in the last 72 hours. Hepatic: No results for input(s): AST, ALT, ALB, BILITOT, ALKPHOS in the last 72 hours. Troponin: No results for input(s): TROPONINI in the last 72 hours.   BNP: No results for input(s): BNP in the last 72 hours. Lipids: No results for input(s): CHOL, HDL in the last 72 hours. Invalid input(s): LDLCALCU  INR: No results for input(s): INR in the last 72 hours. WEIGHTS:    Wt Readings from Last 3 Encounters:   02/07/22 274 lb (124.3 kg)   02/02/22 281 lb 9 oz (127.7 kg)   10/19/20 270 lb (122.5 kg)         TELEMETRY:  Cardiac Regularity: Regular  Cardiac Rhythm/Interpretation: NSR        ASSESSMENT:  Nasir Manriquez is currently in Rehab with stable weights   Instructed them to weigh daily, and to call us with weight gain. Interventions completed this visit:  IV diuretics given no  Lab work obtained yes, BMP/BNP     Reviewed currently prescribed medications with patient, educated on importance of compliance and answered any questions regarding their medication  Educated on signs and symptoms of HF  Educated on low sodium diet    PLAN:  Scheduled to follow up in CHF clinic on   Future Appointments   Date Time Provider Eleno Dejesus   2/14/2022  8:30 AM [facility] CHF ROOM 1 [facility]   2/16/2022 10:00 AM Ezra De La Rosa, APRN - CNP [facility]     Given clinic phone number and aware of signs and symptoms to call with any HF change in symptoms. I reviewed pt with the HF book (\"Caring for Your Heart: Living Well with Heart Failure\"), HF zones, and Sodium content pamphlet.

## 2022-02-14 NOTE — PROGRESS NOTES
Congestive Heart Failure [address]   1945          Referring Provider: Radha Ahuja, CHF Advocate  Primary Care Physician:  [facility]  Cardiologist: Dr Shirley Beckford  Nephrologist: Dr Benton        History of Present Illness:     Raj Hurt is a 65 y.o. male with a history of HFpEF, most recent EF 70% on 1-25-22. Patient Story: Today is his 1st CHF Visit    He does  have dyspnea with exertion, shortness of breath, or decline in overall functional capacity. He does not have orthopnea, PND, nocturnal cough or hemoptysis. He does not have abdominal distention or bloating, early satiety, anorexia/change in appetite. He does not has a good urinary response to  oral diuretic. States it used to be better. He does have slight lower extremity edema. He denies lightheadedness, dizziness. He denies palpitations, syncope or near syncope. He does not complain of chest pain, pressure, discomfort.          Allergies   Allergen Reactions    Penicillins Anaphylaxis         Outpatient Medications Marked as Taking for the 2/14/22 encounter [facility] Encounter) with [facility] CHF ROOM 1   Medication Sig Dispense Refill    acetaminophen (TYLENOL) 325 MG tablet Take 650 mg by mouth every 4 hours as needed for Pain For temp> 100.4 F,  And for pain      vitamin C (ASCORBIC ACID) 500 MG tablet Take 500 mg by mouth 2 times daily      Cholecalciferol (VITAMIN D) 50 MCG (2000 UT) CAPS capsule Take by mouth Pt takes 200 units daily      zinc sulfate (ZINCATE) 220 (50 Zn) MG capsule Take 50 mg by mouth daily      aspirin 81 MG EC tablet Take 1 tablet by mouth daily 30 tablet 3    insulin glargine-yfgn (SEMGLEE-YFGN) 100 UNIT/ML injection vial Inject 50 Units into the skin nightly 1 each 0    guaiFENesin 400 MG tablet Take 1 tablet by mouth 4 times daily as needed for Cough 56 tablet 0    bumetanide (BUMEX) 1 MG tablet Take 1 tablet by mouth 2 times daily 30 tablet 3    ezetimibe (ZETIA) 10 MG tablet Take 10 mg by mouth daily      folic acid (FOLVITE) 1 MG tablet Take 1 mg by mouth daily      cyanocobalamin 1000 MCG tablet Take 1,000 mcg by mouth daily      ATORVASTATIN CALCIUM PO Take by mouth      loperamide (IMODIUM) 2 MG capsule Take 2 mg by mouth 4 times daily as needed for Diarrhea      pantoprazole (PROTONIX) 40 MG tablet Take 40 mg by mouth daily      metoprolol succinate (TOPROL XL) 25 MG extended release tablet Take 25 mg by mouth daily             Guideline directed medical:  ARNI/ACE I/ARB: No  Beta blocker: Yes  Aldosterone antagonist:  No        Physical Examination:     /78   Pulse 95   Resp 16     Assessment  Charting Type: Shift assessment    Neurological  Level of Consciousness: Alert (0)  Orientation Level: Oriented X4  Cognition: Follows commands,Appropriate safety awareness,Appropriate attention/concentration  Language: Clear         HEENT  HEENT (WDL): Exceptions to WDL  Right Eye: Impaired vision  Left Eye: Impaired vision    Respiratory  Respiratory Pattern: Regular  Respiratory Depth: Normal  Respiratory Quality/Effort: Unlabored,Dyspnea with exertion  Chest Assessment: Chest expansion symmetrical  L Breath Sounds: Clear  R Breath Sounds: Diminished              Cardiac  Cardiac Regularity: Regular  Cardiac Rhythm: Sinus rhythm    Rhythm Interpretation  Pulse: 95         Gastrointestinal  Abdominal (WDL): Exceptions to WDL  Abdomen Inspection: Distended  Tenderness: Soft,No guarding,Nontender  RUQ Bowel Sounds: Active  LUQ Bowel Sounds: Active  RLQ Bowel Sounds: Active  LLQ Bowel Sounds: Active          Bowel Sounds  RUQ Bowel Sounds: Active  LUQ Bowel Sounds: Active  RLQ Bowel Sounds: Active  LLQ Bowel Sounds:  Active    Peripheral Vascular  Peripheral Vascular (WDL): Exceptions to WDL  Edema: Right lower extremity,Left lower extremity  RLE Edema: +1,Pitting  LLE Edema: +1,Pitting              Musculoskeletal  RUE: Swelling,Limited movement  LUE: Swelling,Limited movement  RL Extremity: Swelling,Limited movement  LL Extremity: Swelling,Limited movement    Genitourinary  Genitourinary (WDL): Within Defined Limits (piedra)    Psychosocial  Psychosocial (WDL): Within Defined Limits                        Pulse: 95                     LAB DATA:    Last 3 BMP      Sodium (mmol/L)   Date Value   02/07/2022 140   02/02/2022 139   02/01/2022 140     Potassium (mmol/L)   Date Value   02/07/2022 4.1   02/02/2022 3.3 (L)   02/01/2022 3.5     Chloride (mmol/L)   Date Value   02/07/2022 97 (L)   02/02/2022 96 (L)   02/01/2022 96 (L)     CO2 (mmol/L)   Date Value   02/07/2022 26   02/02/2022 33 (H)   02/01/2022 30 (H)     BUN (mg/dL)   Date Value   02/07/2022 60 (H)   02/02/2022 69 (H)   02/01/2022 65 (H)     Glucose (mg/dL)   Date Value   02/07/2022 220 (H)   02/02/2022 102 (H)   02/01/2022 125 (H)     Calcium (mg/dL)   Date Value   02/07/2022 8.6   02/02/2022 8.5 (L)   02/01/2022 8.7       Last 3 BNP       Pro-BNP (pg/mL)   Date Value   02/07/2022 2,543 (H)   01/23/2022 2,357 (H)   01/21/2022 2,419 (H)          CBC: No results for input(s): WBC, HGB, PLT in the last 72 hours. BMP:  No results for input(s): NA, K, CL, CO2, BUN, CREATININE, GLUCOSE in the last 72 hours. Hepatic: No results for input(s): AST, ALT, ALB, BILITOT, ALKPHOS in the last 72 hours. Troponin: No results for input(s): TROPONINI in the last 72 hours. BNP: No results for input(s): BNP in the last 72 hours. Lipids: No results for input(s): CHOL, HDL in the last 72 hours. Invalid input(s): LDLCALCU  INR: No results for input(s): INR in the last 72 hours.         WEIGHTS:    Wt Readings from Last 3 Encounters:   02/07/22 274 lb (124.3 kg)   02/02/22 281 lb 9 oz (127.7 kg)   10/19/20 270 lb (122.5 kg)         TELEMETRY:  Cardiac Regularity: Regular  Cardiac Rhythm/Interpretation: NSR        ASSESSMENT:  Franklin Dukes is currently in Rehab with stable weights Instructed them to weigh daily, and to call us with weight gain. Interventions completed this visit:  IV diuretics given no  Lab work obtained yes, BMP/BNP     Reviewed currently prescribed medications with patient, educated on importance of compliance and answered any questions regarding their medication  Educated on signs and symptoms of HF  Educated on low sodium diet    PLAN:  Scheduled to follow up in CHF clinic on   Future Appointments   Date Time Provider Eleno Dejesus   2/16/2022 10:00 1500 Janeen Navarrete [id number]     Given clinic phone number and aware of signs and symptoms to call with any HF change in symptoms. unable to stand to get weighed. Labs obtained.   Future Appointments   Date Time Provider Eleno Dejesus   2/16/2022 10:00 AM GILMAR Christine - CNP [place] [facility]   2/22/2022  8:15 AM [facility] CHF ROOM 1 [facility] CHF Tamela Kayser

## 2022-02-16 PROBLEM — G47.33 OSA (OBSTRUCTIVE SLEEP APNEA): Status: ACTIVE | Noted: 2022-01-01

## 2022-02-16 PROBLEM — I10 PRIMARY HYPERTENSION: Status: ACTIVE | Noted: 2022-01-01

## 2022-02-16 PROBLEM — Z95.0 CARDIAC PACEMAKER IN SITU: Status: ACTIVE | Noted: 2022-01-01

## 2022-02-16 PROBLEM — I50.33 ACUTE ON CHRONIC HEART FAILURE WITH PRESERVED EJECTION FRACTION (HCC): Status: ACTIVE | Noted: 2022-01-01

## 2022-02-16 PROBLEM — J96.11 CHRONIC RESPIRATORY FAILURE WITH HYPOXIA (HCC): Status: ACTIVE | Noted: 2022-01-01

## 2022-02-16 PROBLEM — I25.10 CORONARY ARTERY DISEASE INVOLVING NATIVE CORONARY ARTERY OF NATIVE HEART WITHOUT ANGINA PECTORIS: Status: ACTIVE | Noted: 2022-01-01

## 2022-02-16 NOTE — PROGRESS NOTES
[id number] [facility] Cardiology  Office Visit         Reason for Visit: Heart Failure    Primary Cardiologist: Dr. Lesli Lange (previously known to cardiology at [facility] in hospitals). He also follows with the [place]. History of Present Illness:     Mr. Freddy Bledsoe is a 65year old male with a PMHx of chronic HFpEF, CAD, PPM in situ, CKD, HTN, HLD, morbid obesity, T2DM, COPD with remote tobacco abuse, SPEEDY. He recently presented to the emergency room with complaints of increased shortness of breath/MOREL, lower extremity edema and dizziness. Reported decreased urine output from oral Lasix prior to presentation to the ED. He was admitted to the hospital for acute respiratory failure in the setting of acute heart failure. He was IV diuresed and given CKD nephrology assisted with diuresis and use of IV bumetanide drip. During hospitalization he underwent TTE that demonstrated normal LV size and hyperdynamic systolic function, normal RV size and function, mildmoderate aortic stenosis. He subjectively improved and was discharged to SNF facility for ongoing rehabilitation. He presents today in post acute heart failure hospitalization follow-up, since discharge from the hospital he has been in acute rehab working with PT. He has ongoing bilateral lower extremity edema for which he reports slow improvement. His legs are in a dependent position majority of the day however and he is currently not wearing any lower extremity compression stockings. He has chronic MOREL and is wearing supplemental O2. He is compliant with BiPAP majority of nights, but does state some evenings falls asleep with just nasal cannula. He is unable to monitor sodium intake while at the nursing facility, however does not add any extra sodium to his food. He reports dyspnea with exertion, shortness of breath, and decline in overall functional capacity. He denies orthopnea, PND, nocturnal cough or hemoptysis.   He has been having trouble sleeping at night, states he has been having recurrent dreams/nightmares that wake him up from his sleep. He denies abdominal distention or bloating, early satiety, anorexia/change in appetite, unintentional weight loss. He does lower extremity edema. He denies exertional lightheadedness. He denies palpitations, syncope or near syncope. Review of systems is negative for chest pain, pressure, discomfort. When ambulating on an incline, He does not leg claudication. History is negative for neurological symptoms including transient loss of vision, asymmetric weakness, aphasia, dysphasia, numbness, tingling. Patient Active Problem List    Diagnosis Date Noted    PNA (pneumonia) 01/21/2022       Past Medical History:   Diagnosis Date    Acid reflux     Agent orange exposure     Congestive heart failure (CHF) (HCC)     COPD (chronic obstructive pulmonary disease) (HCC)     Depression     Diabetes mellitus (HCC)     Hyperlipidemia     Hypertension     MI ([facility]ocardial infarction) ([facility] [address]     Polio     Sleep apnea      Past Medical History:    1. Polio as a child (two week)  2. 35 pack years quit in 1986  3. Agent orange exposure  4. HTN  5. HLD  6. T2DM insulin requiring with nephropathy  7. CKD follows with Dr Maya Pereyra  8. COPD  9. GERD  10. 1/2003 CAD s/p PCI RCA (Express stent)  11. 6/2010 PPM BSCI  12. SPEEDY compliant with C-pap  13. 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 2015 PUD/GIB required 2 units PRBC (ASA was stopped @ that time)  15.  October 2021 TTE @ [facility] (will attempt to get records)      Past Surgical History:   Procedure Laterality Date    CARDIAC PACEMAKER PLACEMENT      CORONARY ANGIOPLASTY WITH STENT PLACEMENT      PACEMAKER PLACEMENT         Allergies   Allergen Reactions    Penicillins Anaphylaxis       Outpatient Medications Marked as Taking for the 2/16/22 encounter (Office Visit) with GILMAR Mcadams CNP   Medication Sig Dispense Refill    OXYGEN Inhale into the lungs 1-5 l/min to maintain O2 sat greater than or =to 90%      BiPAP Machine MISC by Does not apply route      insulin lispro, 1 Unit Dial, (HUMALOG KWIKPEN) 100 UNIT/ML SOPN Inject into the skin 4 times daily sliding scale      acetaminophen (TYLENOL) 325 MG tablet Take 650 mg by mouth every 4 hours as needed for Pain For temp> 100.4 F,  And for pain      vitamin C (ASCORBIC ACID) 500 MG tablet Take 500 mg by mouth 2 times daily      Cholecalciferol (VITAMIN D) 50 MCG (2000 UT) CAPS capsule Take by mouth Pt takes 200 units daily      zinc sulfate (ZINCATE) 220 (50 Zn) MG capsule Take 50 mg by mouth daily      aspirin 81 MG EC tablet Take 1 tablet by mouth daily 30 tablet 3    insulin glargine-yfgn (SEMGLEE-YFGN) 100 UNIT/ML injection vial Inject 50 Units into the skin nightly 1 each 0    guaiFENesin 400 MG tablet Take 1 tablet by mouth 4 times daily as needed for Cough 56 tablet 0    bumetanide (BUMEX) 1 MG tablet Take 1 tablet by mouth 2 times daily 30 tablet 3    ezetimibe (ZETIA) 10 MG tablet Take 10 mg by mouth daily      folic acid (FOLVITE) 1 MG tablet Take 1 mg by mouth daily      cyanocobalamin 1000 MCG tablet Take 1,000 mcg by mouth daily      ATORVASTATIN CALCIUM PO Take 40 mg by mouth daily       loperamide (IMODIUM) 2 MG capsule Take 2 mg by mouth 4 times daily as needed for Diarrhea      pantoprazole (PROTONIX) 40 MG tablet Take 40 mg by mouth daily      metoprolol succinate (TOPROL XL) 25 MG extended release tablet Take 25 mg by mouth daily         Review of Systems:   Cardiac: As per HPI  General: No fever, chills, rigors  Pulmonary: As per HPI  HEENT: No visual disturbances, difficult swallowing  GI: No nausea, vomiting, abdominal pain  : No dysuria or hematuria  Endocrine: No thyroid disease or diabetes  Musculoskeletal: STALEY x 4, no focal motor deficits  Skin: Intact, no rashes  Neuro/Psych: No headache or seizures      Weights:   Wt Readings from Last 3 Encounters: 02/16/22 278 lb (126.1 kg)   02/07/22 274 lb (124.3 kg)   02/02/22 281 lb 9 oz (127.7 kg)             Physical Examination:     /88 (Site: Left Upper Arm, Position: Sitting, Cuff Size: Large Adult)   Pulse 98   Resp 22   Ht 5' 9\" (1.753 m)   Wt 278 lb (126.1 kg)   SpO2 99%   BMI 41.05 kg/m²     CONSTITUTIONAL: Alert and oriented times 3, no acute distress and cooperative to examination with proper mood and affect. SKIN: Skin color, texture, turgor normal. No rashes or lesions. LYMPH: no cervical nodes, no inguinal nodes  HEENT: Head is normocephalic, atraumatic. EOMI, PERRLA. NECK: Supple, symmetrical, trachea midline, no adenopathy, thyroid symmetric, not enlarged and no tenderness, skin normal. Unable to assess JVD while in wheelchair   CHEST/LUNGS: chest symmetric with normal A/P diameter, normal respiratory rate and rhythm, lungs clear to auscultation without wheezes, rales or rhonchi. No accessory muscle use. Scars None   CARDIOVASCULAR: Heart sounds are normal.  Regular rate and rhythm without murmur, gallop or rub. Normal S1 and S2. . Carotid and femoral pulses 2+/4 and equal bilaterally. ABDOMEN: Morbidly obese. No and Laparoscopic scar(s) present. Normal bowel sounds. No bruits. soft, nondistended, no masses or organomegaly. no evidence of hernia. Percussion: Normal without hepatosplenomegally. Tenderness: absent. RECTAL: deferred, not clinically indicated  NEUROLOGIC: There are no focalizing motor or sensory deficits. CN II-XII are grossly intact. anell [facility] EXTREMITIES: no cyanosis, no clubbing. 2+ bilateral lower extremity edema. Warm and well perfused. All the following diagnostics were personally reviewed and interpreted by me.        LAB DATA:     2/2/2022 07:42 2/7/2022 09:15 2/14/2022 10:00   Sodium 139 140 145   Potassium 3.3 (L) 4.1 4.3   Chloride 96 (L) 97 (L) 102   CO2 33 (H) 26 30 (H)   BUN 69 (H) 60 (H) 47 (H)   Creatinine 2.6 (H) 2.7 (H) 2.7 (H)   Anion Gap 10 17 (H) 13 GFR Non- 24 23 23   GFR African American 29 28 28   Glucose 102 (H) 220 (H) 132 (H)   CALCIUM, SERUM, 133840 8.5 (L) 8.6 8.5 (L)   Meter Glucose      Pro-BNP  2,543 (H) 1,910 (H)   WBC 11.2     RBC 4.31     Hemoglobin Quant 12.2 (L)     Hematocrit 39.1     MCV 90.7     MCH 28.3     MCHC 31.2 (L)     MPV 9.8     RDW 14.5     Platelet Count 566     SARS-CoV-2, NAAT          IMAGING:    CXR (1/21/2022)  FINDINGS:  The heart is enlarged. Anurag Spatz is a dual lead cardiac pacer on the left. There is an infiltrate seen within the right lung base.  The left lung is clear. Anurag Spatz is a trace right pleural effusion. Anurag Spatz is no left pleural  effusion. Impression:  1. Right lower lobe pneumonia  2. Trace right pleural effusion  3. Cardiomegaly       CARDIAC TESTING:    TTE (1/25/2022)   Summary   Normal left ventricle size. Estimated left ventricle ejection fraction >70 %. Normal right ventricular size and function. Mild-to-moderate aortic stenosis. Evidence of elevated filling pressures by IVC and Doppler. EKG  Sinus Rhythm   Diffuse low voltage. Incomplete right bundle branch block. ASSESSMENT:  1. Acute on chronic HFpEF  2. ACC stage C / NYHA class III  3. Hypervolemic, improving edema per patient  4. CAD with reported stenting to the RCA in 2013 (full cath report not available)  5. 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 CKD stage IV  7. HTN  8. HLD  on statin therapy  9. Morbid obesity  10. SPEEDY  compliant  11. T2DM, insulin requiring with neuropathy  12. Chronic hypoxic respiratory failurewearing supplemental O2  13. COPD with remote tobacco abuse  14. Chronic anemia - hx GIB (2015) s/p transfusions at that time with ASA discontinued, further specifics unclear   15. Hx Polio as a child      PLAN:  1. Increase metoprolol succinate to 25 mg twice daily     2. Continue rest of current cardiac medications     3.  Elevate legs as much as possible when up in wheelchair     4. Apply ACE wraps to lower extremities - on in AM and off in PM    5. Wear BIPAP nightly - please order patient silicone nose protector for BIPAP to improve compliance with wearing nightly      6. [id number] Melinda Plascencia from cardiology standpoint for patient to use melatonin for please, please address with primary team     7. Continue to follow with CHF clinic    8. Follow up with Dr. Luanne Alarcon in 1-2 months    9. Daily weights     -Stay Hydrated    -Diet should sodium restricted to 2 grams    -Again watch your daily weight trends and if you gain water weight please follow below instructions.    -If you gain 3-5 pounds in 2-3 days OR notice that you are retaining fluid in anyway just like you did before then take an extra dose of your water pill (bumetanide/Bumex) every day until you lose the weight or feel better.     -If you notice that you have taken more than 2 extra doses in 1 week then please call and let us know. -If at any time you feel that you are retaining fluid, your medications are not working, or you feel ill in anyway, then please call us for either same day appointment or the next day, and for instructions. Our goal is to keep you out of the emergency room and the hospital and we have ways to do it. You just need to call us in a timely manner.     -If you become sick for other reasons, and notice that you are not urinating as much, the urine is very dark, you have significant diarrhea or vomiting, then please DO NOT take your water pill and CALL US immediately.          Eugenia Whipple APRN-CNP  [id number] [facility] Cardiology

## 2022-02-22 NOTE — PLAN OF CARE
Pt no show for CHF Clinic appt. Despite transportation set up. Transportation was approved by Director Torsten Arroyo for [employer] transportation. Pt was notified yesterday 2/21/22 that [employer] and [employer] will transport pt. And will come into pt's house to assist him into [facility] and will bring pt back home and assist him back into his home, since pt admits he cannot leave home without assistance. Pt verbalized understanding. Provide A Ride and  [employer] [place] by MABEL Mariscal [employer]using do NOT come into pt's house therefore would NOT benefit pt.

## 2022-02-23 PROBLEM — I50.43 CHF (CONGESTIVE HEART FAILURE), NYHA CLASS I, ACUTE ON CHRONIC, COMBINED (HCC): Status: ACTIVE | Noted: 2022-01-01

## 2022-02-23 NOTE — ED PROVIDER NOTES
65y.o. year old male presenting to the emergency room with concerns of shortness of breath beginning worsening over the past two days with associated leg swelling. Patient reports that symptom's onset in January, worsening over the past 2 days. Worsen with exertion. Improves with nothing. Severity of moderate. Symptoms are constant in timing. Symptoms described as swelling in bilateral legs with associated shortness of breath. Patient reports associated symptoms of fatigue. Patient in no acute distress. Review of Systems   Constitutional: Positive for fatigue. Negative for chills and fever. HENT: Negative for congestion, rhinorrhea, trouble swallowing and voice change. Eyes: Negative for photophobia, discharge, redness and visual disturbance. Respiratory: Positive for cough, chest tightness and shortness of breath. Negative for wheezing. Cardiovascular: Negative for chest pain and leg swelling. Gastrointestinal: Negative for abdominal pain, diarrhea, nausea and vomiting. Genitourinary: Negative for dysuria, frequency and hematuria. Musculoskeletal: Negative for arthralgias, back pain and neck stiffness. Skin: Negative for wound. Neurological: Negative for dizziness, syncope and headaches. Psychiatric/Behavioral: Negative for behavioral problems and confusion. The patient is not nervous/anxious. Physical Exam  Vitals reviewed. Constitutional:       General: He is not in acute distress. Appearance: Normal appearance. He is obese. HENT:      Head: Normocephalic. Mouth/Throat:      Mouth: Mucous membranes are moist.   Eyes:      Pupils: Pupils are equal, round, and reactive to light. Cardiovascular:      Rate and Rhythm: Regular rhythm. Tachycardia present. Pulses: Normal pulses. No decreased pulses. Heart sounds: Normal heart sounds. Pulmonary:      Effort: Accessory muscle usage present. No respiratory distress.       Breath sounds: Decreased breath sounds present. No wheezing or rales. Abdominal:      General: Bowel sounds are normal.      Tenderness: There is no abdominal tenderness. There is no guarding or rebound. Musculoskeletal:      Cervical back: Normal range of motion and neck supple. Right lower leg: Edema present. Left lower leg: Edema present. Skin:     General: Skin is warm and dry. Capillary Refill: Capillary refill takes less than 2 seconds. Neurological:      Mental Status: He is alert and oriented to person, place, and time. Psychiatric:         Mood and Affect: Mood normal.         Behavior: Behavior normal.          Procedures     MDM  Number of Diagnoses or Management Options  Acute on chronic congestive heart failure, unspecified heart failure type (HCC)  Chronic kidney disease, unspecified CKD stage  Pleural effusion  Diagnosis management comments: 68-year-old male presenting to the emergency department complaints of shortness of breath, leg swelling. Delta 1. Elevated D-dimer noted. No evidence of DVT. Small pleural effusion noted on left with moderate size right pleural effusion. Large opacity in right lung base. Patient on 4 L nasal cannula. Spoke to patient's PCP Joyce Grullon, discussed patient will plan to admit patient at this time. Patient stable at time of admission. Amount and/or Complexity of Data Reviewed  Decide to obtain previous medical records or to obtain history from someone other than the patient: yes                    --------------------------------------------- PAST HISTORY ---------------------------------------------  Past Medical History:  has a past medical history of Acid reflux, Agent orange exposure, Congestive heart failure (CHF) ([facility] [address] COPD (chronic obstructive pulmonary disease) ([facility] [address] Depression, Diabetes mellitus ([facility] [address] Hyperlipidemia, Hypertension, MI (myocardial infarction) ([facility] [address] Polio, and Sleep apnea.     Past Surgical History:  has a past surgical history that includes Coronary angioplasty with stent; Cardiac pacemaker placement; and pacemaker placement. Social History:  reports that he has quit smoking. He has never used smokeless tobacco. He reports that he does not drink alcohol and does not use drugs. Family History: family history is not on file. The patients home medications have been reviewed.     Allergies: Penicillins    -------------------------------------------------- RESULTS -------------------------------------------------    LABS:  Results for orders placed or performed during the hospital encounter of 02/23/22   CBC with Auto Differential   Result Value Ref Range    WBC 9.5 4.5 - 11.5 E9/L    RBC 3.88 3.80 - 5.80 E12/L    Hemoglobin 10.9 (L) 12.5 - 16.5 g/dL    Hematocrit 36.0 (L) 37.0 - 54.0 %    MCV 92.8 80.0 - 99.9 fL    MCH 28.1 26.0 - 35.0 pg    MCHC 30.3 (L) 32.0 - 34.5 %    RDW 16.4 (H) 11.5 - 15.0 fL    Platelets 092 872 - 287 E9/L    MPV 9.2 7.0 - 12.0 fL    Neutrophils % 64.3 43.0 - 80.0 %    Immature Granulocytes % 0.7 0.0 - 5.0 %    Lymphocytes % 19.9 (L) 20.0 - 42.0 %    Monocytes % 11.6 2.0 - 12.0 %    Eosinophils % 3.2 0.0 - 6.0 %    Basophils % 0.3 0.0 - 2.0 %    Neutrophils Absolute 6.12 1.80 - 7.30 E9/L    Immature Granulocytes # 0.07 E9/L    Lymphocytes Absolute 1.89 1.50 - 4.00 E9/L    Monocytes Absolute 1.10 (H) 0.10 - 0.95 E9/L    Eosinophils Absolute 0.30 0.05 - 0.50 E9/L    Basophils Absolute 0.03 0.00 - 0.20 K3/A   Basic Metabolic Panel w/ Reflex to MG   Result Value Ref Range    Sodium 142 132 - 146 mmol/L    Potassium reflex Magnesium 4.1 3.5 - 5.0 mmol/L    Chloride 102 98 - 107 mmol/L    CO2 30 (H) 22 - 29 mmol/L    Anion Gap 10 7 - 16 mmol/L    Glucose 116 (H) 74 - 99 mg/dL    BUN 33 (H) 6 - 23 mg/dL    CREATININE 2.2 (H) 0.7 - 1.2 mg/dL    GFR Non-African American 29 >=60 mL/min/1.73    GFR African American 35     Calcium 8.8 8.6 - 10.2 mg/dL   Troponin   Result Value Ref Range    Troponin, High Sensitivity 25 (H) 0 - 11 ng/L   Brain Natriuretic Peptide   Result Value Ref Range    Pro-BNP 1,478 (H) 0 - 450 pg/mL   Troponin   Result Value Ref Range    Troponin, High Sensitivity 24 (H) 0 - 11 ng/L   D-Dimer, Quantitative   Result Value Ref Range    D-Dimer, Quant 698 ng/mL DDU   CBC   Result Value Ref Range    WBC 8.3 4.5 - 11.5 E9/L    RBC 3.75 (L) 3.80 - 5.80 E12/L    Hemoglobin 10.9 (L) 12.5 - 16.5 g/dL    Hematocrit 34.7 (L) 37.0 - 54.0 %    MCV 92.5 80.0 - 99.9 fL    MCH 29.1 26.0 - 35.0 pg    MCHC 31.4 (L) 32.0 - 34.5 %    RDW 16.5 (H) 11.5 - 15.0 fL    Platelets 122 903 - 965 E9/L    MPV 9.9 7.0 - 12.0 fL   Basic Metabolic Panel   Result Value Ref Range    Sodium 144 132 - 146 mmol/L    Potassium 3.9 3.5 - 5.0 mmol/L    Chloride 104 98 - 107 mmol/L    CO2 30 (H) 22 - 29 mmol/L    Anion Gap 10 7 - 16 mmol/L    Glucose 94 74 - 99 mg/dL    BUN 31 (H) 6 - 23 mg/dL    CREATININE 2.3 (H) 0.7 - 1.2 mg/dL    GFR Non-African American 28 >=60 mL/min/1.73    GFR African American 33     Calcium 8.6 8.6 - 10.2 mg/dL   Procalcitonin   Result Value Ref Range    Procalcitonin 0.12 (H) 0.00 - 0.08 ng/mL   Magnesium   Result Value Ref Range    Magnesium 2.1 1.6 - 2.6 mg/dL   POCT Glucose   Result Value Ref Range    Meter Glucose 66 (L) 74 - 99 mg/dL   POCT Glucose   Result Value Ref Range    Meter Glucose 102 (H) 74 - 99 mg/dL   POCT Glucose   Result Value Ref Range    Meter Glucose 171 (H) 74 - 99 mg/dL   EKG 12 Lead   Result Value Ref Range    Ventricular Rate 111 BPM    Atrial Rate 111 BPM    P-R Interval 144 ms    QRS Duration 110 ms    Q-T Interval 370 ms    QTc Calculation (Bazett) 503 ms    P Axis 60 degrees    R Axis 73 degrees    T Axis 75 degrees       RADIOLOGY:  US DUP LOWER EXTREMITIES BILATERAL VENOUS   Final Result   Within the visualized vessels there is no evidence for deep venous   thrombosis               XR CHEST PORTABLE   Final Result   1.  Large opacity within the right lung base which could represent pneumonia   and or atelectasis. 2. Moderate size right pleural effusion. 3. Small left pleural effusion. 4. CT of the thorax is recommended for further evaluation.           ------------------------- NURSING NOTES AND VITALS REVIEWED ---------------------------  Date / Time Roomed:  2/23/2022  3:55 PM  ED Bed Assignment:  [id number]/[id number]    The nursing notes within the ED encounter and vital signs as below have been reviewed. Patient Vitals for the past 24 hrs:   BP Temp Temp src Pulse Resp SpO2 Height Weight   02/24/22 1340 -- -- -- -- -- 97 % -- --   02/24/22 1033 90/81 98.2 °F (36.8 °C) Axillary 114 18 98 % -- --   02/24/22 1010 -- -- -- -- -- 99 % -- --   02/24/22 0615 -- -- -- -- -- -- 5' 9\" (1.753 m) 278 lb (126.1 kg)   02/24/22 0215 124/87 98.8 °F (37.1 °C) Oral 90 17 -- -- --   02/24/22 0000 (!) 128/90 -- -- 100 15 97 % -- --   02/23/22 2035 (!) 128/90 -- -- 106 18 97 % -- --   02/23/22 1557 (!) 164/96 98.4 °F (36.9 °C) -- 112 18 99 % -- --       Oxygen Saturation Interpretation: > 90% on 4L oxygen    ------------------------------------------ PROGRESS NOTES ------------------------------------------  Re-evaluation(s):  Patients symptoms show no change  Repeat physical examination is not changed    Counseling:  I have spoken with the patient and discussed todays results, in addition to providing specific details for the plan of care and counseling regarding the diagnosis and prognosis. Their questions are answered at this time and they are agreeable with the plan of admission.    --------------------------------- ADDITIONAL PROVIDER NOTES ---------------------------------  Consultations:  Spoke with Dr. Myrna Estrada. Discussed case. They will admit the patient.   This patient's ED course included: a personal history and physicial examination, re-evaluation prior to disposition, multiple bedside re-evaluations, IV medications, cardiac monitoring and continuous pulse oximetry    This patient has remained hemodynamically stable during their ED course. Diagnosis:  1. Acute on chronic congestive heart failure, unspecified heart failure type ([facility] [address]    2. Pleural effusion    3. Chronic kidney disease, unspecified CKD stage    4. CHF (congestive heart failure), NYHA class I, acute on chronic, combined ([facility] [address]    5. Chronic respiratory failure with hypoxia (HCC)    6. Cardiac pacemaker in situ    7. Coronary artery disease involving native coronary artery of native heart without angina pectoris    8. Acute on chronic heart failure with preserved ejection fraction ([facility] [address]    9. Pneumonia of right lower lobe due to infectious organism        Disposition:  Patient's disposition: Admit  Patient's condition is stable.            Umberto Mcclain DO  Resident  02/24/22 6648

## 2022-02-23 NOTE — LETTER
[address] Medicaid  CERTIFICATION OF NECESSITY  FOR TRANSPORTATION   BY WHEELCHAIR VAN     Individual Information   1. Name: Key Regalado 2. [place] Medicaid Billing Number:    3. Address: [address]      Transportation Provider Information   4. Provider Name:    5. [place] Medicaid Provider Number:  National Provider Identifier (NPI):      Certification  7. Criteria:  By signing this document, the practitioner certifies that two statements are true:  A. This individual must be accompanied by a mobility-related assistive device from the point of pick-up to the point of drop-off. B. Transport of this individual by standard passenger vehicle or common carrier is precluded or contraindicated. 8. Period Beginning Date: 3/10/22   9. Length  [x] Not more than 1 day(s)  [] One Year     Additional Information Relevant to Certification   10. Comments or Explanations, If Necessary or Appropriate    CHF  Pleural effusion  Acute renal insfficnecy  DM  COPD  HTN  Hyperlipidemia  Moderate edema of B lower legs/feet- debilitation     Certifying Practitioner Information   11. Name of Practitioner: Dr Elaina Pérez   12. [place] Medicaid Provider Number, If Applicable:  [address] Provider Identifier (NPI):      Signature Information   14. Date of Signature: 3/10/22 15. Name of Person Signing: Electronically signed by Max Pedraza RN on 3/10/2022 at 10:45 AM   16. Signature and Professional Designation: Electronically signed by Max Pedraza RN on 3/10/2022 at 10:45 AM     OD 82225  Rev. 7/2015   [place] Department Medicaid  CERTIFICATION OF NECESSITY  FOR NON-EMERGENCY TRANSPORTATION   BY GROUND AMBULANCE      Individual Information   1. Name: Key Regalado 2. [place] Medicaid Billing Number:    3. Address: [address]      Transportation Provider Information   4. Provider Name: [facility]   5.  [place] Medicaid Provider Number:  National Provider Identifier (NPI): Certification  7. Criteria:  During transport, this individual requires:  [] Medical treatment or continuous     supervision by an EMT. [x] The administration or regulation of oxygen by another person. [] Supervised protective restraint. 4LITERS 8. Period Beginning Date: 3/2/22   9. Length  [x] Not more than 10 day(s)  [] One Year     Additional Information Relevant to Certification   10. Comments or Explanations, If Necessary or Appropriate     CHF, INABILITY TO WALK DUE HIS EDEMATOUS LEGS, [address]     Certifying Practitioner Information   11. Name of Practitioner: dr Saleem Tan   12. [place] Medicaid Provider Number, If Applicable:  [address] Provider Identifier (NPI):      Signature Information   14. Date of Signature: 3/2/22 15. Name of Person Signing: Robert Zaldivar   47. Signature and Professional Designation: Electronically signed by Robert Zaldivar RN on 3/2/2022 at 1:59 PM     ODM 20949  Rev. 2015       [address] Encounter Date/Time: 2022 [address] [place] [address] Account: [de-identified]    MRN: [id number]    Patient: Beatrice Cedeno Serial #: [id number]      ENCOUNTER          Patient Class: I Private Enc? No Unit [facility] 6405/6405-B   Hospital Service: MED   Encounter DX: CHF (congestive heart fa*   ADM Provider: Micha Gaxiola MD   Procedure:     ATT Provider: Micha Gaxiola MD   REF Provider:        Admission DX: CHF (congestive heart failure), NYHA class I, acute on chronic, combined ([facility] [address] and DX codes: I50.43      PATIENT                 Name: Rika Ruffin : 1945 (77 yrs)   Address: [address] [place] [address] Sex: Male   [facility] [address]         Marital Status: Single   Employer: RETIRED         Voodoo:  Hernan Palomo   Primary Care Provider: Micha Gaxiola, *         Primary Phone: [phone]   EMERGENCY CONTACT   Contact Name Legal Guardian?  Relationship to Patient Home Phone Work Phone   1. angélica muñoz  2. *No

## 2022-02-23 NOTE — ED PROVIDER NOTES
ATTENDING PROVIDER ATTESTATION:     Harry Wren presented to the emergency department for evaluation of Shortness of Breath (pt reports sob since january. pt reports increased edema to lower extremities. )   and was initially evaluated by the Medical Resident. See Original ED Note for H&P and ED course above. I have reviewed and discussed the case, including pertinent history (medical, surgical, family and social) and exam findings with the Medical Resident assigned to Harry Wren. I have personally performed and/or participated in the history, exam, medical decision making, and procedures and agree with all pertinent clinical information and any additional changes or corrections are noted below in my assessment and plan. I have discussed this patient in detail with the resident, and provided the instruction and education,     I have reviewed my findings and recommendations with the assigned Medical Resident, Harry Wren and members of family present at the time of disposition. I have performed a history and physical examination of this patient and directly supervised the resident caring for this patient    HPI  This is a 76-year-old male who presents to the emergency department today for lower extremity edema and shortness of breath. The patient states he has past medical history of congestive heart failure. He is on 4 L home oxygen. He has had chronic leg swelling however since yesterday his legs seem to worsen. They are bilateral.  He is on both Bumex and furosemide. He endorses compliance with this medication. Patient also notes that he has been short of breath for the month. It is worse with exertion. He does have a cough that is nonproductive. Denies any chest pain, fevers, nausea, vomiting or abdominal pain.     ROS  A complete review of systems was performed and pertinent positives and negatives are stated within HPI, all other systems reviewed and are negative.    --------------------------------------------- PAST HISTORY ---------------------------------------------  Past Medical History:  has a past medical history of Acid reflux, Agent orange exposure, Congestive heart failure (CHF) ([facility]), COPD (chronic obstructive pulmonary disease) ([facility] [address] Depression, Diabetes mellitus ([facility] Hyperlipidemia, Hypertension, MI (myocardial infarction) ([facility] Polio, and Sleep apnea. Past Surgical History:  has a past surgical history that includes Coronary angioplasty with stent; Cardiac pacemaker placement; and pacemaker placement. Social History:  reports that he has quit smoking. He has never used smokeless tobacco. He reports that he does not drink alcohol and does not use drugs. Family History: family history is not on file. Unless otherwise noted, family history is non contributory    The patients home medications have been reviewed. Allergies: Penicillins    Physical Exam  General: The patient is conversational and lying comfortably in bed. Head: Normocephalic and atraumatic. Eyes: Sclera non-icteric and no conjunctival injection  ENT: Nasal and oral membranes moist  Neck: Trachea midline. No JVD  Respiratory: Patient has coarse breath sounds of the right lower lobe and also crackles in this region. He is on home 4 L nasal cannula. Patient is not tachypneic. Patient speaking in full sentences. Cardiovascular: Tachycardic but regular. Patient has 4+ pitting edema of the lower extremities that is symmetric. Gastrointestinal:  Abdomen is soft and nondistended. Bowel sounds present. There is no tenderness. There is no guarding or rebound. Musculoskeletal: No deformity  Skin: Skin warm and dry. No rashes. Neurologic: No gross motor deficits. No aphasia. Psychiatric: Normal affect. MDM  This is a 65 y.o. male presenting to the ED for lower extremity swelling and shortness of breath.  On initial presentation, the patient's vital signs are interpreted as hypertensive, tachycardic, afebrile and saturating well on home oxygen settings. Based on history and physical exam, we have a broad differential.  Patient does appear to be fluid overloaded. We are concerned for heart failure exacerbation. We also considered ACS, arrhythmia, pneumonia and PE. Chest x-ray, EKG and laboratory studies obtained. With the patient's lower extremity swelling we will evaluate with ultrasound for DVTs. A 12-lead EKG was performed and interpreted by myself. The rate is 111 with sinus tachycardia and patient has an incomplete right bundle branch block. Normal axis. The [place] interval is 144, QRS interval is 110, and QTC interval is 503. T wave inversion in V1 and V2. No acute ST elevation or depression. This is sinus tachycardia with incomplete right bundle branch block. Laboratory studies show elevated BUN and creatinine of 33 and 2.2. This is improved from baseline. Bicarbonate elevated. No anion gap. BNP elevated at 1478 and troponin 25. No leukocytosis. Patient is anemic. Chest x-ray shows large opacity of the right lung which could be pneumonia or atelectasis. Pleural effusion. CT thorax recommended. Ultrasound of lower extremities showed no DVT. This is interpreted by radiology. Patient's repeat troponin is 24. D-dimer is positive. However, we cannot obtain CT PE at this time because the patient's elevated creatinine. Patient has evidence of fluid overload and heart failure exacerbation. He is given an IV dose of Bumex. He does not have leukocytosis and denies productive cough or fevers. We will hold off on initiating antibiotics. Patient will require admission for diuresis. Resident physician spoke with the patient's primary care physician, Dr. Angelita Carter who is excepted the admission. He is aware of the laboratory studies and has been unable to obtain CT PE. However the patient does have negative lower extremity duplexes.   Patient also verbalized understanding and agreeable. I directly supervised any procedures performed by the resident and was present for the procedure including all critical portions of the procedure    The cardiac monitor revealed sinus rhythm with a heart rate in the 100s as interpreted by me. The cardiac monitor was ordered secondary to the patient's shortness of breath and to monitor the patient for dysrhythmia. CPT [id number]    I, Dr. Peter Abernathy, am the primary provider of record    Impression  1. Acute on chronic congestive heart failure, unspecified heart failure type ([facility] [address]    2. Pleural effusion    3.  Chronic kidney disease, unspecified CKD stage              Peter Abernathy MD  02/24/22 0004

## 2022-02-23 NOTE — ED NOTES
Bed: 15  Expected date:   Expected time:   Means of arrival:   Comments:  lanes Eddye Sons, RN  02/23/22 6703

## 2022-02-24 NOTE — PROGRESS NOTES
Occupational Therapy  OCCUPATIONAL THERAPY INITIAL EVALUATION    [address] [address] [address]      Date:2022                                                Patient Name: Ebony Rodas  MRN: [id number]  : 1945  Room: [address] [place] [address]    Evaluating OT: Gurinder Alvarez OTR/L #[id number]     Referring Provider: Dann De La Torre MD  Specific Provider Orders/Date: OT eval and treat 22    Diagnosis: CHF (congestive heart failure), NYHA class I, acute on chronic, combined ([facility] [address] [I50.43]   Pt admitted to hospital with B LE edema. Pt recently discharged from rehab     Pertinent Medical History:  has a past medical history of Acid reflux, Agent orange exposure, Congestive heart failure (CHF) ([facility] [address] COPD (chronic obstructive pulmonary disease) ([facility] [address] Depression, Diabetes mellitus ([facility] [address] Hyperlipidemia, Hypertension, MI (myocardial infarction) ([facility] [address] Polio, and Sleep apnea.        Precautions:  Fall Risk, O2    Assessment of current deficits    [x] Functional mobility  [x]ADLs  [x] Strength               []Cognition    [x] Functional transfers   [x] IADLs         [x] Safety Awareness   [x]Endurance    [] Fine Coordination              [x] Balance      [] Vision/perception   []Sensation     []Gross Motor Coordination  [] ROM  [] Delirium                   [] Motor Control     OT PLAN OF CARE   OT POC based on physician orders, patient diagnosis and results of clinical assessment    Frequency/Duration 1-3 days/wk for 2 weeks PRN   Specific OT Treatment Interventions to include:   * Instruction/training on adapted ADL techniques and AE recommendations to increase functional independence within precautions       * Training on energy conservation strategies, correct breathing pattern and techniques to improve independence/tolerance for self-care routine  * Functional transfer/mobility training/DME recommendations for increased independence, safety, and fall prevention  * Patient/Family education to increase follow through with safety techniques and functional independence  * Recommendation of environmental modifications for increased safety with functional transfers/mobility and ADLs  * Therapeutic exercise to improve motor endurance, ROM, and functional strength for ADLs/functional transfers  * Therapeutic activities to facilitate/challenge dynamic balance, stand tolerance for increased safety and independence with ADLs  * Therapeutic activities to facilitate gross/fine motor skills for increased independence with ADLs  * Neuro-muscular re-education: facilitation of righting/equilibrium reactions, midline orientation, scapular stability/mobility, normalization of muscle tone, and facilitation of volitional active controled movement    Recommended Adaptive Equipment:  TBD     Home Living: Pt lives alone in a 1 story home with 1 hand rail. 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 [employer]     Pain Level: Pt reports 2/10 toe pain;  Therapist facilitated repositioning to address pain      Cognition: A&O: 4/4; Follows 2 step directions   Memory:  good   Sequencing:  good   Problem solving:  good   Judgement/safety:  fair     Functional Assessment:  AM-PAC Daily Activity Raw Score: 16/24   Initial Eval Status  Date: 2/24/22 Treatment Status  Date: STGs = LTGs  Time frame: 10-14 days   Feeding Independent      Grooming Minimal Assist   Modified Wetzel    UB Dressing Stand by Assist   Modified Wetzel    LB Dressing Maximal Assist     Pants / socks  Modified Wetzel    Bathing Moderate Assist  Modified Wetzel    Toileting Moderate Assist   Modified Wetzel    Bed Mobility  Supine to sit: Stand by Assist Sit to supine: NT   Supine to sit: Modified Tyler   Sit to supine: Modified Tyler    Functional Transfers Minimal Assist   Modified Tyler    Functional Mobility Minimal Assist   Modified Tyler    Balance Sitting:     Static:  Sup    Dynamic:SBA  Standing: Min A     Activity Tolerance F-    + desaturation to 89-90% with activity  F+   Visual/  Perceptual Glasses: yes  wfl                  Hand Dominance right   Strength ROM Additional Info:    RUE   4/5 wfl good  and wfl FMC/dexterity noted during ADL tasks     LUE 4/5 wfl good  and wfl FMC/dexterity noted during ADL tasks     Hearing: wfl  Sensation:wfl  Tone: wfl  Edema:none noted     Comments: Upon arrival patient supine in bed and agreeable to OT Session. Therapist educated pt on role of OT. At end of session, patient seated in chair with call light and phone within reach, all lines and tubes intact. Overall patient demonstrated  decreased independence and safety during completion of ADL/functional transfer/mobility tasks. Pt would benefit from continued skilled OT to increase safety and independence with completion of ADL/IADL tasks for functional independence and quality of life. Treatment: OT treatment provided this date includes: Facilitation of bed mobility, sitting balance (impacting ADLs; addressing posture, weight shifting, dynamic reaching), functional transfers (various surface; bed, toilet, chair), standing tolerance tasks (addressing posture, balance and activity tolerance; impacting ADLs and functional activity) and functional ambulation tasks with w/w (including to/ from bathroom and in preparation for item retrieval tasks; cuing on posture, w/w management  and safety) - skilled cuing on hand placement, posture, body mechanics, energy conservation techniques and safety.   Therapist facilitated self-care retraining: UB/LB self-care tasks (gown, simulated toileting task and standing grooming tasks while educating pt on modified techniques, posture, safety and energy conservation techniques. Skilled monitoring of HR, O2 sats and pts response to treatment. Rehab Potential: Good for established goals     Patient / Family Goal: return home      Patient and/or family were instructed on functional diagnosis, prognosis/goals and OT plan of care. Demonstrated fair- understanding. Eval Complexity: Low    Time In: 935  Time Out: 1015   Total Treatment Time: 24 minutes    Min Units   OT Eval Low 97165  x  1   OT Eval Medium 78954      OT Eval High 36280      OT Re-Eval [id number]       Therapeutic Ex 78343       Therapeutic Activities 72301  12  1   ADL/Self Care 64667  12  1   Orthotic Management 37262       Manual 25145     Neuro Re-Ed 11030       Non-Billable Time          Evaluation Time additionally includes thorough review of current medical information, gathering information on past medical history/social history and prior level of function, interpretation of standardized testing/informal observation of tasks, assessment of data and development of plan of care and goals.           Ho Gibbons OTR/L #[id number]

## 2022-02-24 NOTE — CARE COORDINATION
Care Coordination  The patient was admitted due to being sob and increased lower extremity edema. Chest film + His chest film shows right lower lobe pneumonia. The patient was started on iv bumex 1 mg bid. The patients bnp is 1478 and his b/c is 33/2.2 nephrology on c/s,pulmonology and cardiology. Pt ot has been ordered. The patient is currently on 3 liters o2. Per the patient he lives alone in a 1 story home 3 steps to enter and he also has a ramp. [facility] [address]. [facility] is [facility]. [facility] and was also at [facility] before. He is planning on returning home and his ride home is his sister Vladimir Kaye Phone is  [phone].  I will follow

## 2022-02-24 NOTE — CONSULTS
[facility]   Nephrology Consult Note  Patient's Name: Mckayla Gaytan    Reason for Consult: CKD    Chief Complaint: Shortness of breath  History Obtained From:  patient, past medical records and EMR    History of Present Illness:    Mckayla Gaytan is a 65 y.o. male with a past medical history of CHF, COPD, diabetes mellitus, hypertension, and polio. He presented to the ED on 2/23 with complaints of shortness of breath. Per the ED provider note, patient was also noticed increased leg swelling and that the patient wears 4 L of oxygen at home. Vital signs at presentation to the ED include temperature 98.4, respirations 18, pulse 112, /96, and he was 99% on 4 L O2. Lab data on presentation to the ED include CO2 30, BUN 33, creatinine 2.2, hemoglobin 10.9, and proBNP 1478. We were consulted to see the patient for CKD. Patient is known to our service, and had an office visit in June 2021 with Dr. Michael Estevez. Baseline creatinine from January 2022 is 2.5-2.8. At present, patient was seen and examined. He is currently sitting up in a chair. He reports that he was having swelling and shortness of breath and that is what brought him into the hospital.  He reports that prior to admission his appetite has been okay. Overall he said he feels fine, except that his legs feel tight. He denies any current chest pain or shortness of breath. No nausea or abdominal pain. He denies any headaches or dizziness. He denies use of NSAIDs.     Past Medical History:   Diagnosis Date    Acid reflux     Agent orange exposure     Congestive heart failure (CHF) (HCC)     COPD (chronic obstructive pulmonary disease) (HCC)     Depression     Diabetes mellitus (HCC)     Hyperlipidemia     Hypertension     [facility] infarction) [facility] [address]     Polio     Sleep apnea      Past Surgical History:   Procedure Laterality Date    CARDIAC PACEMAKER PLACEMENT      CORONARY ANGIOPLASTY WITH STENT PLACEMENT      PACEMAKER PLACEMENT       History reviewed. No pertinent family history. reports that he has quit smoking. He has never used smokeless tobacco. He reports that he does not drink alcohol and does not use drugs. Allergies:  Penicillins    Current Medications:    acetaminophen (TYLENOL) tablet 650 mg, Q4H PRN  aspirin EC tablet 81 mg, Daily  atorvastatin (LIPITOR) tablet 40 mg, Daily  vitamin D (CHOLECALCIFEROL) tablet 2,000 Units, Daily  vitamin B-12 (CYANOCOBALAMIN) tablet 1,000 mcg, Daily  ezetimibe (ZETIA) tablet 10 mg, Daily  folic acid (FOLVITE) tablet 1 mg, Daily  guaiFENesin tablet 400 mg, 4x Daily PRN  insulin glargine-yfgn (SEMGLEE-YFGN) injection vial 50 Units, Nightly  loperamide (IMODIUM) capsule 2 mg, 4x Daily PRN  melatonin tablet 3 mg, Nightly  metoprolol succinate (TOPROL XL) extended release tablet 25 mg, BID  pantoprazole (PROTONIX) tablet 40 mg, QAM AC  ascorbic acid (VITAMIN C) tablet 500 mg, BID  zinc sulfate (ZINCATE) capsule 50 mg, Daily  bumetanide (BUMEX) injection 1 mg, BID  insulin lispro (HUMALOG) injection vial 0-6 Units, TID WC  insulin lispro (HUMALOG) injection vial 0-3 Units, Nightly  glucose (GLUTOSE) 40 % oral gel 15 g, PRN  dextrose 50 % IV solution, PRN  glucagon (rDNA) injection 1 mg, PRN  dextrose 5 % solution, PRN  ipratropium-albuterol (DUONEB) nebulizer solution 1 ampule, Q4H WA  heparin (porcine) injection 5,000 Units, Q8H    Review of Systems:   Pertinent items are noted in HPI. Physical exam:  Vitals:    02/24/22 1010   BP:    Pulse:    Resp:    Temp:    SpO2: 99%     General: Awake, alert, no acute distress, sitting up in a chair  Neck: No JVD noted  Lungs: Clear bilaterally upper, diminished in bases bilaterally. Unlabored  CV: Regular rate and rhythm. No rub  Abd: Rounded, soft, nontender, nondistended. Active bowel sounds  Skin: Warm and dry.   No rash on exposed extremities  Ext: 2+ BLE edema  Neuro: Awake, alert, answers questions appropriately  Data:   Labs:  Lab Results   Component Value Date     2022     2022     2022    K 3.9 2022    K 4.1 2022    K 4.3 2022     2022    CO2 30 (H) 2022    CO2 30 (H) 2022    CO2 30 (H) 2022    CREATININE 2.3 (H) 2022    CREATININE 2.2 (H) 2022    CREATININE 2.7 (H) 2022    BUN 31 (H) 2022    BUN 33 (H) 2022    BUN 47 (H) 2022    GLUCOSE 94 2022    GLUCOSE 116 (H) 2022    GLUCOSE 132 (H) 2022    PHOS 4.5 2022    PHOS 4.1 2022    PHOS 4.2 2022    WBC 8.3 2022    WBC 9.5 2022    WBC 11.2 2022    HGB 10.9 (L) 2022    HGB 10.9 (L) 2022    HGB 12.2 (L) 2022    HCT 34.7 (L) 2022    HCT 36.0 (L) 2022    HCT 39.1 2022    MCV 92.5 2022     2022     Imaging:  XR CHEST PORTABLE    Result Date: 2022  EXAMINATION: ONE XRAY VIEW OF THE CHEST 2022 4:49 pm COMPARISON: 2022 HISTORY: ORDERING SYSTEM PROVIDED HISTORY: shortness of breath TECHNOLOGIST PROVIDED HISTORY: Reason for exam:->shortness of breath What reading provider will be dictating this exam?->CRC FINDINGS: There is a large opacity seen within the right lung base. There is a small right pleural effusion. The left upper lobe is clear. There is a small left pleural effusion. The cardiac silhouette is within normals. There is a dual lead cardiac pacer on the left. 1. Large opacity within the right lung base which could represent pneumonia and or atelectasis. 2. Moderate size right pleural effusion. 3. Small left pleural effusion. 4. CT of the thorax is recommended for further evaluation.      US DUP LOWER EXTREMITIES BILATERAL VENOUS    Result Date: 2022  Patient MRN:  [id number] : 1945 Age: 65 years Gender: Male Order Date:  2022 5:28 PM EXAM: US DUP LOWER EXTREMITIES BILATERAL VENOUS NUMBER OF IMAGES:  47 INDICATION:  leg swelling leg swelling What reading provider will be dictating this exam?->[facility] COMPARISON: None Within the visualized vessels, there is no evidence for deep venous thrombosis There is good compressibility, there is good augmentation, there is good color flow. Within the visualized vessels there is no evidence for deep venous thrombosis     Assessment/ Plans:    1. CKD stage IV  Presumed to be due to diabetes mellitus and hypertension  Baseline creatinine from January 2022 is 2.5-2.8  Creatinine at presentation to the ED 2.2--> 2.3 today  On Bumex 1 mg IV twice daily  Strict I&O, daily weights  Follow creatinine    2. HFpEF  ECHO 1/2022 showed EF > 70%  On Bumex 1 mg IV twice daily  Cardiology following    3. Hypertension  BP goal<130/80  BP currently at goal  Follow on current regimen    4. Diabetes mellitus  On insulin lispro and insulin glargine  Management per primary    5.   Pleural effusion  CXR showed \"moderate size right pleural effusion\" and \"small left pleural effusion\"  on 4 L nasal cannula  Pulmonology consulted    Thank you for allowing us to participate in care of GILMRA Jones CNP     Pt seen and examined agree with above  Will try bumex drip since uo not great  Shree Cavazos MD

## 2022-02-24 NOTE — PLAN OF CARE
Patient's chart updated to reflect:      . - HF care plan, HF education points and HF discharge instructions.  -Orders: 2 gram sodium diet, daily weights, I/O.  -PCP and cardiology follow up appointments to be scheduled within 7 days of hospital discharge. -CHF education session will be provided to the patient prior to hospital discharge.     Shirley Goodson RN RN, BSN  Heart Failure Navigator

## 2022-02-24 NOTE — CONSULTS
[place]  Department of Internal Medicine  Division of Pulmonary, Critical Care and Sleep Medicine  Consult Note    Isabelle Dolan DO, MPH, Mayra Ibarra MD, [facility]  [facility]      Patient: Roque Aiken  MRN: [id number]  : 1945    Encounter Time: 5:22 PM     Date of Admission: 2022  3:55 PM    Primary Care Physician: Rikki Healy MD    Reason for Consultation: Pleural effusion. HISTORY OF PRESENT ILLNESS : Roque Aiken 65 y.o. male was seen in consultation regarding the above chief compliant. Patient seen and examined. He was previously admitted on  until . At that time he was seen by my partner. He was treated for congestive heart failure and renal insufficiency had significant volume overload and was diuresed. He was subsequently discharged to [facility]. The patient that he reports that he was at [facility] for 3 weeks he is very displeased with the therapy that he received there he states that he then went home over the holiday weekend which would have been Presidents' Day at that time he did have a therapist that was coming in to see him he developed progressive edema of his lower extremities increased shortness of breath with exertion and reports that the physical therapist had told him he should come back through the emergency room. He does report orthopnea. He denies coughing, wheezing, or hemoptysis. He denies any known history of asthma, COPD, or emphysema. He does report that when he was initially seen in the ER and the Paredes catheter was placed he did have a large volume of urine output however currently reports that his urine output has decreased again. He also states that at home he was taking both Bumex and Lasix and did not feel like he was making much urine. He was also wearing oxygen at home.       PAST MEDICAL HISTORY:  has a past medical history of Acid reflux, Agent orange exposure, Congestive heart failure (CHF) (HCC), COPD (chronic obstructive pulmonary disease) ([facility] Depression, Diabetes mellitus ([facility] Hyperlipidemia, Hypertension, MI (myocardial infarction) ([facility] Polio, and Sleep apnea. SURGICAL HISTORY:  has a past surgical history that includes Coronary angioplasty with stent; Cardiac pacemaker placement; and pacemaker placement. SOCIAL HISTORY:  reports that he has quit smoking. He has never used smokeless tobacco. He reports that he does not drink alcohol and does not use drugs. FAMILY  HISTORY: family history is not on file. MEDICATIONS:    Prior to Admission medications    Medication Sig Start Date End Date Taking?  Authorizing Provider   magnesium hydroxide (MILK OF MAGNESIA) 400 MG/5ML suspension Take 30 mLs by mouth daily as needed for Constipation   Yes Historical Provider, MD   potassium chloride (KLOR-CON) 20 MEQ packet Take 20 mEq by mouth daily   Yes Historical Provider, MD   Dextromethorphan-guaiFENesin  MG/5ML SYRP Take 5 mLs by mouth every 4 hours as needed for Cough   Yes Historical Provider, MD   melatonin 3 MG TABS tablet Take 3 mg by mouth at bedtime   Yes Historical Provider, MD   OXYGEN Inhale into the lungs 1-5 l/min to maintain O2 sat greater than or =to 90%   Yes Historical Provider, MD   BiPAP Machine MISC by Does not apply route   Yes Historical Provider, MD   insulin lispro, 1 Unit Dial, (HUMALOG KWIKPEN) 100 UNIT/ML SOPN Inject into the skin 4 times daily Per sliding scale:    160 - 200 = 2 units  201 - 250 = 4 units  251 - 300 = 6 units  301 - 350 = 8 units  351 - 400 = 10 units   Yes Historical Provider, MD   metoprolol succinate (TOPROL XL) 25 MG extended release tablet Take 1 tablet by mouth 2 times daily 2/16/22  Yes GILMAR Lopez - CNP   acetaminophen (TYLENOL) 325 MG tablet Take 650 mg by mouth every 4 hours as needed for Pain For temp> 100.4 F,  And for pain   Yes Historical Provider, MD vitamin C (ASCORBIC ACID) 500 MG tablet Take 500 mg by mouth 2 times daily   Yes Historical Provider, MD   Cholecalciferol (VITAMIN D) 50 MCG (2000 UT) CAPS capsule Take 2,000 Units by mouth daily    Yes Historical Provider, MD   zinc sulfate (ZINCATE) 220 (50 Zn) MG capsule Take 50 mg by mouth daily   Yes Historical Provider, MD   aspirin 81 MG EC tablet Take 1 tablet by mouth daily 2/3/22  Yes Micha Gaxiola MD   insulin glargine[facility]) 100 UNIT/ML injection vial Inject 50 Units into the skin nightly 2/2/22  Yes Micha Gaxiola MD   bumetanide (BUMEX) 1 MG tablet Take 1 tablet by mouth 2 times daily 2/3/22  Yes Micha Gaxiola MD   ezetimibe (ZETIA) 10 MG tablet Take 10 mg by mouth daily   Yes Historical Provider, MD   folic acid (FOLVITE) 1 MG tablet Take 1 mg by mouth daily   Yes Historical Provider, MD   cyanocobalamin 1000 MCG tablet Take 1,000 mcg by mouth daily   Yes Historical Provider, MD   atorvastatin (LIPITOR) 40 MG tablet Take 40 mg by mouth daily    Yes Historical Provider, MD   loperamide (IMODIUM) 2 MG capsule Take 2 mg by mouth 4 times daily as needed for Diarrhea   Yes Historical Provider, MD   pantoprazole (PROTONIX) 40 MG tablet Take 40 mg by mouth daily   Yes Historical Provider, MD   guaiFENesin 400 MG tablet Take 1 tablet by mouth 4 times daily as needed for Cough  Patient taking differently: Take 400 mg by mouth every 6 hours as needed for Cough  2/2/22   Micha Gaxiola MD       ALLERGIES: Penicillins       REVIEW OF SYSTEMS:  Otherwise negative if not reported or listed below  Constitutional:  No unanticipated weight loss. Positive for significant weight gain     No change in sleep pattern or activity. No fevers, chills or rigors. Eyes:    No visual changes or diplopia. No scleral icterus. ENT:    No Headaches, hearing loss or vertigo. No mouth sores or sore throat.         Cardiovascular:  No chest discomfort, palpitations. Respiratory:  Denies cough, wheezing, hemoptysis. Positive for dyspnea on exertion, orthopnea  Gastrointestinal:  No abdominal pain, appetite loss, blood in stools. No hematemesis  Genitourinary:  No dysuria, trouble voiding or hematuria. No nocturia. Musculoskeletal:   No weakness or joint complaints. Positive for anasarca  Integumentary: No rashes or pruritis. Neurological:  No headache, numbness or tingling. Psychiatric:   No effect on mood, memory, mentation, or behavior. No anxiety or depression. Endocrine:   No excessive thirst, fluid intake, or urination. No tremor. Hematologic: No abnormal bruising or bleeding. Lymphatic:  No swollen lymph nodes. Immunologic:  No hives or hx of anaphaxsis. OBJECTIVE:     PHYSICAL EXAM:   VITALS:   Vitals:    02/24/22 1010 02/24/22 1033 02/24/22 1340 02/24/22 1619   BP:  90/81  121/74   Pulse:  114  107   Resp:  18  18   Temp:  98.2 °F (36.8 °C)  97.7 °F (36.5 °C)   TempSrc:  Axillary  Oral   SpO2: 99% 98% 97% 95%   Weight:       Height:            Intake/Output Summary (Last 24 hours) at 2/24/2022 1722  Last data filed at 2/24/2022 9406  Gross per 24 hour   Intake 300 ml   Output 300 ml   Net 0 ml        CONSTITUTIONAL:   A&O x 3, NAD  SKIN:     No rash, No suspicious lesions or skin discoloration numerous areas of keratosis on back  HEENT:     EOMI, MMM, No thrush  NECK:    No bruits, No JVD  CV:      Sinus,  No murmur, No rubs, No gallops  PULMONARY:   Breath sounds are diminished throughout however clear. No egophony  ABDOMEN:     Soft, non-tender. BS normal. No R/R/G. Patient also with edema to presacral area  EXT:    No deformities . No clubbing. 3+ lower extremity edema, No venous stasis  PULSE:   Appears equal and palpable.   PSYCHIATRIC:  Seems appropriate, No acute psycosis  MS:    No fractures, No gross weakness  NEUROLOGIC:   The clinical assessment is non-focal     DATA: IMAGING & TESTING:     LABORATORY TESTS:    CBC with Differential:    Lab Results   Component Value Date    WBC 8.3 02/24/2022    RBC 3.75 02/24/2022    HGB 10.9 02/24/2022    HCT 34.7 02/24/2022     02/24/2022    MCV 92.5 02/24/2022    MCH 29.1 02/24/2022    MCHC 31.4 02/24/2022    RDW 16.5 02/24/2022    LYMPHOPCT 19.9 02/23/2022    MONOPCT 11.6 02/23/2022    BASOPCT 0.3 02/23/2022    MONOSABS 1.10 02/23/2022    LYMPHSABS 1.89 02/23/2022    EOSABS 0.30 02/23/2022    BASOSABS 0.03 02/23/2022     CMP:    Lab Results   Component Value Date     02/24/2022    K 3.9 02/24/2022    K 4.1 02/23/2022     02/24/2022    CO2 30 02/24/2022    BUN 31 02/24/2022    CREATININE 2.3 02/24/2022    GFRAA 33 02/24/2022    LABGLOM 28 02/24/2022    GLUCOSE 94 02/24/2022    PROT 6.9 01/21/2022    LABALBU 3.9 01/21/2022    CALCIUM 8.6 02/24/2022    BILITOT 0.6 01/21/2022    ALKPHOS 127 01/21/2022    AST 32 01/21/2022    ALT 42 01/21/2022        PRO-BNP:   Lab Results   Component Value Date    PROBNP 1,478 (H) 02/23/2022    PROBNP 1,910 (H) 02/14/2022      ABGs: No results found for: PH, PO2, PCO2  Hemoglobin A1C: No components found for: HGBA1C    IMAGING:  Imaging tests were completed and reviewed and discussed radiology and care team involved and reveals   XR CHEST PORTABLE    Result Date: 2/23/2022  EXAMINATION: ONE XRAY VIEW OF THE CHEST 2/23/2022 4:49 pm COMPARISON: 01/21/2022 HISTORY: ORDERING SYSTEM PROVIDED HISTORY: shortness of breath TECHNOLOGIST PROVIDED HISTORY: Reason for exam:->shortness of breath What reading provider will be dictating this exam?->CRC FINDINGS: There is a large opacity seen within the right lung base. There is a small right pleural effusion. The left upper lobe is clear. There is a small left pleural effusion. The cardiac silhouette is within normals. There is a dual lead cardiac pacer on the left. 1. Large opacity within the right lung base which could represent pneumonia and or atelectasis.  2. Moderate size right pleural effusion. 3. Small left pleural effusion. 4. CT of the thorax is recommended for further evaluation. US DUP LOWER EXTREMITIES BILATERAL VENOUS    Result Date: 2022  Patient MRN:  [id number] : 1945 Age: 65 years Gender: Male Order Date:  2022 5:28 PM EXAM: US DUP LOWER EXTREMITIES BILATERAL VENOUS NUMBER OF IMAGES:  52 INDICATION:  leg swelling leg swelling What reading provider will be dictating this exam?->[facility] COMPARISON: None Within the visualized vessels, there is no evidence for deep venous thrombosis There is good compressibility, there is good augmentation, there is good color flow. Within the visualized vessels there is no evidence for deep venous thrombosis       Assessment:   1. Bilateral pleural effusion  2. Volume overload  3. CKD  4. Anasarca  5. Diabetes  6. Chronic hypoxemic respiratory insufficiency    Plan:   1. Pleural effusions likely secondary to volume overload. Patient had previously responded well to aggressive diuresis. Pleural effusion is greater on the right side than the left would not recommend thoracentesis at this time  2. Continue to wean FiO2 as tolerated  3. Albumin and prealbumin ordered  4.  Recommend euglycemia    Thank you for the consult we will continue to follow        Shanae Kramer DO   Pulmonary, Critical Care and Sleep Medicine

## 2022-02-24 NOTE — PROGRESS NOTES
Physical Therapy   Initial Assessment     Name: Claudette Ramirez  : 1945  MRN: [id number]      Date of Service: 2022    Evaluating PT:  Ema Campbell. Peter Carbajal [id number]    Room #:  5424/2498-M  Diagnosis:  CHF (congestive heart failure), NYHA class I, acute on chronic, combined ([facility] [I50.43]  PMHx/PSHx:     has a past medical history of Acid reflux, Agent orange exposure, Congestive heart failure (CHF) ([facility]), COPD [facility]chronic obstructive [facility] disease) ([facility] Depression, Diabetes mellitus ([facility] Hyperlipidemia, Hypertension, MI (myocardial infarction) ([facility] Polio, and Sleep apnea. has a past surgical history that includes Coronary angioplasty with stent; Cardiac pacemaker placement; and pacemaker placement. Precautions:  Fall risk, O2 (4 lpm @ home)    SUBJECTIVE:    Pt lives alone in a 1 story home with 1 stair to enter. Prior to his initial admission and subsequent rehab in 2022 the pt reports he was Independent with all mobility with no AD, since discharge from rehab the pt has primarily been using a manual WC for mobility. OBJECTIVE:   Initial Evaluation  Date: 22 Treatment Short Term/ Long Term   Goals   AM-PAC 6 Clicks      Was pt agreeable to Eval/treatment? Yes     Does pt have pain?  2/10 in R hallux and arch     Bed Mobility  Rolling: Supervision  Supine to sit: Supervision  Sit to supine: NT  Scooting: Supervision  Independent   Transfers Sit to stand: Mariela  Stand to sit: Mariela  Stand pivot: Mariela with [employer]  Modified Independent   Ambulation    10 feet with [employer] with Mariela  >50 feet with [employer] with Modified [place]   Stair negotiation: ascended and descended  NT  1 step with [employer] rail with SBA   BLE ROM WFL     BLE Strength RLE: grossly 4/5  LLE: grossly 4/5 with exception of knee that was 3+/5 due to pain     Balance Sitting: Independent  Standing: Mariela with AD  Independent     Pt is A & O x 4  Sensation:  Reported numbness/tingling in B feet  Edema:  1+ pitting edema in BLE    Therapeutic Exercises:    Ambulation: 2x10 feet with WW with Mariela   Stand pivot transfer x3 with [employer] with Mariela that improved to SBA  Sit<>stand x4 with Mariela that improved to SBA  Standing balance activities: performed static and dynamic standing balance activities within AD with single and BUE support with reaches within [place] with Mariela    Patient education  Pt educated on transfer technique, proper use of WW, energy conservation, breathing technique. Patient response to education:   Pt verbalized understanding Pt demonstrated skill Pt requires further education in this area   Yes Yes Reinforce      ASSESSMENT:    Conditions Requiring Skilled Therapeutic Intervention:    [x]Decreased strength     []Decreased ROM  [x]Decreased functional mobility  [x]Decreased balance   [x]Decreased endurance   []Decreased posture  []Decreased sensation  []Decreased coordination   []Decreased vision  [x]Decreased safety awareness   []Increased pain       Comments: The pt was able to get OOB, however he required some assistance to stand initially, improved to SBA with repetition, performed standing balance activities to improve safety with standing transfers and ADLs, performed short distance walking with multiple repeated transfers, increased time between activities given as well as increased time to complete tasks with frequent education. The pt was left sitting up at a bedside chair with call light in reach and all needs met. Treatment:  Patient practiced and was instructed in the following treatment:     Transfer training: verbal cues for hand placement during sit to stand transfer and assistance for anterior weight shift in order to facilitate initiation of the stand. Energy conservation: given cues for increased rest phase between activity and allowing for longer periods of time to complete a task.    Gait training: cues to observe energy conservation techniques previously discussed as well as properly manage O2 line to increase independence and safety with task.  Standing balance activities: performed static and dynamic standing balance activities within AD with single and BUE support in order to progress safety with standing activities as well as improve independence with standing ADLs.  Skilled repositioning in chair to promote improved posture and improved cardiorespiratory function. Pt positioned in chair to ensure skin/joint protection. Pt's/ family goals   1. To return home    Prognosis is good for reaching above PT goals. Patient and or family understand(s) diagnosis, prognosis, and plan of care. Yes    PHYSICAL THERAPY PLAN OF CARE:    PT POC is established based on physician order and patient diagnosis     Referring provider/PT Order:  Dieter James MD  Diagnosis:  CHF (congestive heart failure), NYHA class I, acute on chronic, combined ([facility] [I50.43]  Specific instructions for next treatment:  Progress ambulation    Current Treatment Recommendations:     [x] Strengthening to improve independence with functional mobility   [] ROM to improve independence with functional mobility   [x] Balance Training to improve static/dynamic balance and to reduce fall risk  [x] Endurance Training to improve activity tolerance during functional mobility   [x] Transfer Training to improve safety and independence with all functional transfers   [x] Gait Training to improve gait mechanics, endurance and assess need for appropriate assistive device  [x] Stair Training in preparation for safe discharge home and/or into the community   [] Positioning to prevent skin breakdown and contractures  [x] Safety and Education Training   [] Patient/Caregiver Education   [] HEP  [] Other     PT long term treatment goals are located in above grid    Frequency of treatments: 2-5x/week x 1-2 weeks.     Time in  0935  Time out  1015    Total Treatment Time  25 minutes     Evaluation Time includes thorough review of current medical information, gathering information on past medical history/social history and prior level of function, completion of standardized testing/informal observation of tasks, assessment of data and education on plan of care and goals. CPT codes:  [x] Low Complexity PT evaluation 42730  [] Moderate Complexity PT evaluation 62878  [] High Complexity PT evaluation 04964  [] PT Re-evaluation 69467  [] Gait training 51033 0 minutes  [] Manual therapy 57364 0 minutes  [x] Therapeutic activities 69219 25 minutes  [] Therapeutic exercises 10744 0 minutes  [] Neuromuscular reeducation 57161 0 minutes     Villa Code.  Hao, [facility]  [id number]

## 2022-02-24 NOTE — CONSULTS
Roque Aiken  1945  Date of Service: 2/24/2022    Reason for Consultation: We were asked to see Roque Aiken by Dr. Rikki Healy MD  regarding hyperkalemia, shortness of breath, coronary artery disease. Rometta Favre History of Chief Complaint: This is a 65 y.o. male known to our group through myself. He was seen on 1/22/2022 for hypervolemia. At that time he was found to be in acute on chronic renal insufficiency. His cardiac work-up actually demonstrated a hyperdynamic left ventricular systolic function and only mild to moderate aortic stenosis. His RVSP was only 37 indicating  Cardiac cause of his hypervolemia. At that time my partner signed off and deferred further volume management to nephrology. He was then discharged to an extended care facility. He states that he still had dyspnea with exertion at the time of his discharge. He states that that has not changed any since discharge. He states that he then was discharged from that facility to home approximately 4 days ago. He states that since his discharge from the extended care facility however, his lower extremity edema has doubled. He does admit to orthopnea. He denies any chest discomfort. He states that he really was not urinating much after his discharge. However, he states that once the Paredes catheter was placed this admission he did diurese a significant amount of fluid. .  They have a history of coronary artery disease with PCI in [place] of the RCA in 2003. He also has a history of peptic ulcer disease with a GI bleed causing discontinuance of his aspirin in 2015. He has a history of chronic renal insufficiency, lymphoma, polio, morbid obesity, sleep apnea, GERD, permanent pacemaker, hypertension, and diabetes. He denies any palpitations, or syncope, or near syncope. REVIEW OF SYSTEMS:   Heart: as above   Lungs: as above   Eyes: denies changes in vision or discharge. Ears: denies changes in hearing or pain.    Nose: denies epistaxis or masses   Throat: denies sore throat or trouble swallowing. Neuro: denies numbness, tingling, tremors. Skin: denies rashes or itching. : denies hematuria, dysuria   GI: denies vomiting, diarrhea   Psych: denies mood changed, anxiety, depression.     CURRENT MEDICATIONS:  Current Facility-Administered Medications   Medication Dose Route Frequency Provider Last Rate Last Admin    acetaminophen (TYLENOL) tablet 650 mg  650 mg Oral Q4H PRN Enid Cordova MD        aspirin EC tablet 81 mg  81 mg Oral Daily Enid Cordova MD        atorvastatin (LIPITOR) tablet 40 mg  40 mg Oral Daily Enid Cordova MD        vitamin D (CHOLECALCIFEROL) tablet 2,000 Units  2,000 Units Oral Daily Enid Cordova MD        vitamin B-12 (CYANOCOBALAMIN) tablet 1,000 mcg  1,000 mcg Oral Daily Enid Cordova MD        ezetimibe (ZETIA) tablet 10 mg  10 mg Oral Daily Enid Cordova MD        folic acid (FOLVITE) tablet 1 mg  1 mg Oral Daily Enid Cordova MD        guaiFENesin tablet 400 mg  400 mg Oral 4x Daily PRN Enid Cordova MD        insulin glargine-yfgn [facility]) injection vial 50 Units  50 Units SubCUTAneous Nightly Enid Cordova MD        loperamide (IMODIUM) capsule 2 mg  2 mg Oral 4x Daily PRN Enid Cordova MD        melatonin tablet 3 mg  3 mg Oral Nightly Enid Cordova MD        metoprolol succinate (TOPROL XL) extended release tablet 25 mg  25 mg Oral BID Enid Cordova MD        pantoprazole (PROTONIX) tablet 40 mg  40 mg Oral QAM AC Enid Cordova MD   40 mg at 02/24/22 0543    ascorbic acid (VITAMIN C) tablet 500 mg  500 mg Oral BID Enid Cordova MD        zinc sulfate (ZINCATE) capsule 50 mg  50 mg Oral Daily Enid Cordova MD        bumetanide Braulio Manning) injection 1 mg  1 mg IntraVENous BID Enid Cordova MD        insulin lispro (HUMALOG) injection vial 0-6 Units  0-6 Units SubCUTAneous TID WC Zaida Bojorquez MD        insulin lispro (HUMALOG) injection vial 0-3 Units  0-3 Units SubCUTAneous Nightly Zaida Bojorquez MD        glucose (GLUTOSE) 40 % oral gel 15 g  15 g Oral PRN Zaida Bojorquez MD        dextrose 50 % IV solution  12.5 g IntraVENous PRN Zaida Bojorquez MD        glucagon (rDNA) injection 1 mg  1 mg IntraMUSCular PRN Zaida Bojorquez MD        dextrose 5 % solution  100 mL/hr IntraVENous PRN Zaida Bojorquez MD        ipratropium-albuterol (DUONEB) nebulizer solution 1 ampule  1 ampule Inhalation Q4H WA Zaida Bojorquez MD        heparin (porcine) injection 5,000 Units  5,000 Units SubCUTAneous Q8H Zaida Bojorquez MD            ALLERGIES:  Allergies   Allergen Reactions    Penicillins Anaphylaxis       MEDICAL HISTORY:  Past Medical History:   Diagnosis Date    Acid reflux     Agent orange exposure     Congestive heart failure (CHF) (HCC)     [facility]     Depression     [facility]     Hyperlipidemia     Hypertension     [facility] infarction[facility]     Polio     Sleep apnea        SURGICAL HISTORY:  Past Surgical History:   Procedure Laterality Date    CARDIAC PACEMAKER PLACEMENT      CORONARY ANGIOPLASTY WITH STENT PLACEMENT      PACEMAKER PLACEMENT         FAMILY HISTORY:  History reviewed. No pertinent family history.     SOCIAL HISTORY:  Social History     Socioeconomic History    Marital status: Single     Spouse name: None    Number of children: None    Years of education: None    Highest education level: None   Occupational History    None   Tobacco Use    Smoking status: Former Smoker    Smokeless tobacco: Never Used    Tobacco comment: quit in 1985   Substance and Sexual Activity    Alcohol use: Never    Drug use: Never    Sexual activity: None   Other Topics Concern    None   Social History Narrative occasional iced tea      Social Determinants of Health     Financial Resource Strain:     Difficulty of Paying Living Expenses: Not on file   Food Insecurity:     Worried About Running Out of Food in the Last Year: Not on file    [place] of Food in the Last Year: Not on file   Transportation Needs:     Lack of Transportation (Medical): Not on file    Lack of Transportation (Non-Medical): Not on file   Physical Activity:     Days of Exercise per Week: Not on file    Minutes of Exercise per Session: Not on file   Stress:     Feeling of Stress : Not on file   Social Connections:     Frequency of Communication with Friends and Family: Not on file    Frequency of Social Gatherings with Friends and Family: Not on file    Attends [employer] Services: Not on file    Active Member of [address] [place] [address] [employer] or Organizations: Not on file    Attends Club or Organization Meetings: Not on file    Marital Status: Not on file   Intimate Partner Violence:     Fear of Current or Ex-Partner: Not on file    Emotionally Abused: Not on file    Physically Abused: Not on file    Sexually Abused: Not on file   Housing Stability:     Unable to Pay for Housing in the Last Year: Not on file    Number of [place] in the Last Year: Not on file    Unstable Housing in the Last Year: Not on file       PHYSICAL EXAM:  Vitals:    02/23/22 2035 02/24/22 0000 02/24/22 0215 02/24/22 0615   BP: (!) 128/90 (!) 128/90 124/87    Pulse: 106 100 90    Resp: 18 15 17    Temp:   98.8 °F (37.1 °C)    TempSrc:   Oral    SpO2: 97% 97%     Weight:    278 lb (126.1 kg)   Height:    5' 9\" (1.753 m)       GENERAL:  He is alert and oriented x 3, communicates well, in no distress. NECK: Thick and difficult to examine. No masses, trachea is mid position. Supple, full ROM, no JVD or bruits. No palpable thyromegaly or lymphadenopathy. HEART:  Regular rate and rhythm. Normal S1 and S2. There is an S4 gallop and a I/VI systolic ejection murmur.   No heaves. LUNGS: Poor air movement. Clear to auscultation bilaterally. No use of accessory muscles. ABDOMEN:  Soft, non-tender. Normal bowel sounds. Super morbidly obese. EXTREMITIES:  Full ROM x4. Severe bilateral lower extremity edema. Good distal pulses. EYES:  PERRL, normal lids & conjunctiva. No icterus. ENT: no external masses, no bleeding. Moist mucosa. Normal lips formation. NEURO: Full ROM x 4, EOMI, no tremors. SKIN:  Warm, dry and intact. Normal turgor, no petechia. Phych: alert & oriented x3. Normal  Judgement & insight. Currently not agitated or anxious. EKG: Sinus tachycardia, 111 bpm, right bundle branch block. Nonspecific ST - T wave changes. Unchanged compared to the previous ECG. Labs:  Recent Labs     02/23/22  1629 02/24/22  0652   WBC 9.5 8.3   HGB 10.9* 10.9*   HCT 36.0* 34.7*   MCV 92.8 92.5    283     Recent Labs     02/23/22  1629 02/24/22  0652    144   K 4.1 3.9    104   CO2 30* 30*   BUN 33* 31*   CREATININE 2.2* 2.3*     No results for input(s): PROTIME, INR in the last 72 hours. No results for input(s): CKTOTAL, CKMB, CKMBINDEX, TROPONINI in the last 72 hours. No results for input(s): BNP in the last 72 hours. No results for input(s): CHOL, HDL, TRIG in the last 72 hours. Invalid input(s): CHOLHDRoney WARD 58     02/23/22 1629 02/23/22  1820   TROPHS 25* 24*       Assessment:   1. Mild to moderate aortic stenosis. 2. Normal and even hyperdynamic LV systolic function. 3. Acute on chronic renal insufficiency. 4. Difficult exam but increasing lower extremity edema. His BNP is actually decreased from his prior BNP but clinically he appears to be hypervolemic. Cardiac evaluation last month demonstrated non-cardiac etiology for his hypervolemia. 5. CAD. Remote PCI of the RCA. No ischemic symptoms this admission. Chronic borderline elevated troponin in the setting of acute on chronic renal insufficiency. Unchanged. 6. Super morbid obesity. 7. Sleep apnea. 8. Hypertension. 9. Pacemaker. 10. GERD. 11. Peptic ulcer disease/GI bleed in 2015. Aspirin was discontinued at that time. 12. Lymphoma. Recommendations:  1. Echo last month showed hyperdynamic LV systolic function, only mild to moderate aortic stenosis, and an RVSP of only 37 indicating a noncardiac etiology for his hypervolemia at that time. No need to repeat another echocardiogram.  I spent a great deal of time with him going over his echo results with him. 2. He states that he had a lot of urine output with a Paredes catheter was placed. Possible obstructive uropathy. I will defer his comorbidities to others. 3. I will defer volume management and diuresis to nephrology. 4. Continue the metoprolol. Titrate for heart rate and blood pressure. 5. I will replace the potassium and check a magnesium today. 6. It is very important that his sleep apnea is treated. I will defer the comorbidities to others. 7. Cardiology will sign off. Please call us if we can be of any further assistance. Thank you for allowing me to participate in your patient's care. [address] [facility] [place], [address] [facility]  Interventional Cardiology    Note: This report was completed using computerized voice recognition software. Every effort has been made to ensure accuracy, however; and invert and computerized transcription errors may be present.

## 2022-02-24 NOTE — CONSULTS
[facility]  Dr Racquel Jacobsen PCP @ [place]  1. [facility]- Dr Noris Caballero   CKD stage IV  2. Case management consult for this admission      1 21 22 admit- primary dx pneumonia, unspecified hf, acute renal insufficiency, went to rehab. Released to home. 2 16 L [facility] follow up       Patient currently admitted with diagnosis of Diastolic heart failure. Patient was sitting up in bed during the consultation. He was engaged and asked appropriate questions throughout the education session. He is agreeable to  chf clinic f/u and [facility] for DR Cecille Olson    Future Appointments   Date Time Provider Eleno Dejesus   3/3/2022 10:00 AM GILMAR Gómez - CNP [facility]            We reviewed the introduction to Heart Failure, the HF zones, signs and symptoms to report on day 1 of onset, medications, medication compliance, the importance of obtaining daily weights, following a low sodium diet, reading food labels for the sodium content, keeping physician appointments, and smoking cessation. We discussed writing / tracking daily weights on a calendar / log because a 5 pound gain in 1 week can sneak up if you are not tracking it. You can also take your charted weights to your doctor appointments to be reviewed. Contributing risk factors for Heart Failure are identified as other options. his lower extremity edema has doubled with + orthopnea. He says his legs swelled fast , was released from rehab over holiday weekend and was pending [facility]. Had missed CHF  Clinic Visit. Cardiology consulted then signed off, nephrology to manage diuretic needs. IV antibiotics for pneumonia . Per DR Cecille Olson notes: \"Difficult exam but increasing lower extremity edema. His BNP is actually decreased from his prior BNP but clinically he appears to be hypervolemic. Cardiac evaluation last month demonstrated non-cardiac etiology for his hypervolemia. \" Needs big boy scale to self purchase.   Says he tried to coordinate with his insurance but he was 12 cents over. Had to wait for care money deposit. His insurance has plan that allows him to purchase scale. He is interested in telehealth [employer] for CHF or any [employer] services. Says he is Colin Koenig and thinks he can use emids, but would like [employer] for Telehealth  HF if covered benefit. Casemanagement is on consult. I advised patient they can reduce the risk for Heart Failure exacerbations by modifying / controlling the risk factors. We discussed self-managed care which includes the following:  to take medications as prescribed, report any intolerable side effects of medications to the cardiologist / doctor, do not just stop taking the medication; follow a cardiac heart healthy / low sodium diet; weigh yourself daily, exercise regularly- per doctor recommendation and not to smoke or use an excess amount of alcohol. We discussed calling the cardiologist / doctor with a weight gain of 3 pounds in one day or a total of 5 pounds or more in one week. Also, if you should have a significant weight loss of 3# or more in one day to call the doctor, they may need to decrease or hold the diuretic dose. On days you feels nauseated and not eating / drinking, having emesis or diarrhea,  informed to call the cardiologist  / doctor, they may need to decrease or hold diuretic to avoid dehydration. I stressed the importance of informing their cardiologist the first day of onset of any of the signs and symptoms in the \"Yellow Zone\" of the HF Zones. Patient verbalizes understanding. Greater than 30 minutes was spent educating patient.           BNP:   Lab Results   Component Value Date    PROBNP 1,478 (H) 02/23/2022       History of:    has a past medical history of Acid reflux, Agent orange exposure, Congestive heart failure (CHF) (HCC), COPD (chronic obstructive pulmonary disease) ([address] Depression, Diabetes mellitus ([address] Hyperlipidemia, Hypertension, MI (myocardial infarction) ([facility] Polio, and Sleep apnea. has a past surgical history that includes Coronary angioplasty with stent; Cardiac pacemaker placement; and pacemaker placement. Medications:    aspirin  81 mg Oral Daily    atorvastatin  40 mg Oral Daily    vitamin D  2,000 Units Oral Daily    cyanocobalamin  1,000 mcg Oral Daily    ezetimibe  10 mg Oral Daily    folic acid  1 mg Oral Daily    insulin glargine-yfgn  50 Units SubCUTAneous Nightly    melatonin  3 mg Oral Nightly    metoprolol succinate  25 mg Oral BID    pantoprazole  40 mg Oral QAM AC    vitamin C  500 mg Oral BID    zinc sulfate  50 mg Oral Daily    insulin lispro  0-6 Units SubCUTAneous TID WC    insulin lispro  0-3 Units SubCUTAneous Nightly    ipratropium-albuterol  1 ampule Inhalation Q4H WA    heparin (porcine)  5,000 Units SubCUTAneous Q8H      dextrose      bumetanide 0.1 mg/mL infusion         Code Status:Full Code    The patient is ordered:  Diet: ADULT DIET; Regular; 4 carb choices (60 gm/meal); Low Sodium (2 gm)   Sodium controlled diet Yes  Fluid restriction daily ordered (fluid restriction recommended if patient is hyponatremic and/or diuretic is initiated or increased) No  FR:   Daily Weights:   Patient Vitals for the past 96 hrs (Last 3 readings):   Weight   02/24/22 0615 278 lb (126.1 kg)     I/O:     Intake/Output Summary (Last 24 hours) at 2/24/2022 1533  Last data filed at 2/24/2022 1712  Gross per 24 hour   Intake 300 ml   Output 300 ml   Net 0 ml        The Heart Failure Booklet given to the patient with additional patient education addressing:  · What is Heart Failure?   · Things You Can Do to Live Well with HF  · How to Take Your Medications  · How to Eat Less Salt  · Exercising Well with Heart Failure  · Signs and symptoms of HF to report  · Weight Yourself Each Day  · Home Self Management- activity, weight tracking, taking medications as prescribed, meals /diet planning (sodium and fluid restriction), how to read food labels, keeping physician follow ups, smoking cessation, follow the Heart Failure Zones  · The Heart Failure zones  · Sodium content pamphlet  · What foods I should avoid tip sheet    Instructed  to call 911 if you have any of the following symptoms:    ·    Struggling to breathe unrelieved with rest,  ·    Having chest pain, confusion or can't think clearly  ·    Have confusion or cant think clearly    Readmission Risk Score: 21 ( )        Discharge Plan:  I placed the Heart Failure Home Instructions in patient's discharge instructions. Per Heart Failure GWTG, the patient should have a follow-up appointment made within 7 days of discharge. Charles Landrum, RN BSN, RN  Heart Failure Navigator        CONGESTIVE HEART FAILURE (CHF) AHA GUIDELINES  (Must be completed for Primary Diagnosis CHF or History of CHF)    Type of CHF:    [] Acute   [] Chronic     [x] Acute on Chronic     Ventricular Function Assessment (check):    [x] HFpEF  [] HFpEF, borderline  [] HFpEF, improved  [] HFrEF    Echocardiogram:   Ejection Fraction (%):    TTE (1/25/2022)   Summary   Normal left ventricle size.   Estimated left ventricle ejection fraction >70 %.   Normal right ventricular size and function.   Mild-to-moderate aortic stenosis.   Evidence of elevated filling pressures by IVC and Doppler.                                                                Angiotensin-Converting-Enzyme (ACE) inhibitor ordered:  [] Yes  [x] No (specify contraindication):    [] Contraindicated  [] Hypotensive patient who was at immediate risk of cardiogenic shock  [] Hospitalized patient who experienced marked azotemia  [] Other Contraindications  [] Not Eligible  [] Not Tolerant  [] Patient Reason  [] System Reason  [] Other Reason    Angiotensin II receptor blockers (ARB) ordered:  [] Yes  [x] No (specify contraindication):    [] Contraindicated  [] Hypotensive patient who was at immediate risk of cardiogenic shock  [] Hospitalized patient who experienced marked azotemia  [] Other Contraindications    ARNI - Angiotensin Receptor Neprilysin Inhibitor ordered:  [] Yes  [x] No (specify contraindication):    [] ACE inhibitor use within the prior 36 hours  [] Allergy  [] Hyperkalemia  [] Hypotension  [] Renal dysfunction defined as creatinine > 2.5 mg/dL in men or > 2.0 mg/dL in women  [] Other Contraindications  [] Not Eligible  [] Not Tolerant  [] Patient Reason  []System Reason  []Other Reason      Beta Blocker (Carvedilol, Metoprolol Succinate, or Bisoprolol) ordered:    [x] Yes  [] No (specify contraindication):    [] Contraindicated  [] Asthma  [] Fluid Overload  [] Low Blood Pressure  [] Patient recently treated with an intravenous positive inotropic agent  [] Other Contraindications  [] Not Eligible  [] Not Tolerant  [] Patient Reason  [] System Reason    SGLT2 Inhibitor ordered:  [] Yes  [x] No (specify contraindication):    [] Contraindicated  [] Patient currently on dialysis  [] Ketoacidosis  [] Known hypersensitivity to the medication  [] Type I diabetes (not approved for use in patients with Type I diabetes due to increased risk of ketoacidosis)  [] Other Contraindications  [] Not Eligible  [] Not Tolerant  [] Patient Reason  [] System Reason  [] Other Reason    Aldosterone Antagonist ordered:  [] Yes  [x] No (specify contraindication):    [] Contraindicated  [] Allergy due to aldosterone receptor antagonist  [] Hyperkalemia  [] Renal dysfunction defined as creatinine >2.5 mg/dL in men or >2.0 mg/dL in women.   [] Other contraindications  [] Not Eligible  [] Not Tolerant  [] Patient Reason  [] System Reason  [] Other Reason

## 2022-02-24 NOTE — H&P
HISTORY AND PHYSICAL             Date: 2/24/2022        Patient Name: Peyton Abbott     YOB: 1945      Age:  65 y.o. Chief Complaint     Chief Complaint   Patient presents with    Shortness of Breath     pt reports sob since january. pt reports increased edema to lower extremities. History Obtained From   patient    History of Present Illness   Patient came to the ER with shortness of breath and leg swelling. He denies any fevers, or chills. He was recently admitted with the same. Past Medical History     Past Medical History:   Diagnosis Date    Acid reflux     Agent orange exposure     Congestive heart failure (CHF) (HCC)     COPD (chronic obstructive pulmonary disease) (HCC)     Depression     Diabetes mellitus (HCC)     Hyperlipidemia     Hypertension     MI (myocardial infarction) ([facility] [address]     Polio     Sleep apnea         Past Surgical History     Past Surgical History:   Procedure Laterality Date    CARDIAC PACEMAKER PLACEMENT      CORONARY ANGIOPLASTY WITH STENT PLACEMENT      PACEMAKER PLACEMENT          Medications Prior to Admission     Prior to Admission medications    Medication Sig Start Date End Date Taking?  Authorizing Provider   magnesium hydroxide (MILK OF MAGNESIA) 400 MG/5ML suspension Take 30 mLs by mouth daily as needed for Constipation   Yes Historical Provider, MD   potassium chloride (KLOR-CON) 20 MEQ packet Take 20 mEq by mouth daily   Yes Historical Provider, MD   Dextromethorphan-guaiFENesin  MG/5ML SYRP Take 5 mLs by mouth every 4 hours as needed for Cough   Yes Historical Provider, MD   melatonin 3 MG TABS tablet Take 3 mg by mouth at bedtime   Yes Historical Provider, MD   OXYGEN Inhale into the lungs 1-5 l/min to maintain O2 sat greater than or =to 90%   Yes Historical Provider, MD   BiPAP Machine MISC by Does not apply route   Yes Historical Provider, MD   insulin lispro, 1 Unit Dial, (HUMALOG KWIKPEN) 100 UNIT/ML SOPN Inject into the skin 4 times daily Per sliding scale:    160 - 200 = 2 units  201 - 250 = 4 units  251 - 300 = 6 units  301 - 350 = 8 units  351 - 400 = 10 units   Yes Historical Provider, MD   metoprolol succinate (TOPROL XL) 25 MG extended release tablet Take 1 tablet by mouth 2 times daily 2/16/22  Yes GILMAR Mcadams CNP   acetaminophen (TYLENOL) 325 MG tablet Take 650 mg by mouth every 4 hours as needed for Pain For temp> 100.4 F,  And for pain   Yes Historical Provider, MD   vitamin C (ASCORBIC ACID) 500 MG tablet Take 500 mg by mouth 2 times daily   Yes Historical Provider, MD   Cholecalciferol (VITAMIN D) 50 MCG (2000 UT) CAPS capsule Take 2,000 Units by mouth daily    Yes Historical Provider, MD   zinc sulfate (ZINCATE) 220 (50 Zn) MG capsule Take 50 mg by mouth daily   Yes Historical Provider, MD   aspirin 81 MG EC tablet Take 1 tablet by mouth daily 2/3/22  Yes Emily Alonzo MD   insulin glargine-[facility]) 100 UNIT/ML injection vial Inject 50 Units into the skin nightly 2/2/22  Yes Emily Alonzo MD   bumetanide (BUMEX) 1 MG tablet Take 1 tablet by mouth 2 times daily 2/3/22  Yes Emily Alonzo MD   ezetimibe (ZETIA) 10 MG tablet Take 10 mg by mouth daily   Yes Historical Provider, MD   folic acid (FOLVITE) 1 MG tablet Take 1 mg by mouth daily   Yes Historical Provider, MD   cyanocobalamin 1000 MCG tablet Take 1,000 mcg by mouth daily   Yes Historical Provider, MD   atorvastatin (LIPITOR) 40 MG tablet Take 40 mg by mouth daily    Yes Historical Provider, MD   loperamide (IMODIUM) 2 MG capsule Take 2 mg by mouth 4 times daily as needed for Diarrhea   Yes Historical Provider, MD   pantoprazole (PROTONIX) 40 MG tablet Take 40 mg by mouth daily   Yes Historical Provider, MD   guaiFENesin 400 MG tablet Take 1 tablet by mouth 4 times daily as needed for Cough  Patient taking differently: Take 400 mg by mouth every 6 hours as needed for Cough  2/2/22   Emily Alonzo MD Allergies   Penicillins    Social History     Social History     Tobacco History     Smoking Status  Former Smoker    Smokeless Tobacco Use  Never Used    Tobacco Comment  quit in 1985          Alcohol History     Alcohol Use Status  Never          Drug Use     Drug Use Status  Never          Sexual Activity     Sexually Active  Not Asked                Family History   History reviewed. No pertinent family history. Review of Systems   Review of Systems   Constitutional: Positive for activity change. Negative for fever. HENT: Positive for congestion. Respiratory: Positive for shortness of breath. Cardiovascular: Negative for chest pain. Gastrointestinal: Negative for abdominal pain. Genitourinary: Negative for difficulty urinating. Neurological: Negative for dizziness. Physical Exam   /87   Pulse 90   Temp 98.8 °F (37.1 °C) (Oral)   Resp 17   Ht 5' 9\" (1.753 m) Comment: Carried over from 2/16/22  Wt 278 lb (126.1 kg) Comment: Carried over from 2/16/22  SpO2 97%   BMI 41.05 kg/m²     Physical Exam  HENT:      Head: Normocephalic. Mouth/Throat:      Mouth: Mucous membranes are moist.   Eyes:      Pupils: Pupils are equal, round, and reactive to light. Cardiovascular:      Rate and Rhythm: Normal rate. Pulses: Normal pulses. Pulmonary:      Effort: Pulmonary effort is normal.      Breath sounds: No wheezing. Abdominal:      General: Abdomen is flat. Bowel sounds are normal. There is no distension. Musculoskeletal:      Cervical back: Normal range of motion. Right lower leg: Edema present. Skin:     Capillary Refill: Capillary refill takes less than 2 seconds. Neurological:      General: No focal deficit present. Mental Status: He is alert and oriented to person, place, and time.    Psychiatric:         Mood and Affect: Mood normal.         Behavior: Behavior normal.         Labs      Recent Results (from the past 24 hour(s))   EKG 12 Lead Collection Time: 02/23/22  4:02 PM   Result Value Ref Range    Ventricular Rate 111 BPM    Atrial Rate 111 BPM    P-R Interval 144 ms    QRS Duration 110 ms    Q-T Interval 370 ms    QTc Calculation (Bazett) 503 ms    P Axis 60 degrees    R Axis 73 degrees    T Axis 75 degrees   CBC with Auto Differential    Collection Time: 02/23/22  4:29 PM   Result Value Ref Range    WBC 9.5 4.5 - 11.5 E9/L    RBC 3.88 3.80 - 5.80 E12/L    Hemoglobin 10.9 (L) 12.5 - 16.5 g/dL    Hematocrit 36.0 (L) 37.0 - 54.0 %    MCV 92.8 80.0 - 99.9 fL    MCH 28.1 26.0 - 35.0 pg    MCHC 30.3 (L) 32.0 - 34.5 %    RDW 16.4 (H) 11.5 - 15.0 fL    Platelets 827 009 - 570 E9/L    MPV 9.2 7.0 - 12.0 fL    Neutrophils % 64.3 43.0 - 80.0 %    Immature Granulocytes % 0.7 0.0 - 5.0 %    Lymphocytes % 19.9 (L) 20.0 - 42.0 %    Monocytes % 11.6 2.0 - 12.0 %    Eosinophils % 3.2 0.0 - 6.0 %    Basophils % 0.3 0.0 - 2.0 %    Neutrophils Absolute 6.12 1.80 - 7.30 E9/L    Immature Granulocytes # 0.07 E9/L    Lymphocytes Absolute 1.89 1.50 - 4.00 E9/L    Monocytes Absolute 1.10 (H) 0.10 - 0.95 E9/L    Eosinophils Absolute 0.30 0.05 - 0.50 E9/L    Basophils Absolute 0.03 0.00 - 0.20 D2/N   Basic Metabolic Panel w/ Reflex to MG    Collection Time: 02/23/22  4:29 PM   Result Value Ref Range    Sodium 142 132 - 146 mmol/L    Potassium reflex Magnesium 4.1 3.5 - 5.0 mmol/L    Chloride 102 98 - 107 mmol/L    CO2 30 (H) 22 - 29 mmol/L    Anion Gap 10 7 - 16 mmol/L    Glucose 116 (H) 74 - 99 mg/dL    BUN 33 (H) 6 - 23 mg/dL    CREATININE 2.2 (H) 0.7 - 1.2 mg/dL    GFR Non-African American 29 >=60 mL/min/1.73    GFR African American 35     Calcium 8.8 8.6 - 10.2 mg/dL   Troponin    Collection Time: 02/23/22  4:29 PM   Result Value Ref Range    Troponin, High Sensitivity 25 (H) 0 - 11 ng/L   Brain Natriuretic Peptide    Collection Time: 02/23/22  4:29 PM   Result Value Ref Range    Pro-BNP 1,478 (H) 0 - 450 pg/mL   Troponin    Collection Time: 02/23/22  6:20 PM   Result Value Ref Range    Troponin, High Sensitivity 24 (H) 0 - 11 ng/L   D-Dimer, Quantitative    Collection Time: 02/23/22  6:20 PM   Result Value Ref Range    D-Dimer, Quant 698 ng/mL DDU   POCT Glucose    Collection Time: 02/24/22  1:45 AM   Result Value Ref Range    Meter Glucose 66 (L) 74 - 99 mg/dL   POCT Glucose    Collection Time: 02/24/22  5:39 AM   Result Value Ref Range    Meter Glucose 102 (H) 74 - 99 mg/dL        Imaging/Diagnostics Last 24 Hours   XR CHEST PORTABLE    Result Date: 1/21/2022  EXAMINATION: ONE XRAY VIEW OF THE CHEST 1/21/2022 10:02 am COMPARISON: None. HISTORY: ORDERING SYSTEM PROVIDED HISTORY: dyspnea TECHNOLOGIST PROVIDED HISTORY: Reason for exam:->dyspnea FINDINGS: The heart is enlarged. There is a dual lead cardiac pacer on the left There is an infiltrate seen within the right lung base. The left lung is clear. There is a trace right pleural effusion. There is no left pleural effusion. 1. Right lower lobe pneumonia 2. Trace right pleural effusion 3. Cardiomegaly     US DUP LOWER EXTREMITIES BILATERAL VENOUS    Result Date: 1/21/2022  EXAMINATION: DUPLEX VENOUS ULTRASOUND OF THE BILATERAL LOWER EXTREMITIES1/21/2022 10:15 am TECHNIQUE: Duplex ultrasound using B-mode/gray scaled imaging, Doppler spectral analysis and color flow Doppler was obtained of the deep venous structures of the lower bilateral extremities. COMPARISON: None. HISTORY: ORDERING SYSTEM PROVIDED HISTORY: discomfort TECHNOLOGIST PROVIDED HISTORY: Reason for exam:->discomfort What reading provider will be dictating this exam?->CRC FINDINGS: The visualized veins of the bilateral lower extremities are patent and free of echogenic thrombus. The veins demonstrate good compressibility with normal color flow study and spectral analysis. No evidence of DVT in either lower extremity.  RECOMMENDATIONS: Unavailable       Assessment      Hospital Problems           Last Modified POA    * (Principal) CHF (congestive heart failure), NYHA class I, acute on chronic, combined ([facility] [address] 2/23/2022 Yes      CHF  Pleural effusion  Acute renal insfficnecy  DM  COPD  HTN  Hyperlipidemia        Plan   IV Abx. Cautiously diurese  Monitor labs and exam.  Continue rest of meds. Renal, Pulmonary, and Cardiology to see.     Consultations Ordered:  IP CONSULT TO HOSPITALIST  IP CONSULT TO PULMONOLOGY  IP CONSULT TO CASE MANAGEMENT

## 2022-02-25 NOTE — PROGRESS NOTES
Patient requesting to change from [facility] to [facility]. Attending sent Perfect Serve message.     Sarah Hermosillo RN

## 2022-02-25 NOTE — PROGRESS NOTES
Progress Note  Date:2022       DHTO:[id number]  Patient Armando Castanon     YOB: 1945     Age:77 y.o. Patient says breathing is improved today. Subjective    Subjective:  Symptoms:  Improved. He reports shortness of breath. No chest pain. Diet:  Adequate intake. Activity level: Impaired due to weakness. Pain:  He reports no pain. Review of Systems   Constitutional: Negative for activity change and fever. HENT: Negative for congestion. Respiratory: Positive for shortness of breath. Cardiovascular: Positive for leg swelling. Negative for chest pain. Gastrointestinal: Negative for abdominal pain. Neurological: Negative for dizziness. Psychiatric/Behavioral: Negative for agitation. Objective         Vitals Last 24 Hours:  TEMPERATURE:  Temp  Av.1 °F (36.7 °C)  Min: 97.7 °F (36.5 °C)  Max: 98.5 °F (36.9 °C)  RESPIRATIONS RANGE: Resp  Av.7  Min: 17  Max: 18  PULSE OXIMETRY RANGE: SpO2  Av.3 %  Min: 95 %  Max: 99 %  PULSE RANGE: Pulse  Av  Min: 107  Max: 114  BLOOD PRESSURE RANGE: Systolic (72FWO), DMI:596 , Min:90 , CLEMENCIA:323   ; Diastolic (84LEV), GHZ:08, Min:72, Max:81    I/O (24Hr): Intake/Output Summary (Last 24 hours) at 2022 4082  Last data filed at 2022 3728  Gross per 24 hour   Intake 600 ml   Output 700 ml   Net -100 ml     Objective:  General Appearance:  Comfortable. Vital signs: (most recent): Blood pressure 114/72, pulse 109, temperature 98.5 °F (36.9 °C), temperature source Oral, resp. rate 17, height 5' 9\" (1.753 m), weight 278 lb (126.1 kg), SpO2 95 %. No fever. Lungs:  Normal effort and normal respiratory rate. Breath sounds clear to auscultation. Heart: Normal rate. Regular rhythm. S1 normal and S2 normal.    Extremities: There is local swelling.       Labs/Imaging/Diagnostics    Labs:  CBC:  Recent Labs     22  1629 22  0652 22  0453   WBC 9.5 8.3 8.2   RBC 3.88 3.75* 3.82 HGB 10.9* 10.9* 10.9*   HCT 36.0* 34.7* 35.8*   MCV 92.8 92.5 93.7   RDW 16.4* 16.5* 16.7*    283 276     CHEMISTRIES:  Recent Labs     22  1629 22  0652    144   K 4.1 3.9    104   CO2 30* 30*   BUN 33* 31*   CREATININE 2.2* 2.3*   GLUCOSE 116* 94   MG  --  2.1     PT/INR:No results for input(s): PROTIME, INR in the last 72 hours. APTT:No results for input(s): APTT in the last 72 hours. LIVER PROFILE:No results for input(s): AST, ALT, BILIDIR, BILITOT, ALKPHOS in the last 72 hours. Imaging Last 24 Hours:  XR CHEST PORTABLE    Result Date: 2022  EXAMINATION: ONE XRAY VIEW OF THE CHEST 2022 4:49 pm COMPARISON: 2022 HISTORY: ORDERING SYSTEM PROVIDED HISTORY: shortness of breath TECHNOLOGIST PROVIDED HISTORY: Reason for exam:->shortness of breath What reading provider will be dictating this exam?->CRC FINDINGS: There is a large opacity seen within the right lung base. There is a small right pleural effusion. The left upper lobe is clear. There is a small left pleural effusion. The cardiac silhouette is within normals. There is a dual lead cardiac pacer on the left. 1. Large opacity within the right lung base which could represent pneumonia and or atelectasis. 2. Moderate size right pleural effusion. 3. Small left pleural effusion. 4. CT of the thorax is recommended for further evaluation. US DUP LOWER EXTREMITIES BILATERAL VENOUS    Result Date: 2022  Patient MRN:  [id number] : 1945 Age: 65 years Gender: Male Order Date:  2022 5:28 PM EXAM: US DUP LOWER EXTREMITIES BILATERAL VENOUS NUMBER OF IMAGES:  52 INDICATION:  leg swelling leg swelling What reading provider will be dictating this exam?->[facility] COMPARISON: None Within the visualized vessels, there is no evidence for deep venous thrombosis There is good compressibility, there is good augmentation, there is good color flow.      Within the visualized vessels there is no evidence for deep venous thrombosis     Assessment//Plan           Hospital Problems           Last Modified POA    * (Principal) CHF (congestive heart failure), NYHA class I, acute on chronic, combined ([facility] [address] 2/23/2022 Yes        Assessment:  (CHF/ volume overload  Pleural effusion  Acute renal insfficnecy  DM  COPD  HTN  Hyperlipidemia           Plan  IV Abx. Cautiously diurese  Monitor labs and exam.  Continue rest of meds. Renal, Pulmonary, and Cardiology following. Albumin as needed. ).        Electronically signed by Tia Meier MD on 2/25/22 at 6:27 AM EST

## 2022-02-25 NOTE — PROGRESS NOTES
[facility]   Nephrology Progress Note  Patient's Name: Rose Mendez    History of Present Illness from 2/24 consult note:    Maricel Damian is a 65 y.o. male with a past medical history of CHF, COPD, diabetes mellitus, hypertension, and polio. He presented to the ED on 2/23 with complaints of shortness of breath. Per the ED provider note, patient was also noticed increased leg swelling and that the patient wears 4 L of oxygen at home. Vital signs at presentation to the ED include temperature 98.4, respirations 18, pulse 112, /96, and he was 99% on 4 L O2. Lab data on presentation to the ED include CO2 30, BUN 33, creatinine 2.2, hemoglobin 10.9, and proBNP 1478. We were consulted to see the patient for CKD. Patient is known to our service, and had an office visit in June 2021 with Dr. Vivian Bishop. Baseline creatinine from January 2022 is 2.5-2.8. At present, patient was seen and examined. He is currently sitting up in a chair. He reports that he was having swelling and shortness of breath and that is what brought him into the hospital.  He reports that prior to admission his appetite has been okay. Overall he said he feels fine, except that his legs feel tight. He denies any current chest pain or shortness of breath. No nausea or abdominal pain. He denies any headaches or dizziness. He denies use of NSAIDs. \"    Subjective:    2/25: Patient seen and examined. Family at bedside. He reports intermittent shortness of breath. He denies any chest pain. No abdominal pain or nausea.     Past Medical History:   Diagnosis Date    Acid reflux     Agent orange exposure     Congestive heart failure (CHF) (HCC)     COPD (chronic obstructive pulmonary disease) (HCC)     Depression     Diabetes mellitus (HCC)     Hyperlipidemia     Hypertension     MI [facility]myocardial infarction) ([facility] [address]     Polio     Sleep apnea      Past Surgical History:   Procedure Laterality Date    CARDIAC PACEMAKER PLACEMENT  CORONARY ANGIOPLASTY WITH STENT PLACEMENT      PACEMAKER PLACEMENT       History reviewed. No pertinent family history. reports that he has quit smoking. He has never used smokeless tobacco. He reports that he does not drink alcohol and does not use drugs. Allergies:  Penicillins    Current Medications:    acetaminophen (TYLENOL) tablet 650 mg, Q4H PRN  aspirin EC tablet 81 mg, Daily  atorvastatin (LIPITOR) tablet 40 mg, Daily  vitamin D (CHOLECALCIFEROL) tablet 2,000 Units, Daily  vitamin B-12 (CYANOCOBALAMIN) tablet 1,000 mcg, Daily  ezetimibe (ZETIA) tablet 10 mg, Daily  folic acid (FOLVITE) tablet 1 mg, Daily  guaiFENesin tablet 400 mg, 4x Daily PRN  insulin glargine-yfgn (SEMGLEE-YFGN) injection vial 50 Units, Nightly  loperamide (IMODIUM) capsule 2 mg, 4x Daily PRN  melatonin tablet 3 mg, Nightly  metoprolol succinate (TOPROL XL) extended release tablet 25 mg, BID  pantoprazole (PROTONIX) tablet 40 mg, QAM AC  ascorbic acid (VITAMIN C) tablet 500 mg, BID  zinc sulfate (ZINCATE) capsule 50 mg, Daily  insulin lispro (HUMALOG) injection vial 0-6 Units, TID WC  insulin lispro (HUMALOG) injection vial 0-3 Units, Nightly  glucose (GLUTOSE) 40 % oral gel 15 g, PRN  dextrose 50 % IV solution, PRN  glucagon (rDNA) injection 1 mg, PRN  dextrose 5 % solution, PRN  ipratropium-albuterol (DUONEB) nebulizer solution 1 ampule, Q4H WA  heparin (porcine) injection 5,000 Units, Q8H  bumetanide (BUMEX) 12.5 mg in sodium chloride 0.9 % 125 mL infusion, Continuous    Review of Systems:   Pertinent items are noted in HPI. Physical exam:  Vitals:    02/25/22 0045   BP: 114/72   Pulse: 109   Resp: 17   Temp: 98.5 °F (36.9 °C)   SpO2:      General: Awake, alert, no acute distress  Neck: No JVD noted  Lungs: Clear bilaterally upper, diminished in bases bilaterally. Unlabored  CV: Regular rate and rhythm. No rub  Abd: Rounded, soft, nontender, nondistended. Active bowel sounds  Skin: Warm and dry.   No rash on exposed extremities  Ext: 3+ BLE edema; trace BUE edema  Neuro: Awake, alert, answers questions appropriately  Data:   Labs:  Lab Results   Component Value Date     2022     2022     2022    K 4.2 2022    K 3.9 2022    K 4.1 2022     2022    CO2 29 2022    CO2 30 (H) 2022    CO2 30 (H) 2022    CREATININE 2.3 (H) 2022    CREATININE 2.3 (H) 2022    CREATININE 2.2 (H) 2022    BUN 33 (H) 2022    BUN 31 (H) 2022    BUN 33 (H) 2022    GLUCOSE 109 (H) 2022    GLUCOSE 94 2022    GLUCOSE 116 (H) 2022    PHOS 4.5 2022    PHOS 4.1 2022    PHOS 4.2 2022    WBC 8.2 2022    WBC 8.3 2022    WBC 9.5 2022    HGB 10.9 (L) 2022    HGB 10.9 (L) 2022    HGB 10.9 (L) 2022    HCT 35.8 (L) 2022    HCT 34.7 (L) 2022    HCT 36.0 (L) 2022    MCV 93.7 2022     2022     Imaging:  XR CHEST PORTABLE    Result Date: 2022  EXAMINATION: ONE XRAY VIEW OF THE CHEST 2022 4:49 pm COMPARISON: 2022 HISTORY: ORDERING SYSTEM PROVIDED HISTORY: shortness of breath TECHNOLOGIST PROVIDED HISTORY: Reason for exam:->shortness of breath What reading provider will be dictating this exam?->CRC FINDINGS: There is a large opacity seen within the right lung base. There is a small right pleural effusion. The left upper lobe is clear. There is a small left pleural effusion. The cardiac silhouette is within normals. There is a dual lead cardiac pacer on the left. 1. Large opacity within the right lung base which could represent pneumonia and or atelectasis. 2. Moderate size right pleural effusion. 3. Small left pleural effusion. 4. CT of the thorax is recommended for further evaluation.      US DUP LOWER EXTREMITIES BILATERAL VENOUS    Result Date: 2022  Patient MRN:  [id number] : 1945 Age: 65 years Gender: Male Order Date:  2/23/2022 5:28 PM EXAM: US DUP LOWER EXTREMITIES BILATERAL VENOUS NUMBER OF IMAGES:  52 INDICATION:  leg swelling leg swelling What reading provider will be dictating this exam?->[facility] COMPARISON: None Within the visualized vessels, there is no evidence for deep venous thrombosis There is good compressibility, there is good augmentation, there is good color flow. Within the visualized vessels there is no evidence for deep venous thrombosis     Assessment/ Plans:    1. CKD stage IV  Presumed to be due to diabetes mellitus and hypertension  Baseline creatinine from January 2022 is 2.5-2.8  Creatinine at presentation to the ED 2.2--> 2.3 today  Continue Bumex drip   mL 24 hours  Strict I&O, daily weights  Follow creatinine    2. HFpEF  ECHO 1/2022 showed EF > 70%  Continue on Bumex drip  Cardiology following    3. Hypertension  BP goal<130/80  BP currently at goal  Follow on current regimen    4. Diabetes mellitus  On insulin lispro and insulin glargine  Management per primary    5.   Pleural effusion  CXR showed \"moderate size right pleural effusion\" and \"small left pleural effusion\"  on 4 L nasal cannula  Pulmonology following    GILMAR Carvajal - CNP     Pt seen and examined agree with above  Continue bumex drip  Consider hd if no improvement  Luis Alfredo Zelaya MD

## 2022-02-26 NOTE — PROGRESS NOTES
Progress Note  Date:2022       Room:[id number]  Patient Claudeen Scheuermann     YOB: 1945     Age:77 y.o. Patient says breathing is improved today. Subjective    Subjective:  Symptoms:  Improved. He reports shortness of breath. No chest pain. Diet:  Adequate intake. Activity level: Impaired due to weakness. Pain:  He reports no pain. Review of Systems   Constitutional: Negative for activity change and fever. HENT: Negative for congestion. Respiratory: Positive for shortness of breath. Cardiovascular: Positive for leg swelling. Negative for chest pain. Gastrointestinal: Negative for abdominal pain. Neurological: Negative for dizziness. Psychiatric/Behavioral: Negative for agitation. Objective         Vitals Last 24 Hours:  TEMPERATURE:  Temp  Av.6 °F (36.4 °C)  Min: 97.3 °F (36.3 °C)  Max: 97.9 °F (36.6 °C)  RESPIRATIONS RANGE: Resp  Av.5  Min: 15  Max: 18  PULSE OXIMETRY RANGE: SpO2  Av.3 %  Min: 97 %  Max: 98 %  PULSE RANGE: Pulse  Av.3  Min: 90  Max: 102  BLOOD PRESSURE RANGE: Systolic (58GUE), YNT:683 , Min:88 , XJM:055   ; Diastolic (95QZU), FQM:06, Min:64, Max:86    I/O (24Hr): Intake/Output Summary (Last 24 hours) at 2022 0650  Last data filed at 2022 2850  Gross per 24 hour   Intake --   Output 1300 ml   Net -1300 ml     Objective:  General Appearance:  Comfortable. Vital signs: (most recent): Blood pressure 102/64, pulse 98, temperature 97.7 °F (36.5 °C), temperature source Oral, resp. rate 18, height 5' 9\" (1.753 m), weight 278 lb (126.1 kg), SpO2 98 %. No fever. Lungs:  Normal effort and normal respiratory rate. Breath sounds clear to auscultation. Heart: Normal rate. Regular rhythm. S1 normal and S2 normal.    Extremities: There is local swelling.       Labs/Imaging/Diagnostics    Labs:  CBC:  Recent Labs     22  0652 22  0453 22  0532   WBC 8.3 8.2 8.4   RBC 3.75* 3.82 3.98   HGB 10.9* 10.9* 11.5*   HCT 34.7* 35.8* 37.2   MCV 92.5 93.7 93.5   RDW 16.5* 16.7* 16.7*    276 259     CHEMISTRIES:  Recent Labs     22  1629 22  0652 22  0453    144 142   K 4.1 3.9 4.2    104 102   CO2 30* 30* 29   BUN 33* 31* 33*   CREATININE 2.2* 2.3* 2.3*   GLUCOSE 116* 94 109*   MG  --  2.1  --      PT/INR:No results for input(s): PROTIME, INR in the last 72 hours. APTT:No results for input(s): APTT in the last 72 hours. LIVER PROFILE:No results for input(s): AST, ALT, BILIDIR, BILITOT, ALKPHOS in the last 72 hours. Imaging Last 24 Hours:  XR CHEST PORTABLE    Result Date: 2022  EXAMINATION: ONE XRAY VIEW OF THE CHEST 2022 4:49 pm COMPARISON: 2022 HISTORY: ORDERING SYSTEM PROVIDED HISTORY: shortness of breath TECHNOLOGIST PROVIDED HISTORY: Reason for exam:->shortness of breath What reading provider will be dictating this exam?->CRC FINDINGS: There is a large opacity seen within the right lung base. There is a small right pleural effusion. The left upper lobe is clear. There is a small left pleural effusion. The cardiac silhouette is within normals. There is a dual lead cardiac pacer on the left. 1. Large opacity within the right lung base which could represent pneumonia and or atelectasis. 2. Moderate size right pleural effusion. 3. Small left pleural effusion. 4. CT of the thorax is recommended for further evaluation. US DUP LOWER EXTREMITIES BILATERAL VENOUS    Result Date: 2022  Patient MRN:  [id number] : 1945 Age: 65 years Gender: Male Order Date:  2022 5:28 PM EXAM: US DUP LOWER EXTREMITIES BILATERAL VENOUS NUMBER OF IMAGES:  52 INDICATION:  leg swelling leg swelling What reading provider will be dictating this exam?->[facility] COMPARISON: None Within the visualized vessels, there is no evidence for deep venous thrombosis There is good compressibility, there is good augmentation, there is good color flow.      Within the visualized vessels there is no evidence for deep venous thrombosis     Assessment//Plan           Hospital Problems           Last Modified POA    * (Principal) CHF (congestive heart failure), NYHA class I, acute on chronic, combined ([address] 2/23/2022 Yes        Assessment:  (CHF/ volume overload  Pleural effusion  Acute renal insfficnecy  DM  COPD  HTN  Hyperlipidemia           Plan  IV Abx. Cautiously diurese  Monitor labs and exam.  Continue rest of meds. Renal, Pulmonary, and Cardiology following. Albumin as needed. ).

## 2022-02-26 NOTE — PROGRESS NOTES
[facility]   Nephrology Progress Note  Patient's Name: Claudette Ramirez      From consult note 2/24/22- History of Present Illness:    Claudette Ramirez is a 65 y.o. male with a past medical history of CHF, COPD, diabetes mellitus, hypertension, and polio. He presented to the ED on 2/23 with complaints of shortness of breath. Per the ED provider note, patient was also noticed increased leg swelling and that the patient wears 4 L of oxygen at home. Vital signs at presentation to the ED include temperature 98.4, respirations 18, pulse 112, /96, and he was 99% on 4 L O2. Lab data on presentation to the ED include CO2 30, BUN 33, creatinine 2.2, hemoglobin 10.9, and proBNP 1478. We were consulted to see the patient for CKD. Patient is known to our service, and had an office visit in June 2021 with Dr. Jovanny Jenkins. Baseline creatinine from January 2022 is 2.5-2.8. At present, patient was seen and examined. He is currently sitting up in a chair. He reports that he was having swelling and shortness of breath and that is what brought him into the hospital.  He reports that prior to admission his appetite has been okay. Overall he said he feels fine, except that his legs feel tight. He denies any current chest pain or shortness of breath. No nausea or abdominal pain. He denies any headaches or dizziness. He denies use of NSAIDs.    2/26/22- awake and alert. Does not feel well today. Past Medical History:   Diagnosis Date    Acid reflux     Agent orange exposure     Congestive heart failure (CHF) (HCC)     COPD (chronic obstructive pulmonary disease) (HCC)     Depression     Diabetes mellitus (HCC)     Hyperlipidemia     Hypertension     MI (myocardial infarction) [address] [address]     Polio     Sleep apnea      Past Surgical History:   Procedure Laterality Date    CARDIAC PACEMAKER PLACEMENT      CORONARY ANGIOPLASTY WITH STENT PLACEMENT      PACEMAKER PLACEMENT       History reviewed.  No pertinent family history. reports that he has quit smoking. He has never used smokeless tobacco. He reports that he does not drink alcohol and does not use drugs. Allergies:  Penicillins    Current Medications:    acetaminophen (TYLENOL) tablet 650 mg, Q4H PRN  aspirin EC tablet 81 mg, Daily  atorvastatin (LIPITOR) tablet 40 mg, Daily  vitamin D (CHOLECALCIFEROL) tablet 2,000 Units, Daily  vitamin B-12 (CYANOCOBALAMIN) tablet 1,000 mcg, Daily  ezetimibe (ZETIA) tablet 10 mg, Daily  folic acid (FOLVITE) tablet 1 mg, Daily  guaiFENesin tablet 400 mg, 4x Daily PRN  insulin glargine-yfgn (SEMGLEE-YFGN) injection vial 50 Units, Nightly  loperamide (IMODIUM) capsule 2 mg, 4x Daily PRN  melatonin tablet 3 mg, Nightly  metoprolol succinate (TOPROL XL) extended release tablet 25 mg, BID  pantoprazole (PROTONIX) tablet 40 mg, QAM AC  ascorbic acid (VITAMIN C) tablet 500 mg, BID  zinc sulfate (ZINCATE) capsule 50 mg, Daily  insulin lispro (HUMALOG) injection vial 0-6 Units, TID WC  insulin lispro (HUMALOG) injection vial 0-3 Units, Nightly  glucose (GLUTOSE) 40 % oral gel 15 g, PRN  dextrose 50 % IV solution, PRN  glucagon (rDNA) injection 1 mg, PRN  dextrose 5 % solution, PRN  ipratropium-albuterol (DUONEB) nebulizer solution 1 ampule, Q4H WA  heparin (porcine) injection 5,000 Units, Q8H  bumetanide (BUMEX) 12.5 mg in sodium chloride 0.9 % 125 mL infusion, Continuous    Review of Systems:   Pertinent items are noted in HPI. Physical exam:  Vitals:    02/26/22 0842   BP: 104/71   Pulse: 93   Resp: 17   Temp: 97.7 °F (36.5 °C)   SpO2: 96%     General: Awake, alert, no acute distress, sitting up in a chair  Neck: No JVD noted  Lungs: diminished   CV: S1 S2 no S3 or rub  Abd: Rounded, soft, nontender, nondistended. Active bowel sounds  Skin: Warm and dry.   No rash on exposed extremities  Ext: 2-3+ BLE edema  Neuro: Awake, alert, answers questions appropriately  Data:   Labs:  Lab Results   Component Value Date     2022     2022     2022    K 3.9 2022    K 4.2 2022    K 3.9 2022     2022    CO2 26 2022    CO2 29 2022    CO2 30 (H) 2022    CREATININE 2.4 (H) 2022    CREATININE 2.3 (H) 2022    CREATININE 2.3 (H) 2022    BUN 32 (H) 2022    BUN 33 (H) 2022    BUN 31 (H) 2022    GLUCOSE 65 (L) 2022    GLUCOSE 109 (H) 2022    GLUCOSE 94 2022    PHOS 4.5 2022    PHOS 4.1 2022    PHOS 4.2 2022    WBC 8.4 2022    WBC 8.2 2022    WBC 8.3 2022    HGB 11.5 (L) 2022    HGB 10.9 (L) 2022    HGB 10.9 (L) 2022    HCT 37.2 2022    HCT 35.8 (L) 2022    HCT 34.7 (L) 2022    MCV 93.5 2022     2022     Imaging:  XR CHEST PORTABLE    Result Date: 2022  EXAMINATION: ONE XRAY VIEW OF THE CHEST 2022 4:49 pm COMPARISON: 2022 HISTORY: ORDERING SYSTEM PROVIDED HISTORY: shortness of breath TECHNOLOGIST PROVIDED HISTORY: Reason for exam:->shortness of breath What reading provider will be dictating this exam?->[facility] FINDINGS: There is a large opacity seen within the right lung base. There is a small right pleural effusion. The left upper lobe is clear. There is a small left pleural effusion. The cardiac silhouette is within normals. There is a dual lead cardiac pacer on the left. 1. Large opacity within the right lung base which could represent pneumonia and or atelectasis. 2. Moderate size right pleural effusion. 3. Small left pleural effusion. 4. CT of the thorax is recommended for further evaluation.      US DUP LOWER EXTREMITIES BILATERAL VENOUS    Result Date: 2022  Patient MRN:  [id number] : 1945 Age: 65 years Gender: Male Order Date:  2022 5:28 PM EXAM: US DUP LOWER EXTREMITIES BILATERAL VENOUS NUMBER OF IMAGES:  52 INDICATION:  leg swelling leg swelling What reading provider will be dictating this exam?->[facility] COMPARISON: None Within the visualized vessels, there is no evidence for deep venous thrombosis There is good compressibility, there is good augmentation, there is good color flow. Within the visualized vessels there is no evidence for deep venous thrombosis     Assessment/ Plans:    1. CKD stage IV  due to diabetes mellitus and hypertension  Baseline creatinine from January 2022 is 2.5-2.8  Creatinine at baseline   On Bumex drip  UOP 1300 ml past 24 hours    2. HFpEF  ECHO 1/2022 showed EF > 70%  On Bumex drip  Cardiology following    3. Hypertension  BP at goal<130/80    4. Diabetes mellitus  On insulin lispro and insulin glargine  Management per primary    5. Pleural effusion  CXR showed moderate size right pleural effusion and small left pleural effusion  Pulmonology following   No plans for thoracentesis        GILMAR Jaquez - CNP     Patient seen and examined all key components of the physical performed independently , case discussed with NP, all pertinent labs and radiologic tests personally reviewed agree with above.     Still with significant peripheral edema, will increase Bumex drip  Renetta Garay MD

## 2022-02-27 NOTE — PROGRESS NOTES
[facility]  Department of Pulmonary, Critical Care and Sleep Medicine  [address] [facility]  Department of Internal Medicine  Progress Note    SUBJECTIVE:    Patient seen and examined. His blood sugar was low this morning he states that he is not sure why and that he had felt fine after eating last night. He does state that he continues to have pain in his feet likely secondary to the swelling. He is still quite short of breath with exertion. OBJECTIVE:  Vitals:    02/26/22 1921 02/26/22 2045 02/26/22 2300 02/27/22 0825   BP:  (!) 115/97 102/70 109/74   Pulse:  104 96 91   Resp:   18 18   Temp:   97.7 °F (36.5 °C) 97.3 °F (36.3 °C)   TempSrc:   Oral Axillary   SpO2: 98%  98% 99%   Weight:       Height:         Constitutional: Alert,     EENT: EOMI YOSEF. MMM. No icterus. No thrush. Neck: No thyromegaly. No JVD on my exam.  Respiratory: Breath sounds are diminished without use of accessory muscles. Cardiovascular: Regular, No murmur. No rubs. Pulses:  Equal bilaterally. Abdomen: Soft without organomegaly. No rebound, rigidity. No guarding. Lymphatic: No lymphadenopathy. Musculoskeletal: Without weakness or gross deficits  Extremities: 3+ edema bilaterally. Feet still quite tight  Skin:  Warm and dry. No skin rashes. Neurological/Psychiatric: No acute psychosis. Cranial nerves are intact. DATA:    Monitor Strips:  Reviewed & discusses with technical team. No changes noted.     RADIOLOGY:  Films were read/reviewed/discussed with radiology shows no new imaging today      CBC with Differential:    Lab Results   Component Value Date    WBC 11.1 02/27/2022    RBC 4.11 02/27/2022    HGB 11.7 02/27/2022    HCT 38.1 02/27/2022     02/27/2022    MCV 92.7 02/27/2022    MCH 28.5 02/27/2022    MCHC 30.7 02/27/2022    RDW 16.8 02/27/2022    LYMPHOPCT 19.9 02/23/2022    MONOPCT 11.6 02/23/2022    BASOPCT 0.3 02/23/2022    MONOSABS 1.10 02/23/2022    LYMPHSABS 1.89 02/23/2022    EOSABS 0.30 02/23/2022    BASOSABS 0.03 02/23/2022     CMP:    Lab Results   Component Value Date     02/27/2022    K 3.8 02/27/2022    K 4.1 02/23/2022     02/27/2022    CO2 27 02/27/2022    BUN 32 02/27/2022    CREATININE 2.6 02/27/2022    GFRAA 29 02/27/2022    LABGLOM 24 02/27/2022    GLUCOSE 28 02/27/2022    PROT 6.9 01/21/2022    LABALBU 3.5 02/25/2022    CALCIUM 8.3 02/27/2022    BILITOT 0.6 01/21/2022    ALKPHOS 127 01/21/2022    AST 32 01/21/2022    ALT 42 01/21/2022          Assessment:   1. Bilateral pleural effusion  2. Volume overload  3. CKD  4. Anasarca  5. Diabetes  6. Chronic hypoxemic respiratory insufficiency  7. SPEEDY     Plan:   1. We will obtain PA and lateral chest x-ray tomorrow. 2. Continue to wean FiO2 as tolerated. Currently on 3 L nasal cannula. Patient was not requiring oxygen prior to his initial admission in January. PFTs have previously been ordered however these have not been obtained. Would recommend full PFTs as outpatient after discharge. 3. Recommend euglycemia  4. Continue home CPAP  5.  Diuretics as per nephrology     Thank you for the consult we will continue to follow           Abiola Borja DO   Pulmonary, Critical Care and Sleep Medicine

## 2022-02-27 NOTE — PROGRESS NOTES
[facility]   Nephrology Progress Note  Patient's Name: Landon Newton      From consult note 2/24/22- History of Present Illness:    Landon Newton is a 65 y.o. male with a past medical history of CHF, COPD, diabetes mellitus, hypertension, and polio. He presented to the ED on 2/23 with complaints of shortness of breath. Per the ED provider note, patient was also noticed increased leg swelling and that the patient wears 4 L of oxygen at home. Vital signs at presentation to the ED include temperature 98.4, respirations 18, pulse 112, /96, and he was 99% on 4 L O2. Lab data on presentation to the ED include CO2 30, BUN 33, creatinine 2.2, hemoglobin 10.9, and proBNP 1478. We were consulted to see the patient for CKD. Patient is known to our service, and had an office visit in June 2021 with Dr. Nazario Don. Baseline creatinine from January 2022 is 2.5-2.8. At present, patient was seen and examined. He is currently sitting up in a chair. He reports that he was having swelling and shortness of breath and that is what brought him into the hospital.  He reports that prior to admission his appetite has been okay. Overall he said he feels fine, except that his legs feel tight. He denies any current chest pain or shortness of breath. No nausea or abdominal pain. He denies any headaches or dizziness. He denies use of NSAIDs.    2/27/22- awake. He has been having issues with hypoglycemia today making him feel poor.        Past Medical History:   Diagnosis Date    Acid reflux     Agent orange exposure     Congestive heart failure (CHF) (HCC)     COPD (chronic obstructive pulmonary disease) (HCC)     Depression     Diabetes mellitus (HCC)     Hyperlipidemia     Hypertension     [facility] infarction) ([facility] [address]     Polio     Sleep apnea      Past Surgical History:   Procedure Laterality Date    CARDIAC PACEMAKER PLACEMENT      CORONARY ANGIOPLASTY WITH STENT PLACEMENT      PACEMAKER PLACEMENT History reviewed. No pertinent family history. reports that he has quit smoking. He has never used smokeless tobacco. He reports that he does not drink alcohol and does not use drugs. Allergies:  Penicillins    Current Medications:    acetaminophen (TYLENOL) tablet 650 mg, Q4H PRN  aspirin EC tablet 81 mg, Daily  atorvastatin (LIPITOR) tablet 40 mg, Daily  vitamin D (CHOLECALCIFEROL) tablet 2,000 Units, Daily  vitamin B-12 (CYANOCOBALAMIN) tablet 1,000 mcg, Daily  ezetimibe (ZETIA) tablet 10 mg, Daily  folic acid (FOLVITE) tablet 1 mg, Daily  guaiFENesin tablet 400 mg, 4x Daily PRN  insulin glargine-yfgn (SEMGLEE-YFGN) injection vial 50 Units, Nightly  loperamide (IMODIUM) capsule 2 mg, 4x Daily PRN  melatonin tablet 3 mg, Nightly  metoprolol succinate (TOPROL XL) extended release tablet 25 mg, BID  pantoprazole (PROTONIX) tablet 40 mg, QAM AC  ascorbic acid (VITAMIN C) tablet 500 mg, BID  zinc sulfate (ZINCATE) capsule 50 mg, Daily  insulin lispro (HUMALOG) injection vial 0-6 Units, TID WC  insulin lispro (HUMALOG) injection vial 0-3 Units, Nightly  glucose (GLUTOSE) 40 % oral gel 15 g, PRN  dextrose 50 % IV solution, PRN  glucagon (rDNA) injection 1 mg, PRN  dextrose 5 % solution, PRN  ipratropium-albuterol (DUONEB) nebulizer solution 1 ampule, Q4H WA  heparin (porcine) injection 5,000 Units, Q8H  bumetanide (BUMEX) 12.5 mg in sodium chloride 0.9 % 125 mL infusion, Continuous    Review of Systems:   Pertinent items are noted in HPI. Physical exam:  Vitals:    02/27/22 0825   BP: 109/74   Pulse: 91   Resp: 18   Temp: 97.3 °F (36.3 °C)   SpO2: 99%     General: Awake, alert, no acute distress, sitting up in a chair  Neck: No JVD noted  Lungs: diminished   CV: S1 S2 no S3 or rub  Abd: Rounded, soft, nontender, nondistended. Active bowel sounds  Skin: Warm and dry.   No rash on exposed extremities  Ext: 2+ BLE edema  Neuro: Awake, alert, answers questions appropriately  Data:   Labs:  Lab Results Component Value Date     2022     2022     2022    K 3.8 2022    K 3.9 2022    K 4.2 2022     2022    CO2 27 2022    CO2 26 2022    CO2 29 2022    CREATININE 2.6 (H) 2022    CREATININE 2.4 (H) 2022    CREATININE 2.3 (H) 2022    BUN 32 (H) 2022    BUN 32 (H) 2022    BUN 33 (H) 2022    GLUCOSE 28 (LL) 2022    GLUCOSE 65 (L) 2022    GLUCOSE 109 (H) 2022    PHOS 4.4 2022    PHOS 4.5 2022    PHOS 4.1 2022    WBC 11.1 2022    WBC 8.4 2022    WBC 8.2 2022    HGB 11.7 (L) 2022    HGB 11.5 (L) 2022    HGB 10.9 (L) 2022    HCT 38.1 2022    HCT 37.2 2022    HCT 35.8 (L) 2022    MCV 92.7 2022     2022     Imaging:  XR CHEST PORTABLE    Result Date: 2022  EXAMINATION: ONE XRAY VIEW OF THE CHEST 2022 4:49 pm COMPARISON: 2022 HISTORY: ORDERING SYSTEM PROVIDED HISTORY: shortness of breath TECHNOLOGIST PROVIDED HISTORY: Reason for exam:->shortness of breath What reading provider will be dictating this exam?->[facility] FINDINGS: There is a large opacity seen within the right lung base. There is a small right pleural effusion. The left upper lobe is clear. There is a small left pleural effusion. The cardiac silhouette is within normals. There is a dual lead cardiac pacer on the left. 1. Large opacity within the right lung base which could represent pneumonia and or atelectasis. 2. Moderate size right pleural effusion. 3. Small left pleural effusion. 4. CT of the thorax is recommended for further evaluation.      US DUP LOWER EXTREMITIES BILATERAL VENOUS    Result Date: 2022  Patient MRN:  [id number] : 1945 Age: 65 years Gender: Male Order Date:  2022 5:28 PM EXAM: US DUP LOWER EXTREMITIES BILATERAL VENOUS NUMBER OF IMAGES:  47 INDICATION:  leg swelling leg swelling What reading provider will be dictating this exam?->[facility] COMPARISON: None Within the visualized vessels, there is no evidence for deep venous thrombosis There is good compressibility, there is good augmentation, there is good color flow. Within the visualized vessels there is no evidence for deep venous thrombosis     Assessment/ Plans:    1. CKD stage IV  due to diabetes mellitus and hypertension  Baseline creatinine from January 2022 is 2.5-2.8  Creatinine at baseline   On Bumex drip  UOP 1500 ml past 24 hours    2. HFpEF  ECHO 1/2022 showed EF > 70%  On Bumex drip increased 2/26  Cardiology following    3. Hypertension  BP at goal<130/80    4. Diabetes mellitus  On insulin lispro and insulin glargine  Management per primary    5. Pleural effusion  CXR showed moderate size right pleural effusion and small left pleural effusion  Pulmonology following   No plans for thoracentesis    6. Hypoglycemia-  Blood glucose on am labs 28   Treated and recheck [facility]     Patient seen and examined all key components of the physical performed independently , case discussed with NP, all pertinent labs and radiologic tests personally reviewed agree with above.     Still with significant peripheral edema, will add metolazone to current diuretic regimen    Zane Magallanes MD

## 2022-02-27 NOTE — PROGRESS NOTES
Progress Note  Date:2022       Room:6405/6405-  Patient Alaina Bell     YOB: 1945     Age:77 y.o. Patient says breathing is improved today. Subjective    Subjective:  Symptoms:  Improved. He reports shortness of breath. No chest pain. Diet:  Adequate intake. Activity level: Impaired due to weakness. Pain:  He reports no pain. Review of Systems   Constitutional: Negative for activity change and fever. HENT: Negative for congestion. Respiratory: Positive for shortness of breath. Cardiovascular: Positive for leg swelling. Negative for chest pain. Gastrointestinal: Negative for abdominal pain. Neurological: Negative for dizziness. Psychiatric/Behavioral: Negative for agitation. Objective         Vitals Last 24 Hours:  TEMPERATURE:  Temp  Av.6 °F (36.4 °C)  Min: 97.3 °F (36.3 °C)  Max: 97.7 °F (36.5 °C)  RESPIRATIONS RANGE: Resp  Av  Min: 18  Max: 21  PULSE OXIMETRY RANGE: SpO2  Av.5 %  Min: 95 %  Max: 99 %  PULSE RANGE: Pulse  Av.8  Min: 91  Max: 104  BLOOD PRESSURE RANGE: Systolic (46WJO), QFR:971 , Min:92 , AHS:491   ; Diastolic (69FUC), YOU:95, Min:70, Max:97    I/O (24Hr): Intake/Output Summary (Last 24 hours) at 2022 1123  Last data filed at 2022 0474  Gross per 24 hour   Intake 240 ml   Output 1500 ml   Net -1260 ml     Objective:  General Appearance:  Comfortable. Vital signs: (most recent): Blood pressure 109/74, pulse 91, temperature 97.3 °F (36.3 °C), temperature source Axillary, resp. rate 18, height 5' 9\" (1.753 m), weight 278 lb (126.1 kg), SpO2 99 %. No fever. Lungs:  Normal effort and normal respiratory rate. Breath sounds clear to auscultation. Heart: Normal rate. Regular rhythm. S1 normal and S2 normal.    Extremities: There is local swelling.       Labs/Imaging/Diagnostics    Labs:  CBC:  Recent Labs     22  0453 22  0532 22  0436   WBC 8.2 8.4 11.1   RBC 3.82 3.98 4.11 HGB 10.9* 11.5* 11.7*   HCT 35.8* 37.2 38.1   MCV 93.7 93.5 92.7   RDW 16.7* 16.7* 16.8*    259 322     CHEMISTRIES:  Recent Labs     22  0453 22  0532 22  0436    142 144   K 4.2 3.9 3.8    102 101   CO2 29 26 27   BUN 33* 32* 32*   CREATININE 2.3* 2.4* 2.6*   GLUCOSE 109* 65* 28*   PHOS  --   --  4.4   MG  --   --  2.2     PT/INR:No results for input(s): PROTIME, INR in the last 72 hours. APTT:No results for input(s): APTT in the last 72 hours. LIVER PROFILE:No results for input(s): AST, ALT, BILIDIR, BILITOT, ALKPHOS in the last 72 hours. Imaging Last 24 Hours:  XR CHEST PORTABLE    Result Date: 2022  EXAMINATION: ONE XRAY VIEW OF THE CHEST 2022 4:49 pm COMPARISON: 2022 HISTORY: ORDERING SYSTEM PROVIDED HISTORY: shortness of breath TECHNOLOGIST PROVIDED HISTORY: Reason for exam:->shortness of breath What reading provider will be dictating this exam?->[facility] FINDINGS: There is a large opacity seen within the right lung base. There is a small right pleural effusion. The left upper lobe is clear. There is a small left pleural effusion. The cardiac silhouette is within normals. There is a dual lead cardiac pacer on the left. 1. Large opacity within the right lung base which could represent pneumonia and or atelectasis. 2. Moderate size right pleural effusion. 3. Small left pleural effusion. 4. CT of the thorax is recommended for further evaluation.      US DUP LOWER EXTREMITIES BILATERAL VENOUS    Result Date: 2022  Patient MRN:  [id number] : 1945 Age: 65 years Gender: Male Order Date:  2022 5:28 PM EXAM: US DUP LOWER EXTREMITIES BILATERAL VENOUS NUMBER OF IMAGES:  52 INDICATION:  leg swelling leg swelling What reading provider will be dictating this exam?->[facility] COMPARISON: None Within the visualized vessels, there is no evidence for deep venous thrombosis There is good compressibility, there is good augmentation, there is good color flow.     Within the visualized vessels there is no evidence for deep venous thrombosis     Assessment//Plan           Hospital Problems           Last Modified POA    * (Principal) CHF (congestive heart failure), NYHA class I, acute on chronic, combined ([facility] [address] 2/23/2022 Yes        Assessment:  (CHF/ volume overload  Pleural effusion  Acute renal insfficnecy  DM  COPD  HTN  Hyperlipidemia           Plan  IV Abx. Cautiously diurese  Monitor labs and exam.  Continue rest of meds. Renal, Pulmonary, and Cardiology following. Albumin as needed. ).

## 2022-02-28 NOTE — PROGRESS NOTES
Occupational Therapy  OCCUPATIONAL THERAPY BEDSIDE TREATMENT NOTE   [facility] [address] [facility]     Date:2022  Patient Name: Jody Ellison  MRN: [id number]  : 1945  [facility]: [facility]       Arrived to patients room. Patient too fatigue to participate in therapy from working with physical therapy earlier this date, walking to the bathroom, and being transported to/from radiology. Patient required re-positioning in bed with pt able to assist with pulling himself up in bed. Patient properly positioned in semi-supine with call light near and tray table next to bed. Will continue to attempt to work with patient during hospital stay.        Brittney Rees 46, [address]

## 2022-02-28 NOTE — PROGRESS NOTES
Physical Therapy   Treatment Note     Name: Deysi Dickson  : 1945  MRN: [id number]      Date of Service: 2022    Evaluating PT:  Maira Pierson. Snehal Krishna, [id number]    Room #:  7232/3166-W  Diagnosis:  CHF (congestive heart failure), NYHA class I, acute on chronic, combined ([facility] [I50.43]  PMHx/PSHx:     has a past medical history of Acid reflux, Agent orange exposure, Congestive heart failure (CHF) ([facility]), COPD (chronic obstructive pulmonary disease) ([facility] Depression, Diabetes mellitus ([facility] Hyperlipidemia, Hypertension, MI (myocardial infarction) ([facility] Polio, and Sleep apnea. has a past surgical history that includes Coronary angioplasty with stent; Cardiac pacemaker placement; and pacemaker placement. Precautions:  Fall risk, O2 (4 lpm @ home)    SUBJECTIVE:    Pt lives alone in a 1 story home with 1 stair to enter. Prior to his initial admission and subsequent rehab in 2022 the pt reports he was Independent with all mobility with no AD, since discharge from rehab the pt has primarily been using a manual WC for mobility. OBJECTIVE:   Initial Evaluation  Date: 22 Treatment  Date: 22 Short Term/ Long Term   Goals   AM-PAC 6 Clicks  83/77    Was pt agreeable to Eval/treatment? Yes Yes    Does pt have pain?  2/10 in R hallux and arch B foot pain    Bed Mobility  Rolling: Supervision  Supine to sit: Supervision  Sit to supine: NT  Scooting: Supervision Rolling: SBA  Supine to sit: Mariela  Sit to supine: NT  Scooting: SBA Independent   Transfers Sit to stand: Mariela  Stand to sit: Mariela  Stand pivot: Mariela with [facility] Sit to stand: ModA  Stand to sit: Mariela  Stand pivot: ModA with [facility] Modified Independent   Ambulation    10 feet with [facility] with Mariela NT, pt unable to ambulate forward >50 feet with [facility] with Modified [place]   Stair negotiation: ascended and descended  NT NT 1 step with [facility] rail with SBA   BLE ROM WFL WFL    BLE Strength RLE: grossly 4/5  LLE: grossly 4/5 with exception of knee that was 3+/5 due to pain RLE: grossly 4/5  LLE: grossly 4/5 with exception of knee that was 3+/5 due to pain    Balance Sitting: Independent  Standing: Mariela with AD Sitting: Independent  Standing: Mariela with AD Independent     Pt is A & O x 4  Sensation:  Denied numbness/tingling  Edema:  2+ pitting edema in BLE    Therapeutic Exercises:    Sit<>stand x3 with Mariela from elevated position and ModA from low seat  Standing balance activities: performed static and dynamic standing balance activities within AD with single and BUE support with reaches within and outside [place] with Mariela    Patient education  Pt educated on progressive activity, transfer technique, bed mobility strategy. Patient response to education:   Pt verbalized understanding Pt demonstrated skill Pt requires further education in this area   Yes Yes Reinforce      ASSESSMENT:    Comments: The pt was assisted with bed mobility, unable to come to a sitting position unassisted, increased time required for all activity. The pt was unable to stand initially, elevated bed, with repetition the pt was able to stand from a lower seat but with increased assist required. The pt was unable to ambulate forward, was able to complete standing balance activities, the pt was able to perform a stand pivot transfer to a bedside chair and was left with call light in reach and resting comfortably. Treatment:  Patient practiced and was instructed in the following treatment:     Bed mobility training - pt given verbal and tactile cues to facilitate proper sequencing and safety during rolling and supine>sit as well as provided with physical assistance to complete task   Transfer training: verbal cues for hand placement during sit to stand transfer and assistance for anterior weight shift in order to facilitate initiation of the stand.    Standing balance activities: performed static and dynamic standing balance activities within AD with single and BUE support in order to progress safety with standing activities as well as improve independence with standing ADLs.  Skilled repositioning in chair to promote improved posture and improved cardiorespiratory function. Pt positioned in chair to ensure skin/joint protection. PLAN:    Patient is making good progress towards established goals. Will continue with current POC. Time in  0825  Time out  0910    Total Treatment Time  45 minutes     CPT codes:  [] Gait training 79227 0 minutes  [] Manual therapy 45259 0 minutes  [x] Therapeutic activities 28268 45 minutes  [] Therapeutic exercises 99616 0 minutes  [] Neuromuscular reeducation 65225 0 minutes    Omar Corral.  [facility], [facility] Alic[facility]  number:  [phone]

## 2022-02-28 NOTE — CARE COORDINATION
Care Coordination: spoke with pt at bedside to update transition of care upon discharge. States he continues to get weaker, feels he may need sergo. He adamantly does not want to go to [facility] as he was there in past, felt he did not get therapy as they had promised. He lives in [place] and would like [facility] or [facility], but feels [facility] would be closer to home. I have messaged [facility] for [facility], awaiting response.     Jovanni Serna

## 2022-02-28 NOTE — PROGRESS NOTES
[facility]   Nephrology Progress Note  Patient's Name: Snehal Thakur      From consult note 2/24/22- History of Present Illness:    Triston Del Rio is a 65 y.o. male with a past medical history of CHF, COPD, diabetes mellitus, hypertension, and polio. He presented to the ED on 2/23 with complaints of shortness of breath. Per the ED provider note, patient was also noticed increased leg swelling and that the patient wears 4 L of oxygen at home. Vital signs at presentation to the ED include temperature 98.4, respirations 18, pulse 112, /96, and he was 99% on 4 L O2. Lab data on presentation to the ED include CO2 30, BUN 33, creatinine 2.2, hemoglobin 10.9, and proBNP 1478. We were consulted to see the patient for CKD. Patient is known to our service, and had an office visit in June 2021 with Dr. Melba Koenig. Baseline creatinine from January 2022 is 2.5-2.8. At present, patient was seen and examined. He is currently sitting up in a chair. He reports that he was having swelling and shortness of breath and that is what brought him into the hospital.  He reports that prior to admission his appetite has been okay. Overall he said he feels fine, except that his legs feel tight. He denies any current chest pain or shortness of breath. No nausea or abdominal pain. He denies any headaches or dizziness. He denies use of NSAIDs. \"    Subjective:    2/28: Patient seen and examined. He has family at bedside. He denies any current nausea or vomiting. Denies any chest pain. Does report some abdominal pain.     Past Medical History:   Diagnosis Date    Acid reflux     Agent orange exposure     Congestive heart failure (CHF) (HCC)     COPD (chronic obstructive pulmonary disease) (HCC)     Depression     Diabetes mellitus (HCC)     Hyperlipidemia     Hypertension     MI (my[facility] infarction) ([facility] [address]     Polio     Sleep apnea      Past Surgical History:   Procedure Laterality Date    CARDIAC PACEMAKER PLACEMENT      CORONARY ANGIOPLASTY WITH STENT PLACEMENT      PACEMAKER PLACEMENT       History reviewed. No pertinent family history. reports that he has quit smoking. He has never used smokeless tobacco. He reports that he does not drink alcohol and does not use drugs. Allergies:  Penicillins    Current Medications:    metOLazone (ZAROXOLYN) tablet 5 mg, Daily  acetaminophen (TYLENOL) tablet 650 mg, Q4H PRN  aspirin EC tablet 81 mg, Daily  atorvastatin (LIPITOR) tablet 40 mg, Daily  vitamin D (CHOLECALCIFEROL) tablet 2,000 Units, Daily  vitamin B-12 (CYANOCOBALAMIN) tablet 1,000 mcg, Daily  ezetimibe (ZETIA) tablet 10 mg, Daily  folic acid (FOLVITE) tablet 1 mg, Daily  guaiFENesin tablet 400 mg, 4x Daily PRN  insulin glargine-yfgn (SEMGLEE-YFGN) injection vial 50 Units, Nightly  loperamide (IMODIUM) capsule 2 mg, 4x Daily PRN  melatonin tablet 3 mg, Nightly  metoprolol succinate (TOPROL XL) extended release tablet 25 mg, BID  pantoprazole (PROTONIX) tablet 40 mg, QAM AC  ascorbic acid (VITAMIN C) tablet 500 mg, BID  zinc sulfate (ZINCATE) capsule 50 mg, Daily  insulin lispro (HUMALOG) injection vial 0-6 Units, TID WC  insulin lispro (HUMALOG) injection vial 0-3 Units, Nightly  glucose (GLUTOSE) 40 % oral gel 15 g, PRN  dextrose 50 % IV solution, PRN  glucagon (rDNA) injection 1 mg, PRN  dextrose 5 % solution, PRN  ipratropium-albuterol (DUONEB) nebulizer solution 1 ampule, Q4H WA  heparin (porcine) injection 5,000 Units, Q8H  bumetanide (BUMEX) 12.5 mg in sodium chloride 0.9 % 125 mL infusion, Continuous    Review of Systems:   Pertinent items are noted in HPI. Physical exam:  Vitals:    02/27/22 2045   BP: 99/72   Pulse: 96   Resp: 18   Temp: 98.8 °F (37.1 °C)   SpO2: 94%     General: Awake, alert, no acute distress  Neck: No JVD noted  Lungs: Clear bilaterally upper, diminished in bases bilaterally. Unlabored  CV: regular rate and rhythm.   No rub  Abd: Rounded, soft, nontender, nondistended. Active bowel sounds  Skin: Warm and dry. No rash on exposed extremities  Ext: 3+ BLE edema  Neuro: Awake, alert, answers questions appropriately  Data:   Labs:  Lab Results   Component Value Date     2022     2022     2022    K 3.6 2022    K 3.8 2022    K 3.9 2022     2022    CO2 25 2022    CO2 27 2022    CO2 26 2022    CREATININE 2.5 (H) 2022    CREATININE 2.6 (H) 2022    CREATININE 2.4 (H) 2022    BUN 35 (H) 2022    BUN 32 (H) 2022    BUN 32 (H) 2022    GLUCOSE 70 (L) 2022    GLUCOSE 28 (LL) 2022    GLUCOSE 65 (L) 2022    PHOS 4.4 2022    PHOS 4.5 2022    PHOS 4.1 2022    WBC 10.1 2022    WBC 11.1 2022    WBC 8.4 2022    HGB 11.5 (L) 2022    HGB 11.7 (L) 2022    HGB 11.5 (L) 2022    HCT 38.0 2022    HCT 38.1 2022    HCT 37.2 2022    MCV 93.4 2022     2022     Imaging:  XR CHEST PORTABLE    Result Date: 2022  EXAMINATION: ONE XRAY VIEW OF THE CHEST 2022 4:49 pm COMPARISON: 2022 HISTORY: ORDERING SYSTEM PROVIDED HISTORY: shortness of breath TECHNOLOGIST PROVIDED HISTORY: Reason for exam:->shortness of breath What reading provider will be dictating this exam?->[facility] FINDINGS: There is a large opacity seen within the right lung base. There is a small right pleural effusion. The left upper lobe is clear. There is a small left pleural effusion. The cardiac silhouette is within normals. There is a dual lead cardiac pacer on the left. 1. Large opacity within the right lung base which could represent pneumonia and or atelectasis. 2. Moderate size right pleural effusion. 3. Small left pleural effusion. 4. CT of the thorax is recommended for further evaluation.      US DUP LOWER EXTREMITIES BILATERAL VENOUS    Result Date: 2022  Patient MRN:  [id number] :

## 2022-02-28 NOTE — PROGRESS NOTES
[facility]  Department of Pulmonary, Critical Care and Sleep Medicine  [address] [facility]  Department of Internal Medicine  Progress Note    SUBJECTIVE:    He does state that he continues to have pain in his feet likely secondary to the swelling. He is still quite short of breath with exertion. OBJECTIVE:  Vitals:    02/27/22 1546 02/27/22 1743 02/27/22 1948 02/27/22 2045   BP:  97/70  99/72   Pulse:  92  96   Resp:  18  18   Temp:  97.5 °F (36.4 °C)  98.8 °F (37.1 °C)   TempSrc:  Oral  Oral   SpO2: 93% 97% 96% 94%   Weight:       Height:         Constitutional: Alert,     EENT: EOMI YOSEF. MMM. No icterus. No thrush. Neck: No thyromegaly. No JVD on my exam.  Respiratory: Breath sounds are diminished without use of accessory muscles. Cardiovascular: Regular, No murmur. No rubs. Pulses:  Equal bilaterally. Abdomen: Soft without organomegaly. No rebound, rigidity. No guarding. Lymphatic: No lymphadenopathy. Musculoskeletal: Without weakness or gross deficits  Extremities: 3+ edema bilaterally. Feet still quite tight  Skin:  Warm and dry. No skin rashes. Neurological/Psychiatric: No acute psychosis. Cranial nerves are intact. DATA:    Monitor Strips:  Reviewed & discusses with technical team. No changes noted.     RADIOLOGY:  Films were read/reviewed/discussed with radiology shows effusion and heart failure    CBC with Differential:    Lab Results   Component Value Date    WBC 10.1 02/28/2022    RBC 4.07 02/28/2022    HGB 11.5 02/28/2022    HCT 38.0 02/28/2022     02/28/2022    MCV 93.4 02/28/2022    MCH 28.3 02/28/2022    MCHC 30.3 02/28/2022    RDW 16.8 02/28/2022    LYMPHOPCT 19.9 02/23/2022    MONOPCT 11.6 02/23/2022    BASOPCT 0.3 02/23/2022    MONOSABS 1.10 02/23/2022    LYMPHSABS 1.89 02/23/2022    EOSABS 0.30 02/23/2022    BASOSABS 0.03 02/23/2022     CMP:    Lab Results   Component Value Date     02/28/2022    K 3.6 02/28/2022    K 4.1 02/23/2022     02/28/2022    CO2 25 02/28/2022    BUN 35 02/28/2022    CREATININE 2.5 02/28/2022    GFRAA 30 02/28/2022    LABGLOM 25 02/28/2022    GLUCOSE 70 02/28/2022    PROT 6.9 01/21/2022    LABALBU 3.5 02/25/2022    CALCIUM 8.8 02/28/2022    BILITOT 0.6 01/21/2022    ALKPHOS 127 01/21/2022    AST 32 01/21/2022    ALT 42 01/21/2022          Assessment:   1. Bilateral pleural effusion  2. Volume overload  3. CKD  4. Anasarca  5. Diabetes  6. Chronic hypoxemic respiratory insufficiency  7. SPEEDY     Plan:   1. We will obtain PA and lateral chest x-ray =Slight increase seen in size of the right pleural effusion, now moderate in size and small in size on the left.  Ill-defined opacification again seen within the lung bases bilaterally, right greater than left. 2. Continue to wean FiO2 as tolerated. Currently on 3 L nasal cannula. Patient was not requiring oxygen prior to his initial admission in January. 3. PFTs have previously been ordered however these have not been obtained. Would recommend full PFTs as outpatient after discharge. 4. Recommend euglycemia  5. Continue home CPAP  6.  Diuretics as per nephrology    TJB  Pulmonary, Critical Care and Sleep Medicine

## 2022-02-28 NOTE — PROGRESS NOTES
Progress Note  Date:2022       SMFY:[id number]  Timothy Marks     YOB: 1945     Age:77 y.o. Patient says breathing is improved today. Subjective    Subjective:  Symptoms:  Improved. He reports shortness of breath. No chest pain. Diet:  Adequate intake. Activity level: Impaired due to weakness. Pain:  He reports no pain. Review of Systems   Constitutional: Negative for activity change and fever. HENT: Negative for congestion. Respiratory: Positive for shortness of breath. Cardiovascular: Positive for leg swelling. Negative for chest pain. Gastrointestinal: Negative for abdominal pain. Neurological: Negative for dizziness. Psychiatric/Behavioral: Negative for agitation. Objective         Vitals Last 24 Hours:  TEMPERATURE:  Temp  Av.9 °F (36.6 °C)  Min: 97.3 °F (36.3 °C)  Max: 98.8 °F (37.1 °C)  RESPIRATIONS RANGE: Resp  Av  Min: 18  Max: 18  PULSE OXIMETRY RANGE: SpO2  Av.8 %  Min: 93 %  Max: 99 %  PULSE RANGE: Pulse  Av  Min: 91  Max: 96  BLOOD PRESSURE RANGE: Systolic (16HLE), JEC:988 , Min:97 , QPY:560   ; Diastolic (66PEF), FPH:93, Min:70, Max:74    I/O (24Hr): Intake/Output Summary (Last 24 hours) at 2022 6108  Last data filed at 2022 4581  Gross per 24 hour   Intake 360 ml   Output 4450 ml   Net -4090 ml     Objective:  General Appearance:  Comfortable. Vital signs: (most recent): Blood pressure 99/72, pulse 96, temperature 98.8 °F (37.1 °C), temperature source Oral, resp. rate 18, height 5' 9\" (1.753 m), weight 278 lb (126.1 kg), SpO2 94 %. No fever. Lungs:  Normal effort and normal respiratory rate. Breath sounds clear to auscultation. Heart: Normal rate. Regular rhythm. S1 normal and S2 normal.    Extremities: There is local swelling.       Labs/Imaging/Diagnostics    Labs:  CBC:  Recent Labs     22  0532 22  0436   WBC 8.4 11.1   RBC 3.98 4.11   HGB 11.5* 11.7*   HCT 37.2 38.1   MCV 93.5 92.7   RDW 16.7* 16.8*    322     CHEMISTRIES:  Recent Labs     22  0532 22  0436    144   K 3.9 3.8    101   CO2 26 27   BUN 32* 32*   CREATININE 2.4* 2.6*   GLUCOSE 65* 28*   PHOS  --  4.4   MG  --  2.2     PT/INR:No results for input(s): PROTIME, INR in the last 72 hours. APTT:No results for input(s): APTT in the last 72 hours. LIVER PROFILE:No results for input(s): AST, ALT, BILIDIR, BILITOT, ALKPHOS in the last 72 hours. Imaging Last 24 Hours:  XR CHEST PORTABLE    Result Date: 2022  EXAMINATION: ONE XRAY VIEW OF THE CHEST 2022 4:49 pm COMPARISON: 2022 HISTORY: ORDERING SYSTEM PROVIDED HISTORY: shortness of breath TECHNOLOGIST PROVIDED HISTORY: Reason for exam:->shortness of breath What reading provider will be dictating this exam?->[facility] FINDINGS: There is a large opacity seen within the right lung base. There is a small right pleural effusion. The left upper lobe is clear. There is a small left pleural effusion. The cardiac silhouette is within normals. There is a dual lead cardiac pacer on the left. 1. Large opacity within the right lung base which could represent pneumonia and or atelectasis. 2. Moderate size right pleural effusion. 3. Small left pleural effusion. 4. CT of the thorax is recommended for further evaluation. US DUP LOWER EXTREMITIES BILATERAL VENOUS    Result Date: 2022  Patient MRN:  [id number] : 1945 Age: 65 years Gender: Male Order Date:  2022 5:28 PM EXAM: US DUP LOWER EXTREMITIES BILATERAL VENOUS NUMBER OF IMAGES:  52 INDICATION:  leg swelling leg swelling What reading provider will be dictating this exam?->[facility] COMPARISON: None Within the visualized vessels, there is no evidence for deep venous thrombosis There is good compressibility, there is good augmentation, there is good color flow.      Within the visualized vessels there is no evidence for deep venous thrombosis     Assessment//Plan Hospital Problems           Last Modified POA    * (Principal) CHF (congestive heart failure), NYHA class I, acute on chronic, combined ([facility] [address] 2/23/2022 Yes        Assessment:  (CHF/ volume overload  Pleural effusion  Acute renal insfficnecy  DM  COPD  HTN  Hyperlipidemia           Plan  CXR   Cautiously diurese  Monitor labs and exam.  Continue rest of meds. Renal, Pulmonary, and Cardiology following. PT/OT  ).

## 2022-02-28 NOTE — PLAN OF CARE
Problem: OXYGENATION/RESPIRATORY FUNCTION  Goal: Patient will maintain patent airway  Outcome: Ongoing     Problem: HEMODYNAMIC STATUS  Goal: Patient has stable vital signs and fluid balance  Outcome: Ongoing     Problem: Skin Integrity:  Goal: Will show no infection signs and symptoms  Description: Will show no infection signs and symptoms  Outcome: Ongoing  Goal: Absence of new skin breakdown  Description: Absence of new skin breakdown  Outcome: Ongoing     Problem: Falls - Risk of:  Goal: Will remain free from falls  Description: Will remain free from falls  Outcome: Ongoing  Goal: Absence of physical injury  Description: Absence of physical injury  Outcome: Ongoing

## 2022-03-01 NOTE — CARE COORDINATION
Care Coordination:  I have messaged Rhonda from [facility] to see if pt was accepted to one of their facilities at discharge as I did not hear back from [facility] yesterday.  She will check on and let me know    Uriel Astudillo    Addendum: 13:24  I have messaged [facility] again checking to see if they can accept, awaiting return message    Uriel Astudillo

## 2022-03-01 NOTE — PROGRESS NOTES
Progress Note  Date:3/1/2022       [facility]:[id number]  Patient Jama Alpers     YOB: 1945     Age:77 y.o. Patient says breathing is improved today. Subjective    Subjective:  Symptoms:  Improved. He reports shortness of breath. No chest pain. Diet:  Adequate intake. Activity level: Impaired due to weakness. Pain:  He reports no pain. Review of Systems   Constitutional: Negative for activity change and fever. HENT: Negative for congestion. Respiratory: Positive for shortness of breath. Cardiovascular: Positive for leg swelling. Negative for chest pain. Gastrointestinal: Negative for abdominal pain. Neurological: Negative for dizziness. Psychiatric/Behavioral: Negative for agitation. Objective         Vitals Last 24 Hours:  TEMPERATURE:  Temp  Av.1 °F (36.7 °C)  Min: 97.8 °F (36.6 °C)  Max: 98.4 °F (36.9 °C)  RESPIRATIONS RANGE: Resp  Av.7  Min: 18  Max: 20  PULSE OXIMETRY RANGE: SpO2  Av.4 %  Min: 93 %  Max: 96 %  PULSE RANGE: Pulse  Av  Min: 84  Max: 95  BLOOD PRESSURE RANGE: Systolic (51NJK), UJI:101 , Min:110 , GWK:255   ; Diastolic (21CBS), OXU:45, Min:68, Max:72    I/O (24Hr): Intake/Output Summary (Last 24 hours) at 3/1/2022 0709  Last data filed at 3/1/2022 0356  Gross per 24 hour   Intake 480 ml   Output 4575 ml   Net -4095 ml     Objective:  General Appearance:  Comfortable. Vital signs: (most recent): Blood pressure 110/68, pulse 95, temperature 98.4 °F (36.9 °C), temperature source Oral, resp. rate 20, height 5' 9\" (1.753 m), weight 278 lb 14.1 oz (126.5 kg), SpO2 94 %. No fever. Lungs:  Normal effort and normal respiratory rate. Breath sounds clear to auscultation. Heart: Normal rate. Regular rhythm. S1 normal and S2 normal.    Extremities: There is local swelling.       Labs/Imaging/Diagnostics    Labs:  CBC:  Recent Labs     22  0436 22  0443 22  0427   WBC 11.1 10.1 10.1   RBC 4.11 4.07 4.16   HGB 11.7* 11.5* 11.9*   HCT 38.1 38.0 38.2   MCV 92.7 93.4 91.8   RDW 16.8* 16.8* 16.9*    282 259     CHEMISTRIES:  Recent Labs     22  0436 22  0443 22  0427    144 140   K 3.8 3.6 3.6    100 96*   CO2 27 25 32*   BUN 32* 35* 38*   CREATININE 2.6* 2.5* 2.4*   GLUCOSE 28* 70* 65*   PHOS 4.4  --   --    MG 2.2  --   --      PT/INR:No results for input(s): PROTIME, INR in the last 72 hours. APTT:No results for input(s): APTT in the last 72 hours. LIVER PROFILE:No results for input(s): AST, ALT, BILIDIR, BILITOT, ALKPHOS in the last 72 hours. Imaging Last 24 Hours:  XR CHEST PORTABLE    Result Date: 2022  EXAMINATION: ONE XRAY VIEW OF THE CHEST 2022 4:49 pm COMPARISON: 2022 HISTORY: ORDERING SYSTEM PROVIDED HISTORY: shortness of breath TECHNOLOGIST PROVIDED HISTORY: Reason for exam:->shortness of breath What reading provider will be dictating this exam?->CRC FINDINGS: There is a large opacity seen within the right lung base. There is a small right pleural effusion. The left upper lobe is clear. There is a small left pleural effusion. The cardiac silhouette is within normals. There is a dual lead cardiac pacer on the left. 1. Large opacity within the right lung base which could represent pneumonia and or atelectasis. 2. Moderate size right pleural effusion. 3. Small left pleural effusion. 4. CT of the thorax is recommended for further evaluation.      US DUP LOWER EXTREMITIES BILATERAL VENOUS    Result Date: 2022  Patient MRN:  [id number] : 1945 Age: 65 years Gender: Male Order Date:  2022 5:28 PM EXAM: US DUP LOWER EXTREMITIES BILATERAL VENOUS NUMBER OF IMAGES:  52 INDICATION:  leg swelling leg swelling What reading provider will be dictating this exam?->[facility] COMPARISON: None Within the visualized vessels, there is no evidence for deep venous thrombosis There is good compressibility, there is good augmentation, there is good color

## 2022-03-01 NOTE — DISCHARGE INSTR - DIET
Good nutrition is important when healing from an illness, injury, or surgery. Follow any nutrition recommendations given to you during your hospital stay. If you were given an oral nutrition supplement while in the hospital, continue to take this supplement at home. You can take it with meals, in-between meals, and/or before bedtime. These supplements can be purchased at most local grocery stores, pharmacies, and chain super-stores. If you have any questions about your diet or nutrition, call the hospital and ask for the dietitian. Heart Failure Nutrition Therapy  This nutrition therapy will help you feel better and support your heart. This plan focuses on:  Limiting sodium in your diet. Salt (sodium) makes your body hold water. When your body holds too much water, you can feel shortness of breath and swelling. You can prevent these symptoms by eating less salt. Limiting fluid in your diet. For some patients, drinking too much fluid can make heart failure worse. It can cause symptoms such as shortness of breath and swelling. Limiting fluids can help relieve some of your symptoms. Managing your weight. Your registered dietitian nutritionist (RDN) can help you choose a healthy weight for your body type. You can achieve these goals by:  Reading food labels to keep track of how much sodium is in the foods you eat. Limiting foods that are high in sodium. Checking your weight to make sure you're not retaining too much fluid. Reading the Food Label: How Much Sodium Is Too Much? The nutrition plan for heart failure usually limits the sodium you get from food and drinks to 2,000 milligrams per day. Salt is the main source of sodium. Read the nutrition label to find out how much sodium is in 1 serving of a food. Select foods with 140 milligrams of sodium or less per serving. Foods with more than 300 milligrams of sodium per serving may not fit into a reduced-sodium meal plan. Check serving sizes.  If you eat more than 1 serving, you will get more sodium than the amount listed. Cutting Back on Sodium  Avoid processed foods. Eat more fresh foods. Fresh and frozen fruits and vegetables without added juices or sauces are naturally low in sodium. Fresh meats are lower in sodium than processed meats, such as ricardo, sausage, and hot dogs. Read the nutrition label or ask your  to help you find a fresh meat that is low in sodium. Eat less salt, at the table and when cooking. Just 1 teaspoon of table salt has 2,300 milligrams of sodium. Leave the salt out of recipes for pasta, casseroles, and soups. Ask your RDN how to cook your favorite recipes without sodium. Be a smart . Look for food packages that say salt-free or sodium-free.  These items contain less than 5 milligrams of sodium per serving. Very-low-sodium products contain less than 35 milligrams of sodium per serving. Low-sodium products contain less than 140 milligrams of sodium per serving. Unsalted or no added salt products may still be high in sodium. Check the nutrition label. Add flavors to your food without adding sodium. Try lemon juice, lime juice, fruit juice, or vinegar. Dry or fresh herbs add flavor. Try basil, bay leaf, dill, rosemary, parsley, della, dry mustard, nutmeg, thyme, and paprika. Pepper, red pepper flakes, and cayenne pepper can add spice to your meals without adding sodium. Hot sauce contains sodium, but if you use just a drop or two, it will not add up to much. Buy a sodium-free seasoning blend or make your own at home. Use caution when you eat outside your home. Restaurant foods can be very high in sodium. Ask for nutrition information. Many restaurants provide nutrition facts on their menus or websites. Let your  know that you want your food to be cooked without salt. Ask for your salad dressing and sauces to come on the side.   Fluid Restriction  Your doctor may ask you to follow a fluid restriction in addition to taking diuretics (water pills). Ask your doctor how much fluid you can have. Foods that are liquid at room temperature are considered a fluid, such as popsicles, soup, ice cream, and Jell-O. Here are some common conversions that will help you measure your fluid intake every day:  1,000 milliliters = 1 liter or 4 cups 1 fluid ounce = 30 milliliters   1 cup = 240 milliliters 2,000 milliliters = 2 liters or 8 cups   1,500 milliliters = 1½ liters or 6 cups     Weight Monitoring  Weigh yourself each day. Sudden weight gain is a sign that fluid is building up in your body. Follow these guidelines:  Weigh yourself every morning. If you gain 3 or more pounds in 1-2 days or 5 or more pounds within 1 week, call your doctor. Your doctor may adjust your medicine to get rid of the extra fluid. Talk with your doctor or RDN about what a healthy weight is for you. Talk with your doctor to find out what type of physical activity is best for you.   Foods Recommended  Food Group Recommended Foods   Grains Bread with less than 80 milligrams sodium per slice (yeast breads usually have less sodium than those made with baking soda)  Homemade bread made with reduced-sodium baking soda  Many cold cereals, especially shredded wheat and puffed rice  Oats, grits, or cream of wheat  Dry pastas, noodles, quinoa, and rice   Vegetables Fresh and frozen vegetables without added sauces, salt, or sodium  Homemade soups (salt free or low sodium)  Low-sodium or sodium-free canned vegetables and soups   Fruits Fresh and canned fruits  Dried fruits, such as raisins, cranberries, and prunes   Dairy (Milk and Milk Products) Milk or milk powder  Rice milk and soy milk  Yogurt, including Greek yogurt  Small amounts of natural, block cheese or reduced-sodium cheese (Swiss, ricotta, and fresh mozzarella are lower in sodium than others)  Regular or soft cream cheese and low-sodium cottage cheese   Protein Foods (Meat, Poultry, Fish, Beans) Fresh meats and fish  [place] [facility]) ricardo (except if packaged in a sodium solution)  Canned or packed tuna (no more than 4 ounces at 1 serving)  Dried beans and peas; edamame (fresh soybeans)  Eggs or egg beaters (if  less than 200 mg per serving)  Unsalted nuts or peanut butter   Desserts and Snacks Fresh fruit or applesauce  Luis Alberto food cake  Granola bars  Unsalted pretzels, popcorn, or nuts  Pudding or gelatin with whipped cream topping  Homemade rice-crispy treats  Vanilla wafers  Frozen fruit bars   Fats Tub or liquid margarine  Unsaturated fat oils (canola, olive, corn, sunflower, safflower, peanut)   Condiments Fresh or dried herbs; low-sodium ketchup; vinegar; lemon or lime juice; pepper; salt-free seasoning mixes and marinades (salt-free seasoning blend); simple salad dressings (vinegar and oil); salt-free sauces   Foods Not Recommended  Food Group Foods Not Recommended   Grains Breads or crackers topped with salt  Cereals (hot/cold) with more than 300 milligrams sodium per serving  Biscuits, cornbread, and other quick breads prepared with baking soda  Prepackaged bread crumbs  Self-rising flours   Vegetables Canned vegetables (unless they are salt free or low sodium)  Frozen vegetables with seasoning and sauces  Sauerkraut and pickled vegetables  Canned or dried soups (unless they are salt free or low  sodium)  Western Tegan fries and onion rings   Fruits Dried fruits preserved with sodium-containing additives   Dairy (Milk and Milk Products) Buttermilk  Processed cheeses   Cottage cheese (unless a low-sodium variety)  Feta cheese; shredded cheese (has more sodium than block cheese); singles slices and string cheese   Protein Foods (Meat, Poultry, Fish, Beans) Cured meats: ricardo, ham, sausage, pepperoni, and hot dogs  Canned meats: chili, Yue sausage, sardines, and ham  Smoked fish and meats  Frozen meals that have more than 600 milligrams sodium   Fats Salted butter or margarine   Condiments Salt, sea salt, kosher salt, onion salt, and garlic salt  Seasoning mixes containing salt (Lemon Pepper or Bouillon cubes)  Catsup or ketchup, BBQ sauce, Worcestershire and soy sauce  Salsa, pickles, olives, relish  Salad dressings: ranch, blue cheese, [place], and Western Tegan    Alcohol Check with your doctor.    Heart Failure Sample 1-Day Menu View Nutrient Info  Breakfast 1 cup regular oatmeal made with water or milk  1 cup reduced-fat (2%) milk  1 medium banana  1 slice whole wheat bread  1 tablespoon salt-free peanut butter   Morning Snack 1/2 cup dried cranberries   Lunch 3 ounces grilled chicken breast  1 cup salad greens  Olive oil and vinegar dressing (for greens)  5 unsalted or low-sodium crackers  Fruit plate with 1/4 cup strawberries  1/2 sliced orange (for fruit plate)  1 peach half (for fruit plate)   Afternoon Snack 1 ounce low-sodium turkey  1 piece whole wheat bread   Evening Meal 3 ounces herb-baked fish  1 baked potato  2 teaspoons soft margarine (trans fat-free) (for potato)  Sliced tomatoes  1/2 cup steamed spinach drizzled with lemon juice  3-inch square of radha food cake  Fresh strawberries (2) (for cake)   Evening Snack 2 tablespoons salt-free peanut butter  5 low-sodium crackers   Daily Sum  Nutrient Unit Value   Macronutrients   Energy kcal 1890   Energy kJ 7906   Protein g 95   Total lipid (fat) g 56   Carbohydrate, by difference g 270   Fiber, total dietary g 31   Sugars, total g 99   Minerals   Calcium, Ca mg 949   Iron, Fe mg 27   Sodium, Na mg 1538   Vitamins   Vitamin C, total ascorbic acid mg 118   Vitamin A, IU IU 11381   Vitamin D    Lipids   Fatty acids, total saturated g 13   Fatty acids, total monounsaturated g 22   Fatty acids, total polyunsaturated g 16   Cholesterol mg 126      Heart Failure Vegan Sample 1-Day Menu View Nutrient Info  Breakfast 1 cup oatmeal  ¼ cup walnuts  1 banana  1 cup soymilk fortified with calcium, vitamin B12, and vitamin D   Lunch 1 large whole wheat andrea  Salad made with: 1 cup chickpeas  1 cup lettuce  ½ cup cherry tomatoes  1 cup strawberries  1 tablespoon olive oil  1 tablespoon balsamic vinegar   Evening Meal ¾ cup tofu  2 teaspoons olive oil  Pinch garlic powder  1 baked potato  1 tablespoon margarine, soft, tub  ½ cup cooked spinach with:  Squeeze of lemon  1 cup soymilk fortified with calcium, vitamin B12, and vitamin D   Evening Snack 1 tablespoon peanut butter, without salt  ¾ ounce pretzels, without salt   Daily Sum  Nutrient Unit Value   Macronutrients   Energy kcal 1848   Energy kJ 7735   Protein g 74   Total lipid (fat) g 83   Carbohydrate, by difference g 223   Fiber, total dietary g 39   Sugars, total g 44   Minerals   Calcium, Ca mg 1325   Iron, Fe mg 20   Sodium, Na mg 1098   Vitamins   Vitamin C, total ascorbic acid mg 140   Vitamin A, IU IU 52689   Vitamin D    Lipids   Fatty acids, total saturated g 13   Fatty acids, total monounsaturated g 33   Fatty acids, total polyunsaturated g 32   Cholesterol mg 0      Heart Failure Vegetarian (Lacto-Ovo) Sample 1-Day Menu View Nutrient Info  Breakfast 1 cup oatmeal  ¼ cup walnuts  1 banana  1 cup fat-free milk   Lunch 1 large whole wheat andrea  Salad made with: ½ cup chickpeas  1 ounce mozzarella cheese  1 cup lettuce  ½ cup cherry tomatoes  1 cup strawberries  1 tablespoon olive oil  1 tablespoon balsamic vinegar   Evening Meal ¾ cup tofu  2 teaspoons olive oil  Pinch garlic powder  1 baked potato  1 tablespoon margarine, soft, tub  ½ cup cooked spinach with:  Squeeze of lemon  1 cup fat-free milk   Evening Snack 1 tablespoon peanut butter, without salt  1 apple   Daily Sum  Nutrient Unit Value   Macronutrients   Energy kcal 1847   Energy kJ 7734   Protein g 76   Total lipid (fat) g 79   Carbohydrate, by difference g 231   Fiber, total dietary g 35   Sugars, total g 83   Minerals   Calcium, Ca mg 1488   Iron, Fe mg 16   Sodium, Na mg 1100   Vitamins   Vitamin C, total ascorbic acid mg 149 Vitamin A, IU IU 64913   Vitamin D    Lipids   Fatty acids, total saturated g 15   Fatty acids, total monounsaturated g 32   Fatty acids, total polyunsaturated g 26   Cholesterol mg 28          If any questions/concerns please don't hesitate to reach out to either myself or primarily our out-patient dietitian Reyna Oconnell RD, [facility]) @ [phone] (Desk).     Maciel Arreola RD, [facility]  In-Patient Clinical Dietitian  [phone] (Office)  [address]

## 2022-03-01 NOTE — PROGRESS NOTES
[facility]   Nephrology Progress Note  Patient's Name: Peyton Abbott      From consult note 2/24/22- History of Present Illness:    Black Murry is a 65 y.o. male with a past medical history of CHF, COPD, diabetes mellitus, hypertension, and polio. He presented to the ED on 2/23 with complaints of shortness of breath. Per the ED provider note, patient was also noticed increased leg swelling and that the patient wears 4 L of oxygen at home. Vital signs at presentation to the ED include temperature 98.4, respirations 18, pulse 112, /96, and he was 99% on 4 L O2. Lab data on presentation to the ED include CO2 30, BUN 33, creatinine 2.2, hemoglobin 10.9, and proBNP 1478. We were consulted to see the patient for CKD. Patient is known to our service, and had an office visit in June 2021 with Dr. Bronson Chávez. Baseline creatinine from January 2022 is 2.5-2.8. At present, patient was seen and examined. He is currently sitting up in a chair. He reports that he was having swelling and shortness of breath and that is what brought him into the hospital.  He reports that prior to admission his appetite has been okay. Overall he said he feels fine, except that his legs feel tight. He denies any current chest pain or shortness of breath. No nausea or abdominal pain. He denies any headaches or dizziness. He denies use of NSAIDs. \"    Subjective:    3/1: Patient was seen and examined. He denies any current chest pain or shortness of breath. Denies any nausea or vomiting. He reports that his legs feel tight.     Past Medical History:   Diagnosis Date    Acid reflux     Agent orange exposure     Congestive heart failure (CHF) (HCC)     COPD (chronic obstructive pulmonary disease) (HCC)     Depression     Diabetes mellitus (HCC)     Hyperlipidemia     Hypertension     MI (myocardial infarction) ([facility] [address]     Polio     Sleep apnea      Past Surgical History:   Procedure Laterality Date    CARDIAC PACEMAKER PLACEMENT      CORONARY ANGIOPLASTY WITH STENT PLACEMENT      PACEMAKER PLACEMENT       History reviewed. No pertinent family history. reports that he has quit smoking. He has never used smokeless tobacco. He reports that he does not drink alcohol and does not use drugs. Allergies:  Penicillins    Current Medications:    metOLazone (ZAROXOLYN) tablet 5 mg, Daily  acetaminophen (TYLENOL) tablet 650 mg, Q4H PRN  aspirin EC tablet 81 mg, Daily  atorvastatin (LIPITOR) tablet 40 mg, Daily  vitamin D (CHOLECALCIFEROL) tablet 2,000 Units, Daily  vitamin B-12 (CYANOCOBALAMIN) tablet 1,000 mcg, Daily  ezetimibe (ZETIA) tablet 10 mg, Daily  folic acid (FOLVITE) tablet 1 mg, Daily  guaiFENesin tablet 400 mg, 4x Daily PRN  insulin glargine-yfgn (SEMGLEE-YFGN) injection vial 50 Units, Nightly  loperamide (IMODIUM) capsule 2 mg, 4x Daily PRN  melatonin tablet 3 mg, Nightly  metoprolol succinate (TOPROL XL) extended release tablet 25 mg, BID  pantoprazole (PROTONIX) tablet 40 mg, QAM AC  ascorbic acid (VITAMIN C) tablet 500 mg, BID  zinc sulfate (ZINCATE) capsule 50 mg, Daily  insulin lispro (HUMALOG) injection vial 0-6 Units, TID WC  insulin lispro (HUMALOG) injection vial 0-3 Units, Nightly  glucose (GLUTOSE) 40 % oral gel 15 g, PRN  dextrose 50 % IV solution, PRN  glucagon (rDNA) injection 1 mg, PRN  dextrose 5 % solution, PRN  ipratropium-albuterol (DUONEB) nebulizer solution 1 ampule, Q4H WA  heparin (porcine) injection 5,000 Units, Q8H  bumetanide (BUMEX) 12.5 mg in sodium chloride 0.9 % 125 mL infusion, Continuous    Review of Systems:   Pertinent items are noted in HPI. Physical exam:  Vitals:    03/01/22 0800   BP: 103/76   Pulse: 85   Resp: 20   Temp: 98.4 °F (36.9 °C)   SpO2:      General: Awake, alert, no acute distress  Neck: No JVD noted  Lungs: Clear bilaterally upper, diminished in bases bilaterally. Unlabored  CV: regular rate and rhythm. No rub  Abd: Rounded, soft, nontender, nondistended. Active bowel sounds  Skin: Warm and dry. No rash on exposed extremities  Ext: 3+ BLE edema  Neuro: Awake, alert, answers questions appropriately  Data:   Labs:  Lab Results   Component Value Date     2022     2022     2022    K 3.6 2022    K 3.6 2022    K 3.8 2022    CL 96 (L) 2022    CO2 32 (H) 2022    CO2 25 2022    CO2 27 2022    CREATININE 2.4 (H) 2022    CREATININE 2.5 (H) 2022    CREATININE 2.6 (H) 2022    BUN 38 (H) 2022    BUN 35 (H) 2022    BUN 32 (H) 2022    GLUCOSE 65 (L) 2022    GLUCOSE 70 (L) 2022    GLUCOSE 28 (LL) 2022    PHOS 4.4 2022    PHOS 4.5 2022    PHOS 4.1 2022    WBC 10.1 2022    WBC 10.1 2022    WBC 11.1 2022    HGB 11.9 (L) 2022    HGB 11.5 (L) 2022    HGB 11.7 (L) 2022    HCT 38.2 2022    HCT 38.0 2022    HCT 38.1 2022    MCV 91.8 2022     2022     Imaging:  XR CHEST PORTABLE    Result Date: 2022  EXAMINATION: ONE XRAY VIEW OF THE CHEST 2022 4:49 pm COMPARISON: 2022 HISTORY: ORDERING SYSTEM PROVIDED HISTORY: shortness of breath TECHNOLOGIST PROVIDED HISTORY: Reason for exam:->shortness of breath What reading provider will be dictating this exam?->CRC FINDINGS: There is a large opacity seen within the right lung base. There is a small right pleural effusion. The left upper lobe is clear. There is a small left pleural effusion. The cardiac silhouette is within normals. There is a dual lead cardiac pacer on the left. 1. Large opacity within the right lung base which could represent pneumonia and or atelectasis. 2. Moderate size right pleural effusion. 3. Small left pleural effusion. 4. CT of the thorax is recommended for further evaluation.      US DUP LOWER EXTREMITIES BILATERAL VENOUS    Result Date: 2022  Patient MRN:  [id number] : 1945 Age: 65 years Gender: Male Order Date:  2/23/2022 5:28 PM EXAM: US DUP LOWER EXTREMITIES BILATERAL VENOUS NUMBER OF IMAGES:  52 INDICATION:  leg swelling leg swelling What reading provider will be dictating this exam?->[facility] COMPARISON: None Within the visualized vessels, there is no evidence for deep venous thrombosis There is good compressibility, there is good augmentation, there is good color flow. Within the visualized vessels there is no evidence for deep venous thrombosis     Assessment/ Plans:    1. CKD stage IV   due to diabetes mellitus and hypertension  Baseline creatinine from January 2022 is 2.5-2.8   Creatinine remains at baseline   On Bumex drip and metolazone p.o.  UOP 4575 ml past 24 hours; net -10.6 L    2. HFpEF  ECHO 1/2022 showed EF > 70%  On Bumex drip- increased 2/26   on metolazone  Cardiology previously following    3. Hypertension  BP at goal<130/80  Follow on current medications and with diuresis    4. Diabetes mellitus  Hypoglycemic on blood draw 2/27  On insulin lispro and insulin glargine  Management per primary    5.   Pleural effusion  CXR showed moderate size right pleural effusion and small left pleural effusion  Repeat chest x-ray showed \"slight increase\" in right pleural effusion  Pulmonology following     GILMAR Garcia - CNP     Pt seen and examined agree with above  Will add iv diuril  Continues to Alverto Priest MD

## 2022-03-01 NOTE — PROGRESS NOTES
Nutrition Note     Type and Reason for Visit: Patient Education (CHF LOS)    Nutrition Assessment:  Pt to be educated per CHF LOS protocol. Chart reviewed. Pt provided w/ written edu on the Cardiac Diet which includes main diet guidelines, sample meal plan and ways to reduce sodium intake. Handout and contact info placed in d/c instructions. Will follow-up per policy.     Electronically signed by Trini Purdy RD, LD on 3/1/22 at 11:41 AM EST    Contact: ext [phone]

## 2022-03-02 NOTE — PROGRESS NOTES
Comprehensive Nutrition Assessment    Type and Reason for Visit:  Initial (LOS)    Nutrition Recommendations/Plan: No nutritional supplementation recommended at this time. Diet instruction completed on 3/1. Nutrition Assessment:  Patient averaging ~75% of most meals served since admission ; adm w/ SOB and pleural effusion ; KELSEY on CKD ; hx of DM and COPD ; hx of CAD and CHF ; no ONS recommended at this time    Malnutrition Assessment:  Malnutrition Status:  No malnutrition    Context:  Acute Illness     Findings of the 6 clinical characteristics of malnutrition:  Energy Intake:  No significant decrease in energy intake  Weight Loss:  No significant weight loss     Body Fat Loss:  No significant body fat loss     Muscle Mass Loss:  No significant muscle mass loss    Fluid Accumulation:  No significant fluid accumulation     Strength:  Not Performed    Estimated Daily Nutrient Needs:  Energy (kcal):  4334-3783 (REE 1951 x 1.1 SF); Weight Used for Energy Requirements:  Current     Protein (g):  100-110 (1.3-1.5g/kg IBW); Weight Used for Protein Requirements:  Ideal        Fluid (ml/day):  3345-3398; Method Used for Fluid Requirements:  1 ml/kcal      Nutrition Related Findings:  -I&Os (-14.4 L), 3+ edema, active BS, rounded abd, A&O x 4, obesity, anasarca      Wounds:   (red/purple area noted to perineum ; no wound consult in at this time)       Current Nutrition Therapies:    ADULT DIET; Regular; 4 carb choices (60 gm/meal);  Low Sodium (2 gm)    Anthropometric Measures:  · Height: 5' 9\" (175.3 cm)  · Current Body Weight: 273 lb (123.8 kg) (3/2, bedscale)   · Admission Body Weight: 278 lb (126.1 kg) (2/24, no method)    · Usual Body Weight:  (EMR shows past weights of 274# actual on 2/7/22 and 281# actual on 1/21/22)     · Ideal Body Weight: 160 lbs; % Ideal Body Weight 170.6 %   · BMI: 40.3  · BMI Categories: Obese Class 3 (BMI 40.0 or greater)       Nutrition Diagnosis:   No nutrition diagnosis at this time     Nutrition Interventions:   Food and/or Nutrient Delivery:  Continue Current Diet  Nutrition Education/Counseling:  Education completed   Coordination of Nutrition Care:  Continue to monitor while inpatient    Goals:  Patient will consume ~75% of meals served       Nutrition Monitoring and Evaluation:   Behavioral-Environmental Outcomes:  None Identified   Food/Nutrient Intake Outcomes:  Food and Nutrient Intake  Physical Signs/Symptoms Outcomes:  Biochemical Data,Chewing or Swallowing,GI Status,Fluid Status or Edema,Hemodynamic Status,Meal Time Behavior,Nutrition Focused Physical Findings,Skin,Weight     Discharge Planning:     Too soon to determine     Electronically signed by Alda Redmond RD, LD on 3/2/22 at 1:42 PM EST    Contact: [phone]

## 2022-03-02 NOTE — PROGRESS NOTES
Progress Note  Date:3/2/2022       SEDY:7441/[id number]-F  Patient Modesta Blanco     YOB: 1945     Age:77 y.o. Patient says breathing is improved today. Subjective    Subjective:  Symptoms:  Improved. He reports shortness of breath. No chest pain. Diet:  Adequate intake. Activity level: Impaired due to weakness. Pain:  He reports no pain. Review of Systems   Constitutional: Negative for activity change and fever. HENT: Negative for congestion. Respiratory: Positive for shortness of breath. Cardiovascular: Positive for leg swelling. Negative for chest pain. Gastrointestinal: Negative for abdominal pain. Neurological: Negative for dizziness. Psychiatric/Behavioral: Negative for agitation. Objective         Vitals Last 24 Hours:  TEMPERATURE:  Temp  Av.1 °F (36.7 °C)  Min: 97.8 °F (36.6 °C)  Max: 98.4 °F (36.9 °C)  RESPIRATIONS RANGE: Resp  Av  Min: 20  Max: 20  PULSE OXIMETRY RANGE: SpO2  Av.5 %  Min: 94 %  Max: 95 %  PULSE RANGE: Pulse  Av.7  Min: 84  Max: 94  BLOOD PRESSURE RANGE: Systolic (10OSR), PSI:825 , Min:102 , WWL:028   ; Diastolic (09USA), QED:75, Min:58, Max:76    I/O (24Hr): Intake/Output Summary (Last 24 hours) at 3/2/2022 06  Last data filed at 3/2/2022 0446  Gross per 24 hour   Intake 590 ml   Output 4700 ml   Net -4110 ml     Objective:  General Appearance:  Comfortable. Vital signs: (most recent): Blood pressure (!) 102/58, pulse 94, temperature 97.8 °F (36.6 °C), temperature source Oral, resp. rate 20, height 5' 9\" (1.753 m), weight 275 lb 2.2 oz (124.8 kg), SpO2 95 %. No fever. Lungs:  Normal effort and normal respiratory rate. Breath sounds clear to auscultation. Heart: Normal rate. Regular rhythm. S1 normal and S2 normal.    Extremities: There is local swelling.       Labs/Imaging/Diagnostics    Labs:  CBC:  Recent Labs     22  0443 22  0427 22  0430   WBC 10.1 10.1 10.7   RBC 4.07 4.16 4.03   HGB 11.5* 11.9* 11.5*   HCT 38.0 38.2 36.2*   MCV 93.4 91.8 89.8   RDW 16.8* 16.9* 16.5*    259 273     CHEMISTRIES:  Recent Labs     22  0443 22  0427 22  0430    140 140   K 3.6 3.6 3.2*    96* 93*   CO2 25 32* 34*   BUN 35* 38* 49*   CREATININE 2.5* 2.4* 2.7*   GLUCOSE 70* 65* 75     PT/INR:No results for input(s): PROTIME, INR in the last 72 hours. APTT:No results for input(s): APTT in the last 72 hours. LIVER PROFILE:No results for input(s): AST, ALT, BILIDIR, BILITOT, ALKPHOS in the last 72 hours. Imaging Last 24 Hours:  XR CHEST PORTABLE    Result Date: 2022  EXAMINATION: ONE XRAY VIEW OF THE CHEST 2022 4:49 pm COMPARISON: 2022 HISTORY: ORDERING SYSTEM PROVIDED HISTORY: shortness of breath TECHNOLOGIST PROVIDED HISTORY: Reason for exam:->shortness of breath What reading provider will be dictating this exam?->CRC FINDINGS: There is a large opacity seen within the right lung base. There is a small right pleural effusion. The left upper lobe is clear. There is a small left pleural effusion. The cardiac silhouette is within normals. There is a dual lead cardiac pacer on the left. 1. Large opacity within the right lung base which could represent pneumonia and or atelectasis. 2. Moderate size right pleural effusion. 3. Small left pleural effusion. 4. CT of the thorax is recommended for further evaluation. US DUP LOWER EXTREMITIES BILATERAL VENOUS    Result Date: 2022  Patient MRN:  [id number] : 1945 Age: 65 years Gender: Male Order Date:  2022 5:28 PM EXAM: US DUP LOWER EXTREMITIES BILATERAL VENOUS NUMBER OF IMAGES:  52 INDICATION:  leg swelling leg swelling What reading provider will be dictating this exam?->[facility] COMPARISON: None Within the visualized vessels, there is no evidence for deep venous thrombosis There is good compressibility, there is good augmentation, there is good color flow.      Within the visualized vessels there is no evidence for deep venous thrombosis     Assessment//Plan           Hospital Problems           Last Modified POA    * (Principal) CHF (congestive heart failure), NYHA class I, acute on chronic, combined ([facility] [address] 2/23/2022 Yes        Assessment:  (CHF/ volume overload  Pleural effusion  Acute renal insfficnecy  DM  COPD  HTN  Hyperlipidemia           Plan  CXR   Cautiously diurese  Monitor labs and exam.  Continue rest of meds. Renal, Pulmonary, and Cardiology following. PT/OT  ).

## 2022-03-02 NOTE — PROGRESS NOTES
[facility]   Nephrology Progress Note  Patient's Name: Rika Ruffin    From consult note 2/24/22- History of Present Illness:    Smiley Alan is a 65 y.o. male with a past medical history of CHF, COPD, diabetes mellitus, hypertension, and polio. He presented to the ED on 2/23 with complaints of shortness of breath. Per the ED provider note, patient was also noticed increased leg swelling and that the patient wears 4 L of oxygen at home. Vital signs at presentation to the ED include temperature 98.4, respirations 18, pulse 112, /96, and he was 99% on 4 L O2. Lab data on presentation to the ED include CO2 30, BUN 33, creatinine 2.2, hemoglobin 10.9, and proBNP 1478. We were consulted to see the patient for CKD. Patient is known to our service, and had an office visit in June 2021 with Dr. Dane Funez. Baseline creatinine from January 2022 is 2.5-2.8. At present, patient was seen and examined. He is currently sitting up in a chair. He reports that he was having swelling and shortness of breath and that is what brought him into the hospital.  He reports that prior to admission his appetite has been okay. Overall he said he feels fine, except that his legs feel tight. He denies any current chest pain or shortness of breath. No nausea or abdominal pain. He denies any headaches or dizziness. He denies use of NSAIDs. \"    Subjective:    3/2: Patient was seen and examined. He reports that he feels [place]. \"  He denies any current chest pain or shortness of breath. Denies any nausea or vomiting.     Past Medical History:   Diagnosis Date    Acid reflux     Agent orange exposure     Congestive heart failure (CHF) (HCC)     COPD (chronic obstructive pulmonary disease) (HCC)     Depression     Diabetes mellitus (HCC)     Hyperlipidemia     Hypertension     MI [facility]myocardial infarction) ([facility] [address]     Polio     Sleep apnea      Past Surgical History:   Procedure Laterality Date    CARDIAC PACEMAKER PLACEMENT      CORONARY ANGIOPLASTY WITH STENT PLACEMENT      PACEMAKER PLACEMENT       History reviewed. No pertinent family history. reports that he has quit smoking. He has never used smokeless tobacco. He reports that he does not drink alcohol and does not use drugs. Allergies:  Penicillins    Current Medications:    chlorothiazide (DIURIL) injection 250 mg, Q12H  acetaminophen (TYLENOL) tablet 650 mg, Q4H PRN  aspirin EC tablet 81 mg, Daily  atorvastatin (LIPITOR) tablet 40 mg, Daily  vitamin D (CHOLECALCIFEROL) tablet 2,000 Units, Daily  vitamin B-12 (CYANOCOBALAMIN) tablet 1,000 mcg, Daily  ezetimibe (ZETIA) tablet 10 mg, Daily  folic acid (FOLVITE) tablet 1 mg, Daily  guaiFENesin tablet 400 mg, 4x Daily PRN  insulin glargine-yfgn (SEMGLEE-YFGN) injection vial 50 Units, Nightly  loperamide (IMODIUM) capsule 2 mg, 4x Daily PRN  melatonin tablet 3 mg, Nightly  metoprolol succinate (TOPROL XL) extended release tablet 25 mg, BID  pantoprazole (PROTONIX) tablet 40 mg, QAM AC  ascorbic acid (VITAMIN C) tablet 500 mg, BID  zinc sulfate (ZINCATE) capsule 50 mg, Daily  insulin lispro (HUMALOG) injection vial 0-6 Units, TID WC  insulin lispro (HUMALOG) injection vial 0-3 Units, Nightly  glucose (GLUTOSE) 40 % oral gel 15 g, PRN  dextrose 50 % IV solution, PRN  glucagon (rDNA) injection 1 mg, PRN  dextrose 5 % solution, PRN  ipratropium-albuterol (DUONEB) nebulizer solution 1 ampule, Q4H WA  heparin (porcine) injection 5,000 Units, Q8H  bumetanide (BUMEX) 12.5 mg in sodium chloride 0.9 % 125 mL infusion, Continuous    Physical exam:  Vitals:    03/02/22 0730   BP: 108/60   Pulse: 96   Resp: 20   Temp: 98 °F (36.7 °C)   SpO2:      General: Awake, alert, no acute distress  Neck: No JVD noted  Lungs: Clear bilaterally upper, diminished in bases bilaterally. Unlabored  CV: regular rate and rhythm. No rub  Abd: Rounded, soft, nondistended. Active bowel sounds; patient reports slightly tender  Skin: Warm and dry. No rash on exposed extremities  Ext: 3+ BLE edema  Neuro: Awake, alert, answers questions appropriately  Data:   Labs:  Lab Results   Component Value Date     2022     2022     2022    K 3.2 (L) 2022    K 3.6 2022    K 3.6 2022    CL 93 (L) 2022    CO2 34 (H) 2022    CO2 32 (H) 2022    CO2 25 2022    CREATININE 2.7 (H) 2022    CREATININE 2.4 (H) 2022    CREATININE 2.5 (H) 2022    BUN 49 (H) 2022    BUN 38 (H) 2022    BUN 35 (H) 2022    GLUCOSE 75 2022    GLUCOSE 65 (L) 2022    GLUCOSE 70 (L) 2022    PHOS 4.4 2022    PHOS 4.5 2022    PHOS 4.1 2022    WBC 10.7 2022    WBC 10.1 2022    WBC 10.1 2022    HGB 11.5 (L) 2022    HGB 11.9 (L) 2022    HGB 11.5 (L) 2022    HCT 36.2 (L) 2022    HCT 38.2 2022    HCT 38.0 2022    MCV 89.8 2022     2022     Imaging:  XR CHEST PORTABLE    Result Date: 2022  EXAMINATION: ONE XRAY VIEW OF THE CHEST 2022 4:49 pm COMPARISON: 2022 HISTORY: ORDERING SYSTEM PROVIDED HISTORY: shortness of breath TECHNOLOGIST PROVIDED HISTORY: Reason for exam:->shortness of breath What reading provider will be dictating this exam?->CRC FINDINGS: There is a large opacity seen within the right lung base. There is a small right pleural effusion. The left upper lobe is clear. There is a small left pleural effusion. The cardiac silhouette is within normals. There is a dual lead cardiac pacer on the left. 1. Large opacity within the right lung base which could represent pneumonia and or atelectasis. 2. Moderate size right pleural effusion. 3. Small left pleural effusion. 4. CT of the thorax is recommended for further evaluation.      US DUP LOWER EXTREMITIES BILATERAL VENOUS    Result Date: 2022  Patient MRN:  [id number] : 1945 Age: 65 years Gender: Male Order Date:  2/23/2022 5:28 PM EXAM: US DUP LOWER EXTREMITIES BILATERAL VENOUS NUMBER OF IMAGES:  52 INDICATION:  leg swelling leg swelling What reading provider will be dictating this exam?->[facility] COMPARISON: None Within the visualized vessels, there is no evidence for deep venous thrombosis There is good compressibility, there is good augmentation, there is good color flow. Within the visualized vessels there is no evidence for deep venous thrombosis     Assessment/ Plans:    1. CKD stage IV   due to diabetes mellitus and hypertension  Baseline creatinine from January 2022 is 2.5-2.8   Creatinine remains at baseline --> 2.7 today  On Bumex drip and IV Diuril twice daily  UOP 4700 ml past 24 hours; net -14.7 L    2. HFpEF  ECHO 1/2022 showed EF > 70%  On Bumex drip- increased 2/26   And on IV Diuril twice daily  Cardiology previously following    3. Hypertension  BP at goal<130/80  Follow on current medications and with diuresis    4. Diabetes mellitus  Hypoglycemic on blood draw 2/27  On insulin lispro and insulin glargine  Management per primary    5. Pleural effusion  CXR showed moderate size right pleural effusion and small left pleural effusion  Repeat chest x-ray showed \"slight increase\" in right pleural effusion  Pulmonology following     6.   Hypokalemia  Likely in the setting of diuresis  K+ 3.2 today  Supplement today  Follow    GILMAR Rico - CNP     Pt seen and examined agree with above  Cr at baseline  Diuresing  Edema now in feet and about 1/3 way up criselda Victor MD

## 2022-03-02 NOTE — PROGRESS NOTES
Occupational Therapy  OT BEDSIDE TREATMENT NOTE   [facility] [address]  [address] [place] [address]      Date:3/2/2022  Patient Name: Alice Gan  MRN: [id number]  : 1945  Room: [address] [place] [address]     Evaluating OT: Carlo Wakefield OTR/L #[id number]      Referring Provider: Zaida Bojorquez MD  Specific Provider Orders/Date: OT eval and treat 22     Diagnosis: CHF (congestive heart failure), NYHA class I, acute on chronic, combined ([facility] [I50.43]   Pt admitted to hospital with B LE edema. Pt recently discharged from rehab      Pertinent Medical History:  has a past medical history of Acid reflux, Agent orange exposure, Congestive heart failure (CHF) (HCC), COPD (chronic obstructive pulmonary disease) ([facility] Depression, [facility] Hyperlipidemia, Hypertension, MI (myocardial infarction) ([facility] Polio, and Sleep apnea.         Precautions:  Fall Risk, O2     Assessment of current deficits    [x]? Functional mobility          [x]? ADLs           [x]? Strength                  []?Cognition    [x]? Functional transfers        [x]? IADLs         [x]? Safety Awareness   [x]? Endurance    []? Fine Coordination                        [x]? Balance      []? Vision/perception   []? Sensation      []? Gross Motor Coordination            []? ROM           []?  Delirium                   []? Motor Control      OT PLAN OF CARE   OT POC based on physician orders, patient diagnosis and results of clinical assessment     Frequency/Duration 1-3 days/wk for 2 weeks PRN   Specific OT Treatment Interventions to include:   * Instruction/training on adapted ADL techniques and AE recommendations to increase functional independence within precautions       * Training on energy conservation strategies, correct breathing pattern and techniques to improve independence/tolerance for self-care routine  * Functional transfer/mobility training/DME recommendations for increased independence, safety, and fall prevention  * Patient/Family education to increase follow through with safety techniques and functional independence  * Recommendation of environmental modifications for increased safety with functional transfers/mobility and ADLs  * Therapeutic exercise to improve motor endurance, ROM, and functional strength for ADLs/functional transfers  * Therapeutic activities to facilitate/challenge dynamic balance, stand tolerance for increased safety and independence with ADLs  * Therapeutic activities to facilitate gross/fine motor skills for increased independence with ADLs  * Neuro-muscular re-education: facilitation of righting/equilibrium reactions, midline orientation, scapular stability/mobility, normalization of muscle tone, and facilitation of volitional active controled movement     Recommended Adaptive Equipment:  TBD      Home Living: Pt lives alone in a 1 story home with 1 hand rail.      Bathroom setup: walk-in shower     Equipment owned: shower chair, rollator, w/c     Prior Level of Function: Independent with ADLs , mod I with IADLs and ambulated short distances with rollator; manual w/c prn since return from rehab   Driving: yes   Occupation: retire air traffic control      Pain Level: Pt reports bilateral feet pain     Cognition: A&O: 4/4; Follows 2 step directions             Memory:  good             Sequencing:  good             Problem solving:  fair             Judgement/safety:  fair               Functional Assessment:  AM-PAC Daily Activity Raw Score: 16/24    Initial Eval Status  Date: 2/24/22 Treatment Status  Date: 3/2/22 STGs = LTGs  Time frame: 10-14 days   Feeding Independent   Ind     Grooming Minimal Assist  SBA  To wash face and apply deodorant seated in bedside chair  Modified [place]    UB Dressing Stand by Assist   SBA  To don/doff gown seated in bedside chair Modified [place]    LB Dressing Maximal Assist      Pants / socks  Max A  To don socks seated EOB using sock aid Modified [place]    Bathing Moderate Assist  Mod A  Seated in bedside chair. Assist required for posterior and below knee Modified [place]    Toileting Moderate Assist  Max A  Modified [place]    Bed Mobility  Supine to sit: Stand by Assist   Sit to supine: NT  Min A- supine<->sit  Educated pt on technique to increase independence.    Supine to sit: Modified [place]   Sit to supine: Modified [place]    Functional Transfers Minimal Assist  Max A- sit<->stand  Cuing for hand placement and body mechanics   Mod A- stand pivot transfer using w/w Modified [place]    Functional Mobility Minimal Assist  Mod A  Less than home distance using w/w  Modified [place]    Balance Sitting:     Static:  Sup    Dynamic:SBA  Standing: Min A Sitting:     Static:  Sup    Dynamic:SBA  Standing: Mod A      Activity Tolerance F-     + desaturation to 89-90% with activity  Fair F+   Visual/  Perceptual Glasses: yes  wfl                        Comments: Upon arrival pt supine in bed. Pt educated on techniques to increase independence and safety during ADL's, bed mobility, and functional transfers. At end of session pt left seated in bedside chair, call light within reach. ** Nursing asked therapist to complete bedside chair>EOB transfer. Levels above. · Pt has made fair progress towards set goals.      · Continue with current plan of care    Treatment Time In: 1:10            Treatment Time Out: 2:05             Treatment Charges: Mins Units   Ther Ex  09550     Manual Therapy [id number]     Thera Activities [id number] 10 1   ADL/Home Mgt [id number] 45 3   Neuro Re-ed [id number]     Group Therapy      Orthotic manage/training  09212     Non-Billable Time     Total Timed Treatment [id number] [id number] [address]

## 2022-03-02 NOTE — CARE COORDINATION
Care Coordination  I spoke to the patient and he wants to go [place] [facility] and Cyd Cushing accepted the patient at at [facility]. I notified jana of his decision. He also mentioned if the swelling in his legs does not improved he may want to go to [address] [place] [address] as he has [facility] and [address] [place] [address]. Envelope done hens needs done. Pt is 12/24 ot eval is pending. he will need a precert to go there and a covid test. I will follow

## 2022-03-03 NOTE — PROGRESS NOTES
Occupational Therapy  OT BEDSIDE TREATMENT NOTE   [facility] [address] [address]  Patient Name: Roque Aiken  MRN: [id number]  : 1945  Room: [address] [place] [address]     Evaluating OT: Jorgechristian Ibarra OTR/L #[id number]      Referring Provider: Rikki Healy MD  Specific Provider Orders/Date: OT eval and treat 22     Diagnosis: CHF (congestive heart failure), NYHA class I, acute on chronic, combined ([facility] [I50.43]   Pt admitted to hospital with B LE edema. Pt recently discharged from rehab      Pertinent Medical History:  has a past medical history of Acid reflux, Agent orange exposure, Congestive heart failure (CHF) ([facility]), COPD (chronic obstructive pulmonary disease) ([facility] Depression, Diabetes mellitus ([facility] Hyperlipidemia, Hypertension, MI (myocardial infarction) ([facility] Polio, and Sleep apnea.         Precautions:  Fall Risk, O2, CPAP     Assessment of current deficits    [x]? Functional mobility          [x]? ADLs           [x]? Strength                  []?Cognition    [x]? Functional transfers        [x]? IADLs         [x]? Safety Awareness   [x]? Endurance    []? Fine Coordination                        [x]? Balance      []? Vision/perception   []? Sensation      []? Gross Motor Coordination            []? ROM           []?  Delirium                   []? Motor Control      OT PLAN OF CARE   OT POC based on physician orders, patient diagnosis and results of clinical assessment     Frequency/Duration 1-3 days/wk for 2 weeks PRN   Specific OT Treatment Interventions to include:   * Instruction/training on adapted ADL techniques and AE recommendations to increase functional independence within precautions       * Training on energy conservation strategies, correct breathing pattern and techniques to improve independence/tolerance for self-care routine  * Functional transfer/mobility training/DME recommendations for increased independence, safety, and fall prevention  * Patient/Family education to increase follow through with safety techniques and functional independence  * Recommendation of environmental modifications for increased safety with functional transfers/mobility and ADLs  * Therapeutic exercise to improve motor endurance, ROM, and functional strength for ADLs/functional transfers  * Therapeutic activities to facilitate/challenge dynamic balance, stand tolerance for increased safety and independence with ADLs  * Therapeutic activities to facilitate gross/fine motor skills for increased independence with ADLs  * Neuro-muscular re-education: facilitation of righting/equilibrium reactions, midline orientation, scapular stability/mobility, normalization of muscle tone, and facilitation of volitional active controled movement     Recommended Adaptive Equipment:  TBD      Home Living: Pt lives alone in a 1 story home with 1 hand rail. Bathroom setup: walk-in shower     Equipment owned: shower chair, rollator, w/c     Prior Level of Function: Independent with ADLs , mod I with IADLs and ambulated short distances with rollator; manual w/c prn since return from rehab   Driving: yes   Occupation: retire air traffic control      Pain Level:  Pt c/o pain secondary to edema in B LE's. Did not rate.      Cognition: A&O: 4/4; Follows 2 step directions             Memory:  good             Sequencing:  good             Problem solving:  fair             Judgement/safety:  fair               Functional Assessment:  AM-PAC Daily Activity Raw Score: 15/24     Initial Eval Status  Date: 2/24/22 Treatment Status  Date: 3/3/22 STGs = LTGs  Time frame: 10-14 days   Feeding Independent   Ind     Grooming Minimal Assist  S/Setup  Seated at EOB pt demo'd ability to comb hair and wash face after setup. Modified [place]    UB Dressing Stand by Assist   SBA/S  Pt demo'd ability to don/doff hospital gown while seated at EOB.  Modified [place] LB Dressing Maximal Assist      Pants / socks  Max/Dep  To don B socks while seated at EOB. Pt offered LB AE however pt declined d/t edema causing pain with AE use. Modified [place]    Bathing Moderate Assist  Mod A  Per previous tx  Will continue to assess. Modified [place]    Toileting Moderate Assist  Max A   Per previous tx  Will continue to assess. Modified [place]    Bed Mobility  Supine to sit: Stand by Assist   Sit to supine: NT  Supine>Sit:Mod A  Min cues for sequencing/safety required. Assist required to advance L LE and for trunk control.    Supine to sit: Modified [place]   Sit to supine: Modified [place]    Functional Transfers Minimal Assist  Max A- sit<->stand  Mod A- stand pivot transfer using w/w  Per previous tx  Pt deferred d/t fatigue. Will continue to assess. Modified [place]    Functional Mobility Minimal Assist  Mod A   Per previous tx  Pt deferred d/t fatigue. Will continue to assess. Modified [place]    Balance Sitting:     Static:  Sup    Dynamic:SBA  Standing: Min A Sitting:     Static:Independent    Dynamic:Supervision  Standing: NT    Pt tolerated sitting at EOB x15 minutes with Supervision for safety.      Activity Tolerance F-     + desaturation to 89-90% with activity  Fair  Fatigues easily with light activity requiring frequent rest breaks to complete. F+   Visual/  Perceptual Glasses: yes  wfl                        Comments: Upon arrival pt supine in bed. Nursing staff approved OT intervention. Pt on CPAP upon arrival, pt reported physician wanted him to wear it all day today, pt remained on CPAP throughout treatment. Skilled monitoring of O2 completed throughout treatment with O2 remaining WNL. Pt educated on techniques to increase independence and safety during ADL's, LB AE, bed mobility, and functional transfers. Pt instructed on B UE AROM exercises to increase strength/endurance and maintain ROM. Pt verbalized/demo'd fair understanding. Pt reports he just \"likes to sit up\", required redirection to attend to functional tasks from therapist. At end of session pt left seated at EOB with physician present. · Pt has made fair progress towards set goals.    · Continue with current plan of care    Treatment Time In: 9754         Treatment Time Out: 1456            Treatment Charges: Mins Units   Ther Ex  50696     Manual Therapy [address] [facility]     Thera Activities 65787 10 1   ADL/Home Mgt 52850 13 1   Neuro Re-ed 88633     Group Therapy      Orthotic manage/training  43042     Non-Billable Time     Total Timed Treatment 23 2     [address] [place] [address]

## 2022-03-03 NOTE — PROGRESS NOTES
[facility]   Nephrology Progress Note  Patient's Name: Snehal Thakur    From consult note 2/24/22- History of Present Illness:    Triston Del Rio is a 65 y.o. male with a past medical history of CHF, COPD, diabetes mellitus, hypertension, and polio. He presented to the ED on 2/23 with complaints of shortness of breath. Per the ED provider note, patient was also noticed increased leg swelling and that the patient wears 4 L of oxygen at home. Vital signs at presentation to the ED include temperature 98.4, respirations 18, pulse 112, /96, and he was 99% on 4 L O2. Lab data on presentation to the ED include CO2 30, BUN 33, creatinine 2.2, hemoglobin 10.9, and proBNP 1478. We were consulted to see the patient for CKD. Patient is known to our service, and had an office visit in June 2021 with Dr. Melba Koenig. Baseline creatinine from January 2022 is 2.5-2.8. At present, patient was seen and examined. He is currently sitting up in a chair. He reports that he was having swelling and shortness of breath and that is what brought him into the hospital.  He reports that prior to admission his appetite has been okay. Overall he said he feels fine, except that his legs feel tight. He denies any current chest pain or shortness of breath. No nausea or abdominal pain. He denies any headaches or dizziness. He denies use of NSAIDs. \"    Subjective:    3/3: Hypoglycemic episode this morning noted. Patient was seen and examined. He denies any current chest pain. Denies any nausea or vomiting. Reports that he has slight abdominal pain.     Past Medical History:   Diagnosis Date    Acid reflux     Agent orange exposure     Congestive heart failure (CHF) (HCC)     COPD (chronic obstructive pulmonary disease) (HCC)     Depression     Diabetes mellitus (HCC)     Hyperlipidemia     Hypertension     [facility]     Polio     Sleep apnea      Past Surgical History:   Procedure Laterality Date    CARDIAC PACEMAKER PLACEMENT      CORONARY ANGIOPLASTY WITH STENT PLACEMENT      PACEMAKER PLACEMENT       History reviewed. No pertinent family history. reports that he has quit smoking. He has never used smokeless tobacco. He reports that he does not drink alcohol and does not use drugs. Allergies:  Penicillins    Current Medications:    chlorothiazide (DIURIL) injection 250 mg, Q12H  acetaminophen (TYLENOL) tablet 650 mg, Q4H PRN  aspirin EC tablet 81 mg, Daily  atorvastatin (LIPITOR) tablet 40 mg, Daily  vitamin D (CHOLECALCIFEROL) tablet 2,000 Units, Daily  vitamin B-12 (CYANOCOBALAMIN) tablet 1,000 mcg, Daily  ezetimibe (ZETIA) tablet 10 mg, Daily  folic acid (FOLVITE) tablet 1 mg, Daily  guaiFENesin tablet 400 mg, 4x Daily PRN  insulin glargine-yfgn (SEMGLEE-YFGN) injection vial 50 Units, Nightly  loperamide (IMODIUM) capsule 2 mg, 4x Daily PRN  melatonin tablet 3 mg, Nightly  metoprolol succinate (TOPROL XL) extended release tablet 25 mg, BID  pantoprazole (PROTONIX) tablet 40 mg, QAM AC  ascorbic acid (VITAMIN C) tablet 500 mg, BID  zinc sulfate (ZINCATE) capsule 50 mg, Daily  insulin lispro (HUMALOG) injection vial 0-6 Units, TID WC  insulin lispro (HUMALOG) injection vial 0-3 Units, Nightly  glucose (GLUTOSE) 40 % oral gel 15 g, PRN  dextrose 50 % IV solution, PRN  glucagon (rDNA) injection 1 mg, PRN  dextrose 5 % solution, PRN  ipratropium-albuterol (DUONEB) nebulizer solution 1 ampule, Q4H WA  heparin (porcine) injection 5,000 Units, Q8H  bumetanide (BUMEX) 12.5 mg in sodium chloride 0.9 % 125 mL infusion, Continuous    Physical exam:  Vitals:    03/03/22 0630   BP: 87/76   Pulse: 94   Resp: 18   Temp: 97.3 °F (36.3 °C)   SpO2:      General: Awake, alert, no acute distress  Neck: No JVD noted  Lungs: Clear bilaterally upper, diminished in bases bilaterally. Unlabored  CV: regular rate and rhythm. No rub  Abd: Rounded, soft, nondistended.   Active bowel sounds; patient reports slightly tender  Skin: Warm and dry. No rash on exposed extremities  Ext: 3+ BLE edema  Neuro: Awake, alert, answers questions appropriately  Data:   Labs:  Lab Results   Component Value Date     2022     2022     2022    K 3.3 (L) 2022    K 3.2 (L) 2022    K 3.6 2022    CL 91 (L) 2022    CO2 35 (H) 2022    CO2 34 (H) 2022    CO2 32 (H) 2022    CREATININE 2.8 (H) 2022    CREATININE 2.7 (H) 2022    CREATININE 2.4 (H) 2022    BUN 57 (H) 2022    BUN 49 (H) 2022    BUN 38 (H) 2022    GLUCOSE 41 (L) 2022    GLUCOSE 75 2022    GLUCOSE 65 (L) 2022    PHOS 4.4 2022    PHOS 4.5 2022    PHOS 4.1 2022    WBC 10.3 2022    WBC 10.7 2022    WBC 10.1 2022    HGB 12.1 (L) 2022    HGB 11.5 (L) 2022    HGB 11.9 (L) 2022    HCT 38.1 2022    HCT 36.2 (L) 2022    HCT 38.2 2022    MCV 89.9 2022     2022     Imaging:  XR CHEST PORTABLE    Result Date: 2022  EXAMINATION: ONE XRAY VIEW OF THE CHEST 2022 4:49 pm COMPARISON: 2022 HISTORY: ORDERING SYSTEM PROVIDED HISTORY: shortness of breath TECHNOLOGIST PROVIDED HISTORY: Reason for exam:->shortness of breath What reading provider will be dictating this exam?->CRC FINDINGS: There is a large opacity seen within the right lung base. There is a small right pleural effusion. The left upper lobe is clear. There is a small left pleural effusion. The cardiac silhouette is within normals. There is a dual lead cardiac pacer on the left. 1. Large opacity within the right lung base which could represent pneumonia and or atelectasis. 2. Moderate size right pleural effusion. 3. Small left pleural effusion. 4. CT of the thorax is recommended for further evaluation.      US DUP LOWER EXTREMITIES BILATERAL VENOUS    Result Date: 2022  Patient MRN:  [id number] : 1945 Age: 65 years Gender: Male Order Date:  2/23/2022 5:28 PM EXAM: US DUP LOWER EXTREMITIES BILATERAL VENOUS NUMBER OF IMAGES:  52 INDICATION:  leg swelling leg swelling What reading provider will be dictating this exam?->[facility] COMPARISON: None Within the visualized vessels, there is no evidence for deep venous thrombosis There is good compressibility, there is good augmentation, there is good color flow. Within the visualized vessels there is no evidence for deep venous thrombosis     Assessment/ Plans:    1. CKD stage IV   due to diabetes mellitus and hypertension  Baseline creatinine from January 2022 is 2.5-2.8   Creatinine remains at baseline --> 2.8 today  S/p Bumex drip and IV Diuril  UOP 5250 ml past 24 hours; net -20 L  We will hold diuretics today given hypotension    2. HFpEF  ECHO 1/2022 showed EF > 70%  S/p Bumex drip and IV Diuril  Cardiology previously following    3. Hypertension  BP goal<130/80  Now with hypotension  Hold diuretics today given patient's hypotension   follow closely    4. Diabetes mellitus  Hypoglycemic episode this morning 3/3  On insulin lispro and insulin glargine  Management per primary    5. Pleural effusion  CXR showed moderate size right pleural effusion and small left pleural effusion  Repeat chest x-ray showed \"slight increase\" in right pleural effusion  Chest x-ray today  Pulmonology following     6.   Hypokalemia  Likely in the setting of diuresis  K+ 3.3 today  Supplement today  Follow    GILMAR Shetty - CNP     Pt seen and examined agree with above  Holding lasix until bp improves  Alfredito Jacobo MD

## 2022-03-03 NOTE — PROGRESS NOTES
Progress Note  Date:3/3/2022       BRSS:[id number]  Timothy Lr     YOB: 1945     Age:77 y.o. Patient says breathing is improved today. Subjective    Subjective:  Symptoms:  Improved. He reports shortness of breath. No chest pain. Diet:  Adequate intake. Activity level: Impaired due to weakness. Pain:  He reports no pain. Review of Systems   Constitutional: Negative for activity change and fever. HENT: Negative for congestion. Respiratory: Positive for shortness of breath. Cardiovascular: Positive for leg swelling. Negative for chest pain. Gastrointestinal: Negative for abdominal pain. Neurological: Negative for dizziness. Psychiatric/Behavioral: Negative for agitation. Objective         Vitals Last 24 Hours:  TEMPERATURE:  Temp  Av.7 °F (36.5 °C)  Min: 97.3 °F (36.3 °C)  Max: 98 °F (36.7 °C)  RESPIRATIONS RANGE: Resp  Av  Min: 18  Max: 20  PULSE OXIMETRY RANGE: SpO2  Av.7 %  Min: 92 %  Max: 97 %  PULSE RANGE: Pulse  Av.5  Min: 94  Max: 98  BLOOD PRESSURE RANGE: Systolic (12DBP), EOD:990 , Min:87 , BZQ:280   ; Diastolic (96JKG), MMO:30, Min:60, Max:76    I/O (24Hr): Intake/Output Summary (Last 24 hours) at 3/3/2022 0657  Last data filed at 3/3/2022 0300  Gross per 24 hour   Intake 970 ml   Output 5250 ml   Net -4280 ml     Objective:  General Appearance:  Comfortable. Vital signs: (most recent): Blood pressure 87/76, pulse 94, temperature 97.3 °F (36.3 °C), temperature source Axillary, resp. rate 18, height 5' 9\" (1.753 m), weight 273 lb 13 oz (124.2 kg), SpO2 97 %. No fever. Lungs:  Normal effort and normal respiratory rate. Breath sounds clear to auscultation. Heart: Normal rate. Regular rhythm. S1 normal and S2 normal.    Extremities: There is local swelling.       Labs/Imaging/Diagnostics    Labs:  CBC:  Recent Labs     22  0427 22  0430 22  0430   WBC 10.1 10.7 10.3   RBC 4.16 4.03 4.24 HGB 11.9* 11.5* 12.1*   HCT 38.2 36.2* 38.1   MCV 91.8 89.8 89.9   RDW 16.9* 16.5* 16.5*    273 251     CHEMISTRIES:  Recent Labs     22  0427 22  0430 22  0430    140 139   K 3.6 3.2* 3.3*   CL 96* 93* 91*   CO2 32* 34* 35*   BUN 38* 49* 57*   CREATININE 2.4* 2.7* 2.8*   GLUCOSE 65* 75 41*     PT/INR:No results for input(s): PROTIME, INR in the last 72 hours. APTT:No results for input(s): APTT in the last 72 hours. LIVER PROFILE:No results for input(s): AST, ALT, BILIDIR, BILITOT, ALKPHOS in the last 72 hours. Imaging Last 24 Hours:  XR CHEST PORTABLE    Result Date: 2022  EXAMINATION: ONE XRAY VIEW OF THE CHEST 2022 4:49 pm COMPARISON: 2022 HISTORY: ORDERING SYSTEM PROVIDED HISTORY: shortness of breath TECHNOLOGIST PROVIDED HISTORY: Reason for exam:->shortness of breath What reading provider will be dictating this exam?->CRC FINDINGS: There is a large opacity seen within the right lung base. There is a small right pleural effusion. The left upper lobe is clear. There is a small left pleural effusion. The cardiac silhouette is within normals. There is a dual lead cardiac pacer on the left. 1. Large opacity within the right lung base which could represent pneumonia and or atelectasis. 2. Moderate size right pleural effusion. 3. Small left pleural effusion. 4. CT of the thorax is recommended for further evaluation. US DUP LOWER EXTREMITIES BILATERAL VENOUS    Result Date: 2022  Patient MRN:  [id number] : 1945 Age: 65 years Gender: Male Order Date:  2022 5:28 PM EXAM: US DUP LOWER EXTREMITIES BILATERAL VENOUS NUMBER OF IMAGES:  52 INDICATION:  leg swelling leg swelling What reading provider will be dictating this exam?->[facility] COMPARISON: None Within the visualized vessels, there is no evidence for deep venous thrombosis There is good compressibility, there is good augmentation, there is good color flow.      Within the visualized vessels there is no evidence for deep venous thrombosis     Assessment//Plan           Hospital Problems           Last Modified POA    * (Principal) CHF (congestive heart failure), NYHA class I, acute on chronic, combined ([facility] [address] 2/23/2022 Yes        Assessment:  (CHF/ volume overload  Pleural effusion  Acute renal insfficnecy  DM  COPD  HTN  Hyperlipidemia           Plan  CXR   Cautiously diurese  Monitor labs and exam.  Continue rest of meds. Renal, Pulmonary, and Cardiology following. PT/OT  ).

## 2022-03-03 NOTE — PROGRESS NOTES
[facility]  Department of Pulmonary, Critical Care and Sleep Medicine  [address] [facility]  Department of Internal Medicine  Progress Note    SUBJECTIVE:    On gtt - 18 liters  Still with edema  He is still quite short of breath with exertion. OBJECTIVE:  Vitals:    03/02/22 1630 03/02/22 1945 03/03/22 0630 03/03/22 0903   BP: 101/73 102/64 87/76    Pulse: 98 94 94    Resp: 18 20 18    Temp: 97.4 °F (36.3 °C) 97.9 °F (36.6 °C) 97.3 °F (36.3 °C)    TempSrc: Oral Oral Axillary    SpO2:  97%  98%   Weight:       Height:         Constitutional: Alert,     EENT: EOMI YOSEF. MMM. No icterus. No thrush. Neck: No thyromegaly. No JVD on my exam.  Respiratory: Breath sounds are diminished without use of accessory muscles. Cardiovascular: Regular,      Pulses:  Equal bilaterally. Abdomen: Soft without organomegaly. Lymphatic: No lymphadenopathy. Musculoskeletal: Without weakness or gross deficits  Extremities: 3+ edema bilaterally. L>>R Feet still quite tight  Skin:  Warm and dry. No skin rashes. Neurological/Psychiatric: No acute psychosis. Cranial nerves are intact. DATA:    Monitor Strips:  Reviewed & discusses with technical team. No changes noted.     RADIOLOGY:  Films were read/reviewed/discussed with radiology shows effusion and heart failure    CBC with Differential:    Lab Results   Component Value Date    WBC 10.3 03/03/2022    RBC 4.24 03/03/2022    HGB 12.1 03/03/2022    HCT 38.1 03/03/2022     03/03/2022    MCV 89.9 03/03/2022    MCH 28.5 03/03/2022    MCHC 31.8 03/03/2022    RDW 16.5 03/03/2022    LYMPHOPCT 19.9 02/23/2022    MONOPCT 11.6 02/23/2022    BASOPCT 0.3 02/23/2022    MONOSABS 1.10 02/23/2022    LYMPHSABS 1.89 02/23/2022    EOSABS 0.30 02/23/2022    BASOSABS 0.03 02/23/2022     CMP:    Lab Results   Component Value Date     03/03/2022    K 3.3 03/03/2022    K 4.1 02/23/2022    CL 91 03/03/2022    CO2 35 03/03/2022 BUN 57 03/03/2022    CREATININE 2.8 03/03/2022    GFRAA 27 03/03/2022    LABGLOM 22 03/03/2022    GLUCOSE 41 03/03/2022    PROT 6.9 01/21/2022    LABALBU 3.5 02/25/2022    CALCIUM 8.7 03/03/2022    BILITOT 0.6 01/21/2022    ALKPHOS 127 01/21/2022    AST 32 01/21/2022    ALT 42 01/21/2022          Assessment:   1. Bilateral pleural effusion  2. Volume overload  3. CKD  4. Anasarca  5. Diabetes  6. Chronic hypoxemic respiratory insufficiency  7. SPEEDY     Plan:   1. Continue to wean FiO2 as tolerated. Currently on 3 L nasal cannula. Patient was not requiring oxygen prior to his initial admission in January. 2. PFTs have previously been ordered however these have not been obtained. Would recommend full PFTs as outpatient after discharge. 3. Recommend euglycemia  4. Continue home CPAP  5.  Diuretics as per nephrology    TJB  Pulmonary, Critical Care and Sleep Medicine

## 2022-03-03 NOTE — PROGRESS NOTES
Patients morning blood glucose 52, half amp of D50% given. Patient vital signs stable, patient does say he is feeling weak. BP pressure 87/76. Attending notified of BP via perfect serve. No new orders given but to recheck BP in 1 hour.

## 2022-03-04 NOTE — PLAN OF CARE
Problem: OXYGENATION/RESPIRATORY FUNCTION  Goal: Patient will maintain patent airway  Outcome: Met This Shift     Problem: HEMODYNAMIC STATUS  Goal: Patient has stable vital signs and fluid balance  Outcome: Met This Shift     Problem: FLUID AND ELECTROLYTE IMBALANCE  Goal: Fluid and electrolyte balance are achieved/maintained  Outcome: Not Met This Shift     Problem: ACTIVITY INTOLERANCE/IMPAIRED MOBILITY  Goal: Mobility/activity is maintained at optimum level for patient  Outcome: Not Met This Shift

## 2022-03-04 NOTE — PROGRESS NOTES
Occupational Therapy  OT BEDSIDE TREATMENT NOTE   [address] [facility] [address] [address]  Patient Name: Raj Hurt  MRN: [id number]  : 1945  Room: [address] [place] [address]     Evaluating OT: Cindy San OTR/L #[id number]      Referring Provider: Olive Rowley MD  Specific Provider Orders/Date: OT eval and treat 22     Diagnosis: CHF (congestive heart failure), NYHA class I, acute on chronic, combined ([facility] [address] [I50.43]   Pt admitted to hospital with B LE edema. Pt recently discharged from rehab      Pertinent Medical History:  has a past medical history of Acid reflux, Agent orange exposure, Congest[facility] failure ([facility]F) ([facility]), COPD (chronic obstructive pulmonary disease) ([facility] [address] Depression, [facility] Hyperlipidemia, Hypertension, MI (myocardial infarction) ([facility] Polio, and Sleep apnea.      Precautions:  Fall Risk, O2     Assessment of current deficits    [x]? Functional mobility          [x]? ADLs           [x]? Strength                  []?Cognition    [x]? Functional transfers        [x]? IADLs         [x]? Safety Awareness   [x]? Endurance    []? Fine Coordination                        [x]? Balance      []? Vision/perception   []? Sensation      []? Gross Motor Coordination            []? ROM           []?  Delirium                   []? Motor Control      OT PLAN OF CARE   OT POC based on physician orders, patient diagnosis and results of clinical assessment     Frequency/Duration 1-3 days/wk for 2 weeks PRN   Specific OT Treatment Interventions to include:   * Instruction/training on adapted ADL techniques and AE recommendations to increase functional independence within precautions       * Training on energy conservation strategies, correct breathing pattern and techniques to improve independence/tolerance for self-care routine  * Functional transfer/mobility training/DME recommendations for increased independence, safety, and fall prevention  * Patient/Family education to increase follow through with safety techniques and functional independence  * Recommendation of environmental modifications for increased safety with functional transfers/mobility and ADLs  * Therapeutic exercise to improve motor endurance, ROM, and functional strength for ADLs/functional transfers  * Therapeutic activities to facilitate/challenge dynamic balance, stand tolerance for increased safety and independence with ADLs  * Therapeutic activities to facilitate gross/fine motor skills for increased independence with ADLs  * Neuro-muscular re-education: facilitation of righting/equilibrium reactions, midline orientation, scapular stability/mobility, normalization of muscle tone, and facilitation of volitional active controled movement     Recommended Adaptive Equipment:  TBD      Home Living: Pt lives alone in a 1 story home with 1 hand rail.      Bathroom setup: walk-in shower     Equipment owned: shower chair, rollator, w/c     Prior Level of Function: Independent with ADLs , mod I with IADLs and ambulated short distances with rollator; manual w/c prn since return from rehab   Driving: yes   Occupation: retire air traffic control      Pain Level: Pt reports bilateral feet pain     Cognition: A&O: 4/4; Follows 2 step directions             Memory:  good             Sequencing:  good             Problem solving:  fair             Judgement/safety:  fair               Functional Assessment:  AM-PAC Daily Activity Raw Score: 16/24    Initial Eval Status  Date: 2/24/22 Treatment Status  Date: 3/4/22 STGs = LTGs  Time frame: 10-14 days   Feeding Independent   Ind     Grooming Minimal Assist  SBA  Washing face, hands & using shampoo cap, washing & drying hair  Modified [place]    UB Dressing Stand by Assist   SBA  To don/doff gown seated  Modified [place]    LB Dressing Maximal Assist      Pants / socks  Max A  Kelechi & doff socks, significant edema in B LE Modified [place]    Bathing Moderate Assist  Mod A  simulated Modified [place]    Toileting Moderate Assist  Max/Dep  Reggie present, progress to [facility] Modified [place]    Bed Mobility  Supine to sit: Stand by Assist   Sit to supine: NT  Min A- supine >sit  SBA sit > supine  Educated pt on technique to increase independence.    Supine to sit: Modified [place]   Sit to supine: Modified [place]    Functional Transfers Minimal Assist  Mod/Max A- sit<->stand  Completed x 2  Cuing for hand placement and body mechanics   Difficulty transitioning hand from EOB to walker & then stand upright due to \"bad knees\"   Modified [place]    Functional Mobility Minimal Assist  Min A  With walker short distances along EOB due to weakness in B LE Modified [place]    Balance Sitting:     Static:  Sup    Dynamic:SBA  Standing: Min A Sitting:     Static:  Sup    Dynamic:SBA  Standing: Mod A      Activity Tolerance F-     + desaturation to 89-90% with activity  Fair+  Light/mod tasks  O2 on 4L 94-95 F+   Visual/  Perceptual Glasses: yes  wfl                        Comments: Upon arrival pt supine in bed & agreeable for therapy. Pt educated on techniques to increase independence and safety during ADL's, bed mobility, and functional transfers. At end of session pt left was returned to bed, HOB upright into chair position, to elevate B LE due to edema, call light within reach. · Pt has made fair progress towards set goals. · Continue with current plan of care    Treatment Time In: 2:55           Treatment Time Out: 3:25             Treatment Charges: Mins Units   Ther Ex  [id number]     Manual Therapy Mat Angulo [id number] [id number] 26 1   ADL/Home Mgt 60945 15 1   Neuro Re-ed 85058     Group Therapy      Orthotic manage/training  [id number]     Non-Billable Time     Total Timed Treatment 30 2     Inge WYATT  [address] [place] [address] Drive, [address] [place] [address]

## 2022-03-04 NOTE — PROGRESS NOTES
Progress Note  Date:3/4/2022       [id number]  Patient Maxime Benitez     YOB: 1945     Age:77 y.o. Patient says breathing is improved today. Subjective    Subjective:  Symptoms:  Improved. He reports shortness of breath. No chest pain. Diet:  Adequate intake. Activity level: Impaired due to weakness. Pain:  He reports no pain. Review of Systems   Constitutional: Negative for activity change and fever. HENT: Negative for congestion. Respiratory: Positive for shortness of breath. Cardiovascular: Positive for leg swelling. Negative for chest pain. Gastrointestinal: Negative for abdominal pain. Neurological: Negative for dizziness. Psychiatric/Behavioral: Negative for agitation. Objective         Vitals Last 24 Hours:  TEMPERATURE:  Temp  Av.9 °F (36.6 °C)  Min: 97.7 °F (36.5 °C)  Max: 98.1 °F (36.7 °C)  RESPIRATIONS RANGE: Resp  Av  Min: 18  Max: 18  PULSE OXIMETRY RANGE: SpO2  Av.3 %  Min: 94 %  Max: 98 %  PULSE RANGE: Pulse  Av.5  Min: 88  Max: 92  BLOOD PRESSURE RANGE: Systolic (51RYX), JSL:45 , Min:86 , FHO:43   ; Diastolic (86LTB), INV:70, Min:64, Max:72    I/O (24Hr): Intake/Output Summary (Last 24 hours) at 3/4/2022 0715  Last data filed at 3/4/2022 0559  Gross per 24 hour   Intake 720 ml   Output 2925 ml   Net -2205 ml     Objective:  General Appearance:  Comfortable. Vital signs: (most recent): Blood pressure 90/69, pulse 88, temperature 98 °F (36.7 °C), temperature source Infrared, resp. rate 18, height 5' 9\" (1.753 m), weight 268 lb 15.4 oz (122 kg), SpO2 96 %. No fever. Lungs:  Normal effort and normal respiratory rate. Breath sounds clear to auscultation. Heart: Normal rate. Regular rhythm. S1 normal and S2 normal.    Extremities: There is local swelling.       Labs/Imaging/Diagnostics    Labs:  CBC:  Recent Labs     22  0430 22  0430   WBC 10.7 10.3   RBC 4.03 4.24   HGB 11.5* 12.1*   HCT 36.2* 38.1   MCV 89.8 89.9   RDW 16.5* 16.5*    251     CHEMISTRIES:  Recent Labs     22  0430 22  0430    139   K 3.2* 3.3*   CL 93* 91*   CO2 34* 35*   BUN 49* 57*   CREATININE 2.7* 2.8*   GLUCOSE 75 41*     PT/INR:No results for input(s): PROTIME, INR in the last 72 hours. APTT:No results for input(s): APTT in the last 72 hours. LIVER PROFILE:No results for input(s): AST, ALT, BILIDIR, BILITOT, ALKPHOS in the last 72 hours. Imaging Last 24 Hours:  XR CHEST PORTABLE    Result Date: 2022  EXAMINATION: ONE XRAY VIEW OF THE CHEST 2022 4:49 pm COMPARISON: 2022 HISTORY: ORDERING SYSTEM PROVIDED HISTORY: shortness of breath TECHNOLOGIST PROVIDED HISTORY: Reason for exam:->shortness of breath What reading provider will be dictating this exam?->CRC FINDINGS: There is a large opacity seen within the right lung base. There is a small right pleural effusion. The left upper lobe is clear. There is a small left pleural effusion. The cardiac silhouette is within normals. There is a dual lead cardiac pacer on the left. 1. Large opacity within the right lung base which could represent pneumonia and or atelectasis. 2. Moderate size right pleural effusion. 3. Small left pleural effusion. 4. CT of the thorax is recommended for further evaluation. US DUP LOWER EXTREMITIES BILATERAL VENOUS    Result Date: 2022  Patient MRN:  [id number] : 1945 Age: 65 years Gender: Male Order Date:  2022 5:28 PM EXAM: US DUP LOWER EXTREMITIES BILATERAL VENOUS NUMBER OF IMAGES:  52 INDICATION:  leg swelling leg swelling What reading provider will be dictating this exam?->[facility] COMPARISON: None Within the visualized vessels, there is no evidence for deep venous thrombosis There is good compressibility, there is good augmentation, there is good color flow.      Within the visualized vessels there is no evidence for deep venous thrombosis     Assessment//Plan           [facility]           Last Modified POA    * (Principal) CHF (congestive heart failure), NYHA class I, acute on chronic, combined ([facility] [address] 2/23/2022 Yes        Assessment:  (CHF/ volume overload  Pleural effusion  Acute renal insfficnecy  DM  COPD  HTN  Hyperlipidemia           Plan  CXR   Cautiously diurese  Monitor labs and exam.  Continue rest of meds. Renal, Pulmonary, and Cardiology following. PT/OT  ).

## 2022-03-04 NOTE — PROGRESS NOTES
Physical Therapy   Treatment Note     Name: Jody Ellison  : 1945  MRN: [id number]      Date of Service: 3/4/2022    Evaluating PT:  Nazario Dominguez, [id number]    Room #:  3468/9383-K  Diagnosis:  CHF (congestive heart failure), NYHA class I, acute on chronic, combined ([facility] [I50.43]  PMHx/PSHx:     has a past medical history of Acid reflux, Agent orange exposure, Congestive heart failure (CHF) ([facility]), COPD (chronic obstructive pulmonary disease) ([facility] [address] Depression, Diabetes mellitus ([facility] Hyperlipidemia, Hypertension, MI (myocardial infarction) ([facility] Polio, and Sleep apnea. has a past surgical history that includes Coronary angioplasty with stent; Cardiac pacemaker placement; and pacemaker placement. Precautions:  Fall risk, O2 (4 lpm @ home)    SUBJECTIVE:    Pt lives alone in a 1 story home with 1 stair to enter. Prior to his initial admission and subsequent rehab in 2022 the pt reports he was Independent with all mobility with no AD, since discharge from rehab the pt has primarily been using a manual WC for mobility. OBJECTIVE:   Initial Evaluation  Date: 22 Treatment  Date: 3/4/22 Short Term/ Long Term   Goals   AM-PAC 6 Clicks     Was pt agreeable to Eval/treatment? Yes Yes    Does pt have pain? 2/10 in R hallux and arch None     Bed Mobility  Rolling: Supervision  Supine to sit: Supervision  Sit to supine: NT  Scooting: Supervision Rolling: SBA  Supine to sit: Mariela  Sit to supine: SBA  Scooting: SBA Independent   Transfers Sit to stand: Mariela  Stand to sit: Mariela  Stand pivot: Mariela with [employer] Sit to stand:  Mod/max A  Stand to sit: mod A   Modified Independent   Ambulation    10 feet with WW with Mariela Side stepping along edge of bed with ww with min A >50 feet with [employer] with Modified [place]   Stair negotiation: ascended and descended  NT NT 1 step with [employer] rail with SBA   BLE ROM WFL WFL    BLE Strength RLE: grossly 4/5  LLE: grossly 4/5 with exception of knee that was 3+/5 due to pain RLE: grossly 4/5  LLE: grossly 4/5 with exception of knee that was 3+/5 due to pain    Balance Sitting: Independent  Standing: Mariela with AD Sitting: Independent  Standing: Mariela with AD Independent           Patient education  Pt educated on transfer technique,    Patient response to education:   Pt verbalized understanding Pt demonstrated skill Pt requires further education in this area   x X verbal cues Reinforce      ASSESSMENT:    Comments:  Pt in bed upon arrival and agreed to participate in therapy. Pt on 4l iters of O2 and O2 levels were 94-97 %with activity. Pt completed functional mobility as noted above. Improved ability with mobility. Pt still having difficulty with transfers especially with hand transition from bed to walker. Pt able to advance B LE with side stepping . Pt sat on edge of bed x10-15 minutes to promote upright posture. Pt returned to supine with call light in reach. Treatment:  Patient practiced and was instructed in the following treatment:     Bed mobility training -Verbal instruction to facilitate proper technique with bed mobility. Assistance required to complete task.  Transfer training: verbal instruction  for hand placement and technique with transfers to improve safety and balance. Assistance required to complete task.  Gait - verbal instruction for sequencing and technique with walker during gait. Assistance required to complete task. PLAN:    Patient is making good progress towards established goals. Will continue with current POC.       Time in  1455  Time out  1525    Total Treatment Time  30 minutes     CPT codes:  [] Gait training 45874 0 minutes  [] Manual therapy 10517 0 minutes  [x] Therapeutic activities 65371 30 minutes  [] Therapeutic exercises 51455 0 minutes  [] Neuromuscular reeducation 18915 0 minutes    Cait Vigil [id number]

## 2022-03-04 NOTE — CARE COORDINATION
Care Coordination: Spoke to Alberta Mays at [facility], pt will be going to [facility], still waiting for PTx eval to initiate precert as called yesterday afternoon, I called again today and spoke to [employer] again at rehab for need of ptx to initiate precert today. [facility] has been initiated and transport envelope on chart.      Carlitos Shah

## 2022-03-04 NOTE — PROGRESS NOTES
[facility]   Nephrology Progress Note  Patient's Name: Franklin Dukes    From consult note 2/24/22- History of Present Illness:    Chris Villanueva is a 65 y.o. male with a past medical history of CHF, COPD, diabetes mellitus, hypertension, and polio. He presented to the ED on 2/23 with complaints of shortness of breath. Per the ED provider note, patient was also noticed increased leg swelling and that the patient wears 4 L of oxygen at home. Vital signs at presentation to the ED include temperature 98.4, respirations 18, pulse 112, /96, and he was 99% on 4 L O2. Lab data on presentation to the ED include CO2 30, BUN 33, creatinine 2.2, hemoglobin 10.9, and proBNP 1478. We were consulted to see the patient for CKD. Patient is known to our service, and had an office visit in June 2021 with Dr. Duane Ruelas. Baseline creatinine from January 2022 is 2.5-2.8. At present, patient was seen and examined. He is currently sitting up in a chair. He reports that he was having swelling and shortness of breath and that is what brought him into the hospital.  He reports that prior to admission his appetite has been okay. Overall he said he feels fine, except that his legs feel tight. He denies any current chest pain or shortness of breath. No nausea or abdominal pain. He denies any headaches or dizziness. He denies use of NSAIDs. \"    Subjective:    3/4: Patient was seen and examined. He feels better overall. He denies any current chest pain or shortness of breath. No abdominal pain or nausea.     Past Medical History:   Diagnosis Date    Acid reflux     Agent orange exposure     Congestive heart failure (CHF) (HCC)     COPD (chronic obstructive pulmonary disease) (HCC)     Depression     Diabetes mellitus (HCC)     Hyperlipidemia     Hypertension     MI (myocardial infarction) ([facility] [address]     Polio     Sleep apnea      Past Surgical History:   Procedure Laterality Date    CARDIAC PACEMAKER PLACEMENT  CORONARY ANGIOPLASTY WITH STENT PLACEMENT      PACEMAKER PLACEMENT       History reviewed. No pertinent family history. reports that he has quit smoking. He has never used smokeless tobacco. He reports that he does not drink alcohol and does not use drugs. Allergies:  Penicillins    Current Medications:    acetaminophen (TYLENOL) tablet 650 mg, Q4H PRN  aspirin EC tablet 81 mg, Daily  atorvastatin (LIPITOR) tablet 40 mg, Daily  vitamin D (CHOLECALCIFEROL) tablet 2,000 Units, Daily  vitamin B-12 (CYANOCOBALAMIN) tablet 1,000 mcg, Daily  ezetimibe (ZETIA) tablet 10 mg, Daily  folic acid (FOLVITE) tablet 1 mg, Daily  guaiFENesin tablet 400 mg, 4x Daily PRN  insulin glargine-yfgn (SEMGLEE-YFGN) injection vial 50 Units, Nightly  loperamide (IMODIUM) capsule 2 mg, 4x Daily PRN  melatonin tablet 3 mg, Nightly  metoprolol succinate (TOPROL XL) extended release tablet 25 mg, BID  pantoprazole (PROTONIX) tablet 40 mg, QAM AC  ascorbic acid (VITAMIN C) tablet 500 mg, BID  zinc sulfate (ZINCATE) capsule 50 mg, Daily  insulin lispro (HUMALOG) injection vial 0-6 Units, TID WC  insulin lispro (HUMALOG) injection vial 0-3 Units, Nightly  glucose (GLUTOSE) 40 % oral gel 15 g, PRN  dextrose 50 % IV solution, PRN  glucagon (rDNA) injection 1 mg, PRN  dextrose 5 % solution, PRN  ipratropium-albuterol (DUONEB) nebulizer solution 1 ampule, Q4H WA  heparin (porcine) injection 5,000 Units, Q8H    Physical exam:  Vitals:    03/04/22 0815   BP: 103/60   Pulse: 87   Resp: 20   Temp: 97.5 °F (36.4 °C)   SpO2: 95%     General: Awake, alert, no acute distress  Neck: No JVD noted  Lungs: Clear bilaterally upper, diminished in bases bilaterally. Unlabored  CV: regular rate and rhythm. No rub  Abd: Rounded, soft, nondistended. Active bowel sounds; patient reports slightly tender  Skin: Warm and dry.   No rash on exposed extremities  Ext: 2+ pitting BLE edema  Neuro: Awake, alert, answers questions appropriately  Data:   Labs:  Lab Results   Component Value Date     2022     2022     2022    K 3.3 (L) 2022    K 3.2 (L) 2022    K 3.6 2022    CL 91 (L) 2022    CO2 35 (H) 2022    CO2 34 (H) 2022    CO2 32 (H) 2022    CREATININE 2.8 (H) 2022    CREATININE 2.7 (H) 2022    CREATININE 2.4 (H) 2022    BUN 57 (H) 2022    BUN 49 (H) 2022    BUN 38 (H) 2022    GLUCOSE 41 (L) 2022    GLUCOSE 75 2022    GLUCOSE 65 (L) 2022    PHOS 4.4 2022    PHOS 4.5 2022    PHOS 4.1 2022    WBC 10.3 2022    WBC 10.7 2022    WBC 10.1 2022    HGB 12.1 (L) 2022    HGB 11.5 (L) 2022    HGB 11.9 (L) 2022    HCT 38.1 2022    HCT 36.2 (L) 2022    HCT 38.2 2022    MCV 89.9 2022     2022     Imaging:  XR CHEST PORTABLE    Result Date: 2022  EXAMINATION: ONE XRAY VIEW OF THE CHEST 2022 4:49 pm COMPARISON: 2022 HISTORY: ORDERING SYSTEM PROVIDED HISTORY: shortness of breath TECHNOLOGIST PROVIDED HISTORY: Reason for exam:->shortness of breath What reading provider will be dictating this exam?->CRC FINDINGS: There is a large opacity seen within the right lung base. There is a small right pleural effusion. The left upper lobe is clear. There is a small left pleural effusion. The cardiac silhouette is within normals. There is a dual lead cardiac pacer on the left. 1. Large opacity within the right lung base which could represent pneumonia and or atelectasis. 2. Moderate size right pleural effusion. 3. Small left pleural effusion. 4. CT of the thorax is recommended for further evaluation.      US DUP LOWER EXTREMITIES BILATERAL VENOUS    Result Date: 2022  Patient MRN:  [id number] : 1945 Age: 65 years Gender: Male Order Date:  2022 5:28 PM EXAM: US DUP LOWER EXTREMITIES BILATERAL VENOUS NUMBER OF IMAGES:  47 INDICATION:  leg swelling leg swelling What reading provider will be dictating this exam?->[facility] COMPARISON: None Within the visualized vessels, there is no evidence for deep venous thrombosis There is good compressibility, there is good augmentation, there is good color flow. Within the visualized vessels there is no evidence for deep venous thrombosis     Assessment/ Plans:    1. CKD stage IV   due to diabetes mellitus and hypertension  Baseline creatinine from January 2022 is 2.5-2.8   Creatinine 3.0 today  S/p Bumex drip and IV Diuril  UOP 2925 ml past 24 hours; net -21.1 L  We will hold diuretics at present given hypotension    2. HFpEF  ECHO 1/2022 showed EF > 70%  S/p Bumex drip and IV Diuril  Cardiology previously following    3. Hypertension  BP goal<130/80  Now with hypotension  Hold diuretics at present given patient's hypotension   follow closely    4. Diabetes mellitus  Hypoglycemic episode this morning 3/3  On insulin lispro and insulin glargine  Management per primary    5. Pleural effusion  CXR showed moderate size right pleural effusion and small left pleural effusion  Repeat chest x-ray showed \"slight increase\" in right pleural effusion  Chest x-ray 3/3 \"unchanged moderate bilateral pleural effusions\"  Pulmonology following     6.   Hypokalemia  Likely in the setting of diuresis  K+ 3.6 today  Follow    Astrid Goodell, APRN - CNP     Pt seen and examined agree with above  Off bumex drip to inc cr and low  bp  Net out 21 L  Tess Watkins MD

## 2022-03-04 NOTE — PROGRESS NOTES
[place]  Department of Pulmonary, Critical Care and Sleep Medicine  [address] [facility]  Department of Internal Medicine  Progress Note    SUBJECTIVE:    On gtt negative 20 liters  Still with edema  He is still quite short of breath with exertion. OBJECTIVE:  Vitals:    03/03/22 2030 03/03/22 2300 03/04/22 0605 03/04/22 0815   BP: 86/64 90/69  103/60   Pulse: 90 88  87   Resp:  18  20   Temp:  98 °F (36.7 °C)  97.5 °F (36.4 °C)   TempSrc:  Infrared  Oral   SpO2: 96%   95%   Weight:   268 lb 15.4 oz (122 kg)    Height:         Constitutional: Alert,     EENT: EOMI YOSEF. MMM. No icterus. No thrush. Neck: No thyromegaly. No JVD on my exam.  Respiratory: Breath sounds are diminished without use of accessory muscles. Cardiovascular: Regular,      Pulses:  Equal bilaterally. Abdomen: Soft without organomegaly. Lymphatic: No lymphadenopathy. Musculoskeletal: Without weakness or gross deficits  Extremities: 3+ edema bilaterally. L>>R Feet still quite tight  Skin:  Warm and dry. No skin rashes. Neurological/Psychiatric: No acute psychosis. Cranial nerves are intact. DATA:    Monitor Strips:  Reviewed & discusses with technical team. No changes noted.     RADIOLOGY:  Films were read/reviewed/discussed with radiology shows effusion and heart failure    CBC with Differential:    Lab Results   Component Value Date    WBC 10.1 03/04/2022    RBC 4.22 03/04/2022    HGB 11.9 03/04/2022    HCT 38.1 03/04/2022     03/04/2022    MCV 90.3 03/04/2022    MCH 28.2 03/04/2022    MCHC 31.2 03/04/2022    RDW 16.8 03/04/2022    LYMPHOPCT 19.9 02/23/2022    MONOPCT 11.6 02/23/2022    BASOPCT 0.3 02/23/2022    MONOSABS 1.10 02/23/2022    LYMPHSABS 1.89 02/23/2022    EOSABS 0.30 02/23/2022    BASOSABS 0.03 02/23/2022     CMP:    Lab Results   Component Value Date     03/04/2022    K 3.3 03/04/2022    K 4.1 02/23/2022    CL 87 03/04/2022    CO2 35

## 2022-03-04 NOTE — DISCHARGE INSTR - COC
Continuity of Care Form    Patient Name: Kristina Cuellar   :  1945  MRN:  [id number]    Admit date:  2022  Discharge date:  3/10/2022    Code Status Order: DNR-CCA   Advance Directives:      Admitting Physician:  Asa Samano MD  PCP: Asa Samano MD    Discharging Nurse: Margarette Gautam RN   [address] Unit/Room#: 9171/8494-N  Discharging Unit Phone Number: ***    Emergency Contact:   Extended Emergency Contact Information  Primary Emergency Contact: [facility] Phone: [phone]  Mobile Phone: [phone]  Relation: Brother/Sister   needed? No    Past Surgical History:  Past Surgical History:   Procedure Laterality Date    CARDIAC PACEMAKER PLACEMENT      CORONARY ANGIOPLASTY WITH STENT PLACEMENT      PACEMAKER PLACEMENT         Immunization History: There is no immunization history on file for this patient.     Active Problems:  Patient Active Problem List   Diagnosis Code    PNA (pneumonia) J18.9    Acute on chronic heart failure with preserved ejection fraction (HCC) I50.33    Coronary artery disease involving native coronary artery of native heart without angina pectoris I25.10    Cardiac pacemaker in situ Z95.0    Primary hypertension I10    SPEEDY (obstructive sleep apnea) G47.33    Chronic respiratory failure with hypoxia (HCC) J96.11    CHF (congestive heart failure), NYHA class I, acute on chronic, combined ([facility] [address] I50.43       Isolation/Infection:   Isolation            No Isolation          Patient Infection Status       Infection Onset Added Last Indicated Last Indicated By Review Planned Expiration Resolved Resolved By    None active    Resolved    COVID-19 (Rule Out) 22 Respiratory Panel, Molecular, with COVID-19 (Restricted: peds pts or suitable admitted adults) (Ordered)   22 Rule-Out Test Resulted    COVID-19 (Rule Out) 22 COVID-19, Rapid (Ordered)   22 Rule-Out Test Resulted C-diff Rule Out 21 Clostridium difficile EIA (Ordered)   21 Rule-Out Test Resulted            Nurse Assessment:  Last Vital Signs: /60   Pulse 87   Temp 97.5 °F (36.4 °C) (Oral)   Resp 20   Ht 5' 9\" (1.753 m)   Wt 268 lb 15.4 oz (122 kg)   SpO2 95%   BMI 39.72 kg/m²     Last documented pain score (0-10 scale): Pain Level: 0  Last Weight:   Wt Readings from Last 1 Encounters:   22 268 lb 15.4 oz (122 kg)     Mental Status:  oriented and alert    IV Access:  - None    Nursing Mobility/ADLs:  Walking   Assisted  Transfer  Assisted  Bathing  Assisted  Dressing  Assisted  Toileting  Assisted  Feeding  Independent  Med Admin  Independent  Med Delivery   whole    Wound Care Documentation and Therapy:  Wound 22 Perineum Posterior red/purple area,blanches well. (Active)   Number of days: 8        Elimination:  Continence: Bowel: Yes  Bladder: Yes  Urinary Catheter: Insertion Date: 2022    Colostomy/Ileostomy/Ileal Conduit: No       Date of Last BM:     Intake/Output Summary (Last 24 hours) at 3/4/2022 1236  Last data filed at 3/4/2022 0929  Gross per 24 hour   Intake 660 ml   Output 1775 ml   Net -1115 ml     I/O last 3 completed shifts: In: 12 [P.O.:960]  Out: 5575 [Urine:5575]    Safety Concerns: At Risk for Falls    Impairments/Disabilities:      None    Nutrition Therapy:  Current Nutrition Therapy:   - Oral Diet:  Carb Control 4 carbs/meal (1800kcals/day)    Routes of Feeding: Oral  Liquids: No Restrictions  Daily Fluid Restriction: no  Last Modified Barium Swallow with Video (Video Swallowing Test): not done    Treatments at the Time of Hospital Discharge:   Respiratory Treatments: ***  Oxygen Therapy:  is on oxygen at 3 L/min per nasal cannula.   Ventilator:    { ARMAND Vent EDSX:[id number]}    Rehab Therapies: {THERAPEUTIC INTERVENTION:4813806890}  Weight Bearing Status/Restrictions: {[facility] Weight Bearin}  Other Medical Equipment (for information only, NOT a DME order):  {EQUIPMENT:740611584}  Other Treatments: ***    Patient's personal belongings (please select all that are sent with patient):  Maciel    RN SIGNATURE:  Electronically signed by Markel Singletary RN on 3/10/22 at 3:17 PM EST    CASE MANAGEMENT/SOCIAL WORK SECTION    Inpatient Status Date:     Readmission Risk Assessment Score:  Readmission Risk              Risk of Unplanned Readmission:  24           Discharging to Facility/ Agency   Name:   Address:  Phone:  Fax:    Dialysis Facility (if applicable)   Name:  Address:  Dialysis Schedule:  Phone:  Fax:    / signature:     PHYSICIAN SECTION    Prognosis:     Condition at Discharge:     Rehab Potential (if transferring to Rehab):     Recommended Labs or Other Treatments After Discharge:     Physician Certification: I certify the above information and transfer of Alice Gan  is necessary for the continuing treatment of the diagnosis listed and that he requires Sulaiman Pedro for  30 days.      Update Admission H&P:     PHYSICIAN SIGNATURE:  Kvng Larson MD

## 2022-03-04 NOTE — PLAN OF CARE
Problem: OXYGENATION/RESPIRATORY FUNCTION  Goal: Patient will maintain patent airway  Outcome: Met This Shift  Goal: Patient will achieve/maintain normal respiratory rate/effort  Description: Respiratory rate and effort will be within normal limits for the patient  Outcome: Met This Shift     Problem: ACTIVITY INTOLERANCE/IMPAIRED MOBILITY  Goal: Mobility/activity is maintained at optimum level for patient  Outcome: Met This Shift     Problem: Pain:  Goal: Control of chronic pain  Description: Control of chronic pain  Outcome: Met This Shift     Problem: HEMODYNAMIC STATUS  Goal: Patient has stable vital signs and fluid balance  Outcome: Ongoing     Problem: FLUID AND ELECTROLYTE IMBALANCE  Goal: Fluid and electrolyte balance are achieved/maintained  Outcome: Ongoing     Problem: Pain:  Goal: Pain level will decrease  Description: Pain level will decrease  Outcome: Ongoing  Goal: Control of acute pain  Description: Control of acute pain  Outcome: Ongoing

## 2022-03-04 NOTE — DISCHARGE SUMMARY
Discharge Summary    Date: 3/4/2022  Patient Name: Rose Mendez YOB: 1945 Age: 65 y.o. Admit Date: 2/23/2022  Discharge Date: 3/4/2022  Discharge Condition: Stable    Admission Diagnosis  CHF (congestive heart failure), NYHA class I, acute on chronic, combined (HCC) (I50.43)     Discharge Diagnosis  Principal Problem: CHF (congestive heart failure), NYHA class I, acute on chronic, combined (HCC)Resolved Problems: * No resolved hospital problems. [facility] Stay  Narrative of Hospital Course:  Patient admitted with  (CHF/ volume overload  Pleural effusion  Acute renal insfficnecy  DM  COPD  HTN  Hyperlipidemia     Improved with diuresis per cardiology and renal.    Consultants:  Robin [facility] TO CARDIOLOGYIP CONSULT TO NEPHROLOGYIP CONSULT TO HEART FAILURE NURSE/COORDINATORIP CONSULT TO NEPHROLOGY    Surgeries/procedures Performed:       Treatments:           Discharge Plan/Disposition:  To [facility]/Incidental Findings Requiring Follow Up:    Patient Instructions:    Diet: Diabetic Diet    Activity:Activity as Tolerated  For number of days (if applicable): Other Instructions:    Provider Follow-Up:   No follow-ups on file. Significant Diagnostic Studies:    Recent Labs:  Admission on 02/23/2022No results displayed because visit has over 200 results. ------------    Radiology last 7 days:  XR CHEST (2 VW)Result Date: 2/28/2022Slight increase seen in size of the right pleural effusion, now moderate in size and small in size on the left. Ill-defined opacification again seen within the lung bases bilaterally, right greater than left. XR CHEST PORTABLEResult Date: 3/3/2022Unchanged moderate bilateral pleural effusions greater on the right.      [unfilled]    Discharge Medications    Current Discharge Medication ListSTART taking these medicationsipratropium-albuterol (DUONEB) 0.5-2.5 (3) MG/3ML SOLN nebulizer solutionInhale 3 mLs into the lungs every 4 hours (while awake)Qty: 360 mL Refills: 0    Current Discharge Medication ListCONTINUE these medications which have CHANGEDbumetanide (BUMEX) 2 MG tabletTake 1 tablet by mouth 2 times dailyQty: 30 tablet Refills: 3    Current Discharge Medication ListCONTINUE these medications which have NOT CHANGEDmagnesium hydroxide (MILK OF MAGNESIA) 400 MG/5ML suspensionTake 30 mLs by mouth daily as needed for Constipationpotassium chloride (KLOR-CON) 20 MEQ packetTake 20 mEq by mouth dailyDextromethorphan-guaiFENesin  MG/5ML SYRPTake 5 mLs by mouth every 4 hours as needed for Coughmelatonin 3 MG TABS tabletTake 3 mg by mouth at bedtimeOXYGENInhale into the lungs 1-5 l/min to maintain O2 sat greater than or =to 90%BiPAP Machine MISCby Does not apply routeinsulin lispro, 1 Unit Dial, (HUMALOG KWIKPEN) 100 UNIT/ML SOPNInject into the skin 4 times daily Per sliding scale:160 - 200 = 2 xjupa184 - 250 = 4 gdhxu181 - 300 = 6 xkyta412 - 350 = 8 xolgv695 - 400 = 10 unitsmetoprolol succinate (TOPROL XL) 25 MG extended release tabletTake 1 tablet by mouth 2 times dailyQty: 30 tablet Refills: 3acetaminophen (TYLENOL) 325 MG tabletTake 650 mg by mouth every 4 hours as needed for Pain For temp> 100.4 F,  And for painvitamin C (ASCORBIC ACID) 500 MG tabletTake 500 mg by mouth 2 times dailyCholecalciferol (VITAMIN D) 50 MCG (2000 UT) CAPS capsuleTake 2,000 Units by mouth daily zinc sulfate (ZINCATE) 220 (50 Zn) MG capsuleTake 50 mg by mouth dailyaspirin 81 MG EC tabletTake 1 tablet by mouth dailyQty: 30 tablet Refills: 3insulin glargine-yfgn (SEMGLEE-YFGN) 100 UNIT/ML injection vialInject 50 Units into the skin nightlyQty: 1 each Refills: 0ezetimibe (ZETIA) 10 MG tabletTake 10 mg by mouth dailyfolic acid (FOLVITE) 1 MG tabletTake 1 mg by mouth dailycyanocobalamin 1000 MCG tabletTake 1,000 mcg by mouth dailyatorvastatin (LIPITOR) 40 MG tabletTake 40 mg by mouth daily loperamide (IMODIUM) 2 MG capsuleTake 2 mg by mouth 4 times daily as needed for Diarrheapantoprazole (PROTONIX) 40 MG tabletTake 40 mg by mouth dailyguaiFENesin 400 MG tabletTake 1 tablet by mouth 4 times daily as needed for CoughQty: 56 tablet Refills: 0    Current Discharge Medication ListSTOP taking these medicationspotassium chloride (KLOR-CON M) 20 MEQ extended release tabletComments:Reason for Stopping:    Time Spent on Discharge:1E] minutes were spent in patient examination, evaluation, counseling as well as medication reconciliation, prescriptions for required medications, discharge plan, and follow up.     Electronically signed by Clifford Cabrales MD on 3/4/22 at 12:45 PM EST

## 2022-03-04 NOTE — CARE COORDINATION
Care Coordination: otx updated daily, but still in need of ptx. I called and left message for Bryan Limon at rehab [phone], awaiting dc disposition, pt is off bumex gtt. Will need hens completed. I have messaged Ulysses Munson to check facility and hens has been started for [facility], will need to switch to [facility] if that is not correct.  Transport envelope on chart      Adis Resendez

## 2022-03-05 NOTE — PLAN OF CARE
Problem: OXYGENATION/RESPIRATORY FUNCTION  Goal: Patient will maintain patent airway  3/5/2022 0123 by Radha Nguyen RN  Outcome: Met This Shift  3/4/2022 1758 by Gilford Salisbury, RN  Outcome: Met This Shift  Goal: Patient will achieve/maintain normal respiratory rate/effort  Description: Respiratory rate and effort will be within normal limits for the patient  Outcome: Met This Shift     Problem: HEMODYNAMIC STATUS  Goal: Patient has stable vital signs and fluid balance  3/5/2022 0123 by Radha Nguyen RN  Outcome: Met This Shift  3/4/2022 1758 by Gilford Salisbury, RN  Outcome: Met This Shift     Problem: Pain:  Goal: Pain level will decrease  Description: Pain level will decrease  Outcome: Met This Shift  Goal: Control of acute pain  Description: Control of acute pain  Outcome: Met This Shift  Goal: Control of chronic pain  Description: Control of chronic pain  Outcome: Met This Shift     Problem: FLUID AND ELECTROLYTE IMBALANCE  Goal: Fluid and electrolyte balance are achieved/maintained  3/5/2022 0123 by Radha Nguyen RN  Outcome: Ongoing  3/4/2022 1758 by Gilford Salisbury, RN  Outcome: Not Met This Shift     Problem: ACTIVITY INTOLERANCE/IMPAIRED MOBILITY  Goal: Mobility/activity is maintained at optimum level for patient  3/5/2022 0123 by Radha Nguyen RN  Outcome: Ongoing  3/4/2022 1758 by Gilford Salisbury, RN  Outcome: Not Met This Shift

## 2022-03-05 NOTE — PROGRESS NOTES
[facility]   Nephrology Progress Note  Patient's Name: Huan Colmenares    From consult note 2/24/22- History of Present Illness:    Acacia Norton is a 65 y.o. male with a past medical history of CHF, COPD, diabetes mellitus, hypertension, and polio. He presented to the ED on 2/23 with complaints of shortness of breath. Per the ED provider note, patient was also noticed increased leg swelling and that the patient wears 4 L of oxygen at home. Vital signs at presentation to the ED include temperature 98.4, respirations 18, pulse 112, /96, and he was 99% on 4 L O2. Lab data on presentation to the ED include CO2 30, BUN 33, creatinine 2.2, hemoglobin 10.9, and proBNP 1478. We were consulted to see the patient for CKD. Patient is known to our service, and had an office visit in June 2021 with Dr. Isabella Anguiano. Baseline creatinine from January 2022 is 2.5-2.8. At present, patient was seen and examined. He is currently sitting up in a chair. He reports that he was having swelling and shortness of breath and that is what brought him into the hospital.  He reports that prior to admission his appetite has been okay. Overall he said he feels fine, except that his legs feel tight. He denies any current chest pain or shortness of breath. No nausea or abdominal pain. He denies any headaches or dizziness. He denies use of NSAIDs. \"    Subjective:    3/5: Patient was seen and examined - no adverse events overnight - no chest pain or shortness of breath at rest - anticipating likely discharge later today      Past Medical History:   Diagnosis Date    Acid reflux     Agent orange exposure     Congestive heart failure (CHF) (HCC)     COPD (chronic obstructive pulmonary disease) (HCC)     Depression     Diabetes mellitus (HCC)     Hyperlipidemia     Hypertension     MI (myocardial infarction) ([address]     Polio     Sleep apnea      Past Surgical History:   Procedure Laterality Date    CARDIAC PACEMAKER PLACEMENT      CORONARY ANGIOPLASTY WITH STENT PLACEMENT      PACEMAKER PLACEMENT       History reviewed. No pertinent family history. reports that he has quit smoking. He has never used smokeless tobacco. He reports that he does not drink alcohol and does not use drugs. Allergies:  Penicillins    Current Medications:    bumetanide (BUMEX) tablet 2 mg, BID  acetaminophen (TYLENOL) tablet 650 mg, Q4H PRN  aspirin EC tablet 81 mg, Daily  atorvastatin (LIPITOR) tablet 40 mg, Daily  vitamin D (CHOLECALCIFEROL) tablet 2,000 Units, Daily  vitamin B-12 (CYANOCOBALAMIN) tablet 1,000 mcg, Daily  ezetimibe (ZETIA) tablet 10 mg, Daily  folic acid (FOLVITE) tablet 1 mg, Daily  guaiFENesin tablet 400 mg, 4x Daily PRN  insulin glargine-yfgn (SEMGLEE-YFGN) injection vial 50 Units, Nightly  loperamide (IMODIUM) capsule 2 mg, 4x Daily PRN  melatonin tablet 3 mg, Nightly  metoprolol succinate (TOPROL XL) extended release tablet 25 mg, BID  pantoprazole (PROTONIX) tablet 40 mg, QAM AC  ascorbic acid (VITAMIN C) tablet 500 mg, BID  zinc sulfate (ZINCATE) capsule 50 mg, Daily  insulin lispro (HUMALOG) injection vial 0-6 Units, TID WC  insulin lispro (HUMALOG) injection vial 0-3 Units, Nightly  glucose (GLUTOSE) 40 % oral gel 15 g, PRN  dextrose 50 % IV solution, PRN  glucagon (rDNA) injection 1 mg, PRN  dextrose 5 % solution, PRN  ipratropium-albuterol (DUONEB) nebulizer solution 1 ampule, Q4H WA  heparin (porcine) injection 5,000 Units, Q8H    Physical exam:  Vitals:    03/05/22 0830   BP: 107/61   Pulse: 78   Resp: 18   Temp: 98.2 °F (36.8 °C)   SpO2: 96%     General: Awake, alert, no acute distress  Neck: No JVD noted  Lungs: Clear bilaterally upper, diminished in bases bilaterally. Unlabored  CV: regular rate and rhythm. No rub  Abd: Rounded, soft, nondistended. Active bowel sounds; patient reports slightly tender  Skin: Warm and dry.   No rash on exposed extremities  Ext: 2+ pitting BLE edema  Neuro: Awake, alert, answers questions appropriately        Labs:  Lab Results   Component Value Date     2022     2022     2022    K 3.6 2022    K 3.3 (L) 2022    K 3.6 2022    CL 89 (L) 2022    CO2 33 (H) 2022    CO2 35 (H) 2022    CO2 38 (H) 2022    CREATININE 3.1 (H) 2022    CREATININE 3.0 (H) 2022    CREATININE 3.0 (H) 2022    BUN 70 (H) 2022    BUN 64 (H) 2022    BUN 63 (H) 2022    GLUCOSE 85 2022    GLUCOSE 146 (H) 2022    GLUCOSE 50 (L) 2022    PHOS 4.4 2022    PHOS 4.5 2022    PHOS 4.1 2022    WBC 10.7 2022    WBC 10.1 2022    WBC 10.3 2022    HGB 11.3 (L) 2022    HGB 11.9 (L) 2022    HGB 12.1 (L) 2022    HCT 35.4 (L) 2022    HCT 38.1 2022    HCT 38.1 2022    MCV 88.7 2022     2022     Imaging:  XR CHEST PORTABLE    Result Date: 2022  EXAMINATION: ONE XRAY VIEW OF THE CHEST 2022 4:49 pm COMPARISON: 2022 HISTORY: ORDERING SYSTEM PROVIDED HISTORY: shortness of breath TECHNOLOGIST PROVIDED HISTORY: Reason for exam:->shortness of breath What reading provider will be dictating this exam?->CRC FINDINGS: There is a large opacity seen within the right lung base. There is a small right pleural effusion. The left upper lobe is clear. There is a small left pleural effusion. The cardiac silhouette is within normals. There is a dual lead cardiac pacer on the left. 1. Large opacity within the right lung base which could represent pneumonia and or atelectasis. 2. Moderate size right pleural effusion. 3. Small left pleural effusion. 4. CT of the thorax is recommended for further evaluation.      US DUP LOWER EXTREMITIES BILATERAL VENOUS    Result Date: 2022  Patient MRN:  [id number] : 1945 Age: 65 years Gender: Male Order Date:  2022 5:28 PM EXAM: US DUP LOWER EXTREMITIES BILATERAL VENOUS NUMBER OF IMAGES:  52 INDICATION:  leg swelling leg swelling What reading provider will be dictating this exam?->[facility] COMPARISON: None Within the visualized vessels, there is no evidence for deep venous thrombosis There is good compressibility, there is good augmentation, there is good color flow. Within the visualized vessels there is no evidence for deep venous thrombosis     Assessment/ Plans:    1. CKD stage IV   due to diabetes mellitus and hypertension  Baseline creatinine from January 2022 is 2.5-2.8   Creatinine 3.1 today  S/p Bumex drip and IV Diuril  UOP 1050 ml past 24 hours; net -21.1 L    2. HFpEF  ECHO 1/2022 showed EF > 70%  S/p Bumex drip and IV Diuril  Currently on oral Bumex twice daily    3. Hypertension  BP goal<130/80    4. Diabetes mellitus  Hypoglycemic episode this morning 3/3  On insulin lispro and insulin glargine  Management per primary    5. Pleural effusion  CXR showed moderate size right pleural effusion and small left pleural effusion  Repeat chest x-ray showed \"slight increase\" in right pleural effusion  Chest x-ray 3/3 \"unchanged moderate bilateral pleural effusions\"  Pulmonology following     6.   Hypokalemia  Likely in the setting of diuresis  K+ 3.6 today  Follow    Geovanny Bae APRN - CNP     Pt seen and examined agree with above  Creat at 3.1  Sp iv diuresis of 21 L  Follow on oral lasix for residual edema  Ok for Austyn Villatoro MD

## 2022-03-05 NOTE — CARE COORDINATION
Social Work Discharge Planning:  Discharge order noted. HOMERO followed up with [facility], precert is still pending, SW completed the HENS  Ambulance form in soft chart.  SW following  Electronically signed by ZULLY Gomez on 3/5/2022 at 10:44 AM

## 2022-03-05 NOTE — PROGRESS NOTES
[place]  Department of Pulmonary, Critical Care and Sleep Medicine  [address] [facility]  Department of Internal Medicine  Progress Note    SUBJECTIVE:    Discharge? OBJECTIVE:  Vitals:    03/05/22 0544 03/05/22 0830 03/05/22 1311 03/05/22 1415   BP:  107/61  105/62   Pulse:  78  95   Resp:  18  18   Temp:  98.2 °F (36.8 °C)  97.7 °F (36.5 °C)   TempSrc:  Oral  Oral   SpO2:  96% 90% 94%   Weight: 271 lb 2.7 oz (123 kg)      Height:         Constitutional: Alert,     EENT: EOMI YOSEF. MMM. No icterus. No thrush. Neck: No thyromegaly. No JVD on my exam.  Respiratory: Breath sounds are diminished without use of accessory muscles. Cardiovascular: Regular,      Pulses:  Equal bilaterally. Abdomen: Soft without organomegaly. Lymphatic: No lymphadenopathy. Musculoskeletal: Without weakness or gross deficits  Extremities: 3+ edema bilaterally. L>>R Feet still quite tight  Skin:  Warm and dry. No skin rashes. Neurological/Psychiatric: No acute psychosis. Cranial nerves are intact. DATA:    Monitor Strips:  Reviewed & discusses with technical team. No changes noted.     RADIOLOGY:  Films were read/reviewed/discussed with radiology shows effusion and heart failure    CBC with Differential:    Lab Results   Component Value Date    WBC 10.7 03/05/2022    RBC 3.99 03/05/2022    HGB 11.3 03/05/2022    HCT 35.4 03/05/2022     03/05/2022    MCV 88.7 03/05/2022    MCH 28.3 03/05/2022    MCHC 31.9 03/05/2022    RDW 16.5 03/05/2022    LYMPHOPCT 19.9 02/23/2022    MONOPCT 11.6 02/23/2022    BASOPCT 0.3 02/23/2022    MONOSABS 1.10 02/23/2022    LYMPHSABS 1.89 02/23/2022    EOSABS 0.30 02/23/2022    BASOSABS 0.03 02/23/2022     CMP:    Lab Results   Component Value Date     03/05/2022    K 3.6 03/05/2022    K 4.1 02/23/2022    CL 89 03/05/2022    CO2 33 03/05/2022    BUN 70 03/05/2022    CREATININE 3.1 03/05/2022    GFRAA 24 03/05/2022    LABGLOM 20 03/05/2022    GLUCOSE 85 03/05/2022    PROT 6.9 01/21/2022    LABALBU 3.5 02/25/2022    CALCIUM 8.6 03/05/2022    BILITOT 0.6 01/21/2022    ALKPHOS 127 01/21/2022    AST 32 01/21/2022    ALT 42 01/21/2022          Assessment:   1. Bilateral pleural effusion  2. Volume overload  3. CKD  4. Anasarca  5. Diabetes  6. Chronic hypoxemic respiratory insufficiency  7. SPEEDY     Plan:   1. Continue to wean FiO2 as tolerated. Currently on 3 L nasal cannula. Patient was not requiring oxygen prior to his initial admission in January. 2. PFTs have previously been ordered however these have not been obtained. Would recommend full PFTs as outpatient after discharge. 3. Recommend euglycemia  4. Continue home CPAP home machine  5. Diuretics as per nephrology  6.  Ok to discharge,     TJB  Pulmonary, Critical Care and Sleep Medicine

## 2022-03-06 NOTE — PROGRESS NOTES
[place]  Department of Pulmonary, Critical Care and Sleep Medicine  [address] [facility]  Department of Internal Medicine  Progress Note    SUBJECTIVE:    Discharge?  -22 liters    OBJECTIVE:  Vitals:    03/06/22 0545 03/06/22 0600 03/06/22 0748 03/06/22 1415   BP: 88/69  (!) 94/57 97/62   Pulse: 83  82 89   Resp: 18  20 18   Temp: 97.5 °F (36.4 °C)  97.7 °F (36.5 °C) 98 °F (36.7 °C)   TempSrc: Infrared  Axillary Oral   SpO2: 93%  95% 97%   Weight:  266 lb 12.1 oz (121 kg)     Height:         Constitutional: Alert,     EENT: EOMI YOSEF. MMM. No icterus. No thrush. Neck: No thyromegaly. No JVD on my exam.  Respiratory: Breath sounds are diminished without use of accessory muscles. Cardiovascular: Regular,      Pulses:  Equal bilaterally. Abdomen: Soft without organomegaly. Lymphatic: No lymphadenopathy. Musculoskeletal: Without weakness or gross deficits  Extremities: + edema bilaterally. L>>R Feet still quite tight  Skin:  Warm and dry. No skin rashes. Neurological/Psychiatric: No acute psychosis. Cranial nerves are intact. DATA:    Monitor Strips:  Reviewed & discusses with technical team. No changes noted.     RADIOLOGY:  Films were read/reviewed/discussed with radiology shows effusion and heart failure    CBC with Differential:    Lab Results   Component Value Date    WBC 11.4 03/06/2022    RBC 3.95 03/06/2022    HGB 11.3 03/06/2022    HCT 35.4 03/06/2022     03/06/2022    MCV 89.6 03/06/2022    MCH 28.6 03/06/2022    MCHC 31.9 03/06/2022    RDW 16.5 03/06/2022    LYMPHOPCT 19.9 02/23/2022    MONOPCT 11.6 02/23/2022    BASOPCT 0.3 02/23/2022    MONOSABS 1.10 02/23/2022    LYMPHSABS 1.89 02/23/2022    EOSABS 0.30 02/23/2022    BASOSABS 0.03 02/23/2022     CMP:    Lab Results   Component Value Date     03/06/2022    K 3.5 03/06/2022    K 4.1 02/23/2022    CL 91 03/06/2022    CO2 34 03/06/2022    BUN 75 03/06/2022    CREATININE 3.2 03/06/2022    GFRAA 23 03/06/2022    LABGLOM 19 03/06/2022    GLUCOSE 93 03/06/2022    PROT 6.9 01/21/2022    LABALBU 3.5 02/25/2022    CALCIUM 8.7 03/06/2022    BILITOT 0.6 01/21/2022    ALKPHOS 127 01/21/2022    AST 32 01/21/2022    ALT 42 01/21/2022          Assessment:   1. Bilateral pleural effusion  2. Volume overload  3. CKD  4. Anasarca  5. Diabetes  6. Chronic hypoxemic respiratory insufficiency  7. SPEEDY     Plan:   1. Continue to wean FiO2 as tolerated. Currently on 3 L nasal cannula. Patient was not requiring oxygen prior to his initial admission in January. 2. PFTs have previously been ordered however these have not been obtained. Would recommend full PFTs as outpatient after discharge. 3. Recommend euglycemia  4. Continue home CPAP home machine  5. Diuretics as per nephrology  6.  Ok to discharge,     TJB  Pulmonary, Critical Care and Sleep Medicine

## 2022-03-06 NOTE — PROGRESS NOTES
[facility]   Nephrology Progress Note  Patient's Name: Brayan Courtney    From consult note 2/24/22- History of Present Illness:    Akila Smith is a 65 y.o. male with a past medical history of CHF, COPD, diabetes mellitus, hypertension, and polio. He presented to the ED on 2/23 with complaints of shortness of breath. Per the ED provider note, patient was also noticed increased leg swelling and that the patient wears 4 L of oxygen at home. Vital signs at presentation to the ED include temperature 98.4, respirations 18, pulse 112, /96, and he was 99% on 4 L O2. Lab data on presentation to the ED include CO2 30, BUN 33, creatinine 2.2, hemoglobin 10.9, and proBNP 1478. We were consulted to see the patient for CKD. Patient is known to our service, and had an office visit in June 2021 with Dr. Shasha Morales. Baseline creatinine from January 2022 is 2.5-2.8. At present, patient was seen and examined. He is currently sitting up in a chair. He reports that he was having swelling and shortness of breath and that is what brought him into the hospital.  He reports that prior to admission his appetite has been okay. Overall he said he feels fine, except that his legs feel tight. He denies any current chest pain or shortness of breath. No nausea or abdominal pain. He denies any headaches or dizziness. He denies use of NSAIDs. \"    Subjective:    3/6: Patient was seen and examined - no adverse events overnight - currently 95% on 4L NC - ongoing brisk response to diuretics       Past Medical History:   Diagnosis Date    Acid reflux     Agent orange exposure     Congestive heart failure (CHF) (HCC)     [facility]) [facility] [address]     Depression     Diabetes mellitus ([facility] [address] [facility]     Hyperlipidemia     Hypertension     MI [facility]myocardial infarction) ([facility] [address] [facility]     Polio     Sleep apnea      Past Surgical History:   Procedure Laterality Date    CARDIAC PACEMAKER PLACEMENT      CORONARY ANGIOPLASTY WITH STENT PLACEMENT      PACEMAKER PLACEMENT       History reviewed. No pertinent family history. reports that he has quit smoking. He has never used smokeless tobacco. He reports that he does not drink alcohol and does not use drugs. Allergies:  Penicillins    Current Medications:    bumetanide (BUMEX) tablet 2 mg, BID  acetaminophen (TYLENOL) tablet 650 mg, Q4H PRN  aspirin EC tablet 81 mg, Daily  atorvastatin (LIPITOR) tablet 40 mg, Daily  vitamin D (CHOLECALCIFEROL) tablet 2,000 Units, Daily  vitamin B-12 (CYANOCOBALAMIN) tablet 1,000 mcg, Daily  ezetimibe (ZETIA) tablet 10 mg, Daily  folic acid (FOLVITE) tablet 1 mg, Daily  guaiFENesin tablet 400 mg, 4x Daily PRN  insulin glargine-yfgn (SEMGLEE-YFGN) injection vial 50 Units, Nightly  loperamide (IMODIUM) capsule 2 mg, 4x Daily PRN  melatonin tablet 3 mg, Nightly  metoprolol succinate (TOPROL XL) extended release tablet 25 mg, BID  pantoprazole (PROTONIX) tablet 40 mg, QAM AC  ascorbic acid (VITAMIN C) tablet 500 mg, BID  zinc sulfate (ZINCATE) capsule 50 mg, Daily  insulin lispro (HUMALOG) injection vial 0-6 Units, TID WC  insulin lispro (HUMALOG) injection vial 0-3 Units, Nightly  glucose (GLUTOSE) 40 % oral gel 15 g, PRN  dextrose 50 % IV solution, PRN  glucagon (rDNA) injection 1 mg, PRN  dextrose 5 % solution, PRN  ipratropium-albuterol (DUONEB) nebulizer solution 1 ampule, Q4H WA  heparin (porcine) injection 5,000 Units, Q8H    Physical exam:  Vitals:    03/06/22 0748   BP: (!) 94/57   Pulse: 82   Resp: 20   Temp: 97.7 °F (36.5 °C)   SpO2: 95%     General: Awake, alert, no acute distress  Neck: No JVD noted  Lungs: Clear bilaterally upper, diminished in bases bilaterally. Unlabored  CV: regular rate and rhythm. No rub  Abd: Rounded, soft, nondistended. Active bowel sounds; patient reports slightly tender  Skin: Warm and dry.   No rash on exposed extremities  Ext: 2+ pitting BLE edema  Neuro: Awake, alert, answers questions appropriately        Labs:  Lab Results   Component Value Date     2022     2022     2022    K 3.5 2022    K 3.6 2022    K 3.3 (L) 2022    CL 91 (L) 2022    CO2 34 (H) 2022    CO2 33 (H) 2022    CO2 35 (H) 2022    CREATININE 3.2 (H) 2022    CREATININE 3.1 (H) 2022    CREATININE 3.0 (H) 2022    BUN 75 (H) 2022    BUN 70 (H) 2022    BUN 64 (H) 2022    GLUCOSE 93 2022    GLUCOSE 85 2022    GLUCOSE 146 (H) 2022    PHOS 4.4 2022    PHOS 4.5 2022    PHOS 4.1 2022    WBC 11.4 2022    WBC 10.7 2022    WBC 10.1 2022    HGB 11.3 (L) 2022    HGB 11.3 (L) 2022    HGB 11.9 (L) 2022    HCT 35.4 (L) 2022    HCT 35.4 (L) 2022    HCT 38.1 2022    MCV 89.6 2022     2022     Imaging:  XR CHEST PORTABLE    Result Date: 2022  EXAMINATION: ONE XRAY VIEW OF THE CHEST 2022 4:49 pm COMPARISON: 2022 HISTORY: ORDERING SYSTEM PROVIDED HISTORY: shortness of breath TECHNOLOGIST PROVIDED HISTORY: Reason for exam:->shortness of breath What reading provider will be dictating this exam?->CRC FINDINGS: There is a large opacity seen within the right lung base. There is a small right pleural effusion. The left upper lobe is clear. There is a small left pleural effusion. The cardiac silhouette is within normals. There is a dual lead cardiac pacer on the left. 1. Large opacity within the right lung base which could represent pneumonia and or atelectasis. 2. Moderate size right pleural effusion. 3. Small left pleural effusion. 4. CT of the thorax is recommended for further evaluation.      US DUP LOWER EXTREMITIES BILATERAL VENOUS    Result Date: 2022  Patient MRN:  [id number] : 1945 Age: 65 years Gender: Male Order Date:  2022 5:28 PM EXAM: US DUP LOWER EXTREMITIES BILATERAL VENOUS NUMBER OF IMAGES:  47 INDICATION:  leg swelling leg swelling What reading provider will be dictating this exam?->[facility] COMPARISON: None Within the visualized vessels, there is no evidence for deep venous thrombosis There is good compressibility, there is good augmentation, there is good color flow. Within the visualized vessels there is no evidence for deep venous thrombosis     Assessment/ Plans:    1. CKD stage IV   due to diabetes mellitus and hypertension  Baseline creatinine from January 2022 is 2.5 -> 2.8   Creatinine 3.2 today  S/p Bumex drip and IV Diuril  UOP 1775 ml past 24 hours; net -22.6 L    2. HFpEF  ECHO 1/2022 showed EF > 70%  S/p Bumex drip and IV Diuril  Currently on oral Bumex twice daily    3. Hypertension  BP goal<130/80    4. Diabetes mellitus  Hypoglycemic episode this morning 3/3  On insulin lispro and insulin glargine  Management per primary    5. Pleural effusion  CXR showed moderate size right pleural effusion and small left pleural effusion  Repeat chest x-ray showed \"slight increase\" in right pleural effusion  Chest x-ray 3/3 \"unchanged moderate bilateral pleural effusions\"  Pulmonology following     6.   Hypokalemia  Likely in the setting of diuresis  K+ 3.5 today  Supplement and add-on Mg showed 2.4      Vaibhav Reynaga APRN - CNP   Pt seen and examined agree with above  Sp iv diuresis  Now on oral  Follow cr  Shannen Cervantes MD

## 2022-03-06 NOTE — PLAN OF CARE
Problem: OXYGENATION/RESPIRATORY FUNCTION  Goal: Patient will maintain patent airway  3/6/2022 0025 by Shireen Peterson RN  Outcome: Met This Shift  3/5/2022 1538 by Melisa Boucher RN  Outcome: Ongoing  Goal: Patient will achieve/maintain normal respiratory rate/effort  Description: Respiratory rate and effort will be within normal limits for the patient  3/6/2022 0025 by Shireen Peterson RN  Outcome: Met This Shift  3/5/2022 1538 by Melisa Boucher RN  Outcome: Ongoing     Problem: HEMODYNAMIC STATUS  Goal: Patient has stable vital signs and fluid balance  3/6/2022 0025 by Shireen Peterson RN  Outcome: Met This Shift  3/5/2022 1538 by Melisa Boucher RN  Outcome: Ongoing     Problem: FLUID AND ELECTROLYTE IMBALANCE  Goal: Fluid and electrolyte balance are achieved/maintained  3/6/2022 0025 by Shireen Peterson RN  Outcome: Met This Shift  3/5/2022 1538 by Melisa Boucher RN  Outcome: Ongoing     Problem: ACTIVITY INTOLERANCE/IMPAIRED MOBILITY  Goal: Mobility/activity is maintained at optimum level for patient  3/6/2022 0025 by Shireen Peterson RN  Outcome: Met This Shift  3/5/2022 1538 by Melisa Boucher RN  Outcome: Ongoing     Problem: Pain:  Goal: Pain level will decrease  Description: Pain level will decrease  3/6/2022 0025 by Shireen Peterson RN  Outcome: Met This Shift  3/5/2022 1538 by Melisa Boucher RN  Outcome: Ongoing  Goal: Control of acute pain  Description: Control of acute pain  3/6/2022 0025 by Shireen Peterson RN  Outcome: Met This Shift  3/5/2022 1538 by Melisa Boucher RN  Outcome: Ongoing  Goal: Control of chronic pain  Description: Control of chronic pain  3/6/2022 0025 by Shireen Peterson RN  Outcome: Met This Shift  3/5/2022 1538 by Melisa Boucher RN  Outcome: Ongoing

## 2022-03-06 NOTE — CARE COORDINATION
Care coordination: Precert remains pending for [facility].  Hens completed, transport envelope on chart    Zhane Garza

## 2022-03-06 NOTE — PROGRESS NOTES
Progress Note  Date:3/6/2022       [facility]:[id number]  Patient Rossana Zarate     YOB: 1945     Age:77 y.o. Patient says breathing is improved today. Subjective    Subjective:  Symptoms:  Improved. He reports shortness of breath. No chest pain. Diet:  Adequate intake. Activity level: Impaired due to weakness. Pain:  He reports no pain. Review of Systems   Constitutional: Negative for activity change and fever. HENT: Negative for congestion. Respiratory: Positive for shortness of breath. Cardiovascular: Positive for leg swelling. Negative for chest pain. Gastrointestinal: Negative for abdominal pain. Neurological: Negative for dizziness. Psychiatric/Behavioral: Negative for agitation. Objective         Vitals Last 24 Hours:  TEMPERATURE:  Temp  Av.8 °F (36.6 °C)  Min: 97.5 °F (36.4 °C)  Max: 98.2 °F (36.8 °C)  RESPIRATIONS RANGE: Resp  Av  Min: 18  Max: 18  PULSE OXIMETRY RANGE: SpO2  Av %  Min: 90 %  Max: 97 %  PULSE RANGE: Pulse  Av.2  Min: 78  Max: 95  BLOOD PRESSURE RANGE: Systolic (25MWV), JFA:73 , Min:86 , OWW:793   ; Diastolic (63MHS), VNA:12, Min:61, Max:71    I/O (24Hr): Intake/Output Summary (Last 24 hours) at 3/6/2022 0726  Last data filed at 3/6/2022 0603  Gross per 24 hour   Intake 900 ml   Output 1775 ml   Net -875 ml     Objective:  General Appearance:  Comfortable. Vital signs: (most recent): Blood pressure 88/69, pulse 83, temperature 97.5 °F (36.4 °C), temperature source Infrared, resp. rate 18, height 5' 9\" (1.753 m), weight 266 lb 12.1 oz (121 kg), SpO2 93 %. No fever. Lungs:  Normal effort and normal respiratory rate. Breath sounds clear to auscultation. Heart: Normal rate. Regular rhythm. S1 normal and S2 normal.    Extremities: There is local swelling.       Labs/Imaging/Diagnostics    Labs:  CBC:  Recent Labs     22  0750 22  0611 22  0618   WBC 10.1 10.7 11.4   RBC 4.22 3.99 3.95 HGB 11.9* 11.3* 11.3*   HCT 38.1 35.4* 35.4*   MCV 90.3 88.7 89.6   RDW 16.8* 16.5* 16.5*    257 265     CHEMISTRIES:  Recent Labs     22  0750 22  1047 22  0611    141 137   K 3.6 3.3* 3.6   CL 88* 87* 89*   CO2 38* 35* 33*   BUN 63* 64* 70*   CREATININE 3.0* 3.0* 3.1*   GLUCOSE 50* 146* 85     PT/INR:No results for input(s): PROTIME, INR in the last 72 hours. APTT:No results for input(s): APTT in the last 72 hours. LIVER PROFILE:No results for input(s): AST, ALT, BILIDIR, BILITOT, ALKPHOS in the last 72 hours. Imaging Last 24 Hours:  XR CHEST PORTABLE    Result Date: 2022  EXAMINATION: ONE XRAY VIEW OF THE CHEST 2022 4:49 pm COMPARISON: 2022 HISTORY: ORDERING SYSTEM PROVIDED HISTORY: shortness of breath TECHNOLOGIST PROVIDED HISTORY: Reason for exam:->shortness of breath What reading provider will be dictating this exam?->CRC FINDINGS: There is a large opacity seen within the right lung base. There is a small right pleural effusion. The left upper lobe is clear. There is a small left pleural effusion. The cardiac silhouette is within normals. There is a dual lead cardiac pacer on the left. 1. Large opacity within the right lung base which could represent pneumonia and or atelectasis. 2. Moderate size right pleural effusion. 3. Small left pleural effusion. 4. CT of the thorax is recommended for further evaluation. US DUP LOWER EXTREMITIES BILATERAL VENOUS    Result Date: 2022  Patient MRN:  [id number] : 1945 Age: 65 years Gender: Male Order Date:  2022 5:28 PM EXAM: US DUP LOWER EXTREMITIES BILATERAL VENOUS NUMBER OF IMAGES:  52 INDICATION:  leg swelling leg swelling What reading provider will be dictating this exam?->[facility] COMPARISON: None Within the visualized vessels, there is no evidence for deep venous thrombosis There is good compressibility, there is good augmentation, there is good color flow.      Within the visualized vessels there is no evidence for deep venous thrombosis     Assessment//Plan           Hospital Problems           Last Modified POA    * (Principal) CHF (congestive heart failure), NYHA class I, acute on chronic, combined ([facility] 2/23/2022 Yes        Assessment:  (CHF/ volume overload  Pleural effusion  Acute renal insfficnecy  DM  COPD  HTN  Hyperlipidemia           Plan  CXR   Cautiously diurese  Monitor labs and exam.  Continue rest of meds. Renal, Pulmonary, and Cardiology following. PT/OT  ).

## 2022-03-07 NOTE — PROGRESS NOTES
[facility]   Nephrology Progress Note  Patient's Name: Michael Thao    From consult note 2/24/22- History of Present Illness:    Libby Walker is a 65 y.o. male with a past medical history of CHF, COPD, diabetes mellitus, hypertension, and polio. He presented to the ED on 2/23 with complaints of shortness of breath. Per the ED provider note, patient was also noticed increased leg swelling and that the patient wears 4 L of oxygen at home. Vital signs at presentation to the ED include temperature 98.4, respirations 18, pulse 112, /96, and he was 99% on 4 L O2. Lab data on presentation to the ED include CO2 30, BUN 33, creatinine 2.2, hemoglobin 10.9, and proBNP 1478. We were consulted to see the patient for CKD. Patient is known to our service, and had an office visit in June 2021 with Dr. Danford Runner. Baseline creatinine from January 2022 is 2.5-2.8. At present, patient was seen and examined. He is currently sitting up in a chair. He reports that he was having swelling and shortness of breath and that is what brought him into the hospital.  He reports that prior to admission his appetite has been okay. Overall he said he feels fine, except that his legs feel tight. He denies any current chest pain or shortness of breath. No nausea or abdominal pain. He denies any headaches or dizziness. He denies use of NSAIDs. \"    Subjective:    3/7: Patient seen and examined. Reports that he feels \"alright. \"  Denies any current chest pain or shortness of breath. No abdominal pain or nausea.     Past Medical History:   Diagnosis Date    Acid reflux     Agent orange exposure     Congestive heart failure (CHF) (HCC)     COPD (chronic obstructive pulmonary disease) (HCC)     Depression     Diabetes mellitus (HCC)     Hyperlipidemia     Hypertension     MI ([facility]ocardial infarction) ([facility]     Polio     Sleep apnea      Past Surgical History:   Procedure Laterality Date    CARDIAC PACEMAKER PLACEMENT  CORONARY ANGIOPLASTY WITH STENT PLACEMENT      PACEMAKER PLACEMENT       History reviewed. No pertinent family history. reports that he has quit smoking. He has never used smokeless tobacco. He reports that he does not drink alcohol and does not use drugs. Allergies:  Penicillins    Current Medications:    bumetanide (BUMEX) tablet 2 mg, BID  acetaminophen (TYLENOL) tablet 650 mg, Q4H PRN  aspirin EC tablet 81 mg, Daily  atorvastatin (LIPITOR) tablet 40 mg, Daily  vitamin D (CHOLECALCIFEROL) tablet 2,000 Units, Daily  vitamin B-12 (CYANOCOBALAMIN) tablet 1,000 mcg, Daily  ezetimibe (ZETIA) tablet 10 mg, Daily  folic acid (FOLVITE) tablet 1 mg, Daily  guaiFENesin tablet 400 mg, 4x Daily PRN  insulin glargine-yfgn (SEMGLEE-YFGN) injection vial 50 Units, Nightly  loperamide (IMODIUM) capsule 2 mg, 4x Daily PRN  melatonin tablet 3 mg, Nightly  metoprolol succinate (TOPROL XL) extended release tablet 25 mg, BID  pantoprazole (PROTONIX) tablet 40 mg, QAM AC  ascorbic acid (VITAMIN C) tablet 500 mg, BID  zinc sulfate (ZINCATE) capsule 50 mg, Daily  insulin lispro (HUMALOG) injection vial 0-6 Units, TID WC  insulin lispro (HUMALOG) injection vial 0-3 Units, Nightly  glucose (GLUTOSE) 40 % oral gel 15 g, PRN  dextrose 50 % IV solution, PRN  glucagon (rDNA) injection 1 mg, PRN  dextrose 5 % solution, PRN  ipratropium-albuterol (DUONEB) nebulizer solution 1 ampule, Q4H WA  heparin (porcine) injection 5,000 Units, Q8H    Physical exam:  Vitals:    03/06/22 2330   BP: 90/62   Pulse: 80   Resp: 18   Temp: 97 °F (36.1 °C)   SpO2: 97%     General: Awake, alert, no acute distress  Neck: No JVD noted  Lungs: Clear bilaterally upper, diminished in bases bilaterally. Unlabored  CV: regular rate and rhythm. No rub  Abd: Rounded, soft, nondistended. Active bowel sounds; patient reports slightly tender  Skin: Warm and dry.   No rash on exposed extremities  Ext: 2+ pitting pedal edema  Neuro: Awake, alert, answers questions appropriately    Labs:  Lab Results   Component Value Date     2022     2022     2022    K 3.4 (L) 2022    K 3.5 2022    K 3.6 2022    CL 89 (L) 2022    CO2 33 (H) 2022    CO2 34 (H) 2022    CO2 33 (H) 2022    CREATININE 3.0 (H) 2022    CREATININE 3.2 (H) 2022    CREATININE 3.1 (H) 2022    BUN 75 (H) 2022    BUN 75 (H) 2022    BUN 70 (H) 2022    GLUCOSE 75 2022    GLUCOSE 93 2022    GLUCOSE 85 2022    PHOS 4.4 2022    PHOS 4.5 2022    PHOS 4.1 2022    WBC 12.7 (H) 2022    WBC 11.4 2022    WBC 10.7 2022    HGB 10.9 (L) 2022    HGB 11.3 (L) 2022    HGB 11.3 (L) 2022    HCT 34.7 (L) 2022    HCT 35.4 (L) 2022    HCT 35.4 (L) 2022    MCV 90.6 2022     2022     Imaging:  XR CHEST PORTABLE    Result Date: 2022  EXAMINATION: ONE XRAY VIEW OF THE CHEST 2022 4:49 pm COMPARISON: 2022 HISTORY: ORDERING SYSTEM PROVIDED HISTORY: shortness of breath TECHNOLOGIST PROVIDED HISTORY: Reason for exam:->shortness of breath What reading provider will be dictating this exam?->CRC FINDINGS: There is a large opacity seen within the right lung base. There is a small right pleural effusion. The left upper lobe is clear. There is a small left pleural effusion. The cardiac silhouette is within normals. There is a dual lead cardiac pacer on the left. 1. Large opacity within the right lung base which could represent pneumonia and or atelectasis. 2. Moderate size right pleural effusion. 3. Small left pleural effusion. 4. CT of the thorax is recommended for further evaluation.      US DUP LOWER EXTREMITIES BILATERAL VENOUS    Result Date: 2022  Patient MRN:  [id number] : 1945 Age: 65 years Gender: Male Order Date:  2022 5:28 PM EXAM: US DUP LOWER EXTREMITIES BILATERAL VENOUS NUMBER OF IMAGES: 52 INDICATION:  leg swelling leg swelling What reading provider will be dictating this exam?->[facility] COMPARISON: None Within the visualized vessels, there is no evidence for deep venous thrombosis There is good compressibility, there is good augmentation, there is good color flow. Within the visualized vessels there is no evidence for deep venous thrombosis     Assessment/ Plans:    1. CKD stage IV   due to diabetes mellitus and hypertension  Baseline creatinine from January 2022 is 2.5-2.8   Creatinine 3.0 today  S/p Bumex drip and IV Diuril  UOP 1975  ml past 24 hours; net - 24 L  Now on p.o. Bumex- we will hold p.o. Bumex this a.m. given hypotension  Follow    2. HFpEF  ECHO 1/2022 showed EF > 70%  S/p Bumex drip and IV Diuril  Now on p.o. Bumex  Cardiology previously following    3. Hypertension  BP goal<130/80  Now with hypotension at present  we will hold p.o. Bumex this a.m. given hypotension  Follow closely    4. Diabetes mellitus  Hypoglycemic episodes intermittently during hospital stay  On insulin lispro and insulin glargine  Management per primary    5. Pleural effusion  CXR showed moderate size right pleural effusion and small left pleural effusion  Repeat chest x-ray showed \"slight increase\" in right pleural effusion  Chest x-ray 3/3 \"unchanged moderate bilateral pleural effusions\"  Pulmonology following     6. Hypokalemia  Likely in the setting of diuresis  K+ 3.4 today  Supplement potassium today  Follow    GILMAR Hearn - CNP     Patient seen and examined all key components of the physical performed independently , case discussed with NP, all pertinent labs and radiologic tests personally reviewed agree with above.       Roxanna Houser MD

## 2022-03-07 NOTE — CARE COORDINATION
Care Coordination:  Call to [address], spoke to colten. Precert started late Friday as waiting for updated therapy. Remains pending, Hamida Dupree will follow today for auth    Mabel Mention    Addendum:  Left message for colten checking on precert     Mabel Mention 14:46    Addendum: received a message from colten requesting updated evals.  I have called [phone] and left a message requesting updated evals for insurance company as last ones completed were 3/4/22 at 411 pm which was the time it was submitted    Mabel Mention

## 2022-03-07 NOTE — PROGRESS NOTES
Physical Therapy  Facility/Department: [facility]  Daily Treatment Note  NAME: Landon Newton  : 1945  MRN: [id number]    Date of Service: 3/7/2022    Evaluating PT:  Tammy Arevalo, [id number]     Room #:  6405/6405-B  Diagnosis:  CHF (congestive heart failure), NYHA class I, acute on chronic, combined ([facility] [I50.43]  PMHx/PSHx:     has a past medical history of Acid reflux, Agent orange exposure, Congestive heart failure (CHF) ([facility]), COPD (chronic obstructive pulmonary disease) ([facility] Depression, Diabetes mellitus ([facility] Hyperlipidemia, Hypertension, MI (myocardial infarction) ([facility] Polio, and Sleep apnea. has a past surgical history that includes Coronary angioplasty with stent; Cardiac pacemaker placement; and pacemaker placement. Precautions:  Fall risk, O2 (4 lpm @ home)     SUBJECTIVE:     Pt lives alone in a 1 story home with 1 stair to enter. Prior to his initial admission and subsequent rehab in 2022 the pt reports he was Independent with all mobility with no AD, since discharge from rehab the pt has primarily been using a manual WC for mobility.     OBJECTIVE:    Initial Evaluation  Date: 22 Treatment  Date: 3/7/22 Short Term/ Long Term   Goals   AM-PAC 6 Clicks 50/74      Was pt agreeable to Eval/treatment? Yes Yes     Does pt have pain?  2/10 in R hallux and arch None      Bed Mobility  Rolling: Supervision  Supine to sit: Supervision  Sit to supine: NT  Scooting: Supervision Rolling: SBA  Supine to sit: SBA  Sit to supine: SBA  Scooting: SBA Independent   Transfers Sit to stand: Mariela  Stand to sit: Mariela  Stand pivot: Mariela with [facility] [place] Sit to stand: ModA with bed elevated approx 6\", MaxA x2 from standard height bed  Stand to sit: ModA    Modified Independent   Ambulation    10 feet with [facility] [place] with Mariela 3 feet side stepping R/L ea with seated rest break between, ModA with FWW >50 feet with [facility] [place] with Modified [place]   Stair negotiation: ascended and descended  NT NT 1 step with [employer] rail with SBA   BLE ROM WFL WFL     BLE Strength RLE: grossly 4/5  LLE: grossly 4/5 with exception of knee that was 3+/5 due to pain RLE: grossly 4/5  LLE: grossly 4/5 with exception of knee that was 3+/5 due to pain     Balance Sitting: Independent  Standing: Mariela with AD Sitting: Independent  Standing: ModA with FWW Independent      Pt is A & O x 4  Sensation:  Pt denies numbness and tingling to extremities  Edema: Moderate edema of B lower legs/feet    Vitals:  SPO2 maintained 95-98% on 4L/min via NC throughout    Therapeutic Exercises: Ankle pumps x15 B LEs  LAQ 10x2 reps ea LE    Patient education  Pt educated on role of therapy, safety with mobility, transfer and TE technique    Patient response to education:   Pt verbalized understanding Pt demonstrated skill Pt requires further education in this area   yes yes yes     ASSESSMENT:    Comments:  Pt resting semi-supine upon arrival, agreeable to PT session. Pt completed bed mobility with slow pace of movement and strong use of bed rails without external assist. Pt required increased height of bed to initiate sit>stand transfers with modified technique of L UE on bed rail and R UE on walker. Pt completed side steps with distance limited by fatigue of B LEs with ea trial. Pt completed seated B LE TE's at EOB to improve circulation and promote upright activity. Pt unsafe to attempt OOB to chair. Pt remained sitting EOB with OT at bedside to complete additional interventions. Treatment:  Patient practiced and was instructed in the following treatment:     Bed mobility: cues for sequencing to ease transition   Transfer training: cues for hand placement, manual assist for lift/lower with sit<>stand transfers   Gait training: cues for upright posture and sequencing with side stepping, manual assist for balance and walker progression. PLAN:    Patient is making good progress towards established goals. Will continue with current POC. PLAN:    Time in  1519  Time out  1542    Total Treatment Time  23 min    CPT codes:  [] Gait training 36736 0 minutes  [] Manual therapy 75726 0 minutes  [x] Therapeutic activities 85427 23 minutes  [] Therapeutic exercises 89584 0 minutes  [] Neuromuscular reeducation 12620 0 minutes      Erica Grant, PT, DPT  [id number]

## 2022-03-07 NOTE — PLAN OF CARE
Problem: OXYGENATION/RESPIRATORY FUNCTION  Goal: Patient will maintain patent airway  Outcome: Met This Shift  Goal: Patient will achieve/maintain normal respiratory rate/effort  Description: Respiratory rate and effort will be within normal limits for the patient  Outcome: Met This Shift     Problem: HEMODYNAMIC STATUS  Goal: Patient has stable vital signs and fluid balance  Outcome: Met This Shift     Problem: FLUID AND ELECTROLYTE IMBALANCE  Goal: Fluid and electrolyte balance are achieved/maintained  Outcome: Met This Shift     Problem: ACTIVITY INTOLERANCE/IMPAIRED MOBILITY  Goal: Mobility/activity is maintained at optimum level for patient  Outcome: Met This Shift     Problem: Pain:  Goal: Pain level will decrease  Description: Pain level will decrease  Outcome: Met This Shift  Goal: Control of acute pain  Description: Control of acute pain  Outcome: Met This Shift  Goal: Control of chronic pain  Description: Control of chronic pain  Outcome: Met This Shift

## 2022-03-07 NOTE — PROGRESS NOTES
Occupational Therapy  OT BEDSIDE TREATMENT NOTE   [facility] [address] [address], [address] [place] [address]  Patient Name: Sulma Marshall  MRN: [id number]  : 1945  Room: [address] [place] [address]     Evaluating OT: Jesika Hurtado OTR/L #[id number]      Referring Provider: Rl King MD  Specific Provider Orders/Date: OT eval and treat 3/7/22     Diagnosis: CHF (congestive heart failure), NYHA class I, acute on chronic, combined ([facility] [I50.43]   Pt admitted to hospital with B LE edema. Pt recently discharged from rehab      Pertinent Medical History:  has a past medical history of Acid reflux, Agent orange exposure, Congestive heart failure (CHF) ([facility]), COPD (chronic obstructive pulmonary disease) ([facility] Depression, [facility] Hyperlipidemia, Hypertension, MI (myocardial infarction) ([facility] Polio, and Sleep apnea.      Precautions:  Fall Risk, O2     Assessment of current deficits    [x]? Functional mobility          [x]? ADLs           [x]? Strength                  []?Cognition    [x]? Functional transfers        [x]? IADLs         [x]? Safety Awareness   [x]? Endurance    []? Fine Coordination                        [x]? Balance      []? Vision/perception   []? Sensation      []? Gross Motor Coordination            []? ROM           []?  Delirium                   []? Motor Control      OT PLAN OF CARE   OT POC based on physician orders, patient diagnosis and results of clinical assessment     Frequency/Duration 1-3 days/wk for 2 weeks PRN   Specific OT Treatment Interventions to include:   * Instruction/training on adapted ADL techniques and AE recommendations to increase functional independence within precautions       * Training on energy conservation strategies, correct breathing pattern and techniques to improve independence/tolerance for self-care routine  * Functional transfer/mobility training/DME recommendations for increased independence, safety, and fall prevention  * Patient/Family education to increase follow through with safety techniques and functional independence  * Recommendation of environmental modifications for increased safety with functional transfers/mobility and ADLs  * Therapeutic exercise to improve motor endurance, ROM, and functional strength for ADLs/functional transfers  * Therapeutic activities to facilitate/challenge dynamic balance, stand tolerance for increased safety and independence with ADLs  * Therapeutic activities to facilitate gross/fine motor skills for increased independence with ADLs  * Neuro-muscular re-education: facilitation of righting/equilibrium reactions, midline orientation, scapular stability/mobility, normalization of muscle tone, and facilitation of volitional active controled movement     Recommended Adaptive Equipment:  TBD      Home Living: Pt lives alone in a 1 story home with 1 hand rail. Bathroom setup: walk-in shower     Equipment owned: shower chair, rollator, w/c     Prior Level of Function: Independent with ADLs , mod I with IADLs and ambulated short distances with rollator; manual w/c prn since return from rehab   Driving: yes   Occupation: retire air traffic control      Pain Level: Pt reports bilateral feet pain     Cognition: A&O: 4/4; Follows 2 step directions             Memory:  good             Sequencing:  good             Problem solving:  fair             Judgement/safety:  fair               Functional Assessment:  AM-PAC Daily Activity Raw Score: 16/24    Initial Eval Status  Date: 2/24/22 Treatment Status  Date: 3/7/22 STGs = LTGs  Time frame: 10-14 days   Feeding Independent   Ind     Grooming Minimal Assist  S  Washed face, completed oral and denture care and applied deodorant seated EOB.    Modified [place]    UB Dressing Stand by Assist   S  To don/doff gown seated  Modified [place]    LB Dressing Maximal Assist      Pants / socks  Max A  Simulated donning pants pt required Max A to manage clothing with assist to maintain balance. VCs for upright posture. Modified [place]    Bathing Moderate Assist  Mod A  UB bathing completed seated EOB. Pt washed BUEs, chest and abdomin. Assist to wash lower BLEs, feet and buttocks Modified [place]    Toileting Moderate Assist  Max/Dep  Paredes present, progress to [facility] Modified [place]    Bed Mobility  Supine to sit: Stand by Assist   Sit to supine: NT  Min A- supine >sit  SBA sit > supine  Sit to supine pt required SBA for safety.    Supine to sit: Modified [place]   Sit to supine: Modified [place]    Functional Transfers Minimal Assist  3/7/22- Max A X 2 from standard height bed    3/4/22: Mod/Max A- sit<->stand  Completed x 2       Modified [place]    Functional Mobility Minimal Assist  Mod A:  Pt completed side stepping to the L with VCs for safety and upright posture. Modified [place]    Balance Sitting:     Static:  Sup    Dynamic:SBA  Standing: Min A Sitting:     Static:  Sup    Dynamic:SBA  Standing: Mod A      Activity Tolerance F-     + desaturation to 89-90% with activity  Fair+  Light/mod tasks  O2 on 4L 95-98% F+   Visual/  Perceptual Glasses: yes  wfl                        Comments: Upon arrival ptSeated EOB workinig with PT & agreeable for therapy. Pt educated on techniques to increase independence and safety during ADL's, bed mobility, and functional transfers. At end of session pt left was returned to bed, HOB upright into chair position, to elevate B LE due to edema, call light within reach. · Pt has made fair progress towards set goals.    · Continue with current plan of care    Treatment Time In: 3:30           Treatment Time Out: 4:10            Treatment Charges: Mins Units   Ther Ex  53237     Manual Therapy 31509     Thera Activities 02225     ADL/Home Mgt [id number] 40 3   Neuro Re-ed 21903     Group Therapy      Orthotic manage/training  31430     Non-Billable Time     Total Timed Treatment 40 3     Victor Manuel WYLIE/L [id number]

## 2022-03-08 NOTE — PROGRESS NOTES
[place]  Department of Pulmonary, Critical Care and Sleep Medicine  [address] [facility]  Department of Internal Medicine  Progress Note    SUBJECTIVE:    Patient seen and examined. Overall reports that his breathing has improved and that he has significantly less swelling in his legs however his feet are still quite tender. He reports that he has been sitting at the side of the bed however has not been out of bed to the chair recently    OBJECTIVE:  Vitals:    03/07/22 2130 03/08/22 0700 03/08/22 0831 03/08/22 0920   BP:   110/68    Pulse:   102    Resp:   18 17   Temp:       TempSrc:       SpO2: 95%  99% 95%   Weight:  262 lb 12.6 oz (119.2 kg)     Height:         Constitutional: Alert,     EENT: EOMI YOSEF. MMM. No icterus. No thrush. Neck: No thyromegaly. No JVD on my exam.  Respiratory: Breath sounds are diminished without use of accessory muscles. Cardiovascular: Regular,      Pulses:  Equal bilaterally. Abdomen: Soft without organomegaly. Lymphatic: No lymphadenopathy. Musculoskeletal: Without weakness or gross deficits  Extremities: 1-2+ edema in feet bilaterally  Skin:  Warm and dry. No skin rashes. Neurological/Psychiatric: No acute psychosis. Cranial nerves are intact. DATA:    Monitor Strips:  Reviewed & discusses with technical team. No changes noted.     RADIOLOGY:  No new imaging studies today  CBC with Differential:    Lab Results   Component Value Date    WBC 21.3 03/08/2022    RBC 3.76 03/08/2022    HGB 10.7 03/08/2022    HCT 33.8 03/08/2022     03/08/2022    MCV 89.9 03/08/2022    MCH 28.5 03/08/2022    MCHC 31.7 03/08/2022    RDW 16.6 03/08/2022    LYMPHOPCT 19.9 02/23/2022    MONOPCT 11.6 02/23/2022    BASOPCT 0.3 02/23/2022    MONOSABS 1.10 02/23/2022    LYMPHSABS 1.89 02/23/2022    EOSABS 0.30 02/23/2022    BASOSABS 0.03 02/23/2022     CMP:    Lab Results   Component Value Date     03/08/2022    K 3.2 03/08/2022    K 4.1 02/23/2022    CL 90 03/08/2022    CO2 32 03/08/2022    BUN 73 03/08/2022    CREATININE 2.7 03/08/2022    GFRAA 28 03/08/2022    LABGLOM 23 03/08/2022    GLUCOSE 86 03/08/2022    PROT 6.9 01/21/2022    LABALBU 3.5 02/25/2022    CALCIUM 8.6 03/08/2022    BILITOT 0.6 01/21/2022    ALKPHOS 127 01/21/2022    AST 32 01/21/2022    ALT 42 01/21/2022          Assessment:   1. Bilateral pleural effusion  2. Volume overload  3. CKD  4. Anasarca  5. Diabetes  6. Chronic hypoxemic respiratory insufficiency  7. SPEEDY  8. Hypokalemia  9. Elevated white blood cell count     Plan:   1. Continue to wean FiO2 as tolerated. Currently on 3 L nasal cannula. Patient was not requiring oxygen prior to his initial admission in January. 2. PFTs have previously been ordered however these have not been obtained. Would recommend full PFTs as outpatient after discharge. 3. Replace potassium  4. Continue home CPAP home machine  5. Diuretics as per nephrology  6. New leukocytosis today. However patient is asymptomatic and afebrile.   7. Ok to discharge,     Mark Mendoza DO

## 2022-03-08 NOTE — PROGRESS NOTES
3.76*   HGB 11.3* 10.9* 10.7*   HCT 35.4* 34.7* 33.8*   MCV 89.6 90.6 89.9   RDW 16.5* 16.7* 16.6*    281 267     CHEMISTRIES:  Recent Labs     22  0618 22  0725 22  0434    139 137   K 3.5 3.4* 3.2*   CL 91* 89* 90*   CO2 34* 33* 32*   BUN 75* 75* 73*   CREATININE 3.2* 3.0* 2.7*   GLUCOSE 93 75 86   MG 2.4 2.4 2.4     PT/INR:No results for input(s): PROTIME, INR in the last 72 hours. APTT:No results for input(s): APTT in the last 72 hours. LIVER PROFILE:No results for input(s): AST, ALT, BILIDIR, BILITOT, ALKPHOS in the last 72 hours. Imaging Last 24 Hours:  XR CHEST PORTABLE    Result Date: 2022  EXAMINATION: ONE XRAY VIEW OF THE CHEST 2022 4:49 pm COMPARISON: 2022 HISTORY: ORDERING SYSTEM PROVIDED HISTORY: shortness of breath TECHNOLOGIST PROVIDED HISTORY: Reason for exam:->shortness of breath What reading provider will be dictating this exam?->CRC FINDINGS: There is a large opacity seen within the right lung base. There is a small right pleural effusion. The left upper lobe is clear. There is a small left pleural effusion. The cardiac silhouette is within normals. There is a dual lead cardiac pacer on the left. 1. Large opacity within the right lung base which could represent pneumonia and or atelectasis. 2. Moderate size right pleural effusion. 3. Small left pleural effusion. 4. CT of the thorax is recommended for further evaluation. US DUP LOWER EXTREMITIES BILATERAL VENOUS    Result Date: 2022  Patient MRN:  [id number] : 1945 Age: 65 years Gender: Male Order Date:  2022 5:28 PM EXAM: US DUP LOWER EXTREMITIES BILATERAL VENOUS NUMBER OF IMAGES:  52 INDICATION:  leg swelling leg swelling What reading provider will be dictating this exam?->[facility] COMPARISON: None Within the visualized vessels, there is no evidence for deep venous thrombosis There is good compressibility, there is good augmentation, there is good color flow.      Within the visualized vessels there is no evidence for deep venous thrombosis     Assessment//Plan           Hospital Problems           Last Modified POA    * (Principal) CHF (congestive heart failure), NYHA class I, acute on chronic, combined ([facility] [address] 2/23/2022 Yes        Assessment:  (CHF/ volume overload  Pleural effusion  Acute renal insfficnecy  DM  COPD  HTN  Hyperlipidemia           Plan  Stable  Await discharge when bed available. ).

## 2022-03-08 NOTE — PLAN OF CARE
Problem: OXYGENATION/RESPIRATORY FUNCTION  Goal: Patient will maintain patent airway  Outcome: Met This Shift  Goal: Patient will achieve/maintain normal respiratory rate/effort  Description: Respiratory rate and effort will be within normal limits for the patient  Outcome: Met This Shift     Problem: Pain:  Goal: Pain level will decrease  Description: Pain level will decrease  Outcome: Met This Shift     Problem: Falls - Risk of:  Goal: Will remain free from falls  Description: Will remain free from falls  Outcome: Met This Shift  Goal: Absence of physical injury  Description: Absence of physical injury  Outcome: Met This Shift

## 2022-03-08 NOTE — PROGRESS NOTES
[facility]   Nephrology Progress Note  Patient's Name: Franklin Dukes    From consult note 2/24/22- History of Present Illness:    Chris Villanueva is a 65 y.o. male with a past medical history of CHF, COPD, diabetes mellitus, hypertension, and polio. He presented to the ED on 2/23 with complaints of shortness of breath. Per the ED provider note, patient was also noticed increased leg swelling and that the patient wears 4 L of oxygen at home. Vital signs at presentation to the ED include temperature 98.4, respirations 18, pulse 112, /96, and he was 99% on 4 L O2. Lab data on presentation to the ED include CO2 30, BUN 33, creatinine 2.2, hemoglobin 10.9, and proBNP 1478. We were consulted to see the patient for CKD. Patient is known to our service, and had an office visit in June 2021 with Dr. Duane Ruelas. Baseline creatinine from January 2022 is 2.5-2.8. At present, patient was seen and examined. He is currently sitting up in a chair. He reports that he was having swelling and shortness of breath and that is what brought him into the hospital.  He reports that prior to admission his appetite has been okay. Overall he said he feels fine, except that his legs feel tight. He denies any current chest pain or shortness of breath. No nausea or abdominal pain. He denies any headaches or dizziness. He denies use of NSAIDs. \"    Subjective:    3/8: Patient seen and examined. Reports that he feels the \"same as yesterday. \"  Denies any current chest pain or shortness of breath at this time. Denies any abdominal pain or nausea. Reports that he is eating okay. He does report a cough today.     Past Medical History:   Diagnosis Date    Acid reflux     Agent orange exposure     Congestive heart failure (CHF) (HCC)     COPD (chronic obstructive pulmonary disease) (HCC)     Depression     Diabetes mellitus (HCC)     Hyperlipidemia     Hypertension     MI [facility] infarction) ([facility] [address]     Polio     Sleep apnea      Past Surgical History:   Procedure Laterality Date    CARDIAC PACEMAKER PLACEMENT      CORONARY ANGIOPLASTY WITH STENT PLACEMENT      PACEMAKER PLACEMENT       History reviewed. No pertinent family history. reports that he has quit smoking. He has never used smokeless tobacco. He reports that he does not drink alcohol and does not use drugs. Allergies:  Penicillins    Current Medications:    potassium chloride (KLOR-CON M) extended release tablet 40 mEq, Once  bumetanide (BUMEX) tablet 2 mg, BID  acetaminophen (TYLENOL) tablet 650 mg, Q4H PRN  aspirin EC tablet 81 mg, Daily  atorvastatin (LIPITOR) tablet 40 mg, Daily  vitamin D (CHOLECALCIFEROL) tablet 2,000 Units, Daily  vitamin B-12 (CYANOCOBALAMIN) tablet 1,000 mcg, Daily  ezetimibe (ZETIA) tablet 10 mg, Daily  folic acid (FOLVITE) tablet 1 mg, Daily  guaiFENesin tablet 400 mg, 4x Daily PRN  insulin glargine-yfgn (SEMGLEE-YFGN) injection vial 50 Units, Nightly  loperamide (IMODIUM) capsule 2 mg, 4x Daily PRN  melatonin tablet 3 mg, Nightly  [Held by provider] metoprolol succinate (TOPROL XL) extended release tablet 25 mg, BID  pantoprazole (PROTONIX) tablet 40 mg, QAM AC  ascorbic acid (VITAMIN C) tablet 500 mg, BID  zinc sulfate (ZINCATE) capsule 50 mg, Daily  insulin lispro (HUMALOG) injection vial 0-6 Units, TID WC  insulin lispro (HUMALOG) injection vial 0-3 Units, Nightly  glucose (GLUTOSE) 40 % oral gel 15 g, PRN  dextrose 50 % IV solution, PRN  glucagon (rDNA) injection 1 mg, PRN  dextrose 5 % solution, PRN  ipratropium-albuterol (DUONEB) nebulizer solution 1 ampule, Q4H WA  heparin (porcine) injection 5,000 Units, Q8H    Physical exam:  Vitals:    03/07/22 2130   BP:    Pulse:    Resp:    Temp:    SpO2: 95%     General: Awake, alert, no acute distress  Neck: No JVD noted  Lungs: Clear bilaterally upper, diminished in bases bilaterally. Unlabored  CV: regular rate and rhythm. No rub  Abd: Rounded, soft, nondistended.   Active bowel sounds; patient reports slightly tender  Skin: Warm and dry. No rash on exposed extremities  Ext: 2+ pitting pedal edema  Neuro: Awake, alert, answers questions appropriately    Labs:  Lab Results   Component Value Date     03/08/2022     03/07/2022     03/06/2022    K 3.2 (L) 03/08/2022    K 3.4 (L) 03/07/2022    K 3.5 03/06/2022    CL 90 (L) 03/08/2022    CO2 32 (H) 03/08/2022    CO2 33 (H) 03/07/2022    CO2 34 (H) 03/06/2022    CREATININE 2.7 (H) 03/08/2022    CREATININE 3.0 (H) 03/07/2022    CREATININE 3.2 (H) 03/06/2022    BUN 73 (H) 03/08/2022    BUN 75 (H) 03/07/2022    BUN 75 (H) 03/06/2022    GLUCOSE 86 03/08/2022    GLUCOSE 75 03/07/2022    GLUCOSE 93 03/06/2022    PHOS 4.4 02/27/2022    PHOS 4.5 01/31/2022    PHOS 4.1 01/30/2022    WBC 21.3 (H) 03/08/2022    WBC 12.7 (H) 03/07/2022    WBC 11.4 03/06/2022    HGB 10.7 (L) 03/08/2022    HGB 10.9 (L) 03/07/2022    HGB 11.3 (L) 03/06/2022    HCT 33.8 (L) 03/08/2022    HCT 34.7 (L) 03/07/2022    HCT 35.4 (L) 03/06/2022    MCV 89.9 03/08/2022     03/08/2022     Imaging:  XR CHEST PORTABLE    Result Date: 2/23/2022  EXAMINATION: ONE XRAY VIEW OF THE CHEST 2/23/2022 4:49 pm COMPARISON: 01/21/2022 HISTORY: ORDERING SYSTEM PROVIDED HISTORY: shortness of breath TECHNOLOGIST PROVIDED HISTORY: Reason for exam:->shortness of breath What reading provider will be dictating this exam?->[facility] FINDINGS: There is a large opacity seen within the right lung base. There is a small right pleural effusion. The left upper lobe is clear. There is a small left pleural effusion. The cardiac silhouette is within normals. There is a dual lead cardiac pacer on the left. 1. Large opacity within the right lung base which could represent pneumonia and or atelectasis. 2. Moderate size right pleural effusion. 3. Small left pleural effusion. 4. CT of the thorax is recommended for further evaluation.      US DUP LOWER EXTREMITIES BILATERAL VENOUS    Result Date: 2022  Patient MRN:  [id number] : 1945 Age: 65 years Gender: Male Order Date:  2022 5:28 PM EXAM: US DUP LOWER EXTREMITIES BILATERAL VENOUS NUMBER OF IMAGES:  52 INDICATION:  leg swelling leg swelling What reading provider will be dictating this exam?->[facility] COMPARISON: None Within the visualized vessels, there is no evidence for deep venous thrombosis There is good compressibility, there is good augmentation, there is good color flow. Within the visualized vessels there is no evidence for deep venous thrombosis     Assessment/ Plans:    1. CKD stage IV   due to diabetes mellitus and hypertension  Baseline creatinine from 2022 is 2.5-2.8   Creatinine 2.7 today  S/p Bumex drip and IV Diuril  UOP 1750 ml past 24 hours; net - 24.9 L  Now on p.o. Bumex  Follow    2. HFpEF  ECHO 2022 showed EF > 70%  S/p Bumex drip and IV Diuril  Now on p.o. Bumex  Cardiology previously following    3. Hypertension  BP goal<130/80  Intermittent hypotension  Follow closely    4. Diabetes mellitus  Hypoglycemic episodes intermittently during hospital stay  On insulin lispro and insulin glargine  Management per primary    5. Pleural effusion  CXR showed moderate size right pleural effusion and small left pleural effusion  Repeat chest x-ray showed \"slight increase\" in right pleural effusion  Chest x-ray 3/3 \"unchanged moderate bilateral pleural effusions\"  Pulmonology following     6. Hypokalemia  Likely in the setting of diuresis  K+ 3.2 today  Supplement potassium today  Follow    Oj Miller, APRN - CNP     Patient seen and examined all key components of the physical performed independently , case discussed with NP, all pertinent labs and radiologic tests personally reviewed agree with above.       Sherrell Whitfield MD

## 2022-03-08 NOTE — CARE COORDINATION
Discharge noted. Await precert from insurance. [facility] will inform CM/SW when obtained. Will need a covid. Ambulance form ij envelope in soft chart and HENS completed. CM/SW will continue to follow and await precert from insurance.

## 2022-03-08 NOTE — PLAN OF CARE
Problem: OXYGENATION/RESPIRATORY FUNCTION  Goal: Patient will maintain patent airway  Outcome: Met This Shift  Goal: Patient will achieve/maintain normal respiratory rate/effort  Description: Respiratory rate and effort will be within normal limits for the patient  Outcome: Met This Shift     Problem: Pain:  Goal: Pain level will decrease  Description: Pain level will decrease  Outcome: Met This Shift

## 2022-03-09 NOTE — PROGRESS NOTES
Physical Therapy  Facility/Department: [facility]  Daily Treatment Note  NAME: Glenroy Yang  : 1945   MRN: [id number]    Date of Service: 3/9/2022  Evaluating PT: Jennifer Bui, [facility], [id number]     Room #:  6405/6405-B  Diagnosis:  CHF (congestive heart failure), NYHA class I, acute on chronic, combined ([facility] [I50.43]  PMHx/PSHx:     has a past medical history of Acid reflux, Agent orange exposure, Congestive heart failure (CHF) ([facility]), COPD (chronic obstructive pulmonary disease) ([facility] [address] Depression, Diabetes mellitus ([facility] Hyperlipidemia, Hypertension, MI (myocardial infarction) ([facility] Polio, and Sleep apnea.   has a past surgical history that includes Coronary angioplasty with stent; Cardiac pacemaker placement; and pacemaker placement. Precautions:  Fall risk, O2 (4 lpm @ home)     SUBJECTIVE:     Pt lives alone in a 1 story home with 1 stair to enter.    Prior to his initial admission and subsequent rehab in 2022 the pt reports he was Independent with all mobility with no AD, since discharge from rehab the pt has primarily been using a manual WC for mobility.     OBJECTIVE:    Initial Evaluation  Date: 22 Treatment  Date: 3/9/22 Short Term/ Long Term   Goals   AM-PAC 6 Clicks  96/67     Was pt agreeable to Eval/treatment? Yes Yes     Does pt have pain?  2/10 in R hallux and arch None      Bed Mobility  Rolling: Supervision  Supine to sit: Supervision  Sit to supine: NT  Scooting: Supervision Rolling: SBA  Supine to sit: SBA  Sit to supine: SBA  Scooting: SBA Independent   Transfers Sit to stand: Mariela  Stand to sit: Mariela  Stand pivot: Mariela with [employer] Sit to stand: ModA with bed elevated and from chair with arm rests  Stand to sit: ModA  Stand pivot: ModA with FWW    Modified Independent   Ambulation    10 feet with [employer] with Mariela 3 feet side stepping towards L and between bed<>chair with FWW with ModA >50 feet with [employer] with Modified [place]   Stair negotiation: ascended and descended  NT NT 1 step with [employer] rail with SBA   BLE ROM WFL WFL     BLE Strength RLE: grossly 4/5  LLE: grossly 4/5 with exception of knee that was 3+/5 due to pain RLE: grossly 4/5  LLE: grossly 4/5 with exception of knee that was 3+/5 due to pain     Balance Sitting: Independent  Standing: Mariela with AD Sitting: Independent  Standing: ModA with FWW Independent      Pt is A & O x 4  Sensation:  Pt denies numbness and tingling to extremities  Edema: Moderate edema of B lower legs/feet    Vitals:  SPO2 maintained 90-92% on 3L/min via NC throughout mobility    Therapeutic Exercises:    3x sit<>stand from EOB  2x stand pivot transfers with FWW    Patient education  Pt educated on safety with mobility, transfer technique, gait mechanics with FWW    Patient response to education:   Pt verbalized understanding Pt demonstrated skill Pt requires further education in this area   yes With assist yes     ASSESSMENT:    Comments:  Pt resting semi-supine upon arrival, agreeable to PT session. Pt completed bed mobility with very slow pace and use of bed rails but was able to complete without external assist. Sit<>stand attempted from standard height bed without success despite MaxA from therapist. Pt was able to complete sit<>stand from 8\" additional elevation of bed height then progressively lowered to 4\" elevation with upper and lower rails raised for improved UE engagement. Pt assisted with bed pan and hygiene d/t need for BM during session. Pt completed stand-step transfer to chair at bedside with very slow pace; reported feeling too fatigued to remain OOB to chair and completed additional transfer to return to bed. Pt semi-supine upon completion of session with all needs in reach.     Treatment:  Patient practiced and was instructed in the following treatment:     Bed mobility: cues for sequencing and technique to ease transition, cues for improved breathing and avoidance of valsalva   Transfer training: cues for hand placement, manual assist for lift/lower, multiple sit<>stand completed from EOB and single from chair with arm rests, cues for stand-step transfer and walker progression with manual assist as noted above   Gait training: cues for upright posture and walker negotiation for side stepping and steps between bed/chair    PLAN:    Patient is making good progress towards established goals. Will continue with current POC.         PLAN:    Time in  1522  Time out  1554    Total Treatment Time  32 min    CPT codes:  [] Gait training 15010 0 minutes  [] Manual therapy 46111 0 minutes  [x] Therapeutic activities 74296 32 minutes  [] Therapeutic exercises 37590 0 minutes  [] Neuromuscular reeducation 04646 0 minutes      Jocelyn Echols, PT, DPT  [id number]

## 2022-03-09 NOTE — PROGRESS NOTES
[facility]   Nephrology Progress Note  Patient's Name: Mckayla Gaytan    From consult note 2/24/22- History of Present Illness:    Monica Jenkins is a 65 y.o. male with a past medical history of CHF, COPD, diabetes mellitus, hypertension, and polio. He presented to the ED on 2/23 with complaints of shortness of breath. Per the ED provider note, patient was also noticed increased leg swelling and that the patient wears 4 L of oxygen at home. Vital signs at presentation to the ED include temperature 98.4, respirations 18, pulse 112, /96, and he was 99% on 4 L O2. Lab data on presentation to the ED include CO2 30, BUN 33, creatinine 2.2, hemoglobin 10.9, and proBNP 1478. We were consulted to see the patient for CKD. Patient is known to our service, and had an office visit in June 2021 with Dr. Michael Estevez. Baseline creatinine from January 2022 is 2.5-2.8. At present, patient was seen and examined. He is currently sitting up in a chair. He reports that he was having swelling and shortness of breath and that is what brought him into the hospital.  He reports that prior to admission his appetite has been okay. Overall he said he feels fine, except that his legs feel tight. He denies any current chest pain or shortness of breath. No nausea or abdominal pain. He denies any headaches or dizziness. He denies use of NSAIDs. \"    Subjective:    3/9: Patient was seen and examined. He denies any current chest pain. He did explain that he has shortness of breath with exertion. Denies any abdominal pain or nausea. Reports that he is eating and drinking well.     Past Medical History:   Diagnosis Date    Acid reflux     Agent orange exposure     Congestive heart failure (CHF) (HCC)     COPD (chronic obstructive pulmonary disease) (HCC)     Depression     Diabetes mellitus (HCC)     Hyperlipidemia     Hypertension     MI [facility]my[facility] infarction) ([facility] [address]     Polio     Sleep apnea      Past Surgical History:   Procedure Laterality Date    CARDIAC PACEMAKER PLACEMENT      CORONARY ANGIOPLASTY WITH STENT PLACEMENT      PACEMAKER PLACEMENT       History reviewed. No pertinent family history. reports that he has quit smoking. He has never used smokeless tobacco. He reports that he does not drink alcohol and does not use drugs. Allergies:  Penicillins    Current Medications:    potassium chloride (KLOR-CON M) extended release tablet 40 mEq, Once  bumetanide (BUMEX) tablet 2 mg, BID  acetaminophen (TYLENOL) tablet 650 mg, Q4H PRN  aspirin EC tablet 81 mg, Daily  atorvastatin (LIPITOR) tablet 40 mg, Daily  vitamin D (CHOLECALCIFEROL) tablet 2,000 Units, Daily  vitamin B-12 (CYANOCOBALAMIN) tablet 1,000 mcg, Daily  ezetimibe (ZETIA) tablet 10 mg, Daily  folic acid (FOLVITE) tablet 1 mg, Daily  guaiFENesin tablet 400 mg, 4x Daily PRN  insulin glargine-yfgn (SEMGLEE-YFGN) injection vial 50 Units, Nightly  loperamide (IMODIUM) capsule 2 mg, 4x Daily PRN  melatonin tablet 3 mg, Nightly  [Held by provider] metoprolol succinate (TOPROL XL) extended release tablet 25 mg, BID  pantoprazole (PROTONIX) tablet 40 mg, QAM AC  ascorbic acid (VITAMIN C) tablet 500 mg, BID  zinc sulfate (ZINCATE) capsule 50 mg, Daily  insulin lispro (HUMALOG) injection vial 0-6 Units, TID WC  insulin lispro (HUMALOG) injection vial 0-3 Units, Nightly  glucose (GLUTOSE) 40 % oral gel 15 g, PRN  dextrose 50 % IV solution, PRN  glucagon (rDNA) injection 1 mg, PRN  dextrose 5 % solution, PRN  ipratropium-albuterol (DUONEB) nebulizer solution 1 ampule, Q4H WA  heparin (porcine) injection 5,000 Units, Q8H    Physical exam:  Vitals:    03/09/22 0054   BP: (!) 90/58   Pulse: 98   Resp: 16   Temp: 97.9 °F (36.6 °C)   SpO2:      General: Awake, alert, no acute distress  Neck: No JVD noted  Lungs: Clear bilaterally upper, diminished in bases bilaterally. Unlabored  CV: regular rate and rhythm. No rub  Abd: Rounded, soft, nondistended.   Active bowel sounds; patient reports slightly tender  Skin: Warm and dry. No rash on exposed extremities  Ext: 2+ pitting pedal edema  Neuro: Awake, alert, answers questions appropriately    Labs:  Lab Results   Component Value Date     03/09/2022     03/08/2022     03/07/2022    K 3.1 (L) 03/09/2022    K 3.2 (L) 03/08/2022    K 3.4 (L) 03/07/2022    CL 87 (L) 03/09/2022    CO2 34 (H) 03/09/2022    CO2 32 (H) 03/08/2022    CO2 33 (H) 03/07/2022    CREATININE 2.7 (H) 03/09/2022    CREATININE 2.7 (H) 03/08/2022    CREATININE 3.0 (H) 03/07/2022    BUN 74 (H) 03/09/2022    BUN 73 (H) 03/08/2022    BUN 75 (H) 03/07/2022    GLUCOSE 79 03/09/2022    GLUCOSE 86 03/08/2022    GLUCOSE 75 03/07/2022    PHOS 4.4 02/27/2022    PHOS 4.5 01/31/2022    PHOS 4.1 01/30/2022    WBC 18.6 (H) 03/09/2022    WBC 21.3 (H) 03/08/2022    WBC 12.7 (H) 03/07/2022    HGB 10.7 (L) 03/09/2022    HGB 10.7 (L) 03/08/2022    HGB 10.9 (L) 03/07/2022    HCT 33.6 (L) 03/09/2022    HCT 33.8 (L) 03/08/2022    HCT 34.7 (L) 03/07/2022    MCV 89.6 03/09/2022     03/09/2022     Imaging:  XR CHEST PORTABLE    Result Date: 2/23/2022  EXAMINATION: ONE XRAY VIEW OF THE CHEST 2/23/2022 4:49 pm COMPARISON: 01/21/2022 HISTORY: ORDERING SYSTEM PROVIDED HISTORY: shortness of breath TECHNOLOGIST PROVIDED HISTORY: Reason for exam:->shortness of breath What reading provider will be dictating this exam?->CRC FINDINGS: There is a large opacity seen within the right lung base. There is a small right pleural effusion. The left upper lobe is clear. There is a small left pleural effusion. The cardiac silhouette is within normals. There is a dual lead cardiac pacer on the left. 1. Large opacity within the right lung base which could represent pneumonia and or atelectasis. 2. Moderate size right pleural effusion. 3. Small left pleural effusion. 4. CT of the thorax is recommended for further evaluation.      US DUP LOWER EXTREMITIES BILATERAL VENOUS    Result Date: 2022  Patient MRN:  [id number] : 1945 Age: 65 years Gender: Male Order Date:  2022 5:28 PM EXAM: US DUP LOWER EXTREMITIES BILATERAL VENOUS NUMBER OF IMAGES:  52 INDICATION:  leg swelling leg swelling What reading provider will be dictating this exam?->[facility] COMPARISON: None Within the visualized vessels, there is no evidence for deep venous thrombosis There is good compressibility, there is good augmentation, there is good color flow. Within the visualized vessels there is no evidence for deep venous thrombosis     Assessment/ Plans:    1. CKD stage IV   due to diabetes mellitus and hypertension  Baseline creatinine from 2022 is 2.5-2.8   Creatinine 2.7 today  S/p Bumex drip and IV Diuril  UOP 1700 ml past 24 hours; net - 26 L  on p.o. Bumex  Follow    2. HFpEF  ECHO 2022 showed EF > 70%  S/p Bumex drip and IV Diuril  on p.o. Bumex  Cardiology previously following    3. Hypertension  BP goal<130/80  Intermittent hypotension  Follow closely    4. Diabetes mellitus  Hypoglycemic episodes intermittently during hospital stay  On insulin lispro and insulin glargine  Management per primary    5. Pleural effusion  CXR showed moderate size right pleural effusion and small left pleural effusion  Repeat chest x-ray showed \"slight increase\" in right pleural effusion  Chest x-ray 3/3 \"unchanged moderate bilateral pleural effusions\"  Pulmonology following     6. Hypokalemia  Likely in the setting of diuresis  K+ 3.1 today  Supplement potassium today  Follow    Tomi Wolf APRN - CNP     Patient seen and examined all key components of the physical performed independently , case discussed with NP, all pertinent labs and radiologic tests personally reviewed agree with above.       Yonatan Jay MD

## 2022-03-09 NOTE — PROGRESS NOTES
Patient refused to be turned and bilateral heel protectors. Self turns initiated.     Radha Ragsdale, RN

## 2022-03-09 NOTE — PROGRESS NOTES
Progress Note  Date:3/9/2022       [facility]:[id number]  Patient Lavern Dupree     YOB: 1945     Age:77 y.o. Patient says breathing is improved today. Subjective    Subjective:  Symptoms:  Improved. He reports shortness of breath. No chest pain. Diet:  Adequate intake. Activity level: Impaired due to weakness. Pain:  He reports no pain. Review of Systems   Constitutional: Negative for activity change and fever. HENT: Negative for congestion. Respiratory: Positive for shortness of breath. Cardiovascular: Positive for leg swelling. Negative for chest pain. Gastrointestinal: Negative for abdominal pain. Neurological: Negative for dizziness. Psychiatric/Behavioral: Negative for agitation. Objective         Vitals Last 24 Hours:  TEMPERATURE:  Temp  Av.1 °F (36.7 °C)  Min: 97.9 °F (36.6 °C)  Max: 98.2 °F (36.8 °C)  RESPIRATIONS RANGE: Resp  Av.5  Min: 16  Max: 20  PULSE OXIMETRY RANGE: SpO2  Av.2 %  Min: 94 %  Max: 99 %  PULSE RANGE: Pulse  Av  Min: 98  Max: 102  BLOOD PRESSURE RANGE: Systolic (10FSO), LU , Min:90 , LLW:087   ; Diastolic (85RWM), IMX:69, Min:58, Max:70    I/O (24Hr): Intake/Output Summary (Last 24 hours) at 3/9/2022 0718  Last data filed at 3/9/2022 0443  Gross per 24 hour   Intake 600 ml   Output 1700 ml   Net -1100 ml     Objective:  General Appearance:  Comfortable. Vital signs: (most recent): Blood pressure (!) 90/58, pulse 98, temperature 97.9 °F (36.6 °C), temperature source Axillary, resp. rate 16, height 5' 9\" (1.753 m), weight 261 lb 7.5 oz (118.6 kg), SpO2 96 %. No fever. Lungs:  Normal effort and normal respiratory rate. Breath sounds clear to auscultation. Heart: Normal rate. Regular rhythm. S1 normal and S2 normal.    Extremities: There is local swelling.       Labs/Imaging/Diagnostics    Labs:  CBC:  Recent Labs     22  0725 22  0434 22  0419   WBC 12.7* 21.3* 18.6*   RBC Within the visualized vessels there is no evidence for deep venous thrombosis     Assessment//Plan           Hospital Problems           Last Modified POA    * (Principal) CHF (congestive heart failure), NYHA class I, acute on chronic, combined ([facility] [address] 2/23/2022 Yes        Assessment:  (CHF/ volume overload  Pleural effusion  Acute renal insfficnecy  DM  COPD  HTN  Hyperlipidemia           Plan  Stable  Await discharge when bed available. ).

## 2022-03-09 NOTE — PLAN OF CARE
Problem: OXYGENATION/RESPIRATORY FUNCTION  Goal: Patient will maintain patent airway  Outcome: Met This Shift  Goal: Patient will achieve/maintain normal respiratory rate/effort  Description: Respiratory rate and effort will be within normal limits for the patient  Outcome: Met This Shift     Problem: Falls - Risk of:  Goal: Will remain free from falls  Description: Will remain free from falls  Outcome: Met This Shift  Goal: Absence of physical injury  Description: Absence of physical injury  Outcome: Met This Shift

## 2022-03-09 NOTE — PROGRESS NOTES
[facility]  Department of Pulmonary, Critical Care and Sleep Medicine  [address] [facility]  Department of Internal Medicine  Progress Note    SUBJECTIVE:  No acute issues overnight. Patient continues to work with therapy daily. OBJECTIVE:  Vitals:    03/09/22 0955 03/09/22 1254 03/09/22 1438 03/09/22 1640   BP: 92/63  85/62    Pulse: 103  96    Resp: 20  18    Temp: 98.1 °F (36.7 °C)      TempSrc: Oral  Oral    SpO2: 95% 94% 94% 93%   Weight:       Height:         Constitutional: Alert,     EENT: EOMI YOSEF. MMM. No icterus. No thrush. Neck: No thyromegaly. No JVD on my exam.  Respiratory: Breath sounds are diminished without use of accessory muscles. Cardiovascular: Regular,      Pulses:  Equal bilaterally. Abdomen: Soft without organomegaly. Lymphatic: No lymphadenopathy. Musculoskeletal: Without weakness or gross deficits  Extremities: 1-2+ edema in feet bilaterally  Skin:  Warm and dry. No skin rashes. Neurological/Psychiatric: No acute psychosis. Cranial nerves are intact. DATA:    Monitor Strips:  Reviewed & discusses with technical team. No changes noted.     RADIOLOGY:  No new imaging studies today  CBC with Differential:    Lab Results   Component Value Date    WBC 18.6 03/09/2022    RBC 3.75 03/09/2022    HGB 10.7 03/09/2022    HCT 33.6 03/09/2022     03/09/2022    MCV 89.6 03/09/2022    MCH 28.5 03/09/2022    MCHC 31.8 03/09/2022    RDW 16.9 03/09/2022    LYMPHOPCT 19.9 02/23/2022    MONOPCT 11.6 02/23/2022    BASOPCT 0.3 02/23/2022    MONOSABS 1.10 02/23/2022    LYMPHSABS 1.89 02/23/2022    EOSABS 0.30 02/23/2022    BASOSABS 0.03 02/23/2022     CMP:    Lab Results   Component Value Date     03/09/2022    K 3.1 03/09/2022    K 4.1 02/23/2022    CL 87 03/09/2022    CO2 34 03/09/2022    BUN 74 03/09/2022    CREATININE 2.7 03/09/2022    GFRAA 28 03/09/2022    LABGLOM 23 03/09/2022    GLUCOSE 79 03/09/2022    PROT 6.9 01/21/2022    LABALBU 3.5 02/25/2022    CALCIUM 8.5 03/09/2022    BILITOT 0.6 01/21/2022    ALKPHOS 127 01/21/2022    AST 32 01/21/2022    ALT 42 01/21/2022          Assessment:   1. Bilateral pleural effusion  2. Volume overload  3. CKD  4. Anasarca  5. Diabetes  6. Chronic hypoxemic respiratory insufficiency  7. SPEEDY  8. Hypokalemia  9. Elevated white blood cell count, slightly improved today to 18. 6.     Plan:   1. Continue to wean FiO2 as tolerated. Currently on 3 L nasal cannula. Patient was not requiring oxygen prior to his initial admission in January. 2. PFTs have previously been ordered however these have not been obtained. Would recommend full PFTs as outpatient after discharge. 3. Replace potassium  4. Continue home CPAP home machine  5. Diuretics as per nephrology  6.  Ok to discharge,     Cris Daily DO

## 2022-03-09 NOTE — PROGRESS NOTES
Occupational Therapy  OT BEDSIDE TREATMENT NOTE   [address] [facility], [place] [address] [facility]  [address] [place] [address], [address] [place] [address]  Patient Name: Mckayla Gaytan  MRN: [id number]  : 1945  Room: [address] [place] [address]     Per eval:  Evaluating OT: Savannah Levine OTR/L #[id number]      Referring Provider: Gena Galvin MD  Specific Provider Orders/Date: OT eval and treat 3/7/22     Diagnosis: CHF (congestive heart failure), NYHA class I, acute on chronic, combined ([facility] [address] [I50.43]   Pt admitted to hospital with B LE edema. Pt recently discharged from rehab      Pertinent Medical History:  has a past medical history of Acid reflux, Agent orange exposure, Congestive heart failure (CHF) ([facility]), COPD (chronic obstructive pulmonary disease) ([facility] [address] Depression, [facility] Hyperlipidemia, Hypertension, MI [facility]myocardial infarction) ([facility] Polio, and Sleep apnea.      Precautions:  Fall Risk, O2     Assessment of current deficits    [x]? Functional mobility          [x]? ADLs           [x]? Strength                  []?Cognition    [x]? Functional transfers        [x]? IADLs         [x]? Safety Awareness   [x]? Endurance    []? Fine Coordination                        [x]? Balance      []? Vision/perception   []? Sensation      []? Gross Motor Coordination            []? ROM           []?  Delirium                   []? Motor Control      OT PLAN OF CARE   OT POC based on physician orders, patient diagnosis and results of clinical assessment     Frequency/Duration 1-3 days/wk for 2 weeks PRN   Specific OT Treatment Interventions to include:   * Instruction/training on adapted ADL techniques and AE recommendations to increase functional independence within precautions       * Training on energy conservation strategies, correct breathing pattern and techniques to improve independence/tolerance for self-care routine  * Functional transfer/mobility training/DME recommendations for increased independence, safety, and fall prevention  * Patient/Family education to increase follow through with safety techniques and functional independence  * Recommendation of environmental modifications for increased safety with functional transfers/mobility and ADLs  * Therapeutic exercise to improve motor endurance, ROM, and functional strength for ADLs/functional transfers  * Therapeutic activities to facilitate/challenge dynamic balance, stand tolerance for increased safety and independence with ADLs  * Therapeutic activities to facilitate gross/fine motor skills for increased independence with ADLs  * Neuro-muscular re-education: facilitation of righting/equilibrium reactions, midline orientation, scapular stability/mobility, normalization of muscle tone, and facilitation of volitional active controled movement     Recommended Adaptive Equipment:  TBD      Home Living: Pt lives alone in a 1 story home with 1 hand rail.      Bathroom setup: walk-in shower     Equipment owned: shower chair, rollator, w/c     Prior Level of Function: Independent with ADLs , mod I with IADLs and ambulated short distances with rollator; manual w/c prn since return from rehab   Driving: yes   Occupation: retire air traffic control      Pain Level: BLEs     Cognition: A&O: 4/4; Follows 2 step directions             Memory:  good             Sequencing:  good             Problem solving:  fair             Judgement/safety:  fair               Functional Assessment:  AM-PAC Daily Activity Raw Score: 16/24    Initial Eval Status  Date: 2/24/22 Treatment Status  Date: 3/9/22 STGs = LTGs  Time frame: 10-14 days   Feeding Independent   Ind     Grooming Minimal Assist  SB (seated EOB) Modified [place]    UB Dressing Stand by Assist  Min A Modified [place]    LB Dressing Maximal Assist      Pants / socks Max A (assist with shorts bed level, deferred edu on AE due to fatigue) Modified [place]    Bathing Moderate Assist Max A (sponge bath seated EOB, assist from knees down to feet and buttocks bed level) Modified [place]    Toileting Moderate Assist  Max A (assist with bed pan, toileting task, and brief management bed level) Modified [place]    Bed Mobility  Supine to sit: Stand by Assist   Sit to supine: NT  Mod A Supine to sit: Modified [place]   Sit to supine: Modified [place]    Functional Transfers Minimal Assist  Attempted, unable to complete with 1 assist Modified [place]    Functional Mobility Minimal Assist  NT Modified [place]    Balance Sitting:     Static:  Sup    Dynamic:SBA  Standing: Min A Sitting:SBA    Standing: unable     Activity Tolerance F-     + desaturation to 89-90% with activity fair F+   Visual/  Perceptual Glasses: yes  wfl                       Comments: Upon arrival pt supine in bed and agreeable to session. Cleared by RN to see pt. Pt required vc's and physical assist for proper technique/safety with hand placement/body mechanics/posture for bed mobility/ADLs/functional tranfers. Pt required vc's for sequencing/initiation of ADLs/functional transfers. Pt able to  sit EOB ~30 mins to increase core strength/balance/activity tolerance for ease with ADLs. Deferred edu on AE due to fatigue. Pt required increased time to complete ADLs due to rest breaks. Pt required skilled monitoring of SpO2 during session, which was >95% . Pt appeared to have tolerated session fairly and appears motivated/cooperative/pleasant . Pt instructed on use of call light for assistance and fall prevention. Pt demo'ing fair understanding of education provided. Continue to educate. At end of session, pt supine in bed, call light within reach. · Pt has made fair progress towards set goals.    · Continue with current plan of care    Treatment Time In: 0845           Treatment Time Out: 1707          Treatment Charges: Mins Units   Ther Ex  [id number]     Manual Therapy [address] [phone] [id number]     ADL/Home Mgt [id number] 40 3   Neuro Re-ed [address] [facility] manage/training  [id number]     Non-Billable Time     Total Timed Treatment [address] [facility], OTR/L [id number]

## 2022-03-09 NOTE — CARE COORDINATION
Rodger Thompson at [facility] (skilled nursing) to check if Jhonatan Venturaing was received but no answer; left a message to return the call. Therapy department called for updated therapy notes.     Emily Stevens, MSW, LSW [phone]

## 2022-03-10 NOTE — PROGRESS NOTES
[place] [place]  Department of Pulmonary, Critical Care and Sleep Medicine  [address]  Department of Internal Medicine  Progress Note    SUBJECTIVE:  Patient seen and examined. Reports that he did take a few steps yesterday with therapy and that he sat in the chair but only for a few minutes he reports overall his breathing has improved and that the swelling in his legs is down significantly since admission but his main complaint at this time is continued generalized weakness. OBJECTIVE:  Vitals:    03/09/22 2036 03/09/22 2300 03/10/22 0611 03/10/22 0948   BP:  106/74  105/71   Pulse:  98  96   Resp:  20  18   Temp:  97.9 °F (36.6 °C)  97.3 °F (36.3 °C)   TempSrc:  Infrared  Infrared   SpO2: 93% 96%  95%   Weight:   264 lb 1.8 oz (119.8 kg)    Height:         Constitutional: Alert,     EENT: EOMI YOSEF. MMM. No icterus. No thrush. Neck: No thyromegaly. No JVD on my exam.  Respiratory: Breath sounds are diminished without use of accessory muscles. Cardiovascular: Regular, no murmurs rubs or gallops. Pulses:  Equal bilaterally. Abdomen: Soft without organomegaly. Lymphatic: No lymphadenopathy. Musculoskeletal: Without weakness or gross deficits  Extremities: 1-2+ edema in feet bilaterally  Skin:  Warm and dry. No skin rashes. Neurological/Psychiatric: No acute psychosis. Cranial nerves are intact. DATA:    Monitor Strips:  Reviewed & discusses with technical team. No changes noted.     RADIOLOGY:  No new imaging studies today  CBC with Differential:    Lab Results   Component Value Date    WBC 13.2 03/10/2022    RBC 3.80 03/10/2022    HGB 10.9 03/10/2022    HCT 34.1 03/10/2022     03/10/2022    MCV 89.7 03/10/2022    MCH 28.7 03/10/2022    MCHC 32.0 03/10/2022    RDW 16.7 03/10/2022    LYMPHOPCT 19.9 02/23/2022    MONOPCT 11.6 02/23/2022    BASOPCT 0.3 02/23/2022    MONOSABS 1.10 02/23/2022    LYMPHSABS 1.89 02/23/2022 EOSABS 0.30 02/23/2022    BASOSABS 0.03 02/23/2022     CMP:    Lab Results   Component Value Date     03/10/2022    K 3.1 03/10/2022    K 4.1 02/23/2022    CL 91 03/10/2022    CO2 34 03/10/2022    BUN 83 03/10/2022    CREATININE 2.9 03/10/2022    GFRAA 26 03/10/2022    LABGLOM 21 03/10/2022    GLUCOSE 90 03/10/2022    PROT 6.9 01/21/2022    LABALBU 3.5 02/25/2022    CALCIUM 8.5 03/10/2022    BILITOT 0.6 01/21/2022    ALKPHOS 127 01/21/2022    AST 32 01/21/2022    ALT 42 01/21/2022          Assessment:   1. Bilateral pleural effusion  2. Volume overload  3. CKD  4. Anasarca  5. Diabetes  6. Chronic hypoxemic respiratory insufficiency  7. SPEEDY  8. Hypokalemia  9. Elevated white blood cell count, continues to improve     Plan:   1. Continue to wean FiO2 as tolerated. Currently on 3 L nasal cannula. Patient was not requiring oxygen prior to his initial admission in January. 2. PFTs have previously been ordered however these have not been obtained. Would recommend full PFTs as outpatient after discharge. 3. Replace potassium  4. Continue home CPAP home machine  5. Diuretics as per nephrology  6. Ok to discharge from pulmonary standpoint.     Savoy Bound, DO

## 2022-03-10 NOTE — PLAN OF CARE
Problem: OXYGENATION/RESPIRATORY FUNCTION  Goal: Patient will maintain patent airway  Outcome: Met This Shift  Goal: Patient will achieve/maintain normal respiratory rate/effort  Description: Respiratory rate and effort will be within normal limits for the patient  Outcome: Met This Shift     Problem: HEMODYNAMIC STATUS  Goal: Patient has stable vital signs and fluid balance  Outcome: Met This Shift     Problem: FLUID AND ELECTROLYTE IMBALANCE  Goal: Fluid and electrolyte balance are achieved/maintained  Outcome: Met This Shift     Problem: ACTIVITY INTOLERANCE/IMPAIRED MOBILITY  Goal: Mobility/activity is maintained at optimum level for patient  Outcome: Met This Shift     Problem: Pain:  Goal: Pain level will decrease  Description: Pain level will decrease  Outcome: Met This Shift  Goal: Control of acute pain  Description: Control of acute pain  Outcome: Met This Shift  Goal: Control of chronic pain  Description: Control of chronic pain  Outcome: Met This Shift     Problem: Falls - Risk of:  Goal: Will remain free from falls  Description: Will remain free from falls  Outcome: Met This Shift  Goal: Absence of physical injury  Description: Absence of physical injury  Outcome: Met This Shift     Problem: Tissue Perfusion - Renal, Altered:  Goal: Electrolytes within specified parameters  Description: Electrolytes within specified parameters  Outcome: Met This Shift  Goal: Urine creatinine clearance will be within specified parameters  Description: Urine creatinine clearance will be within specified parameters  Outcome: Met This Shift  Goal: Serum creatinine will be within specified parameters  Description: Serum creatinine will be within specified parameters  Outcome: Met This Shift  Goal: Ability to achieve a balanced intake and output will improve  Description: Ability to achieve a balanced intake and output will improve  Outcome: Met This Shift     Problem: Serum Glucose Level - Abnormal:  Goal: Ability to maintain appropriate glucose levels has stabilized  Description: Ability to maintain appropriate glucose levels has stabilized  Outcome: Met This Shift

## 2022-03-10 NOTE — PROGRESS NOTES
[facility]   Nephrology Progress Note  Patient's Name: Rose Mendez    From consult note 2/24/22- History of Present Illness:    Maricel Damian is a 65 y.o. male with a past medical history of CHF, COPD, diabetes mellitus, hypertension, and polio. He presented to the ED on 2/23 with complaints of shortness of breath. Per the ED provider note, patient was also noticed increased leg swelling and that the patient wears 4 L of oxygen at home. Vital signs at presentation to the ED include temperature 98.4, respirations 18, pulse 112, /96, and he was 99% on 4 L O2. Lab data on presentation to the ED include CO2 30, BUN 33, creatinine 2.2, hemoglobin 10.9, and proBNP 1478. We were consulted to see the patient for CKD. Patient is known to our service, and had an office visit in June 2021 with Dr. Vivian Bishop. Baseline creatinine from January 2022 is 2.5-2.8. At present, patient was seen and examined. He is currently sitting up in a chair. He reports that he was having swelling and shortness of breath and that is what brought him into the hospital.  He reports that prior to admission his appetite has been okay. Overall he said he feels fine, except that his legs feel tight. He denies any current chest pain or shortness of breath. No nausea or abdominal pain. He denies any headaches or dizziness. He denies use of NSAIDs. \"    Subjective:    3/10: Patient was seen and examined. He reports that he is eating okay. He denies any current chest pain or shortness of breath at this time. Denies any abdominal pain or nausea.     Past Medical History:   Diagnosis Date    Acid reflux     Agent orange exposure     Congestive heart failure (CHF) (HCC)     COPD (chronic obstructive pulmonary disease) (HCC)     Depression     Diabetes mellitus (HCC)     Hyperlipidemia     Hypertension     MI [facility]ocardial infarction) ([facility] [address]     Polio     Sleep apnea      Past Surgical History:   Procedure Laterality Date  CARDIAC PACEMAKER PLACEMENT      CORONARY ANGIOPLASTY WITH STENT PLACEMENT      PACEMAKER PLACEMENT       History reviewed. No pertinent family history. reports that he has quit smoking. He has never used smokeless tobacco. He reports that he does not drink alcohol and does not use drugs. Allergies:  Penicillins    Current Medications:    potassium chloride (KLOR-CON M) extended release tablet 40 mEq, Once  bumetanide (BUMEX) tablet 2 mg, BID  acetaminophen (TYLENOL) tablet 650 mg, Q4H PRN  aspirin EC tablet 81 mg, Daily  atorvastatin (LIPITOR) tablet 40 mg, Daily  vitamin D (CHOLECALCIFEROL) tablet 2,000 Units, Daily  vitamin B-12 (CYANOCOBALAMIN) tablet 1,000 mcg, Daily  ezetimibe (ZETIA) tablet 10 mg, Daily  folic acid (FOLVITE) tablet 1 mg, Daily  guaiFENesin tablet 400 mg, 4x Daily PRN  insulin glargine-yfgn (SEMGLEE-YFGN) injection vial 50 Units, Nightly  loperamide (IMODIUM) capsule 2 mg, 4x Daily PRN  melatonin tablet 3 mg, Nightly  [Held by provider] metoprolol succinate (TOPROL XL) extended release tablet 25 mg, BID  pantoprazole (PROTONIX) tablet 40 mg, QAM AC  ascorbic acid (VITAMIN C) tablet 500 mg, BID  zinc sulfate (ZINCATE) capsule 50 mg, Daily  insulin lispro (HUMALOG) injection vial 0-6 Units, TID WC  insulin lispro (HUMALOG) injection vial 0-3 Units, Nightly  glucose (GLUTOSE) 40 % oral gel 15 g, PRN  dextrose 50 % IV solution, PRN  glucagon (rDNA) injection 1 mg, PRN  dextrose 5 % solution, PRN  ipratropium-albuterol (DUONEB) nebulizer solution 1 ampule, Q4H WA  heparin (porcine) injection 5,000 Units, Q8H    Physical exam:  Vitals:    03/09/22 2300   BP: 106/74   Pulse: 98   Resp: 20   Temp: 97.9 °F (36.6 °C)   SpO2: 96%     General: Awake, alert, no acute distress  Neck: No JVD noted  Lungs: Clear bilaterally upper, diminished in bases bilaterally. Unlabored  CV: regular rate and rhythm. No rub  Abd: Rounded, soft, nondistended.   Active bowel sounds; patient reports slightly tender  Skin: Warm and dry. No rash on exposed extremities  Ext: 2+ pitting BLE edema  Neuro: Awake, alert, answers questions appropriately    Labs:  Lab Results   Component Value Date     03/10/2022     03/09/2022     03/08/2022    K 3.1 (L) 03/10/2022    K 3.1 (L) 03/09/2022    K 3.2 (L) 03/08/2022    CL 91 (L) 03/10/2022    CO2 34 (H) 03/10/2022    CO2 34 (H) 03/09/2022    CO2 32 (H) 03/08/2022    CREATININE 2.9 (H) 03/10/2022    CREATININE 2.7 (H) 03/09/2022    CREATININE 2.7 (H) 03/08/2022    BUN 83 (H) 03/10/2022    BUN 74 (H) 03/09/2022    BUN 73 (H) 03/08/2022    GLUCOSE 90 03/10/2022    GLUCOSE 79 03/09/2022    GLUCOSE 86 03/08/2022    PHOS 4.4 02/27/2022    PHOS 4.5 01/31/2022    PHOS 4.1 01/30/2022    WBC 13.2 (H) 03/10/2022    WBC 18.6 (H) 03/09/2022    WBC 21.3 (H) 03/08/2022    HGB 10.9 (L) 03/10/2022    HGB 10.7 (L) 03/09/2022    HGB 10.7 (L) 03/08/2022    HCT 34.1 (L) 03/10/2022    HCT 33.6 (L) 03/09/2022    HCT 33.8 (L) 03/08/2022    MCV 89.7 03/10/2022     03/10/2022     Imaging:  XR CHEST PORTABLE    Result Date: 2/23/2022  EXAMINATION: ONE XRAY VIEW OF THE CHEST 2/23/2022 4:49 pm COMPARISON: 01/21/2022 HISTORY: ORDERING SYSTEM PROVIDED HISTORY: shortness of breath TECHNOLOGIST PROVIDED HISTORY: Reason for exam:->shortness of breath What reading provider will be dictating this exam?->CRC FINDINGS: There is a large opacity seen within the right lung base. There is a small right pleural effusion. The left upper lobe is clear. There is a small left pleural effusion. The cardiac silhouette is within normals. There is a dual lead cardiac pacer on the left. 1. Large opacity within the right lung base which could represent pneumonia and or atelectasis. 2. Moderate size right pleural effusion. 3. Small left pleural effusion. 4. CT of the thorax is recommended for further evaluation.      US DUP LOWER EXTREMITIES BILATERAL VENOUS    Result Date: 2/23/2022  Patient MRN: [id number] : 1945 Age: 65 years Gender: Male Order Date:  2022 5:28 PM EXAM: US DUP LOWER EXTREMITIES BILATERAL VENOUS NUMBER OF IMAGES:  52 INDICATION:  leg swelling leg swelling What reading provider will be dictating this exam?->[facility] COMPARISON: None Within the visualized vessels, there is no evidence for deep venous thrombosis There is good compressibility, there is good augmentation, there is good color flow. Within the visualized vessels there is no evidence for deep venous thrombosis     Assessment/ Plans:    1. CKD stage IV   due to diabetes mellitus and hypertension  Baseline creatinine from 2022 is 2.5-2.8   Creatinine 2.9 today  S/p Bumex drip and IV Diuril  UOP 1200 ml past 24 hours; net - 26.6 L  on p.o. Bumex  Follow    2. HFpEF  ECHO 2022 showed EF > 70%  S/p Bumex drip and IV Diuril  on p.o. Bumex  Cardiology previously following    3. Hypertension  BP goal<130/80  Intermittent hypotension  Follow closely    4. Diabetes mellitus  Hypoglycemic episodes intermittently during hospital stay  On insulin lispro and insulin glargine  Management per primary    5. Pleural effusion  CXR showed moderate size right pleural effusion and small left pleural effusion  Repeat chest x-ray showed \"slight increase\" in right pleural effusion  Chest x-ray 3/3 \"unchanged moderate bilateral pleural effusions\"  Pulmonology following     6. Hypokalemia  Likely in the setting of diuresis  K+ 3.1 today  Will supplement potassium today and start potassium 20meq oral daily tomorrow   Follow    Jasmin De Oliveira, APRN - CNP     Patient seen and examined all key components of the physical performed independently , case discussed with NP, all pertinent labs and radiologic tests personally reviewed agree with above.     OK FOR DISCHARGE  Leave piedra catheter in; voiding trial at facility  Yeison Hendricks MD

## 2022-03-10 NOTE — PROGRESS NOTES
Progress Note  Date:3/10/2022       Room:6405/6405-  Patient Rika Whatley     YOB: 1945     Age:77 y.o. Patient says breathing is improved today. Subjective    Subjective:  Symptoms:  Improved. He reports shortness of breath. No chest pain. Diet:  Adequate intake. Activity level: Impaired due to weakness. Pain:  He reports no pain. Review of Systems   Constitutional: Negative for activity change and fever. HENT: Negative for congestion. Respiratory: Positive for shortness of breath. Cardiovascular: Positive for leg swelling. Negative for chest pain. Gastrointestinal: Negative for abdominal pain. Neurological: Negative for dizziness. Psychiatric/Behavioral: Negative for agitation. Objective         Vitals Last 24 Hours:  TEMPERATURE:  Temp  Av °F (36.7 °C)  Min: 97.9 °F (36.6 °C)  Max: 98.1 °F (36.7 °C)  RESPIRATIONS RANGE: Resp  Av  Min: 18  Max: 20  PULSE OXIMETRY RANGE: SpO2  Av.1 %  Min: 93 %  Max: 96 %  PULSE RANGE: Pulse  Av.8  Min: 96  Max: 103  BLOOD PRESSURE RANGE: Systolic (03LWG), TLM:03 , Min:85 , VTS:076   ; Diastolic (71MNK), JPC:10, Min:62, Max:74    I/O (24Hr): Intake/Output Summary (Last 24 hours) at 3/10/2022 0706  Last data filed at 3/9/2022 2306  Gross per 24 hour   Intake 600 ml   Output 1200 ml   Net -600 ml     Objective:  General Appearance:  Comfortable. Vital signs: (most recent): Blood pressure 106/74, pulse 98, temperature 97.9 °F (36.6 °C), temperature source Infrared, resp. rate 20, height 5' 9\" (1.753 m), weight 264 lb 1.8 oz (119.8 kg), SpO2 96 %. No fever. Lungs:  Normal effort and normal respiratory rate. Breath sounds clear to auscultation. Heart: Normal rate. Regular rhythm. S1 normal and S2 normal.    Extremities: There is local swelling.       Labs/Imaging/Diagnostics    Labs:  CBC:  Recent Labs     22  0434 22  0419 03/10/22  0431   WBC 21.3* 18.6* 13.2*   RBC 3.76* 3.75* 3.80   HGB 10.7* 10.7* 10.9*   HCT 33.8* 33.6* 34.1*   MCV 89.9 89.6 89.7   RDW 16.6* 16.9* 16.7*    257 264     CHEMISTRIES:  Recent Labs     22  0725 22  0725 22  0434 22  0419 03/10/22  0431      < > 137 134 139   K 3.4*   < > 3.2* 3.1* 3.1*   CL 89*   < > 90* 87* 91*   CO2 33*   < > 32* 34* 34*   BUN 75*   < > 73* 74* 83*   CREATININE 3.0*   < > 2.7* 2.7* 2.9*   GLUCOSE 75   < > 86 79 90   MG 2.4  --  2.4 2.4  --     < > = values in this interval not displayed. PT/INR:No results for input(s): PROTIME, INR in the last 72 hours. APTT:No results for input(s): APTT in the last 72 hours. LIVER PROFILE:No results for input(s): AST, ALT, BILIDIR, BILITOT, ALKPHOS in the last 72 hours. Imaging Last 24 Hours:  XR CHEST PORTABLE    Result Date: 2022  EXAMINATION: ONE XRAY VIEW OF THE CHEST 2022 4:49 pm COMPARISON: 2022 HISTORY: ORDERING SYSTEM PROVIDED HISTORY: shortness of breath TECHNOLOGIST PROVIDED HISTORY: Reason for exam:->shortness of breath What reading provider will be dictating this exam?->CRC FINDINGS: There is a large opacity seen within the right lung base. There is a small right pleural effusion. The left upper lobe is clear. There is a small left pleural effusion. The cardiac silhouette is within normals. There is a dual lead cardiac pacer on the left. 1. Large opacity within the right lung base which could represent pneumonia and or atelectasis. 2. Moderate size right pleural effusion. 3. Small left pleural effusion. 4. CT of the thorax is recommended for further evaluation.      US DUP LOWER EXTREMITIES BILATERAL VENOUS    Result Date: 2022  Patient MRN:  [id number] : 1945 Age: 65 years Gender: Male Order Date:  2022 5:28 PM EXAM: US DUP LOWER EXTREMITIES BILATERAL VENOUS NUMBER OF IMAGES:  52 INDICATION:  leg swelling leg swelling What reading provider will be dictating this exam?->[facility] COMPARISON: None Within the visualized vessels, there is no evidence for deep venous thrombosis There is good compressibility, there is good augmentation, there is good color flow. Within the visualized vessels there is no evidence for deep venous thrombosis     Assessment//Plan           Hospital Problems           Last Modified POA    * (Principal) CHF (congestive heart failure), NYHA class I, acute on chronic, [facility] 2/23/2022 Yes        Assessment:  (CHF/ volume overload  Pleural effusion  Acute renal insfficnecy  DM  COPD  HTN  Hyperlipidemia           Plan  Stable  Await discharge when bed available. ).

## 2022-03-10 NOTE — CARE COORDINATION
Care coordination: Received a message from Nguyễn Gomez from [place]. Has been requesting updated evals for 3 days, [facility] and continues to [facility] without [facility]. She has reached out to harriet at [facility] to see if they can assist with information pending a denial.  I called and spoke to Nguyễn Gomez [phone] and reviewed evals from 3/7 and 3/9 that were done as requested and that they never received from [address] [place] [address] 2. She plans to approve and will call me with approval as well [facility], to let me know when to set up transport    Max Pedraza    Addendum: [facility] obtained, transport set up by jennyfer for  at 330 pm.  Charge nurse notified. Spoke to pt via phone and he is aware and will call sibling to meet at facility    Max Pedraza    Addendum: I messaged for renal to check for discharge as per nursing, may not yet be cleared. Spoke to pulmonary and they have cleared.  Awaiting return call    Max Pedraza

## 2022-03-14 PROBLEM — I50.23 ACUTE ON CHRONIC CLINICAL SYSTOLIC HEART FAILURE (HCC): Status: ACTIVE | Noted: 2022-01-01

## 2022-03-14 NOTE — LETTER
heart failure [facility]) and DX codes: I50.23      PATIENT                 Name: Rika Ruffin : 1945 (77 yrs)   Address: [address] [place] [address] Sex: Male   [place] city: [place] [address]         Marital Status: Single   Employer: RETIRED         Alevism:  [employer]   Primary Care Provider: Micha Gaxiola, *         Primary Phone: [phone]   EMERGENCY CONTACT   Contact Name Legal Guardian? Relationship to Patient Home Phone Work Phone   1. Nicole Betancourt  2. *No Contact Specified* No    Brother/Sister    [phone]                 GUARANTOR            Guarantor: Rika Ruffin     : 1945   Address: [address] [place] [address] Sex: Male   [facility] [address]     Relation to Patient: Self       Home Phone: [phone]   Guarantor ID: [id number]       Work Phone:     Guarantor Employer: RETIRED         Status: RETIRED      COVERAGE        PRIMARY INSURANCE   Payor: [facility] MEDICARE Plan: [facility] MEDICARE COMPLETE   Payor Address: ,          Group Number: [id number] Insurance Type: INDEMNITY   Subscriber Name: Bishnu Montana : 1945   Subscriber ID: [id number] Pat. Rel. to Sub: Self   SECONDARY INSURANCE   Payor:  Plan:  [employer] MEDICAR*   Payor Address:  [address] [facility] [id number], [place] [address]          Group Number:   Insurance Type: INDEMNITY   Subscriber Name: Bishnu Montana : 1945   Subscriber ID: [id number] Pat.  Rel. to Sub: SELF           CSN: [id number]

## 2022-03-14 NOTE — ED PROVIDER NOTES
Peyton Abbott is a 65 y.o. male    Chief Complaint   Patient presents with    Shortness of Breath     per ems patient normally on 3L oxygen, pulse ox in 80s at nursing home, now on 6L          HPI   Peyton Abbott is a 65 y.o. male presenting to the ED for Shortness of Breath (per ems patient normally on 3L oxygen, pulse ox in 80s at nursing home, now on 6L )    History comes primarily from the patient. Patient presents to the emergency department for shortness of breath . He does use oxygen at home at a baseline of 3 L but is requiring 6 L now. Upon arriving to the nursing home, EMS noted the patient to be in the 80% SPO2 on his 3 L. His hypoxia and shortness of breath was even while at rest.  Patient can think of no provocation. Onset was over the last several hours. Patient has a history of heart failure, coronary artery disease. He is not complaining of any chest pain, dizziness, palpitations, abdominal pain, nausea/vomiting/diarrhea. No bowel or urinary symptoms. Review of Systems   Constitutional: Negative for appetite change, fatigue and fever. HENT: Negative for congestion, rhinorrhea and trouble swallowing. Eyes: Negative for redness and itching. Respiratory: Positive for shortness of breath. Negative for chest tightness and wheezing. Cardiovascular: Negative for chest pain, palpitations and leg swelling. Gastrointestinal: Negative for abdominal distention, abdominal pain, constipation, diarrhea, nausea and vomiting. Genitourinary: Negative for decreased urine volume, difficulty urinating and frequency. Musculoskeletal: Negative for arthralgias, back pain and myalgias. Neurological: Negative for dizziness, syncope, weakness, numbness and headaches. Psychiatric/Behavioral: Negative for agitation, behavioral problems, confusion and decreased concentration. The patient is not nervous/anxious. All other systems reviewed and are negative.        Physical Exam  Vitals reviewed. Constitutional:       General: He is not in acute distress. Appearance: Normal appearance. He is obese. He is not ill-appearing or toxic-appearing. HENT:      Head: Normocephalic and atraumatic. Nose: Nose normal. No congestion or rhinorrhea. Mouth/Throat:      Mouth: Mucous membranes are moist.      Pharynx: Oropharynx is clear. No oropharyngeal exudate or posterior oropharyngeal erythema. Eyes:      Extraocular Movements: Extraocular movements intact. Conjunctiva/sclera: Conjunctivae normal.      Pupils: Pupils are equal, round, and reactive to light. Cardiovascular:      Rate and Rhythm: Normal rate and regular rhythm. Heart sounds: Normal heart sounds. No murmur heard. Pulmonary:      Effort: Pulmonary effort is normal. No tachypnea or respiratory distress. Breath sounds: Decreased breath sounds present. Abdominal:      General: Abdomen is flat. There is no distension. Tenderness: There is no abdominal tenderness. There is no guarding. Musculoskeletal:         General: No swelling or tenderness. Normal range of motion. Cervical back: Normal range of motion. No rigidity or tenderness. Right lower leg: Edema present. Left lower leg: Edema present. Skin:     General: Skin is warm and dry. Coloration: Skin is not jaundiced or pale. Findings: No bruising or erythema. Neurological:      General: No focal deficit present. Mental Status: He is alert and oriented to person, place, and time. Cranial Nerves: No cranial nerve deficit. Motor: No weakness. Psychiatric:         Mood and Affect: Mood normal.         Behavior: Behavior normal.         Thought Content:  Thought content normal.          Procedures     MDM   Patient presented to the Emergency Department for Shortness of Breath (per ems patient normally on 3L oxygen, pulse ox in 80s at nursing home, now on 6L )    Patient presents for increasing oxygen demand on a baseline of 3 L he was 80% and is now requiring 6 L of oxygen to maintain sats above 90. Patient denies chest pain, abdominal pain, headache or lightheadedness/dizziness. Labs and imaging reviewed. Patient noted to have worsening renal function, elevated WBC. Delta troponin negative. Chest x-ray shows right and left pleural effusions with atelectasis, supported by the CTA. No pulmonary embolism. Patient also has hepatic cirrhosis with some ascites. Based on clinical exam along with laboratory work-up, patient appears to have acute on chronic heart failure. The patient will be admitted to the hospital for further management. EKG: This EKG is signed and interpreted by me. Rate: 74  Rhythm: sinus  Axis: normal  Interpretation: no acute changes  Comparison: stable as compared to patient's most recent EKG         --------------------------------------------- PAST HISTORY ---------------------------------------------  Past Medical History:  has a past medical history of Acid reflux, Agent orange exposure, Congestive heart failure (CHF) ([facility]), COPD (chronic obstructive pulmonary disease) ([facility]), Depression, [facility] Hyperlipidemia, Hypertension, MI (myocardial infarction) ([facility] Polio, and Sleep apnea. Past Surgical History:  has a past surgical history that includes Coronary angioplasty with stent; Cardiac pacemaker placement; and pacemaker placement. Social History:  reports that he has quit smoking. He has never used smokeless tobacco. He reports that he does not drink alcohol and does not use drugs. Family History: family history is not on file. The patients home medications have been reviewed.     Allergies: Penicillins    -------------------------------------------------- RESULTS -------------------------------------------------    LABS:  Results for orders placed or performed during the hospital encounter of 03/14/22   CBC with Auto Differential   Result Value Ref Range    WBC 17.3 (H) 4.5 - 11.5 E9/L    RBC 3.92 3.80 - 5.80 E12/L    Hemoglobin 11.2 (L) 12.5 - 16.5 g/dL    Hematocrit 35.1 (L) 37.0 - 54.0 %    MCV 89.5 80.0 - 99.9 fL    MCH 28.6 26.0 - 35.0 pg    MCHC 31.9 (L) 32.0 - 34.5 %    RDW 16.7 (H) 11.5 - 15.0 fL    Platelets 237 (H) 986 - 450 E9/L    MPV 9.3 7.0 - 12.0 fL    Neutrophils % 68.4 43.0 - 80.0 %    Immature Granulocytes % 4.0 0.0 - 5.0 %    Lymphocytes % 16.0 (L) 20.0 - 42.0 %    Monocytes % 9.5 2.0 - 12.0 %    Eosinophils % 1.7 0.0 - 6.0 %    Basophils % 0.4 0.0 - 2.0 %    Neutrophils Absolute 11.85 (H) 1.80 - 7.30 E9/L    Immature Granulocytes # 0.70 E9/L    Lymphocytes Absolute 2.77 1.50 - 4.00 E9/L    Monocytes Absolute 1.65 (H) 0.10 - 0.95 E9/L    Eosinophils Absolute 0.30 0.05 - 0.50 E9/L    Basophils Absolute 0.07 0.00 - 0.20 E9/L    Anisocytosis 2+     Polychromasia 2+     Poikilocytes 1+     Schistocytes 1+     Hemphill Cells 1+     Ovalocytes 1+    Comprehensive Metabolic Panel w/ Reflex to MG   Result Value Ref Range    Sodium 137 132 - 146 mmol/L    Potassium reflex Magnesium 3.7 3.5 - 5.0 mmol/L    Chloride 93 (L) 98 - 107 mmol/L    CO2 29 22 - 29 mmol/L    Anion Gap 15 7 - 16 mmol/L    Glucose 164 (H) 74 - 99 mg/dL    BUN 84 (H) 6 - 23 mg/dL    CREATININE 3.2 (H) 0.7 - 1.2 mg/dL    GFR Non-African American 19 >=60 mL/min/1.73    GFR African American 23     Calcium 8.8 8.6 - 10.2 mg/dL    Total Protein 6.8 6.4 - 8.3 g/dL    Albumin 3.4 (L) 3.5 - 5.2 g/dL    Total Bilirubin 0.6 0.0 - 1.2 mg/dL    Alkaline Phosphatase 113 40 - 129 U/L    ALT 53 (H) 0 - 40 U/L    AST 29 0 - 39 U/L   Brain Natriuretic Peptide   Result Value Ref Range    Pro-BNP 3,338 (H) 0 - 450 pg/mL   Troponin   Result Value Ref Range    Troponin, High Sensitivity 39 (H) 0 - 11 ng/L   D-Dimer, Quantitative   Result Value Ref Range    D-Dimer, Quant 741 ng/mL DDU   Troponin   Result Value Ref Range    Troponin, High Sensitivity 38 (H) 0 - 11 ng/L   CBC   Result Value Ref Range    WBC 13.9 (H) 4.5 - 11.5 E9/L    RBC 3.77 (L) 3.80 - 5.80 E12/L    Hemoglobin 10.7 (L) 12.5 - 16.5 g/dL    Hematocrit 34.1 (L) 37.0 - 54.0 %    MCV 90.5 80.0 - 99.9 fL    MCH 28.4 26.0 - 35.0 pg    MCHC 31.4 (L) 32.0 - 34.5 %    RDW 16.6 (H) 11.5 - 15.0 fL    Platelets 207 697 - 204 E9/L    MPV 9.3 7.0 - 12.0 fL   Basic Metabolic Panel   Result Value Ref Range    Sodium 138 132 - 146 mmol/L    Potassium 3.6 3.5 - 5.0 mmol/L    Chloride 93 (L) 98 - 107 mmol/L    CO2 29 22 - 29 mmol/L    Anion Gap 16 7 - 16 mmol/L    Glucose 102 (H) 74 - 99 mg/dL    BUN 83 (H) 6 - 23 mg/dL    CREATININE 3.1 (H) 0.7 - 1.2 mg/dL    GFR Non-African American 20 >=60 mL/min/1.73    GFR African American 24     Calcium 8.9 8.6 - 10.2 mg/dL   Cell Count with Differential, Body Fluid   Result Value Ref Range    Cell Count Fluid Type Pleural     Color, Fluid Red     Appearance, Fluid Cloudy     Nucl Cell, Fluid 359 /uL    RBC, Fluid 23,000 /uL    Neutrophil Count, Fluid 22 %    Monocyte Count, Fluid 78 %   Glucose, Body Fluid   Result Value Ref Range    Glucose, Fluid 143 Not Established mg/dL    Fluid Type Pleural    Lactate Dehydrogenase, Body Fluid   Result Value Ref Range    LD, Fluid 126 Not Established U/L   Protein, Body Fluid   Result Value Ref Range    Protein, Fluid 2.8 Not Established g/dL   POCT Glucose   Result Value Ref Range    Meter Glucose 134 (H) 74 - 99 mg/dL   POCT Glucose   Result Value Ref Range    Meter Glucose 169 (H) 74 - 99 mg/dL   POCT Glucose   Result Value Ref Range    Meter Glucose 176 (H) 74 - 99 mg/dL   POCT Glucose   Result Value Ref Range    Meter Glucose 116 (H) 74 - 99 mg/dL   POCT Glucose   Result Value Ref Range    Meter Glucose 129 (H) 74 - 99 mg/dL   EKG 12 Lead   Result Value Ref Range    Ventricular Rate 74 BPM    Atrial Rate 74 BPM    P-R Interval 154 ms    QRS Duration 122 ms    Q-T Interval 442 ms    QTc Calculation (Bazett) 490 ms    P Axis 59 degrees    R Axis 67 degrees    T Axis 59 degrees RADIOLOGY:  US THORACENTESIS Which side should the procedure be performed? Right   Final Result   Ultrasound utilized for thoracentesis procedure. For additional information,   please refer to operative report. CTA PULMONARY W CONTRAST   Final Result   1. No PE.      2.  Bilateral large pleural effusions with secondary bilateral passive   atelectasis. 3.  Hepatic cirrhosis. Mild splenomegaly. Small ascites. XR CHEST PORTABLE   Final Result   1. Moderate right pleural effusion and small left pleural effusion   2. Bibasilar airspace disease favored to represent atelectasis secondary to   the pleural effusions. .         XR CHEST PORTABLE    (Results Pending)         ------------------------- NURSING NOTES AND VITALS REVIEWED ---------------------------  Date / Time Roomed:  3/14/2022 11:15 AM  ED Bed Assignment:  [id number]    The nursing notes within the ED encounter and vital signs as below have been reviewed. Patient Vitals for the past 24 hrs:   BP Temp Temp src Pulse Resp SpO2 Height Weight   03/15/22 0808 86/60 97.5 °F (36.4 °C) Oral 77 17 97 % -- --   03/15/22 0400 (!) 86/59 97.4 °F (36.3 °C) Axillary 70 18 95 % -- --   03/14/22 2300 107/70 97.4 °F (36.3 °C) Axillary 81 19 94 % 5' 9\" (1.753 m) 271 lb 13.2 oz (123.3 kg)   03/14/22 2147 96/74 -- -- 84 (!) 99 99 % -- --   03/14/22 1922 92/74 -- -- 85 19 99 % -- --       Oxygen Saturation Interpretation: Normal and Abnormal    ------------------------------------------ PROGRESS NOTES ------------------------------------------  Re-evaluation(s):  Time: Multiple reevaluation's. Patients symptoms show no change  Repeat physical examination is not changed    Counseling:  I have spoken with the patient and discussed todays results, in addition to providing specific details for the plan of care and counseling regarding the diagnosis and prognosis.   Their questions are answered at this time and they are agreeable with the plan of admission.    --------------------------------- ADDITIONAL PROVIDER NOTES ---------------------------------  Consultations:  Time: 1700. Spoke with Dr. Bryon San. Discussed case. They will admit the patient. This patient's ED course included: a personal history and physicial examination, multiple bedside re-evaluations, cardiac monitoring and continuous pulse oximetry    This patient has remained hemodynamically stable during their ED course. Diagnosis:  1. Congestive heart failure, unspecified HF chronicity, unspecified heart failure type ([facility]    2. COPD exacerbation ([facility]    3. [facility]ELSE[facility]acute kidney [facility]        Disposition:  Patient's disposition: Admit to telemetry  Patient's condition is stable.            Corie Hsieh MD  Resident  03/15/22 0476

## 2022-03-14 NOTE — ED NOTES
Radiology Procedure Waiver   Name: Franklin Dukes  : 1945  MRN: [id number]    Date:  3/14/22    Time: 12:45 PM EDT    Benefits of immediately proceeding with radiology exam(s) without pre-testing outweigh the risks or are not indicated as specified below and therefore the following is/are being waived:    [x] Benefits of immediate radiology exam(s) outweigh any risk. OR    Pre-exam testing is not indicated for the following reason(s):  [] Pregnancy test   [] Patients LMP on-time and regular.   [] Patient had Tubal Ligation or has other Contraception Device. [] Patient  is Menopausal or Premenarcheal.    [] Patient had Full or Partial Hysterectomy. [] Protocol for CT contrast allegry   [] Patient has tolerated well previously   [] Patient does not have a true allergy    [] MRI Questionnaire     [] BUN/Creatinine   [] Patient age w/no hx of renal dysfunction. [] Patient on Dialysis. [] Recent Normal Labs.   Electronically signed by Sol Rajan MD on 3/14/22 at 12:45 PM EDT               fEraín Zamarripa MD  Resident  [phone]

## 2022-03-14 NOTE — LETTER
[place] Department Medicaid  CERTIFICATION OF NECESSITY  FOR NON-EMERGENCY TRANSPORTATION   BY GROUND AMBULANCE      Individual Information   1. Name: Michael Thao 2. [place] Medicaid Billing Number:    3. Address: [address] [facility]      Transportation Provider Information   4. Provider Name:    5. [place] Medicaid Provider Number:  National Provider Identifier (NPI):      Certification  7. Criteria:  During transport, this individual requires:  [x] Medical treatment or continuous     supervision by an EMT. [] The administration or regulation of oxygen by another person. [] Supervised protective restraint. 8. Period Beginning Date: 03/22/22   9. Length  [x] Not more than 10 day(s)  [] One Year     Additional Information Relevant to Certification   10. Comments or Explanations, If Necessary or Appropriate     CHF, COPD, hx MI, profound weakness, assist x 2 for transfers 4L oxygen dependent     Certifying Practitioner Information   11. Name of Practitioner: Neela Marcum MD   12. [place] Medicaid Provider Number, If Applicable:  [address] Provider Identifier (NPI):      Signature Information   14. Date of Signature: 03/22/22 15. Name of Person Signing: [facility]   45. Signature and Professional Designation: Electronically signed by [facility] ZULLY ARELLANO on 3/22/2022 at 3:33 PM  Discharge planner     [facility] [id number]  Rev. 7/2015    [address] [place] [address] Encounter Date/Time: 3/14/2022 [address] [place] [address] Account: [de-identified]    MRN: [id number]    Patient: Aldo Lei Serial #: [id number]      ENCOUNTER          Patient Class: I Private Enc?   No Unit RM BD: SEYZ 4S PICU 4501/4501-A   Hospital Service: MED   Encounter DX: Acute on chronic clinica*   ADM Provider: Neela Marcum MD   Procedure:     ATT Provider: Neela Marcum MD   REF Provider:        Admission DX: Acute on chronic clinical systolic heart failure ([facility] and DX codes: I50.23    PATIENT                 Name: Bel Sample : 1945 (77 yrs)   Address: [address] [place] [address] Sex: Male   [place] city: [place] [address]         Marital Status: Single   Employer: RETIRED         Mu-ism:  Hernan Palomo   Primary Care Provider: Cait Miranda, *         Primary Phone: [phone]   EMERGENCY CONTACT   Contact Name Legal Guardian? Relationship to Patient Home Phone Work Phone   1. Nicole Betancourt  2. *No Contact Specified* No    Brother/Sister    [phone]                 GUARANTOR            Guarantor: Bel Sample     : 1945   Address: [address] [place] [address] Sex: Male   [address] [facility] [address]     Relation to Patient: Self       Home Phone: [phone]   Guarantor ID: [id number]       Work Phone:     Guarantor Employer: RETIRED         Status: RETIRED      COVERAGE        PRIMARY INSURANCE   Payor: [facility] MEDICARE Plan: [facility] MEDICARE COMPLETE   Payor Address: ,          Group Number: [id number] Insurance Type: INDEMNITY   Subscriber Name: Jimbo Markham : 1945   Subscriber ID: [id number] Pat. Rel. to Sub: Self   SECONDARY INSURANCE   Payor:  Plan:  [employer] MEDICAR*   Payor Address:  [address]  [id number], [place] [address]          Group Number:   Insurance Type: INDEMNITY   Subscriber Name: Jimbo Markham : 1945   Subscriber ID: [id number] Pat.  Rel. to Sub: SELF           CSN: [id number]

## 2022-03-14 NOTE — ED NOTES
Pt reports to ED from rehab facility with complaints of shortness of breath that have since been relieved. Pt is on 6L O2 via nasal canula. Normally wears 3L nasal canula. Pt reports no pain or shortness of breath at this time. Pt does present with 3+ pitting edema to both lower extremities.       Kelly Rodriguez RN  03/14/22 2535

## 2022-03-15 NOTE — PROGRESS NOTES
Occupational Therapy  Attempted OT eval at b/s, however, pt was off the unit for Thoracentesis. Will attempt Assessment next date.   Thank you for this referral. BIBIANA Lux, OTR/L  # [phone]

## 2022-03-15 NOTE — PROGRESS NOTES
Dr. Maryjane Umanzor notified about pt's BP running 80's/60's. Stated to hold bumetanide and metoprolol.  Rachel Gray RN

## 2022-03-15 NOTE — CONSULTS
Inpatient Cardiology Consultation      Reason for Consult: CHF    Consulting Physician: Dr. Mark Conklin    Requesting Physician: Dr. Chato Lomeli    Date of Consultation: 3/15/2022    HISTORY OF PRESENT ILLNESS:     This 39-year-old male is known to [facility] cardiology and is followed by Dr. Mark Conklin. He has a history of HFpEF and coronary artery disease and permanent pacemaker. He had a recent CHF hospitalization about a month ago. He was discharged on Bumex 2 mg twice a day he followed up on February 16 with MATT Naranjo. His weight at that time was 278. Metoprolol was increased to 25 mg twice a day as his heart rate was 98 bpm.  Bumex is noted to be 1 mg twice a day at the office visit. He presented to the emergency room around 11 AM yesterday with hypoxia. Pulse oximeter was 80% on 3 L. Oxygen was increased to 6 L. He states his breathing problems \"come and goes. \"apparently the hypoxia occurred while at rest.  He denies any chest pain, dizziness, palpitations, presyncope and syncope. He cannot tell me when the swelling of his lower extremities worsened. Blood pressure was 103/63 and then did drop to 86/59 and he was afebrile and on 6 L O2 saturation was 97%. Chest X ray showed moderate right pleural effusion and small left pleural effusion with atelectasis. CTA of the chest showed no PE but there were bilateral large pleural effusions with passive atelectasis and hepatic cirrhosis and mild splenomegaly and small ascites. Potassium was 3.7 with a BUN of 84 and creatinine of 3.2, BNP 3338 and troponin 39-38 with a white count of 17.3 and an H&H of 11.2 and 35.1, platelets 038, D-dimer 741    EKG was sinus rhythm with a right bundle branch block and septal infarct    He was treated with Bumex 2 mg IV twice a day starting last evening. Past Medical History:    1. Polio as a child   2. 35 pack years quit in 1986  3. Agent orange exposure  4. HTN  5. HLD  6.  T2DM insulin requiring with nephropathy  7. CKD, creatinine 2.6 in February 2022  8. COPD/chronic hypoxic respiratory failure  9. GERD  10. 1/2003 CAD s/p PCI RCA (Express stent)  11. 6/2010 PPM [facility]  12. SPEEDY compliant with C-pap  13. 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 2015 PUD/GIB required 2 units PRBC (ASA was stopped @ that time)  15. October 2021 TTE @ [facility]   16. Hospital admissionJanuary 22nd 2022 with dyspnea. proBNP 2419, volume overload discharged March 4, 2022  17. 2D echocardiogram January 25, 2022     TTE (1/25/2022)   Summary   Normal left ventricle size.   Estimated left ventricle ejection fraction >70 %.   Normal right ventricular size and function.   Mild-to-moderate aortic stenosis.   Evidence of elevated filling pressures by IVC and Doppler. Right ventricular systolic pressure 37    18. Incomplete right bundle branch block  19. Hospital admission February 24, 2022, dyspnea, proBNP 1478  20. Hyperkalemia  21. Hospital admission, hypoxia, proBNP 3338 March 14, 2022, IV diuresis         Medications Prior to admit:  Prior to Admission medications    Medication Sig Start Date End Date Taking?  Authorizing Provider   guaiFENesin 400 MG tablet Take 400 mg by mouth 4 times daily as needed for Cough   Yes Historical Provider, MD   ipratropium-albuterol (DUONEB) 0.5-2.5 (3) MG/3ML SOLN nebulizer solution Take 1 vial by nebulization every 4 hours as needed for Shortness of Breath   Yes Historical Provider, MD   potassium chloride (KLOR-CON M) 10 MEQ extended release tablet Take 20 mEq by mouth daily   Yes Historical Provider, MD   insulin glargine (LANTUS SOLOSTAR) 100 UNIT/ML injection pen Inject 50 Units into the skin nightly   Yes Historical Provider, MD   loperamide (IMODIUM) 2 MG capsule Take 2 mg by mouth 4 times daily as needed for Diarrhea   Yes Historical Provider, MD   Ascorbic Acid (VITAMIN C) 100 MG tablet Take 50 mg by mouth 2 times daily   Yes Historical Provider, MD   buPROPion (WELLBUTRIN XL) 300 MG extended release tablet Take 300 mg by mouth every morning   Yes Historical Provider, MD   ezetimibe (ZETIA) 10 MG tablet Take 10 mg by mouth at bedtime   Yes Historical Provider, MD   Zinc Sulfate 110 MG TABS Take 2 tablets by mouth daily   Yes Historical Provider, MD   bumetanide (BUMEX) 2 MG tablet Take 1 tablet by mouth 2 times daily 3/5/22  Yes Lennox Victoria MD   magnesium hydroxide (MILK OF MAGNESIA) 400 MG/5ML suspension Take 30 mLs by mouth daily as needed for Constipation   Yes Historical Provider, MD   Dextromethorphan-guaiFENesin  MG/5ML SYRP Take 5 mLs by mouth every 4 hours as needed for Cough   Yes Historical Provider, MD   melatonin 3 MG TABS tablet Take 3 mg by mouth at bedtime   Yes Historical Provider, MD   insulin lispro, 1 Unit Dial, (HUMALOG KWIKPEN) 100 UNIT/ML SOPN Inject 0-10 Units into the skin 4 times daily (before meals and nightly) *Per Sliding Scale*   Yes Historical Provider, MD   metoprolol succinate (TOPROL XL) 25 MG extended release tablet Take 1 tablet by mouth 2 times daily 2/16/22  Yes GILMAR Burkett CNP   acetaminophen (TYLENOL) 325 MG tablet Take 650 mg by mouth every 4 hours as needed for Pain or Fever    Yes Historical Provider, MD   vitamin D (CHOLECALCIFEROL) 50 MCG (2000 UT) TABS tablet Take 2,000 Units by mouth daily    Yes Historical Provider, MD   aspirin 81 MG EC tablet Take 1 tablet by mouth daily 2/3/22  Yes Lennox Victoria MD   folic acid (FOLVITE) 1 MG tablet Take 1 mg by mouth daily   Yes Historical Provider, MD   cyanocobalamin 1000 MCG tablet Take 1,000 mcg by mouth daily   Yes Historical Provider, MD   atorvastatin (LIPITOR) 40 MG tablet Take 40 mg by mouth at bedtime    Yes Historical Provider, MD   pantoprazole (PROTONIX) 40 MG tablet Take 40 mg by mouth daily   Yes Historical Provider, MD   vitamin C (ASCORBIC ACID) 500 MG tablet Take 500 mg by mouth 2 times daily    Historical Provider, MD       Current Medications:    Current Facility-Administered Medications: acetaminophen (TYLENOL) tablet 650 mg, 650 mg, Oral, Q4H PRN  aspirin EC tablet 81 mg, 81 mg, Oral, Daily  atorvastatin (LIPITOR) tablet 40 mg, 40 mg, Oral, Nightly  buPROPion (WELLBUTRIN XL) extended release tablet 300 mg, 300 mg, Oral, QAM  vitamin B-12 (CYANOCOBALAMIN) tablet 1,000 mcg, 1,000 mcg, Oral, Daily  guaiFENesin-dextromethorphan (ROBITUSSIN DM) 100-10 MG/5ML syrup 5 mL, 5 mL, Oral, Q4H PRN  ezetimibe (ZETIA) tablet 10 mg, 10 mg, Oral, Nightly  folic acid (FOLVITE) tablet 1 mg, 1 mg, Oral, Daily  guaiFENesin tablet 400 mg, 400 mg, Oral, 4x Daily PRN  insulin glargine-yfgn (SEMGLEE-YFGN) injection vial 50 Units, 50 Units, SubCUTAneous, Nightly  ipratropium-albuterol (DUONEB) nebulizer solution 3 mL, 1 vial, Nebulization, Q4H PRN  loperamide (IMODIUM) capsule 2 mg, 2 mg, Oral, 4x Daily PRN  magnesium hydroxide (MILK OF MAGNESIA) 400 MG/5ML suspension 30 mL, 30 mL, Oral, Daily PRN  melatonin tablet 3 mg, 3 mg, Oral, Nightly  metoprolol succinate (TOPROL XL) extended release tablet 25 mg, 25 mg, Oral, BID  pantoprazole (PROTONIX) tablet 40 mg, 40 mg, Oral, QAM AC  potassium chloride (KLOR-CON M) extended release tablet 20 mEq, 20 mEq, Oral, Daily  ascorbic acid (VITAMIN C) tablet 500 mg, 500 mg, Oral, BID  vitamin D (CHOLECALCIFEROL) tablet 2,000 Units, 2,000 Units, Oral, Daily  zinc sulfate (ZINCATE) capsule 50 mg, 50 mg, Oral, Daily  bumetanide (BUMEX) injection 2 mg, 2 mg, IntraVENous, BID  insulin lispro (HUMALOG) injection vial 0-6 Units, 0-6 Units, SubCUTAneous, TID WC  insulin lispro (HUMALOG) injection vial 0-3 Units, 0-3 Units, SubCUTAneous, Nightly  glucose (GLUTOSE) 40 % oral gel 15 g, 15 g, Oral, PRN  dextrose 50 % IV solution, 12.5 g, IntraVENous, PRN  glucagon (rDNA) injection 1 mg, 1 mg, IntraMUSCular, PRN  dextrose 5 % solution, 100 mL/hr, IntraVENous, PRN    Allergies:  Penicillins    Social History:  Former smoker no alcohol or illicit drugs and was living in a facility    Family History:   History reviewed. No pertinent family history. REVIEW OF SYSTEMS:     · Constitutional: Denies fatigue, fevers, chills or night sweats  · Eyes: Denies visual changes or drainage  · ENT: Denies headaches or hearing loss. No mouth sores or sore throat. No epistaxis   · Cardiovascular: Denies chest pain, pressure or palpitations. No lower extremity swelling. · Respiratory: Chronic MOREL, but denies cough, orthopnea or PND. No hemoptysis   · Gastrointestinal: Denies hematemesis or anorexia. No hematochezia or melena    · Genitourinary: Denies urgency, dysuria or hematuria. · Musculoskeletal:  posiptive gait disturbance, weakness or joint complaints  · Integumentary: Denies rash, hives or pruritis   · Neurological: Denies dizziness, headaches or seizures. No numbness or tingling  · Psychiatric: Denies anxiety or depression. · Endocrine: Denies temperature intolerance. No recent weight change. .  · Hematologic/Lymphatic: Denies abnormal bruising or bleeding. No swollen lymph nodes    PHYSICAL EXAM:   BP (!) 86/59   Pulse 70   Temp 97.4 °F (36.3 °C) (Axillary)   Resp 18   Ht 5' 9\" (1.753 m)   Wt 271 lb 13.2 oz (123.3 kg)   SpO2 95%   BMI 40.14 kg/m²   CONST:  Well developed, well nourished who appears of stated age. Awake, alert and cooperative. No apparent distress. HEENT:   Head- Normocephalic, atraumatic   Eyes- Conjunctivae pink, anicteric  Throat- Oral mucosa pink and moist  Neck-  No stridor, trachea midline, no jugular venous distention. No carotid bruit. CHEST: Chest symmetrical and non-tender to palpation.  No accessory muscle use or intercostal retractions, site of pacemaker left upper chest unremarkable  RESPIRATORY: Lung sounds - clear throughout fields but severely diminished throughout  CARDIOVASCULAR:     Heart Inspection- shows no noted pulsations  Heart Palpation- no heaves or thrills; PMI is non-displaced   Heart Ausculation- Regular rate and rhythm, no and plan by Dr. Lisbet Garcia:   1. Mild to moderate aortic stenosis. 2. Normal and even hyperdynamic LV systolic function. 3. Acute on chronic renal insufficiency. 4. Difficult exam but increasing lower extremity edema. His BNP is actually decreased from his prior BNP but clinically he appears to be hypervolemic. Cardiac evaluation last month demonstrated non-cardiac etiology for his hypervolemia. 5. CAD. Remote PCI of the RCA. No ischemic symptoms this admission. Chronic borderline elevated troponin in the setting of acute on chronic renal insufficiency. Unchanged. 6. Super morbid obesity. 7. Sleep apnea. 8. Hypertension. 9. Pacemaker. 10. GERD. 11. Peptic ulcer disease/GI bleed in 2015. Aspirin was discontinued at that time. 12. Lymphoma.        Recommendations:  1. Echo last month showed hyperdynamic LV systolic function, only mild to moderate aortic stenosis, and an RVSP of only 37 indicating a noncardiac etiology for his hypervolemia at that time. No need to repeat another echocardiogram.  I spent a great deal of time with him going over his echo results with him. 2. He states that he had a lot of urine output with a Paredes catheter was placed. Possible obstructive uropathy. I will defer his comorbidities to others. 3. I will defer volume management and diuresis to nephrology. 4. Continue the metoprolol. Titrate for heart rate and blood pressure. 5. I will replace the potassium and check a magnesium today. 6. It is very important that his sleep apnea is treated. I will defer the comorbidities to others. 7. Cardiology will sign off. Please call us if we can be of any further assistance.     Thank you for allowing me to participate in your patient's care. Electronically signed by GILMAR Chiu CNP on 3/15/2022 at 7:46 AM      I have personally seen and evaluated the patient.   I personally obtained the history and performed the physical exam.  I personally reviewed all of the above labs, history, review of systems, and data. All of the assessments and recommendations are from me. All of the above cardiac medical decisions are from me. Please see my additional contributions to the history, physical exam, assessment, and recommendations below. History of chief complaint:  He resides at an extended care facility. States that he did notice that it was a little difficult or uncomfortable with taking a breath in. He was undergoing physical therapy. He is not sure if it is what he said or they just felt that he looked short of breath but they checked a pulse oximeter and his sats was in the 80s. He states that he really did not notice that he was feeling much worse than he usually does. He denies any chest discomfort other than what is described above. Review of systems:     Heart: as above   Lungs: as above   Eyes: denies changes in vision or discharge. Ears: denies changes in hearing or pain. Nose: denies epistaxis or masses   Throat: denies sore throat or trouble swallowing. Neuro: denies numbness, tingling, tremors. Skin: denies rashes or itching. : denies hematuria, dysuria   GI: denies vomiting, diarrhea   Psych: denies mood changed, anxiety, depression. Physical exam:  BP 86/60   Pulse 77   Temp 97.5 °F (36.4 °C) (Oral)   Resp 17   Ht 5' 9\" (1.753 m)   Wt 271 lb 13.2 oz (123.3 kg)   SpO2 97%   BMI 40.14 kg/m²   Constitutional: A&O x3, communicates well, no acute distress. Eyes: extraocular muscles intact, PERRL. Normal lids & conjunctiva. No icterus. ENT: clear, no bleeding. No external masses. Lips normal formation. Neck: supple, full ROM, no JVD, no bruits, no lymphadenopathy. No masses. trachea midline. Heart: Distant. Regular rate & rhythm, normal S1 & S2, no abnormal murmurs. No heave. Lungs: CTA. No accessory muscles. Abd: soft, non-tender. Normal bowel sounds. Obese  Neuro:  Full ROM X 4, EOMI, no tremors. EXT: Moderate bilateral lower extremity edema  Skin: warm, dry, intact. Good turgor. Psych: A&O x 3, normal behavior, not anxious. Patient seen and examined. Chart, labs & data reviewed. A:  1. Acute on chronic renal insufficiency. 2. Hypervolemia due to the above. 3. Echo 1-22 shows an ejection fraction of 70%. The RVSP is only 37 mmHg. 4. Hypertension  5. Diabetes  6. Oxygen requiring COPD. 7. Exposure to agent orange  8. Sleep apnea. Uses a CPAP. 9. Pacemaker. 10. Lymphoma  11. Peptic ulcer disease with a prior GI bleed resulting in discontinuance of aspirin usage. 12. Coronary artery disease with an RCA stent in [place] in 2003. 13. Chronically elevated BNP due to his chronic renal insufficiency. However, clinically he is severely hypervolemic but given his recent echo results and his worsening creatinine it appears that his volume is primarily due to the acute on chronic renal insufficiency and not CHF. 14. Chronically elevated troponin. 15. Obstructive uropathy      Rec:  1. Echo just done in January. No need to repeat. 2. IV Bumex. However, his blood pressure is low which will make this difficult. I will defer further volume management to nephrology. 3. We will give parameters for the metoprolol due to his low blood pressure. 4. He has a chronic indwelling catheter in his bladder and an elevated white count. It is possible he may have a urinary tract infection or other infection making his blood pressure low. I will defer this to others. Electronically signed by Nick Bee DO on 3/15/2022 at 1:04 PM    Note: This report was completed using computerized voice recognition software. Every effort has been made to ensure accuracy, however; and invert and computerized transcription errors may be present.

## 2022-03-15 NOTE — CONSULTS
Nephrology Consult Note  Patient's Name: Sulma Marshall  10:22 AM  3/15/2022        Reason for Consult:  CKD/KELSEY  Requesting Physician:  Rl King MD    Chief Complaint:  SOB, hypoxia  History Obtained From:  patient and EHR    History of Present Ilness:    Sulma Marshall is a 65 y.o. male with a history of chronic kidney disease stage IV (recent recent baseline creatinine 2.5-3), type 2 diabetes mellitus, COPD, chronic HFpEF H/O pacemaker and several other medical problems listed below who presented from [facility] with complaints of shortness of breath and hypoxia. According to patient he developed shortness of breath and hypoxia at his facility. It occurred while he was at rest.  There was no associated chest pain. On presentation to ED his initial vitals were blood pressure of 103/63, respiration 20, oxygen saturation 97% on 6 L nasal cannula. Laboratory data was significant for high-sensitivity troponin of 38, WBC 17.3, hemoglobin 11.2, BUN 84 and a creatinine of 3.2. A CTA of the chest showed no pulmonary embolism. Demonstrated large bilateral pleural effusions however. Patient is on chronic oxygen therapy at his facility AT 3-4L/min  He was recently discharged from this hospital 5 days ago after an admission for acute exacerbation of CHF. He was inpatient for a little over 2 weeks. Creatinine at discharge was 2.9 mg/dL. Past Medical History:   Diagnosis Date    Acid reflux     Agent orange exposure     Congestive heart failure (CHF) (HCC)     COPD (chronic obstructive pulmonary disease) (HCC)     Depression     Diabetes mellitus (HCC)     Hyperlipidemia     Hypertension     MI (myocardial infarction) ([address]     Polio     Sleep apnea        Past Surgical History:   Procedure Laterality Date    CARDIAC PACEMAKER PLACEMENT      CORONARY ANGIOPLASTY WITH STENT PLACEMENT      PACEMAKER PLACEMENT         History reviewed. No pertinent family history.      reports that he has quit smoking. He has never used smokeless tobacco. He reports that he does not drink alcohol and does not use drugs. Allergies:  Penicillins    Current Medications:    acetaminophen (TYLENOL) tablet 650 mg, Q4H PRN  aspirin EC tablet 81 mg, Daily  atorvastatin (LIPITOR) tablet 40 mg, Nightly  buPROPion (WELLBUTRIN XL) extended release tablet 300 mg, QAM  vitamin B-12 (CYANOCOBALAMIN) tablet 1,000 mcg, Daily  guaiFENesin-dextromethorphan (ROBITUSSIN DM) 100-10 MG/5ML syrup 5 mL, Q4H PRN  ezetimibe (ZETIA) tablet 10 mg, Nightly  folic acid (FOLVITE) tablet 1 mg, Daily  guaiFENesin tablet 400 mg, 4x Daily PRN  insulin glargine-yfgn (SEMGLEE-YFGN) injection vial 50 Units, Nightly  ipratropium-albuterol (DUONEB) nebulizer solution 3 mL, Q4H PRN  loperamide (IMODIUM) capsule 2 mg, 4x Daily PRN  magnesium hydroxide (MILK OF MAGNESIA) 400 MG/5ML suspension 30 mL, Daily PRN  melatonin tablet 3 mg, Nightly  metoprolol succinate (TOPROL XL) extended release tablet 25 mg, BID  pantoprazole (PROTONIX) tablet 40 mg, QAM AC  potassium chloride (KLOR-CON M) extended release tablet 20 mEq, Daily  ascorbic acid (VITAMIN C) tablet 500 mg, BID  vitamin D (CHOLECALCIFEROL) tablet 2,000 Units, Daily  zinc sulfate (ZINCATE) capsule 50 mg, Daily  bumetanide (BUMEX) injection 2 mg, BID  insulin lispro (HUMALOG) injection vial 0-6 Units, TID WC  insulin lispro (HUMALOG) injection vial 0-3 Units, Nightly  glucose (GLUTOSE) 40 % oral gel 15 g, PRN  dextrose 50 % IV solution, PRN  glucagon (rDNA) injection 1 mg, PRN  dextrose 5 % solution, PRN        Review of Systems:   Pertinent items are noted in HPI. Physical exam:  Vitals:    03/15/22 0808   BP: 86/60   Pulse: 77   Resp: 17   Temp: 97.5 °F (36.4 °C)   SpO2: 97%          General: No distress. Alert. Eyes: PERRL. No sclera icterus. No conjunctival injection. ENT: No discharge. Pharynx clear. Neck: Trachea midline. Normal thyroid. Lungs: No accessory muscle use. No crackles.  No wheezing. No rhonchi. CV: Regular rate. Regular rhythm. No murmur or rub. .   Abd: Non-tender. Non-distended. No masses. No organmegaly. Normal bowel sounds. Skin: Warm and dry. No nodule on exposed extremities. No rash on exposed extremities. Ext: No cyanosis, clubbing, 2+ pitting bilateral leg edema   Neuro: Awake. Follows commands. Positive pupils/gag/corneals. Normal pain response. Data:   Labs:  Lab Results   Component Value Date     03/15/2022     03/14/2022     03/10/2022    K 3.6 03/15/2022    K 3.7 03/14/2022    K 3.1 (L) 03/10/2022    CL 93 (L) 03/15/2022    CO2 29 03/15/2022    CO2 29 03/14/2022    CO2 34 (H) 03/10/2022    CREATININE 3.1 (H) 03/15/2022    CREATININE 3.2 (H) 03/14/2022    CREATININE 2.9 (H) 03/10/2022    BUN 83 (H) 03/15/2022    BUN 84 (H) 03/14/2022    BUN 83 (H) 03/10/2022    GLUCOSE 102 (H) 03/15/2022    GLUCOSE 164 (H) 03/14/2022    GLUCOSE 90 03/10/2022    PHOS 4.4 02/27/2022    PHOS 4.5 01/31/2022    PHOS 4.1 01/30/2022    WBC 13.9 (H) 03/15/2022    WBC 17.3 (H) 03/14/2022    WBC 13.2 (H) 03/10/2022    HGB 10.7 (L) 03/15/2022    HGB 11.2 (L) 03/14/2022    HGB 10.9 (L) 03/10/2022    HCT 34.1 (L) 03/15/2022    HCT 35.1 (L) 03/14/2022    HCT 34.1 (L) 03/10/2022    MCV 90.5 03/15/2022     03/15/2022         Imaging:  XR CHEST PORTABLE    Result Date: 3/14/2022  EXAMINATION: ONE XRAY VIEW OF THE CHEST 3/14/2022 12:16 pm COMPARISON: 03/03/2022 and 02/28/2022 HISTORY: ORDERING SYSTEM PROVIDED HISTORY: shortness of breath TECHNOLOGIST PROVIDED HISTORY: Reason for exam:->shortness of breath What reading provider will be dictating this exam?->CRC FINDINGS: The cardiac silhouette is within normals. Bilateral lower lobe airspace disease is noted favored to represent atelectasis. There is a moderate right pleural effusion and small left pleural effusion. 1. Moderate right pleural effusion and small left pleural effusion 2.  Bibasilar airspace disease favored to represent atelectasis secondary to the pleural effusions. .     CTA PULMONARY W CONTRAST    Result Date: 3/14/2022  EXAMINATION: CTA OF THE CHEST 3/14/2022 3:28 pm TECHNIQUE: CTA of the chest was performed after the administration of intravenous contrast.  Multiplanar reformatted images are provided for review. MIP images are provided for review. Dose modulation, iterative reconstruction, and/or weight based adjustment of the mA/kV was utilized to reduce the radiation dose to as low as reasonably achievable. COMPARISON: None. HISTORY: ORDERING SYSTEM PROVIDED HISTORY: elevated dimer TECHNOLOGIST PROVIDED HISTORY: Reason for exam:->elevated dimer Decision Support Exception - unselect if not a suspected or confirmed emergency medical condition->Emergency Medical Condition (MA) What reading provider will be dictating this exam?->CRC FINDINGS: CTA chest exam is technically satisfactory. Pulmonary Arteries: Normal caliber. No PE. Lungs and pleura: Bilateral large pleural effusions with secondary bilateral passive atelectasis. Right middle lobe 2.6 cm thin walled cyst or bulla. No central obstructing lesion identified. Heart and mediastinum: Normal heart size. Coronary artery calcifications. Left subclavian transvenous pacemaker with right atrial and right ventricular leads. Trace pericardial effusion. No lymphadenopathy. The thoracic aorta is atherosclerotic and normal in caliber. Upper abdomen: Limited visualization. Hepatic cirrhosis. Mild splenomegaly. Small ascites. Bones: No acute osseous abnormality. Chronic appearing Schmorl's node and concave compression deformity of the T3 superior endplate. 1.  No PE. 2.  Bilateral large pleural effusions with secondary bilateral passive atelectasis. 3.  Hepatic cirrhosis. Mild splenomegaly. Small ascites. Assessment/Plans    1. Chronic kidney disease stage IV due to diabetes mellitus and hypertension.   Current creatinine at his usual baseline  No aggressive work-up  Monitor in view of receiving IV contrast  May require dialytic support    2. Acute on chronic HFpEF  Continue IV diuretics  Fluid and sodium restriction  Monitor lab urine output and weights    3. Acute hypoxic respiratory failure due to acute decompensated CHF  Minimal oxygen requirement at present  CTA of chest showed large bilateral pleural effusion, no PE  Pulmonary consulted to evaluate for possible thoracentesis    4. Type 2 diabetes mellitus  Continue basal bolus insulin    5. Hypertension, BP trending low at present  Start Midodrine at present  Hold parameters on metoprolol        Rosa Pulido MD  10:22 AM  3/15/2022

## 2022-03-15 NOTE — CONSULTS
Nephrology, pulm and cardiology on consult   consulted   Patient of DR Anila Hernandez  PCP at [facility] Dr Prem Delacruz group seen past visits - Dr Anahi Hatfield failure navigator seen Michael Wallace all 3 admissions    Admissions this year:   1/21/22 admit pneumonia, chf. Went to rehab released to home then   2 23   admit  chf , pleural effusion   To [facility]   3 14   chf  (per \[facility]\"" intent to return to [facility])       Patient currently admitted with diagnosis of Diastolic heart failure. Patient was alert and oriented during the consultation. He was engaged and asked appropriate questions throughout the education session. He was originally set for    Future Appointments   Date Time Provider Eleno Dejesus   3/21/2022  9:00 AM [facility] CHF ROOM 1 [facility]   3/22/2022  2:30 PM GILMAR Blank - CNP [facility] CARDIO HM     · Apt is now set with DR Kacie Blank 4 5 22 for Virtual visit at [facility] will send email link to Michael Gonsalez (also placed on [address] discharge for facility he is discharged to to call [facility] - can coordinate a ipad visit with facility for Michael Gonsalez )     3/18/2022 Says he feels little better since his lung was drained. But doesn't feel all that well. He is aware waiting for [facility] to auth him coming. He is aware of new chf clinic visit. He says DR Anila Hernandez say him and said he didn't need [facility] visit this coming Tuesday. Also the chf visit was reset for   Future Appointments   Date Time Provider Eleno Dejesus   4/4/2022  9:00 AM [facility] CHF ROOM 1 [facility]    he is aware of the VV with pcp DR Yue Porter. He says he does phone visits with PCP and will see if rehab can do the VV for him.           We reviewed the introduction to Heart Failure, the HF zones, signs and symptoms to report on day 1 of onset, medications, medication compliance, the importance of obtaining daily weights, following a low sodium diet, reading food labels for the sodium Booklet  On last 2 admission was given to the patient with additional patient education addressing:  · What is Heart Failure? · Things You Can Do to Live Well with HF  · How to Take Your Medications  · How to Eat Less Salt  · Exercising Well with Heart Failure  · Signs and symptoms of HF to report  · Weight Yourself Each Day  · Home Self Management- activity, weight tracking, taking medications as prescribed, meals /diet planning (sodium and fluid restriction), how to read food labels, keeping physician follow ups, smoking cessation, follow the Heart Failure Zones  · The Heart Failure zones  · Sodium content pamphlet  · What foods I should avoid tip sheet    Instructed  to call 911 if you have any of the following symptoms:    ·    Struggling to breathe unrelieved with rest,  ·    Having chest pain, confusion or can't think clearly  ·    Have confusion or cant think clearly    ·  This visit provided education packet with : What is heart failure from [facility], up to date: patient education- medication for heart failure with reduced ejection fraction (the basics), heart failure zones, and cardioSmart of worth its salt. ·   The patient is ordered:  Diet: ADULT DIET; Regular; 4 carb choices (60 gm/meal); Low Sodium (2 gm)   Sodium controlled diet Yes  Fluid restriction daily ordered (fluid restriction recommended if patient is hyponatremic and/or diuretic is initiated or increased) No  FR:   Daily Weights:   Patient Vitals for the past 96 hrs (Last 3 readings):   Weight   03/14/22 2300 271 lb 13.2 oz (123.3 kg)   03/14/22 1122 250 lb (113.4 kg)     I/O:     Intake/Output Summary (Last 24 hours) at 3/15/2022 1335  Last data filed at 3/15/2022 0955  Gross per 24 hour   Intake 390 ml   Output 1000 ml   Net -610 ml            Alaina Manzanares, RN BSN, RN  Heart Failure Navigator        CONGESTIVE HEART FAILURE (CHF) AHA GUIDELINES  (Must be completed for Primary Diagnosis CHF or History of CHF)    Discharge Plan:   I placed the Heart Failure Home Instructions in patient's discharge instructions. Per Heart Failure GWTG, the patient should have a follow-up appointment made within 7 days of discharge. apts are on Discharge [facility] for cardiology f/u-  chf clinic and NP     New Diagnosis No    ECHO Results most recent:  Lab Results   Component Value Date    LVEF 70 01/25/2022     Repeat echo ordered this admission. Social History     Tobacco Use   Smoking Status Former Smoker   Smokeless Tobacco Never Used   Tobacco Comment    quit in 1985          There is no immunization history on file for this patient.        Angiotensin-Converting-Enzyme (ACE) inhibitor ordered:  [] Yes  [x] No (specify contraindication): hfpef  [] Contraindicated  [] Hypotensive patient who was at immediate risk of cardiogenic shock  [] Hospitalized patient who experienced marked azotemia  [] Other Contraindications  [] Not Eligible  [] Not Tolerant  [] Patient Reason  [] System Reason  [] Other Reason    Angiotensin II receptor blockers (ARB) ordered:  [] Yes  [x] No (specify contraindication):hfpef  [] Contraindicated  [] Hypotensive patient who was at immediate risk of cardiogenic shock  [] Hospitalized patient who experienced marked azotemia  [] Other Contraindications    ARNI - Angiotensin Receptor Neprilysin Inhibitor ordered:  [] Yes  [x] No (specify contraindication): hfpef  [] ACE inhibitor use within the prior 36 hours  [] Allergy  [] Hyperkalemia  [] Hypotension  [] Renal dysfunction defined as creatinine > 2.5 mg/dL in men or > 2.0 mg/dL in women  [] Other Contraindications  [] Not Eligible  [] Not Tolerant  [] Patient Reason  []System Reason  []Other Reason      Beta Blocker (Carvedilol, Metoprolol Succinate, or Bisoprolol) ordered:    [x] Yes  [] No (specify contraindication):    [] Contraindicated  [] Asthma  [] Fluid Overload  [] Low Blood Pressure  [] Patient recently treated with an intravenous positive inotropic agent  [] Other Contraindications  [] Not Eligible  [] Not Tolerant  [] Patient Reason  [] System Reason    SGLT2 Inhibitor ordered:  [] Yes  [x] No (specify contraindication):    [] Contraindicated  [] Patient currently on dialysis  [] Ketoacidosis  [] Known hypersensitivity to the medication  [] Type I diabetes (not approved for use in patients with Type I diabetes due to increased risk of ketoacidosis)  [] Other Contraindications  [] Not Eligible  [] Not Tolerant  [] Patient Reason  [x] System Reason  [] Other Reason    Aldosterone Antagonist ordered:  [] Yes  [x] No (specify contraindication):  hfpef  [] Contraindicated  [] Allergy due to aldosterone receptor antagonist  [] Hyperkalemia  [] Renal dysfunction defined as creatinine >2.5 mg/dL in men or >2.0 mg/dL in women.   [] Other contraindications  [] Not Eligible  [] Not Tolerant  [] Patient Reason  [] System Reason  [] Other Reason

## 2022-03-15 NOTE — DISCHARGE INSTR - COC
Continuity of Care Form    Patient Name: Ebony Rodas   :  1945  MRN:  [id number]    Admit date:  3/14/2022  Discharge date:  3/22/2022    Code Status Order: Prior   Advance Directives:      Admitting Physician:  Dann De La Torre MD  PCP: Dann De La Torre MD    Discharging Nurse: Dima Gusman 23 Unit/Room#: 5945/3823-S  Discharging Unit Phone Number: [phone]    Emergency Contact:   Extended Emergency Contact Information  Primary Emergency Contact: Erasto Dutta  Phone: [phone]  Mobile Phone: [phone]  Relation: Brother/Sister   needed? No    Past Surgical History:  Past Surgical History:   Procedure Laterality Date    CARDIAC PACEMAKER PLACEMENT      CORONARY ANGIOPLASTY WITH STENT PLACEMENT      PACEMAKER PLACEMENT         Immunization History: There is no immunization history on file for this patient.     Active Problems:  Patient Active Problem List   Diagnosis Code    PNA (pneumonia) J18.9    Acute on chronic heart failure with preserved ejection fraction (HCC) I50.33    Coronary artery disease involving native coronary artery of native heart without angina pectoris I25.10    Cardiac pacemaker in situ Z95.0    Primary hypertension I10    SPEEDY (obstructive sleep apnea) G47.33    Chronic respiratory failure with hypoxia (formerly [facility]) J96.11    CHF (congestive heart failure), NYHA class I, acute on chronic, combined ([address] I50.43    Acute on chronic clinical systolic heart failure ([facility] [address] I50.23       Isolation/Infection:   Isolation            No Isolation          Patient Infection Status       Infection Onset Added Last Indicated Last Indicated By Review Planned Expiration Resolved Resolved By    None active    Resolved    COVID-19 (Rule Out) 22 Respiratory Panel, Molecular, with COVID-19 (Restricted: peds pts or suitable admitted adults) (Ordered)   22 Rule-Out Test Resulted    COVID-19 (Rule Out) 22 01/21/22 COVID-19, Rapid (Ordered)   01/21/22 Rule-Out Test Resulted    C-diff Rule Out 04/08/21 04/08/21 04/08/21 Clostridium difficile EIA (Ordered)   04/09/21 Rule-Out Test Resulted            Nurse Assessment:  Last Vital Signs: BP 86/60   Pulse 77   Temp 97.5 °F (36.4 °C) (Oral)   Resp 17   Ht 5' 9\" (1.753 m)   Wt 271 lb 13.2 oz (123.3 kg)   SpO2 97%   BMI 40.14 kg/m²     Last documented pain score (0-10 scale): Pain Level: 0  Last Weight:   Wt Readings from Last 1 Encounters:   03/14/22 271 lb 13.2 oz (123.3 kg)     Mental Status:  oriented, alert, coherent, thought processes intact, and able to concentrate and follow conversation    IV Access:  - None    Nursing Mobility/ADLs:  Walking   Dependent  Transfer  Assisted  Bathing  Assisted  Dressing  Assisted  Toileting  Dependent  Feeding  Independent  Med Admin  Assisted  Med Delivery   none    Wound Care Documentation and Therapy:  Wound 02/24/22 Perineum Posterior red/purple area,blanches well. (Active)   Dressing/Treatment Open to air 03/15/22 0808   Wound Assessment Erythema 03/15/22 0808   Drainage Amount None 03/15/22 0808   Number of days: 19        Elimination:  Continence: Bowel: Yes  Bladder: No  Urinary Catheter: Last Change Date 3/17/2022  Colostomy/Ileostomy/Ileal Conduit: No       Date of Last BM: 3/22/2022    Intake/Output Summary (Last 24 hours) at 3/15/2022 1340  Last data filed at 3/15/2022 0955  Gross per 24 hour   Intake 390 ml   Output 1000 ml   Net -610 ml     I/O last 3 completed shifts: In: 150 [P.O.:150]  Out: 1000 [Urine:1000]    Safety Concerns:      At Risk for Falls    Impairments/Disabilities:      None    Nutrition Therapy:  Current Nutrition Therapy:   - Oral Diet:  Carb Control 4 carbs/meal (1800kcals/day)    Routes of Feeding: Oral  Liquids: No Restrictions  Daily Fluid Restriction: no  Last Modified Barium Swallow with Video (Video Swallowing Test): not done    Treatments at the Time of Hospital Discharge: Respiratory Treatments: see MAR  Oxygen Therapy:  is on oxygen at 4 L/min per nasal cannula. Ventilator:    - No ventilator support    Rehab Therapies: Physical Therapy and Occupational Therapy  Weight Bearing Status/Restrictions: No weight bearing restrictions  Other Medical Equipment (for information only, NOT a DME order):  wheelchair, walker, and hospital bed  Other Treatments: N/A    Patient's personal belongings (please select all that are sent with patient):  None    RN SIGNATURE:  Electronically signed by Queen Anival RN on 3/22/22 at 3:47 PM EDT    CASE MANAGEMENT/SOCIAL WORK SECTION    Inpatient Status Date: ***    Readmission Risk Assessment Score:  Readmission Risk              Risk of Unplanned Readmission:  24           Discharging to Facility/ Agency   Name:   Address:  Phone:  Fax:    Dialysis Facility (if applicable)   Name:  Address:  Dialysis Schedule:  Phone:  Fax:    / signature: {Esignature:[id number]}    PHYSICIAN SECTION    Prognosis: {Prognosis:1483182100}    Condition at Discharge: Reena Driver Patient Condition:[id number]}    Rehab Potential (if transferring to Rehab): {Prognosis:8660880631}    Recommended Labs or Other Treatments After Discharge: ***    Physician Certification: I certify the above information and transfer of Hang Velazquez  is necessary for the continuing treatment of the diagnosis listed and that he requires {Admit to Appropriate Level of Care:37822} for {GREATER/LESS:372999441} 30 days.      Update Admission H&P: {P DME Changes in VJOVX:099132830}    PHYSICIAN SIGNATURE:  Myra Sr MD      ***HEART FAILURE - CONGESTIVE HEART FAILURE***  DISCHARGE INSTRUCTIONS:  GUIDELINES TO FOLLOW AT [facility]/SNF/ Assisted Living    Future Appointments   Date Time Provider Eleno Dejesus   3/21/2022  9:00 AM [facility]   3/22/2022  2:30 PM Katt Franco, APRN - CNP [place] [address]  [phone] option 2 for [place] [facility] office- Maribel SCHAFER [facility] virtual visit is set for 4-5-22 at 3pm  they will send link for visit to his email account. Facility he is at can coordinate with the [place] if they have an ipad to utilize for the VV. Please call [place] [phone]     MEDICATIONS:  Please notify the doctor if patient is not able to take their medications or if medications are being held for any reasons (such as low blood pressure ect.)  Do not give the patient ibuprofen (Advil or Motrin), naproxen (Aleve) without talking to the doctor first. This could make their heart failure worse. WEIGHT MONITORING:   Weigh patient every day in the morning after they void (If patient is able to stand, please get a standing weight.)   Notify the doctor of a weight gain of 3 pounds or more in 1 day   OR  a total of 5 pounds or more in 1 week             DIET   Cardiac heart healthy diet:  Low sodium diet: no  more than 2,000mg (2 grams) of salt / sodium per day (which equals to a little less than  a teaspoon of salt)/ Cardiac Diet: Low saturated / low trans fat, no added salt, caffeine restricted    If patient is there for rehab and will be returning home in the near future; reinforce with the patient and the family to follow a low sodium diet (2,000 mg)- avoid using salt at the table, avoid / limit use of canned soups, processed / packaged foods, salted snacks, olives and pickles. Do not use a salt substitute without checking with the doctor. (Mrs. Suman Markham is safe to use).        NOTIFY THE DOCTOR THE FIRST DAY OF ONSET OF ANY OF THESE   SYMPTOMS:   Weight gain of 3 pounds or more in 1 day         OR 5 pounds or more in one week  More shortness of breath  More swelling in stomach, legs, ankles or feet  Feeling more tired, No energy  Dry hacky cough  Dizziness  More chest pain / discomfort  Hard time breathing laying down

## 2022-03-15 NOTE — CONSULTS
Pulmonary [facility]                             Pulmonary Consult/Progress Note :          Patient: Mckayla Gaytan  MRN: [id number]  : 1945      Date of Admission: .3/14/2022 11:15 AM    Consulting Physician:Pleural effusion         Reason for Consultation:Pleural effusion   CC : SOB   HPI:   Mckayla Gaytan is a 65y.o. year old with 20 PPY smoking history ,he also has history of HFpEF with permanent pacemaker      He presented to the emergency room around 11 AM yesterday with hypoxia. Pulse oximeter was 80% on 3 L. Oxygen was increased to 6 L. He states his breathing problems \"come and goes. \"apparently the hypoxia occurred while at rest.  He denies any chest pain, dizziness, palpitations, presyncope and syncope. He cannot tell me when the swelling of his lower extremities worsened.     Blood pressure was 103/63 and then did drop to 86/59 and he was afebrile and on 6 L O2 saturation was 97%.     Chest X ray showed moderate right pleural effusion and small left pleural effusion with atelectasis. CTA of the chest showed no PE but there were bilateral large pleural effusions with passive atelectasis and hepatic cirrhosis and mild splenomegaly and small ascites. Potassium was 3.7 with a BUN of 84 and creatinine of 3.2, BNP 3338 and troponin 39-38 with a white count of 17.3 and an H&H of 11.2 and 35.1, platelets 438, D-dimer 741     EKG was sinus rhythm with a right bundle branch block and septal infarct     He was treated with Bumex 2 mg IV twice a day starting last evening.     PAST MEDICAL HISTORY:   Past Medical History:   Diagnosis Date    Acid reflux     Agent orange exposure     Congestive heart failure (CHF) (HCC)     COPD (chronic obstructive pulmonary disease) (HCC)     Depression     Diabetes mellitus (HCC)     Hyperlipidemia     Hypertension     MI (myocardial infarction) ([facility] [address]     Polio     Sleep apnea        PAST SURGICAL HISTORY: Past Surgical History:   Procedure Laterality Date    CARDIAC PACEMAKER PLACEMENT      CORONARY ANGIOPLASTY WITH STENT PLACEMENT      PACEMAKER PLACEMENT         FAMILY HISTORY:   History reviewed. No pertinent family history. SOCIAL HISTORY:   Social History     Socioeconomic History    Marital status: Single     Spouse name: Not on file    Number of children: Not on file    Years of education: Not on file    Highest education level: Not on file   Occupational History    Not on file   Tobacco Use    Smoking status: Former Smoker    Smokeless tobacco: Never Used    Tobacco comment: quit in 1985   Substance and Sexual Activity    Alcohol use: Never    Drug use: Never    Sexual activity: Not on file   Other Topics Concern    Not on file   Social History Narrative    occasional iced tea      Social Determinants of Health     Financial Resource Strain:     Difficulty of Paying Living Expenses: Not on file   Food Insecurity:     Worried About Running Out of Food in the Last Year: Not on file    Dale of Food in the Last Year: Not on file   Transportation Needs:     Lack of Transportation (Medical): Not on file    Lack of Transportation (Non-Medical):  Not on file   Physical Activity:     Days of Exercise per Week: Not on file    Minutes of Exercise per Session: Not on file   Stress:     Feeling of Stress : Not on file   Social Connections:     Frequency of Communication with Friends and Family: Not on file    Frequency of Social Gatherings with Friends and Family: Not on file    Attends [employer] Services: Not on file    Active Member of Clubs or Organizations: Not on file    Attends Club or Organization Meetings: Not on file    Marital Status: Not on file   Intimate Partner Violence:     Fear of Current or Ex-Partner: Not on file    Emotionally Abused: Not on file    Physically Abused: Not on file    Sexually Abused: Not on file   Housing Stability:     Unable to Pay for Housing every 4 hours as needed for Cough   Yes Historical Provider, MD   melatonin 3 MG TABS tablet Take 3 mg by mouth at bedtime   Yes Historical Provider, MD   insulin lispro, 1 Unit Dial, (HUMALOG KWIKPEN) 100 UNIT/ML SOPN Inject 0-10 Units into the skin 4 times daily (before meals and nightly) *Per Sliding Scale*   Yes Historical Provider, MD   metoprolol succinate (TOPROL XL) 25 MG extended release tablet Take 1 tablet by mouth 2 times daily 2/16/22  Yes GILMAR Blank - CNP   acetaminophen (TYLENOL) 325 MG tablet Take 650 mg by mouth every 4 hours as needed for Pain or Fever    Yes Historical Provider, MD   vitamin D (CHOLECALCIFEROL) 50 MCG (2000 UT) TABS tablet Take 2,000 Units by mouth daily    Yes Historical Provider, MD   aspirin 81 MG EC tablet Take 1 tablet by mouth daily 2/3/22  Yes Clifford Cabrales MD   folic acid (FOLVITE) 1 MG tablet Take 1 mg by mouth daily   Yes Historical Provider, MD   cyanocobalamin 1000 MCG tablet Take 1,000 mcg by mouth daily   Yes Historical Provider, MD   atorvastatin (LIPITOR) 40 MG tablet Take 40 mg by mouth at bedtime    Yes Historical Provider, MD   pantoprazole (PROTONIX) 40 MG tablet Take 40 mg by mouth daily   Yes Historical Provider, MD   vitamin C (ASCORBIC ACID) 500 MG tablet Take 500 mg by mouth 2 times daily    Historical Provider, MD       CURRENT MEDICATIONS:  Current Facility-Administered Medications: acetaminophen (TYLENOL) tablet 650 mg, 650 mg, Oral, Q4H PRN  aspirin EC tablet 81 mg, 81 mg, Oral, Daily  atorvastatin (LIPITOR) tablet 40 mg, 40 mg, Oral, Nightly  buPROPion (WELLBUTRIN XL) extended release tablet 300 mg, 300 mg, Oral, QAM  vitamin B-12 (CYANOCOBALAMIN) tablet 1,000 mcg, 1,000 mcg, Oral, Daily  guaiFENesin-dextromethorphan (ROBITUSSIN DM) 100-10 MG/5ML syrup 5 mL, 5 mL, Oral, Q4H PRN  ezetimibe (ZETIA) tablet 10 mg, 10 mg, Oral, Nightly  folic acid (FOLVITE) tablet 1 mg, 1 mg, Oral, Daily  guaiFENesin tablet 400 mg, 400 mg, Oral, 4x Daily PRN  insulin glargine-yfgn (SEMGLEE-YFGN) injection vial 50 Units, 50 Units, SubCUTAneous, Nightly  ipratropium-albuterol (DUONEB) nebulizer solution 3 mL, 1 vial, Nebulization, Q4H PRN  loperamide (IMODIUM) capsule 2 mg, 2 mg, Oral, 4x Daily PRN  magnesium hydroxide (MILK OF MAGNESIA) 400 MG/5ML suspension 30 mL, 30 mL, Oral, Daily PRN  melatonin tablet 3 mg, 3 mg, Oral, Nightly  metoprolol succinate (TOPROL XL) extended release tablet 25 mg, 25 mg, Oral, BID  pantoprazole (PROTONIX) tablet 40 mg, 40 mg, Oral, QAM AC  potassium chloride (KLOR-CON M) extended release tablet 20 mEq, 20 mEq, Oral, Daily  ascorbic acid (VITAMIN C) tablet 500 mg, 500 mg, Oral, BID  vitamin D (CHOLECALCIFEROL) tablet 2,000 Units, 2,000 Units, Oral, Daily  zinc sulfate (ZINCATE) capsule 50 mg, 50 mg, Oral, Daily  bumetanide (BUMEX) injection 2 mg, 2 mg, IntraVENous, BID  insulin lispro (HUMALOG) injection vial 0-6 Units, 0-6 Units, SubCUTAneous, TID WC  insulin lispro (HUMALOG) injection vial 0-3 Units, 0-3 Units, SubCUTAneous, Nightly  glucose (GLUTOSE) 40 % oral gel 15 g, 15 g, Oral, PRN  dextrose 50 % IV solution, 12.5 g, IntraVENous, PRN  glucagon (rDNA) injection 1 mg, 1 mg, IntraMUSCular, PRN  dextrose 5 % solution, 100 mL/hr, IntraVENous, PRN    IV MEDICATIONS:   dextrose         ALLERGIES:  Allergies   Allergen Reactions    Penicillins Anaphylaxis       REVIEW OF SYSTEMS:  General ROS:  No weight loss ,no fatigue     ENT ROS:   No Sore throat ,no lymphoadenopathy,no nasal stuffiness     Hematological and Lymphatic ROS:   No ecchymosis ,no tendency to bleed  Respiratory ROS:   SOB   Cardiovascular ROS:   +2 edema   Gastrointestinal ROS:   No Gi bleed,no nausea or vomiting      - Musculoskeletal ROS:      - no joint swelling ,no joint pain   Neurological ROS:     -no weakness or numbness    Dermatological ROS:   No skin rash ,no urticaria     PHYSICAL EXAMINATION:     VITAL SIGNS:  BP 86/60   Pulse 77   Temp 97.5 °F (36.4 °C) (Oral)   Resp 17   Ht 5' 9\" (1.753 m)   Wt 271 lb 13.2 oz (123.3 kg)   SpO2 97%   BMI 40.14 kg/m²   Wt Readings from Last 3 Encounters:   03/14/22 271 lb 13.2 oz (123.3 kg)   03/10/22 264 lb 1.8 oz (119.8 kg)   02/16/22 278 lb (126.1 kg)     Temp Readings from Last 3 Encounters:   03/15/22 97.5 °F (36.4 °C) (Oral)   03/10/22 97.3 °F (36.3 °C) (Infrared)   02/02/22 97.3 °F (36.3 °C) (Oral)     TMAX:  BP Readings from Last 3 Encounters:   03/15/22 86/60   03/10/22 105/71   02/16/22 128/88     Pulse Readings from Last 3 Encounters:   03/15/22 77   03/10/22 96   02/16/22 98           INTAKE/OUTPUTS:  I/O last 3 completed shifts:   In: 150 [P.O.:150]  Out: 1000 [Urine:1000]    Intake/Output Summary (Last 24 hours) at 3/15/2022 0947  Last data filed at 3/15/2022 0400  Gross per 24 hour   Intake 150 ml   Output 1000 ml   Net -850 ml       General Appearance: alert and oriented to person, place and time, well-developed and   well-nourished, in no acute distress   Eyes: pupils equal, round, and reactive to light, extraocular eye movements intact, conjunctivae normal and sclera anicteric   Neck: neck supple and non tender without mass, no thyromegaly, no thyroid nodules and no cervical adenopathy   Pulmonary/Chest:*decrease breath sound bilateral  Cardiovascular: normal rate, regular rhythm, normal S1 and S2, no murmurs, rubs, clicks or gallops, distal pulses intact, no carotid bruits, no murmurs, no gallops, no carotid bruits and no JVD   Abdomen: obese, soft, non-tender, non-distended, normal bowel sounds, no masses or organomegaly   Extremities:*edema ,no cyanosis   Musculoskeletal: normal range of motion, no joint swelling, deformity or tenderness   Neurologic: reflexes normal and symmetric, no cranial nerve deficit noted    LABS/IMAGING:    CBC:  Lab Results   Component Value Date    WBC 13.9 (H) 03/15/2022    HGB 10.7 (L) 03/15/2022    HCT 34.1 (L) 03/15/2022    MCV 90.5 03/15/2022     03/15/2022 LYMPHOPCT 16.0 (L) 03/14/2022    RBC 3.77 (L) 03/15/2022    MCH 28.4 03/15/2022    MCHC 31.4 (L) 03/15/2022    RDW 16.6 (H) 03/15/2022    NEUTOPHILPCT 68.4 03/14/2022    MONOPCT 9.5 03/14/2022    BASOPCT 0.4 03/14/2022    NEUTROABS 11.85 (H) 03/14/2022    LYMPHSABS 2.77 03/14/2022    MONOSABS 1.65 (H) 03/14/2022    EOSABS 0.30 03/14/2022    BASOSABS 0.07 03/14/2022       Recent Labs     03/15/22  0446 03/14/22  1149 03/10/22  0431   WBC 13.9* 17.3* 13.2*   HGB 10.7* 11.2* 10.9*   HCT 34.1* 35.1* 34.1*   MCV 90.5 89.5 89.7    453* 264       BMP:   Recent Labs     03/14/22  1149 03/15/22  0446    138   K 3.7 3.6   CL 93* 93*   CO2 29 29   BUN 84* 83*   CREATININE 3.2* 3.1*       MG:   Lab Results   Component Value Date    MG 2.4 03/09/2022     Ca/Phos:   Lab Results   Component Value Date    CALCIUM 8.9 03/15/2022    PHOS 4.4 02/27/2022     Amylase: No results found for: AMYLASE  Lipase: No results found for: LIPASE  LIVER PROFILE:   Recent Labs     03/14/22  1149   AST 29   ALT 53*   BILITOT 0.6   ALKPHOS 113       PT/INR: No results for input(s): PROTIME, INR in the last 72 hours. APTT: No results for input(s): APTT in the last 72 hours. Cardiac Enzymes:  No results found for: CKTOTAL, CKMB, CKMBINDEX, TROPONINI                PROBLEM LIST:  Patient Active Problem List   Diagnosis    PNA (pneumonia)    Acute on chronic heart failure with preserved ejection fraction ([facility] [address]    Coronary artery disease involving native coronary artery of native heart without angina pectoris    Cardiac pacemaker in situ    Primary hypertension    SPEEDY (obstructive sleep apnea)    Chronic respiratory failure with hypoxia (HCC)    CHF (congestive heart failure), NYHA class I, acute on chronic, combined (HCC)    Acute on chronic clinical systolic heart failure (HCC)               ASSESSMENT:  1.) Moderate hypoxia   2.)cardiorenal syndrome  3. )Bilateral pleural effusion ,anasarca  4.)Possible SPEEDY  5.)KELSEY on CKD      PLAN:  *-patient symptoms clearly fluid overload and seems combined CHF ,HFpEF along with worsening kindye function    *-with his large effusion I recommended start with thoracentesis after we look with US and send for lab   *-BiPPA at night and as needed daytime  *-Diuresis as per renl  *_procalcitonin/crp   *- assessed Left side . not much fluid drain  *- incentive spirometery  Work up for pneumonia ,cover with Rocephin,strep and legionella in urine     Thank you very much for allowing me to participate in the care of this pleasant patient , should you have any questions ,please do not hesitate to contact me      Miguel Ángel Mejia  Pulmonary&Critical Care Medicine   Director of [address] [place] [address] Director of [address] [place] [address]    Nishi Ho    NOTE: This report was transcribed using voice recognition software. Every effort was made to ensure accuracy; however, inadvertent computerized transcription errors may be present.

## 2022-03-15 NOTE — H&P
HISTORY AND PHYSICAL             Date: 3/15/2022        Patient Name: Rose Mendez     YOB: 1945      Age:  65 y.o. Chief Complaint     Chief Complaint   Patient presents with    Shortness of Breath     per ems patient normally on 3L oxygen, pulse ox in 80s at nursing home, now on 6L           History Obtained From   patient    History of Present Illness   Patient came to the ER with shortness of breath and leg swelling. He denies any fevers, or chills. He was recently admitted with the same. Past Medical History     Past Medical History:   Diagnosis Date    Acid reflux     Agent orange exposure     [facility]     COPD [facility]chronic obstructive pulmonary disease) [facility]     Depression     Diabetes mellitus (HCC)     Hyperlipidemia     Hypertension     MI (myocardial infarction) ([facility] [address]     Polio     Sleep apnea         Past Surgical History     Past Surgical History:   Procedure Laterality Date    CARDIAC PACEMAKER PLACEMENT      CORONARY ANGIOPLASTY WITH STENT PLACEMENT      PACEMAKER PLACEMENT          Medications Prior to Admission     Prior to Admission medications    Medication Sig Start Date End Date Taking?  Authorizing Provider   guaiFENesin 400 MG tablet Take 400 mg by mouth 4 times daily as needed for Cough   Yes Historical Provider, MD   ipratropium-albuterol (DUONEB) 0.5-2.5 (3) MG/3ML SOLN nebulizer solution Take 1 vial by nebulization every 4 hours as needed for Shortness of Breath   Yes Historical Provider, MD   potassium chloride (KLOR-CON M) 10 MEQ extended release tablet Take 20 mEq by mouth daily   Yes Historical Provider, MD   insulin glargine (LANTUS SOLOSTAR) 100 UNIT/ML injection pen Inject 50 Units into the skin nightly   Yes Historical Provider, MD   loperamide (IMODIUM) 2 MG capsule Take 2 mg by mouth 4 times daily as needed for Diarrhea   Yes Historical Provider, MD   Ascorbic Acid (VITAMIN C) 100 MG tablet Take 50 mg by mouth 2 times daily   Yes Historical Provider, MD   buPROPion (WELLBUTRIN XL) 300 MG extended release tablet Take 300 mg by mouth every morning   Yes Historical Provider, MD   ezetimibe (ZETIA) 10 MG tablet Take 10 mg by mouth at bedtime   Yes Historical Provider, MD   Zinc Sulfate 110 MG TABS Take 2 tablets by mouth daily   Yes Historical Provider, MD   bumetanide (BUMEX) 2 MG tablet Take 1 tablet by mouth 2 times daily 3/5/22  Yes Dieter James MD   magnesium hydroxide (MILK OF MAGNESIA) 400 MG/5ML suspension Take 30 mLs by mouth daily as needed for Constipation   Yes Historical Provider, MD   Dextromethorphan-guaiFENesin  MG/5ML SYRP Take 5 mLs by mouth every 4 hours as needed for Cough   Yes Historical Provider, MD   melatonin 3 MG TABS tablet Take 3 mg by mouth at bedtime   Yes Historical Provider, MD   insulin lispro, 1 Unit Dial, (HUMALOG KWIKPEN) 100 UNIT/ML SOPN Inject 0-10 Units into the skin 4 times daily (before meals and nightly) *Per Sliding Scale*   Yes Historical Provider, MD   metoprolol succinate (TOPROL XL) 25 MG extended release tablet Take 1 tablet by mouth 2 times daily 2/16/22  Yes GILMAR Mi - CNP   acetaminophen (TYLENOL) 325 MG tablet Take 650 mg by mouth every 4 hours as needed for Pain or Fever    Yes Historical Provider, MD   vitamin D (CHOLECALCIFEROL) 50 MCG (2000 UT) TABS tablet Take 2,000 Units by mouth daily    Yes Historical Provider, MD   aspirin 81 MG EC tablet Take 1 tablet by mouth daily 2/3/22  Yes Dieter James MD   folic acid (FOLVITE) 1 MG tablet Take 1 mg by mouth daily   Yes Historical Provider, MD   cyanocobalamin 1000 MCG tablet Take 1,000 mcg by mouth daily   Yes Historical Provider, MD   atorvastatin (LIPITOR) 40 MG tablet Take 40 mg by mouth at bedtime    Yes Historical Provider, MD   pantoprazole (PROTONIX) 40 MG tablet Take 40 mg by mouth daily   Yes Historical Provider, MD   vitamin C (ASCORBIC ACID) 500 MG tablet Take 500 mg by mouth 2 times daily    Historical Provider, MD        Allergies   Penicillins    Social History     Social History     Tobacco History     Smoking Status  Former Smoker    Smokeless Tobacco Use  Never Used    Tobacco Comment  quit in 1985          Alcohol History     Alcohol Use Status  Never          Drug Use     Drug Use Status  Never          Sexual Activity     Sexually Active  Not Asked                Family History   History reviewed. No pertinent family history. Review of Systems   Review of Systems   Constitutional: Positive for activity change. Negative for fever. HENT: Positive for congestion. Respiratory: Positive for shortness of breath. Cardiovascular: Negative for chest pain. Gastrointestinal: Negative for abdominal pain. Genitourinary: Negative for difficulty urinating. Neurological: Negative for dizziness. Physical Exam   BP (!) 86/59   Pulse 70   Temp 97.4 °F (36.3 °C) (Axillary)   Resp 18   Ht 5' 9\" (1.753 m)   Wt 271 lb 13.2 oz (123.3 kg)   SpO2 95%   BMI 40.14 kg/m²     Physical Exam  HENT:      Head: Normocephalic. Mouth/Throat:      Mouth: Mucous membranes are moist.   Eyes:      Pupils: Pupils are equal, round, and reactive to light. Cardiovascular:      Rate and Rhythm: Normal rate. Pulses: Normal pulses. Pulmonary:      Effort: Pulmonary effort is normal.      Breath sounds: No wheezing. Abdominal:      General: Abdomen is flat. Bowel sounds are normal. There is no distension. Musculoskeletal:      Cervical back: Normal range of motion. Right lower leg: Edema present. Skin:     Capillary Refill: Capillary refill takes less than 2 seconds. Neurological:      General: No focal deficit present. Mental Status: He is alert and oriented to person, place, and time.    Psychiatric:         Mood and Affect: Mood normal.         Behavior: Behavior normal.         Labs      Recent Results (from the past 24 hour(s))   EKG 12 Lead    Collection Time: 03/14/22 11:30 AM   Result Value Ref Range    Ventricular Rate 74 BPM    Atrial Rate 74 BPM    P-R Interval 154 ms    QRS Duration 122 ms    Q-T Interval 442 ms    QTc Calculation (Bazett) 490 ms    P Axis 59 degrees    R Axis 67 degrees    T Axis 59 degrees   CBC with Auto Differential    Collection Time: 03/14/22 11:49 AM   Result Value Ref Range    WBC 17.3 (H) 4.5 - 11.5 E9/L    RBC 3.92 3.80 - 5.80 E12/L    Hemoglobin 11.2 (L) 12.5 - 16.5 g/dL    Hematocrit 35.1 (L) 37.0 - 54.0 %    MCV 89.5 80.0 - 99.9 fL    MCH 28.6 26.0 - 35.0 pg    MCHC 31.9 (L) 32.0 - 34.5 %    RDW 16.7 (H) 11.5 - 15.0 fL    Platelets 093 (H) 162 - 450 E9/L    MPV 9.3 7.0 - 12.0 fL    Neutrophils % 68.4 43.0 - 80.0 %    Immature Granulocytes % 4.0 0.0 - 5.0 %    Lymphocytes % 16.0 (L) 20.0 - 42.0 %    Monocytes % 9.5 2.0 - 12.0 %    Eosinophils % 1.7 0.0 - 6.0 %    Basophils % 0.4 0.0 - 2.0 %    Neutrophils Absolute 11.85 (H) 1.80 - 7.30 E9/L    Immature Granulocytes # 0.70 E9/L    Lymphocytes Absolute 2.77 1.50 - 4.00 E9/L    Monocytes Absolute 1.65 (H) 0.10 - 0.95 E9/L    Eosinophils Absolute 0.30 0.05 - 0.50 E9/L    Basophils Absolute 0.07 0.00 - 0.20 E9/L    Anisocytosis 2+     Polychromasia 2+     Poikilocytes 1+     Schistocytes 1+     Mac Cells 1+     Ovalocytes 1+    Comprehensive Metabolic Panel w/ Reflex to MG    Collection Time: 03/14/22 11:49 AM   Result Value Ref Range    Sodium 137 132 - 146 mmol/L    Potassium reflex Magnesium 3.7 3.5 - 5.0 mmol/L    Chloride 93 (L) 98 - 107 mmol/L    CO2 29 22 - 29 mmol/L    Anion Gap 15 7 - 16 mmol/L    Glucose 164 (H) 74 - 99 mg/dL    BUN 84 (H) 6 - 23 mg/dL    CREATININE 3.2 (H) 0.7 - 1.2 mg/dL    GFR Non-African American 19 >=60 mL/min/1.73    GFR African American 23     Calcium 8.8 8.6 - 10.2 mg/dL    Total Protein 6.8 6.4 - 8.3 g/dL    Albumin 3.4 (L) 3.5 - 5.2 g/dL    Total Bilirubin 0.6 0.0 - 1.2 mg/dL    Alkaline Phosphatase 113 40 - 129 U/L    ALT 53 (H) 0 - 40 U/L    AST 29 0 - 39 U/L   Brain Natriuretic Peptide    Collection Time: 03/14/22 11:49 AM   Result Value Ref Range    Pro-BNP 3,338 (H) 0 - 450 pg/mL   Troponin    Collection Time: 03/14/22 11:49 AM   Result Value Ref Range    Troponin, High Sensitivity 39 (H) 0 - 11 ng/L   D-Dimer, Quantitative    Collection Time: 03/14/22 11:49 AM   Result Value Ref Range    D-Dimer, Quant 741 ng/mL DDU   Troponin    Collection Time: 03/14/22  1:29 PM   Result Value Ref Range    Troponin, High Sensitivity 38 (H) 0 - 11 ng/L   POCT Glucose    Collection Time: 03/14/22  9:34 PM   Result Value Ref Range    Meter Glucose 134 (H) 74 - 99 mg/dL   POCT Glucose    Collection Time: 03/14/22 10:23 PM   Result Value Ref Range    Meter Glucose 169 (H) 74 - 99 mg/dL   POCT Glucose    Collection Time: 03/14/22 11:57 PM   Result Value Ref Range    Meter Glucose 176 (H) 74 - 99 mg/dL   CBC    Collection Time: 03/15/22  4:46 AM   Result Value Ref Range    WBC 13.9 (H) 4.5 - 11.5 E9/L    RBC 3.77 (L) 3.80 - 5.80 E12/L    Hemoglobin 10.7 (L) 12.5 - 16.5 g/dL    Hematocrit 34.1 (L) 37.0 - 54.0 %    MCV 90.5 80.0 - 99.9 fL    MCH 28.4 26.0 - 35.0 pg    MCHC 31.4 (L) 32.0 - 34.5 %    RDW 16.6 (H) 11.5 - 15.0 fL    Platelets 525 761 - 679 E9/L    MPV 9.3 7.0 - 12.0 fL   Basic Metabolic Panel    Collection Time: 03/15/22  4:46 AM   Result Value Ref Range    Sodium 138 132 - 146 mmol/L    Potassium 3.6 3.5 - 5.0 mmol/L    Chloride 93 (L) 98 - 107 mmol/L    CO2 29 22 - 29 mmol/L    Anion Gap 16 7 - 16 mmol/L    Glucose 102 (H) 74 - 99 mg/dL    BUN 83 (H) 6 - 23 mg/dL    CREATININE 3.1 (H) 0.7 - 1.2 mg/dL    GFR Non-African American 20 >=60 mL/min/1.73    GFR African American 24     Calcium 8.9 8.6 - 10.2 mg/dL   POCT Glucose    Collection Time: 03/15/22  5:07 AM   Result Value Ref Range    Meter Glucose 116 (H) 74 - 99 mg/dL        Imaging/Diagnostics Last 24 Hours   XR CHEST PORTABLE    Result Date: 1/21/2022  EXAMINATION: ONE XRAY VIEW OF THE CHEST 1/21/2022 10:02 am COMPARISON: None. HISTORY: ORDERING SYSTEM PROVIDED HISTORY: dyspnea TECHNOLOGIST PROVIDED HISTORY: Reason for exam:->dyspnea FINDINGS: The heart is enlarged. There is a dual lead cardiac pacer on the left There is an infiltrate seen within the right lung base. The left lung is clear. There is a trace right pleural effusion. There is no left pleural effusion. 1. Right lower lobe pneumonia 2. Trace right pleural effusion 3. Cardiomegaly     US DUP LOWER EXTREMITIES BILATERAL VENOUS    Result Date: 1/21/2022  EXAMINATION: DUPLEX VENOUS ULTRASOUND OF THE BILATERAL LOWER EXTREMITIES1/21/2022 10:15 am TECHNIQUE: Duplex ultrasound using B-mode/gray scaled imaging, Doppler spectral analysis and color flow Doppler was obtained of the deep venous structures of the lower bilateral extremities. COMPARISON: None. HISTORY: ORDERING SYSTEM PROVIDED HISTORY: discomfort TECHNOLOGIST PROVIDED HISTORY: Reason for exam:->discomfort What reading provider will be dictating this exam?->CRC FINDINGS: The visualized veins of the bilateral lower extremities are patent and free of echogenic thrombus. The veins demonstrate good compressibility with normal color flow study and spectral analysis. No evidence of DVT in either lower extremity. RECOMMENDATIONS: Unavailable       Assessment      Hospital Problems           Last Modified POA    * (Principal) Acute on chronic clinical systolic heart failure ([address] 3/14/2022 Yes      Acute on chronic hypoxic respiratory failure. CHF  Pleural effusion  Acute renal insfficnecy  DM  COPD  HTN  Hyperlipidemia        Plan   Cautiously diurese  Monitor labs and exam.  Continue rest of meds. Renal, Pulmonary, and Cardiology to see.     Consultations Ordered:  IP CONSULT TO CARDIOLOGY  IP CONSULT TO PULMONOLOGY  IP CONSULT TO NEPHROLOGY  IP CONSULT TO CASE MANAGEMENT

## 2022-03-15 NOTE — ED NOTES
[facility] faxed, Report given to Ana Lilia RN, pt marked ready for transport      Zach Mc RN  03/14/22 0620

## 2022-03-15 NOTE — PROCEDURES
Ultrasound guided Thoracentesis Procedure Note        DATE:  *03/15 2022    INDICTATIONS: pleural effusion found on imaging. PRE - OPERATIVE DIAGNOSIS:  Pleural Effusion    POST - OPERATIVE DIAGNOSIS:  Right  Pleural Effusion    PERFORMED By:  Jennifer Pike MD    ASSISTANT(S):  US Technician     CONSCENT:  Verbal consent obtained. Written consent obtained. After informed consent & appropriate time out protocol was noted & obtained. Risks/Benefits/Alternatives of the procedure were discussed including: infection, bleeding, pain, & pneumothorax. THORACENTESIS PROCEDURE DETAILS:  Patient understanding: patient states understanding of the procedure being performed. Time out: Immediately prior to procedure a \"time out\" was called to verify the correct. Patient was placed in a sitting position and ultrasound was done and site of maximum fluid collection was marked. PREPARATION: Patient was prepped and draped in the usual sterile fashion. ANESTHESIA: Lidocaine 1% without epinephrine, amount varied for local control. PROCEDURE NOTE: The patient was placed in the sitting position. US was then used to lois the fluid and depth was determined. Then the skin was prepped with Chloraprep solution and draped with sterile covers. For the procedure we used 1% buffered lidocaine to anesthetize the skin, subcutaneous tissue and parietal pleura. After adequate anesthesia was accomplished. The plural catheter was advanced over a guide into the pleural space. The fluid was obtained without any difficulties and minimal blood loss. FINDINGS/SAMPLES: We removed 1000 ml of serosanguinous cloudy pleural fluid. The samples was sent for analysis. COMPLICATIONS:  None; patient tolerated the procedure well. PLAN: Care will be taken to review the post procedure radiograph for pneumothorax & testing when available.     Jennifer Pike

## 2022-03-15 NOTE — CARE COORDINATION
Spoke with patient, he is from [facility], plans to return when released. Spoke with jennyfer, patient was there skilled, will require a precert to return. Requested that facility start auth once therapy notes available. For questions I can be reached at [phone].  Yvette Shannon, [place]

## 2022-03-15 NOTE — PROGRESS NOTES
Physical Therapy      Name: Sulma Marshall  : 1945  MRN: [id number]  Date of Service: 3/15/2022  Room #:  7782/8772-P    PT order received. PT evaluation attempted - patient off floor (ultrasound). PT will follow up as appropriate.     Gorge Blackmon, PT, DPT  [id number]

## 2022-03-16 NOTE — PROGRESS NOTES
Physical Therapy  Physical Therapy Initial Assessment     Name: Manuel Del Rio  : 1945  MRN: [id number]      Date of Service: 3/16/2022    Evaluating PT:  Quan Ramachandran, PT, DPT [id number]    Room #:  2582/7579-B  Diagnosis:  Acute on chronic clinical systolic heart failure (HCC) [I50.23]  PMHx/PSHx:  HTN, HLD, DM, depression, acid reflux, CHF, polio, COPD, MI, coronary angioplasty with stent, cardiac pacemaker placement  Procedure/Surgery:  Ultrasound guided thoracentesis (3/15/2022)  Precautions:  Fall risk, alarm, piedra catheter, O2  Equipment Needs:  TBD  Equipment Owned: SPC, 4WW, manual wheelchair    SUBJECTIVE:    Patient from nursing facility; reports that they were at different nursing facility before most recent one. Prior to initial nursing facility, patient lived alone in a 1 story home with 3 stair(s) to enter and 0 rail(s) or ramped entry (depending on entrance); ambulated with no AD. OBJECTIVE:   Initial Evaluation  Date: 3/16/2022 Treatment Short Term/ Long Term   Goals   AM-PAC 6 Clicks      Was pt agreeable to Eval/treatment? Yes     Does pt have pain? Mild B toes     Bed Mobility  Rolling: ModA  Supine to sit: ModA (with HOB elevated)  Sit to supine: ModA (with HOB elevated)  Scooting: ModA (seated)  Rolling: Independent  Supine to sit:  Independent  Sit to supine: Independent  Scooting: Independent   Transfers Sit to stand: Attempted; unable to perform with [employer] MaxA  Stand to sit: MaxA with [employer] (partial stand)  Stand pivot: NT  Sit to stand: Lissette  Stand to sit: Lissette  Stand pivot: Lissette with AAD   Ambulation    NT  50 feet with AAD Lissette   Stair negotiation: ascended and descended  NT  TBD   ROM BUE:  See OT note  BLE:  WFL     Strength BUE:  See OT note  BLE:  Grossly 5/5     Balance Sitting EOB:  SBA  Dynamic Standing:  NT  Sitting EOB:  Independent  Dynamic Standing:  Lissette with AAD     Pt is A & O x 4  Sensation:  Pt denies numbness and tingling to extremities  Edema:  BLE    Vitals: HR 70, SpO2 96%, /80 at rest.  HR 76, SpO2 96%, /69 during activity. HR 72, SpO2 96% following activity. Patient education  Pt educated on role of PT, safety during functional mobility, use of call light for assistance. Patient response to education:   Pt verbalized understanding Pt demonstrated skill Pt requires further education in this area   Yes Yes Yes     ASSESSMENT:    Conditions Requiring Skilled Therapeutic Intervention:    [x]Decreased strength     []Decreased ROM  [x]Decreased functional mobility  [x]Decreased balance   [x]Decreased endurance   []Decreased posture  []Decreased sensation  []Decreased coordination   []Decreased vision  [x]Decreased safety awareness   [x]Increased pain       Comments:  Session cleared by nursing. Patient in semi-Gan's position with bed pan in place upon arrival; agreeable to PT evaluation. Performed rolling in order for bed pan to be removed; did not have bowel movement. Required significantly increased time, assistance of trunk and BLE to perform bed mobility. Tolerated sitting EOB for extended period of time with BUE support. Attempted sit to stand transfer multiple times; unable to perform complete stand. Patient assisted to supine. With assistance of nursing aide, scooting performed to improve positioning. Activity limited by fatigue. Vitals monitored and noted. Reported mild dizziness, shortness of breath with activity; nursing notified. Patient left in semi-Gan's position with call light in reach. Patient would benefit from continued skilled PT services to address functional deficits.     Treatment:  Patient practiced and was instructed in the following treatment:     Bed mobility - verbal cues to facilitate proper positioning and sequencing; physical assistance provided during activity   Static sitting - performed to promote upright tolerance, postural awareness, and balance maintenance   Functional transfers - verbal cues to facilitate proper positioning and sequencing, particularly related to hand/foot placement; physical assistance provided during activity   Skilled positioning - patient positioned optimally to protect skin/joint integrity and promote comfort    Pt's/ family goals   1. Return home    Prognosis is good for reaching above PT goals. Patient and or family understand(s) diagnosis, prognosis, and plan of care. Yes    PHYSICAL THERAPY PLAN OF CARE:    PT POC is established based on physician order and patient diagnosis     Referring provider/PT Order:    03/14/22 2145  PT eval and treat  Start:  03/14/22 2145,   End:  03/14/22 2145,   ONE TIME,   Standing Count:  1 Occurrences,   R         oJyce Grullon MD       Diagnosis:  Acute on chronic clinical systolic heart failure ([facility] [address] [I50.23]  Specific instructions for next treatment:  Continue to facilitate safe performance of functional mobility; progress as appropriate. Current Treatment Recommendations:     [x] Strengthening to improve independence with functional mobility   [] ROM to improve independence with functional mobility   [x] Balance Training to improve static/dynamic balance and to reduce fall risk  [x] Endurance Training to improve activity tolerance during functional mobility   [x] Transfer Training to improve safety and independence with all functional transfers   [x] Gait Training to improve gait mechanics, endurance and assess need for appropriate assistive device  [] Stair Training in preparation for safe discharge home and/or into the community   [x] Positioning to prevent skin breakdown and contractures  [x] Safety and Education Training   [x] Patient/Caregiver Education   [] HEP  [] Other     PT long term treatment goals are located in above grid    Frequency of treatments: 2-5x/week x 1-2 weeks.     Time in  1340  Time out  1425    Total Treatment Time  30 minutes     Evaluation Time includes thorough review of current medical information, gathering information on past medical history/social history and prior level of function, completion of standardized testing/informal observation of tasks, assessment of data and education on plan of care and goals.     CPT codes:  [] Low Complexity PT evaluation 15671  [x] Moderate Complexity PT evaluation 08040  [] High Complexity PT evaluation 35264  [] PT Re-evaluation 81862  [] Gait training 57140 0 minutes  [] Manual therapy 56330 0 minutes  [x] Therapeutic activities 29580 30 minutes  [] Therapeutic exercises 18441 0 minutes  [] Neuromuscular reeducation 30568 0 minutes     Karyna Azevedo, PT, DPT  [id number]

## 2022-03-16 NOTE — PROGRESS NOTES
Nephrology Progress Note  Patient's Name: Peyton Abbott  12:00 PM  3/16/2022        Reason for Consult:  CKD/KELSEY  Requesting Physician:  Andrew Hernandez MD    Chief Complaint:  SOB, hypoxia  History Obtained From:  patient and EHR    History of Present Ilness:    Peyton Abbott is a 65 y.o. male with a history of chronic kidney disease stage IV (recent recent baseline creatinine 2.5-3), type 2 diabetes mellitus, COPD, chronic HFpEF H/O pacemaker and several other medical problems listed below who presented from [facility] with complaints of shortness of breath and hypoxia. According to patient he developed shortness of breath and hypoxia at his facility. It occurred while he was at rest.  There was no associated chest pain. On presentation to ED his initial vitals were blood pressure of 103/63, respiration 20, oxygen saturation 97% on 6 L nasal cannula. Laboratory data was significant for high-sensitivity troponin of 38, WBC 17.3, hemoglobin 11.2, BUN 84 and a creatinine of 3.2. A CTA of the chest showed no pulmonary embolism. Demonstrated large bilateral pleural effusions however. Patient is on chronic oxygen therapy at his facility AT 3-4L/min  He was recently discharged from this hospital 5 days ago after an admission for acute exacerbation of CHF. He was inpatient for a little over 2 weeks. Creatinine at discharge was 2.9 mg/dL.     Subjective    3/16: Right-sided thoracentesis performed yesterday; he reports feeling less dyspneic    Allergies:  Penicillins    Current Medications:    bumetanide (BUMEX) 12.5 mg in sodium chloride 0.9 % 125 mL infusion, Continuous  midodrine (PROAMATINE) tablet 10 mg, TID WC  cefTRIAXone (ROCEPHIN) 1,000 mg in sterile water 10 mL IV syringe, Q24H  acetaminophen (TYLENOL) tablet 650 mg, Q4H PRN  aspirin EC tablet 81 mg, Daily  atorvastatin (LIPITOR) tablet 40 mg, Nightly  buPROPion (WELLBUTRIN XL) extended release tablet 300 mg, QAM  vitamin B-12 (CYANOCOBALAMIN) tablet 1,000 mcg, Daily  guaiFENesin-dextromethorphan (ROBITUSSIN DM) 100-10 MG/5ML syrup 5 mL, Q4H PRN  ezetimibe (ZETIA) tablet 10 mg, Nightly  folic acid (FOLVITE) tablet 1 mg, Daily  guaiFENesin tablet 400 mg, 4x Daily PRN  insulin glargine-yfgn (SEMGLEE-YFGN) injection vial 50 Units, Nightly  ipratropium-albuterol (DUONEB) nebulizer solution 3 mL, Q4H PRN  loperamide (IMODIUM) capsule 2 mg, 4x Daily PRN  magnesium hydroxide (MILK OF MAGNESIA) 400 MG/5ML suspension 30 mL, Daily PRN  melatonin tablet 3 mg, Nightly  metoprolol succinate (TOPROL XL) extended release tablet 25 mg, BID  pantoprazole (PROTONIX) tablet 40 mg, QAM AC  potassium chloride (KLOR-CON M) extended release tablet 20 mEq, Daily  ascorbic acid (VITAMIN C) tablet 500 mg, BID  vitamin D (CHOLECALCIFEROL) tablet 2,000 Units, Daily  zinc sulfate (ZINCATE) capsule 50 mg, Daily  insulin lispro (HUMALOG) injection vial 0-6 Units, TID WC  insulin lispro (HUMALOG) injection vial 0-3 Units, Nightly  glucose (GLUTOSE) 40 % oral gel 15 g, PRN  dextrose 50 % IV solution, PRN  glucagon (rDNA) injection 1 mg, PRN  dextrose 5 % solution, PRN        Review of Systems:   Pertinent items are noted in HPI. Physical exam:  Vitals:    03/16/22 0819   BP: (!) 79/57   Pulse: 73   Resp: 19   Temp: 97.5 °F (36.4 °C)   SpO2: 98%          General: No distress. Alert. Eyes: PERRL. No sclera icterus. No conjunctival injection. ENT: No discharge. Pharynx clear. Neck: Trachea midline. Normal thyroid. Lungs: No accessory muscle use. No crackles. No wheezing. Decreased breath sounds in both bases  CV: Regular rate. Regular rhythm. No murmur or rub. .   Abd: Non-tender. Non-distended. No masses. No organmegaly. Normal bowel sounds. Skin: Warm and dry. No nodule on exposed extremities. No rash on exposed extremities. Ext: No cyanosis, clubbing, 2+ pitting bilateral leg edema   Neuro: Awake. Follows commands. Positive pupils/gag/corneals.  Normal pain response. Data:   Labs:  Lab Results   Component Value Date     03/16/2022     03/15/2022     03/14/2022    K 3.3 (L) 03/16/2022    K 3.3 (L) 03/16/2022    K 3.6 03/15/2022    CL 95 (L) 03/16/2022    CO2 31 (H) 03/16/2022    CO2 29 03/15/2022    CO2 29 03/14/2022    CREATININE 3.2 (H) 03/16/2022    CREATININE 3.1 (H) 03/15/2022    CREATININE 3.2 (H) 03/14/2022    BUN 88 (H) 03/16/2022    BUN 83 (H) 03/15/2022    BUN 84 (H) 03/14/2022    GLUCOSE 102 (H) 03/16/2022    GLUCOSE 102 (H) 03/15/2022    GLUCOSE 164 (H) 03/14/2022    PHOS 4.4 02/27/2022    PHOS 4.5 01/31/2022    PHOS 4.1 01/30/2022    WBC 13.8 (H) 03/16/2022    WBC 13.9 (H) 03/15/2022    WBC 17.3 (H) 03/14/2022    HGB 10.6 (L) 03/16/2022    HGB 10.7 (L) 03/15/2022    HGB 11.2 (L) 03/14/2022    HCT 33.7 (L) 03/16/2022    HCT 34.1 (L) 03/15/2022    HCT 35.1 (L) 03/14/2022    MCV 91.1 03/16/2022     03/16/2022         Imaging:  XR CHEST PORTABLE    Result Date: 3/14/2022  EXAMINATION: ONE XRAY VIEW OF THE CHEST 3/14/2022 12:16 pm COMPARISON: 03/03/2022 and 02/28/2022 HISTORY: ORDERING SYSTEM PROVIDED HISTORY: shortness of breath TECHNOLOGIST PROVIDED HISTORY: Reason for exam:->shortness of breath What reading provider will be dictating this exam?->CRC FINDINGS: The cardiac silhouette is within normals. Bilateral lower lobe airspace disease is noted favored to represent atelectasis. There is a moderate right pleural effusion and small left pleural effusion. 1. Moderate right pleural effusion and small left pleural effusion 2. Bibasilar airspace disease favored to represent atelectasis secondary to the pleural effusions. .     CTA PULMONARY W CONTRAST    Result Date: 3/14/2022  EXAMINATION: CTA OF THE CHEST 3/14/2022 3:28 pm TECHNIQUE: CTA of the chest was performed after the administration of intravenous contrast.  Multiplanar reformatted images are provided for review. MIP images are provided for review.  Dose modulation, iterative reconstruction, and/or weight based adjustment of the mA/kV was utilized to reduce the radiation dose to as low as reasonably achievable. COMPARISON: None. HISTORY: ORDERING SYSTEM PROVIDED HISTORY: elevated dimer TECHNOLOGIST PROVIDED HISTORY: Reason for exam:->elevated dimer Decision Support Exception - unselect if not a suspected or confirmed emergency medical condition->Emergency Medical Condition (MA) What reading provider will be dictating this exam?->CRC FINDINGS: CTA chest exam is technically satisfactory. Pulmonary Arteries: Normal caliber. No PE. Lungs and pleura: Bilateral large pleural effusions with secondary bilateral passive atelectasis. Right middle lobe 2.6 cm thin walled cyst or bulla. No central obstructing lesion identified. Heart and mediastinum: Normal heart size. Coronary artery calcifications. Left subclavian transvenous pacemaker with right atrial and right ventricular leads. Trace pericardial effusion. No lymphadenopathy. The thoracic aorta is atherosclerotic and normal in caliber. Upper abdomen: Limited visualization. Hepatic cirrhosis. Mild splenomegaly. Small ascites. Bones: No acute osseous abnormality. Chronic appearing Schmorl's node and concave compression deformity of the T3 superior endplate. 1.  No PE. 2.  Bilateral large pleural effusions with secondary bilateral passive atelectasis. 3.  Hepatic cirrhosis. Mild splenomegaly. Small ascites. Assessment/Plans    1. Chronic kidney disease stage IV due to diabetes mellitus and hypertension. Current creatinine at his usual baseline  No aggressive work-up  Monitor in view of receiving IV contrast  May require dialytic support    2. Acute on chronic HFpEF  Continue IV diuretics  Transition to oral diuretics next 24 hours    3.   Acute hypoxic respiratory failure due to acute decompensated CHF  Minimal oxygen requirement at present  CTA of chest showed large bilateral pleural effusion, no PE  Right-sided thoracentesis performed 3/15 with 1 L serosanguineous fluid from the  Pulmonary following    4. Type 2 diabetes mellitus  Continue basal bolus insulin    5. Hypertension, BP trending low at present  Start Midodrine at present  Hold parameters on metoprolol    5 Hypokalemia, diuretic induced  Supplement and check magnesium level        Yolis Acuña MD  12:00 PM  3/16/2022

## 2022-03-16 NOTE — PROGRESS NOTES
[facility]  Department of Internal Medicine  Division of Pulmonary, Critical Care and Sleep Medicine  Progress Note    Lisa Raymond DO, MPH, FCCP, FACOI, FACP  Mauricio Stokes MD, [facility]  [facility]      Patient: Landon Newton  MRN: [id number]  : 1945    Encounter Time: 9:19 AM     Date of Admission: 2022  9:41 AM    Primary Care Physician: Toy Carpio MD    Reason for Consultation: Respiratory Failure     SUBJECTIVE:    Echo noted  Still the same clinically dispite diuresis of - 29.8 liters  Cr increased to 2.6        OBJECTIVE:     PHYSICAL EXAM:   VITALS:   Vitals:    22 2045 22 2315 22 0541 22 0833   BP: 110/76 112/74     Pulse: 95 91     Resp: 15 17  16   Temp: 97 °F (36.1 °C) 97.4 °F (36.3 °C)     TempSrc: Temporal Temporal     SpO2: 96% 97%  92%   Weight:   281 lb 9 oz (127.7 kg)    Height:            Intake/Output Summary (Last 24 hours) at 2022 0919  Last data filed at 2022 0543  Gross per 24 hour   Intake 785 ml   Output 3400 ml   Net -2615 ml        CONSTITUTIONAL:    Alert X 3, NAD  SKIN:     No rash, Skin discoloration  HEENT:     EOMI, MMM, No thrush  NECK:    No bruits, No JVP apprechiated  CV:      Sinus,  No murmur, No rubs, No gallops  PULMONARY:   Couse BS,  No Wheezing, No Rales, No Rhonchi      No noted egophony  ABDOMEN:     Soft, non-tender. BS normal. No R/R/G  EXT:    No deformities . No clubbing.       + lower extremity edema, + venous stasis  PULSE:   Diminished palpable.   PSYCHIATRIC:  Seems appropriate, No acute psycosis  MS:    No fractures, No gross weakness  NEUROLOGIC:   Non-focal     DATA: IMAGING & TESTING:     LABORATORY TESTS:    CBC:   Lab Results   Component Value Date    WBC 9.5 2022    RBC 4.15 2022    HGB 11.9 2022    HCT 37.2 2022    MCV 89.6 2022    MCH 28.7 2022    MCHC 32.0 2022    RDW 14.7 2022     required XRT  17. 2015 PUD/GIB required 2 units PRBC (ASA was stopped @ that time)  18. October 2021 TTE @ [facility] (will attempt to get records)      Plan:   1. CPAP at night, may use home machine  2. Bronchodilators for COPD   3. Needs more fluid removal, bumex gtt  4. Doubt pnumonia - check cultures, + Atelectasis, Abx remain. All cultures negative will stop Abx  5. HF clinic on discharge  6. COPD by report - get PFTs   7. PT OT noted  8. [facility]? [facility]? [facility] ?  Soon           Ana Gama DO DO, MPH, Liset Kaminski  Professor of Internal Medicine  Pulmonary, Critical Care and Sleep Medicine Female

## 2022-03-16 NOTE — CARE COORDINATION
Patient on bumex gtt today. [facility] following. They will submit precert when appropriate. Will need PT/OT when appropriate for auth. Ambulance on soft chart. For questions I can be reached at [phone].  Xuan Wilkinson

## 2022-03-16 NOTE — PROGRESS NOTES
Pulmonary [address] [facility]                             Pulmonary Consult/Progress Note :            Reason for Consultation:Pleural effusion   CC : SOB   HPI:   States SOB better after thoracentesis   1 L removed  Hb stable   Worsening renal failure  Down to 4 L   No chest pain       PHYSICAL EXAMINATION:     VITAL SIGNS:  BP (!) 79/57   Pulse 73   Temp 97.5 °F (36.4 °C) (Oral)   Resp 19   Ht 5' 9\" (1.753 m)   Wt 271 lb 13.2 oz (123.3 kg)   SpO2 98%   BMI 40.14 kg/m²   Wt Readings from Last 3 Encounters:   03/14/22 271 lb 13.2 oz (123.3 kg)   03/10/22 264 lb 1.8 oz (119.8 kg)   02/16/22 278 lb (126.1 kg)     Temp Readings from Last 3 Encounters:   03/16/22 97.5 °F (36.4 °C) (Oral)   03/10/22 97.3 °F (36.3 °C) (Infrared)   02/02/22 97.3 °F (36.3 °C) (Oral)     TMAX:  BP Readings from Last 3 Encounters:   03/16/22 (!) 79/57   03/10/22 105/71   02/16/22 128/88     Pulse Readings from Last 3 Encounters:   03/16/22 73   03/10/22 96   02/16/22 98           INTAKE/OUTPUTS:  I/O last 3 completed shifts:   In: 18 [P.O.:510]  Out: 1600 [Urine:1600]    Intake/Output Summary (Last 24 hours) at 3/16/2022 1113  Last data filed at 3/16/2022 0708  Gross per 24 hour   Intake 120 ml   Output 1175 ml   Net -1055 ml       General Appearance: alert and oriented to person, place and time, well-developed and   well-nourished, in no acute distress   Eyes: pupils equal, round, and reactive to light, extraocular eye movements intact, conjunctivae normal and sclera anicteric   Neck: neck supple and non tender without mass, no thyromegaly, no thyroid nodules and no cervical adenopathy   Pulmonary/Chest:*decrease breath sound bilateral  Cardiovascular: normal rate, regular rhythm, normal S1 and S2, no murmurs, rubs, clicks or gallops, distal pulses intact, no carotid bruits, no murmurs, no gallops, no carotid bruits and no JVD   Abdomen: obese, soft, non-tender, non-distended, normal bowel sounds, no masses or organomegaly   Extremities:*edema ,no cyanosis   Musculoskeletal: normal range of motion, no joint swelling, deformity or tenderness   Neurologic: reflexes normal and symmetric, no cranial nerve deficit noted    LABS/IMAGING:    CBC:  Lab Results   Component Value Date    WBC 13.8 (H) 03/16/2022    HGB 10.6 (L) 03/16/2022    HCT 33.7 (L) 03/16/2022    MCV 91.1 03/16/2022     03/16/2022    LYMPHOPCT 16.0 (L) 03/14/2022    RBC 3.70 (L) 03/16/2022    MCH 28.6 03/16/2022    MCHC 31.5 (L) 03/16/2022    RDW 16.5 (H) 03/16/2022    NEUTOPHILPCT 68.4 03/14/2022    MONOPCT 9.5 03/14/2022    BASOPCT 0.4 03/14/2022    NEUTROABS 11.85 (H) 03/14/2022    LYMPHSABS 2.77 03/14/2022    MONOSABS 1.65 (H) 03/14/2022    EOSABS 0.30 03/14/2022    BASOSABS 0.07 03/14/2022       Recent Labs     03/16/22  0457 03/15/22  0446 03/14/22  1149   WBC 13.8* 13.9* 17.3*   HGB 10.6* 10.7* 11.2*   HCT 33.7* 34.1* 35.1*   MCV 91.1 90.5 89.5    436 453*       BMP:   Recent Labs     03/14/22  1149 03/15/22  0446 03/16/22  0457    138 141   K 3.7 3.6 3.3*  3.3*   CL 93* 93* 95*   CO2 29 29 31*   BUN 84* 83* 88*   CREATININE 3.2* 3.1* 3.2*       MG:   Lab Results   Component Value Date    MG 2.5 03/16/2022     Ca/Phos:   Lab Results   Component Value Date    CALCIUM 8.6 03/16/2022    PHOS 4.4 02/27/2022     Amylase: No results found for: AMYLASE  Lipase: No results found for: LIPASE  LIVER PROFILE:   Recent Labs     03/14/22  1149   AST 29   ALT 53*   BILITOT 0.6   ALKPHOS 113       PT/INR: No results for input(s): PROTIME, INR in the last 72 hours. APTT: No results for input(s): APTT in the last 72 hours.     Cardiac Enzymes:  No results found for: CKTOTAL, CKMB, CKMBINDEX, TROPONINI                PROBLEM LIST:  Patient Active Problem List   Diagnosis    PNA (pneumonia)    Acute on chronic heart failure with preserved ejection fraction ([facility] [address]    Coronary artery disease involving native coronary artery of native heart without angina pectoris    Cardiac pacemaker in situ    Primary hypertension    SPEEDY (obstructive sleep apnea)    Chronic respiratory failure with hypoxia (HCC)    CHF (congestive heart failure), NYHA class I, acute on chronic, combined (HCC)    Acute on chronic clinical systolic heart failure (HCC)               ASSESSMENT:  1.) Moderate hypoxia   2.)cardiorenal syndrome  3. )Bilateral pleural effusion ,anasarca  4.)Possible SPEEDY  5.)KELSEY on CKD      PLAN:  Discuss with Dr Jacob Mratin ,2 d echo to r/o pericardial effusion   Concern with bloody fluid of uremia   *-patient symptoms clearly fluid overload and seems combined HFpEF along with worsening kindye function, also concern for pericardial effusion   Fluid seems more toward transudate    after we look with US and send for lab   *-BiPPA at night and as needed daytime  *-Diuresis as per renal   *_procalcitonin/crp   *- assessed Left side . not much fluid drain  *- incentive spirometery  Work up for pneumonia ,cover with Rocephin,strep and legionella in urine     Thank you very much for allowing me to participate in the care of this pleasant patient , should you have any questions ,please do not hesitate to contact me      Miguel Ángel [address]  Pulmonary&Critical Care Medicine   Director of [address] [place] [address] Director of [address] [place] [address]    Jones Washington    NOTE: This report was transcribed using voice recognition software. Every effort was made to ensure accuracy; however, inadvertent computerized transcription errors may be present.

## 2022-03-16 NOTE — PROGRESS NOTES
[address]      Date:3/16/2022                                                  Patient Name: Kathy Murrieta  MRN: [id number]  : 1945  Room: [address] [place] [address]    Evaluating OT: BIBIANA Torres, OTR/L  # [id number]    Referring Provider:  Ene Venegas MD  Specific Provider Orders:  Lo Specking and Treat\"  3-14-22    Diagnosis: Acute on chronic clinical systolic heart failure ([facility] [address] [I50.23]    Pt was transferred from SNF w/ LE swelling, 3+ Pitting Edema, Hypotension    Pertinent Medical History:  Pt has a past medical history of Acid reflux, Agent orange exposure, Congestive heart failure (CHF) ([facility] [address] COPD (chronic obstructive pulmonary disease) ([facility] [address] Depression, Diabetes mellitus ([facility] [address] Hyperlipidemia, Hypertension, MI (myocardial infarction) ([facility] [address] Polio, and Sleep apnea. ,  has a past surgical history that includes Coronary angioplasty with stent; Cardiac pacemaker placement; and pacemaker placement.     Surgeries this admission: 3-15-22:  Thoracentesis - removal of 1000ml     Precautions:  Fall Risk  4L O2  Hypotension  Paredes Catheter    Assessment of current deficits   [x] Functional mobility   [x]ADLs  [x] Strength               []Cognition   [x] Functional transfers   [x] IADLs         [x] Safety Awareness   [x]Endurance   [] Fine Coordination              [x] Balance      [] Vision/perception   []Sensation    []Gross Motor Coordination  [] ROM  [] Delirium                   [] Motor Control       OT PLAN OF CARE   OT POC based on physician orders, patient diagnosis and results of clinical assessment    Frequency/Duration 1-3 days/wk for 2 weeks PRN   Specific OT Treatment to include:   * Instruction/training on adapted ADL techniques and AE recommendations to increase functional independence within precautions       * Training on energy conservation strategies, correct breathing pattern and techniques to improve independence/tolerance for self-care routine  * Functional transfer/mobility training/DME recommendations for increased independence, safety, and fall prevention  * Patient/Family education to increase follow through with safety techniques and functional independence  * Recommendation of environmental modifications for increased safety with functional transfers/mobility and ADLs  * Therapeutic exercise to improve motor endurance, ROM, and functional strength for ADLs/functional transfers  * Therapeutic activities to facilitate/challenge dynamic balance, stand tolerance for increased safety and independence with ADLs  * Therapeutic activities to facilitate gross/fine motor skills for increased independence with ADLs  * Neuro-muscular re-education: facilitation of righting/equilibrium reactions, midline orientation, scapular stability/mobility, normalization of muscle tone, and facilitation of volitional active controled movement  * Positioning to improve skin integrity, interaction with environment and functional independence  * Manual techniques for edema management  Other:    Recommended Adaptive Equipment: TBD as pt progresses       Home Living:  Pt lives alone in a 1-story house. Bed/bath on the main floor.  (+) Basement. Pt reported being hospitalized or in a SNF since 1-23-22. Currently in SNF. Bathroom setup:  Walk-in-Shower, Standard-height Commode   Equipment owned:  W/C, [address], Shower chair    Available Family Assist:  Staff assist    Prior Level of Function:  Pt reported currently requiring assist w/ all ADLs, Transfers and Mobility using [employer] vs W/C for ambulation. Little to no participation in therapy since January per pt report. Driving:  ??   Occupation:  Retired      Pain Level:  Denied pain this session    Additional Complaints:  General weakness/fatigue    Cognition: A & O x 4   Able to Follow Multi-Step Commands INDly - within physical limitations   Memory:  good    Sequencing:  good    Problem solving:  good    Judgement/safety:  good   Additional Comments:  Pt was pleasant and cooperative. Vitals/Lab Values:  BP in supine 86/60, seated EOB 97/78;  O2 sats remained > 94% for duration of session w/ 4L O2 w/ > 30 mins of ax seated EOB     Functional Assessment:  AM-PAC Daily Activity Raw Score: 11/24     Initial Eval Status  Date: 3/16/22   Treatment Status  Date: STGs = LTGs  Time frame: 10-14 days   Feeding IND after set up    High kan position    NA   Grooming SUP/Set up    Able to complete all tasks w/ SUP for safety seated EOB  Unable to stand or transfer to chair for ax    SUP/Set up  Seated/Standing [address] A/Set up    Donning/doffing gown seated EOB    Set up     LB Dressing Dep    Max A to don socks despite pt ed/demo for use of adaptive tech seated EOB  Max A of 2 for simulated task of donning pants in supine, unable to stand for task w/ Max A of 1    Max A     Bathing Dep    UB - Min A seated EOB  LB - Max A of 2 for task and bed mobility in supine    Max A      Toileting Dep    Paredes Catheter  Max A of 1 for bowel hygiene + Max A of 2 for simulated clothing adjustment bed level    Max A     Bed Mobility  Rolling to facilitate Functional ax: Max A  Repositioning to facilitate bed-level ax: Max A    Supine to sit: Max A   Sit to supine:  Max A     VCs for safety, hand placement, use of Side-rails    Supine to sit: Mod A  Sit to supine:  Mod A     Functional Transfers Dep    Unable to stand from EOB w/ Max A of 1  VCs for safety/hand placement    Max A     Functional Mobility NT  Max A     Balance Sitting:     Static:  Remote SUP EOB    Dynamic:  Close SUP w/ functional ax EOB     Standing:     Static:  NT    Dynamic:  NT    Sitting:     Static:  IND    Dynamic:  IND w/ functional ax    Standing:     Static:  Max A w/ AD PRN    Dynamic:  Max A w/ functional ax/mobility w/ AD PRN   Activity Tolerance Fair(-)    Limited by general weakness, fatigue, habitus, severity of edema    Fair   Visual/  Perceptual    Hearing: WNL   Glasses: No    WFL   Hearing Aids:  No               Hand Dominance: Right   AROM Strength Additional Info:    RUE  WFL 4+/5 Good ;   Good FMC/dexterity noted during ADL tasks     LUE WFL 4+/5 Good ;    Good FMC/dexterity noted during ADL tasks       Sensation:  Denies numbness or tingling Damián UEs   Tone: WFL Damián UEs   Edema: None Noted Damián UEs     Comments: Upon arrival, patient was found in supine. He was agreeable to participate in therapeutic ax. No Family present during session. Received permission from RN prior to engaging pt in OT services. Educated pt on role of OT services. At the end of the session, patient was properly positioned in High kan position. Call light and phone within reach, all lines and tubes intact. Oriented pt to call bell. Made all appropriate Environmental Modifications to facilitate pt's level of IND and safety. All needs met. Bed Alarm activated. Overall patient demonstrated decreased independence and safety during completion of ADL/functional transfer/mobility tasks. Pt would benefit from continued skilled OT to increase safety and independence with completion of ADL/IADL tasks for functional independence and quality of life.     Treatment: OT treatment provided this date includes:    Instruction/training on safety and adapted techniques for completion of ADLs, use of DME/AD/Adaptive equip:     Instruction/training on safe functional mobility/transfer techniques, use of DME/AD:     Instruction/training on energy conservation techs (EC)/Pursed-Lip Breathing (PLB)/work simplification for completion of ADLs:      Neuromuscular Reeducation to facilitate balance/righting reactions for increased function with ADLs:     Skilled positioning/alignment for Skin Integrity, Edema Control, to maximize Pt's safety and ability to INDly interact w/ his/her environment, maximize respiratory status   Activity tolerance - Sitting ax to improve endurance w/ functional ax    Cognitive retraining -  Cues for safety/safety awareness, sequencing, problem solving     Skilled monitoring of Vitals during session and pt's response to tx ax       Consulted RN     Made all appropriate Environmental Modifications to facilitate pt's level of IND and safety.  Recommendations for Continued Participation in OT services during Hospitalization and at D/C - SNF    Pt and/or Family verbalized/demonstrated a Good understanding of education provided. Will Review PRN. Rehab Potential: Good(-) for established goals     Patient / Family Goal: Return to SNF for therapy program      Patient and/or family were instructed on functional diagnosis, prognosis/goals and OT plan of care. Demonstrated Good understanding. Eval Complexity: Low    Time In: 1042  Time Out: 1141  Total Treatment Time: 44 minutes    Min Units   OT Eval Low 97165  X  1   OT Eval Medium 32599      OT Eval High 39778      OT Re-Eval [id number]       Therapeutic Ex 99717       Therapeutic Activities 51859       ADL/Self Care 76827  44  3   Orthotic Management 82021       Manual [id number]     Neuro Re-Ed 84353       Non-Billable Time              Evaluation Time additionally includes thorough review of current medical information, gathering information on past medical history/social history and prior level of function, completion of standardized testing/informal observation of tasks, assessment of data and education on plan of care and goals.             BIBIANA Wood, OTR/L  # [id number]

## 2022-03-16 NOTE — PROGRESS NOTES
PROGRESS NOTE     CARDIOLOGY    Chief complaint: Seen today for follow up, management & recommendations for hypervolemia    He denies chest pain or shortness of breath today. He was reclining in bed. He was comfortable and in no distress. Wt Readings from Last 3 Encounters:   03/14/22 271 lb 13.2 oz (123.3 kg)   03/10/22 264 lb 1.8 oz (119.8 kg)   02/16/22 278 lb (126.1 kg)     Temp Readings from Last 3 Encounters:   03/16/22 97.5 °F (36.4 °C) (Oral)   03/10/22 97.3 °F (36.3 °C) (Infrared)   02/02/22 97.3 °F (36.3 °C) (Oral)     BP Readings from Last 3 Encounters:   03/16/22 (!) 79/57   03/10/22 105/71   02/16/22 128/88     Pulse Readings from Last 3 Encounters:   03/16/22 73   03/10/22 96   02/16/22 98         Intake/Output Summary (Last 24 hours) at 3/16/2022 1244  Last data filed at 3/16/2022 0708  Gross per 24 hour   Intake 120 ml   Output 1175 ml   Net -1055 ml       Recent Labs     03/14/22  1149 03/15/22  0446 03/16/22  0457   WBC 17.3* 13.9* 13.8*   HGB 11.2* 10.7* 10.6*   HCT 35.1* 34.1* 33.7*   MCV 89.5 90.5 91.1   * 436 415     Recent Labs     03/14/22  1149 03/15/22  0446 03/16/22  0457    138 141   K 3.7 3.6 3.3*  3.3*   CL 93* 93* 95*   CO2 29 29 31*   BUN 84* 83* 88*   CREATININE 3.2* 3.1* 3.2*   MG  --   --  2.5     No results for input(s): PROTIME, INR in the last 72 hours. No results for input(s): CKTOTAL, CKMB, CKMBINDEX, TROPONINI in the last 72 hours. No results for input(s): BNP in the last 72 hours. No results for input(s): CHOL, HDL, TRIG in the last 72 hours.     Invalid input(s): CHOLHDLR, LDLCALCU  Recent Labs     03/14/22  1149 03/14/22  1329   TROPHS 39* 38*         perflutren lipid microspheres (DEFINITY) injection 1.65 mg, ONCE PRN  bumetanide (BUMEX) 12.5 mg in sodium chloride 0.9 % 125 mL infusion, Continuous  midodrine (PROAMATINE) tablet 10 mg, TID WC  cefTRIAXone (ROCEPHIN) 1,000 mg in sterile water 10 mL IV syringe, Q24H  acetaminophen (TYLENOL) tablet 650 mg, Q4H PRN  aspirin EC tablet 81 mg, Daily  atorvastatin (LIPITOR) tablet 40 mg, Nightly  buPROPion (WELLBUTRIN XL) extended release tablet 300 mg, QAM  vitamin B-12 (CYANOCOBALAMIN) tablet 1,000 mcg, Daily  guaiFENesin-dextromethorphan (ROBITUSSIN DM) 100-10 MG/5ML syrup 5 mL, Q4H PRN  ezetimibe (ZETIA) tablet 10 mg, Nightly  folic acid (FOLVITE) tablet 1 mg, Daily  guaiFENesin tablet 400 mg, 4x Daily PRN  insulin glargine-yfgn (SEMGLEE-YFGN) injection vial 50 Units, Nightly  ipratropium-albuterol (DUONEB) nebulizer solution 3 mL, Q4H PRN  loperamide (IMODIUM) capsule 2 mg, 4x Daily PRN  magnesium hydroxide (MILK OF MAGNESIA) 400 MG/5ML suspension 30 mL, Daily PRN  melatonin tablet 3 mg, Nightly  metoprolol succinate (TOPROL XL) extended release tablet 25 mg, BID  pantoprazole (PROTONIX) tablet 40 mg, QAM AC  potassium chloride (KLOR-CON M) extended release tablet 20 mEq, Daily  ascorbic acid (VITAMIN C) tablet 500 mg, BID  vitamin D (CHOLECALCIFEROL) tablet 2,000 Units, Daily  zinc sulfate (ZINCATE) capsule 50 mg, Daily  insulin lispro (HUMALOG) injection vial 0-6 Units, TID WC  insulin lispro (HUMALOG) injection vial 0-3 Units, Nightly  glucose (GLUTOSE) 40 % oral gel 15 g, PRN  dextrose 50 % IV solution, PRN  glucagon (rDNA) injection 1 mg, PRN  dextrose 5 % solution, PRN        Review of systems:     Heart: as above   Lungs: as above   Eyes: denies changes in vision or discharge. Ears: denies changes in hearing or pain. Nose: denies epistaxis or masses   Throat: denies sore throat or trouble swallowing. Neuro: denies numbness, tingling, tremors. Skin: denies rashes or itching. : denies hematuria, dysuria   GI: denies vomiting, diarrhea   Psych: denies mood changed, anxiety, depression. Physical exam:    Constitutional: A&O x3, communicates well, no acute distress. Eyes: extraocular muscles intact, PERRL. Normal lids & conjunctiva. No icterus. ENT: clear, no bleeding.   No external masses. Lips normal formation. Neck: supple, full ROM, no JVD, no bruits, no lymphadenopathy. No masses. trachea midline. Heart: regular rate & rhythm, normal S1 & S2, no abnormal murmurs. No heave. Lungs: CTA. No accessory muscles. Poor air movement. Abd: soft, non-tender. Normal bowel sounds. Obese. Neuro: Full ROM X 4, EOMI, no tremors. EXT: Moderate bilateral lower extremity edema  Skin: warm, dry, intact. Good turgor. Psych: A&O x 3, normal behavior, not anxious. Assessment/Recommendations  1. Normal LV systolic function, normal left atrial size, normal RV systolic pressure indicating CHF is not causing his hypervolemia. 2. Acute on chronic renal insufficiency. 3. Hypervolemia appears to be due to the renal insufficiency. This does not appear to be heart failure. 4. I am concerned with his creatinine level and his low blood pressure that he may develop a pericardial effusion. Therefore, I will get a limited echo even though he just had an echo recently. 5. Hypertension (now low)  6. Diabetes  7. Oxygen requiring COPD  8. Exposure to agent orange  9. Sleep apnea. He does use his CPAP. 10. Pacemaker. 11. Coronary artery disease. No symptoms this admission. 12. Lymphoma. 13. Peptic ulcer disease with GI bleed requiring the discontinuance of his aspirin. 14. Chronically elevated BNP. 15. Chronically elevated troponin. 16. Obstructive uropathy. Note: This report was completed using computerized voice recognition software. Every effort has been made to ensure accuracy, however; and invert and computerized transcription errors may be present.

## 2022-03-16 NOTE — PROGRESS NOTES
Progress Note  Date:3/16/2022       Room:[address] [place] [address]-A  Patient Yeni Nguyen     YOB: 1945     Age:77 y.o. Patient says breathing is improved today. Subjective    Subjective:  Symptoms:  Improved. He reports shortness of breath. No chest pain. Diet:  Adequate intake. Activity level: Impaired due to weakness. Pain:  He reports no pain. Review of Systems   Constitutional: Negative for activity change and fever. HENT: Negative for congestion. Respiratory: Positive for shortness of breath. Cardiovascular: Positive for leg swelling. Negative for chest pain. Gastrointestinal: Negative for abdominal pain. Neurological: Negative for dizziness. Psychiatric/Behavioral: Negative for agitation. Objective         Vitals Last 24 Hours:  TEMPERATURE:  Temp  Av.6 °F (36.4 °C)  Min: 97.5 °F (36.4 °C)  Max: 97.7 °F (36.5 °C)  RESPIRATIONS RANGE: Resp  Av.5  Min: 17  Max: 18  PULSE OXIMETRY RANGE: SpO2  Av %  Min: 93 %  Max: 97 %  PULSE RANGE: Pulse  Av.5  Min: 77  Max: 84  BLOOD PRESSURE RANGE: Systolic (12ANY), OLE:14 , Min:86 , CVV:60   ; Diastolic (85MXH), DBW:06, Min:60, Max:65    I/O (24Hr): Intake/Output Summary (Last 24 hours) at 3/16/2022 0641  Last data filed at 3/15/2022 1924  Gross per 24 hour   Intake 360 ml   Output 600 ml   Net -240 ml     Objective:  General Appearance:  Comfortable. Vital signs: (most recent): Blood pressure 94/65, pulse 84, temperature 97.7 °F (36.5 °C), temperature source Oral, resp. rate 18, height 5' 9\" (1.753 m), weight 271 lb 13.2 oz (123.3 kg), SpO2 93 %. No fever. Lungs:  Normal effort and normal respiratory rate. Breath sounds clear to auscultation. Heart: Normal rate. Regular rhythm. S1 normal and S2 normal.    Extremities: There is local swelling.       Labs/Imaging/Diagnostics    Labs:  CBC:  Recent Labs     22  1149 03/15/22  0446 22  0457   WBC 17.3* 13.9* 13.8*   RBC 3.92 3.77* 3.70*   HGB 11.2* 10.7* 10.6*   HCT 35.1* 34.1* 33.7*   MCV 89.5 90.5 91.1   RDW 16.7* 16.6* 16.5*   * 436 415     CHEMISTRIES:  Recent Labs     22  1149 03/15/22  0446    138   K 3.7 3.6   CL 93* 93*   CO2 29 29   BUN 84* 83*   CREATININE 3.2* 3.1*   GLUCOSE 164* 102*     PT/INR:No results for input(s): PROTIME, INR in the last 72 hours. APTT:No results for input(s): APTT in the last 72 hours. LIVER PROFILE:  Recent Labs     22  1149   AST 29   ALT 53*   BILITOT 0.6   ALKPHOS 113       Imaging Last 24 Hours:  XR CHEST PORTABLE    Result Date: 2022  EXAMINATION: ONE XRAY VIEW OF THE CHEST 2022 4:49 pm COMPARISON: 2022 HISTORY: ORDERING SYSTEM PROVIDED HISTORY: shortness of breath TECHNOLOGIST PROVIDED HISTORY: Reason for exam:->shortness of breath What reading provider will be dictating this exam?->CRC FINDINGS: There is a large opacity seen within the right lung base. There is a small right pleural effusion. The left upper lobe is clear. There is a small left pleural effusion. The cardiac silhouette is within normals. There is a dual lead cardiac pacer on the left. 1. Large opacity within the right lung base which could represent pneumonia and or atelectasis. 2. Moderate size right pleural effusion. 3. Small left pleural effusion. 4. CT of the thorax is recommended for further evaluation. US DUP LOWER EXTREMITIES BILATERAL VENOUS    Result Date: 2022  Patient MRN:  [id number] : 1945 Age: 65 years Gender: Male Order Date:  2022 5:28 PM EXAM: US DUP LOWER EXTREMITIES BILATERAL VENOUS NUMBER OF IMAGES:  52 INDICATION:  leg swelling leg swelling What reading provider will be dictating this exam?->[facility] COMPARISON: None Within the visualized vessels, there is no evidence for deep venous thrombosis There is good compressibility, there is good augmentation, there is good color flow.      Within the visualized vessels there is no evidence for deep venous thrombosis     Assessment//Plan           Hospital Problems           Last Modified POA    * (Principal) Acute on chronic clinical systolic heart failure ([facility] [address] 3/14/2022 Yes        Assessment:  (   (Principal) Acute on chronic clinical systolic heart failure ([facility] [address] 3/14/2022 Yes     Acute on chronic hypoxic respiratory failure. CHF  Pleural effusion  Acute renal insfficnecy  DM  COPD  HTN  Hyperlipidemia       ). Plan:   (IV diureses  Had thoracentesis yesterday  Monitor labs and output. Continue meds. ).

## 2022-03-16 NOTE — PROGRESS NOTES
Pt states that he feels worse today. Describes general malaise and \"very heavy legs. \" Lawrony Gray, RN

## 2022-03-17 NOTE — PLAN OF CARE
Problem: Skin Integrity:  Goal: Will show no infection signs and symptoms  Description: Will show no infection signs and symptoms  Outcome: Met This Shift  Goal: Absence of new skin breakdown  Description: Absence of new skin breakdown  Outcome: Met This Shift     Problem: Falls - Risk of:  Goal: Will remain free from falls  Description: Will remain free from falls  Outcome: Met This Shift  Goal: Absence of physical injury  Description: Absence of physical injury  Outcome: Met This Shift     Problem: OXYGENATION/RESPIRATORY FUNCTION  Goal: Patient will maintain patent airway  Outcome: Met This Shift     Problem: HEMODYNAMIC STATUS  Goal: Patient has stable vital signs and fluid balance  Outcome: Ongoing     Problem: Musculor/Skeletal Functional Status  Goal: Highest potential functional level  Outcome: Ongoing  Goal: Absence of falls  Outcome: Met This Shift

## 2022-03-17 NOTE — PROGRESS NOTES
Nephrology Progress Note  Patient's Name: Kristina Cuellar  11:01 AM  3/17/2022        Reason for Consult:  CKD/KELSEY  Requesting Physician:  Asa Samano MD    Chief Complaint:  SOB, hypoxia  History Obtained From:  patient and EHR    History of Present Ilness:    Kristina Cuellar is a 65 y.o. male with a history of chronic kidney disease stage IV (recent recent baseline creatinine 2.5-3), type 2 diabetes mellitus, COPD, chronic HFpEF H/O pacemaker and several other medical problems listed below who presented from [facility] with complaints of shortness of breath and hypoxia. According to patient he developed shortness of breath and hypoxia at his facility. It occurred while he was at rest.  There was no associated chest pain. On presentation to ED his initial vitals were blood pressure of 103/63, respiration 20, oxygen saturation 97% on 6 L nasal cannula. Laboratory data was significant for high-sensitivity troponin of 38, WBC 17.3, hemoglobin 11.2, BUN 84 and a creatinine of 3.2. A CTA of the chest showed no pulmonary embolism. Demonstrated large bilateral pleural effusions however. Patient is on chronic oxygen therapy at his facility AT 3-4L/min  He was recently discharged from this hospital 5 days ago after an admission for acute exacerbation of CHF. He was inpatient for a little over 2 weeks. Creatinine at discharge was 2.9 mg/dL.     Subjective    3/16: Right-sided thoracentesis performed yesterday; he reports feeling less dyspneic    3/17: Some difficulty with insertion of Paredes catheter earlier today; denies shortness of breath    Allergies:  Penicillins    Current Medications:    sodium chloride flush 0.9 % injection,   perflutren lipid microspheres (DEFINITY) injection 1.65 mg, ONCE PRN  bumetanide (BUMEX) 12.5 mg in sodium chloride 0.9 % 125 mL infusion, Continuous  midodrine (PROAMATINE) tablet 10 mg, TID WC  cefTRIAXone (ROCEPHIN) 1,000 mg in sterile water 10 mL IV syringe, Q24H  acetaminophen (TYLENOL) tablet 650 mg, Q4H PRN  aspirin EC tablet 81 mg, Daily  atorvastatin (LIPITOR) tablet 40 mg, Nightly  buPROPion (WELLBUTRIN XL) extended release tablet 300 mg, QAM  vitamin B-12 (CYANOCOBALAMIN) tablet 1,000 mcg, Daily  guaiFENesin-dextromethorphan (ROBITUSSIN DM) 100-10 MG/5ML syrup 5 mL, Q4H PRN  ezetimibe (ZETIA) tablet 10 mg, Nightly  folic acid (FOLVITE) tablet 1 mg, Daily  guaiFENesin tablet 400 mg, 4x Daily PRN  insulin glargine-yfgn (SEMGLEE-YFGN) injection vial 50 Units, Nightly  ipratropium-albuterol (DUONEB) nebulizer solution 3 mL, Q4H PRN  loperamide (IMODIUM) capsule 2 mg, 4x Daily PRN  magnesium hydroxide (MILK OF MAGNESIA) 400 MG/5ML suspension 30 mL, Daily PRN  melatonin tablet 3 mg, Nightly  metoprolol succinate (TOPROL XL) extended release tablet 25 mg, BID  pantoprazole (PROTONIX) tablet 40 mg, QAM AC  potassium chloride (KLOR-CON M) extended release tablet 20 mEq, Daily  ascorbic acid (VITAMIN C) tablet 500 mg, BID  vitamin D (CHOLECALCIFEROL) tablet 2,000 Units, Daily  zinc sulfate (ZINCATE) capsule 50 mg, Daily  insulin lispro (HUMALOG) injection vial 0-6 Units, TID WC  insulin lispro (HUMALOG) injection vial 0-3 Units, Nightly  glucose (GLUTOSE) 40 % oral gel 15 g, PRN  dextrose 50 % IV solution, PRN  glucagon (rDNA) injection 1 mg, PRN  dextrose 5 % solution, PRN        Review of Systems:   Pertinent items are noted in HPI. Physical exam:  Vitals:    03/17/22 1035   BP:    Pulse:    Resp:    Temp:    SpO2: 95%          General: No distress. Alert. Eyes: PERRL. No sclera icterus. No conjunctival injection. ENT: No discharge. Pharynx clear. Neck: Trachea midline. Normal thyroid. Lungs: No accessory muscle use. No crackles. No wheezing. Decreased breath sounds in both bases  CV: Regular rate. Regular rhythm. No murmur or rub. .   Abd: Non-tender. Non-distended. No masses. No organmegaly. Normal bowel sounds. Skin: Warm and dry. No nodule on exposed extremities.  No rash on decompensated CHF  Minimal oxygen requirement at present  CTA of chest showed large bilateral pleural effusion, no PE  Right-sided thoracentesis performed 3/15 with 1 L serosanguineous fluid from the  Respiratory distress improved  Pulmonary following    4. Type 2 diabetes mellitus  Continue basal bolus insulin    5. Hypertension, BP trending low at present  Start Midodrine at present  Hold parameters on metoprolol    5 Hypokalemia, diuretic induced  Improved with supplement I have acquired 1 reviewed back tomorrow are not effective     Discharge planning    Kenney Homans, MD  11:01 AM  3/17/2022

## 2022-03-17 NOTE — PROGRESS NOTES
Patient complained of discomfort with piedra. Piedra removed. Honey care completed. New piedra inserted. No flash present. Bladder Scanner completed 26mL recorded. Patient complained of pressure and discomfort with new piedra. Upon assessment,blood and clots noted patient complained of discomfort at base of penis. Piedra removed. Dr. Maya Pereyra on floor, assessed patient stated need for chronic piedra. New piedra inserted. Flash of urine present. Patient stated pressure relieved.  Elma George

## 2022-03-17 NOTE — PROGRESS NOTES
Dr. Michael Estevez at bedside stated to restart Bumex drip w/ hold parameter of <90 SBP.  Minh Allen RN

## 2022-03-17 NOTE — CARE COORDINATION
Shavonne Krause to submit for Tally  today. For possible transition to PO bumex. Ambulance on soft chart. For questions I can be reached at [phone].  Xuan Youngblood

## 2022-03-17 NOTE — PROGRESS NOTES
PROGRESS NOTE     CARDIOLOGY    Chief complaint: Seen today for follow up, management & recommendations for hypervolemia    He is not a great historian. However, he does state that since the fluid was taken out of his lungs his chest feels better and he is breathing a little better. He was reclining in bed. He was comfortable and in no distress. His only complaint was that he had his Paredes catheter exchanged and is uncomfortable. Wt Readings from Last 3 Encounters:   03/14/22 271 lb 13.2 oz (123.3 kg)   03/10/22 264 lb 1.8 oz (119.8 kg)   02/16/22 278 lb (126.1 kg)     Temp Readings from Last 3 Encounters:   03/17/22 97.3 °F (36.3 °C) (Oral)   03/10/22 97.3 °F (36.3 °C) (Infrared)   02/02/22 97.3 °F (36.3 °C) (Oral)     BP Readings from Last 3 Encounters:   03/17/22 95/66   03/10/22 105/71   02/16/22 128/88     Pulse Readings from Last 3 Encounters:   03/17/22 71   03/10/22 96   02/16/22 98         Intake/Output Summary (Last 24 hours) at 3/17/2022 1920  Last data filed at 3/17/2022 1518  Gross per 24 hour   Intake 660 ml   Output 1175 ml   Net -515 ml       Recent Labs     03/15/22  0446 03/16/22  0457 03/17/22  0417   WBC 13.9* 13.8* 15.1*   HGB 10.7* 10.6* 11.3*   HCT 34.1* 33.7* 35.8*   MCV 90.5 91.1 90.2    415 446     Recent Labs     03/15/22  0446 03/15/22  0446 03/16/22  0457 03/17/22  0417     --  141 138   K 3.6   < > 3.3*  3.3* 3.8  3.8   CL 93*  --  95* 96*   CO2 29  --  31* 27   BUN 83*  --  88* 83*   CREATININE 3.1*  --  3.2* 3.0*   MG  --   --  2.5  --     < > = values in this interval not displayed. No results for input(s): PROTIME, INR in the last 72 hours. No results for input(s): CKTOTAL, CKMB, CKMBINDEX, TROPONINI in the last 72 hours. No results for input(s): BNP in the last 72 hours. No results for input(s): CHOL, HDL, TRIG in the last 72 hours. Invalid input(s): CHOLHDLR, LDLCALCU  No results for input(s): TROPHS in the last 72 hours.       perflutren lipid microspheres (DEFINITY) injection 1.65 mg, ONCE PRN  bumetanide (BUMEX) 12.5 mg in sodium chloride 0.9 % 125 mL infusion, Continuous  midodrine (PROAMATINE) tablet 10 mg, TID WC  cefTRIAXone (ROCEPHIN) 1,000 mg in sterile water 10 mL IV syringe, Q24H  acetaminophen (TYLENOL) tablet 650 mg, Q4H PRN  aspirin EC tablet 81 mg, Daily  atorvastatin (LIPITOR) tablet 40 mg, Nightly  buPROPion (WELLBUTRIN XL) extended release tablet 300 mg, QAM  vitamin B-12 (CYANOCOBALAMIN) tablet 1,000 mcg, Daily  guaiFENesin-dextromethorphan (ROBITUSSIN DM) 100-10 MG/5ML syrup 5 mL, Q4H PRN  ezetimibe (ZETIA) tablet 10 mg, Nightly  folic acid (FOLVITE) tablet 1 mg, Daily  guaiFENesin tablet 400 mg, 4x Daily PRN  insulin glargine-yfgn (SEMGLEE-YFGN) injection vial 50 Units, Nightly  ipratropium-albuterol (DUONEB) nebulizer solution 3 mL, Q4H PRN  loperamide (IMODIUM) capsule 2 mg, 4x Daily PRN  magnesium hydroxide (MILK OF MAGNESIA) 400 MG/5ML suspension 30 mL, Daily PRN  melatonin tablet 3 mg, Nightly  metoprolol succinate (TOPROL XL) extended release tablet 25 mg, BID  pantoprazole (PROTONIX) tablet 40 mg, QAM AC  potassium chloride (KLOR-CON M) extended release tablet 20 mEq, Daily  ascorbic acid (VITAMIN C) tablet 500 mg, BID  vitamin D (CHOLECALCIFEROL) tablet 2,000 Units, Daily  zinc sulfate (ZINCATE) capsule 50 mg, Daily  insulin lispro (HUMALOG) injection vial 0-6 Units, TID WC  insulin lispro (HUMALOG) injection vial 0-3 Units, Nightly  glucose (GLUTOSE) 40 % oral gel 15 g, PRN  dextrose 50 % IV solution, PRN  glucagon (rDNA) injection 1 mg, PRN  dextrose 5 % solution, PRN        Review of systems:     Heart: as above   Lungs: as above   Eyes: denies changes in vision or discharge. Ears: denies changes in hearing or pain. Nose: denies epistaxis or masses   Throat: denies sore throat or trouble swallowing. Neuro: denies numbness, tingling, tremors. Skin: denies rashes or itching.    : denies hematuria, dysuria   GI: denies vomiting, diarrhea   Psych: denies mood changed, anxiety, depression. Physical exam:    Constitutional: A&O x3, communicates well, no acute distress. Eyes: extraocular muscles intact, PERRL. Normal lids & conjunctiva. No icterus. ENT: clear, no bleeding. No external masses. Lips normal formation. Neck: supple, full ROM, no JVD, no bruits, no lymphadenopathy. No masses. trachea midline. Heart: Distant. Regular rate & rhythm, normal S1 & S2, no abnormal murmurs. No heave. Lungs: Poor air movement. CTA. No accessory muscles. Abd: Morbidly obese. Soft, non-tender. Normal bowel sounds. Neuro: Full ROM X 4, EOMI, no tremors. EXT: Moderate bilateral lower extremity edema  Skin: warm, dry, intact. Good turgor. Psych: A&O x 3, normal behavior, not anxious. Assessment/Recommendations  1. Normal LV systolic function, normal left atrial size, normal RV systolic pressure indicating CHF is not the cause of his hypervolemia. 2. Acute on chronic renal insufficiency. Creatinine is remaining stable on the Bumex drip. 3. Hypervolemia appears to be due to the renal insufficiency. 4. I am concerned with his creatinine level and his low blood pressure that he may develop a pericardial effusion. Echo is pending  5. Hypertension (now low)  6. Diabetes  7. Oxygen requiring COPD  8. Exposure to agent orange  9. Sleep apnea. He does use his CPAP. 10. Pacemaker. 11. Coronary artery disease. No symptoms this admission. 12. Lymphoma. 13. Peptic ulcer disease with GI bleed requiring the discontinuance of his aspirin. 14. Chronically elevated BNP. 15. Chronically elevated troponin. 16. Obstructive uropathy. Note: This report was completed using computerized voice recognition software. Every effort has been made to ensure accuracy, however; and invert and computerized transcription errors may be present.

## 2022-03-17 NOTE — PROGRESS NOTES
Progress Note  Date:3/17/2022       Room:[address] [place] [address]-A  Patient Osbaldo Goel     YOB: 1945     Age:77 y.o. Patient says breathing is improved today. Subjective    Subjective:  Symptoms:  Improved. He reports shortness of breath. No chest pain. Diet:  Adequate intake. Activity level: Impaired due to weakness. Pain:  He reports no pain. Review of Systems   Constitutional: Negative for activity change and fever. HENT: Negative for congestion. Respiratory: Positive for shortness of breath. Cardiovascular: Positive for leg swelling. Negative for chest pain. Gastrointestinal: Negative for abdominal pain. Neurological: Negative for dizziness. Psychiatric/Behavioral: Negative for agitation. Objective         Vitals Last 24 Hours:  TEMPERATURE:  Temp  Av.7 °F (36.5 °C)  Min: 97.5 °F (36.4 °C)  Max: 97.8 °F (36.6 °C)  RESPIRATIONS RANGE: Resp  Av.5  Min: 18  Max: 19  PULSE OXIMETRY RANGE: SpO2  Av %  Min: 94 %  Max: 98 %  PULSE RANGE: Pulse  Av.5  Min: 70  Max: 73  BLOOD PRESSURE RANGE: Systolic (47FYI), BPH:78 , Min:79 , ILD:61   ; Diastolic (61HWV), JUI:04, Min:47, Max:57    I/O (24Hr): Intake/Output Summary (Last 24 hours) at 3/17/2022 0647  Last data filed at 3/17/2022 0600  Gross per 24 hour   Intake 240 ml   Output 1900 ml   Net -1660 ml     Objective:  General Appearance:  Comfortable. Vital signs: (most recent): Blood pressure (!) 97/47, pulse 70, temperature 97.8 °F (36.6 °C), temperature source Oral, resp. rate 18, height 5' 9\" (1.753 m), weight 271 lb 13.2 oz (123.3 kg), SpO2 94 %. No fever. Lungs:  Normal effort and normal respiratory rate. Breath sounds clear to auscultation. Heart: Normal rate. Regular rhythm. S1 normal and S2 normal.    Extremities: There is local swelling.       Labs/Imaging/Diagnostics    Labs:  CBC:  Recent Labs     03/15/22  0446 22  0457 22  0417   WBC 13.9* 13.8* 15.1*   RBC 3.77* 3.70* 3.97   HGB 10.7* 10.6* 11.3*   HCT 34.1* 33.7* 35.8*   MCV 90.5 91.1 90.2   RDW 16.6* 16.5* 16.9*    415 446     CHEMISTRIES:  Recent Labs     22  1149 03/15/22  0446 22  0457 22  0417   NA  --  138 141 138   K   < > 3.6 3.3*  3.3* 3.8   CL  --  93* 95* 96*   CO2  --  29 31* 27   BUN  --  83* 88* 83*   CREATININE  --  3.1* 3.2* 3.0*   GLUCOSE  --  102* 102* 86   MG  --   --  2.5  --     < > = values in this interval not displayed. PT/INR:No results for input(s): PROTIME, INR in the last 72 hours. APTT:No results for input(s): APTT in the last 72 hours. LIVER PROFILE:  Recent Labs     22  1149   AST 29   ALT 53*   BILITOT 0.6   ALKPHOS 113       Imaging Last 24 Hours:  XR CHEST PORTABLE    Result Date: 2022  EXAMINATION: ONE XRAY VIEW OF THE CHEST 2022 4:49 pm COMPARISON: 2022 HISTORY: ORDERING SYSTEM PROVIDED HISTORY: shortness of breath TECHNOLOGIST PROVIDED HISTORY: Reason for exam:->shortness of breath What reading provider will be dictating this exam?->CRC FINDINGS: There is a large opacity seen within the right lung base. There is a small right pleural effusion. The left upper lobe is clear. There is a small left pleural effusion. The cardiac silhouette is within normals. There is a dual lead cardiac pacer on the left. 1. Large opacity within the right lung base which could represent pneumonia and or atelectasis. 2. Moderate size right pleural effusion. 3. Small left pleural effusion. 4. CT of the thorax is recommended for further evaluation.      US DUP LOWER EXTREMITIES BILATERAL VENOUS    Result Date: 2022  Patient MRN:  [id number] : 1945 Age: 65 years Gender: Male Order Date:  2022 5:28 PM EXAM: US DUP LOWER EXTREMITIES BILATERAL VENOUS NUMBER OF IMAGES:  52 INDICATION:  leg swelling leg swelling What reading provider will be dictating this exam?->[facility] COMPARISON: None Within the visualized vessels, there is no evidence for deep venous thrombosis There is good compressibility, there is good augmentation, there is good color flow. Within the visualized vessels there is no evidence for deep venous thrombosis     Assessment//Plan           Hospital Problems           Last Modified POA    * (Principal) Acute on chronic clinical systolic heart failure ([address] 3/14/2022 Yes        Assessment:  (   (Principal) Acute on chronic clinical systolic heart failure ([address] 3/14/2022 Yes     Acute on chronic hypoxic respiratory failure. CHF  Pleural effusion  Acute renal insfficnecy  DM  COPD  HTN  Hyperlipidemia       ). Plan:   (RISHI singer  Had thoracentesis   Monitor labs and output. Continue meds. ).

## 2022-03-17 NOTE — PROGRESS NOTES
Pulmonary [facility]                             Pulmonary Consult/Progress Note :            Reason for Consultation:Pleural effusion   CC : SOB   HPI:   States SOB better after thoracentesis and feels he can take deep breath  1 L removed  Hb stable   Worsening renal failure  Down to 4 L   No chest pain       PHYSICAL EXAMINATION:     VITAL SIGNS:  BP 95/66   Pulse 71   Temp 97.3 °F (36.3 °C) (Oral)   Resp 18   Ht 5' 9\" (1.753 m)   Wt 271 lb 13.2 oz (123.3 kg)   SpO2 95%   BMI 40.14 kg/m²   Wt Readings from Last 3 Encounters:   03/14/22 271 lb 13.2 oz (123.3 kg)   03/10/22 264 lb 1.8 oz (119.8 kg)   02/16/22 278 lb (126.1 kg)     Temp Readings from Last 3 Encounters:   03/17/22 97.3 °F (36.3 °C) (Oral)   03/10/22 97.3 °F (36.3 °C) (Infrared)   02/02/22 97.3 °F (36.3 °C) (Oral)     TMAX:  BP Readings from Last 3 Encounters:   03/17/22 95/66   03/10/22 105/71   02/16/22 128/88     Pulse Readings from Last 3 Encounters:   03/17/22 71   03/10/22 96   02/16/22 98           INTAKE/OUTPUTS:  I/O last 3 completed shifts:   In: 240 [P.O.:240]  Out: 2500 [Urine:2500]    Intake/Output Summary (Last 24 hours) at 3/17/2022 1254  Last data filed at 3/17/2022 6332  Gross per 24 hour   Intake 420 ml   Output 1325 ml   Net -905 ml       General Appearance: alert and oriented to person, place and time, well-developed and   well-nourished, in no acute distress   Eyes: pupils equal, round, and reactive to light, extraocular eye movements intact, conjunctivae normal and sclera anicteric   Neck: neck supple and non tender without mass, no thyromegaly, no thyroid nodules and no cervical adenopathy   Pulmonary/Chest:*decrease breath sound bilateral  Cardiovascular: normal rate, regular rhythm, normal S1 and S2, no murmurs, rubs, clicks or gallops, distal pulses intact, no carotid bruits, no murmurs, no gallops, no carotid bruits and no JVD   Abdomen: obese, soft, non-tender, non-distended, normal bowel sounds, no masses or organomegaly   Extremities:*edema ,no cyanosis   Musculoskeletal: normal range of motion, no joint swelling, deformity or tenderness   Neurologic: reflexes normal and symmetric, no cranial nerve deficit noted    LABS/IMAGING:    CBC:  Lab Results   Component Value Date    WBC 15.1 (H) 03/17/2022    HGB 11.3 (L) 03/17/2022    HCT 35.8 (L) 03/17/2022    MCV 90.2 03/17/2022     03/17/2022    LYMPHOPCT 16.0 (L) 03/14/2022    RBC 3.97 03/17/2022    MCH 28.5 03/17/2022    MCHC 31.6 (L) 03/17/2022    RDW 16.9 (H) 03/17/2022    NEUTOPHILPCT 68.4 03/14/2022    MONOPCT 9.5 03/14/2022    BASOPCT 0.4 03/14/2022    NEUTROABS 11.85 (H) 03/14/2022    LYMPHSABS 2.77 03/14/2022    MONOSABS 1.65 (H) 03/14/2022    EOSABS 0.30 03/14/2022    BASOSABS 0.07 03/14/2022       Recent Labs     03/17/22  0417 03/16/22  0457 03/15/22  0446   WBC 15.1* 13.8* 13.9*   HGB 11.3* 10.6* 10.7*   HCT 35.8* 33.7* 34.1*   MCV 90.2 91.1 90.5    415 436       BMP:   Recent Labs     03/15/22  0446 03/15/22  0446 03/16/22 0457 03/17/22 0417     --  141 138   K 3.6   < > 3.3*  3.3* 3.8  3.8   CL 93*  --  95* 96*   CO2 29  --  31* 27   BUN 83*  --  88* 83*   CREATININE 3.1*  --  3.2* 3.0*    < > = values in this interval not displayed. MG:   Lab Results   Component Value Date    MG 2.5 03/16/2022     Ca/Phos:   Lab Results   Component Value Date    CALCIUM 8.7 03/17/2022    PHOS 4.4 02/27/2022     Amylase: No results found for: AMYLASE  Lipase: No results found for: LIPASE  LIVER PROFILE:   No results for input(s): AST, ALT, LIPASE, BILIDIR, BILITOT, ALKPHOS in the last 72 hours. Invalid input(s): AMYLASE,  ALB    PT/INR: No results for input(s): PROTIME, INR in the last 72 hours. APTT: No results for input(s): APTT in the last 72 hours.     Cardiac Enzymes:  No results found for: CKTOTAL, CKMB, CKMBINDEX, TROPONINI                PROBLEM LIST:  Patient Active Problem List Diagnosis    PNA (pneumonia)    Acute on chronic heart failure with preserved ejection fraction ([address]    Coronary artery disease involving native coronary artery of native heart without angina pectoris    Cardiac pacemaker in situ    Primary hypertension    SPEEDY (obstructive sleep apnea)    Chronic respiratory failure with hypoxia (HCC)    CHF (congestive heart failure), NYHA class I, acute on chronic, combined ([address]    Acute on chronic clinical systolic heart failure (HCC)               ASSESSMENT:  1.) Moderate hypoxia   2.)cardiorenal syndrome  3. )Bilateral pleural effusion ,anasarca  4.)Possible SPEEDY  5.)KELSEY on CKD      PLAN:  Continue diuresis as per primary ,renal   Pending 2 d echo   *-patient symptoms clearly fluid overload and seems combined HFpEF along with worsening kindye function, also concern for pericardial effusion ,cardiology following   Fluid seems more toward transudate   *-BiPPA at night and as needed daytime  *-Diuresis as per renal   *_procalcitonin/crp mild elavation ,on abx ,on Rocephine ,possible UTI  *- incentive spirometery  Work up for pneumonia ,cover with Rocephin,strep and legionella in urine     Thank you very much for allowing me to participate in the care of this pleasant patient , should you have any questions ,please do not hesitate to contact me      Mauricio Gaytan MD,[facility]  Pulmonary&Critical Care Medicine   Director of [address] [place] [address] Director of [address] [place] [address]    Tiffanie Mendoza    NOTE: This report was transcribed using voice recognition software. Every effort was made to ensure accuracy; however, inadvertent computerized transcription errors may be present.

## 2022-03-17 NOTE — PROGRESS NOTES
Pt has small amount of sanguineous fluid around meatus at insertion site of piedra cath.  Rajesh Ledezma RN

## 2022-03-18 NOTE — CONSULTS
Spoke with Peyton Abbott today 5:11 PM   See previous chf consult note from me     I updated it today.    He has the chf education materials  questions answered  Pending [facility] placement for rehab

## 2022-03-18 NOTE — PROGRESS NOTES
[address]  [address] [place], [place]      Date:3/18/2022                                                  Patient Name: Harry Wren  MRN: [id number]  : 1945  Room: [address] [place] [address]    Evaluating OT: BIBIANA Tejeda, OTR/L  # [id number]    Referring Provider:  Joyce Grullon MD  Specific Provider Orders:  Alray Doing and Treat\"  3-14-22    Diagnosis: Acute on chronic clinical systolic heart failure ([facility] [I50.23]    Pt was transferred from SNF w/ LE swelling, 3+ Pitting Edema, Hypotension    Pertinent Medical History:  Pt has a past medical history of Acid reflux, Agent orange exposure, Congestive heart failure (CHF) ([facility] COPD (chronic obstructive pulmonary disease) ([facility] Depression, Diabetes mellitus ([facility] Hyperlipidemia, Hypertension, MI (myocardial infarction) ([facility] Polio, and Sleep apnea. ,  has a past surgical history that includes Coronary angioplasty with stent; Cardiac pacemaker placement; and pacemaker placement.     Surgeries this admission: 3-15-22:  Thoracentesis - removal of 1000ml     Precautions:  Fall Risk  4L O2  Hypotension  Paredes Catheter    Assessment of current deficits   [x] Functional mobility   [x]ADLs  [x] Strength               []Cognition   [x] Functional transfers   [x] IADLs         [x] Safety Awareness   [x]Endurance   [] Fine Coordination              [x] Balance      [] Vision/perception   []Sensation    []Gross Motor Coordination  [] ROM  [] Delirium                   [] Motor Control       OT PLAN OF CARE   OT POC based on physician orders, patient diagnosis and results of clinical assessment    Frequency/Duration 1-3 days/wk for 2 weeks PRN   Specific OT Treatment to include:   * Instruction/training on adapted ADL techniques and AE recommendations to increase functional independence within precautions       * Training on energy conservation strategies, correct breathing pattern and techniques to improve independence/tolerance for self-care routine  * Functional transfer/mobility training/DME recommendations for increased independence, safety, and fall prevention  * Patient/Family education to increase follow through with safety techniques and functional independence  * Recommendation of environmental modifications for increased safety with functional transfers/mobility and ADLs  * Therapeutic exercise to improve motor endurance, ROM, and functional strength for ADLs/functional transfers  * Therapeutic activities to facilitate/challenge dynamic balance, stand tolerance for increased safety and independence with ADLs  * Therapeutic activities to facilitate gross/fine motor skills for increased independence with ADLs  * Neuro-muscular re-education: facilitation of righting/equilibrium reactions, midline orientation, scapular stability/mobility, normalization of muscle tone, and facilitation of volitional active controled movement  * Positioning to improve skin integrity, interaction with environment and functional independence  * Manual techniques for edema management  Other:    Recommended Adaptive Equipment: TBD as pt progresses       Home Living:  Pt lives alone in a 1-story house. Bed/bath on the main floor.  (+) Basement. Pt reported being hospitalized or in a SNF since 1-23-22. Currently in SNF. Bathroom setup:  Walk-in-Shower, Standard-height Commode   Equipment owned:  W/C, [address], Shower chair    Available Family Assist:  Staff assist    Prior Level of Function:  Pt reported currently requiring assist w/ all ADLs, Transfers and Mobility using [employer] vs W/C for ambulation. Little to no participation in therapy since January per pt report. Driving:  ??   Occupation:  Retired      Pain Level:  Denied pain this session    Additional Complaints:  General weakness/fatigue    Cognition: A & O x 4   Able to Follow Multi-Step Commands INDly - within physical limitations   Memory:  good    Sequencing:  good    Problem solving:  good    Judgement/safety:  good   Additional Comments:  Pt was pleasant and cooperative. Vitals/Lab Values: O2 sats remained > 94% for duration of session w/ 4L O2. Pt experiencing SOB with EOB activity, however O2 levels maintain stable. Rest: 94%, 77HR  After activity: 95%, 84HR     Functional Assessment:  AM-PAC Daily Activity Raw Score: 12/24     Initial Eval Status  Date: 3/16/22   Treatment Status  Date: 3/18/22 STGs = LTGs  Time frame: 10-14 days   Feeding IND after set up    High kan position   NT  Prior session     IND after set up    High kan position NA   Grooming SUP/Set up    Able to complete all tasks w/ SUP for safety seated EOB  Unable to stand or transfer to chair for ax   Sup/Set up    Patient with increased SOB while seated at EOB for self-care. SUP/Set up  Seated/Standing at the [address] A/Set up    Donning/doffing gown seated EOB   NT    Patient declining. Set up     LB Dressing Dep    Max A to don socks despite pt ed/demo for use of adaptive tech seated EOB  Max A of 2 for simulated task of donning pants in supine, unable to stand for task w/ Max A of 1   Max A overall    Patient donning pants at EOB with LB AE with Min A to pull up to legs. Pt unable to stand to complete task. Pt resistant to dressing tasks this date stating his wife with assist him with the task. Max A     Bathing Dep    UB - Min A seated EOB  LB - Max A of 2 for task and bed mobility in supine   Dep     Max A      Toileting Dep    Paredes Catheter  Max A of 1 for bowel hygiene + Max A of 2 for simulated clothing adjustment bed level   Dep    Patient unable to stand for toileting tasks, returned to supine, rolling for bed pan use with Min A. Simulated Dep for hygiene. Max A     Bed Mobility  Rolling to facilitate Functional ax: Max A  Repositioning to facilitate bed-level ax: Max A    Supine to sit:  Max A   Sit to supine:  Max A     VCs for safety, hand placement, use of Side-rails   Rolling L/R: Min A  Sup>sit: Max A  Sit>sup: Max A  With use of bed rails and fair demo of proper body mechanics. Verbal cues for body mechanics and hand placement. Supine to sit: Mod A  Sit to supine: Mod A     Functional Transfers Dep    Unable to stand from EOB w/ Max A of 1  VCs for safety/hand placement   Max A x2 with FWW  For sit<>stand from elevated bed surface, patient unable to complete full stand from EOB. EOB Scooting: Mod A Max A     Functional Mobility NT NT  Patient unable to tolerate. Max A     Balance Sitting:     Static:  Remote SUP EOB    Dynamic:  Close SUP w/ functional ax EOB     Standing:     Static:  NT    Dynamic:  NT   Sitting:     Static:  Remote SUP EOB  Dynamic:  Close SUP while seated at EOB     Standing:     Static:  NT    Dynamic:  NT Sitting:     Static:  IND    Dynamic:  IND w/ functional ax    Standing:     Static:  Max A w/ AD PRN    Dynamic:  Max A w/ functional ax/mobility w/ AD PRN   Activity Tolerance Fair(-)    Limited by general weakness, fatigue, habitus, severity of edema   Fair-    Limited by decreased endurance, strength, habitus, and edema. Fair   Visual/  Perceptual    Hearing: WNL   Glasses: No    WFL   Hearing Aids:  No               Hand Dominance: Right   AROM Strength Additional Info:    RUE  WFL 4+/5 Good ;   Good FMC/dexterity noted during ADL tasks     LUE WFL 4+/5 Good ;    Good FMC/dexterity noted during ADL tasks       Sensation:  Denies numbness or tingling Damián UEs   Tone: WFL Damián UEs   Edema: None Noted Damián UEs     Comments: Upon arrival, patient was found in supine. He was agreeable to participate in OT. No Family present during session. Received permission from RN prior to engaging pt in OT services. Educated pt on role of OT services.       At the end of the session, patient was properly positioned in High kan position using bed pan, nursing notified of patient position. Call light and phone within reach, all lines and tubes intact. Oriented pt to call bell. Made all appropriate Environmental Modifications to facilitate pt's level of IND and safety. All needs met. Overall patient demonstrated decreased independence and safety during completion of ADL/functional transfer/mobility tasks. Pt would benefit from continued skilled OT to increase safety and independence with completion of ADL/IADL tasks for functional independence and quality of life. Treatment: OT treatment provided this date includes:    Instruction/training on safety and adapted techniques for completion of ADLs, use of DME/AD/Adaptive equip: to maximize independence and safety in self-care.   Instruction/training on safe functional mobility/transfer techniques, use of DME/AD: to improve body mechanics for bed mobility, improve endurance and increase ease of task.   Instruction/training on energy conservation techs (EC)/Pursed-Lip Breathing (PLB)/work simplification for completion of ADLs:     Activity tolerance - Sitting tolerance to improve endurance for self-care and mobility.  Skilled monitoring of Vitals during session and pt's response: including spO2.  Consulted RN     Made all appropriate Environmental Modifications to facilitate pt's level of IND and safety.       Time In: 1:55 PM  Time Out: 2:24 PM  Total Treatment Time: 29 minutes    Min Units   OT Eval Low [id number]       OT Eval Medium 67134      OT Eval High [id number]      OT Re-Eval [id number]       Therapeutic Ex [id number]       Therapeutic Activities 41236       ADL/Self Care 30434  29  2   Orthotic Management 51503       Manual [id number]     Neuro Re-Ed 86161       Non-Billable Time            Steph Cunha OTD, OTR/L, [id number]

## 2022-03-18 NOTE — CARE COORDINATION
Insurance requesting updated therapy notes today. Therapy to see for treat today. Coverted to PO bumex yesterday. Ambulance on soft chart. Kiran aware for possible discharge today if auth obtained. For questions I can be reached at [phone].  [place], [place]

## 2022-03-18 NOTE — PLAN OF CARE
Problem: Falls - Risk of:  Goal: Will remain free from falls  Description: Will remain free from falls  3/18/2022 0156 by Kahlil Garland RN  Outcome: Met This Shift  3/17/2022 1611 by Tim Gibbs RN  Outcome: Met This Shift     Problem: Falls - Risk of:  Goal: Absence of physical injury  Description: Absence of physical injury  3/18/2022 0156 by Kahlil Garland RN  Outcome: Met This Shift  3/17/2022 1611 by Tim Gibbs RN  Outcome: Met This Shift

## 2022-03-18 NOTE — PROGRESS NOTES
Physical Therapy  Physical Therapy Treatment    Name: Manuel Del Rio  : 1945  MRN: [id number]      Date of Service: 3/18/2022    Evaluating PT:  Quan Ramachandran, PT, DPT [id number]    Room #:  5908/8468-U  Diagnosis:  Acute on chronic clinical systolic heart failure (HCC) [I50.23]  PMHx/PSHx:  HTN, HLD, DM, depression, acid reflux, CHF, polio, COPD, MI, coronary angioplasty with stent, cardiac pacemaker placement  Procedure/Surgery:  Ultrasound guided thoracentesis (3/15/2022)  Precautions:  Fall risk, alarm, piedra catheter, O2  Equipment Needs:  TBD  Equipment Owned: SPC, 4WW, manual wheelchair    SUBJECTIVE:    Patient from nursing facility; reports that they were at different nursing facility before most recent one. Prior to initial nursing facility, patient lived alone in a 1 story home with 3 stair(s) to enter and 0 rail(s) or ramped entry (depending on entrance); ambulated with no AD. OBJECTIVE:   Initial Evaluation  Date: 3/16/2022 Treatment  3/18/2022 Short Term/ Long Term   Goals   AM-PAC 6 Clicks     Was pt agreeable to Eval/treatment? Yes Yes    Does pt have pain? Mild B toes No    Bed Mobility  Rolling: ModA  Supine to sit: ModA (with HOB elevated)  Sit to supine: ModA (with HOB elevated)  Scooting: ModA (seated) Rolling: Mariela  Supine to sit: MaxA x2  Sit to supine: MaxA  Scooting: ModA (seated) Rolling: Independent  Supine to sit:  Independent  Sit to supine: Independent  Scooting: Independent   Transfers Sit to stand: Attempted; unable to perform with [employer] MaxA  Stand to sit: MaxA with [employer] (partial stand)  Stand pivot: NT Sit to stand: Attempted; unable to perform with [employer] MaxA x2  Stand to sit: MaxA x2 with [employer] (partial stand)  Stand pivot: NT Sit to stand: Lissette  Stand to sit: Lissette  Stand pivot: Lissette with AAD   Ambulation    NT NT 50 feet with AAD Lissette   Stair negotiation: ascended and descended  NT NT TBD   ROM BUE:  See OT note  BLE:  WFL     Strength BUE:  See OT note  BLE:  Grossly 55 Balance Sitting EOB:  SBA  Dynamic Standing:  NT Sitting EOB:  SBA  Dynamic Standing:  NT Sitting EOB:  Independent  Dynamic Standing:  Lissette with AAD     Pt is A & O x 4  Sensation:  No reports of numbness/tingling to extremities  Edema:  Unremarkable    Vitals:   HR 77, SpO2 94% at rest.  HR 80, SpO2 95% during activity. Patient education  Pt educated on safety during functional mobility. Patient response to education:   Pt verbalized understanding Pt demonstrated skill Pt requires further education in this area   Yes Yes Yes     ASSESSMENT:    Comments:  Session cleared by nursing. Patient in semi-Gan's position upon arrival; agreeable to PT session. Required increased time, assistance of trunk and BLE to perform supine <> sit. Tolerated sitting EOB for extended period of time while interdisciplinary care was provided. Reported need to have bowel movement. Attempted sit to stand transfer; unable to perform complete stand. Patient assisted to supine. Performed rolling in order for bed pan to be placed. Vitals monitored and noted. Reported chest heaviness, mild shortness of breath, mild lightheadedness; nursing notified. Patient left in semi-Gan's position with bed pan in place and call light in reach. Nursing notified. Treatment:  Patient practiced and was instructed in the following treatment:     Bed mobility - verbal cues to facilitate proper positioning and sequencing; physical assistance provided during activity   Static sitting - performed to promote upright tolerance, postural awareness, and balance maintenance   Functional transfers - verbal cues to facilitate proper positioning and sequencing, particularly related to hand/foot placement; physical assistance provided during activity    PLAN:    Patient is making good progress towards established goals. Will continue with current POC.       Time in  1400  Time out  1425    Total Treatment Time  25 minutes     CPT codes:  [] Gait training 68869 0 minutes  [] Manual therapy 23659 0 minutes  [x] Therapeutic activities 43142 25 minutes  [] Therapeutic exercises 80379 0 minutes  [] Neuromuscular reeducation 06981 0 minutes    Samira Iqbal, PT, DPT  [id number]

## 2022-03-18 NOTE — PROGRESS NOTES
Progress Note  Date:3/18/2022       Room:[facility]  Patient Lynn Munoz     YOB: 1945     Age:77 y.o. Patient says breathing is improved today. Subjective    Subjective:  Symptoms:  Improved. He reports shortness of breath. No chest pain. Diet:  Adequate intake. Activity level: Impaired due to weakness. Pain:  He reports no pain. Review of Systems   Constitutional: Negative for activity change and fever. HENT: Negative for congestion. Respiratory: Positive for shortness of breath. Cardiovascular: Positive for leg swelling. Negative for chest pain. Gastrointestinal: Negative for abdominal pain. Neurological: Negative for dizziness. Psychiatric/Behavioral: Negative for agitation. Objective         Vitals Last 24 Hours:  TEMPERATURE:  Temp  Av.5 °F (36.4 °C)  Min: 97.3 °F (36.3 °C)  Max: 97.7 °F (36.5 °C)  RESPIRATIONS RANGE: Resp  Av  Min: 18  Max: 20  PULSE OXIMETRY RANGE: SpO2  Av %  Min: 95 %  Max: 95 %  PULSE RANGE: Pulse  Av  Min: 71  Max: 125  BLOOD PRESSURE RANGE: Systolic (18VOR), FB , Min:95 , FAX:066   ; Diastolic (18MDE), NJA:50, Min:62, Max:68    I/O (24Hr): Intake/Output Summary (Last 24 hours) at 3/18/2022 0706  Last data filed at 3/18/2022 0551  Gross per 24 hour   Intake 600 ml   Output 1300 ml   Net -700 ml     Objective:  General Appearance:  Comfortable. Vital signs: (most recent): Blood pressure 118/62, pulse 125, temperature 97.7 °F (36.5 °C), temperature source Oral, resp. rate 20, height 5' 9\" (1.753 m), weight 271 lb 13.2 oz (123.3 kg), SpO2 95 %. No fever. Lungs:  Normal effort and normal respiratory rate. Breath sounds clear to auscultation. Heart: Normal rate. Regular rhythm. S1 normal and S2 normal.    Extremities: There is local swelling.       Labs/Imaging/Diagnostics    Labs:  CBC:  Recent Labs     22  0457 22  0417 22  0421   WBC 13.8* 15.1* 13.6*   RBC 3.70* 3.97 3.91   HGB 10.6* 11.3* 11.1*   HCT 33.7* 35.8* 36.0*   MCV 91.1 90.2 92.1   RDW 16.5* 16.9* 17.1*    446 441     CHEMISTRIES:  Recent Labs     22  0457 22  0457 22  0417 22  0421     --  138 144   K 3.3*  3.3*   < > 3.8  3.8 4.1   CL 95*  --  96* 99   CO2 31*  --  27 32*   BUN 88*  --  83* 78*   CREATININE 3.2*  --  3.0* 3.1*   GLUCOSE 102*  --  86 77   MG 2.5  --   --   --     < > = values in this interval not displayed. PT/INR:No results for input(s): PROTIME, INR in the last 72 hours. APTT:No results for input(s): APTT in the last 72 hours. LIVER PROFILE:  No results for input(s): AST, ALT, BILIDIR, BILITOT, ALKPHOS in the last 72 hours. Imaging Last 24 Hours:  XR CHEST PORTABLE    Result Date: 2022  EXAMINATION: ONE XRAY VIEW OF THE CHEST 2022 4:49 pm COMPARISON: 2022 HISTORY: ORDERING SYSTEM PROVIDED HISTORY: shortness of breath TECHNOLOGIST PROVIDED HISTORY: Reason for exam:->shortness of breath What reading provider will be dictating this exam?->CRC FINDINGS: There is a large opacity seen within the right lung base. There is a small right pleural effusion. The left upper lobe is clear. There is a small left pleural effusion. The cardiac silhouette is within normals. There is a dual lead cardiac pacer on the left. 1. Large opacity within the right lung base which could represent pneumonia and or atelectasis. 2. Moderate size right pleural effusion. 3. Small left pleural effusion. 4. CT of the thorax is recommended for further evaluation.      US DUP LOWER EXTREMITIES BILATERAL VENOUS    Result Date: 2022  Patient MRN:  [id number] : 1945 Age: 65 years Gender: Male Order Date:  2022 5:28 PM EXAM: US DUP LOWER EXTREMITIES BILATERAL VENOUS NUMBER OF IMAGES:  52 INDICATION:  leg swelling leg swelling What reading provider will be dictating this exam?->[facility] COMPARISON: None Within the visualized vessels, there is no evidence for deep venous thrombosis There is good compressibility, there is good augmentation, there is good color flow. Within the visualized vessels there is no evidence for deep venous thrombosis     Assessment//Plan           Hospital Problems           Last Modified POA    * (Principal) Acute on chronic clinical systolic heart failure ([address] 3/14/2022 Yes        Assessment:  (   (Principal) Acute on chronic clinical systolic heart failure ([address] 3/14/2022 Yes     Acute on chronic hypoxic respiratory failure. CHF  Pleural effusion  Acute renal insfficnecy  DM  COPD  HTN  Hyperlipidemia       ). Plan:   (RISHI singer  Had thoracentesis   Monitor labs and output. Continue meds. ).

## 2022-03-18 NOTE — PROGRESS NOTES
PROGRESS NOTE     CARDIOLOGY    Chief complaint: Seen today for follow up, management & recommendations for hypervolemia    He is not a great historian. However, he was reclining in bed finishing breakfast.  He was comfortable and in no distress. He denies chest discomfort or shortness of breath today. Wt Readings from Last 3 Encounters:   03/14/22 271 lb 13.2 oz (123.3 kg)   03/10/22 264 lb 1.8 oz (119.8 kg)   02/16/22 278 lb (126.1 kg)     Temp Readings from Last 3 Encounters:   03/17/22 97.7 °F (36.5 °C) (Oral)   03/10/22 97.3 °F (36.3 °C) (Infrared)   02/02/22 97.3 °F (36.3 °C) (Oral)     BP Readings from Last 3 Encounters:   03/17/22 118/62   03/10/22 105/71   02/16/22 128/88     Pulse Readings from Last 3 Encounters:   03/17/22 125   03/10/22 96   02/16/22 98         Intake/Output Summary (Last 24 hours) at 3/18/2022 0816  Last data filed at 3/18/2022 0551  Gross per 24 hour   Intake 600 ml   Output 1300 ml   Net -700 ml       Recent Labs     03/16/22 0457 03/17/22 0417 03/18/22  0421   WBC 13.8* 15.1* 13.6*   HGB 10.6* 11.3* 11.1*   HCT 33.7* 35.8* 36.0*   MCV 91.1 90.2 92.1    446 441     Recent Labs     03/16/22 0457 03/16/22 0457 03/17/22 0417 03/18/22  0421     --  138 144   K 3.3*  3.3*   < > 3.8  3.8 4.1   CL 95*  --  96* 99   CO2 31*  --  27 32*   BUN 88*  --  83* 78*   CREATININE 3.2*  --  3.0* 3.1*   MG 2.5  --   --   --     < > = values in this interval not displayed. No results for input(s): PROTIME, INR in the last 72 hours. No results for input(s): CKTOTAL, CKMB, CKMBINDEX, TROPONINI in the last 72 hours. No results for input(s): BNP in the last 72 hours. No results for input(s): CHOL, HDL, TRIG in the last 72 hours. Invalid input(s): CHOLHDLR, LDLCALCU  No results for input(s): TROPHS in the last 72 hours.       bumetanide (BUMEX) tablet 2 mg, Daily  perflutren lipid microspheres (DEFINITY) injection 1.65 mg, ONCE PRN  midodrine (PROAMATINE) tablet 10 mg, TID WC  cefTRIAXone (ROCEPHIN) 1,000 mg in sterile water 10 mL IV syringe, Q24H  acetaminophen (TYLENOL) tablet 650 mg, Q4H PRN  aspirin EC tablet 81 mg, Daily  atorvastatin (LIPITOR) tablet 40 mg, Nightly  buPROPion (WELLBUTRIN XL) extended release tablet 300 mg, QAM  vitamin B-12 (CYANOCOBALAMIN) tablet 1,000 mcg, Daily  guaiFENesin-dextromethorphan (ROBITUSSIN DM) 100-10 MG/5ML syrup 5 mL, Q4H PRN  ezetimibe (ZETIA) tablet 10 mg, Nightly  folic acid (FOLVITE) tablet 1 mg, Daily  guaiFENesin tablet 400 mg, 4x Daily PRN  insulin glargine-yfgn (SEMGLEE-YFGN) injection vial 50 Units, Nightly  ipratropium-albuterol (DUONEB) nebulizer solution 3 mL, Q4H PRN  loperamide (IMODIUM) capsule 2 mg, 4x Daily PRN  magnesium hydroxide (MILK OF MAGNESIA) 400 MG/5ML suspension 30 mL, Daily PRN  melatonin tablet 3 mg, Nightly  metoprolol succinate (TOPROL XL) extended release tablet 25 mg, BID  pantoprazole (PROTONIX) tablet 40 mg, QAM AC  potassium chloride (KLOR-CON M) extended release tablet 20 mEq, Daily  ascorbic acid (VITAMIN C) tablet 500 mg, BID  vitamin D (CHOLECALCIFEROL) tablet 2,000 Units, Daily  zinc sulfate (ZINCATE) capsule 50 mg, Daily  insulin lispro (HUMALOG) injection vial 0-6 Units, TID WC  insulin lispro (HUMALOG) injection vial 0-3 Units, Nightly  glucose (GLUTOSE) 40 % oral gel 15 g, PRN  dextrose 50 % IV solution, PRN  glucagon (rDNA) injection 1 mg, PRN  dextrose 5 % solution, PRN        Review of systems:     Heart: as above   Lungs: as above   Eyes: denies changes in vision or discharge. Ears: denies changes in hearing or pain. Nose: denies epistaxis or masses   Throat: denies sore throat or trouble swallowing. Neuro: denies numbness, tingling, tremors. Skin: denies rashes or itching. : denies hematuria, dysuria   GI: denies vomiting, diarrhea   Psych: denies mood changed, anxiety, depression. Physical exam:    Constitutional: A&O x3, communicates well, no acute distress.   Eyes: extraocular muscles intact, PERRL. Normal lids & conjunctiva. No icterus. ENT: clear, no bleeding. No external masses. Lips normal formation. Neck: supple, full ROM, no JVD, no bruits, no lymphadenopathy. No masses. trachea midline. Heart: Distant. Regular rate & rhythm, normal S1 & S2, no abnormal murmurs. No heave. Lungs: Poor air movement. CTA. No accessory muscles. Abd: Soft, non-tender. Normal bowel sounds. Morbidly obese. Neuro: Full ROM X 4, EOMI, no tremors. EXT: Moderate bilateral lower extremity edema  Skin: warm, dry, intact. Good turgor. Psych: A&O x 3, normal behavior, not anxious. Assessment/Recommendations  1. Normal LV systolic function, normal left atrial size, normal RV systolic pressure indicating CHF is not the cause of his hypervolemia. 2. Acute on chronic renal insufficiency. Creatinine is remaining stable on the Bumex drip. 3. Hypervolemia appears to be due to the renal insufficiency. Slowly improving. 4. Reported pulse of 125 at 2134 last night. However, in review of telemetry with the telemetry tech, he was in sinus rhythm all through the night. No dysrhythmias. No abnormal tachycardia. 5. I am concerned with his creatinine level and his low blood pressure that he may develop a pericardial effusion. Echo is still pending  6. Hypertension (now low but slowly improving)  7. Diabetes  8. Oxygen requiring COPD  9. Exposure to agent orange  10. Sleep apnea. He does use his CPAP. 11. Pacemaker. 12. Coronary artery disease. No symptoms this admission. 13. Lymphoma. 14. Peptic ulcer disease with GI bleed requiring the discontinuance of his aspirin. 15. Chronically elevated BNP. 16. Chronically elevated troponin. 17. Obstructive uropathy. Note: This report was completed using computerized voice recognition software. Every effort has been made to ensure accuracy, however; and invert and computerized transcription errors may be present.

## 2022-03-18 NOTE — PROGRESS NOTES
Nephrology Progress Note  Patient's Name: Marian Koch  9:38 AM  3/18/2022        Reason for Consult:  CKD/KELSEY  Requesting Physician:  Brayden Bridges MD    Chief Complaint:  SOB, hypoxia  History Obtained From:  patient and EHR    History of Present Ilness:    Marian Koch is a 65 y.o. male with a history of chronic kidney disease stage IV (recent recent baseline creatinine 2.5-3), type 2 diabetes mellitus, COPD, chronic HFpEF H/O pacemaker and several other medical problems listed below who presented from [facility] with complaints of shortness of breath and hypoxia. According to patient he developed shortness of breath and hypoxia at his facility. It occurred while he was at rest.  There was no associated chest pain. On presentation to ED his initial vitals were blood pressure of 103/63, respiration 20, oxygen saturation 97% on 6 L nasal cannula. Laboratory data was significant for high-sensitivity troponin of 38, WBC 17.3, hemoglobin 11.2, BUN 84 and a creatinine of 3.2. A CTA of the chest showed no pulmonary embolism. Demonstrated large bilateral pleural effusions however. Patient is on chronic oxygen therapy at his facility AT 3-4L/min  He was recently discharged from this hospital 5 days ago after an admission for acute exacerbation of CHF. He was inpatient for a little over 2 weeks. Creatinine at discharge was 2.9 mg/dL. Subjective    3/16: Right-sided thoracentesis performed yesterday; he reports feeling less dyspneic    3/17: Some difficulty with insertion of Paredes catheter earlier today; denies shortness of breath     3/18: Paredes catheter insertion yesterday without difficulty. He denies any other complaints. No shortness of breath.     Allergies:  Penicillins    Current Medications:    bumetanide (BUMEX) tablet 2 mg, Daily  perflutren lipid microspheres (DEFINITY) injection 1.65 mg, ONCE PRN  midodrine (PROAMATINE) tablet 10 mg, TID WC  cefTRIAXone (ROCEPHIN) 1,000 mg in sterile water 10 mL IV syringe, Q24H  acetaminophen (TYLENOL) tablet 650 mg, Q4H PRN  aspirin EC tablet 81 mg, Daily  atorvastatin (LIPITOR) tablet 40 mg, Nightly  buPROPion (WELLBUTRIN XL) extended release tablet 300 mg, QAM  vitamin B-12 (CYANOCOBALAMIN) tablet 1,000 mcg, Daily  guaiFENesin-dextromethorphan (ROBITUSSIN DM) 100-10 MG/5ML syrup 5 mL, Q4H PRN  ezetimibe (ZETIA) tablet 10 mg, Nightly  folic acid (FOLVITE) tablet 1 mg, Daily  guaiFENesin tablet 400 mg, 4x Daily PRN  insulin glargine-yfgn (SEMGLEE-YFGN) injection vial 50 Units, Nightly  ipratropium-albuterol (DUONEB) nebulizer solution 3 mL, Q4H PRN  loperamide (IMODIUM) capsule 2 mg, 4x Daily PRN  magnesium hydroxide (MILK OF MAGNESIA) 400 MG/5ML suspension 30 mL, Daily PRN  melatonin tablet 3 mg, Nightly  metoprolol succinate (TOPROL XL) extended release tablet 25 mg, BID  pantoprazole (PROTONIX) tablet 40 mg, QAM AC  potassium chloride (KLOR-CON M) extended release tablet 20 mEq, Daily  ascorbic acid (VITAMIN C) tablet 500 mg, BID  vitamin D (CHOLECALCIFEROL) tablet 2,000 Units, Daily  zinc sulfate (ZINCATE) capsule 50 mg, Daily  insulin lispro (HUMALOG) injection vial 0-6 Units, TID WC  insulin lispro (HUMALOG) injection vial 0-3 Units, Nightly  glucose (GLUTOSE) 40 % oral gel 15 g, PRN  dextrose 50 % IV solution, PRN  glucagon (rDNA) injection 1 mg, PRN  dextrose 5 % solution, PRN        Review of Systems:   Pertinent items are noted in HPI. Physical exam:  Vitals:    03/18/22 0851   BP: 99/66   Pulse: 76   Resp: 19   Temp: 97.5 °F (36.4 °C)   SpO2: 95%          General: No distress. Alert. Eyes: PERRL. No sclera icterus. No conjunctival injection. ENT: No discharge. Pharynx clear. Neck: Trachea midline. Normal thyroid. Lungs: No accessory muscle use. No crackles. No wheezing. Decreased breath sounds in both bases  CV: Regular rate. Regular rhythm. No murmur or rub. .   Abd: Non-tender. Non-distended. No masses. No organmegaly.  Normal bowel sounds. Skin: Warm and dry. No nodule on exposed extremities. No rash on exposed extremities. Ext: No cyanosis, clubbing, 2+ pitting bilateral leg edema   Neuro: Awake. Follows commands. Positive pupils/gag/corneals. Normal pain response. Data:   Labs:  Lab Results   Component Value Date     03/18/2022     03/17/2022     03/16/2022    K 4.1 03/18/2022    K 3.8 03/17/2022    K 3.8 03/17/2022    CL 99 03/18/2022    CO2 32 (H) 03/18/2022    CO2 27 03/17/2022    CO2 31 (H) 03/16/2022    CREATININE 3.1 (H) 03/18/2022    CREATININE 3.0 (H) 03/17/2022    CREATININE 3.2 (H) 03/16/2022    BUN 78 (H) 03/18/2022    BUN 83 (H) 03/17/2022    BUN 88 (H) 03/16/2022    GLUCOSE 77 03/18/2022    GLUCOSE 86 03/17/2022    GLUCOSE 102 (H) 03/16/2022    PHOS 4.4 02/27/2022    PHOS 4.5 01/31/2022    PHOS 4.1 01/30/2022    WBC 13.6 (H) 03/18/2022    WBC 15.1 (H) 03/17/2022    WBC 13.8 (H) 03/16/2022    HGB 11.1 (L) 03/18/2022    HGB 11.3 (L) 03/17/2022    HGB 10.6 (L) 03/16/2022    HCT 36.0 (L) 03/18/2022    HCT 35.8 (L) 03/17/2022    HCT 33.7 (L) 03/16/2022    MCV 92.1 03/18/2022     03/18/2022         Imaging:  XR CHEST PORTABLE    Result Date: 3/14/2022  EXAMINATION: ONE XRAY VIEW OF THE CHEST 3/14/2022 12:16 pm COMPARISON: 03/03/2022 and 02/28/2022 HISTORY: ORDERING SYSTEM PROVIDED HISTORY: shortness of breath TECHNOLOGIST PROVIDED HISTORY: Reason for exam:->shortness of breath What reading provider will be dictating this exam?->CRC FINDINGS: The cardiac silhouette is within normals. Bilateral lower lobe airspace disease is noted favored to represent atelectasis. There is a moderate right pleural effusion and small left pleural effusion. 1. Moderate right pleural effusion and small left pleural effusion 2. Bibasilar airspace disease favored to represent atelectasis secondary to the pleural effusions. .     CTA PULMONARY W CONTRAST    Result Date: 3/14/2022  EXAMINATION: CTA OF THE CHEST 3/14/2022 3:28 pm TECHNIQUE: CTA of the chest was performed after the administration of intravenous contrast.  Multiplanar reformatted images are provided for review. MIP images are provided for review. Dose modulation, iterative reconstruction, and/or weight based adjustment of the mA/kV was utilized to reduce the radiation dose to as low as reasonably achievable. COMPARISON: None. HISTORY: ORDERING SYSTEM PROVIDED HISTORY: elevated dimer TECHNOLOGIST PROVIDED HISTORY: Reason for exam:->elevated dimer Decision Support Exception - unselect if not a suspected or confirmed emergency medical condition->Emergency Medical Condition (MA) What reading provider will be dictating this exam?->CRC FINDINGS: CTA chest exam is technically satisfactory. Pulmonary Arteries: Normal caliber. No PE. Lungs and pleura: Bilateral large pleural effusions with secondary bilateral passive atelectasis. Right middle lobe 2.6 cm thin walled cyst or bulla. No central obstructing lesion identified. Heart and mediastinum: Normal heart size. Coronary artery calcifications. Left subclavian transvenous pacemaker with right atrial and right ventricular leads. Trace pericardial effusion. No lymphadenopathy. The thoracic aorta is atherosclerotic and normal in caliber. Upper abdomen: Limited visualization. Hepatic cirrhosis. Mild splenomegaly. Small ascites. Bones: No acute osseous abnormality. Chronic appearing Schmorl's node and concave compression deformity of the T3 superior endplate. 1.  No PE. 2.  Bilateral large pleural effusions with secondary bilateral passive atelectasis. 3.  Hepatic cirrhosis. Mild splenomegaly. Small ascites. Assessment/Plans    1. Chronic kidney disease stage IV due to diabetes mellitus and hypertension. Current creatinine at his usual baseline  No aggressive work-up  Monitor in view of receiving IV contrast      2.   Acute on chronic HFpEF  Transition to oral diuretics    Will increase fluids relative to admission dose; he failed previous outpatient diureetic therapy      3. Acute hypoxic respiratory failure due to acute decompensated CHF  Minimal oxygen requirement at present  CTA of chest showed large bilateral pleural effusion, no PE  Right-sided thoracentesis performed 3/15 with 1 L serosanguineous fluid from the  Respiratory distress improved  Pulmonary following    4. Type 2 diabetes mellitus  Continue basal bolus insulin    5. Hypertension, BP trending low at present  Start Midodrine at present  Hold parameters on metoprolol    5 Hypokalemia, diuretic induced  Improved with supplement      ok to discharge from a Renal standpoint  Voiding trial at facility once activity level improves  Discharge with Rory Yeung MD  9:38 AM  3/18/2022

## 2022-03-19 NOTE — PROGRESS NOTES
Pulmonary [facility]                             Pulmonary Consult/Progress Note :            Reason for Consultation:Pleural effusion   CC : SOB   HPI:   He states SOB has improved a lot   There is no chest pain   No fever or chills  Down to 2 L      PHYSICAL EXAMINATION:     VITAL SIGNS:  BP 87/77   Pulse 73   Temp 97.7 °F (36.5 °C) (Oral)   Resp 16   Ht 5' 9\" (1.753 m)   Wt 271 lb 13.2 oz (123.3 kg)   SpO2 95%   BMI 40.14 kg/m²   Wt Readings from Last 3 Encounters:   03/14/22 271 lb 13.2 oz (123.3 kg)   03/10/22 264 lb 1.8 oz (119.8 kg)   02/16/22 278 lb (126.1 kg)     Temp Readings from Last 3 Encounters:   03/18/22 97.7 °F (36.5 °C) (Oral)   03/10/22 97.3 °F (36.3 °C) (Infrared)   02/02/22 97.3 °F (36.3 °C) (Oral)     TMAX:  BP Readings from Last 3 Encounters:   03/18/22 87/77   03/10/22 105/71   02/16/22 128/88     Pulse Readings from Last 3 Encounters:   03/18/22 73   03/10/22 96   02/16/22 98           INTAKE/OUTPUTS:  I/O last 3 completed shifts:   In: 65 [P.O.:660]  Out: 1900 [Urine:1900]    Intake/Output Summary (Last 24 hours) at 3/18/2022 2214  Last data filed at 3/18/2022 1155  Gross per 24 hour   Intake 240 ml   Output 1750 ml   Net -1510 ml       General Appearance: alert and oriented to person, place and time, well-developed and   well-nourished, in no acute distress   Eyes: pupils equal, round, and reactive to light, extraocular eye movements intact, conjunctivae normal and sclera anicteric   Neck: neck supple and non tender without mass, no thyromegaly, no thyroid nodules and no cervical adenopathy   Pulmonary/Chest:*decrease breath sound bilateral  Cardiovascular: normal rate, regular rhythm, normal S1 and S2, no murmurs, rubs, clicks or gallops, distal pulses intact, no carotid bruits, no murmurs, no gallops, no carotid bruits and no JVD   Abdomen: obese, soft, non-tender, non-distended, normal bowel sounds, no masses or organomegaly Extremities:*edema ,no cyanosis   Musculoskeletal: normal range of motion, no joint swelling, deformity or tenderness   Neurologic: reflexes normal and symmetric, no cranial nerve deficit noted    LABS/IMAGING:    CBC:  Lab Results   Component Value Date    WBC 13.6 (H) 03/18/2022    HGB 11.1 (L) 03/18/2022    HCT 36.0 (L) 03/18/2022    MCV 92.1 03/18/2022     03/18/2022    LYMPHOPCT 16.0 (L) 03/14/2022    RBC 3.91 03/18/2022    MCH 28.4 03/18/2022    MCHC 30.8 (L) 03/18/2022    RDW 17.1 (H) 03/18/2022    NEUTOPHILPCT 68.4 03/14/2022    MONOPCT 9.5 03/14/2022    BASOPCT 0.4 03/14/2022    NEUTROABS 11.85 (H) 03/14/2022    LYMPHSABS 2.77 03/14/2022    MONOSABS 1.65 (H) 03/14/2022    EOSABS 0.30 03/14/2022    BASOSABS 0.07 03/14/2022       Recent Labs     03/18/22  0421 03/17/22 0417 03/16/22 0457   WBC 13.6* 15.1* 13.8*   HGB 11.1* 11.3* 10.6*   HCT 36.0* 35.8* 33.7*   MCV 92.1 90.2 91.1    446 415       BMP:   Recent Labs     03/16/22 0457 03/16/22 0457 03/17/22 0417 03/18/22 0421     --  138 144   K 3.3*  3.3*   < > 3.8  3.8 4.1   CL 95*  --  96* 99   CO2 31*  --  27 32*   BUN 88*  --  83* 78*   CREATININE 3.2*  --  3.0* 3.1*    < > = values in this interval not displayed. MG:   Lab Results   Component Value Date    MG 2.5 03/16/2022     Ca/Phos:   Lab Results   Component Value Date    CALCIUM 8.6 03/18/2022    PHOS 4.4 02/27/2022     Amylase: No results found for: AMYLASE  Lipase: No results found for: LIPASE  LIVER PROFILE:   No results for input(s): AST, ALT, LIPASE, BILIDIR, BILITOT, ALKPHOS in the last 72 hours. Invalid input(s): AMYLASE,  ALB    PT/INR: No results for input(s): PROTIME, INR in the last 72 hours. APTT: No results for input(s): APTT in the last 72 hours.     Cardiac Enzymes:  No results found for: CKTOTAL, CKMB, CKMBINDEX, TROPONINI                PROBLEM LIST:  Patient Active Problem List   Diagnosis    PNA (pneumonia)    Acute on chronic heart failure with preserved ejection fraction ([address]    Coronary artery disease involving native coronary artery of native heart without angina pectoris    Cardiac pacemaker in situ    Primary hypertension    SPEEDY (obstructive sleep apnea)    Chronic respiratory failure with hypoxia (HCC)    CHF (congestive heart failure), NYHA class I, acute on chronic, combined ([address]    Acute on chronic clinical systolic heart failure (HCC)               ASSESSMENT:  1.) Moderate hypoxia   2.)cardiorenal syndrome  3. )Bilateral pleural effusion ,anasarca  4.)Possible SPEEDY  5.)KELSEY on CKD      PLAN:  Continue diuresis as per primary ,renal   Pending 2 d echo ,discuss with Dr Jose Luis Machado ,r/o pericardial effusion ,no report I can see  *-patient symptoms clearly fluid overload and seems combined HFpEF along on base line CKD, worsening kindye function, also concern for pericardial effusion ,cardiology following   Fluid seems more toward transudate   *-BiPPA at night and as needed daytime  *-Diuresis as per renal   *_procalcitonin/crp mild elavation ,on abx ,on Rocephine ,possible UTI  *- incentive spirometery  Work up for pneumonia ,cover with Rocephin,strep and legionella in urine     Thank you very much for allowing me to participate in the care of this pleasant patient , should you have any questions ,please do not hesitate to contact me      Miguel Ángel Mejia  Pulmonary&Critical Care Medicine   Director of [address] [place] [address] Director of [address] [place] [address]    Rudy Medina    NOTE: This report was transcribed using voice recognition software. Every effort was made to ensure accuracy; however, inadvertent computerized transcription errors may be present.

## 2022-03-19 NOTE — PROGRESS NOTES
Chronic respiratory failure with hypoxia (HCC)    CHF (congestive heart failure), NYHA class I, acute on chronic, combined ([facility]    Acute on chronic clinical systolic heart failure (HCC)        PAST MEDICAL HISTORY:    Past Medical History:   Diagnosis Date    Acid reflux     Agent orange exposure     Congestive heart failure (CHF) (HCC)     COPD (chronic obstructive pulmonary disease) (HCC)     Depression     Diabetes mellitus (HCC)     Hyperlipidemia     Hypertension     MI (myocardial infarction) ([facility]     Polio     Sleep apnea        DIET:    ADULT DIET; Regular; 4 carb choices (60 gm/meal);  Low Sodium (2 gm)     PHYSICAL EXAM:     Patient Vitals for the past 24 hrs:   BP Temp Temp src Pulse Resp SpO2 Weight   03/19/22 0800 109/79 98.5 °F (36.9 °C) Oral 70 18 96 % --   03/19/22 0640 -- -- -- -- -- -- 262 lb 5.6 oz (119 kg)   03/19/22 0000 95/68 98.6 °F (37 °C) Oral 70 16 98 % --   03/18/22 1600 87/77 97.7 °F (36.5 °C) Oral 73 16 95 % --   03/18/22 1155 112/79 97.5 °F (36.4 °C) Oral 73 17 99 % --   @      Intake/Output Summary (Last 24 hours) at 3/19/2022 1119  Last data filed at 3/19/2022 0540  Gross per 24 hour   Intake --   Output 1500 ml   Net -1500 ml         Wt Readings from Last 3 Encounters:   03/19/22 262 lb 5.6 oz (119 kg)   03/10/22 264 lb 1.8 oz (119.8 kg)   02/16/22 278 lb (126.1 kg)       Constitutional:  Pt is in no acute distress  Head: normocephalic, atraumatic  Neck: no JVD  Cardiovascular: regular rate and rhythm, no murmurs, gallops, or rubs  Respiratory:  No rales, rhochi, or wheezes  Gastrointestinal:  Soft, nontender, nondistended, bowel sounds x 4  Ext: edema  Skin: dry, no rash  Neuro: aaox3    MEDS (scheduled):    bumetanide  2 mg Oral BID    midodrine  10 mg Oral TID WC    cefTRIAXone (ROCEPHIN) IV  1,000 mg IntraVENous Q24H    aspirin  81 mg Oral Daily    atorvastatin  40 mg Oral Nightly    buPROPion  300 mg Oral QAM    cyanocobalamin  1,000 mcg Oral Daily    ezetimibe  10 mg Oral Nightly    folic acid  1 mg Oral Daily    insulin glargine-yfgn  50 Units SubCUTAneous Nightly    melatonin  3 mg Oral Nightly    metoprolol succinate  25 mg Oral BID    pantoprazole  40 mg Oral QAM AC    potassium chloride  20 mEq Oral Daily    vitamin C  500 mg Oral BID    vitamin D  2,000 Units Oral Daily    zinc sulfate  50 mg Oral Daily    insulin lispro  0-6 Units SubCUTAneous TID WC    insulin lispro  0-3 Units SubCUTAneous Nightly       MEDS (infusions):   dextrose         MEDS (prn):  perflutren lipid microspheres, acetaminophen, guaiFENesin-dextromethorphan, guaiFENesin, ipratropium-albuterol, loperamide, magnesium hydroxide, glucose, dextrose, glucagon (rDNA), dextrose    DATA:    Recent Labs     03/17/22 0417 03/18/22 0421 03/19/22 0426   WBC 15.1* 13.6* 14.4*   HGB 11.3* 11.1* 10.7*   HCT 35.8* 36.0* 34.5*   MCV 90.2 92.1 91.3    441 462*     Recent Labs     03/17/22 0417 03/17/22 0417 03/18/22 0421 03/19/22 0426     --  144 142   K 3.8  3.8   < > 4.1 4.0  4.0   CL 96*  --  99 99   CO2 27  --  32* 29   BUN 83*  --  78* 78*   CREATININE 3.0*  --  3.1* 3.0*   LABGLOM 20  --  20 20   GLUCOSE 86  --  77 81   CALCIUM 8.7  --  8.6 8.5*    < > = values in this interval not displayed.        Lab Results   Component Value Date    LABALBU 3.4 (L) 03/14/2022    LABALBU 3.5 02/25/2022    LABALBU 3.9 01/21/2022     Lab Results   Component Value Date    TSH 2.310 01/22/2022       Iron Studies  No results found for: IRON, TIBC, FERRITIN  No results found for: MAZWWSZG25  No results found for: FOLATE    No results found for: VITD25  PTH   Date Value Ref Range Status   01/24/2022 112 (H) 15 - 65 pg/mL Final       No components found for: URIC    Lab Results   Component Value Date    COLORU Yellow 03/15/2022    NITRU Negative 03/15/2022    GLUCOSEU Negative 03/15/2022    KETUA Negative 03/15/2022    UROBILINOGEN 0.2 03/15/2022    BILIRUBINUR Negative 03/15/2022 No results found for: LIPIDPAN      IMPRESSION/RECOMMENDATIONS:      1. Chronic kidney disease stage IV  due to diabetes mellitus and hypertension  Current creatinine at his usual baseline  No aggressive work-up  Monitor in view of receiving IV contrast        2. Acute on chronic HFpEF  Transition to oral diuretics    Will increase fluids relative to admission dose; he failed previous outpatient diureetic therapy     3. Acute hypoxic respiratory failure due to acute decompensated CHF  Minimal oxygen requirement at present  CTA of chest showed large bilateral pleural effusion, no PE  Right-sided thoracentesis performed 3/15 with 1 L serosanguineous fluid from the  Respiratory distress improved  Pulmonary following     4. Type 2 diabetes mellitus  Continue basal bolus insulin     5. Hypertension, BP trending low at present  Start Midodrine at present  Hold parameters on metoprolol     5 Hypokalemia, diuretic induced  Improved with supplement   Hector Doan.  Camelia Pitts MD

## 2022-03-19 NOTE — PROGRESS NOTES
Progress Note  Date:3/19/2022       Room:[facility]  Patient Lashay Hewitt     YOB: 1945     Age:77 y.o. Patient says breathing is improved today. Subjective    Subjective:  Symptoms:  Improved. He reports shortness of breath. No chest pain. Diet:  Adequate intake. Activity level: Impaired due to weakness. Pain:  He reports no pain. Review of Systems   Constitutional: Negative for activity change and fever. HENT: Negative for congestion. Respiratory: Positive for shortness of breath. Cardiovascular: Positive for leg swelling. Negative for chest pain. Gastrointestinal: Negative for abdominal pain. Neurological: Negative for dizziness. Psychiatric/Behavioral: Negative for agitation. Objective         Vitals Last 24 Hours:  TEMPERATURE:  Temp  Av.8 °F (36.6 °C)  Min: 97.5 °F (36.4 °C)  Max: 98.6 °F (37 °C)  RESPIRATIONS RANGE: Resp  Av  Min: 16  Max: 19  PULSE OXIMETRY RANGE: SpO2  Av.8 %  Min: 95 %  Max: 99 %  PULSE RANGE: Pulse  Av  Min: 70  Max: 76  BLOOD PRESSURE RANGE: Systolic (64YVF), DEY:08 , Min:87 , GHX:304   ; Diastolic (88JYO), CPZ:06, Min:66, Max:79    I/O (24Hr): Intake/Output Summary (Last 24 hours) at 3/19/2022 0731  Last data filed at 3/19/2022 0540  Gross per 24 hour   Intake 60 ml   Output 1500 ml   Net -1440 ml     Objective:  General Appearance:  Comfortable. Vital signs: (most recent): Blood pressure 95/68, pulse 70, temperature 98.6 °F (37 °C), temperature source Oral, resp. rate 16, height 5' 9\" (1.753 m), weight 262 lb 5.6 oz (119 kg), SpO2 98 %. No fever. Lungs:  Normal effort and normal respiratory rate. Breath sounds clear to auscultation. Heart: Normal rate. Regular rhythm. S1 normal and S2 normal.    Extremities: There is local swelling.       Labs/Imaging/Diagnostics    Labs:  CBC:  Recent Labs     22  0417 22  0421 22  0426   WBC 15.1* 13.6* 14.4*   RBC 3.97 3.91 3.78* HGB 11.3* 11.1* 10.7*   HCT 35.8* 36.0* 34.5*   MCV 90.2 92.1 91.3   RDW 16.9* 17.1* 17.2*    441 462*     CHEMISTRIES:  Recent Labs     227 22  0421 22  0426     --  144 142   K 3.8  3.8   < > 4.1 4.0  4.0   CL 96*  --  99 99   CO2 27  --  32* 29   BUN 83*  --  78* 78*   CREATININE 3.0*  --  3.1* 3.0*   GLUCOSE 86  --  77 81    < > = values in this interval not displayed. PT/INR:No results for input(s): PROTIME, INR in the last 72 hours. APTT:No results for input(s): APTT in the last 72 hours. LIVER PROFILE:  No results for input(s): AST, ALT, BILIDIR, BILITOT, ALKPHOS in the last 72 hours. Imaging Last 24 Hours:  XR CHEST PORTABLE    Result Date: 2022  EXAMINATION: ONE XRAY VIEW OF THE CHEST 2022 4:49 pm COMPARISON: 2022 HISTORY: ORDERING SYSTEM PROVIDED HISTORY: shortness of breath TECHNOLOGIST PROVIDED HISTORY: Reason for exam:->shortness of breath What reading provider will be dictating this exam?->CRC FINDINGS: There is a large opacity seen within the right lung base. There is a small right pleural effusion. The left upper lobe is clear. There is a small left pleural effusion. The cardiac silhouette is within normals. There is a dual lead cardiac pacer on the left. 1. Large opacity within the right lung base which could represent pneumonia and or atelectasis. 2. Moderate size right pleural effusion. 3. Small left pleural effusion. 4. CT of the thorax is recommended for further evaluation.      US DUP LOWER EXTREMITIES BILATERAL VENOUS    Result Date: 2022  Patient MRN:  [id number] : 1945 Age: 65 years Gender: Male Order Date:  2022 5:28 PM EXAM: US DUP LOWER EXTREMITIES BILATERAL VENOUS NUMBER OF IMAGES:  52 INDICATION:  leg swelling leg swelling What reading provider will be dictating this exam?->[facility] COMPARISON: None Within the visualized vessels, there is no evidence for deep venous thrombosis There is good compressibility, there is good augmentation, there is good color flow. Within the visualized vessels there is no evidence for deep venous thrombosis     Assessment//Plan           Hospital Problems           Last Modified POA    * (Principal) Acute on chronic clinical systolic heart failure ([address] 3/14/2022 Yes        Assessment:  (   (Principal) Acute on chronic clinical systolic heart failure ([address] 3/14/2022 Yes     Acute on chronic hypoxic respiratory failure. CHF  Pleural effusion  Acute renal insfficnecy  DM  COPD  HTN  Hyperlipidemia       ). Plan:   (IV enmanuel  Had thoracentesis   Monitor labs and output. Continue meds. ).

## 2022-03-19 NOTE — PROGRESS NOTES
INPATIENT CARDIOLOGY FOLLOW-UP    Name: Jennifer Garcia    Age: 65 y.o. Date of Admission: 3/14/2022 11:15 AM    Date of Service: 3/19/2022    Chief Complaint: Follow-up for acute superimposed upon chronic diastolic heart failure, coronary atherosclerosis, hypertension with present relative hypotension, chronic obstructive lung disease with associated chronic hypoxic respiratory failure, acute kidney injury superimposed upon chronic kidney disease, morbid obesity, obstructive sleep apnea    Interim History: The patient relates no significant cardiovascular complaints with persistent intermittent dyspnea and persistent evidence of volume overload in spite of present fluid mobilization. A persistent prerenal azotemia is present with stable serum creatinine levels. Persistent relative hypotension is noted. Review of Systems: The remainder of a complete multisystem review including consitutional, central nervous, respiratory, circulatory, gastrointestinal, genitourinary, endocrinologic, hematologic, musculoskeletal and psychiatric are negative. Problem List:  Patient Active Problem List   Diagnosis    PNA (pneumonia)    Acute on chronic heart failure with preserved ejection fraction ([address]    Coronary artery disease involving native coronary artery of native heart without angina pectoris    Cardiac pacemaker in situ    Primary hypertension    SPEEDY (obstructive sleep apnea)    Chronic respiratory failure with hypoxia (HCC)    CHF (congestive heart failure), NYHA class I, acute on chronic, combined ([address]    Acute on chronic clinical systolic heart failure (HCC)       Allergies:   Allergies   Allergen Reactions    Penicillins Anaphylaxis       Current Medications:  Current Facility-Administered Medications   Medication Dose Route Frequency Provider Last Rate Last Admin    bumetanide (BUMEX) tablet 2 mg  2 mg Oral BID Raymond Benton MD   2 mg at 03/18/22 2050    perflutren lipid microspheres (DEFINITY) injection 1.65 mg  1.5 mL IntraVENous ONCE PRN Arnol Healy DO        midodrine (PROAMATINE) tablet 10 mg  10 mg Oral TID WC Michelle Benton MD   10 mg at 03/18/22 1839    cefTRIAXone (ROCEPHIN) 1,000 mg in sterile water 10 mL IV syringe  1,000 mg IntraVENous Q24H Mauricio Gaytan MD   1,000 mg at 03/18/22 2050    acetaminophen (TYLENOL) tablet 650 mg  650 mg Oral Q4H PRN Dieter James MD        aspirin EC tablet 81 mg  81 mg Oral Daily Dieter James MD   81 mg at 03/18/22 1057    atorvastatin (LIPITOR) tablet 40 mg  40 mg Oral Nightly Dieter James MD   40 mg at 03/18/22 2049    buPROPion (WELLBUTRIN XL) extended release tablet 300 mg  300 mg Oral QAM Dieter James MD   300 mg at 03/18/22 1057    vitamin B-12 (CYANOCOBALAMIN) tablet 1,000 mcg  1,000 mcg Oral Daily Dieter James MD   1,000 mcg at 03/18/22 1057    guaiFENesin-dextromethorphan (ROBITUSSIN DM) 100-10 MG/5ML syrup 5 mL  5 mL Oral Q4H PRN Dieter James MD        ezetimibe (ZETIA) tablet 10 mg  10 mg Oral Nightly Dieter James MD   10 mg at 10/06/17 6235    folic acid (FOLVITE) tablet 1 mg  1 mg Oral Daily Dieter James MD   1 mg at 03/18/22 1057    guaiFENesin tablet 400 mg  400 mg Oral 4x Daily PRN Dieter James MD   400 mg at 03/17/22 0914    insulin glargine-yfgn (SEMGLEE-YFGN) injection vial 50 Units  50 Units SubCUTAneous Nightly Dieter James MD   50 Units at 03/18/22 2050    ipratropium-albuterol (DUONEB) nebulizer solution 3 mL  1 vial Nebulization Q4H PRN Dieter James MD        loperamide (IMODIUM) capsule 2 mg  2 mg Oral 4x Daily PRN Dieter James MD        magnesium hydroxide (MILK OF MAGNESIA) 400 MG/5ML suspension 30 mL  30 mL Oral Daily PRN Dieter Erika, MD   30 mL at 03/18/22 1340    melatonin tablet 3 mg  3 mg Oral Nightly Dieter James MD   3 mg at 03/18/22 2050    metoprolol succinate (TOPROL XL) extended release tablet 25 mg  25 mg Oral BID Lexy Polly DO   25 mg at 03/18/22 2051    pantoprazole (PROTONIX) tablet 40 mg  40 mg Oral QAM AC Andrew Hernandez, MD   40 mg at 03/19/22 0543    potassium chloride (KLOR-CON M) extended release tablet 20 mEq  20 mEq Oral Daily Andrew Hernandez, MD   20 mEq at 03/18/22 1057    ascorbic acid (VITAMIN C) tablet 500 mg  500 mg Oral BID Andrew Hernandez, MD   500 mg at 03/18/22 2049    vitamin D (CHOLECALCIFEROL) tablet 2,000 Units  2,000 Units Oral Daily Andrew Hernandez, MD   2,000 Units at 03/18/22 1057    zinc sulfate (ZINCATE) capsule 50 mg  50 mg Oral Daily Andrew Hernandez, MD   50 mg at 03/18/22 1057    insulin lispro (HUMALOG) injection vial 0-6 Units  0-6 Units SubCUTAneous TID WC Andrew Hernandez, MD   1 Units at 03/18/22 1138    insulin lispro (HUMALOG) injection vial 0-3 Units  0-3 Units SubCUTAneous Nightly Andrew Hernandez, MD   1 Units at 03/17/22 2200    glucose (GLUTOSE) 40 % oral gel 15 g  15 g Oral PRN Andrew Hernandez, MD        dextrose 50 % IV solution  12.5 g IntraVENous PRN Andrew Hernandez MD        glucagon (rDNA) injection 1 mg  1 mg IntraMUSCular PRN Andrew Hernandez, MD        dextrose 5 % solution  100 mL/hr IntraVENous PRN Andrew Hernandez, MD          dextrose         Physical Exam:  BP 95/68   Pulse 70   Temp 98.6 °F (37 °C) (Oral)   Resp 16   Ht 5' 9\" (1.753 m)   Wt 271 lb 13.2 oz (123.3 kg)   SpO2 98%   BMI 40.14 kg/m²   Weight change: Wt Readings from Last 3 Encounters:   03/14/22 271 lb 13.2 oz (123.3 kg)   03/10/22 264 lb 1.8 oz (119.8 kg)   02/16/22 278 lb (126.1 kg)     The patient is awake, alert and in no discomfort or distress. He appears chronically ill. No gross musculoskeletal deformity is present. No significant skin or nail changes are present. Gross examination of head, eyes, nose and throat are negative.  Jugular venous pressure is normal and no carotid bruits are present. Normal respiratory effort is noted with no accessory muscle usage present. Lung fields are clear to ascultation. Cardiac examination is notable for a regular rate and rhythm with no palpable thrill. No gallop rhythm or cardiac murmur are identified. A benign abdominal examination is present with the exception of obesity and no masses or organomegaly. Intact pulses are present throughout all extremities and mild to moderate pretibial and pedal edema is present. No focal neurologic deficits are present. Intake/Output:    Intake/Output Summary (Last 24 hours) at 3/19/2022 0627  Last data filed at 3/19/2022 0540  Gross per 24 hour   Intake 60 ml   Output 1500 ml   Net -1440 ml     I/O this shift:  In: -   Out: 900 [Urine:900]    Laboratory Tests:  Lab Results   Component Value Date    CREATININE 3.0 (H) 03/19/2022    BUN 78 (H) 03/19/2022     03/19/2022    K 4.0 03/19/2022    K 4.0 03/19/2022    CL 99 03/19/2022    CO2 29 03/19/2022     No results for input(s): CKTOTAL, CKMB in the last 72 hours. Invalid input(s): TROPONONI  No results found for: BNP  Lab Results   Component Value Date    WBC 14.4 03/19/2022    RBC 3.78 03/19/2022    HGB 10.7 03/19/2022    HCT 34.5 03/19/2022    MCV 91.3 03/19/2022    MCH 28.3 03/19/2022    MCHC 31.0 03/19/2022    RDW 17.2 03/19/2022     03/19/2022    MPV 9.0 03/19/2022     No results for input(s): ALKPHOS, ALT, AST, PROT, BILITOT, BILIDIR, LABALBU in the last 72 hours.   Lab Results   Component Value Date    MG 2.5 03/16/2022     No results found for: PROTIME, INR  Lab Results   Component Value Date    TSH 2.310 01/22/2022     No components found for: CHLPL  Lab Results   Component Value Date    TRIG 140 01/22/2022     Lab Results   Component Value Date    HDL 24 01/22/2022     Lab Results   Component Value Date    LDLCALC 24 01/22/2022       Cardiac Tests:  Telemetry findings reviewed: sinus rhythm, no new tachy/bradyarrhythmias overnight      ASSESSMENT / PLAN: On a clinical basis, the patient remains compensated from cardiovascular standpoint with ongoing fluid mobilization and stable renal function in the face of his concomitant chronic kidney disease. He remains relatively hypotensive limiting further management of his chronic diastolic heart failure with the majority of management regarding fluid mobilization deferred to the nephrology service. An echocardiogram for assessment of additional structural cardiac abnormalities is pending and will be reviewed upon completion with additional associated recommendations. Nutritional support will remain essential to fluid mobilization reducing risk of progressive debilitation. Ongoing appropriate lifestyle modification will be necessary to assist diastolic cardiac performance as well as assist in management of his obstructive sleep apnea in addition to that of compliance with the use of nocturnal CPAP. Continued aggressive risk factor modification of blood pressure, diabetes and serum lipids remain essential to reducing risk of future atherosclerotic development. Note: This report was completed utilizing computer voice recognition software. Every effort has been made to ensure accuracy, however; inadvertent computerized transcription errors may be present. Cathi Beth.  Robin Roberto, [facility]

## 2022-03-19 NOTE — PROGRESS NOTES
Resting in bed with no c/o any. Repositioned for comfort and encouraged to start moving all  extremities and turning side to side to help regain strength and prevent skin breakdown.

## 2022-03-20 NOTE — PROGRESS NOTES
Progress Note  Date:3/20/2022       [facility] [id number]  Patient Yeni Nguyen     YOB: 1945     Age:77 y.o. Patient says breathing is improved today. Subjective    Subjective:  Symptoms:  Improved. He reports shortness of breath. No chest pain. Diet:  Adequate intake. Activity level: Impaired due to weakness. Pain:  He reports no pain. Review of Systems   Constitutional: Negative for activity change and fever. HENT: Negative for congestion. Respiratory: Positive for shortness of breath. Cardiovascular: Positive for leg swelling. Negative for chest pain. Gastrointestinal: Negative for abdominal pain. Neurological: Negative for dizziness. Psychiatric/Behavioral: Negative for agitation. Objective         Vitals Last 24 Hours:  TEMPERATURE:  Temp  Av.4 °F (36.9 °C)  Min: 98.2 °F (36.8 °C)  Max: 98.6 °F (37 °C)  RESPIRATIONS RANGE: Resp  Av.7  Min: 18  Max: 20  PULSE OXIMETRY RANGE: SpO2  Av.7 %  Min: 94 %  Max: 96 %  PULSE RANGE: Pulse  Av.3  Min: 67  Max: 71  BLOOD PRESSURE RANGE: Systolic (02SRO), QXB:318 , Min:98 , YKC:262   ; Diastolic (97QNK), IJP:14, Min:60, Max:79    I/O (24Hr): Intake/Output Summary (Last 24 hours) at 3/20/2022 0737  Last data filed at 3/20/2022 5176  Gross per 24 hour   Intake 970 ml   Output 1100 ml   Net -130 ml     Objective:  General Appearance:  Comfortable. Vital signs: (most recent): Blood pressure 98/71, pulse 71, temperature 98.6 °F (37 °C), temperature source Oral, resp. rate 18, height 5' 9\" (1.753 m), weight 262 lb 5.6 oz (119 kg), SpO2 94 %. No fever. Lungs:  Normal effort and normal respiratory rate. Breath sounds clear to auscultation. Heart: Normal rate. Regular rhythm. S1 normal and S2 normal.    Extremities: There is local swelling.       Labs/Imaging/Diagnostics    Labs:  CBC:  Recent Labs     22  0421 22  0426 22  0445   WBC 13.6* 14.4* 15.7*   RBC 3.91 3.78* 3.87   HGB 11.1* 10.7* 11.0*   HCT 36.0* 34.5* 35.5*   MCV 92.1 91.3 91.7   RDW 17.1* 17.2* 17.3*    462* 473*     CHEMISTRIES:  Recent Labs     22  0421 22  0421 22  0426 22  0445     --  142 143   K 4.1   < > 4.0  4.0 4.2  4.2   CL 99  --  99 100   CO2 32*  --  29 29   BUN 78*  --  78* 75*   CREATININE 3.1*  --  3.0* 2.9*   GLUCOSE 77  --  81 84    < > = values in this interval not displayed. PT/INR:No results for input(s): PROTIME, INR in the last 72 hours. APTT:No results for input(s): APTT in the last 72 hours. LIVER PROFILE:  No results for input(s): AST, ALT, BILIDIR, BILITOT, ALKPHOS in the last 72 hours. Imaging Last 24 Hours:  XR CHEST PORTABLE    Result Date: 2022  EXAMINATION: ONE XRAY VIEW OF THE CHEST 2022 4:49 pm COMPARISON: 2022 HISTORY: ORDERING SYSTEM PROVIDED HISTORY: shortness of breath TECHNOLOGIST PROVIDED HISTORY: Reason for exam:->shortness of breath What reading provider will be dictating this exam?->CRC FINDINGS: There is a large opacity seen within the right lung base. There is a small right pleural effusion. The left upper lobe is clear. There is a small left pleural effusion. The cardiac silhouette is within normals. There is a dual lead cardiac pacer on the left. 1. Large opacity within the right lung base which could represent pneumonia and or atelectasis. 2. Moderate size right pleural effusion. 3. Small left pleural effusion. 4. CT of the thorax is recommended for further evaluation.      US DUP LOWER EXTREMITIES BILATERAL VENOUS    Result Date: 2022  Patient MRN:  [id number] : 1945 Age: 65 years Gender: Male Order Date:  2022 5:28 PM EXAM: US DUP LOWER EXTREMITIES BILATERAL VENOUS NUMBER OF IMAGES:  52 INDICATION:  leg swelling leg swelling What reading provider will be dictating this exam?->[facility] COMPARISON: None Within the visualized vessels, there is no evidence for deep venous thrombosis There is good compressibility, there is good augmentation, there is good color flow. Within the visualized vessels there is no evidence for deep venous thrombosis     Assessment//Plan           Hospital Problems           Last Modified POA    * (Principal) Acute on chronic clinical systolic heart failure ([address] 3/14/2022 Yes        Assessment:  (   (Principal) Acute on chronic clinical systolic heart failure ([address] 3/14/2022 Yes     Acute on chronic hypoxic respiratory failure. CHF  Pleural effusion  Acute renal insfficnecy  DM  COPD  HTN  Hyperlipidemia       ). Plan:   (IV enmanuel  Had thoracentesis   Monitor labs and output. Continue meds. ).

## 2022-03-20 NOTE — PROGRESS NOTES
INPATIENT CARDIOLOGY FOLLOW-UP    Name: Brayan Courtney    Age: 65 y.o. Date of Admission: 3/14/2022 11:15 AM    Date of Service: 3/20/2022    Chief Complaint: Follow-up for acute superimposed upon chronic diastolic heart failure, coronary atherosclerosis, hypertension with present relative hypotension, chronic obstructive lung disease with associated chronic hypoxic respiratory failure, acute kidney injury superimposed upon chronic kidney disease, morbid obesity, obstructive sleep apnea    Interim History: The patient presently remains compensated from cardiovascular standpoint with persistent evidence of volume overload and limited additional fluid mobilization. His echocardiogram while technically limited demonstrated normal ventricular function with a small, hemodynamically insignificant anterior pericardial effusion. He remains relatively hypotensive with stable renal function and electrolytes      Review of Systems: The remainder of a complete multisystem review including consitutional, central nervous, respiratory, circulatory, gastrointestinal, genitourinary, endocrinologic, hematologic, musculoskeletal and psychiatric are negative. Problem List:  Patient Active Problem List   Diagnosis    PNA (pneumonia)    Acute on chronic heart failure with preserved ejection fraction ([address]    Coronary artery disease involving native coronary artery of native heart without angina pectoris    Cardiac pacemaker in situ    Primary hypertension    SPEEDY (obstructive sleep apnea)    Chronic respiratory failure with hypoxia (HCC)    CHF (congestive heart failure), NYHA class I, acute on chronic, combined ([address]    Acute on chronic clinical systolic heart failure (HCC)       Allergies:   Allergies   Allergen Reactions    Penicillins Anaphylaxis       Current Medications:  Current Facility-Administered Medications   Medication Dose Route Frequency Provider Last Rate Last Admin    docusate sodium (COLACE) capsule 100 mg  100 mg Oral BID Joel Barahona MD   100 mg at 03/19/22 2357    bumetanide (BUMEX) tablet 2 mg  2 mg Oral BID Tunde Benton MD   2 mg at 03/19/22 2214    perflutren lipid microspheres (DEFINITY) injection 1.65 mg  1.5 mL IntraVENous ONCE PRN Zuri Gary DO        midodrine (PROAMATINE) tablet 10 mg  10 mg Oral TID WC Tunde Benton MD   10 mg at 03/19/22 1742    cefTRIAXone (ROCEPHIN) 1,000 mg in sterile water 10 mL IV syringe  1,000 mg IntraVENous Q24H Mauricio Stokes MD   1,000 mg at 03/19/22 2213    acetaminophen (TYLENOL) tablet 650 mg  650 mg Oral Q4H PRN Joel Barahona MD        aspirin EC tablet 81 mg  81 mg Oral Daily Joel Barahona MD   81 mg at 03/19/22 9159    atorvastatin (LIPITOR) tablet 40 mg  40 mg Oral Nightly Joel Barahona MD   40 mg at 03/19/22 2214    buPROPion (WELLBUTRIN XL) extended release tablet 300 mg  300 mg Oral QAM Joel Barahona MD   300 mg at 03/19/22 8153    vitamin B-12 (CYANOCOBALAMIN) tablet 1,000 mcg  1,000 mcg Oral Daily Joel Barahona MD   1,000 mcg at 03/19/22 2956    guaiFENesin-dextromethorphan (ROBITUSSIN DM) 100-10 MG/5ML syrup 5 mL  5 mL Oral Q4H PRN Joel Barahona MD        ezetimibe (ZETIA) tablet 10 mg  10 mg Oral Nightly Joel Barahona MD   10 mg at 37/67/40 6966    folic acid (FOLVITE) tablet 1 mg  1 mg Oral Daily Joel Barahona MD   1 mg at 03/19/22 7103    guaiFENesin tablet 400 mg  400 mg Oral 4x Daily PRN Joel Barahona MD   400 mg at 03/17/22 0914    insulin glargine-yfgn (SEMGLEE-YFGN) injection vial 50 Units  50 Units SubCUTAneous Nightly Joel Barahona MD   50 Units at 03/19/22 2208    ipratropium-albuterol (DUONEB) nebulizer solution 3 mL  1 vial Nebulization Q4H PRN Joel Barahona MD        loperamide (IMODIUM) capsule 2 mg  2 mg Oral 4x Daily PRN Joel Barahona MD        magnesium hydroxide (MILK OF MAGNESIA) 400 MG/5ML suspension 30 mL  30 mL Oral Daily PRN Clifford Cabrales MD   30 mL at 03/18/22 1340    melatonin tablet 3 mg  3 mg Oral Nightly Clifford Cabrales MD   3 mg at 03/19/22 2214    metoprolol succinate (TOPROL XL) extended release tablet 25 mg  25 mg Oral BID Charlette Russian, DO   25 mg at 03/19/22 8997    pantoprazole (PROTONIX) tablet 40 mg  40 mg Oral QAM AC Clifford Cabrales MD   40 mg at 03/20/22 7602    potassium chloride (KLOR-CON M) extended release tablet 20 mEq  20 mEq Oral Daily Clifford Cabrales MD   20 mEq at 03/19/22 4254    ascorbic acid (VITAMIN C) tablet 500 mg  500 mg Oral BID Clifford Cabrales MD   500 mg at 03/19/22 2214    vitamin D (CHOLECALCIFEROL) tablet 2,000 Units  2,000 Units Oral Daily Clifford Cabrales MD   2,000 Units at 03/19/22 1020    zinc sulfate (ZINCATE) capsule 50 mg  50 mg Oral Daily Clifford Cabrales MD   50 mg at 03/19/22 2495    insulin lispro (HUMALOG) injection vial 0-6 Units  0-6 Units SubCUTAneous TID WC Clifford Cabrales MD   1 Units at 03/18/22 1138    insulin lispro (HUMALOG) injection vial 0-3 Units  0-3 Units SubCUTAneous Nightly Clifford Cabrales MD   1 Units at 03/17/22 2200    glucose (GLUTOSE) 40 % oral gel 15 g  15 g Oral PRN Clifford Cabrales MD        dextrose 50 % IV solution  12.5 g IntraVENous PRN Clifford Cabrales MD        glucagon (rDNA) injection 1 mg  1 mg IntraMUSCular PRN Clifford Cabrales MD        dextrose 5 % solution  100 mL/hr IntraVENous PRN Clifford Cabrales MD          dextrose         Physical Exam:  BP 98/71   Pulse 71   Temp 98.6 °F (37 °C) (Oral)   Resp 18   Ht 5' 9\" (1.753 m)   Wt 262 lb 5.6 oz (119 kg)   SpO2 94%   BMI 38.74 kg/m²   Weight change: Wt Readings from Last 3 Encounters:   03/19/22 262 lb 5.6 oz (119 kg)   03/10/22 264 lb 1.8 oz (119.8 kg)   02/16/22 278 lb (126.1 kg)     The patient is awake, alert and in no discomfort or distress. No gross musculoskeletal deformity is present. No significant skin or nail changes are present. Gross examination of head, eyes, nose and throat are negative. Jugular venous pressure is normal and no carotid bruits are present. Normal respiratory effort is noted with no accessory muscle usage present. Lung fields are clear to ascultation. Cardiac examination is notable for a regular rate and rhythm with no palpable thrill. No gallop rhythm or cardiac murmur are identified. A benign abdominal examination is present with the exception of obesity no masses or organomegaly. Intact pulses are present throughout all extremities and mild pretibial and pedal edema is present. No focal neurologic deficits are present. Intake/Output:    Intake/Output Summary (Last 24 hours) at 3/20/2022 0638  Last data filed at 3/20/2022 4593  Gross per 24 hour   Intake 970 ml   Output 1100 ml   Net -130 ml     I/O this shift:  In: -   Out: 750 [Urine:750]    Laboratory Tests:  Lab Results   Component Value Date    CREATININE 2.9 (H) 03/20/2022    BUN 75 (H) 03/20/2022     03/20/2022    K 4.2 03/20/2022    K 4.2 03/20/2022     03/20/2022    CO2 29 03/20/2022     No results for input(s): CKTOTAL, CKMB in the last 72 hours. Invalid input(s): TROPONONI  No results found for: BNP  Lab Results   Component Value Date    WBC 15.7 03/20/2022    RBC 3.87 03/20/2022    HGB 11.0 03/20/2022    HCT 35.5 03/20/2022    MCV 91.7 03/20/2022    MCH 28.4 03/20/2022    MCHC 31.0 03/20/2022    RDW 17.3 03/20/2022     03/20/2022    MPV 9.1 03/20/2022     No results for input(s): ALKPHOS, ALT, AST, PROT, BILITOT, BILIDIR, LABALBU in the last 72 hours.   Lab Results   Component Value Date    MG 2.5 03/16/2022     No results found for: PROTIME, INR  Lab Results   Component Value Date    TSH 2.310 01/22/2022     No components found for: CHLPL  Lab Results   Component Value Date    TRIG 140 01/22/2022     Lab Results   Component Value Date HDL 24 01/22/2022     Lab Results   Component Value Date    LDLCALC 24 01/22/2022       Cardiac Tests:  Telemetry findings reviewed: sinus rhythm, no new tachy/bradyarrhythmias overnight  Last Echocardiogram: An echocardiogram while technically suboptimal with limited available windows demonstrates evidence of a normal-sized left ventricular chamber with normal left ventricular systolic function and no evidence of right ventricular dysfunction. A small, hemodynamically insignificant anterior pericardial effusion is identified      ASSESSMENT / PLAN: On a clinical basis, the patient presently remains compensated from a cardiovascular standpoint with ongoing gradual fluid mobilization and no echocardiographic evidence of ventricular dysfunction or a significant pericardial effusion contributing to his present volume status and relative hypotension. Continued medical management will be necessary with additional care largely deferred to the primary care service in addition to of the pulmonary service and nephrology with ongoing nutritional support essential to fluid mobilization and reducing risk of progressive debilitation. In addition to monitoring his volume status, continued careful monitoring of renal function electrolytes will be essential with further optimization of management. Continued aggressive lifestyle modification inclusive of weight reduction and compliance with the use of nocturnal CPAP will be necessary for management of his diastolic heart failure as well as reducing his risk of further adverse cardiovascular effects including that of an increased risk of atrial arrhythmias in addition to needs of aggressive risk factor modification of blood pressure, diabetes and serum lipids in attempt to reduce risk of future atherosclerotic development.   We will further evaluate him during hospitalization should additional cardiovascular difficulties or concerns arise with arrangements made for follow-up with his primary cardiologist, Katerine Del Rio on an outpatient basis following discharge. Note: This report was completed utilizing computer voice recognition software. Every effort has been made to ensure accuracy, however; inadvertent computerized transcription errors may be present. Fatou West.  [facility], Pending sale to [facility]

## 2022-03-20 NOTE — PROGRESS NOTES
[facility]  Nephrology Attending Progress Note  Mariposa Hinojosa. Dayanara Corral MD        SUBJECTIVE:     History of Present Ilness:    Harry Wren is a 65 y.o. male with a history of chronic kidney disease stage IV (recent recent baseline creatinine 2.5-3), type 2 diabetes mellitus, COPD, chronic HFpEF H/O pacemaker and several other medical problems listed below who presented from [facility] with complaints of shortness of breath and hypoxia. According to patient he developed shortness of breath and hypoxia at his facility. It occurred while he was at rest.  There was no associated chest pain. On presentation to ED his initial vitals were blood pressure of 103/63, respiration 20, oxygen saturation 97% on 6 L nasal cannula. Laboratory data was significant for high-sensitivity troponin of 38, WBC 17.3, hemoglobin 11.2, BUN 84 and a creatinine of 3.2. A CTA of the chest showed no pulmonary embolism. Demonstrated large bilateral pleural effusions however. Patient is on chronic oxygen therapy at his facility AT 3-4L/min  He was recently discharged from this hospital 5 days ago after an admission for acute exacerbation of CHF. He was inpatient for a little over 2 weeks. Creatinine at discharge was 2.9 mg/dL.     Subjective     3/16: Right-sided thoracentesis performed yesterday; he reports feeling less dyspneic     3/17: Some difficulty with insertion of Paredes catheter earlier today; denies shortness of breath      3/18: Paredes catheter insertion yesterday without difficulty. He denies any other complaints. No shortness of breath. 3/19: seen in room, on iv diuretics    3/20: pt without complaints.  No cp or sob         PROBLEM LIST:    Patient Active Problem List   Diagnosis    PNA (pneumonia)    Acute on chronic heart failure with preserved ejection fraction [facility]    Coronary artery disease involving native coronary artery of native heart without angina pectoris    Cardiac pacemaker in situ    Primary 1 mg Oral Daily    insulin glargine-yfgn  50 Units SubCUTAneous Nightly    melatonin  3 mg Oral Nightly    metoprolol succinate  25 mg Oral BID    pantoprazole  40 mg Oral QAM AC    potassium chloride  20 mEq Oral Daily    vitamin C  500 mg Oral BID    vitamin D  2,000 Units Oral Daily    zinc sulfate  50 mg Oral Daily    insulin lispro  0-6 Units SubCUTAneous TID WC    insulin lispro  0-3 Units SubCUTAneous Nightly       MEDS (infusions):   dextrose         MEDS (prn):  perflutren lipid microspheres, acetaminophen, guaiFENesin-dextromethorphan, guaiFENesin, ipratropium-albuterol, loperamide, magnesium hydroxide, glucose, dextrose, glucagon (rDNA), dextrose    DATA:    Recent Labs     03/18/22 0421 03/19/22 0426 03/20/22  0445   WBC 13.6* 14.4* 15.7*   HGB 11.1* 10.7* 11.0*   HCT 36.0* 34.5* 35.5*   MCV 92.1 91.3 91.7    462* 473*     Recent Labs     03/18/22 0421 03/18/22 0421 03/19/22 0426 03/20/22  0445     --  142 143   K 4.1   < > 4.0  4.0 4.2  4.2   CL 99  --  99 100   CO2 32*  --  29 29   BUN 78*  --  78* 75*   CREATININE 3.1*  --  3.0* 2.9*   LABGLOM 20  --  20 21   GLUCOSE 77  --  81 84   CALCIUM 8.6  --  8.5* 8.8    < > = values in this interval not displayed.        Lab Results   Component Value Date    LABALBU 3.4 (L) 03/14/2022    LABALBU 3.5 02/25/2022    LABALBU 3.9 01/21/2022     Lab Results   Component Value Date    TSH 2.310 01/22/2022       Iron Studies  No results found for: IRON, TIBC, FERRITIN  No results found for: ALCMDUBY56  No results found for: FOLATE    No results found for: VITD25  PTH   Date Value Ref Range Status   01/24/2022 112 (H) 15 - 65 pg/mL Final       No components found for: URIC    Lab Results   Component Value Date    COLORU Yellow 03/15/2022    NITRU Negative 03/15/2022    GLUCOSEU Negative 03/15/2022    KETUA Negative 03/15/2022    UROBILINOGEN 0.2 03/15/2022    BILIRUBINUR Negative 03/15/2022       No results found for: LIPIDPAN      IMPRESSION/RECOMMENDATIONS:      1. Chronic kidney disease stage IV  due to diabetes mellitus and hypertension  Current creatinine at his usual baseline 2.5-3  Monitor in view of receiving IV contrast     2. Acute on chronic HFpEF  Transition to oral diuretics    uo 1.1 L  Net out 5L  CTA of chest showed large bilateral pleural effusion, no PE  Right-sided thoracentesis performed 3/15 with 1 L serosanguineous fluid from the  Respiratory distress improved  Pulmonary following     3. Htn  On Midodrine  Hold parameters on metoprolol     4 Hypokalemia, diuretic induced  Improved with supplement     Tiffany Del Valle

## 2022-03-20 NOTE — PROGRESS NOTES
Pulmonary [facility]                             Pulmonary Consult/Progress Note :            Reason for Consultation:Pleural effusion   CC : SOB   HPI:   He states SOB has improved a lot   There is no chest pain   No fever or chills  Has not been moving much         PHYSICAL EXAMINATION:     VITAL SIGNS:  /60   Pulse 67   Temp 98.2 °F (36.8 °C) (Oral)   Resp 20   Ht 5' 9\" (1.753 m)   Wt 262 lb 5.6 oz (119 kg)   SpO2 94%   BMI 38.74 kg/m²   Wt Readings from Last 3 Encounters:   03/19/22 262 lb 5.6 oz (119 kg)   03/10/22 264 lb 1.8 oz (119.8 kg)   02/16/22 278 lb (126.1 kg)     Temp Readings from Last 3 Encounters:   03/19/22 98.2 °F (36.8 °C) (Oral)   03/10/22 97.3 °F (36.3 °C) (Infrared)   02/02/22 97.3 °F (36.3 °C) (Oral)     TMAX:  BP Readings from Last 3 Encounters:   03/19/22 107/60   03/10/22 105/71   02/16/22 128/88     Pulse Readings from Last 3 Encounters:   03/19/22 67   03/10/22 96   02/16/22 98           INTAKE/OUTPUTS:  I/O last 3 completed shifts:   In: 1030 [P.O.:1030]  Out: 1850 [Urine:1850]    Intake/Output Summary (Last 24 hours) at 3/19/2022 2217  Last data filed at 3/19/2022 1743  Gross per 24 hour   Intake 970 ml   Output 1250 ml   Net -280 ml       General Appearance: alert and oriented to person, place and time, well-developed and   well-nourished, in no acute distress   Eyes: pupils equal, round, and reactive to light, extraocular eye movements intact, conjunctivae normal and sclera anicteric   Neck: neck supple and non tender without mass, no thyromegaly, no thyroid nodules and no cervical adenopathy   Pulmonary/Chest:*decrease breath sound bilateral  Cardiovascular: normal rate, regular rhythm, normal S1 and S2, no murmurs, rubs, clicks or gallops, distal pulses intact, no carotid bruits, no murmurs, no gallops, no carotid bruits and no JVD   Abdomen: obese, soft, non-tender, non-distended, normal bowel sounds, no masses or organomegaly   Extremities:*edema ,no cyanosis   Musculoskeletal: normal range of motion, no joint swelling, deformity or tenderness   Neurologic: reflexes normal and symmetric, no cranial nerve deficit noted    LABS/IMAGING:    CBC:  Lab Results   Component Value Date    WBC 14.4 (H) 03/19/2022    HGB 10.7 (L) 03/19/2022    HCT 34.5 (L) 03/19/2022    MCV 91.3 03/19/2022     (H) 03/19/2022    LYMPHOPCT 16.0 (L) 03/14/2022    RBC 3.78 (L) 03/19/2022    MCH 28.3 03/19/2022    MCHC 31.0 (L) 03/19/2022    RDW 17.2 (H) 03/19/2022    NEUTOPHILPCT 68.4 03/14/2022    MONOPCT 9.5 03/14/2022    BASOPCT 0.4 03/14/2022    NEUTROABS 11.85 (H) 03/14/2022    LYMPHSABS 2.77 03/14/2022    MONOSABS 1.65 (H) 03/14/2022    EOSABS 0.30 03/14/2022    BASOSABS 0.07 03/14/2022       Recent Labs     03/19/22  0426 03/18/22 0421 03/17/22 0417   WBC 14.4* 13.6* 15.1*   HGB 10.7* 11.1* 11.3*   HCT 34.5* 36.0* 35.8*   MCV 91.3 92.1 90.2   * 441 446       BMP:   Recent Labs     03/17/22  0417 03/17/22  0417 03/18/22 0421 03/19/22 0426     --  144 142   K 3.8  3.8   < > 4.1 4.0  4.0   CL 96*  --  99 99   CO2 27  --  32* 29   BUN 83*  --  78* 78*   CREATININE 3.0*  --  3.1* 3.0*    < > = values in this interval not displayed. MG:   Lab Results   Component Value Date    MG 2.5 03/16/2022     Ca/Phos:   Lab Results   Component Value Date    CALCIUM 8.5 (L) 03/19/2022    PHOS 4.4 02/27/2022     Amylase: No results found for: AMYLASE  Lipase: No results found for: LIPASE  LIVER PROFILE:   No results for input(s): AST, ALT, LIPASE, BILIDIR, BILITOT, ALKPHOS in the last 72 hours. Invalid input(s): AMYLASE,  ALB    PT/INR: No results for input(s): PROTIME, INR in the last 72 hours. APTT: No results for input(s): APTT in the last 72 hours.     Cardiac Enzymes:  No results found for: CKTOTAL, CKMB, CKMBINDEX, TROPONINI                PROBLEM LIST:  Patient Active Problem List   Diagnosis    PNA (pneumonia)    Acute on chronic heart failure with preserved ejection fraction ([facility]    Coronary artery disease involving native coronary artery of native heart without angina pectoris    Cardiac pacemaker in situ    Primary hypertension    SPEEDY (obstructive sleep apnea)    Chronic respiratory failure with hypoxia (HCC)    CHF (congestive heart failure), NYHA class I, acute on chronic, combined ([address]    Acute on chronic clinical systolic heart failure (HCC)               ASSESSMENT:  1.) Moderate hypoxia   2.)cardiorenal syndrome  3. )Bilateral pleural effusion ,anasarca  4.)Possible SPEEDY  5.)KELSEY on CKD      PLAN:  Need PT and PT ,ordred   Continue diuresis as per primary ,renal   2 d echo small pericardial effusion   oncern for pericardial effusion ,cardiology following   Fluid seems more toward transudate   *-BiPPA at night and as needed daytime  *-Diuresis as per renal   *_procalcitonin/crp mild elavation ,on abx ,on Rocephine  UTI  *-   onRocephin,  strep and legionella in urine negative  up to chair    Thank you very much for allowing me to participate in the care of this pleasant patient , should you have any questions ,please do not hesitate to contact me      Mauricio Gaytan MD,[facility]  Pulmonary&Critical Care Medicine   Director of [address] [place] [address] Director of [address] [place] [address]    Janice Parsons    NOTE: This report was transcribed using voice recognition software. Every effort was made to ensure accuracy; however, inadvertent computerized transcription errors may be present.

## 2022-03-21 NOTE — PROGRESS NOTES
Comprehensive Nutrition Assessment    Type and Reason for Visit:  Initial,RD Nutrition Re-Screen/LOS    Nutrition Recommendations/Plan: Continue current nutrition regimen. No nutrition intervention indicated at this time. Consult RD as needed. Nutrition Assessment:  pt. presented from [facility] with SOB/hypoxia. noted CTA of chest showed large bilateral pleural effusion, noted Respiratory distress improved s/p thoracentesis 3/15 (1L extracted). noted KELSEY on CKD. PMHx noted for CHF, DM , COPD, HTN, MI, Pt. is w/ consistent % intakes since adm. Will monitor. Nutrition Related Findings:  A&OX4, -I/O 210ml, +BS, +2 pitting edema, 4L NC , Missing teeth      Wounds:  Open Wounds (Perineum)       Current Nutrition Therapies:    ADULT DIET; Regular; 4 carb choices (60 gm/meal); Low Sodium (2 gm)    Anthropometric Measures:  · Height: 5' 9\" (175.3 cm)  · Current Body Weight: 272 lb 11.3 oz (123.7 kg) (3/21)   · Admission Body Weight: 271 lb 13.2 oz (123.3 kg) (First measured 3/14)    · Usual Body Weight:  (UTO , no significant wt hx per EMR to assess)     · Ideal Body Weight: 160 lbs; % Ideal Body Weight 170.4 %   · BMI: 40.3    · BMI Categories: Obese Class 3 (BMI 40.0 or greater)       Nutrition Diagnosis:   · No nutrition diagnosis at this time       Nutrition Interventions:   Food and/or Nutrient Delivery:  Continue Current Diet  Nutrition Education/Counseling:  No recommendation at this time   Coordination of Nutrition Care:  Continue to monitor while inpatient    Goals:  Pt. to continue to consume >75% meals       Nutrition Monitoring and Evaluation:   Behavioral-Environmental Outcomes:  None Identified   Food/Nutrient Intake Outcomes:  Food and Nutrient Intake  Physical Signs/Symptoms Outcomes:  Biochemical Data,Chewing or Swallowing,GI Status,Fluid Status or Edema,Hemodynamic Status,Skin,Weight,Meal Time Behavior,Nutrition Focused Physical Findings     Discharge Planning:     Too soon to determine Electronically signed by Kira Samson RD on 3/21/22 at 4:12 PM EDT    Contact: ext [phone]

## 2022-03-21 NOTE — PROGRESS NOTES
[address] [address]  [address] [place], [place]      Date:3/21/2022                                                  Patient Name: Rubi Arambula  MRN: [id number]  : 1945  Room: [address] [place] [address]    Evaluating OT: BIBIANA Dunbar, OTR/L  # [id number]    Referring Provider:  Brynn Bruno MD  Specific Provider Orders:  Navjot Rain and Treat\"  3-14-22    Diagnosis: Acute on chronic clinical systolic heart failure ([facility] [I50.23]    Pt was transferred from SNF w/ LE swelling, 3+ Pitting Edema, Hypotension    Pertinent Medical History:  Pt has a past medical history of Acid reflux, Agent orange exposure, Congestive heart failure (CHF) ([facility] [facility] COPD (chronic obstructive pulmonary disease) ([facility] [address] [facility] Depression, Diabetes mellitus ([facility] [address] [facility] Hyperlipidemia, Hypertension, MI (myocardial infarction) ([facility] [address] [facility] Polio, and Sleep apnea. ,  has a past surgical history that includes Coronary angioplasty with stent; Cardiac pacemaker placement; and pacemaker placement.     Surgeries this admission: 3-15-22:  Thoracentesis - removal of 1000ml     Precautions:  Fall Risk  4L O2  Hypotension  Paredes Catheter    Assessment of current deficits   [x] Functional mobility   [x]ADLs  [x] Strength               []Cognition   [x] Functional transfers   [x] IADLs         [x] Safety Awareness   [x]Endurance   [] Fine Coordination              [x] Balance      [] Vision/perception   []Sensation    []Gross Motor Coordination  [] ROM  [] Delirium                   [] Motor Control       OT PLAN OF CARE   OT POC based on physician orders, patient diagnosis and results of clinical assessment    Frequency/Duration 1-3 days/wk for 2 weeks PRN   Specific OT Treatment to include:   * Instruction/training on adapted ADL techniques and AE recommendations to increase functional independence within precautions       * Training on energy conservation strategies, correct breathing pattern and techniques to improve independence/tolerance for self-care routine  * Functional transfer/mobility training/DME recommendations for increased independence, safety, and fall prevention  * Patient/Family education to increase follow through with safety techniques and functional independence  * Recommendation of environmental modifications for increased safety with functional transfers/mobility and ADLs  * Therapeutic exercise to improve motor endurance, ROM, and functional strength for ADLs/functional transfers  * Therapeutic activities to facilitate/challenge dynamic balance, stand tolerance for increased safety and independence with ADLs  * Therapeutic activities to facilitate gross/fine motor skills for increased independence with ADLs  * Neuro-muscular re-education: facilitation of righting/equilibrium reactions, midline orientation, scapular stability/mobility, normalization of muscle tone, and facilitation of volitional active controled movement  * Positioning to improve skin integrity, interaction with environment and functional independence  * Manual techniques for edema management  Other:    Recommended Adaptive Equipment: TBD as pt progresses       Home Living:  Pt lives alone in a 1-story house. Bed/bath on the main floor.  (+) Basement. Pt reported being hospitalized or in a SNF since 1-23-22. Currently in SNF. Bathroom setup:  Walk-in-Shower, Standard-height Commode   Equipment owned:  W/C, [address], Shower chair    Available Family Assist:  Staff assist    Prior Level of Function:  Pt reported currently requiring assist w/ all ADLs, Transfers and Mobility using [employer] vs W/C for ambulation. Little to no participation in therapy since January per pt report. Driving:  ??   Occupation:  Retired      Pain Level:  Denied pain this session    Additional Complaints:  General weakness/fatigue    Cognition: A & O x 4   Able to Follow Multi-Step Commands INDly - within physical limitations   Memory:  good    Sequencing:  good    Problem solving:  good    Judgement/safety:  good   Additional Comments:  Pt was pleasant and cooperative. Vitals/Lab Values: O2 sats remained > 94% for duration of session w/ 4L O2. Pt experiencing SOB with EOB activity, however O2 levels maintain stable. Functional Assessment:  AM-PAC Daily Activity Raw Score: 12/24     Initial Eval Status  Date: 3/16/22   Treatment Status  Date: 3/18/22 STGs = LTGs  Time frame: 10-14 days   Feeding IND after set up    High kan position   NT  Prior session     IND after set up    High kan position NA   Grooming SUP/Set up    Able to complete all tasks w/ SUP for safety seated EOB  Unable to stand or transfer to chair for ax   Sup/Set up    Able to complete all tasks w/ SUP for safety seated EOB SUP/Set up  Seated/Standing at the Sink   UB Dressing Min A/Set up    Donning/doffing gown seated EOB   Min A/Set up    Donning/doffing gown seated EOB   Set up     LB Dressing Dep    Max A to don socks despite pt ed/demo for use of adaptive tech seated EOB  Max A of 2 for simulated task of donning pants in supine, unable to stand for task w/ Max A of 1   Dep    Max A to don socks seated EOB, unable to stand w/ Max A of 2 for clothing adjustment over hips - Max A in supine   Pt ed for safety, use of adaptive equip/techs Max A     Bathing Dep    UB - Min A seated EOB  LB - Max A of 2 for task and bed mobility in supine   Dep     Max A      Toileting Dep    Paredes Catheter  Max A of 1 for bowel hygiene + Max A of 2 for simulated clothing adjustment bed level   Dep    Paredes Catheter  Patient unable to stand for toileting tasks w/ Max A of 2, Max A in supine, VCs for adaptive tech Max A     Bed Mobility  Rolling to facilitate Functional ax: Max A  Repositioning to facilitate bed-level ax: Max A    Supine to sit:  Max A   Sit to supine:  Max A     VCs for safety, hand placement, use of Side-rails   Rolling L/R: Min A  Sup>sit: SUP  Sit>sup: SUP    Verbal cues for safety, hand placement. Supine to sit: Mod A  Sit to supine: Mod A     Functional Transfers Dep    Unable to stand from EOB w/ Max A of 1  VCs for safety/hand placement   Max A x2 with FWW    Unable to stand from EOB w/ Max A of 2 x 2 attempts, Able to achieve Partial stance 3rd attempt, Mod VCs for safety/hand placement  Seated rest breaks b/t trials     Max A     Functional Mobility NT NT  Patient unable to tolerate. Max A     Balance Sitting:     Static:  Remote SUP EOB    Dynamic:  Close SUP w/ functional ax EOB     Standing:     Static:  NT    Dynamic:  NT   Sitting:     Static:  Remote SUP EOB  Dynamic:  Close SUP while seated at EOB w/ ax    Standing:     Static:  Max A of 2    Dynamic:  NT Sitting:     Static:  IND    Dynamic:  IND w/ functional ax    Standing:     Static:  Max A w/ AD PRN    Dynamic:  Max A w/ functional ax/mobility w/ AD PRN   Activity Tolerance Fair(-)    Limited by general weakness, fatigue, habitus, severity of edema   Fair-    Limited by decreased endurance, strength, habitus, and edema. Fair   Visual/  Perceptual    Hearing: WNL   Glasses: No    WFL   Hearing Aids:  No                 Comments: Upon arrival, patient was found in supine. He was agreeable to participate in OT. No Family present during session. Received permission from RN prior to engaging pt in OT services. Educated pt on role of OT services. At the end of the session, patient was properly positioned in High kan position using bed pan, nursing notified of patient position. Call light and phone within reach, all lines and tubes intact. Oriented pt to call bell. Made all appropriate Environmental Modifications to facilitate pt's level of IND and safety. All needs met. Overall patient demonstrated decreased independence and safety during completion of ADL/functional transfer/mobility tasks.   Pt would benefit from continued skilled OT to increase safety and independence with completion of ADL/IADL tasks for functional independence and quality of life. Treatment: OT treatment provided this date includes:    Instruction/training on safety and adapted techniques for completion of ADLs, use of DME/AD/Adaptive equip: to maximize independence and safety in self-care.   Instruction/training on safe functional mobility/transfer techniques, use of DME/AD: to improve body mechanics for bed mobility, improve endurance and increase ease of task.   Instruction/training on energy conservation techs (EC)/Pursed-Lip Breathing (PLB)/work simplification for completion of ADLs:     Activity tolerance - Sitting tolerance to improve endurance for self-care and mobility.  Skilled monitoring of Vitals during session and pt's response: including spO2.  Consulted RN, PT     Made all appropriate Environmental Modifications to facilitate pt's level of IND and safety.       Time In: 2516  Time Out: 1031  Total Treatment Time: 33 minutes    Min Units   OT Eval Low 20130       OT Eval Medium 35949      OT Eval High [phone]      OT Re-Eval [id number]       Therapeutic Ex [phone]       Therapeutic Activities 16888       ADL/Self Care 01290  33  2   Orthotic Management 07894       Manual [id number]     Neuro Re-Ed 41213       Non-Billable Time            Rosales Alford, MOT, OTR/L  # [id number]

## 2022-03-21 NOTE — PROGRESS NOTES
Physical Therapy  Physical Therapy Treatment    Name: Roque Aiken  : 1945  MRN: [id number]      Date of Service: 3/21/2022    Evaluating PT:  Morgan Oden PT, DPT [id number]    Room #:  2158/9032-B  Diagnosis:  Acute on chronic clinical systolic heart failure (HCC) [I50.23]  PMHx/PSHx:  HTN, HLD, DM, depression, acid reflux, CHF, polio, COPD, MI, coronary angioplasty with stent, cardiac pacemaker placement  Procedure/Surgery:  Ultrasound guided thoracentesis (3/15/2022)  Precautions:  Fall risk, alarm, piedra catheter, O2  Equipment Needs:  TBD  Equipment Owned: SPC, 4WW, manual wheelchair    SUBJECTIVE:    Patient from nursing facility; reports that they were at different nursing facility before most recent one. Prior to initial nursing facility, patient lived alone in a 1 story home with 3 stair(s) to enter and 0 rail(s) or ramped entry (depending on entrance); ambulated with no AD. OBJECTIVE:   Initial Evaluation  Date: 3/16/2022 Treatment  3/21/2022 Short Term/ Long Term   Goals   AM-PAC 6 Clicks  70/10    Was pt agreeable to Eval/treatment? Yes Yes    Does pt have pain? Mild B toes No c/o pain    Bed Mobility  Rolling: ModA  Supine to sit: ModA (with HOB elevated)  Sit to supine: ModA (with HOB elevated)  Scooting: ModA (seated) Rolling: Mariela  Supine to sit: NT  Sit to supine: Mariela  Scooting: ModA x2 (seated) Rolling: Independent  Supine to sit:  Independent  Sit to supine: Independent  Scooting: Independent   Transfers Sit to stand: Attempted; unable to perform with [employer] MaxA  Stand to sit: MaxA with [employer] (partial stand)  Stand pivot: NT Sit to stand: Attempted; unable to perform with [employer] MaxA x2  Stand to sit: MaxA x2 with [employer] (partial stand)  Stand pivot: NT Sit to stand: Lissette  Stand to sit: Lissette  Stand pivot: Lissette with AAD   Ambulation    NT NT 50 feet with AAD Lissette   Stair negotiation: ascended and descended  NT NT TBD   ROM BUE:  See OT note  BLE:  WFL     Strength BUE:  See OT note  BLE: Grossly 5/5     Balance Sitting EOB:  SBA  Dynamic Standing:  NT Sitting EOB:  SBA  Dynamic Standing:  NT Sitting EOB:  Independent  Dynamic Standing:  Lissette with AAD     Pt is A & O x 4  Sensation:  No reports of numbness/tingling to extremities  Edema:  Unremarkable    Vitals:   HR 51-72, SpO2 94% (4 L O2), /85 during activity. HR 85, SpO2 98% (4 L O2) following activity. Patient education  Pt educated on safety during functional mobility. Patient response to education:   Pt verbalized understanding Pt demonstrated skill Pt requires further education in this area   Yes Yes Yes     ASSESSMENT:    Comments:  Collaboration with OT required for safety. Patient sitting EOB with OT present upon arrival; agreeable to PT session. Tolerated sitting EOB for extended period of time. Reported lightheadedness. Vitals monitored and noted. Attempted several sit to stand transfers; unable to perform complete stand; achieved near complete stand. Reported shortness of breath with activity that improved with rest. Rest breaks provided between activity bouts. Activity limited by fatigue. Performed lateral seated scooting several times to improve positioning before returning to supine with assistance. Patient left in semi-Gan's position with call light in reach. Treatment:  Patient practiced and was instructed in the following treatment:    · Bed mobility - verbal cues to facilitate proper positioning; physical assistance provided during activity  · Static sitting - performed to promote upright tolerance, postural awareness, and balance maintenance  · Functional transfers - verbal cues to facilitate proper positioning and sequencing, particularly related to hand/foot placement; physical assistance provided during activity    PLAN:    Patient is making good progress towards established goals. Will continue with current POC.       Time in  1005  Time out  1030    Total Treatment Time  25 minutes     CPT codes:  [] Gait training 21709 0 minutes  [] Manual therapy 16700 0 minutes  [x] Therapeutic activities 15323 25 minutes  [] Therapeutic exercises 74185 0 minutes  [] Neuromuscular reeducation 40582 0 minutes    Morgan Oden PT, DPT  [id number]

## 2022-03-21 NOTE — CARE COORDINATION
Requested stat therapy updates this am for pending precert. On oral bumex, jennyfer aware of possible discharge if auth obtained. Ambulance on soft chart. Updated Shannon Driver that therapy notes available for today. They will let me know if insurance responds to precert request.    For questions I can be reached at [phone].  Ezequiel Tubbs, [place]

## 2022-03-21 NOTE — PROGRESS NOTES
Pulmonary [facility]                             Pulmonary Consult/Progress Note :            Reason for Consultation:Pleural effusion   CC : SOB   HPI:   He states SOB has improved a lot   Negative 5 L   There is no chest pain   No fever or chills  Started PT and OT         PHYSICAL EXAMINATION:     VITAL SIGNS:  /70   Pulse 68   Temp 97 °F (36.1 °C) (Oral)   Resp 16   Ht 5' 9\" (1.753 m)   Wt 272 lb 11.3 oz (123.7 kg)   SpO2 95%   BMI 40.27 kg/m²   Wt Readings from Last 3 Encounters:   03/21/22 272 lb 11.3 oz (123.7 kg)   03/10/22 264 lb 1.8 oz (119.8 kg)   02/16/22 278 lb (126.1 kg)     Temp Readings from Last 3 Encounters:   03/21/22 97 °F (36.1 °C) (Oral)   03/10/22 97.3 °F (36.3 °C) (Infrared)   02/02/22 97.3 °F (36.3 °C) (Oral)     TMAX:  BP Readings from Last 3 Encounters:   03/21/22 101/70   03/10/22 105/71   02/16/22 128/88     Pulse Readings from Last 3 Encounters:   03/21/22 68   03/10/22 96   02/16/22 98           INTAKE/OUTPUTS:  I/O last 3 completed shifts:   In: 0 [P.O.:590]  Out: 1350 [Urine:1350]    Intake/Output Summary (Last 24 hours) at 3/21/2022 1216  Last data filed at 3/21/2022 1003  Gross per 24 hour   Intake 360 ml   Output 1050 ml   Net -690 ml       General Appearance: alert and oriented to person, place and time, well-developed and   well-nourished, in no acute distress   Eyes: pupils equal, round, and reactive to light, extraocular eye movements intact, conjunctivae normal and sclera anicteric   Neck: neck supple and non tender without mass, no thyromegaly, no thyroid nodules and no cervical adenopathy   Pulmonary/Chest:*decrease breath sound bilateral  Cardiovascular: normal rate, regular rhythm, normal S1 and S2, no murmurs, rubs, clicks or gallops, distal pulses intact, no carotid bruits, no murmurs, no gallops, no carotid bruits and no JVD   Abdomen: obese, soft, non-tender, non-distended, normal bowel sounds, no masses or organomegaly   Extremities:*edema ,no cyanosis   Musculoskeletal: normal range of motion, no joint swelling, deformity or tenderness   Neurologic: reflexes normal and symmetric, no cranial nerve deficit noted    LABS/IMAGING:    CBC:  Lab Results   Component Value Date    WBC 15.6 (H) 03/21/2022    HGB 11.6 (L) 03/21/2022    HCT 37.3 03/21/2022    MCV 91.4 03/21/2022     (H) 03/21/2022    LYMPHOPCT 16.0 (L) 03/14/2022    RBC 4.08 03/21/2022    MCH 28.4 03/21/2022    MCHC 31.1 (L) 03/21/2022    RDW 17.2 (H) 03/21/2022    NEUTOPHILPCT 68.4 03/14/2022    MONOPCT 9.5 03/14/2022    BASOPCT 0.4 03/14/2022    NEUTROABS 11.85 (H) 03/14/2022    LYMPHSABS 2.77 03/14/2022    MONOSABS 1.65 (H) 03/14/2022    EOSABS 0.30 03/14/2022    BASOSABS 0.07 03/14/2022       Recent Labs     03/21/22  0408 03/20/22  0445 03/19/22  0426   WBC 15.6* 15.7* 14.4*   HGB 11.6* 11.0* 10.7*   HCT 37.3 35.5* 34.5*   MCV 91.4 91.7 91.3   * 473* 462*       BMP:   Recent Labs     03/19/22  0426 03/19/22  0426 03/20/22  0445 03/21/22  0408     --  143 143   K 4.0  4.0   < > 4.2  4.2 4.3  4.3   CL 99  --  100 101   CO2 29  --  29 28   BUN 78*  --  75* 78*   CREATININE 3.0*  --  2.9* 3.0*    < > = values in this interval not displayed. MG:   Lab Results   Component Value Date    MG 2.5 03/16/2022     Ca/Phos:   Lab Results   Component Value Date    CALCIUM 8.9 03/21/2022    PHOS 4.4 02/27/2022     Amylase: No results found for: AMYLASE  Lipase: No results found for: LIPASE  LIVER PROFILE:   No results for input(s): AST, ALT, LIPASE, BILIDIR, BILITOT, ALKPHOS in the last 72 hours. Invalid input(s): AMYLASE,  ALB    PT/INR: No results for input(s): PROTIME, INR in the last 72 hours. APTT: No results for input(s): APTT in the last 72 hours.     Cardiac Enzymes:  No results found for: CKTOTAL, CKMB, CKMBINDEX, TROPONINI                PROBLEM LIST:  Patient Active Problem List   Diagnosis    PNA (pneumonia)    Acute on chronic heart failure with preserved ejection fraction ([facility]    Coronary artery disease involving native coronary artery of native heart without angina pectoris    Cardiac pacemaker in situ    Primary hypertension    SPEEDY (obstructive sleep apnea)    Chronic respiratory failure with hypoxia (HCC)    CHF (congestive heart failure), NYHA class I, acute on chronic, combined ([facility] [address]    Acute on chronic clinical systolic heart failure (HCC)               ASSESSMENT:  1.) Moderate hypoxia   2.)cardiorenal syndrome  3. )Bilateral pleural effusion ,anasarca  4.)Possible SPEEDY  5.)KELSEY on CKD      PLAN:  CXR looks ok ,still effusion ,diuresis as mild and airway drained   Continue PT and PT ,ordred   Continue diuresis as per primary ,renal   2 d echo small pericardial effusion   oncern for pericardial effusion ,cardiology following   Fluid seems more toward transudate   *-BiPPA at night and as needed daytime  *-Diuresis as per renal   *_procalcitonin/crp mild elavation ,on abx ,on Rocephine  UTI  *-   onRocephin,  strep and legionella in urine negative  up to chair    Thank you very much for allowing me to participate in the care of this pleasant patient , should you have any questions ,please do not hesitate to contact me      Mauricio Gaytan MD,[facility]  Pulmonary&Critical Care Medicine   Director of [address] [place] [address] Director of [address] [place] [address]    Janice Parsons    NOTE: This report was transcribed using voice recognition software. Every effort was made to ensure accuracy; however, inadvertent computerized transcription errors may be present.

## 2022-03-21 NOTE — PROGRESS NOTES
Progress Note  Date:3/21/2022       Room:[address] [place] [address]-A  Patient Adali Parks     YOB: 1945     Age:77 y.o. Patient says breathing is improved today. Subjective    Subjective:  Symptoms:  Improved. He reports shortness of breath. No chest pain. Diet:  Adequate intake. Activity level: Impaired due to weakness. Pain:  He reports no pain. Review of Systems   Constitutional: Negative for activity change and fever. HENT: Negative for congestion. Respiratory: Positive for shortness of breath. Cardiovascular: Positive for leg swelling. Negative for chest pain. Gastrointestinal: Negative for abdominal pain. Neurological: Negative for dizziness. Psychiatric/Behavioral: Negative for agitation. Objective         Vitals Last 24 Hours:  TEMPERATURE:  Temp  Av.3 °F (36.8 °C)  Min: 98 °F (36.7 °C)  Max: 98.5 °F (36.9 °C)  RESPIRATIONS RANGE: Resp  Av.7  Min: 17  Max: 18  PULSE OXIMETRY RANGE: SpO2  Av.7 %  Min: 95 %  Max: 99 %  PULSE RANGE: Pulse  Av  Min: 67  Max: 71  BLOOD PRESSURE RANGE: Systolic (43HRA), CLW:231 , Min:103 , HGK:861   ; Diastolic (72OFB), CYJ:91, Min:73, Max:81    I/O (24Hr): Intake/Output Summary (Last 24 hours) at 3/21/2022 0646  Last data filed at 3/20/2022 2055  Gross per 24 hour   Intake 590 ml   Output 600 ml   Net -10 ml     Objective:  General Appearance:  Comfortable. Vital signs: (most recent): Blood pressure 103/73, pulse 71, temperature 98.3 °F (36.8 °C), temperature source Oral, resp. rate 17, height 5' 9\" (1.753 m), weight 272 lb 11.3 oz (123.7 kg), SpO2 95 %. No fever. Lungs:  Normal effort and normal respiratory rate. Breath sounds clear to auscultation. Heart: Normal rate. Regular rhythm. S1 normal and S2 normal.    Extremities: There is local swelling.       Labs/Imaging/Diagnostics    Labs:  CBC:  Recent Labs     22  0426 22  0445 22  0408   WBC 14.4* 15.7* 15.6*   RBC 3.78* 3.87 4.08   HGB 10.7* 11.0* 11.6*   HCT 34.5* 35.5* 37.3   MCV 91.3 91.7 91.4   RDW 17.2* 17.3* 17.2*   * 473* 486*     CHEMISTRIES:  Recent Labs     22  0426 22  0426 22  0445 22  0408     --  143 143   K 4.0  4.0   < > 4.2  4.2 4.3  4.3   CL 99  --  100 101   CO2 29  --  29 28   BUN 78*  --  75* 78*   CREATININE 3.0*  --  2.9* 3.0*   GLUCOSE 81  --  84 83    < > = values in this interval not displayed. PT/INR:No results for input(s): PROTIME, INR in the last 72 hours. APTT:No results for input(s): APTT in the last 72 hours. LIVER PROFILE:  No results for input(s): AST, ALT, BILIDIR, BILITOT, ALKPHOS in the last 72 hours. Imaging Last 24 Hours:  XR CHEST PORTABLE    Result Date: 2022  EXAMINATION: ONE XRAY VIEW OF THE CHEST 2022 4:49 pm COMPARISON: 2022 HISTORY: ORDERING SYSTEM PROVIDED HISTORY: shortness of breath TECHNOLOGIST PROVIDED HISTORY: Reason for exam:->shortness of breath What reading provider will be dictating this exam?->CRC FINDINGS: There is a large opacity seen within the right lung base. There is a small right pleural effusion. The left upper lobe is clear. There is a small left pleural effusion. The cardiac silhouette is within normals. There is a dual lead cardiac pacer on the left. 1. Large opacity within the right lung base which could represent pneumonia and or atelectasis. 2. Moderate size right pleural effusion. 3. Small left pleural effusion. 4. CT of the thorax is recommended for further evaluation.      US DUP LOWER EXTREMITIES BILATERAL VENOUS    Result Date: 2022  Patient MRN:  [id number] : 1945 Age: 65 years Gender: Male Order Date:  2022 5:28 PM EXAM: US DUP LOWER EXTREMITIES BILATERAL VENOUS NUMBER OF IMAGES:  52 INDICATION:  leg swelling leg swelling What reading provider will be dictating this exam?->[facility] COMPARISON: None Within the visualized vessels, there is no evidence for deep venous thrombosis There is good compressibility, there is good augmentation, there is good color flow. Within the visualized vessels there is no evidence for deep venous thrombosis     Assessment//Plan           Hospital Problems           Last Modified POA    * (Principal) Acute on chronic clinical systolic heart failure ([address] 3/14/2022 Yes        Assessment:  (   (Principal) Acute on chronic clinical systolic heart failure ([address] 3/14/2022 Yes     Acute on chronic hypoxic respiratory failure. CHF  Pleural effusion  Acute renal insfficnecy  DM  COPD  HTN  Hyperlipidemia       ). Plan:   (RISHI singer  Had thoracentesis   Monitor labs and output. Continue meds. ).

## 2022-03-21 NOTE — PROGRESS NOTES
[facility]  Nephrology Attending Progress Note  Lizzy Verde. Cori Neumann MD        SUBJECTIVE:     History of Present Ilness:    Claudette Ramirez is a 65 y.o. male with a history of chronic kidney disease stage IV (recent recent baseline creatinine 2.5-3), type 2 diabetes mellitus, COPD, chronic HFpEF H/O pacemaker and several other medical problems listed below who presented from [facility] with complaints of shortness of breath and hypoxia. According to patient he developed shortness of breath and hypoxia at his facility. It occurred while he was at rest.  There was no associated chest pain. On presentation to ED his initial vitals were blood pressure of 103/63, respiration 20, oxygen saturation 97% on 6 L nasal cannula. Laboratory data was significant for high-sensitivity troponin of 38, WBC 17.3, hemoglobin 11.2, BUN 84 and a creatinine of 3.2. A CTA of the chest showed no pulmonary embolism. Demonstrated large bilateral pleural effusions however. Patient is on chronic oxygen therapy at his facility AT 3-4L/min  He was recently discharged from this hospital 5 days ago after an admission for acute exacerbation of CHF. He was inpatient for a little over 2 weeks. Creatinine at discharge was 2.9 mg/dL.     Subjective     3/16: Right-sided thoracentesis performed yesterday; he reports feeling less dyspneic     3/17: Some difficulty with insertion of Paredes catheter earlier today; denies shortness of breath      3/18: Paredes catheter insertion yesterday without difficulty. He denies any other complaints. No shortness of breath. 3/19: seen in room, on iv diuretics    3/20: pt without complaints.  No cp or sob    3/21: pt seen in room, feels ok, no cp or sob, awaits rehab         PROBLEM LIST:    Patient Active Problem List   Diagnosis    PNA (pneumonia)    [facility] chronic heart failure with preserved [facility] fraction [facility]    Coronary artery disease involving native coronary artery of native heart without angina pectoris    Cardiac pacemaker in situ    Primary hypertension    SPEEDY (obstructive sleep apnea)    Chronic respiratory failure with hypoxia (HCC)    CHF (congestive heart failure), NYHA class I, acute on chronic, combined ([facility]    Acute on chronic clinical systolic heart failure (HCC)        PAST MEDICAL HISTORY:    Past Medical History:   Diagnosis Date    Acid reflux     Agent orange exposure     Congestive heart failure (CHF) (HCC)     COPD (chronic obstructive pulmonary disease) (HCC)     Depression     Diabetes mellitus (HCC)     Hyperlipidemia     Hypertension     MI (myocardial infarction) ([facility]     Polio     Sleep apnea        DIET:    ADULT DIET; Regular; 4 carb choices (60 gm/meal);  Low Sodium (2 gm)     PHYSICAL EXAM:     Patient Vitals for the past 24 hrs:   BP Temp Temp src Pulse Resp SpO2 Weight   03/21/22 1145 101/70 97 °F (36.1 °C) Oral 68 16 95 % --   03/21/22 0730 105/76 97.2 °F (36.2 °C) Oral 66 18 95 % --   03/21/22 0509 -- -- -- -- -- -- 272 lb 11.3 oz (123.7 kg)   03/20/22 2012 103/73 98.3 °F (36.8 °C) Oral 71 17 95 % --   03/20/22 1545 116/81 98 °F (36.7 °C) Oral 67 18 99 % --   @      Intake/Output Summary (Last 24 hours) at 3/21/2022 1241  Last data filed at 3/21/2022 1003  Gross per 24 hour   Intake 360 ml   Output 1050 ml   Net -690 ml         Wt Readings from Last 3 Encounters:   03/21/22 272 lb 11.3 oz (123.7 kg)   03/10/22 264 lb 1.8 oz (119.8 kg)   02/16/22 278 lb (126.1 kg)       Constitutional:  Pt is in no acute distress  Head: normocephalic, atraumatic  Neck: no JVD  Cardiovascular: regular rate and rhythm, no murmurs, gallops, or rubs  Respiratory:  No rales, rhochi, or wheezes  Gastrointestinal:  Soft, nontender, nondistended, bowel sounds x 4  Ext: tr edema  Skin: dry, no rash  Neuro: aaox3    MEDS (scheduled):    docusate sodium  100 mg Oral BID    bumetanide  2 mg Oral BID    midodrine  10 mg Oral TID WC    cefTRIAXone (ROCEPHIN) IV  1,000 mg IntraVENous Q24H    aspirin  81 mg Oral Daily    atorvastatin  40 mg Oral Nightly    buPROPion  300 mg Oral QAM    cyanocobalamin  1,000 mcg Oral Daily    ezetimibe  10 mg Oral Nightly    folic acid  1 mg Oral Daily    insulin glargine-yfgn  50 Units SubCUTAneous Nightly    melatonin  3 mg Oral Nightly    metoprolol succinate  25 mg Oral BID    pantoprazole  40 mg Oral QAM AC    potassium chloride  20 mEq Oral Daily    vitamin C  500 mg Oral BID    vitamin D  2,000 Units Oral Daily    zinc sulfate  50 mg Oral Daily    insulin lispro  0-6 Units SubCUTAneous TID WC    insulin lispro  0-3 Units SubCUTAneous Nightly       MEDS (infusions):   dextrose         MEDS (prn):  perflutren lipid microspheres, acetaminophen, guaiFENesin-dextromethorphan, guaiFENesin, ipratropium-albuterol, loperamide, magnesium hydroxide, glucose, dextrose, glucagon (rDNA), dextrose    DATA:    Recent Labs     03/19/22 0426 03/20/22 0445 03/21/22  0408   WBC 14.4* 15.7* 15.6*   HGB 10.7* 11.0* 11.6*   HCT 34.5* 35.5* 37.3   MCV 91.3 91.7 91.4   * 473* 486*     Recent Labs     03/19/22 0426 03/19/22  0426 03/20/22  0445 03/21/22  0408     --  143 143   K 4.0  4.0   < > 4.2  4.2 4.3  4.3   CL 99  --  100 101   CO2 29  --  29 28   BUN 78*  --  75* 78*   CREATININE 3.0*  --  2.9* 3.0*   LABGLOM 20  --  21 20   GLUCOSE 81  --  84 83   CALCIUM 8.5*  --  8.8 8.9    < > = values in this interval not displayed.        Lab Results   Component Value Date    LABALBU 3.4 (L) 03/14/2022    LABALBU 3.5 02/25/2022    LABALBU 3.9 01/21/2022     Lab Results   Component Value Date    TSH 2.310 01/22/2022       Iron Studies  No results found for: IRON, TIBC, FERRITIN  No results found for: [id number]  No results found for: FOLATE    No results found for: VITD25  PTH   Date Value Ref Range Status   01/24/2022 112 (H) 15 - 65 pg/mL Final       No components found for: URIC    Lab Results   Component Value Date    COLORU Yellow 03/15/2022    NITRU Negative 03/15/2022    GLUCOSEU Negative 03/15/2022    KETUA Negative 03/15/2022    UROBILINOGEN 0.2 03/15/2022    BILIRUBINUR Negative 03/15/2022       No results found for: LIPIDPAN      IMPRESSION/RECOMMENDATIONS:      1. Chronic kidney disease stage IV  due to diabetes mellitus and hypertension  Current creatinine at his usual baseline 2.5-3  Monitor in view of receiving IV contrast     2. Acute on chronic HFpEF  Transition to oral diuretics bumex 2 mg po bid  uo ?600  Net out 5L  CTA of chest showed large bilateral pleural effusion, no PE  Right-sided thoracentesis performed 3/15 with 1 L serosanguineous fluid from the  Respiratory distress improved  Pulmonary following     3. Htn  On Midodrine  Hold parameters on metoprolol     4 Hypokalemia, diuretic induced  Improved with supplement     Tiffany Del Valle

## 2022-03-22 NOTE — TELEPHONE ENCOUNTER
----- Message from Jovanny Mayberry MD sent at 3/20/2022  6:46 AM EDT -----  This is a patient of results admitted with acute superimposed upon chronic diastolic heart failure in the face of multiple additional comorbidities.   Please schedule follow-up with he or either Xander Nair one of the NP's within the next 2 weeks for heart failure follow-up

## 2022-03-22 NOTE — PROGRESS NOTES
Nurse to nurse report called to [facility].  Electronically signed by Romana Benjamin RN on 3/22/2022 at 4:48 PM

## 2022-03-22 NOTE — PROGRESS NOTES
Progress Note  Date:3/22/2022       Room:[address] [place], [place] [address]  Patient Caroline Booker     YOB: 1945     Age:77 y.o. Patient says breathing is improved today. Subjective    Subjective:  Symptoms:  Improved. He reports shortness of breath. No chest pain. Diet:  Adequate intake. Activity level: Impaired due to weakness. Pain:  He reports no pain. Review of Systems   Constitutional: Negative for activity change and fever. HENT: Negative for congestion. Respiratory: Positive for shortness of breath. Cardiovascular: Positive for leg swelling. Negative for chest pain. Gastrointestinal: Negative for abdominal pain. Neurological: Negative for dizziness. Psychiatric/Behavioral: Negative for agitation. Objective         Vitals Last 24 Hours:  TEMPERATURE:  Temp  Av.4 °F (36.3 °C)  Min: 97 °F (36.1 °C)  Max: 98 °F (36.7 °C)  RESPIRATIONS RANGE: Resp  Av  Min: 16  Max: 18  PULSE OXIMETRY RANGE: SpO2  Av.5 %  Min: 95 %  Max: 97 %  PULSE RANGE: Pulse  Av.8  Min: 63  Max: 70  BLOOD PRESSURE RANGE: Systolic (19MFN), SQS:771 , Min:98 , KHX:679   ; Diastolic (39DZB), FRJ:46, Min:63, Max:87    I/O (24Hr): Intake/Output Summary (Last 24 hours) at 3/22/2022 0710  Last data filed at 3/22/2022 0541  Gross per 24 hour   Intake 540 ml   Output 1400 ml   Net -860 ml     Objective:  General Appearance:  Comfortable. Vital signs: (most recent): Blood pressure 98/63, pulse 63, temperature 98 °F (36.7 °C), temperature source Oral, resp. rate 16, height 5' 9\" (1.753 m), weight 272 lb (123.4 kg), SpO2 95 %. No fever. Lungs:  Normal effort and normal respiratory rate. Breath sounds clear to auscultation. Heart: Normal rate. Regular rhythm. S1 normal and S2 normal.    Extremities: There is local swelling.       Labs/Imaging/Diagnostics    Labs:  CBC:  Recent Labs     22  0445 22  0408 22  0437   WBC 15.7* 15.6* 14.1*   RBC 3.87 4.08 4.02   HGB 11.0* 11.6* 11.3*   HCT 35.5* 37.3 36.8*   MCV 91.7 91.4 91.5   RDW 17.3* 17.2* 17.2*   * 486* 471*     CHEMISTRIES:  Recent Labs     22  0445 22  0445 22  0408 22  0437     --  143 143   K 4.2  4.2   < > 4.3  4.3 4.3     --  101 98   CO2 29  --  28 30*   BUN 75*  --  78* 69*   CREATININE 2.9*  --  3.0* 3.2*   GLUCOSE 84  --  83 59*    < > = values in this interval not displayed. PT/INR:No results for input(s): PROTIME, INR in the last 72 hours. APTT:No results for input(s): APTT in the last 72 hours. LIVER PROFILE:  No results for input(s): AST, ALT, BILIDIR, BILITOT, ALKPHOS in the last 72 hours. Imaging Last 24 Hours:  XR CHEST PORTABLE    Result Date: 2022  EXAMINATION: ONE XRAY VIEW OF THE CHEST 2022 4:49 pm COMPARISON: 2022 HISTORY: ORDERING SYSTEM PROVIDED HISTORY: shortness of breath TECHNOLOGIST PROVIDED HISTORY: Reason for exam:->shortness of breath What reading provider will be dictating this exam?->CRC FINDINGS: There is a large opacity seen within the right lung base. There is a small right pleural effusion. The left upper lobe is clear. There is a small left pleural effusion. The cardiac silhouette is within normals. There is a dual lead cardiac pacer on the left. 1. Large opacity within the right lung base which could represent pneumonia and or atelectasis. 2. Moderate size right pleural effusion. 3. Small left pleural effusion. 4. CT of the thorax is recommended for further evaluation.      US DUP LOWER EXTREMITIES BILATERAL VENOUS    Result Date: 2022  Patient MRN:  [id number] : 1945 Age: 65 years Gender: Male Order Date:  2022 5:28 PM EXAM: US DUP LOWER EXTREMITIES BILATERAL VENOUS NUMBER OF IMAGES:  52 INDICATION:  leg swelling leg swelling What reading provider will be dictating this exam?->[facility] COMPARISON: None Within the visualized vessels, there is no evidence for deep venous thrombosis There is good compressibility, there is good augmentation, there is good color flow. Within the visualized vessels there is no evidence for deep venous thrombosis     Assessment//Plan           Hospital Problems           Last Modified POA    * (Principal) Acute on chronic clinical systolic heart failure ([address] 3/14/2022 Yes        Assessment:  (   (Principal) Acute on chronic clinical systolic heart failure ([address] 3/14/2022 Yes     Acute on chronic hypoxic respiratory failure. CHF  Pleural effusion  Acute renal insfficnecy  DM  COPD  HTN  Hyperlipidemia       ). Plan:   (IV enmanuel  Had thoracentesis   Monitor labs and output. Continue meds. PT/OT).

## 2022-03-22 NOTE — PROGRESS NOTES
[facility]  Nephrology Attending Progress Note  Thao Davenport MD        SUBJECTIVE:     History of Present Ilness:    Rubi Arambula is a 65 y.o. male with a history of chronic kidney disease stage IV (recent recent baseline creatinine 2.5-3), type 2 diabetes mellitus, COPD, chronic HFpEF H/O pacemaker and several other medical problems listed below who presented from [facility] with complaints of shortness of breath and hypoxia. According to patient he developed shortness of breath and hypoxia at his facility. It occurred while he was at rest.  There was no associated chest pain. On presentation to ED his initial vitals were blood pressure of 103/63, respiration 20, oxygen saturation 97% on 6 L nasal cannula. Laboratory data was significant for high-sensitivity troponin of 38, WBC 17.3, hemoglobin 11.2, BUN 84 and a creatinine of 3.2. A CTA of the chest showed no pulmonary embolism. Demonstrated large bilateral pleural effusions however. Patient is on chronic oxygen therapy at his facility AT 3-4L/min  He was recently discharged from this hospital 5 days ago after an admission for acute exacerbation of CHF. He was inpatient for a little over 2 weeks. Creatinine at discharge was 2.9 mg/dL.     Subjective     3/16: Right-sided thoracentesis performed yesterday; he reports feeling less dyspneic     3/17: Some difficulty with insertion of Paredes catheter earlier today; denies shortness of breath      3/18: Paredes catheter insertion yesterday without difficulty. He denies any other complaints. No shortness of breath. 3/19: seen in room, on iv diuretics    3/20: pt without complaints.  No cp or sob    3/21: pt seen in room, feels ok, no cp or sob, awaits rehab    3/22 pt seen in room, feels much better, waiting to go to rehab         PROBLEM LIST:    Patient Active Problem List   Diagnosis    PNA (pneumonia)    Acute on chronic heart failure with preserved ejection fraction ([address]    Coronary artery disease involving native coronary artery of native heart without angina pectoris    Cardiac pacemaker in situ    Primary hypertension    SPEEDY (obstructive sleep apnea)    Chronic respiratory failure with hypoxia (HCC)    CHF (congestive heart failure), NYHA class I, acute on chronic, combined (HCC)    Acute on chronic clinical systolic heart failure ([facility]        PAST MEDICAL HISTORY:    Past Medical History:   Diagnosis Date    Acid reflux     Agent orange exposure     Congestive heart failure (CHF) (HCC)     COPD (chronic obstructive pulmonary disease) ([facility])     Depression     Diabetes mellitus ([facility]     Hyperlipidemia     Hypertension     MI (myocardial infarction) ([facility]     Polio     Sleep apnea        DIET:    ADULT DIET; Regular; 4 carb choices (60 gm/meal);  Low Sodium (2 gm)     PHYSICAL EXAM:     Patient Vitals for the past 24 hrs:   BP Temp Temp src Pulse Resp SpO2 Height Weight   03/22/22 0758 114/76 97.3 °F (36.3 °C) Oral 66 16 96 % -- --   03/22/22 0551 -- -- -- -- -- -- -- 272 lb (123.4 kg)   03/22/22 0536 98/63 98 °F (36.7 °C) Oral 63 16 95 % -- --   03/21/22 2014 123/87 97.4 °F (36.3 °C) Oral 70 18 97 % -- --   03/21/22 1606 -- -- -- -- -- -- 5' 9\" (1.753 m) --   @      Intake/Output Summary (Last 24 hours) at 3/22/2022 1338  Last data filed at 3/22/2022 0541  Gross per 24 hour   Intake 300 ml   Output 950 ml   Net -650 ml         Wt Readings from Last 3 Encounters:   03/22/22 272 lb (123.4 kg)   03/10/22 264 lb 1.8 oz (119.8 kg)   02/16/22 278 lb (126.1 kg)       Constitutional:  Pt is in no acute distress  Head: normocephalic, atraumatic  Neck: no JVD  Cardiovascular: regular rate and rhythm, no murmurs, gallops, or rubs  Respiratory:  No rales, rhochi, or wheezes  Gastrointestinal:  Soft, nontender, nondistended, bowel sounds x 4  Ext: tr edema  Skin: dry, no rash  Neuro: aaox3    MEDS (scheduled):    docusate sodium  100 mg Oral BID    bumetanide  2 mg Oral BID    midodrine  10 URIC    Lab Results   Component Value Date    COLORU Yellow 03/15/2022    NITRU Negative 03/15/2022    GLUCOSEU Negative 03/15/2022    KETUA Negative 03/15/2022    UROBILINOGEN 0.2 03/15/2022    BILIRUBINUR Negative 03/15/2022       No results found for: Charmayne Linen      IMPRESSION/RECOMMENDATIONS:      1. Chronic kidney disease stage IV  due to diabetes mellitus and hypertension  Current creatinine at his usual baseline 2.5-3  Monitor in view of receiving IV contrast     2. Acute on chronic HFpEF  Transition to oral diuretics bumex 2 mg po bid  [id number]  Net out 5.8L  CTA of chest showed large bilateral pleural effusion, no PE  Right-sided thoracentesis performed 3/15 with 1 L serosanguineous fluid from the  Respiratory distress improved  Pulmonary following     3. Htn  On Midodrine  Hold parameters on metoprolol     4 Hypokalemia, diuretic induced  Improved with supplement     Abiel Del Valle

## 2022-03-22 NOTE — CARE COORDINATION
Insurance is requesting peer to peer, only good through 3:30pm. Updated Dr. Cindy Mazariegos. Unfortunately patient was short term care so cannot return unless it is approved. Call number [phone] option 5 case #[id number]. Ambulance on soft chart. Dr. Cindy Mazariegos completed peer to peer and insurance still denying skilled care. Discussed medicaid process with patient, from conversation sounds like he may have a small nest egg to pay privately temporarily. We discussed liquidating assets briefly, he does still have outstanding mortgage on his home. Patient will discuss with his sister, understands it is unsafe for him to return home as he cannot ambulate. He is agreeable to participate in medicaid process. Kiran to reach out to sister. They will let me know when financial arrangements have been made for patient to return to facility. Financial arrangements made with [facility] for return today. [facility] ambulance arranged for 1800. Facility notified of discharge time. Patient notified of dicsharge time. Patient also asked me to notify [facility] that he will require LTC to see if there are benefits they can assist with. Spoke with Hien Kinney at [place], she will call patient and discuss with him directly. For questions I can be reached at [phone].  Omar Villaseñor [place]

## 2022-03-22 NOTE — DISCHARGE SUMMARY
Discharge Summary    Date: 3/22/2022  Patient Name: Kristina Cuellar YOB: 1945 Age: 65 y.o. Admit Date: 3/14/2022  Discharge Date: 3/22/2022  Discharge Condition: Stable    Admission Diagnosis  Acute on chronic clinical systolic heart failure (HCC) (I50.23)     Discharge Diagnosis  Principal Problem: Acute on chronic clinical systolic heart failure (HCC)Resolved Problems: * No resolved hospital problems. [facility] Stay  Narrative of Hospital Course:  Patient admitted with  (CHF/ volume overload  Pleural effusion  Acute renal insfficnecy  DM  COPD  HTN  Hyperlipidemia     Improved with diuresis per cardiology and renal.  Pulmonary also followed, same as above    Consultants:    Harini Rincon CONSULT TO NEPHROLOGYIP CONSULT TO PATRICIA Galvez TO HEART FAILURE NURSE/COORDINATOR    Surgeries/procedures Performed:       Treatments:           Discharge Plan/Disposition:  To [facility]    Hospital/Incidental Findings Requiring Follow Up:    Patient Instructions:    Diet: Diabetic Diet    Activity:Activity as Tolerated  For number of days (if applicable): Other Instructions:    Provider Follow-Up:   No follow-ups on file. Significant Diagnostic Studies:    Recent Labs:  Admission on 03/14/2022No results displayed because visit has over 200 results. ------------    Radiology last 7 days:  XR CHEST PORTABLEResult Date: 3/21/2022Moderate bilateral pleural effusions are mildly worsened. XR CHEST PORTABLEResult Date: 3/15/82535. Bibasilar opacities predominantly related to large pleural effusions, decreased as of yesterday. 2. In part at least, the decrease may be artifactual, related to differences in technique. On the right, the decrease is also attributable to recent thoracentesis. 3. No pneumothorax. US THORACENTESIS Which side should the procedure be performed? RightResult Date: 3/15/2022Ultrasound utilized for thoracentesis procedure.  For additional information, please refer to operative report.       Pending Labs   Order Current Status  Cell Count with Differential, Body Fluid Collected (03/16/22 1121)  Culture, Body Fluid Collected (03/16/22 1121)  Culture, Fungus Collected (03/16/22 1121)  Cytology, non gyne Collected (03/16/22 1121)  Glucose, body fluid Collected (03/16/22 1121)  Gram stain Collected (03/16/22 1121)  Lactate dehydrogenase, body fluid Collected (03/16/22 1121)  Protein, body fluid Collected (03/16/22 1153)  Basic Metabolic Panel w/ Reflex to MG In process  Culture, Fungus In process      Discharge Medications    Current Discharge Medication ListSTART taking these medicationsdocusate sodium (COLACE) 100 MG capsuleTake 1 capsule by mouth 2 times dailyQty: 60 capsule Refills: 0midodrine (PROAMATINE) 10 MG tabletTake 1 tablet by mouth 3 times daily (with meals)Qty: 90 tablet Refills: 0    Current Discharge Medication List    Current Discharge Medication ListCONTINUE these medications which have NOT CHANGEDguaiFENesin 400 MG tabletTake 400 mg by mouth 4 times daily as needed for Coughipratropium-albuterol (DUONEB) 0.5-2.5 (3) MG/3ML SOLN nebulizer solutionTake 1 vial by nebulization every 4 hours as needed for Shortness of Breathpotassium chloride (KLOR-CON M) 10 MEQ extended release tabletTake 20 mEq by mouth dailyinsulin glargine (LANTUS SOLOSTAR) 100 UNIT/ML injection penInject 50 Units into the skin nightlyloperamide (IMODIUM) 2 MG capsuleTake 2 mg by mouth 4 times daily as needed for DiarrheabuPROPion (WELLBUTRIN XL) 300 MG extended release tabletTake 300 mg by mouth every morningezetimibe (ZETIA) 10 MG tabletTake 10 mg by mouth at bedtimeZinc Sulfate 110 MG TABSTake 2 tablets by mouth dailybumetanide (BUMEX) 2 MG tabletTake 1 tablet by mouth 2 times dailyQty: 30 tablet Refills: 3magnesium hydroxide (MILK OF MAGNESIA) 400 MG/5ML suspensionTake 30 mLs by mouth daily as needed for ConstipationDextromethorphan-guaiFENesin  MG/5ML SYRPTake 5 mLs by mouth every 4 hours as needed for Coughmelatonin 3 MG TABS tabletTake 3 mg by mouth at bedtimeinsulin lispro, 1 Unit Dial, (HUMALOG KWIKPEN) 100 UNIT/ML SOPNInject 0-10 Units into the skin 4 times daily (before meals and nightly) *Per Sliding Scale*metoprolol succinate (TOPROL XL) 25 MG extended release tabletTake 1 tablet by mouth 2 times dailyQty: 30 tablet Refills: 3acetaminophen (TYLENOL) 325 MG tabletTake 650 mg by mouth every 4 hours as needed for Pain or Fever vitamin D (CHOLECALCIFEROL) 50 MCG (2000 UT) TABS tabletTake 2,000 Units by mouth daily aspirin 81 MG EC tabletTake 1 tablet by mouth dailyQty: 30 tablet Refills: 3folic acid (FOLVITE) 1 MG tabletTake 1 mg by mouth dailycyanocobalamin 1000 MCG tabletTake 1,000 mcg by mouth dailyatorvastatin (LIPITOR) 40 MG tabletTake 40 mg by mouth at bedtime pantoprazole (PROTONIX) 40 MG tabletTake 40 mg by mouth dailyvitamin C (ASCORBIC ACID) 500 MG tabletTake 500 mg by mouth 2 times daily    Current Discharge Medication ListSTOP taking these medicationspotassium chloride (KLOR-CON) 20 MEQ packetComments:Reason for Stopping:OXYGENComments:Reason for Stopping: BiPAP Machine MISCComments:Reason for Stopping:zinc sulfate (ZINCATE) 220 (50 Zn) MG capsuleComments:Reason for Stopping:insulin glargine-yfgn (SEMGLEE-YFGN) 100 UNIT/ML injection vialComments:Reason for Stopping:    Time Spent on Discharge:1E] minutes were spent in patient examination, evaluation, counseling as well as medication reconciliation, prescriptions for required medications, discharge plan, and follow up.     Electronically signed by Enid Cordova MD on 3/22/22 at 3:18 PM EDT

## 2022-03-28 PROBLEM — I50.23 ACUTE ON CHRONIC SYSTOLIC HEART FAILURE (HCC): Status: ACTIVE | Noted: 2022-01-01

## 2022-03-28 NOTE — LETTER
[address] PICU  [facility]  Phone: [phone]          2022     Patient: Modesta Sanders   YOB: 1945   Date of Visit: 3/28/2022       To Whom It May Concern:    Tyler Francisco (: 1945) has been hospitalized at [place] [address] since 3/28/2022. Anticipated discharge is in the next 1-2 days and patient will be discharging to [facility]. If you have any questions or concerns, please don't hesitate to call.     Sincerely,  SONU RichardsN, RN Case Manager  (c): [phone]

## 2022-03-28 NOTE — ED PROVIDER NOTES
ED  Provider Note  Admit Date/RoomTime: 3/28/2022  3:18 PM  ED Room: [facility] F/     HPI:   Bob Ellis is a 65 y.o. male presenting to the ED for shortness of breath, beginning 3 days ago. History comes primarily from the patient. Patient Active Problem List:     PNA (pneumonia)     Acute on chronic heart failure with preserved ejection fraction (HCC)     Coronary artery disease involving native coronary artery of native heart without angina pectoris     Cardiac pacemaker in situ     Primary hypertension     SPEEDY (obstructive sleep apnea)     Chronic respiratory failure with hypoxia (HCC)     CHF (congestive heart failure), NYHA class I, acute on chronic, combined ([facility]     Acute on chronic clinical systolic heart failure ([facility]  . The complaint has been persistent, moderate in severity, improved by nothing and worsened by nothing. Associated symptoms include none. Leana Finnegan was at a skilled nursing facility where he normally is on continuous supplemental oxygen and uses CPAP at night. He states that over the course the last several days despite these interventions, he has been getting progressively more short of breath at rest.  He also believes that his lower extremities have begun to swell bilaterally. Due to his worsening shortness of breath he was brought to [facility] emergency department for further evaluation and treatment. On arrival, the patient was assessed with history, physical exam, imaging studies and laboratory studies, vital signs. Vital signs were stable on arrival and the patient was afebrile. Review of Systems   Constitutional: Positive for activity change. Negative for chills and fever. HENT: Negative for ear pain, sinus pressure and sore throat. Eyes: Negative for pain, discharge and redness. Respiratory: Positive for shortness of breath. Negative for cough and wheezing. Cardiovascular: Positive for leg swelling.  Negative for observation of telemetry service in the inpatient setting. This information was relayed to the patient who understood this plan of care and was amenable to the plan. Patient was discussed with the admitting service (Dr. Ban Hunt) who concurred with the decision for admission, and have agreed to admit the patient to telemetry.           --------------------------------------------- PAST HISTORY ---------------------------------------------  Past Medical History:  has a past medical history of Acid reflux, Agent orange exposure, [facility]est[facility]) [facility] COPD (chronic obstructive pulmonary disease) ([facility] Depression, Diabetes mellitus ([facility] Hyperlipidemia, Hypertension, MI (myocardial infarction) ([facility] Polio, and Sleep apnea. Past Surgical History:  has a past surgical history that includes Coronary angioplasty with stent; Cardiac pacemaker placement; and pacemaker placement. Social History:  reports that he has quit smoking. He has never used smokeless tobacco. He reports that he does not drink alcohol and does not use drugs. Family History: family history is not on file. The patients home medications have been reviewed.     Allergies: Penicillins    -------------------------------------------------- RESULTS -------------------------------------------------    LABS:  Results for orders placed or performed during the hospital encounter of 03/28/22   CBC with Auto Differential   Result Value Ref Range    WBC 15.5 (H) 4.5 - 11.5 E9/L    RBC 4.06 3.80 - 5.80 E12/L    Hemoglobin 11.6 (L) 12.5 - 16.5 g/dL    Hematocrit 37.0 37.0 - 54.0 %    MCV 91.1 80.0 - 99.9 fL    MCH 28.6 26.0 - 35.0 pg    MCHC 31.4 (L) 32.0 - 34.5 %    RDW 18.2 (H) 11.5 - 15.0 fL    Platelets 264 051 - 816 E9/L    MPV 8.8 7.0 - 12.0 fL    Neutrophils % 69.6 43.0 - 80.0 %    Immature Granulocytes % 0.8 0.0 - 5.0 %    Lymphocytes % 16.7 (L) 20.0 - 42.0 %    Monocytes % 10.2 2.0 - 12.0 %    Eosinophils % 2.3 0.0 - 6.0 % Basophils % 0.4 0.0 - 2.0 %    Neutrophils Absolute 10.74 (H) 1.80 - 7.30 E9/L    Immature Granulocytes # 0.13 E9/L    Lymphocytes Absolute 2.58 1.50 - 4.00 E9/L    Monocytes Absolute 1.58 (H) 0.10 - 0.95 E9/L    Eosinophils Absolute 0.36 0.05 - 0.50 E9/L    Basophils Absolute 0.06 0.00 - 0.20 E9/L    Anisocytosis 1+     Polychromasia 2+     Poikilocytes 1+     Mac Cells 2+    Comprehensive Metabolic Panel w/ Reflex to MG   Result Value Ref Range    Sodium 142 132 - 146 mmol/L    Potassium reflex Magnesium 4.5 3.5 - 5.0 mmol/L    Chloride 102 98 - 107 mmol/L    CO2 27 22 - 29 mmol/L    Anion Gap 13 7 - 16 mmol/L    Glucose 80 74 - 99 mg/dL    BUN 57 (H) 6 - 23 mg/dL    CREATININE 3.0 (H) 0.7 - 1.2 mg/dL    GFR Non-African American 20 >=60 mL/min/1.73    GFR African American 25     Calcium 9.0 8.6 - 10.2 mg/dL    Total Protein 7.0 6.4 - 8.3 g/dL    Albumin 3.6 3.5 - 5.2 g/dL    Total Bilirubin 0.5 0.0 - 1.2 mg/dL    Alkaline Phosphatase 129 40 - 129 U/L    ALT 35 0 - 40 U/L    AST 24 0 - 39 U/L   Troponin   Result Value Ref Range    Troponin, High Sensitivity 34 (H) 0 - 11 ng/L   Brain Natriuretic Peptide   Result Value Ref Range    Pro-BNP 2,428 (H) 0 - 450 pg/mL   Lactate, Sepsis   Result Value Ref Range    Lactic Acid, Sepsis 0.9 0.5 - 1.9 mmol/L       RADIOLOGY:  XR CHEST (2 VW)   Final Result   1. Findings of CHF   2. Bilateral pleural effusions, right greater than left. The right pleural   effusion is large             EKG:  Rate 76  Sinus rhythm  Normal axis  No DC, QT prolongation, right bundle branch block noted  No ST segment elevations or depressions  T wave inversions noted in leads V2, V3  Abnormal EKG    ------------------------- NURSING NOTES AND VITALS REVIEWED ---------------------------  Date / Time Roomed:  3/28/2022  3:18 PM  ED Bed Assignment:  DAVID CASTRO    The nursing notes within the ED encounter and vital signs as below have been reviewed.      Patient Vitals for the past 24 hrs:   BP Temp Pulse Resp SpO2 Weight   03/28/22 1812 118/80 -- 77 16 94 % --   03/28/22 1502 123/68 97.8 °F (36.6 °C) 80 18 97 % 251 lb (113.9 kg)       Oxygen Saturation Interpretation: Normal    ------------------------------------------ PROGRESS NOTES ------------------------------------------  Re-evaluation(s):  Time: 7:27 PM EDT  Patients symptoms show no change  Repeat physical examination is not changed    Counseling:  I have spoken with the patient and discussed todays results, in addition to providing specific details for the plan of care and counseling regarding the diagnosis and prognosis. Their questions are answered at this time and they are agreeable with the plan of admission.    --------------------------------- ADDITIONAL PROVIDER NOTES ---------------------------------  Consultations:  Time: 7:27 PM EDT. Spoke with Dr. Heaven Cunningham. Discussed case. They will admit the patient. This patient's ED course included: a personal history and physicial examination, re-evaluation prior to disposition, multiple bedside re-evaluations, IV medications and continuous pulse oximetry    This patient has remained hemodynamically stable during their ED course. Diagnosis:  1. Acute on chronic systolic CHF (congestive heart failure) ([facility])        Disposition:  Patient's disposition: Admit to telemetry  Patient's condition is fair.          Addie Ramirez DO  Resident  03/28/22 5371

## 2022-03-28 NOTE — LETTER
[place] Department Medicaid  CERTIFICATION OF NECESSITY  FOR NON-EMERGENCY TRANSPORTATION   BY GROUND AMBULANCE      Individual Information   1. Name: Quincy Lira 2. [place] Medicaid Billing Number:    3. Address: [place] [address]      Transportation Provider Information   4. Provider Name:    5. [place] Medicaid Provider Number:  National Provider Identifier (NPI):      Certification  7. Criteria:  During transport, this individual requires:  [x] Medical treatment or continuous     supervision by an EMT. [] The administration or regulation of oxygen by another person. [] Supervised protective restraint. 8. Period Beginning Date:    5. Length  [x] Not more than 10  day(s)  [] One Year     Additional Information Relevant to Certification   10. Comments or Explanations, If Necessary or Appropriate     Acute on chronic CHF, Wearing supplemental oxygen by NC, bilateral lower extremity edema. COPD, hx MI, profound weakness, assist x 2 for transfers, dependent Decreased functional ability, decreased strength      Certifying Practitioner Information   11. Name of Practitioner: Mahendra Grewal MD   12. [place] Medicaid Provider Number, If Applicable:  [address] Provider Identifier (NPI):      Signature Information   14. Date of Signature: 3/29/2022 15. Name of Person Signing: Electronically signed by Mat Street RN on 3/29/2022 at 12:11 PM   16. Signature and Professional Designation: Electronically signed by Mat Street RN on 3/29/2022 at 12:11 PM     [place] [address]  Rev. 7/2015  [address] Encounter Date/Time: 3/28/2022 [address] [place] [address] Account: [de-identified]    MRN: [id number]    Patient: Crystal Boston Serial #: [id number]      ENCOUNTER          Patient Class: I Private Enc?   No Unit RM BD: [facility] Service: MED   Encounter DX: Acute on chronic systoli*   ADM Provider: Mahendra Grewal MD   Procedure:     ATT Provider: Katy Sr MD   REF Provider:        Admission DX: Acute on chronic systolic heart failure ([facility] Acute on chronic systolic CHF (congestive heart failure) ([facility] and DX codes: I50.23, I50.23      PATIENT                 Name: Juanjo Moya : 1945 (65 yrs)   Address: [address] * Sex: Male   City: [place] [address]         Marital Status: Single   Employer: RETIRED         Evangelical:  [employer] Robert Palomo   Primary Care Provider: Katy Sr, *         Primary Phone: [phone]   EMERGENCY CONTACT   Contact Name Legal Guardian? Relationship to Patient Home Phone Work Phone   1. Nicole Betancourt  2. *No Contact Specified* No    Brother/Sister    [phone]                 GUARANTOR            Guarantor: Juanjo Moya     : 1945   Address: [address] [place] [place] [address] Sex: Male   [address] [facility] [address]     Relation to Patient: Self       Home Phone: [phone]   Guarantor ID: [id number]       Work Phone:     Guarantor Employer: RETIRED         Status: RETIRED      COVERAGE        PRIMARY INSURANCE   Payor: [facility] MEDICARE Plan: [facility] MEDICARE COMPLETE   Payor Address: ,          Group Number: [id number] Insurance Type: INDEMNITY   Subscriber Name: Filomena Christensen : 1945   Subscriber ID: [id number] Pat. Rel. to Sub: Self   SECONDARY INSURANCE   Payor:  Plan:  FOR LIFE MEDICAR*   Payor Address:  C[address]/, [facility] [id number] [address]          Group Number:   Insurance Type: INDEMNITY   Subscriber Name: Filomena Christensen : 1945   Subscriber ID: [id number] Pat.  Rel. to Sub: SELF

## 2022-03-28 NOTE — LETTER
[address] Medicaid  CERTIFICATION OF NECESSITY  FOR TRANSPORTATION   BY WHEELCHAIR VAN     Individual Information   1. Name: Bob Ellis 2. [place] Medicaid Billing Number:    3. Address: [place] [address]      Transportation Provider Information   4. Provider Name:    5. [place] Medicaid Provider Number:  National Provider Identifier (NPI):      Certification  7. Criteria:  By signing this document, the practitioner certifies that two statements are true:  A. This individual must be accompanied by a mobility-related assistive device from the point of pick-up to the point of drop-off. B. Transport of this individual by standard passenger vehicle or common carrier is precluded or contraindicated. 8. Period Beginning Date: 04/06/22   9. Length  [] Not more than 10 day(s)  [] One Year     Additional Information Relevant to Certification   10. Comments or Explanations, If Necessary or Appropriate     CHF, KELSEY, weakness     Certifying Practitioner Information   11. Name of Practitioner: Ranjan Velasquez MD   12. [place] Medicaid Provider Number, If Applicable:  [address] Provider Identifier (NPI):      Signature Information   14. Date of Signature: 04/06/22 15. Name of Person Signing: Jose Gibbs   76. Signature and Professional Designation: Electronically signed by ZULLY Gibbs on 4/6/2022 at 12:32 PM  Discharge planner       [facility] [address]  Rev. 7/2015    [address] [place] [address] Encounter Date/Time: 3/28/2022 [address] [place] [address] Account: [de-identified]    MRN: [id number]    Patient: Vianey Argueta Serial #: [id number]      ENCOUNTER          Patient Class: I Private Enc?   No Unit RM BD: [facility] Service: MED   Encounter DX: Acute on chronic systoli*   ADM Provider: Ranjan Velasquez MD   Procedure:     ATT Provider: Ranjan Velasquez MD   REF Provider:        Admission DX: Acute on chronic systolic heart failure ([facility] Acute on chronic systolic CHF (congestive heart failure) ([facility] and DX codes: I50.23, I50.23      PATIENT                 Name: Sheila Mratin : 1945 (77 yrs)   Address: [address] * Sex: Male   [place] city: [place] [address]         Marital Status: Single   Employer: RETIRED         Baptism:  [employer]ernan gDecide   Primary Care Provider: Susan Lama, *         Primary Phone: [phone]   EMERGENCY CONTACT   Contact Name Legal Guardian? Relationship to Patient Home Phone Work Phone   1. Nicole Betancourt  2. *No Contact Specified* No    Brother/Sister    [phone]                 GUARANTOR            Guarantor: Sheila Martin     : 1945   Address: [address] [place] [address] Sex: Male   [address] [facility] [address]     Relation to Patient: Self       Home Phone: [phone]   Guarantor ID: [id number]       Work Phone:     Guarantor Employer: RETIRED         Status: RETIRED      COVERAGE        PRIMARY INSURANCE   Payor: [facility] MEDICARE Plan: [facility] MEDICARE COMPLETE   Payor Address: ,          Group Number: [id number] Insurance Type: INDEMNITY   Subscriber Name: Scott Tee : 1945   Subscriber ID: [id number] Pat. Rel. to Sub: Self   SECONDARY INSURANCE   Payor:  Plan:  FOR LIFE MEDICAR*   Payor Address:  [address] [id number], [place] [address]          Group Number:   Insurance Type: INDEMNITY   Subscriber Name: Scott Tee : 1945   Subscriber ID: [id number] Pat.  Rel. to Sub: SELF           CSN: [id number]

## 2022-03-29 NOTE — CONSULTS
Nephrology Consult  [facility]  [facility]. Iris CAMARILLO    CC:       HPI:   Venkatesh Duncan a 65 y. o. male with a history of chronic kidney disease stage IV (recent recent baseline creatinine 2.5-3), type 2 diabetes mellitus, COPD, chronic HFpEF H/O pacemaker, speedy, gerd, djd, htn, hyperlipidemia, who presented from [facility] with complaints of shortness of breath and hypoxia. He has been admitted twice for this in the last weeks. He has been on bumex drip. Labs at this time show na 143, k 4.3, co2 29, bun 54, cr 3, gfr 20, ca 9, alb 3.6, wbc 13.3, hgb 11.2, plt 409. He has been started on iv bumex 2 iv q 12. He is on cpap and 3 L of o2 at the snf. He has had a large right pleural effusion which has been tapped.      PMH:    Past Medical History:   Diagnosis Date    Acid reflux     Agent orange exposure     Arthritis     CAD (coronary artery disease)     Congestive heart failure (CHF) (HCC)     COPD (chronic obstructive pulmonary disease) (HCC)     Depression     Diabetes mellitus ([address]     History of blood transfusion     Hyperlipidemia     Hypertension     MI (myocardial infarction) ([address]     Polio     Sleep apnea        Patient Active Problem List   Diagnosis    PNA (pneumonia)    Acute on chronic heart failure with preserved ejection fraction ([address]    Coronary artery disease involving native coronary artery of native heart without angina pectoris    Cardiac pacemaker in situ    Primary hypertension    SPEEDY (obstructive sleep apnea)    Chronic respiratory failure with hypoxia (HCC)    CHF (congestive heart failure), NYHA class I, acute on chronic, combined ([address]    Acute on chronic clinical systolic heart failure (HCC)    Acute on chronic systolic heart failure (HCC)       Meds:     aspirin  81 mg Oral Daily    atorvastatin  40 mg Oral Nightly    buPROPion  300 mg Oral QAM    cyanocobalamin  1,000 mcg Oral Daily    docusate sodium  100 mg Oral BID    ezetimibe  10 mg Oral Nightly    folic acid  1 mg Oral Daily    insulin glargine-yfgn  50 Units SubCUTAneous Nightly    melatonin  3 mg Oral Nightly    metoprolol succinate  25 mg Oral BID    midodrine  10 mg Oral TID WC    pantoprazole  40 mg Oral QAM AC    potassium chloride  20 mEq Oral Daily    vitamin C  500 mg Oral BID    vitamin D  2,000 Units Oral Daily    zinc sulfate  50 mg Oral Daily    bumetanide  2 mg IntraVENous BID    insulin lispro  0-6 Units SubCUTAneous TID WC    insulin lispro  0-3 Units SubCUTAneous Nightly    heparin (porcine)  5,000 Units SubCUTAneous Q8H        dextrose         Meds prn:     acetaminophen, guaiFENesin-dextromethorphan, guaiFENesin, ipratropium-albuterol, loperamide, magnesium hydroxide, glucose, dextrose, glucagon (rDNA), dextrose    Meds prior to admission:     No current facility-administered medications on file prior to encounter.      Current Outpatient Medications on File Prior to Encounter   Medication Sig Dispense Refill    docusate sodium (COLACE) 100 MG capsule Take 1 capsule by mouth 2 times daily 60 capsule 0    midodrine (PROAMATINE) 10 MG tablet Take 1 tablet by mouth 3 times daily (with meals) 90 tablet 0    guaiFENesin 400 MG tablet Take 400 mg by mouth 4 times daily as needed for Cough      ipratropium-albuterol (DUONEB) 0.5-2.5 (3) MG/3ML SOLN nebulizer solution Take 1 vial by nebulization every 4 hours as needed for Shortness of Breath      potassium chloride (KLOR-CON M) 10 MEQ extended release tablet Take 20 mEq by mouth daily      insulin glargine (LANTUS SOLOSTAR) 100 UNIT/ML injection pen Inject 50 Units into the skin nightly      loperamide (IMODIUM) 2 MG capsule Take 2 mg by mouth 4 times daily as needed for Diarrhea      buPROPion (WELLBUTRIN XL) 300 MG extended release tablet Take 300 mg by mouth every morning      ezetimibe (ZETIA) 10 MG tablet Take 10 mg by mouth at bedtime      Zinc Sulfate 110 MG TABS Take 2 tablets by mouth daily      bumetanide (BUMEX) 2 MG tablet Take 1 tablet by mouth 2 times daily 30 tablet 3    magnesium hydroxide (MILK OF MAGNESIA) 400 MG/5ML suspension Take 30 mLs by mouth daily as needed for Constipation      Dextromethorphan-guaiFENesin  MG/5ML SYRP Take 5 mLs by mouth every 4 hours as needed for Cough      melatonin 3 MG TABS tablet Take 3 mg by mouth at bedtime      insulin lispro, 1 Unit Dial, (HUMALOG KWIKPEN) 100 UNIT/ML SOPN Inject 0-10 Units into the skin 4 times daily (before meals and nightly) *Per Sliding Scale*      metoprolol succinate (TOPROL XL) 25 MG extended release tablet Take 1 tablet by mouth 2 times daily 30 tablet 3    acetaminophen (TYLENOL) 325 MG tablet Take 650 mg by mouth every 4 hours as needed for Pain or Fever       vitamin C (ASCORBIC ACID) 500 MG tablet Take 500 mg by mouth 2 times daily      vitamin D (CHOLECALCIFEROL) 50 MCG (2000 UT) TABS tablet Take 2,000 Units by mouth daily       aspirin 81 MG EC tablet Take 1 tablet by mouth daily 30 tablet 3    folic acid (FOLVITE) 1 MG tablet Take 1 mg by mouth daily      cyanocobalamin 1000 MCG tablet Take 1,000 mcg by mouth daily      atorvastatin (LIPITOR) 40 MG tablet Take 40 mg by mouth at bedtime       pantoprazole (PROTONIX) 40 MG tablet Take 40 mg by mouth daily         Allergies:    Penicillins    Social History:     reports that he has quit smoking. He has never used smokeless tobacco. He reports that he does not drink alcohol and does not use drugs. Family History:     History reviewed. No pertinent family history.     ROS:     General: no fever, chills   Heent: no nasal congestion, sore throat   Resp: no cp or sob  Cardiac: no cp , le edema, palpitations  Gi: no nausea, vomiting, melena, abd pain, hematemesis  Gu: no hematuria, dysuria   Neruo: no numbness, weakness, headache, blurry vision   Endocrine:  no h/o dm  Derm: no rash , petechia  Heme: no epistaxis, bruising  All other sx negative     Physical Exam:      Patient Vitals for the past 24 hrs:   BP Temp Temp src Pulse Resp SpO2 Height Weight   03/29/22 0741 (!) 134/93 97.7 °F (36.5 °C) Oral 80 28 95 % 5' 9\" (1.753 m) 270 lb 12.8 oz (122.8 kg)   03/29/22 0515 113/81 97.9 °F (36.6 °C) -- 78 22 94 % -- --   03/29/22 0059 123/79 97.6 °F (36.4 °C) -- 76 18 96 % -- --   03/28/22 1812 118/80 -- -- 77 16 94 % -- --   03/28/22 1502 123/68 97.8 °F (36.6 °C) -- 80 18 97 % -- 251 lb (113.9 kg)         Intake/Output Summary (Last 24 hours) at 3/29/2022 1222  Last data filed at 3/29/2022 0825  Gross per 24 hour   Intake --   Output 1050 ml   Net -1050 ml       Constitutional: Patient in no acute distress   Head: normocephalic, atraumatic   Neck: supple, no jvd  Cardiovascular: regular rate and rhythm, no murmurs, gallops, or rubs   Respiratory: Clear, no rales, rhochi, or wheezes,   Gastrointestinal: soft, nontender, nondistended, no hepatosplenomegaly  Ext: no edema  Neuro: aaox3  Skin: dry, no rash   Back: nontender    Data:    Recent Labs     03/28/22  1638 03/29/22  0537   WBC 15.5* 13.3*   HGB 11.6* 11.2*   HCT 37.0 36.8*   MCV 91.1 92.9    409       Recent Labs     03/28/22  1638 03/29/22  0537    143   K 4.5 4.3    101   CO2 27 29   CREATININE 3.0* 3.0*   BUN 57* 54*   LABGLOM 20 20   GLUCOSE 80 69*   CALCIUM 9.0 9.0       No results found for: VITD25    PTH   Date Value Ref Range Status   01/24/2022 112 (H) 15 - 65 pg/mL Final       Recent Labs     03/28/22  1638   ALT 35   AST 24   ALKPHOS 129   BILITOT 0.5       Recent Labs     03/28/22  1638   LABALBU 3.6       No results found for: FERRITIN, IRON, TIBC    No results found for: [id number]    No results found for: FOLATE      Lab Results   Component Value Date    COLORU Yellow 03/15/2022    NITRU Negative 03/15/2022    GLUCOSEU Negative 03/15/2022    KETUA Negative 03/15/2022    UROBILINOGEN 0.2 03/15/2022    BILIRUBINUR Negative 03/15/2022       Lab Results   Component Value Date/Time    YAMILETH 351 01/22/2022 11:24 AM No components found for: URIC    No results found for: LIPIDPAN      Assessment and Plan:    1.  Chronic kidney disease stage IV  due to diabetes mellitus and hypertension  Current creatinine at his usual baseline 2.5-3  Follow on iv diuretics  Check pth and phos     2.  Acute on chronic HFpEF  Continue iv bumex  Strict I/o  Right-sided thoracentesis performed 3/15      3. Htn  On Midodrine  Hold parameters on metoprolol     4 mame  On cpap    Cynthia Dopp.  Jody Mckenzie MD

## 2022-03-29 NOTE — PROGRESS NOTES
Sharee Wilkins served Dr Annabelle Grimes for consult re: recurrent right pleural effusion, hx thoracentesis 3/16/22

## 2022-03-29 NOTE — CARE COORDINATION
3/29/2022 - Care Coordination - admitted for acute on chronic CHF. Has Cardiology, nephrology and pulmonology consult. Wearing 6L NC. Has 2+ pitting edema of BLE. IV lasix twice a day. PT/OT francine ordered. Spoke with pt to discuss transition of care. He is from [facility]. He plans to return there - he private pays to stay there. He states he will eventually go home from [facility] since he private pays. He states that he has reached out to the [place] and he said they will be assisting to get him help a home. He has a 1 story home with 3 steps to enter and also has a ramp. PCP is Dr Meredith Turner at the [place] on [facility]. DME - fww, cane, and CPAP from the [place]. [facility]. Per Aram Schwab at [facility] - pt can return to [facility] - they would like for him to return skilled under his insurance - he will need a precert. He will need a covid to return. Ambulance form completed and in envelope on soft chart. SW/CM will follow. Authored by Resident/PA/NP

## 2022-03-29 NOTE — PROGRESS NOTES
Physical Therapy  Physical Therapy Initial Assessment     Name: Juanjo Moay  : 1945  MRN: [id number]      Date of Service: 3/29/2022    Evaluating PT:  Alexia Curry PT, DPT [id number]    Room #:  0169/7106-U  Diagnosis:  Acute on chronic systolic heart failure (HCC) [I50.23]  Acute on chronic systolic CHF (congestive heart failure) (HCC) [I50.23]  PMHx/PSHx:  Acid reflux, agent orange exposure, arthritis, CAD, CHF, COPD, depression, DM, HLD, HTN, MI, polio, sleep apnea  Procedure/Surgery:  None this admission  Precautions:  Falls, O2  Equipment Needs:  None, patient to return to [facility]. Owns SPC, rollator, manual WC    SUBJECTIVE:    Pt admitted from [address] [place] [address] where he was active with therapy ambulating with [facility]. Previously, Pt lives alone in a 1 story home with 3 stairs to enter and 1 rail, however, does have ramp available. Bed is on 1st floor and bath is on 1st floor. Pt ambulated with SPC, rollator PTA. OBJECTIVE:   Initial Evaluation  Date: 3/29/22 Treatment Short Term/ Long Term   Goals   AM-PAC 6 Clicks      Was pt agreeable to Eval/treatment? yes     Does pt have pain? No c/o pain     Bed Mobility  Rolling: ModA  Supine to sit: ModA  Sit to supine: ModA  Scooting: ModA  Rolling: Independent   Supine to sit:  Independent   Sit to supine: Independent   Scooting: Independent    Transfers Sit to stand: MaxA x2  Stand to sit: MaxA x2  Stand pivot: NT  Sit to stand: SBA  Stand to sit: SBA  Stand pivot: Mariela with wW   Ambulation    side steps at EOB with [facility] and Mariela  100 feet with [facility] Supervision    Stair negotiation: ascended and descended  NT  TBD   ROM BUE:  Defer to OT note  BLE:  WFL     Strength BUE:  Defer to OT note  BLE:  Grossly 3/5  WFL   Balance Sitting EOB:  SBA  Dynamic Standing:  Mariela with [facility]  Sitting EOB:  Independent   Dynamic Standing:  Mariela with [facility]     Pt is A & O x 4  Sensation:  NT  Edema:  BLE pitting edema 3+    Vitals:  SPO2 throughout session:  5L at rest: 93%  6L during PT goals. Patient and or family understand(s) diagnosis, prognosis, and plan of care. yes    PHYSICAL THERAPY PLAN OF CARE:    PT POC is established based on physician order and patient diagnosis     Referring provider/PT Order:    03/29/22 0115  PT eval and treat  Start:  03/29/22 0115,   End:  03/29/22 0115,   ONE TIME,   Standing Count:  1 Occurrences,   R         Vivian Powers MD       Diagnosis:  Acute on chronic systolic heart failure (HCC) [I50.23]  Acute on chronic systolic CHF (congestive heart failure) (HCC) [I50.23]  Specific instructions for next treatment:  Progress mobility as appropriate    Current Treatment Recommendations:     [x] Strengthening to improve independence with functional mobility   [x] ROM to improve independence with functional mobility   [x] Balance Training to improve static/dynamic balance and to reduce fall risk  [x] Endurance Training to improve activity tolerance during functional mobility   [x] Transfer Training to improve safety and independence with all functional transfers   [x] Gait Training to improve gait mechanics, endurance and assess need for appropriate assistive device  [] Stair Training in preparation for safe discharge home and/or into the community   [x] Positioning to prevent skin breakdown and contractures  [x] Safety and Education Training   [x] Patient/Caregiver Education   [x] HEP  [x] Other     PT long term treatment goals are located in above grid    Frequency of treatments: 2-5x/week x 1-2 weeks. Time in  1412  Time out  1445    Total Treatment Time  10 minutes     Evaluation Time includes thorough review of current medical information, gathering information on past medical history/social history and prior level of function, completion of standardized testing/informal observation of tasks, assessment of data and education on plan of care and goals.     CPT codes:  [x] Low Complexity PT evaluation 05630  [] Moderate Complexity PT evaluation [id number]  [] High Complexity PT evaluation [id number]  [] PT Re-evaluation [id number]  [] Gait training 88644 - minutes  [] Manual therapy 97315 - minutes  [x] Therapeutic activities 49590 10 minutes  [] Therapeutic exercises 70933 - minutes  [] Neuromuscular reeducation 45553 - minutes     Reginald Israel PT, DPT  [id number]

## 2022-03-29 NOTE — CONSULTS
[place]  Department of Internal Medicine  Division of Pulmonary, Critical Care and Sleep Medicine  Consult Note    Genevieve Anton DO, MPH, Lino Platt, Zakiya Jordan MD, [address], [facility]      Patient: Quincy Lira  MRN: [id number]  : 1945    Encounter Time: 7:36 PM     Date of Admission: 3/28/2022  3:18 PM    Primary Care Physician: Mahendra Grewal MD    Reason for Consultation: Pleural effusions, shortness of breath     HISTORY OF PRESENT ILLNESS : Quincy Lira 65 y.o. male was seen in consultation regarding the above chief compliant. The patient has been seen by myself and my partners multiple times since January. Most recently he was admitted on  and discharged on  [address] [place] [address] due to decompensated heart failure. At that time he was found to have a large right-sided pleural effusion and did undergo a thoracentesis. He was also aggressively diuresed with a Bumex drip. The patient reports that he was doing well at [address] [place] [address] for a few days and then subsequently developed worsening shortness of breath. He was not following a low-sodium diet. He also reports that he had decreased oral intake due to shortness of breath with eating. He is very dyspneic during the exam and therefore history is limited. He does however endorse orthopnea. PAST MEDICAL HISTORY:  has a past medical history of Acid reflux, Agent orange exposure, Arthritis, CAD (coronary artery disease), Congestive heart failure (CHF) ([facility]), COPD (chronic obstructive pulmonary disease) ([facility] [address] Depression, Diabetes mellitus ([facility] [address] History of blood transfusion, Hyperlipidemia, Hypertension, MI (myocardial infarction) ([facility] [address] Polio, and Sleep apnea. SURGICAL HISTORY:  has a past surgical history that includes Coronary angioplasty with stent; Cardiac pacemaker placement; pacemaker placement; and hernia repair.      SOCIAL HISTORY: reports that he has quit smoking. He has never used smokeless tobacco. He reports that he does not drink alcohol and does not use drugs. FAMILY  HISTORY: family history is not on file. MEDICATIONS:    Prior to Admission medications    Medication Sig Start Date End Date Taking?  Authorizing Provider   docusate sodium (COLACE) 100 MG capsule Take 1 capsule by mouth 2 times daily 3/22/22   Cristina Shin MD   midodrine (PROAMATINE) 10 MG tablet Take 1 tablet by mouth 3 times daily (with meals) 3/22/22   Cristina Shin MD   guaiFENesin 400 MG tablet Take 400 mg by mouth 4 times daily as needed for Cough    Historical Provider, MD   ipratropium-albuterol (DUONEB) 0.5-2.5 (3) MG/3ML SOLN nebulizer solution Take 1 vial by nebulization every 4 hours as needed for Shortness of Breath    Historical Provider, MD   potassium chloride (KLOR-CON M) 10 MEQ extended release tablet Take 20 mEq by mouth daily    Historical Provider, MD   insulin glargine (LANTUS SOLOSTAR) 100 UNIT/ML injection pen Inject 50 Units into the skin nightly    Historical Provider, MD   loperamide (IMODIUM) 2 MG capsule Take 2 mg by mouth 4 times daily as needed for Diarrhea    Historical Provider, MD   buPROPion (WELLBUTRIN XL) 300 MG extended release tablet Take 300 mg by mouth every morning    Historical Provider, MD   ezetimibe (ZETIA) 10 MG tablet Take 10 mg by mouth at bedtime    Historical Provider, MD   Zinc Sulfate 110 MG TABS Take 2 tablets by mouth daily    Historical Provider, MD   bumetanide (BUMEX) 2 MG tablet Take 1 tablet by mouth 2 times daily 3/5/22   Cristina Shin MD   magnesium hydroxide (MILK OF MAGNESIA) 400 MG/5ML suspension Take 30 mLs by mouth daily as needed for Constipation    Historical Provider, MD   Dextromethorphan-guaiFENesin  MG/5ML SYRP Take 5 mLs by mouth every 4 hours as needed for Cough    Historical Provider, MD   melatonin 3 MG TABS tablet Take 3 mg by mouth at bedtime Historical Provider, MD   insulin lispro, 1 Unit Dial, (HUMALOG KWIKPEN) 100 UNIT/ML SOPN Inject 0-10 Units into the skin 4 times daily (before meals and nightly) *Per Sliding Scale*    Historical Provider, MD   metoprolol succinate (TOPROL XL) 25 MG extended release tablet Take 1 tablet by mouth 2 times daily 2/16/22   Schuyler Me, APRN - CNP   acetaminophen (TYLENOL) 325 MG tablet Take 650 mg by mouth every 4 hours as needed for Pain or Fever     Historical Provider, MD   vitamin C (ASCORBIC ACID) 500 MG tablet Take 500 mg by mouth 2 times daily    Historical Provider, MD   vitamin D (CHOLECALCIFEROL) 50 MCG (2000 UT) TABS tablet Take 2,000 Units by mouth daily     Historical Provider, MD   aspirin 81 MG EC tablet Take 1 tablet by mouth daily 2/3/22   Linnea Faustin MD   folic acid (FOLVITE) 1 MG tablet Take 1 mg by mouth daily    Historical Provider, MD   cyanocobalamin 1000 MCG tablet Take 1,000 mcg by mouth daily    Historical Provider, MD   atorvastatin (LIPITOR) 40 MG tablet Take 40 mg by mouth at bedtime     Historical Provider, MD   pantoprazole (PROTONIX) 40 MG tablet Take 40 mg by mouth daily    Historical Provider, MD       ALLERGIES: Penicillins       REVIEW OF SYSTEMS:  Otherwise negative if not reported or listed below  Constitutional:  Positive for weight gain     Positive for difficulty sleeping     No fevers, chills or rigors. Eyes:    No visual changes or diplopia. No scleral icterus. ENT:    No Headaches, hearing loss or vertigo. No mouth sores or sore throat. Cardiovascular:  Positive for chest tightness. Denies palpitations  Respiratory:  Positive for shortness of breath, orthopnea, dry cough denies hemoptysis. No sputum production. Gastrointestinal:  No abdominal pain, appetite loss, blood in stools. Positive for increased abdominal girth     No hematemesis  Genitourinary:  No dysuria, trouble voiding or hematuria. No nocturia.   Musculoskeletal: No weakness or joint complaints. Integumentary: No rashes or pruritis. Neurological:  No headache, numbness or tingling. Psychiatric:   Positive for depression  Endocrine:   No excessive thirst, fluid intake, or urination. No tremor. Hematologic: No abnormal bruising or bleeding. Lymphatic:  No swollen lymph nodes. Immunologic:  No hives or hx of anaphaxsis. OBJECTIVE:     PHYSICAL EXAM:   VITALS:   Vitals:    03/29/22 0059 03/29/22 0515 03/29/22 0741 03/29/22 1500   BP: 123/79 113/81 (!) 134/93 (!) 150/94   Pulse: 76 78 80 92   Resp: 18 22 28 26   Temp: 97.6 °F (36.4 °C) 97.9 °F (36.6 °C) 97.7 °F (36.5 °C) 97.9 °F (36.6 °C)   TempSrc:   Oral Oral   SpO2: 96% 94% 95% 93%   Weight:   270 lb 12.8 oz (122.8 kg)    Height:   5' 9\" (1.753 m)         Intake/Output Summary (Last 24 hours) at 3/29/2022 1936  Last data filed at 3/29/2022 1853  Gross per 24 hour   Intake --   Output 1450 ml   Net -1450 ml        CONSTITUTIONAL:   A&O x 3, mild respiratory distress  SKIN:     No rash, No suspicious lesions or skin discoloration  HEENT:     EOMI, MMM, No thrush  NECK:    No bruits, no JVD  CV:      Sinus,  No murmur, No rubs, No gallops  PULMONARY:   Breath sounds diminished throughout. ABDOMEN:     Soft, non-tender. BS normal. No R/R/G  EXT:    No deformities . No clubbing. Significant lower extremity edema going to the presacral area  PULSE:   Appears equal and palpable.   PSYCHIATRIC:  Seems appropriate, No acute psycosis  MS:    No fractures, No gross weakness  NEUROLOGIC:   The clinical assessment is non-focal     DATA: IMAGING & TESTING:     LABORATORY TESTS:    CBC with Differential:    Lab Results   Component Value Date    WBC 13.3 03/29/2022    RBC 3.96 03/29/2022    HGB 11.2 03/29/2022    HCT 36.8 03/29/2022     03/29/2022    MCV 92.9 03/29/2022    MCH 28.3 03/29/2022    MCHC 30.4 03/29/2022    RDW 18.1 03/29/2022    LYMPHOPCT 16.7 03/28/2022    MONOPCT 10.2 03/28/2022    BASOPCT 0.4 03/28/2022    MONOSABS 1.58 03/28/2022    LYMPHSABS 2.58 03/28/2022    EOSABS 0.36 03/28/2022    BASOSABS 0.06 03/28/2022     CMP:    Lab Results   Component Value Date     03/29/2022    K 4.3 03/29/2022    K 4.5 03/28/2022     03/29/2022    CO2 29 03/29/2022    BUN 54 03/29/2022    CREATININE 3.0 03/29/2022    GFRAA 25 03/29/2022    LABGLOM 20 03/29/2022    GLUCOSE 69 03/29/2022    PROT 7.0 03/28/2022    LABALBU 3.6 03/28/2022    CALCIUM 9.0 03/29/2022    BILITOT 0.5 03/28/2022    ALKPHOS 129 03/28/2022    AST 24 03/28/2022    ALT 35 03/28/2022        PRO-BNP:   Lab Results   Component Value Date    PROBNP 2,428 (H) 03/28/2022    PROBNP 3,338 (H) 03/14/2022      ABGs: No results found for: PH, PO2, PCO2  Hemoglobin A1C: No components found for: HGBA1C    IMAGING:  Imaging tests were completed and reviewed and discussed radiology and care team involved and reveals   Echo Limited    Result Date: 3/19/2022  Transthoracic Echocardiography Report (TTE)  Demographics   Patient Name    Unique Zurita  Gender            Male                  [address] [facility]  [id number]      Room Number       0187  Number   Account #       [de-identified]     Procedure Date    03/19/2022   Corporate ID                  Ordering          Steve De MD                                Physician   Accession       [id number]    Referring  Number                        Physician   Date of Birth   1945    Sonographer       [facility]   Age             65 year(s)    Interpreting      [address]                                Physician         Physician Cardiology                                                  Mehran Brenner MD                                 Any Other  Procedure Type of Study   TTE procedure:Echo Limited Study. Procedure Date Date: 03/19/2022 Start: 08:53 AM Study Location: Portable Technical Quality: Adequate visualization Indications:Pericardial effusion.  Patient Status: Routine Height: 69 inches Weight: 271 pounds BSA: 2.35 m^2 BMI: 40.02 kg/m^2 BP: 95/68 mmHg  Findings   Left Ventricle  Left ventricle is grossly normal in size. No gross regional wall motion abnormalities seen. Grossly normal left ventricular ejection fraction. Indeterminate diastolic function. Right Ventricle  The right ventricle was not clearly visualized. Grossly normal right ventricular size and function. Pacer wire visualized in right ventricle. Left Atrium  Normal sized left atrium. Interatrial septum appears intact. Right Atrium  Normal right atrium size. Pacemaker lead was visualized in RA cavity. Mitral Valve  Structurally normal mitral valve. Tricuspid Valve  The tricuspid valve was not well visualized. Aortic Valve  The aortic valve leaflets were not well visualized. Pulmonic Valve  The pulmonic valve was not well visualized. Pericardial Effusion  There is a small anterior pericardial effusion noted. Aorta  Aorta was not clearly visualized. Conclusions   Summary  Technically suboptimal and limited study with images limited to subcostal  window. Left ventricle is grossly normal in size. No gross regional wall motion abnormalities seen. Grossly normal left ventricular ejection fraction. There is a small anterior pericardial effusion noted. Signature   ----------------------------------------------------------------  Electronically signed by Doreen Pickens MD(Interpreting  physician) on 03/19/2022 02:48 PM  ----------------------------------------------------------------  [email or web address] [id number]    XR CHEST (2 VW)    Result Date: 3/28/2022  EXAMINATION: TWO XRAY VIEWS OF THE CHEST 3/28/2022 4:08 pm COMPARISON: 03/21/2022 HISTORY: ORDERING SYSTEM PROVIDED HISTORY: SOB TECHNOLOGIST PROVIDED HISTORY: Reason for exam:->SOB What reading provider will be dictating this exam?->CRC FINDINGS: The cardiac silhouette is within normals. There are findings of CHF. There are bilateral pleural effusions, right greater than left. The right pleural effusion is large in size. There is no pneumothorax. 1. Findings of CHF 2. Bilateral pleural effusions, right greater than left. The right pleural effusion is large     XR CHEST (2 VW)    Result Date: 2/28/2022  EXAMINATION: TWO XRAY VIEWS OF THE CHEST 2/28/2022 10:20 am COMPARISON: 2/23/2022 HISTORY: ORDERING SYSTEM PROVIDED HISTORY: follow up pleural effusions TECHNOLOGIST PROVIDED HISTORY: Reason for exam:->follow up pleural effusions What reading provider will be dictating this exam?->CRC FINDINGS: Two-view chest reveals pacer to be in satisfactory position. Patchy ill-defined opacifications in the left lung base with moderate size right pleural effusion and small left pleural effusion. The effusion on the right is slightly increased in size in the interval.     Slight increase seen in size of the right pleural effusion, now moderate in size and small in size on the left. Ill-defined opacification again seen within the lung bases bilaterally, right greater than left. XR CHEST PORTABLE    Result Date: 3/21/2022  EXAMINATION: ONE XRAY VIEW OF THE CHEST 3/21/2022 1:10 pm COMPARISON: Chest radiograph March 15, 2022 HISTORY: ORDERING SYSTEM PROVIDED HISTORY: sob TECHNOLOGIST PROVIDED HISTORY: Reason for exam:->sob What reading provider will be dictating this exam?->CRC FINDINGS: Left cardiac pacing device again identified. Bilateral moderate pleural effusions with compressive atelectasis. There is mild worsened compared to prior exam.  No pneumothorax. The cardiomediastinal silhouette is without acute process. The osseous structures are without acute process. Moderate bilateral pleural effusions are mildly worsened.      XR CHEST PORTABLE    Result Date: 3/15/2022  EXAMINATION: ONE XRAY VIEW OF THE CHEST 3/15/2022 4:12 pm COMPARISON: 03/03/2022 HISTORY: ORDERING SYSTEM PROVIDED HISTORY: Thoracentesis TECHNOLOGIST PROVIDED HISTORY: Reason for exam:->Thoracentesis What reading provider will be dictating this exam?->CRC FINDINGS: Bibasilar opacities are seen, which appear somewhat less prominent than yesterday, however, that may be related differences in technique. The current study was obtained in a patel state of inspiration. The cardiomediastinal contour is unremarkable. Pacemaker in situ. No pneumothorax     1. Bibasilar opacities predominantly related to large pleural effusions, decreased as of yesterday. 2. In part at least, the decrease may be artifactual, related to differences in technique. On the right, the decrease is also attributable to recent thoracentesis. 3. No pneumothorax. XR CHEST PORTABLE    Result Date: 3/14/2022  EXAMINATION: ONE XRAY VIEW OF THE CHEST 3/14/2022 12:16 pm COMPARISON: 03/03/2022 and 02/28/2022 HISTORY: ORDERING SYSTEM PROVIDED HISTORY: shortness of breath TECHNOLOGIST PROVIDED HISTORY: Reason for exam:->shortness of breath What reading provider will be dictating this exam?->CRC FINDINGS: The cardiac silhouette is within normals. Bilateral lower lobe airspace disease is noted favored to represent atelectasis. There is a moderate right pleural effusion and small left pleural effusion. 1. Moderate right pleural effusion and small left pleural effusion 2. Bibasilar airspace disease favored to represent atelectasis secondary to the pleural effusions. .     XR CHEST PORTABLE    Result Date: 3/3/2022  EXAMINATION: ONE XRAY VIEW OF THE CHEST 3/3/2022 3:23 pm COMPARISON: 02/28/2022 HISTORY: ORDERING SYSTEM PROVIDED HISTORY: sob, vol overload TECHNOLOGIST PROVIDED HISTORY: Reason for exam:->sob, vol overload What reading provider will be dictating this exam?->CRC FINDINGS: Findings remain compatible with moderate bilateral pleural effusions greater on the right. Heart is normal in size. There is a left dual lead pacemaker.  No evidence of pneumothorax. The osseous structures are stable. Unchanged moderate bilateral pleural effusions greater on the right. CTA PULMONARY W CONTRAST    Result Date: 3/14/2022  EXAMINATION: CTA OF THE CHEST 3/14/2022 3:28 pm TECHNIQUE: CTA of the chest was performed after the administration of intravenous contrast.  Multiplanar reformatted images are provided for review. MIP images are provided for review. Dose modulation, iterative reconstruction, and/or weight based adjustment of the mA/kV was utilized to reduce the radiation dose to as low as reasonably achievable. COMPARISON: None. HISTORY: ORDERING SYSTEM PROVIDED HISTORY: elevated dimer TECHNOLOGIST PROVIDED HISTORY: Reason for exam:->elevated dimer Decision Support Exception - unselect if not a suspected or confirmed emergency medical condition->Emergency Medical Condition (MA) What reading provider will be dictating this exam?->CRC FINDINGS: CTA chest exam is technically satisfactory. Pulmonary Arteries: Normal caliber. No PE. Lungs and pleura: Bilateral large pleural effusions with secondary bilateral passive atelectasis. Right middle lobe 2.6 cm thin walled cyst or bulla. No central obstructing lesion identified. Heart and mediastinum: Normal heart size. Coronary artery calcifications. Left subclavian transvenous pacemaker with right atrial and right ventricular leads. Trace pericardial effusion. No lymphadenopathy. The thoracic aorta is atherosclerotic and normal in caliber. Upper abdomen: Limited visualization. Hepatic cirrhosis. Mild splenomegaly. Small ascites. Bones: No acute osseous abnormality. Chronic appearing Schmorl's node and concave compression deformity of the T3 superior endplate. 1.  No PE. 2.  Bilateral large pleural effusions with secondary bilateral passive atelectasis. 3.  Hepatic cirrhosis. Mild splenomegaly. Small ascites. US THORACENTESIS Which side should the procedure be performed?  Right    Result Date: 3/15/2022  PROCEDURE: ULTRASOUNDGUIDED RIGHT THORACENTESIS 3/15/2022 HISTORY: ORDERING SYSTEM PROVIDED HISTORY: thoracentesis TECHNOLOGIST PROVIDED HISTORY: Call dr raya at 1 pm at [address] for exam:->thoracentesis Which side should the procedure be performed?->Right TECHNIQUE: Ultrasound utilized by Dr. Roge Diggs for right-sided thoracentesis. FINDINGS: Ultrasound images show right and left pleural effusions. Ultrasound utilized for thoracentesis procedure. For additional information, please refer to operative report. Assessment:   1. Acute on chronic hypoxemic respiratory failure  2. Bilateral pleural effusions  3. Dyspnea with exertion  4. KELSEY on CKD  5. Concern for cardiorenal syndrome  6. History of SPEEDY    Plan:   1. Plan for thoracentesis in a.m. We will likely plan to do a thoracentesis on the right side however I did discuss with the patient that we will evaluate which side has the larger effusion in the procedure suite. And proceed from there. The patient is agreeable to this. 2. Wean FiO2 as tolerated  3. Continue CPAP at at bedtime and with naps  4. Continue Bumex IV as per nephrology. This is the patient's fourth admission in the last few months with similar symptoms. He does not appear to be able to maintain his volume status outside of the hospital.  May be nearing need for hemodialysis for volume removal.  5. Obtain procalcitonin  6. Will obtain pfts after thoracentesis  7.  Appreciate cardiology recommendations      Gia Plasencia, DO   Pulmonary, Critical Care and Sleep Medicine

## 2022-03-29 NOTE — CONSULTS
Inpatient Cardiology Consultation      Reason for Consult: Acute on chronic HFpEF /acute on chronic hypoxic respiratory failure /recurrent bilateral pleural effusions (right > left)    Consulting Physician: Dr. Hollis Overall    Requesting Physician:  Dr. Gabbie Mckee     Date of Consultation: 3/29/2022    HISTORY OF PRESENT ILLNESS:   Mr. Rohit Benedict is a 27-year-old obese male who is known to Dr. Telma Schulte; most recently seen in hospital consultation on 3/15/2022 for heart failure. During admission patient was noted to be in acute hypoxic respiratory failure (80% on 3 L which was increased to 6 L). He had no complaints of chest pain or dizziness and was unsure if he was having worsening lower extremity edema. He was hypotensive on admission with a blood pressure 86/59. Chest x-ray showed a moderate right pleural effusion and small left pleural effusion with atelectasis. PE was ruled out by CT of the chest however did show bilateral large pleural effusions with possible atelectasis and hepatic cirrhosis, mild splenomegaly and small ascites. proBNP 3338. Hs-cTnT 39, 38. WBC 17.3. Nephrology was consulted and started patient on Bumex drip. He underwent a right-sided thoracentesis on 3/16/2022 (1 L serosanguineous fluid). Creatinine went from 2.9 --> 3.2 on 3/22/2022 (baseline creatinine 2.5-3.0). Net total diuresis on discharge -5.8 L. On discharge (3/23/2022) patient was transitioned to p.o. Bumex 2 mg twice daily. Patient is scheduled to see WM Saavedra in outpatient on 3/30/2022 at 11:30 AM.    PMHx: Former smoker, agent orange exposure, polio as a child, hypertension, hyperlipidemia, type 2 diabetes mellitus, CKD, COPD/chronic hypoxic respiratory failure, SPEEDY (compliant with CPAP), peptic ulcer disease, history of CAD status post PCI to RCA (2003, [address]), PPM implanted (2010), chronic HFpEF PAP and obesity.     Patient reported that when he was discharged to [facility] facility on 3/23/2022 he was doing well for approximately 2 to 3 days and is able to ambulate with his walker approximately 40 feet without MOREL or chest pain. He stated that progressively over the past 3 days he has been experiencing worsening MOREL at rest and with minimal exertion, increased lower extremity edema, belly bloating, early satiety, orthopnea and weight gain (amount unknown). Over the past 24 hours he developed a dry cough without fever or chills. He denies any recent nausea, vomiting, hematuria, lightheadedness or dizziness. [facility] worsening symptoms [facility]  For further evaluation and management. Upon arrival to the ED: Blood pressure 123/68, heart rate 80, afebrile, 97% on 5 L nasal cannula. Labs: Sodium 142, potassium 4.5, BUN 57, creatinine 3.0, proBNP 2428. Hs-cTnT 34. WBC 15.5>> 13.3. H/H 11.6/37.0, platelet count 238. CXR: Findings of CHF, bilateral pleural effusions (Right > left), the right pleural effusion is large. EKG: Normal sinus rhythm, RBBB, rate 76 bpm.  ER medications: Lasix 40 mg IV x1. Patient was admitted to a telemetry monitoring unit for further evaluation and management. He was started on Bumex 2 mg IV twice daily. Net output thus far: inaccurate I/O's. He is currently sitting up in bed and appears to be dyspneic with conversation. Denies chest pain and reports \"my belly feels full and tight. \"      Please note: past medical records were reviewed per electronic medical record (EMR) - see detailed reports under Past Medical/ Surgical History. Past Medical History:    1. Polio as a child   2. 35 pack years quit in 1986  3. Agent orange exposure  4. HTN  5. HLD  6. T2DM insulin requiring with nephropathy  7. CKD, creatinine 2.6 in February 2022  8. COPD/chronic hypoxic respiratory failure (chronic home oxygen during the day, CPAP at night). 9. GERD  10. 1/2003 CAD s/p PCI RCA (Express stent)  11.  Chronic borderline elevated troponin  12. PPM in situ [facility]) 6/2010   13. SPEEDY compliant with C-pap  14. 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 2015 PUD/GIB required 2 units PRBC (ASA was stopped @ that time)  16. October 2021 TTE @ [facility]   17. Hospital admissionJanuary 22nd 2022 with dyspnea. proBNP 2419, volume overload discharged March 4, 2022  18. TTE 1/25/2022 (Dr. Lupillo Kamara): Mild LVH, LVEF >70%, normal RV function, mild to moderate AS, mild TR, RVSP 37 mmHg, evidence of elevated filling pressure by IVC and Doppler, right ventricular systolic pressure 37.   18. Incomplete right bundle branch block  19. Hospital admission February 24, 2022, dyspnea, proBNP 1478  20. Hyperkalemia  21. Hospital admission, hypoxia, proBNP 3338 March 14, 2022, IV diuresis  22. Limited TTE: 3/19/2022: (Dr. Monica Tinsley): Indeterminate diastolic function, no wall motion abnormality, the right ventricle was not clearly visualized, grossly normal RV size and function, pacemaker lead was visualized in the RA cavity, small anterior pericardial effusion noted. Medications Prior to admit:  Prior to Admission medications    Medication Sig Start Date End Date Taking?  Authorizing Provider   docusate sodium (COLACE) 100 MG capsule Take 1 capsule by mouth 2 times daily 3/22/22   Ronel Ball MD   midodrine (PROAMATINE) 10 MG tablet Take 1 tablet by mouth 3 times daily (with meals) 3/22/22   Ronel Ball MD   guaiFENesin 400 MG tablet Take 400 mg by mouth 4 times daily as needed for Cough    Historical Provider, MD   ipratropium-albuterol (DUONEB) 0.5-2.5 (3) MG/3ML SOLN nebulizer solution Take 1 vial by nebulization every 4 hours as needed for Shortness of Breath    Historical Provider, MD   potassium chloride (KLOR-CON M) 10 MEQ extended release tablet Take 20 mEq by mouth daily    Historical Provider, MD   insulin glargine (LANTUS SOLOSTAR) 100 UNIT/ML injection pen Inject 50 Units into the skin nightly    Historical Provider, MD   loperamide (IMODIUM) 2 MG capsule Take 2 mg by mouth 4 times daily as needed for Diarrhea    Historical Provider, MD   buPROPion (WELLBUTRIN XL) 300 MG extended release tablet Take 300 mg by mouth every morning    Historical Provider, MD   ezetimibe (ZETIA) 10 MG tablet Take 10 mg by mouth at bedtime    Historical Provider, MD   Zinc Sulfate 110 MG TABS Take 2 tablets by mouth daily    Historical Provider, MD   bumetanide (BUMEX) 2 MG tablet Take 1 tablet by mouth 2 times daily 3/5/22   Salina Miller MD   magnesium hydroxide (MILK OF MAGNESIA) 400 MG/5ML suspension Take 30 mLs by mouth daily as needed for Constipation    Historical Provider, MD   Dextromethorphan-guaiFENesin  MG/5ML SYRP Take 5 mLs by mouth every 4 hours as needed for Cough    Historical Provider, MD   melatonin 3 MG TABS tablet Take 3 mg by mouth at bedtime    Historical Provider, MD   insulin lispro, 1 Unit Dial, (HUMALOG KWIKPEN) 100 UNIT/ML SOPN Inject 0-10 Units into the skin 4 times daily (before meals and nightly) *Per Sliding Scale*    Historical Provider, MD   metoprolol succinate (TOPROL XL) 25 MG extended release tablet Take 1 tablet by mouth 2 times daily 2/16/22   GILMAR Zuleta - CNP   acetaminophen (TYLENOL) 325 MG tablet Take 650 mg by mouth every 4 hours as needed for Pain or Fever     Historical Provider, MD   vitamin C (ASCORBIC ACID) 500 MG tablet Take 500 mg by mouth 2 times daily    Historical Provider, MD   vitamin D (CHOLECALCIFEROL) 50 MCG (2000 UT) TABS tablet Take 2,000 Units by mouth daily     Historical Provider, MD   aspirin 81 MG EC tablet Take 1 tablet by mouth daily 2/3/22   Salina Miller MD   folic acid (FOLVITE) 1 MG tablet Take 1 mg by mouth daily    Historical Provider, MD   cyanocobalamin 1000 MCG tablet Take 1,000 mcg by mouth daily    Historical Provider, MD   atorvastatin (LIPITOR) 40 MG tablet Take 40 mg by mouth at bedtime     Historical Provider, MD   pantoprazole (PROTONIX) 40 MG tablet Take 40 mg by mouth daily    Historical Provider, MD       Current Medications:    Current Facility-Administered Medications: acetaminophen (TYLENOL) tablet 650 mg, 650 mg, Oral, Q4H PRN  aspirin EC tablet 81 mg, 81 mg, Oral, Daily  atorvastatin (LIPITOR) tablet 40 mg, 40 mg, Oral, Nightly  buPROPion (WELLBUTRIN XL) extended release tablet 300 mg, 300 mg, Oral, QAM  vitamin B-12 (CYANOCOBALAMIN) tablet 1,000 mcg, 1,000 mcg, Oral, Daily  guaiFENesin-dextromethorphan (ROBITUSSIN DM) 100-10 MG/5ML syrup 5 mL, 5 mL, Oral, Q4H PRN  docusate sodium (COLACE) capsule 100 mg, 100 mg, Oral, BID  ezetimibe (ZETIA) tablet 10 mg, 10 mg, Oral, Nightly  folic acid (FOLVITE) tablet 1 mg, 1 mg, Oral, Daily  guaiFENesin tablet 400 mg, 400 mg, Oral, 4x Daily PRN  insulin glargine-yfgn (SEMGLEE-YFGN) injection vial 50 Units, 50 Units, SubCUTAneous, Nightly  ipratropium-albuterol (DUONEB) nebulizer solution 3 mL, 1 vial, Nebulization, Q4H PRN  loperamide (IMODIUM) capsule 2 mg, 2 mg, Oral, 4x Daily PRN  magnesium hydroxide (MILK OF MAGNESIA) 400 MG/5ML suspension 30 mL, 30 mL, Oral, Daily PRN  melatonin tablet 3 mg, 3 mg, Oral, Nightly  metoprolol succinate (TOPROL XL) extended release tablet 25 mg, 25 mg, Oral, BID  midodrine (PROAMATINE) tablet 10 mg, 10 mg, Oral, TID WC  pantoprazole (PROTONIX) tablet 40 mg, 40 mg, Oral, QAM AC  potassium chloride (KLOR-CON M) extended release tablet 20 mEq, 20 mEq, Oral, Daily  ascorbic acid (VITAMIN C) tablet 500 mg, 500 mg, Oral, BID  vitamin D (CHOLECALCIFEROL) tablet 2,000 Units, 2,000 Units, Oral, Daily  zinc sulfate (ZINCATE) capsule 50 mg, 50 mg, Oral, Daily  bumetanide (BUMEX) injection 2 mg, 2 mg, IntraVENous, BID  insulin lispro (HUMALOG) injection vial 0-6 Units, 0-6 Units, SubCUTAneous, TID WC  insulin lispro (HUMALOG) injection vial 0-3 Units, 0-3 Units, SubCUTAneous, Nightly  glucose (GLUTOSE) 40 % oral gel 15 g, 15 g, Oral, PRN  dextrose 50 % IV solution, 12.5 g, IntraVENous, PRN  glucagon (rDNA) injection 1 mg, 1 mg, IntraMUSCular, PRN  dextrose 5 % solution, 100 mL/hr, IntraVENous, PRN  heparin (porcine) injection 5,000 Units, 5,000 Units, SubCUTAneous, Q8H    Allergies:  Penicillins    Social History: Former smoker quit 1985  Denies alcohol and illicit drug use. [facility] at [facility]) Alex Fall a walker for ambulation. Family History: Noncontributory due to advanced age. REVIEW OF SYSTEMS:     · Constitutional: + Fatigue. denies  fevers, chills or night sweats  · Eyes: Denies visual changes or drainage  · ENT: Denies headaches or hearing loss. No mouth sores or sore throat. No epistaxis   · Cardiovascular: Denies chest pain, pressure or palpitations. + Lower extremity swelling. · Respiratory: + MOREL, + dry cough, + orthopnea + PND. No hemoptysis   · Gastrointestinal: Denies hematemesis or anorexia. No hematochezia or melena    · Genitourinary: Denies urgency, dysuria or hematuria. · Musculoskeletal: + Gait instability (uses a walker), generalized weakness  · Integumentary: Denies rash, hives or pruritis   · Neurological: Denies dizziness, headaches or seizures. No numbness or tingling  · Psychiatric: Denies anxiety or depression. · Endocrine: Denies temperature intolerance.  + Reported weight gain (amount unknown). · Hematologic/Lymphatic: Denies abnormal bruising or bleeding. No swollen lymph nodes    PHYSICAL EXAM:   BP (!) 134/93   Pulse 80   Temp 97.7 °F (36.5 °C) (Oral)   Resp 28   Ht 5' 9\" (1.753 m)   Wt 270 lb 12.8 oz (122.8 kg)   SpO2 95%   BMI 39.99 kg/m²   CONST:  Well developed, well nourished elderly  male who appears of stated age. Awake, alert and cooperative.  + Dyspneic with conversation. HEENT:   Head- Normocephalic, atraumatic   Eyes- Conjunctivae pink, anicteric  Throat- Oral mucosa pink and moist  Neck-  No stridor, trachea midline, + jugular venous distention. No carotid bruit.     CHEST: Chest symmetrical and non-tender to palpation. No accessory muscle use or intercostal retractions  RESPIRATORY: Lung sounds -faint crackles with diminished breath sounds in the right lower lobe. On supplemental oxygen (6 L)  CARDIOVASCULAR:     Heart Inspection- shows no noted pulsations  Heart Palpation- no heaves or thrills; PMI is non-displaced   Heart Ausculation- Regular rate and rhythm, no murmur. No s3, s4 or rub   PV: +2 pitting lower extremity edema. No varicosities. Pedal pulses palpable, no clubbing or cyanosis   ABDOMEN: Soft, obese, non-tender to light palpation. Bowel sounds present. No palpable masses no organomegaly; no abdominal bruit  MS: Good muscle strength and tone. No atrophy or abnormal movements. : Paredes catheter with clear yellow urine. SKIN: Warm and dry no statis dermatitis or ulcers   NEURO / PSYCH: Oriented to person, place and time. Speech clear and appropriate. Follows all commands. Flat affect. DATA:    ECG: Please see HPI. Tele strips: Sinus rhythm. Diagnostic:      Labs:   CBC:   Recent Labs     03/28/22  1638 03/29/22  0537   WBC 15.5* 13.3*   HGB 11.6* 11.2*   HCT 37.0 36.8*    409     BMP:   Recent Labs     03/28/22  1638 03/29/22  0537    143   K 4.5 4.3   CO2 27 29   BUN 57* 54*   CREATININE 3.0* 3.0*   LABGLOM 20 20   CALCIUM 9.0 9.0     Mag: No results for input(s): MG in the last 72 hours. Phos: No results for input(s): PHOS in the last 72 hours. TFT:   Lab Results   Component Value Date    TSH 2.310 01/22/2022    T4FREE 1.19 01/22/2022      HgA1c:   Lab Results   Component Value Date    LABA1C 6.4 (H) 01/22/2022     No results found for: EAG  proBNP:   Recent Labs     03/28/22  1638   PROBNP 2,428*     PT/INR: No results for input(s): PROTIME, INR in the last 72 hours. APTT:No results for input(s): APTT in the last 72 hours.   CARDIAC ENZYMES:  Recent Labs     03/28/22  1638   TROPHS 34*     FASTING LIPID PANEL:  Lab Results   Component Value Date    CHOL 76 01/22/2022    HDL 24 01/22/2022    LDLCALC 24 01/22/2022    TRIG 140 01/22/2022     LIVER PROFILE:  Recent Labs     03/28/22  1638   AST 24   ALT 35   LABALBU 3.6     CXR: 3/28/2022  1. Findings of CHF   2. Bilateral pleural effusions, right greater than left.  The right pleural   effusion is large         Assessment/plan as per Dr. Hollis Overall to follow. Electronically signed by GILMAR Leal CNP on 3/29/22 at 10:03 AM EDT     I have personally seen and evaluated the patient. I personally obtained the history and performed the physical exam.  I personally reviewed all of the above labs, history, review of systems, and data. All of the assessments and recommendations are from me. All of the above cardiac medical decisions are from me. Please see my additional contributions to the history, physical exam, assessment, and recommendations below. History of chief complaint:  He developed increasing shortness of breath, increasing lower extremity edema, increasing abdominal distention, orthopnea, and coughing 3 days after he was discharged. Upon my arrival today he had just finished trying to do physical therapy. He was sitting in bed. He was struggling severely to breathe. He cannot complete sentences. His pulse ox was in the low to mid 90s on nasal cannula. No chest pain. Review of systems:     Heart: as above   Lungs: as above   Eyes: denies changes in vision or discharge. Ears: denies changes in hearing or pain. Nose: denies epistaxis or masses   Throat: denies sore throat or trouble swallowing. Neuro: denies numbness, tingling, tremors. Skin: denies rashes or itching. : denies hematuria, dysuria   GI: denies vomiting, diarrhea   Psych: denies mood changed, anxiety, depression.        Physical exam:  BP (!) 134/93   Pulse 80   Temp 97.7 °F (36.5 °C) (Oral)   Resp 28   Ht 5' 9\" (1.753 m)   Wt 270 lb 12.8 oz (122.8 kg)   SpO2 95%   BMI 39.99 kg/m²   Constitutional: A&O x3, communicates well, no acute distress. Eyes: extraocular muscles intact, PERRL. Normal lids & conjunctiva. No icterus. ENT: clear, no bleeding. No external masses. Lips normal formation. Neck: supple, full ROM, + JVD, no bruits, no lymphadenopathy. No masses. trachea midline. Heart: Distant to auscultation. Regular rate & rhythm, normal S1 & S2, no abnormal murmurs. No heave. Lungs: The right lung is consolidated. Decreased breath sounds and rales in the left. No accessory muscles. Abd: soft, non-tender. Normal bowel sounds. Morbidly obese. Neuro: Full ROM X 4, EOMI, no tremors. EXT: Severe bilateral lower extremity edema  Skin: warm, dry, intact. Good turgor. Psych: A&O x 3, normal behavior, not anxious. Patient seen and examined. Chart, labs & data reviewed. A:  1. Advanced chronic renal insufficiency. Possibly nearing the need for dialysis. 2. Severe recurring hypervolemia, pulmonary edema, and pleural effusions with significant respiratory insufficiency. 3. Normal LV systolic function. 4. Mild to moderate aortic stenosis. 5. CAD  6. Sick sinus syndrome and pacemaker  7. Lymphoma. Status post radiation therapy. 8. Diabetes. 9. Hypercholesterolemia  10. Hypertension. Recently started on midodrine for hypotension. 11. Small pericardial effusion most likely due to the renal insufficiency on his echo 3/19/2022      Rec:  1. Repeat limited echo to follow the pericardial effusion. 2. He has been started on IV diuresis. I will defer to nephrology for fluid/volume management and further diuresis. 3. Consult pulmonary for thoracentesis. 4. Discontinue the midodrine. Electronically signed by Mackenzie Sal DO on 3/29/2022 at 2:43 PM    Note: This report was completed using computerized voice recognition software. Every effort has been made to ensure accuracy, however; and invert and computerized transcription errors may be present.

## 2022-03-29 NOTE — H&P
HISTORY AND PHYSICAL             Date: 3/29/2022        Patient Name: Von Johnson     YOB: 1945      Age:  65 y.o. Chief Complaint     Chief Complaint   Patient presents with    Shortness of Breath     from [address] for sob x 1 week. History Obtained From   patient    History of Present Illness   Patient came to the ER with shortness of breath and leg swelling. He denies any fevers, or chills. He was twice  recently admitted with the same. Past Medical History     Past Medical History:   Diagnosis Date    Acid reflux     Agent orange exposure     [facility]     COPD [facility] obstructive [facility] disease) ([facility])     Depression     Diabetes mellitus (HCC)     Hyperlipidemia     Hypertension     [facility] infarction) [facility] [address]     Polio     Sleep apnea         Past Surgical History     Past Surgical History:   Procedure Laterality Date    CARDIAC PACEMAKER PLACEMENT      CORONARY ANGIOPLASTY WITH STENT PLACEMENT      PACEMAKER PLACEMENT          Medications Prior to Admission     Prior to Admission medications    Medication Sig Start Date End Date Taking?  Authorizing Provider   docusate sodium (COLACE) 100 MG capsule Take 1 capsule by mouth 2 times daily 3/22/22   Tong Navarro MD   midodrine (PROAMATINE) 10 MG tablet Take 1 tablet by mouth 3 times daily (with meals) 3/22/22   Tong Navarro MD   guaiFENesin 400 MG tablet Take 400 mg by mouth 4 times daily as needed for Cough    Historical Provider, MD   ipratropium-albuterol (DUONEB) 0.5-2.5 (3) MG/3ML SOLN nebulizer solution Take 1 vial by nebulization every 4 hours as needed for Shortness of Breath    Historical Provider, MD   potassium chloride (KLOR-CON M) 10 MEQ extended release tablet Take 20 mEq by mouth daily    Historical Provider, MD   insulin glargine (LANTUS SOLOSTAR) 100 UNIT/ML injection pen Inject 50 Units into the skin nightly    Historical Provider, MD   loperamide (IMODIUM) 2 MG capsule Take 2 mg by mouth 4 times daily as needed for Diarrhea    Historical Provider, MD   buPROPion (WELLBUTRIN XL) 300 MG extended release tablet Take 300 mg by mouth every morning    Historical Provider, MD   ezetimibe (ZETIA) 10 MG tablet Take 10 mg by mouth at bedtime    Historical Provider, MD   Zinc Sulfate 110 MG TABS Take 2 tablets by mouth daily    Historical Provider, MD   bumetanide (BUMEX) 2 MG tablet Take 1 tablet by mouth 2 times daily 3/5/22   Leora Luo MD   magnesium hydroxide (MILK OF MAGNESIA) 400 MG/5ML suspension Take 30 mLs by mouth daily as needed for Constipation    Historical Provider, MD   Dextromethorphan-guaiFENesin  MG/5ML SYRP Take 5 mLs by mouth every 4 hours as needed for Cough    Historical Provider, MD   melatonin 3 MG TABS tablet Take 3 mg by mouth at bedtime    Historical Provider, MD   insulin lispro, 1 Unit Dial, (HUMALOG KWIKPEN) 100 UNIT/ML SOPN Inject 0-10 Units into the skin 4 times daily (before meals and nightly) *Per Sliding Scale*    Historical Provider, MD   metoprolol succinate (TOPROL XL) 25 MG extended release tablet Take 1 tablet by mouth 2 times daily 2/16/22   Ginny Das APRN - CNP   acetaminophen (TYLENOL) 325 MG tablet Take 650 mg by mouth every 4 hours as needed for Pain or Fever     Historical Provider, MD   vitamin C (ASCORBIC ACID) 500 MG tablet Take 500 mg by mouth 2 times daily    Historical Provider, MD   vitamin D (CHOLECALCIFEROL) 50 MCG (2000 UT) TABS tablet Take 2,000 Units by mouth daily     Historical Provider, MD   aspirin 81 MG EC tablet Take 1 tablet by mouth daily 2/3/22   Leora Luo MD   folic acid (FOLVITE) 1 MG tablet Take 1 mg by mouth daily    Historical Provider, MD   cyanocobalamin 1000 MCG tablet Take 1,000 mcg by mouth daily    Historical Provider, MD   atorvastatin (LIPITOR) 40 MG tablet Take 40 mg by mouth at bedtime     Historical Provider, MD   pantoprazole (PROTONIX) 40 MG tablet Take 40 mg by mouth daily    Historical Provider, MD        Allergies   Penicillins    Social History     Social History     Tobacco History     Smoking Status  Former Smoker    Smokeless Tobacco Use  Never Used    Tobacco Comment  quit in 1985          Alcohol History     Alcohol Use Status  Never          Drug Use     Drug Use Status  Never          Sexual Activity     Sexually Active  Not Asked                Family History   History reviewed. No pertinent family history. Review of Systems   Review of Systems   Constitutional: Positive for activity change. Negative for fever. HENT: Positive for congestion. Respiratory: Positive for shortness of breath. Cardiovascular: Negative for chest pain. Gastrointestinal: Negative for abdominal pain. Genitourinary: Negative for difficulty urinating. Neurological: Negative for dizziness. Physical Exam   /81   Pulse 78   Temp 97.9 °F (36.6 °C)   Resp 22   Wt 251 lb (113.9 kg)   SpO2 94%   BMI 37.07 kg/m²     Physical Exam  HENT:      Head: Normocephalic. Mouth/Throat:      Mouth: Mucous membranes are moist.   Eyes:      Pupils: Pupils are equal, round, and reactive to light. Cardiovascular:      Rate and Rhythm: Normal rate. Pulses: Normal pulses. Pulmonary:      Effort: Pulmonary effort is normal.      Breath sounds: No wheezing. Abdominal:      General: Abdomen is flat. Bowel sounds are normal. There is no distension. Musculoskeletal:      Cervical back: Normal range of motion. Right lower leg: Edema present. Skin:     Capillary Refill: Capillary refill takes less than 2 seconds. Neurological:      General: No focal deficit present. Mental Status: He is alert and oriented to person, place, and time.    Psychiatric:         Mood and Affect: Mood normal.         Behavior: Behavior normal.         Labs      Recent Results (from the past 24 hour(s))   EKG 12 Lead    Collection Time: 03/28/22  4:34 PM   Result Value Ref Range    Ventricular Rate 76 BPM    Atrial Rate 76 BPM    P-R Interval 156 ms    QRS Duration 124 ms    Q-T Interval 426 ms    QTc Calculation (Bazett) 479 ms    P Axis 63 degrees    R Axis 64 degrees    T Axis 55 degrees   CBC with Auto Differential    Collection Time: 03/28/22  4:38 PM   Result Value Ref Range    WBC 15.5 (H) 4.5 - 11.5 E9/L    RBC 4.06 3.80 - 5.80 E12/L    Hemoglobin 11.6 (L) 12.5 - 16.5 g/dL    Hematocrit 37.0 37.0 - 54.0 %    MCV 91.1 80.0 - 99.9 fL    MCH 28.6 26.0 - 35.0 pg    MCHC 31.4 (L) 32.0 - 34.5 %    RDW 18.2 (H) 11.5 - 15.0 fL    Platelets 744 075 - 734 E9/L    MPV 8.8 7.0 - 12.0 fL    Neutrophils % 69.6 43.0 - 80.0 %    Immature Granulocytes % 0.8 0.0 - 5.0 %    Lymphocytes % 16.7 (L) 20.0 - 42.0 %    Monocytes % 10.2 2.0 - 12.0 %    Eosinophils % 2.3 0.0 - 6.0 %    Basophils % 0.4 0.0 - 2.0 %    Neutrophils Absolute 10.74 (H) 1.80 - 7.30 E9/L    Immature Granulocytes # 0.13 E9/L    Lymphocytes Absolute 2.58 1.50 - 4.00 E9/L    Monocytes Absolute 1.58 (H) 0.10 - 0.95 E9/L    Eosinophils Absolute 0.36 0.05 - 0.50 E9/L    Basophils Absolute 0.06 0.00 - 0.20 E9/L    Anisocytosis 1+     Polychromasia 2+     Poikilocytes 1+     Eckerman Cells 2+    Comprehensive Metabolic Panel w/ Reflex to MG    Collection Time: 03/28/22  4:38 PM   Result Value Ref Range    Sodium 142 132 - 146 mmol/L    Potassium reflex Magnesium 4.5 3.5 - 5.0 mmol/L    Chloride 102 98 - 107 mmol/L    CO2 27 22 - 29 mmol/L    Anion Gap 13 7 - 16 mmol/L    Glucose 80 74 - 99 mg/dL    BUN 57 (H) 6 - 23 mg/dL    CREATININE 3.0 (H) 0.7 - 1.2 mg/dL    GFR Non-African American 20 >=60 mL/min/1.73    GFR African American 25     Calcium 9.0 8.6 - 10.2 mg/dL    Total Protein 7.0 6.4 - 8.3 g/dL    Albumin 3.6 3.5 - 5.2 g/dL    Total Bilirubin 0.5 0.0 - 1.2 mg/dL    Alkaline Phosphatase 129 40 - 129 U/L    ALT 35 0 - 40 U/L    AST 24 0 - 39 U/L   Troponin    Collection Time: 03/28/22  4:38 PM Result Value Ref Range    Troponin, High Sensitivity 34 (H) 0 - 11 ng/L   Brain Natriuretic Peptide    Collection Time: 03/28/22  4:38 PM   Result Value Ref Range    Pro-BNP 2,428 (H) 0 - 450 pg/mL   Lactate, Sepsis    Collection Time: 03/28/22  4:38 PM   Result Value Ref Range    Lactic Acid, Sepsis 0.9 0.5 - 1.9 mmol/L   POCT Glucose    Collection Time: 03/28/22  7:16 PM   Result Value Ref Range    Meter Glucose 70 (L) 74 - 99 mg/dL   CBC    Collection Time: 03/29/22  5:37 AM   Result Value Ref Range    WBC 13.3 (H) 4.5 - 11.5 E9/L    RBC 3.96 3.80 - 5.80 E12/L    Hemoglobin 11.2 (L) 12.5 - 16.5 g/dL    Hematocrit 36.8 (L) 37.0 - 54.0 %    MCV 92.9 80.0 - 99.9 fL    MCH 28.3 26.0 - 35.0 pg    MCHC 30.4 (L) 32.0 - 34.5 %    RDW 18.1 (H) 11.5 - 15.0 fL    Platelets 942 783 - 240 E9/L    MPV 9.3 7.0 - 12.0 fL   POCT Glucose    Collection Time: 03/29/22  5:41 AM   Result Value Ref Range    Meter Glucose 76 74 - 99 mg/dL        Imaging/Diagnostics Last 24 Hours   XR CHEST PORTABLE    Result Date: 1/21/2022  EXAMINATION: ONE XRAY VIEW OF THE CHEST 1/21/2022 10:02 am COMPARISON: None. HISTORY: ORDERING SYSTEM PROVIDED HISTORY: dyspnea TECHNOLOGIST PROVIDED HISTORY: Reason for exam:->dyspnea FINDINGS: The heart is enlarged. There is a dual lead cardiac pacer on the left There is an infiltrate seen within the right lung base. The left lung is clear. There is a trace right pleural effusion. There is no left pleural effusion. 1. Right lower lobe pneumonia 2. Trace right pleural effusion 3. Cardiomegaly     US DUP LOWER EXTREMITIES BILATERAL VENOUS    Result Date: 1/21/2022  EXAMINATION: DUPLEX VENOUS ULTRASOUND OF THE BILATERAL LOWER EXTREMITIES1/21/2022 10:15 am TECHNIQUE: Duplex ultrasound using B-mode/gray scaled imaging, Doppler spectral analysis and color flow Doppler was obtained of the deep venous structures of the lower bilateral extremities. COMPARISON: None.  HISTORY: ORDERING SYSTEM PROVIDED HISTORY: discomfort TECHNOLOGIST PROVIDED HISTORY: Reason for exam:->discomfort What reading provider will be dictating this exam?->CRC FINDINGS: The visualized veins of the bilateral lower extremities are patent and free of echogenic thrombus. The veins demonstrate good compressibility with normal color flow study and spectral analysis. No evidence of DVT in either lower extremity. RECOMMENDATIONS: Unavailable       Assessment      Hospital Problems           Last Modified POA    * (Principal) Acute on chronic systolic heart failure ([facility] [address] 3/28/2022 Yes      Acute on chronic hypoxic respiratory failure. CHF  Pleural effusion  Acute renal insfficnecy  DM  COPD  HTN  Hyperlipidemia        Plan   IV bumex. Monitor labs and exam.  Continue rest of meds. Cardiology to see.     Consultations Ordered:  IP CONSULT TO HOSPITALIST  IP CONSULT TO CARDIOLOGY  IP CONSULT TO CASE MANAGEMENT

## 2022-03-29 NOTE — PROGRESS NOTES
OCCUPATIONAL THERAPY INITIAL EVALUATION    MARGARITA [place]ado [place] [address] [address]      Date:3/29/2022                                                  Patient Name: Terri Nichole  MRN: [id number]  : 1945  Room: [address] [place] [address]    Evaluating OT: BIBIANA Guevara, OTR/L  # [id number]    Referring Provider:  Ronel Ball MD  Specific Provider Orders:  Stephanie Campos and Treat\"  3-29-22    Diagnosis: Acute on chronic systolic heart failure ([address] [I50.23]  Acute on chronic systolic CHF (congestive heart failure) ([facility] [address] [I50.23]    Pt was re-admitted from SNF w/ SOB, hypoxia    Pertinent Medical History:  Pt has a past medical history of Acid reflux, Agent orange exposure, Arthritis, CAD (coronary artery disease), Congestive heart failure (CHF) ([facility] [address] COPD (chronic obstructive pulmonary disease) ([facility] [address] Depression, Diabetes mellitus ([facility] [address] History of blood transfusion, Hyperlipidemia, Hypertension, MI (myocardial infarction) ([facility] [address] Polio, and Sleep apnea. ,  has a past surgical history that includes Coronary angioplasty with stent; Cardiac pacemaker placement; pacemaker placement; and hernia repair.     Surgeries this admission: None     Precautions:  Fall Risk  6L O2  Paredes Catheter    Assessment of current deficits   [x] Functional mobility   [x]ADLs  [x] Strength               []Cognition   [x] Functional transfers   [x] IADLs         [x] Safety Awareness   [x]Endurance   [] Fine Coordination              [x] Balance      [] Vision/perception   []Sensation    []Gross Motor Coordination  [] ROM  [] Delirium                   [] Motor Control       OT PLAN OF CARE   OT POC based on physician orders, patient diagnosis and results of clinical assessment    Frequency/Duration 1-3 days/wk for 2 weeks PRN   Specific OT Treatment to include:   * Instruction/training on adapted ADL techniques and AE recommendations to increase functional independence within precautions       * Training on energy conservation strategies, correct breathing pattern and techniques to improve independence/tolerance for self-care routine  * Functional transfer/mobility training/DME recommendations for increased independence, safety, and fall prevention  * Patient/Family education to increase follow through with safety techniques and functional independence  * Recommendation of environmental modifications for increased safety with functional transfers/mobility and ADLs  * Therapeutic exercise to improve motor endurance, ROM, and functional strength for ADLs/functional transfers  * Therapeutic activities to facilitate/challenge dynamic balance, stand tolerance for increased safety and independence with ADLs  * Therapeutic activities to facilitate gross/fine motor skills for increased independence with ADLs  * Neuro-muscular re-education: facilitation of righting/equilibrium reactions, midline orientation, scapular stability/mobility, normalization of muscle tone, and facilitation of volitional active controled movement  * Positioning to improve skin integrity, interaction with environment and functional independence  * Manual techniques for edema management  Other:    Recommended Adaptive Equipment: TBD as pt progresses       Home Living:  Pt lives alone in a 1-story house. Bed/bath on the main floor.  (+) Basement. Bathroom setup:  Walk-in-Shower, Standard-height Commode   Equipment owned:  W/[address], Shower chair    Available Family Assist:  Pt has been hospitalized or in a SNF since 1-23-22    Prior Level of Function:  Pt currently requires assist with ADLs, Transfers and Mobility using [employer] vs W/C for ambulation. Participating in Therapy Program  Driving:  ??   Occupation:  Retired     Pain Level:  Denied pain    Additional Complaints:  Moderate SOB w/ Ax, general fatigue, weakness    Cognition: A & O x 4   Able to Follow Multi-Step Commands INDly   Memory:  good Sequencing:  good    Problem solving:  good    Judgement/safety:  good   Additional Comments:  Pt was pleasant and cooperative. Bright affect, jokes appropriately w/ staff    Vitals/Lab Values:  O2 sats on 6L in semi-supine prior to ax = 96%  O2 sats on 6L seated EOB = 93-94%  O2 sats on 6L static standing = 89% ~ 2-3 mins  Experienced Moderate SOB - unable to recover w/ seated rest break at EOB for ~ 5 mins - transferred to high kan position - required an additional 5-10 mins to recover to mild SOB       Functional Assessment:  AM-PAC Daily Activity Raw Score: 11/24     Initial Eval Status  Date: 3/29/22   Treatment Status  Date: STGs = LTGs  Time frame: 10-14 days   Feeding IND after set up    In high kan position    NA   Grooming IND after set up     in high kan position  Unable to tolerate task seated EOB d/t moderate SOB    Set up  Seated    UB Dressing Min A/set up    Simulated - seated EOB  Limited endurance w/ upright ax    Set up     LB Dressing Dep    Max A to don socks in supine - Uses Adaptive equipment regularly at baseline - unable to tolerate this session d/t moderate SOB    Max A of 1 for simulated clothing adjustment over hips + Mod A of 1 for safety w/ dynamic standing balance - limited endurance  Pt ed for safety, PLB    Mod A     Bathing NT    Unable to tolerate this session    Mod A      Toileting Dep    Paredes Catheter  Max A of 1 for simulated clothing adjustment over hips + Mod A of 1 for safety w/ dynamic standing balance - limited endurance  Pt ed for safety, PLB    Mod A     Bed Mobility  Supine to sit: Mod A   Sit to supine:   Mod A     Repositioning self toward [facility] INDly w/ use of head board w/ bed in flat position    Supine to sit: SUP  Sit to supine: SUP     Functional Transfers Dep    Max A of 2 EOB  Pt ed for safety/hand placement    Mod A     Functional Mobility Min A w/ WW    1-2 Side-steps along EOB  Pt ed for safety/improved safety awareness, walker safety    SUP w/ [employer]     Balance Sitting:     Static:  SUP EOB    Dynamic:   SUP EOB w/ functional ax      Standing:     Static:  Min A w/ [employer]    Dynamic:  Max A w/ functional ax/mobility     Sitting:     Static:  Remote SUP    Dynamic:  Remote SUP w/ functional ax    Standing:     Static:  SUP w/ AD PRN    Dynamic:  Min A w/ functional ax/mobility w/ AD PRN   Activity Tolerance Fair    Limited by SOB, Hypoxia  Able to tolerate sitting EOB w/ light ax > 20 mins  Able to tolerate standing ~ 2-3 mins - became moderately SOB    Fair(+)   Visual/  Perceptual    Hearing: WFL   Glasses: Yes    WFL   Hearing Aids:  No               Hand Dominance: Right   AROM Strength Additional Info:    RUE  WFL 5-/5 Good ;   Good FMC/dexterity noted during ADL tasks     LUE WFL 5-/5 Good ;    Good FMC/dexterity noted during ADL tasks       Sensation:  Denies numbness or tingling Damián UEs   Tone: WFL Damián UEs   Edema: None Noted Damián UEs     Comments: Upon arrival, patient was found in semi-supine. He was agreeable to participate in therapeutic ax. No Family present during session. Received permission from RN prior to engaging pt in OT services. Educated pt on role of OT services. At the end of the session, patient was properly positioned in Semi-Supine. Call light and phone within reach, all lines and tubes intact. Oriented pt to call bell. Made all appropriate Environmental Modifications to facilitate pt's level of IND and safety. All needs met. Bed Alarm activated. Overall patient demonstrated decreased independence and safety during completion of ADL/functional transfer/mobility tasks. Pt would benefit from continued skilled OT to increase safety and independence with completion of ADL/IADL tasks for functional independence and quality of life.     Treatment: OT treatment provided this date includes:    Instruction/training on safety and adapted techniques for completion of ADLs, use of DME/AD/Adaptive equip:    Instruction/training on safe functional mobility/transfer techniques, use of DME/AD:     Instruction/training on energy conservation techs (EC)/Pursed-Lip Breathing (PLB)/work simplification for completion of ADLs:      Neuromuscular Reeducation to facilitate balance/righting reactions for increased function with ADLs:     Skilled positioning/alignment for Pain Mgmt, Skin Integrity, Edema Control, to maximize Pt's safety and ability to [facility] interact w/ his/her environment, maximize respiratory status   Activity tolerance - Sitting/Standing to improve endurance w/ functional ax    Cognitive retraining -  Cues for safety/safety awareness, sequencing, problem solving     Skilled monitoring of Vitals during session and pt's response to tx ax       Consulted RN, PT     Made all appropriate Environmental Modifications to facilitate pt's level of IND and safety.  Recommendations for Continued Participation in OT services during Hospitalization and at D/C - SNF    Pt and/or Family verbalized/demonstrated a Good understanding of education provided. Will Review PRN. Rehab Potential: Good for established goals     Patient / Family Goal: Return to SNF to participate in therapy program      Patient and/or family were instructed on functional diagnosis, prognosis/goals and OT plan of care. Demonstrated Good understanding.      Eval Complexity: Low    Time In: 1413  Time Out: 1459  Total Treatment Time: 31 minutes    Min Units   OT Eval Low 97165  X  1   OT Eval Medium 64595      OT Eval High 86907      OT Re-Eval [id number]       Therapeutic Ex 89729       Therapeutic Activities 92994       ADL/Self Care [id number]  31  2   Orthotic Management 84545       Manual [id number]     Neuro Re-Ed [id number]       Non-Billable Time              Evaluation Time additionally includes thorough review of current medical information, gathering information on past medical history/social history and prior level of function, completion of standardized testing/informal observation of tasks, assessment of data and education on plan of care and goals.             Mia Soto, MOT, OTR/L  # [id number]

## 2022-03-30 NOTE — PLAN OF CARE
Patient's chart updated to reflect:      . - HF care plan, HF education points and HF discharge instructions.  -Orders: 2 gram sodium diet, daily weights, I/O.  -PCP and/or Cardiologist appointment to be scheduled within 7 days of hospital discharge. Rescheduled Cardiology APRN appt 4/14 and CHF clinic 4/4.  Appointments added to [address]   -Patient has been seen in consult several times by CHF nurse, he resides at a facility and continues to decline.  -  Suzan Kamara, RN RN, BSN  Heart Failure Navigator

## 2022-03-30 NOTE — PROGRESS NOTES
Progress Note  Date:3/30/2022       CBSE:[id number]/[id number]-S  Patient Mc Jin     YOB: 1945     Age:77 y.o. Patient says breathing is improved today. Subjective    Subjective:  Symptoms:  Improved. He reports shortness of breath. No chest pain. Diet:  Adequate intake. Activity level: Impaired due to weakness. Pain:  He reports no pain. Review of Systems   Constitutional: Negative for activity change and fever. HENT: Negative for congestion. Respiratory: Positive for shortness of breath. Cardiovascular: Positive for leg swelling. Negative for chest pain. Gastrointestinal: Negative for abdominal pain. Neurological: Negative for dizziness. Psychiatric/Behavioral: Negative for agitation. Objective         Vitals Last 24 Hours:  TEMPERATURE:  Temp  Av.9 °F (36.6 °C)  Min: 97.7 °F (36.5 °C)  Max: 98 °F (36.7 °C)  RESPIRATIONS RANGE: Resp  Av.3  Min: 25  Max: 28  PULSE OXIMETRY RANGE: SpO2  Av.3 %  Min: 92 %  Max: 95 %  PULSE RANGE: Pulse  Av  Min: 80  Max: 92  BLOOD PRESSURE RANGE: Systolic (73SEI), FHY:292 , Min:108 , HR   ; Diastolic (02XTK), YRY:95, Min:70, Max:94    I/O (24Hr): Intake/Output Summary (Last 24 hours) at 3/30/2022 0655  Last data filed at 3/30/2022 0616  Gross per 24 hour   Intake 360 ml   Output 1700 ml   Net -1340 ml     Objective:  General Appearance:  Comfortable. Vital signs: (most recent): Blood pressure 108/70, pulse 80, temperature 98 °F (36.7 °C), temperature source Oral, resp. rate 25, height 5' 9\" (1.753 m), weight 271 lb 8 oz (123.2 kg), SpO2 92 %. No fever. Lungs:  Normal effort and normal respiratory rate. Breath sounds clear to auscultation. Heart: Normal rate. Regular rhythm. S1 normal and S2 normal.    Extremities: There is local swelling.       Labs/Imaging/Diagnostics    Labs:  CBC:  Recent Labs     22  1638 22  0537 22  0438   WBC 15.5* 13.3* 10.3   RBC 4.06 3.96 3.84 HGB 11.6* 11.2* 11.0*   HCT 37.0 36.8* 36.1*   MCV 91.1 92.9 94.0   RDW 18.2* 18.1* 18.1*    409 346     CHEMISTRIES:  Recent Labs     22  1638 22  0537 22  0438    143 145   K 4.5 4.3 4.4    101 101   CO2 27 29 32*   BUN 57* 54* 57*   CREATININE 3.0* 3.0* 3.2*   GLUCOSE 80 69* 71*     PT/INR:No results for input(s): PROTIME, INR in the last 72 hours. APTT:No results for input(s): APTT in the last 72 hours. LIVER PROFILE:  Recent Labs     22  1638   AST 24   ALT 35   BILITOT 0.5   ALKPHOS 129       Imaging Last 24 Hours:  XR CHEST PORTABLE    Result Date: 2022  EXAMINATION: ONE XRAY VIEW OF THE CHEST 2022 4:49 pm COMPARISON: 2022 HISTORY: ORDERING SYSTEM PROVIDED HISTORY: shortness of breath TECHNOLOGIST PROVIDED HISTORY: Reason for exam:->shortness of breath What reading provider will be dictating this exam?->CRC FINDINGS: There is a large opacity seen within the right lung base. There is a small right pleural effusion. The left upper lobe is clear. There is a small left pleural effusion. The cardiac silhouette is within normals. There is a dual lead cardiac pacer on the left. 1. Large opacity within the right lung base which could represent pneumonia and or atelectasis. 2. Moderate size right pleural effusion. 3. Small left pleural effusion. 4. CT of the thorax is recommended for further evaluation. US DUP LOWER EXTREMITIES BILATERAL VENOUS    Result Date: 2022  Patient MRN:  [id number] : 1945 Age: 65 years Gender: Male Order Date:  2022 5:28 PM EXAM: US DUP LOWER EXTREMITIES BILATERAL VENOUS NUMBER OF IMAGES:  52 INDICATION:  leg swelling leg swelling What reading provider will be dictating this exam?->[facility] COMPARISON: None Within the visualized vessels, there is no evidence for deep venous thrombosis There is good compressibility, there is good augmentation, there is good color flow.      Within the visualized vessels there is no evidence for deep venous thrombosis     Assessment//Plan           Hospital Problems           Last Modified POA    * (Principal) Acute on chronic systolic heart failure ([facility] [address] 3/28/2022 Yes        Assessment:  (   (Principal) Acute on chronic clinical systolic heart failure ([address] 3/14/2022 Yes     Acute on chronic hypoxic respiratory failure. CHF  Pleural effusion  Acute renal insfficnecy  DM  COPD  HTN  Hyperlipidemia       ). Plan:   (IV diureses  Will have thoracentesis   Monitor labs and output. Continue meds. PT/OT).

## 2022-03-30 NOTE — CARE COORDINATION
3/30/2022 - Admitted for CHF. Cardiology, pulmonology, and nephrology following. Wearing 6L NC. IV bumex twice daily. For thoracentesis today in [address]. Pt is from [facility] - was private pay at facility. Will have therapy evals done and submit for precert to have pt go back to the facility under insurance. Will need a covid test done also to return to facility. Ambulance form completed and in envelope on soft chart. SW/MICKY will follow.

## 2022-03-30 NOTE — PROGRESS NOTES
PROGRESS NOTE     CARDIOLOGY    Chief complaint: Seen today for follow up, management & recommendations for hypervolemia and pleural effusions. He was reclining in bed. He was comfortable and in no distress. He did have a thoracentesis. He states his breathing is much better. .    Wt Readings from Last 3 Encounters:   03/30/22 271 lb 8 oz (123.2 kg)   03/22/22 272 lb (123.4 kg)   03/10/22 264 lb 1.8 oz (119.8 kg)     Temp Readings from Last 3 Encounters:   03/30/22 98.2 °F (36.8 °C) (Oral)   03/22/22 97.6 °F (36.4 °C) (Oral)   03/10/22 97.3 °F (36.3 °C) (Infrared)     BP Readings from Last 3 Encounters:   03/30/22 108/72   03/22/22 128/83   03/10/22 105/71     Pulse Readings from Last 3 Encounters:   03/30/22 72   03/22/22 70   03/10/22 96         Intake/Output Summary (Last 24 hours) at 3/30/2022 1659  Last data filed at 3/30/2022 1533  Gross per 24 hour   Intake 600 ml   Output 1200 ml   Net -600 ml       Recent Labs     03/28/22  1638 03/29/22  0537 03/30/22  0438   WBC 15.5* 13.3* 10.3   HGB 11.6* 11.2* 11.0*   HCT 37.0 36.8* 36.1*   MCV 91.1 92.9 94.0    409 346     Recent Labs     03/28/22  1638 03/29/22  0537 03/30/22  0438    143 145   K 4.5 4.3 4.4  4.4    101 101   CO2 27 29 32*   BUN 57* 54* 57*   CREATININE 3.0* 3.0* 3.2*     No results for input(s): PROTIME, INR in the last 72 hours. No results for input(s): CKTOTAL, CKMB, CKMBINDEX, TROPONINI in the last 72 hours. No results for input(s): BNP in the last 72 hours. No results for input(s): CHOL, HDL, TRIG in the last 72 hours.     Invalid input(s): CHOLHDLR, LDLCALCU  Recent Labs     03/28/22  1638   TROPHS 34*         acetaminophen (TYLENOL) tablet 650 mg, Q4H PRN  aspirin EC tablet 81 mg, Daily  atorvastatin (LIPITOR) tablet 40 mg, Nightly  buPROPion (WELLBUTRIN XL) extended release tablet 300 mg, QAM  vitamin B-12 (CYANOCOBALAMIN) tablet 1,000 mcg, Daily  guaiFENesin-dextromethorphan (ROBITUSSIN DM) 100-10 MG/5ML syrup 5 mL, Q4H PRN  docusate sodium (COLACE) capsule 100 mg, BID  ezetimibe (ZETIA) tablet 10 mg, Nightly  folic acid (FOLVITE) tablet 1 mg, Daily  guaiFENesin tablet 400 mg, 4x Daily PRN  insulin glargine-yfgn (SEMGLEE-YFGN) injection vial 50 Units, Nightly  ipratropium-albuterol (DUONEB) nebulizer solution 3 mL, Q4H PRN  loperamide (IMODIUM) capsule 2 mg, 4x Daily PRN  magnesium hydroxide (MILK OF MAGNESIA) 400 MG/5ML suspension 30 mL, Daily PRN  melatonin tablet 3 mg, Nightly  metoprolol succinate (TOPROL XL) extended release tablet 25 mg, BID  pantoprazole (PROTONIX) tablet 40 mg, QAM AC  potassium chloride (KLOR-CON M) extended release tablet 20 mEq, Daily  ascorbic acid (VITAMIN C) tablet 500 mg, BID  vitamin D (CHOLECALCIFEROL) tablet 2,000 Units, Daily  zinc sulfate (ZINCATE) capsule 50 mg, Daily  bumetanide (BUMEX) injection 2 mg, BID  insulin lispro (HUMALOG) injection vial 0-6 Units, TID WC  insulin lispro (HUMALOG) injection vial 0-3 Units, Nightly  glucose (GLUTOSE) 40 % oral gel 15 g, PRN  dextrose 50 % IV solution, PRN  glucagon (rDNA) injection 1 mg, PRN  dextrose 5 % solution, PRN  heparin (porcine) injection 5,000 Units, Q8H  perflutren lipid microspheres (DEFINITY) injection 1.65 mg, ONCE PRN  white petrolatum ointment, BID PRN  ipratropium-albuterol (DUONEB) nebulizer solution 1 ampule, Q4H WA        Review of systems:     Heart: as above   Lungs: as above   Eyes: denies changes in vision or discharge. Ears: denies changes in hearing or pain. Nose: denies epistaxis or masses   Throat: denies sore throat or trouble swallowing. Neuro: denies numbness, tingling, tremors. Skin: denies rashes or itching. : denies hematuria, dysuria   GI: denies vomiting, diarrhea   Psych: denies mood changed, anxiety, depression. Physical exam:    Constitutional: A&O x3, communicates well, no acute distress. Eyes: extraocular muscles intact, PERRL. Normal lids & conjunctiva. No icterus.    ENT: clear, no bleeding. No external masses. Lips normal formation. Neck: supple, full ROM, no JVD, no bruits, no lymphadenopathy. No masses. trachea midline. Heart: regular rate & rhythm, normal S1 & S2, faint systolic murmur. No heave. Lungs: Poor air movement. Bilateral rales. No accessory muscles. Abd: soft, non-tender. Normal bowel sounds. Neuro: Full ROM X 4, EOMI, no tremors. EXT: Severe bilateral lower extremity edema  Skin: warm, dry, intact. Good turgor. Psych: A&O x 3, normal behavior, not anxious. Assessment/Recommendations  1. Advanced chronic renal insufficiency. 2. Severe hypervolemia, pulmonary edema, and pleural effusions. Breathing improved after thoracentesis. Diuresing per nephrology. 3. Normal LV systolic function. 4. Mild to moderate aortic stenosis. 5. Moderate size pericardial effusion. Slowly increasing compared to prior echocardiogram.  No tamponade. Most likely due to the renal insufficiency. I did discuss this with Dr. Bennett Tarango from nephrology. I will defer to nephrology if dialysis may slowly improve this and the above. 6. Lymphoma. Status post radiation. 7. CAD. 8. Sick sinus syndrome and a pacemaker. 9. Hypertension. Recently had hypotension with placed on midodrine. That is now off. Blood pressure is stable. Note: This report was completed using computerized voice recognition software. Every effort has been made to ensure accuracy, however; and invert and computerized transcription errors may be present.

## 2022-03-30 NOTE — PROCEDURES
PATIENT:  [address] [facility] # [id number]     DATE:  3/30/2022    INDICTATIONS: Hardy Rodriguez 65 y.o. male with pleural effusion found on imaging. PRE - OPERATIVE DIAGNOSIS:  Pleural Effusion    POST - OPERATIVE DIAGNOSIS:   Pleural Effusion    PERFORMED By:  Gera Romano DO, MD    ASSISTANT(S):  US Technician     CONSCENT:  Verbal consent obtained. Written consent obtained. After informed consent & appropriate time out protocol was noted & obtained. Risks/Benefits/Alternatives of the procedure were discussed including: infection, bleeding, pain, & pneumothorax. THORACENTESIS PROCEDURE DETAILS:  Patient understanding: patient states understanding of the procedure being performed. Time out: Immediately prior to procedure a \"time out\" was called to verify the correct. Patient was placed in a sitting position and ultrasound was done and site of maximum fluid collection was marked. PREPARATION: Patient was prepped and draped in the usual sterile fashion. ANESTHESIA: Lidocaine 1% without epinephrine, amount varied for local control. PROCEDURE NOTE: The patient was placed in the sitting position. US was then used to lois the fluid and depth was determined. Then the skin was prepped with Chloraprep solution and draped with sterile covers. For the procedure we used 1% plain lidocaine to anesthetize the skin, subcutaneous tissue and parietal pleura. After adequate anesthesia was accomplished. The plural catheter was advanced over a guide into the pleural space. The fluid was obtained without any difficulties and minimal blood loss. A total of 1500 fluid was removed. The fluid was yellow in color. A dressing was applied to the incision area. FINDINGS/SAMPLES: We removed 1500 ml of straw colored/clear  pleural fluid. The samples was sent for analysis. COMPLICATIONS:  None; patient tolerated the procedure well.     PLAN: Care will be taken to review the post procedure radiograph for pneumothorax & testing when available.     [facility], DO

## 2022-03-30 NOTE — DISCHARGE INSTR - COC
Continuity of Care Form    Patient Name: Dwight Doan   :  1945  MRN:  [id number]    Admit date:  3/28/2022  Discharge date:  22    Code Status Order: Prior   Advance Directives:      Admitting Physician:  Toy Evans MD  PCP: Toy Evans MD    Discharging Nurse: [address] Unit/Room#: 3917/7910-M  Discharging Unit Phone Number: [phone]    Emergency Contact:   Extended Emergency Contact Information  Primary Emergency Contact: Erasto Dutta  Phone: [phone]  Mobile Phone: [phone]  Relation: Brother/Sister   needed? No    Past Surgical History:  Past Surgical History:   Procedure Laterality Date    CARDIAC PACEMAKER PLACEMENT      CORONARY ANGIOPLASTY WITH STENT PLACEMENT      HERNIA REPAIR      PACEMAKER PLACEMENT         Immunization History: There is no immunization history on file for this patient.     Active Problems:  Patient Active Problem List   Diagnosis Code    PNA (pneumonia) J18.9    Acute on chronic heart failure with preserved ejection fraction (HCC) I50.33    Coronary artery disease involving native coronary artery of native heart without angina pectoris I25.10    Cardiac pacemaker in situ Z95.0    Primary hypertension I10    SPEEDY (obstructive sleep apnea) G47.33    Chronic respiratory failure with hypoxia (HCC) J96.11    CHF (congestive heart failure), NYHA class I, acute on chronic, combined ([facility] I50.43    Acute on chronic clinical systolic heart failure (HCC) I50.23    Acute on chronic systolic heart failure (HCC) I50.23       Isolation/Infection:   Isolation            No Isolation          Patient Infection Status       Infection Onset Added Last Indicated Last Indicated By Review Planned Expiration Resolved Resolved By    None active    Resolved    COVID-19 (Rule Out) 22 Respiratory Panel, Molecular, with COVID-19 (Restricted: peds pts or suitable admitted adults) (Ordered) 01/25/22 Rule-Out Test Resulted    COVID-19 (Rule Out) 01/21/22 01/21/22 01/21/22 COVID-19, Rapid (Ordered)   01/21/22 Rule-Out Test Resulted    C-diff Rule Out 04/08/21 04/08/21 04/08/21 Clostridium difficile EIA (Ordered)   04/09/21 Rule-Out Test Resulted            Nurse Assessment:  Last Vital Signs: BP (!) 120/90   Pulse 80   Temp 97.6 °F (36.4 °C) (Oral)   Resp 26   Ht 5' 9\" (1.753 m)   Wt 271 lb 8 oz (123.2 kg)   SpO2 95%   BMI 40.09 kg/m²     Last documented pain score (0-10 scale): Pain Level: 0  Last Weight:   Wt Readings from Last 1 Encounters:   03/30/22 271 lb 8 oz (123.2 kg)     Mental Status:  oriented and alert    IV Access:  - None    Nursing Mobility/ADLs:  Walking   Assisted  Transfer  Assisted  Bathing  Assisted  Dressing  Assisted  Toileting  Assisted  Feeding  Assisted  Med Admin  Assisted  Med Delivery   whole    Wound Care Documentation and Therapy:  Wound 02/24/22 Perineum Posterior red/purple area,blanches well. (Active)   Drainage Amount None 03/29/22 2315   Number of days: 34        Elimination:  Continence: Bowel: Yes  Bladder: Yes  Urinary Catheter: Insertion Date: 3/17/22    Colostomy/Ileostomy/Ileal Conduit: No       Date of Last BM: 4/6/22    Intake/Output Summary (Last 24 hours) at 3/30/2022 0853  Last data filed at 3/30/2022 0710  Gross per 24 hour   Intake 360 ml   Output 850 ml   Net -490 ml     I/O last 3 completed shifts: In: 360 [P.O.:360]  Out: 1700 [Urine:1700]    Safety Concerns: At Risk for Falls    Impairments/Disabilities:      None    Nutrition Therapy:  Current Nutrition Therapy:   - Oral Diet:  General, Carb Control 5 carbs/meal (2000kcals/day), Low Fat, Low Sodium (2gm), and Low Cholesterol and High Fiber    Routes of Feeding: Oral  Liquids:  Thin Liquids  Daily Fluid Restriction: no  Last Modified Barium Swallow with Video (Video Swallowing Test): not done    Treatments at the Time of Hospital Discharge:   Respiratory Treatments: See [facility]  Oxygen Therapy:  is on oxygen at 3 L/min per nasal cannula. Ventilator:    - No ventilator support    Rehab Therapies: Physical Therapy and Occupational Therapy  Weight Bearing Status/Restrictions: No weight bearing restrictions  Other Medical Equipment (for information only, NOT a DME order):  wheelchair, bedside commode, and hospital bed  Other Treatments: N/A    Patient's personal belongings (please select all that are sent with patient):  Dentures upper and lower cell phone,  CPAP, bag    RN SIGNATURE:  Electronically signed by Benedetta Severs, RN on 4/6/22 at 11:59 AM EDT    CASE MANAGEMENT/SOCIAL WORK SECTION    Inpatient Status Date: ***    Readmission Risk Assessment Score:  Readmission Risk              Risk of Unplanned Readmission:  30           Discharging to Facility/ Agency   Name:   Address:  Phone:  Fax:    Dialysis Facility (if applicable)   Name:  Address:  Dialysis Schedule:  Phone:  Fax:    / signature: {Esignature:[id number]}    PHYSICIAN SECTION    Prognosis: {Prognosis:6267562471}    Condition at Discharge: [address] [place] [address] Patient Condition:[id number]}    Rehab Potential (if transferring to Rehab): {Prognosis:7802525620}    Recommended Labs or Other Treatments After Discharge: ***    Physician Certification: I certify the above information and transfer of Tom Michael  is necessary for the continuing treatment of the diagnosis listed and that he requires {Admit to Appropriate Level of Care:75474} for {GREATER/LESS:644560135} 30 days.      Update Admission H&P: {CHP DME Changes in ZHISZ:445922131}    PHYSICIAN SIGNATURE:  Mila Aguilera MD    ***HEART FAILURE - CONGESTIVE HEART FAILURE***  DISCHARGE INSTRUCTIONS:  GUIDELINES TO FOLLOW AT [facility]/SNF/ Assisted Living    Future Appointments   Date Time Provider Eleno Dejesus   4/4/2022  9:00 AM [facility] CHF ROOM 1 SEYZ [facility]   4/14/2022 11:30 AM GILMAR Clark - CNP [facility]          MEDICATIONS:  Please notify the doctor if patient is not able to take their medications or if medications are being held for any reasons (such as low blood pressure ect.)  Do not give the patient ibuprofen (Advil or Motrin), naproxen (Aleve) without talking to the doctor first. This could make their heart failure worse. WEIGHT MONITORING:   Weigh patient every day in the morning after they void (If patient is able to stand, please get a standing weight.)   Notify the doctor of a weight gain of 3 pounds or more in 1 day   OR  a total of 5 pounds or more in 1 week             DIET   Cardiac heart healthy diet:  Low sodium diet: no  more than 2,000mg (2 grams) of salt / sodium per day (which equals to a little less than  a teaspoon of salt)/ Cardiac Diet: Low saturated / low trans fat, no added salt, caffeine restricted    If patient is there for rehab and will be returning home in the near future; reinforce with the patient and the family to follow a low sodium diet (2,000 mg)- avoid using salt at the table, avoid / limit use of canned soups, processed / packaged foods, salted snacks, olives and pickles. Do not use a salt substitute without checking with the doctor. (Mrs. Osmin Waldrop is safe to use).        NOTIFY THE DOCTOR THE FIRST DAY OF ONSET OF ANY OF THESE   SYMPTOMS:   Weight gain of 3 pounds or more in 1 day         OR 5 pounds or more in one week  More shortness of breath  More swelling in stomach, legs, ankles or feet  Feeling more tired, No energy  Dry hacky cough  Dizziness  More chest pain / discomfort  Hard time breathing laying down

## 2022-03-30 NOTE — PROGRESS NOTES
[facility]  Nephrology Attending Progress Note  Mary Medina. Carley Mariscal MD        SUBJECTIVE:     3/29: Crow Mejia a 65 y. o. male with a history of chronic kidney disease stage IV (recent recent baseline creatinine 2.5-3), type 2 diabetes mellitus, COPD, chronic HFpEF H/O pacemaker, speedy, gerd, djd, htn, hyperlipidemia, who presented from [facility] ([place], [place]) with complaints of shortness of breath and hypoxia. He has been admitted twice for this in the last weeks. He has been on bumex drip. Labs at this time show na 143, k 4.3, co2 29, bun 54, cr 3, gfr 20, ca 9, alb 3.6, wbc 13.3, hgb 11.2, plt 409. He has been started on iv bumex 2 iv q 12. He is on cpap and 3 L of o2 at the snf. He has had a large right pleural effusion which has been tapped.      3/30: pt seen in room, awaits thoracentesis      PROBLEM LIST:    Patient Active Problem List   Diagnosis    PNA (pneumonia)    Acute on chronic heart failure with preserved ejection fraction ([address]    Coronary artery disease involving native coronary artery of native heart without angina pectoris    Cardiac pacemaker in situ    Primary hypertension    SPEEDY (obstructive sleep apnea)    Chronic respiratory failure with hypoxia (HCC)    CHF (congestive heart failure), NYHA class I, acute on chronic, combined ([address]    Acute on chronic clinical systolic heart failure (HCC)    Acute on chronic systolic heart failure ([address]        PAST MEDICAL HISTORY:    Past Medical History:   Diagnosis Date    Acid reflux     Agent orange exposure     Arthritis     CAD (coronary artery disease)     Congestive heart failure (CHF) ([address]     COPD (chronic obstructive pulmonary disease) ([address]     Depression     Diabetes mellitus ([address]     History of blood transfusion     Hyperlipidemia     Hypertension     MI (myocardial infarction) ([address]     Polio     Sleep apnea        DIET:    ADULT DIET; Regular; 4 carb choices (60 gm/meal);  Low Sodium (2 gm)     PHYSICAL EXAM:     Patient Vitals for the past 24 hrs:   BP Temp Temp src Pulse Resp SpO2 Weight   03/30/22 0700 (!) 120/90 97.6 °F (36.4 °C) Oral 80 26 95 % --   03/30/22 0600 -- -- -- -- -- -- 271 lb 8 oz (123.2 kg)   03/29/22 2315 108/70 98 °F (36.7 °C) Oral 80 25 92 % --   03/29/22 1500 (!) 150/94 97.9 °F (36.6 °C) Oral 92 26 93 % --   @      Intake/Output Summary (Last 24 hours) at 3/30/2022 1224  Last data filed at 3/30/2022 0710  Gross per 24 hour   Intake 360 ml   Output 850 ml   Net -490 ml         Wt Readings from Last 3 Encounters:   03/30/22 271 lb 8 oz (123.2 kg)   03/22/22 272 lb (123.4 kg)   03/10/22 264 lb 1.8 oz (119.8 kg)       Constitutional:  Pt is in no acute distress  Head: normocephalic, atraumatic  Neck: no JVD  Cardiovascular: regular rate and rhythm, no murmurs, gallops, or rubs  Respiratory:  Decreased at bases  Gastrointestinal:  Soft, nontender, nondistended, bowel sounds x 4  Ext: edema  Skin: dry, no rash  Neuro:     MEDS (scheduled):    aspirin  81 mg Oral Daily    atorvastatin  40 mg Oral Nightly    buPROPion  300 mg Oral QAM    cyanocobalamin  1,000 mcg Oral Daily    docusate sodium  100 mg Oral BID    ezetimibe  10 mg Oral Nightly    folic acid  1 mg Oral Daily    insulin glargine-yfgn  50 Units SubCUTAneous Nightly    melatonin  3 mg Oral Nightly    metoprolol succinate  25 mg Oral BID    pantoprazole  40 mg Oral QAM AC    potassium chloride  20 mEq Oral Daily    vitamin C  500 mg Oral BID    vitamin D  2,000 Units Oral Daily    zinc sulfate  50 mg Oral Daily    bumetanide  2 mg IntraVENous BID    insulin lispro  0-6 Units SubCUTAneous TID WC    insulin lispro  0-3 Units SubCUTAneous Nightly    heparin (porcine)  5,000 Units SubCUTAneous Q8H    ipratropium-albuterol  1 ampule Inhalation Q4H WA       MEDS (infusions):   dextrose         MEDS (prn):  acetaminophen, guaiFENesin-dextromethorphan, guaiFENesin, ipratropium-albuterol, loperamide, magnesium hydroxide, glucose, dextrose, glucagon (rDNA), dextrose, perflutren lipid microspheres, white petrolatum    DATA:    Recent Labs     03/28/22  1638 03/29/22  0537 03/30/22  0438   WBC 15.5* 13.3* 10.3   HGB 11.6* 11.2* 11.0*   HCT 37.0 36.8* 36.1*   MCV 91.1 92.9 94.0    409 346     Recent Labs     03/28/22  1638 03/29/22  0537 03/30/22  0438    143 145   K 4.5 4.3 4.4  4.4    101 101   CO2 27 29 32*   BUN 57* 54* 57*   CREATININE 3.0* 3.0* 3.2*   LABGLOM 20 20 19   GLUCOSE 80 69* 71*   CALCIUM 9.0 9.0 9.0   ALT 35  --   --    AST 24  --   --    BILITOT 0.5  --   --    ALKPHOS 129  --   --        Lab Results   Component Value Date    LABALBU 3.6 03/28/2022    LABALBU 3.4 (L) 03/14/2022    LABALBU 3.5 02/25/2022     Lab Results   Component Value Date    TSH 2.310 01/22/2022       Iron Studies  No results found for: IRON, TIBC, FERRITIN  No results found for: EAGSYVOO68  No results found for: FOLATE    No results found for: VITD25  PTH   Date Value Ref Range Status   01/24/2022 112 (H) 15 - 65 pg/mL Final       No components found for: URIC    Lab Results   Component Value Date    COLORU Yellow 03/15/2022    NITRU Negative 03/15/2022    GLUCOSEU Negative 03/15/2022    KETUA Negative 03/15/2022    UROBILINOGEN 0.2 03/15/2022    BILIRUBINUR Negative 03/15/2022       No results found for: Leopold Deke      IMPRESSION/RECOMMENDATIONS:     1.  Chronic kidney disease stage IV  due to diabetes mellitus and hypertension  Cr usual baseline 2.5-3  Follow on iv diuretics  Check pth and phos     2.  Acute on chronic HFpEF  Continue iv bumex  Strict I/o  Right-sided thoracentesis performed 3/15   Awaits thoracentesis again  Neg 1.3 L off     3.Htn  On Midodrine  Hold parameters on metoprolol     4 mame  On cpap     Frederick Queen.  Kavon Rose MD

## 2022-03-30 NOTE — PROGRESS NOTES
[place]  Department of Pulmonary, Critical Care and Sleep Medicine  [address] [facility]  Department of Internal Medicine  Progress Note    SUBJECTIVE:    Examined the patient by the bedside. He is wearing CPAP mask, is complaining of dyspnea. Appears uncomfortable, using accessory muscles. Is planned for thoracentesis today. OBJECTIVE:  Vitals:    03/30/22 0700 03/30/22 1252 03/30/22 1439 03/30/22 1546   BP: (!) 120/90 116/78 108/72    Pulse: 80 78 72    Resp: 26 20 24 22   Temp: 97.6 °F (36.4 °C) 98.2 °F (36.8 °C) 98.2 °F (36.8 °C)    TempSrc: Oral Oral Oral    SpO2: 95% 98% 95% 95%   Weight:       Height:         Constitutional: Alert, well oriented    EENT: EOMI YOSEF. MMM. No icterus. No thrush. Neck: No thyromegaly. No elevated JVP. Trachea was midline. Respiratory: Symmetrical. Crackles present bilaterally, breath sounds are reduced b/l   Cardiovascular: Regular, No murmur. No rubs. Pulses:  Equal bilaterally. Abdomen: Soft without organomegaly. No rebound, rigidity. No guarding. Lymphatic: No lymphadenopathy. Musculoskeletal: Without weakness or gross deficits  Extremities:  Pitting 2+ bilateral pedal edema  Skin:  Warm   Neurological/Psychiatric: No acute psychosis. Cranial nerves are intact. DATA:    Monitor Strips:  Reviewed & discusses with technical team. No changes noted. RADIOLOGY:  Films were read/reviewed/discussed with radiology shows       Post-thoracentesis CXR TODAY  Impression   Unchanged bilateral pleural effusions with adjacent atelectasis and or   infiltrate.            CBC with Differential:    Lab Results   Component Value Date    WBC 10.3 03/30/2022    RBC 3.84 03/30/2022    HGB 11.0 03/30/2022    HCT 36.1 03/30/2022     03/30/2022    MCV 94.0 03/30/2022    MCH 28.6 03/30/2022    MCHC 30.5 03/30/2022    RDW 18.1 03/30/2022    LYMPHOPCT 16.7 03/28/2022    MONOPCT 10.2 03/28/2022    BASOPCT 0.4 03/28/2022    MONOSABS 1.58 03/28/2022    LYMPHSABS 2.58 03/28/2022    EOSABS 0.36 03/28/2022    BASOSABS 0.06 03/28/2022     CMP:    Lab Results   Component Value Date     03/30/2022    K 4.4 03/30/2022    K 4.4 03/30/2022     03/30/2022    CO2 32 03/30/2022    BUN 57 03/30/2022    CREATININE 3.2 03/30/2022    GFRAA 23 03/30/2022    LABGLOM 19 03/30/2022    GLUCOSE 71 03/30/2022    PROT 7.0 03/28/2022    LABALBU 3.6 03/28/2022    CALCIUM 9.0 03/30/2022    BILITOT 0.5 03/28/2022    ALKPHOS 129 03/28/2022    AST 24 03/28/2022    ALT 35 03/28/2022     BMP:    Lab Results   Component Value Date     03/30/2022    K 4.4 03/30/2022    K 4.4 03/30/2022     03/30/2022    CO2 32 03/30/2022    BUN 57 03/30/2022    LABALBU 3.6 03/28/2022    CREATININE 3.2 03/30/2022    CALCIUM 9.0 03/30/2022    GFRAA 23 03/30/2022    LABGLOM 19 03/30/2022    GLUCOSE 71 03/30/2022       CLINICAL ASSESMENT:  1. Acute on chronic hypoxemic respiratory failure 2/2 decompensated HFpEF +/- CKD  2. Bilateral pleural effusions, s/p right sided US guided thoracentesis  3. Dyspnea with exertion  4. KELSEY on CKD, stable  5. Concern for cardiorenal syndrome  6. History of SPEEDY      PLAN: If needed the case was discussed with the care team  1. Patient tolerated the right sided thoracentesis well. Post procedure CXR did not show any pneumothorax. Plan for thoracentesis of the left side tomorrow in the AM.   2. Wean FiO2 as tolerated  3. Continue CPAP at at bedtime and with naps  4. Continue Bumex IV as per nephrology. This is the patient's fourth admission in the last few months with similar symptoms. He does not appear to be able to maintain his volume status outside of the hospital.  May be nearing need for hemodialysis for volume removal.  5. Will obtain pfts after left-sided thoracentesis  6.  Appreciate cardiology recommendations      Joie Harden MD  PGY-2, [address]  Department of Pulmonary, Critical Care and Sleep Medicine  [address] [facility]  Department of Internal Medicine      During multidisciplinary team rounds Vasiliy Barrera is a 65 y.o. male was seen, examined and discussed. This is confirmation that I have personally seen and examined the patient and that the key elements of the encounter were performed by me (> 85 % time). The medications & laboratory data was discussed and adjusted where necessary. The radiographic images were reviewed or with radiologist or consultant if felt dis-concordant with the exam or history. The above findings were corroborated, plans confirmed and changes made if needed. Family is updated at the bedside as available. Key issues of the case were discussed among consultants.          Nadege Gambino DO

## 2022-03-30 NOTE — PLAN OF CARE
Problem: Falls - Risk of:  Goal: Will remain free from falls  Description: Will remain free from falls  3/29/2022 2251 by Kenrick Melo RN  Outcome: Met This Shift  3/29/2022 1903 by Serge Gonzales RN  Outcome: Met This Shift  Goal: Absence of physical injury  Description: Absence of physical injury  3/29/2022 2251 by Kenrick Melo RN  Outcome: Met This Shift  3/29/2022 1903 by Serge Gonzales RN  Outcome: Met This Shift     Problem: Skin Integrity:  Goal: Will show no infection signs and symptoms  Description: Will show no infection signs and symptoms  3/29/2022 2251 by Kenrick Melo RN  Outcome: Met This Shift  3/29/2022 1903 by Serge Gonzales RN  Outcome: Met This Shift  Goal: Absence of new skin breakdown  Description: Absence of new skin breakdown  3/29/2022 2251 by Kenrick Melo RN  Outcome: Met This Shift  3/29/2022 1903 by Serge Gonzales RN  Outcome: Met This Shift

## 2022-03-31 NOTE — PROCEDURES
PATIENT:  [address] [facility] # [id number]     DATE:  3/31/2022    INDICTATIONS: Zen Lagunas 65 y.o. male with pleural effusion found on imaging. PRE - OPERATIVE DIAGNOSIS:  Pleural Effusion    POST - OPERATIVE DIAGNOSIS:   Pleural Effusion    PERFORMED By:  Pito Campuzano DO,       ASSISTANT(S):  Dr. Rosalia Essex:  Verbal consent obtained. Written consent obtained. After informed consent & appropriate time out protocol was noted & obtained. Risks/Benefits/Alternatives of the procedure were discussed including: infection, bleeding, pain, & pneumothorax. THORACENTESIS PROCEDURE DETAILS:  Patient understanding: patient states understanding of the procedure being performed. Time out: Immediately prior to procedure a \"time out\" was called to verify the correct. Patient was placed in a sitting position and ultrasound was done and site of maximum fluid collection was marked. PREPARATION: Patient was prepped and draped in the usual sterile fashion. ANESTHESIA: Lidocaine 1% without epinephrine, amount varied for local control. PROCEDURE NOTE: The patient was placed in the sitting position. US was then used to lois the fluid and depth was determined. Then the skin was prepped with Chloraprep solution and draped with sterile covers. For the procedure we used 1% plain lidocaine to anesthetize the skin, subcutaneous tissue and parietal pleura. After adequate anesthesia was accomplished. The pleural catheter was advanced over a guide into the pleural space. The fluid was obtained without any difficulties and minimal blood loss. A total of 2000 fluid was removed. The fluid was serosanguinous in color A dressing was applied to the incision area. FINDINGS/SAMPLES: We removed 2000 ml of straw colored/clear  pleural fluid. The samples was sent for analysis. COMPLICATIONS:  None; patient tolerated the procedure well.     PLAN: Post procedure cxr shows significant improvement, no ptx.     Eric Loyd DO

## 2022-03-31 NOTE — PROGRESS NOTES
Occupational Therapy  OT SESSION ATTEMPT     Date:3/31/2022  Patient Name: Steven Cohen  MRN: [id number]  : 1945  Room: [address] [place], [place] [address]     Attempted OT session this date:    [] unavailable due to other medical staff currently with pt   [] on hold per nursing staff   [] on hold per nursing staff secondary to lab / radiology results    [] John Aguilera declined treatment this date due to   [x] off unit at ultrasound. [] Other:     Will reattempt OT session at a later time.       Carolee Arnold TRISH Clare Rees [address], [address]

## 2022-03-31 NOTE — PLAN OF CARE
Problem: Falls - Risk of:  Goal: Will remain free from falls  Description: Will remain free from falls  Outcome: Met This Shift  Goal: Absence of physical injury  Description: Absence of physical injury  Outcome: Met This Shift     Problem: Skin Integrity:  Goal: Will show no infection signs and symptoms  Description: Will show no infection signs and symptoms  Outcome: Met This Shift  Goal: Absence of new skin breakdown  Description: Absence of new skin breakdown  Outcome: Met This Shift     Problem: OXYGENATION/RESPIRATORY FUNCTION  Goal: Patient will maintain patent airway  Outcome: Met This Shift     Problem: HEMODYNAMIC STATUS  Goal: Patient has stable vital signs and fluid balance  Outcome: Met This Shift

## 2022-03-31 NOTE — PROGRESS NOTES
PROGRESS NOTE     CARDIOLOGY    Chief complaint: Seen today for follow up, management & recommendations for hypervolemia and pleural effusions. He was reclining in bed. He was comfortable and in no distress. He did have another thoracentesis today. He states his breathing is much better. .    Wt Readings from Last 3 Encounters:   03/31/22 268 lb (121.6 kg)   03/22/22 272 lb (123.4 kg)   03/10/22 264 lb 1.8 oz (119.8 kg)     Temp Readings from Last 3 Encounters:   03/31/22 98.2 °F (36.8 °C) (Oral)   03/22/22 97.6 °F (36.4 °C) (Oral)   03/10/22 97.3 °F (36.3 °C) (Infrared)     BP Readings from Last 3 Encounters:   03/31/22 92/63   03/22/22 128/83   03/10/22 105/71     Pulse Readings from Last 3 Encounters:   03/31/22 82   03/22/22 70   03/10/22 96         Intake/Output Summary (Last 24 hours) at 3/31/2022 1834  Last data filed at 3/31/2022 1436  Gross per 24 hour   Intake 660 ml   Output 775 ml   Net -115 ml       Recent Labs     03/29/22  0537 03/30/22  0438 03/31/22  0510   WBC 13.3* 10.3 11.3   HGB 11.2* 11.0* 10.9*   HCT 36.8* 36.1* 34.7*   MCV 92.9 94.0 92.8    346 310     Recent Labs     03/29/22  0537 03/29/22  0537 03/30/22  0438 03/31/22  0510     --  145 140   K 4.3   < > 4.4  4.4 4.4  4.4     --  101 99   CO2 29  --  32* 29   BUN 54*  --  57* 62*   CREATININE 3.0*  --  3.2* 3.2*    < > = values in this interval not displayed. No results for input(s): PROTIME, INR in the last 72 hours. No results for input(s): CKTOTAL, CKMB, CKMBINDEX, TROPONINI in the last 72 hours. No results for input(s): BNP in the last 72 hours. No results for input(s): CHOL, HDL, TRIG in the last 72 hours. Invalid input(s): CHOLHDLR, LDLCALCU  No results for input(s): TROPHS in the last 72 hours.       bumetanide (BUMEX) 12.5 mg in sodium chloride 0.9 % 125 mL infusion, Continuous  acetaminophen (TYLENOL) tablet 650 mg, Q4H PRN  aspirin EC tablet 81 mg, Daily  atorvastatin (LIPITOR) tablet 40 mg, Nightly  buPROPion (WELLBUTRIN XL) extended release tablet 300 mg, QAM  vitamin B-12 (CYANOCOBALAMIN) tablet 1,000 mcg, Daily  guaiFENesin-dextromethorphan (ROBITUSSIN DM) 100-10 MG/5ML syrup 5 mL, Q4H PRN  docusate sodium (COLACE) capsule 100 mg, BID  ezetimibe (ZETIA) tablet 10 mg, Nightly  folic acid (FOLVITE) tablet 1 mg, Daily  guaiFENesin tablet 400 mg, 4x Daily PRN  insulin glargine-yfgn (SEMGLEE-YFGN) injection vial 50 Units, Nightly  ipratropium-albuterol (DUONEB) nebulizer solution 3 mL, Q4H PRN  loperamide (IMODIUM) capsule 2 mg, 4x Daily PRN  magnesium hydroxide (MILK OF MAGNESIA) 400 MG/5ML suspension 30 mL, Daily PRN  melatonin tablet 3 mg, Nightly  metoprolol succinate (TOPROL XL) extended release tablet 25 mg, BID  pantoprazole (PROTONIX) tablet 40 mg, QAM AC  potassium chloride (KLOR-CON M) extended release tablet 20 mEq, Daily  ascorbic acid (VITAMIN C) tablet 500 mg, BID  vitamin D (CHOLECALCIFEROL) tablet 2,000 Units, Daily  zinc sulfate (ZINCATE) capsule 50 mg, Daily  insulin lispro (HUMALOG) injection vial 0-6 Units, TID WC  insulin lispro (HUMALOG) injection vial 0-3 Units, Nightly  glucose (GLUTOSE) 40 % oral gel 15 g, PRN  dextrose 50 % IV solution, PRN  glucagon (rDNA) injection 1 mg, PRN  dextrose 5 % solution, PRN  heparin (porcine) injection 5,000 Units, Q8H  perflutren lipid microspheres (DEFINITY) injection 1.65 mg, ONCE PRN  white petrolatum ointment, BID PRN  ipratropium-albuterol (DUONEB) nebulizer solution 1 ampule, Q4H WA        Review of systems:     Heart: as above   Lungs: as above   Eyes: denies changes in vision or discharge. Ears: denies changes in hearing or pain. Nose: denies epistaxis or masses   Throat: denies sore throat or trouble swallowing. Neuro: denies numbness, tingling, tremors. Skin: denies rashes or itching. : denies hematuria, dysuria   GI: denies vomiting, diarrhea   Psych: denies mood changed, anxiety, depression.          Physical exam:    Constitutional: A&O x3, communicates well, no acute distress. Eyes: extraocular muscles intact, PERRL. Normal lids & conjunctiva. No icterus. ENT: clear, no bleeding. No external masses. Lips normal formation. Neck: supple, full ROM, no JVD, no bruits, no lymphadenopathy. No masses. trachea midline. Heart: r distant. Egular rate & rhythm, normal S1 & S2, faint systolic murmur. No heave. Lungs: Poor air movement. Still bilateral rales. No accessory muscles. Abd: soft, non-tender. Normal bowel sounds. Super morbidly obese. Neuro: Full ROM X 4, EOMI, no tremors. EXT: Severe bilateral lower extremity edema on exam  Skin: warm, dry, intact. Good turgor. Psych: A&O x 3, normal behavior, not anxious. Assessment/Recommendations  1. Advanced chronic renal insufficiency. 2. Severe hypervolemia, pulmonary edema, and pleural effusions. Breathing improved after thoracentesis. Diuresing per nephrology. Nephrology is planning to start dialysis. He states that he has agreed. 3. Normal LV systolic function. 4. Mild to moderate aortic stenosis. 5. Moderate size pericardial effusion. No tamponade. 6. Lymphoma. Status post radiation. 7. CAD. 8. Sick sinus syndrome and a pacemaker. 9. Hypertension. Recently had hypotension with placed on midodrine. That is now off. Blood pressure is stable. 10. Cardiology will sign off. Please call us if we can be of any further assistance. Note: This report was completed using computerized voice recognition software. Every effort has been made to ensure accuracy, however; and invert and computerized transcription errors may be present.

## 2022-03-31 NOTE — PROGRESS NOTES
[place]  Department of Pulmonary, Critical Care and Sleep Medicine  [address] [facility]  Department of Internal Medicine  Progress Note    SUBJECTIVE:    The patient was taken for left sided thoracentesis this morning. Tolerated the procedure well, 2L serosanguinous fluid removed. He reports his breathing has improved since yesterday. Still has significant anasarca. -results of pleural fluid cytology/chemistry:  PH: 7.465  LD: 129  fluid protein: 3.0 (serum total protein: 7.0)    OBJECTIVE:  Vitals:    03/30/22 1546 03/30/22 2039 03/31/22 0545 03/31/22 0739   BP:    105/71   Pulse:    80   Resp: 22 24 18   Temp:    97.7 °F (36.5 °C)   TempSrc:    Axillary   SpO2: 95% 96%  93%   Weight:   268 lb (121.6 kg)    Height:         Constitutional: Alert,     EENT: EOMI YOSEF. MMM. No icterus. No thrush. Neck: No thyromegaly. No elevated JVP. Trachea was midline. Respiratory: Symmetrical.  Breath sounds still diminished but improving  Cardiovascular: Regular, No murmur. No rubs. Pulses:  Equal bilaterally. Abdomen: Soft without organomegaly. No rebound, rigidity. No guarding. Lymphatic: No lymphadenopathy. Musculoskeletal: Without weakness or gross deficits  Extremities:  Bilateral pedal edema, pitting 2+. Lacy pattern of rash over the lower extremities below knee (livedo reticularis)  Skin:  Warm, livedo reticularis  Neurological/Psychiatric: No acute psychosis. Cranial nerves are intact. DATA:    Monitor Strips:  Reviewed & discusses with technical team. No changes noted.     RADIOLOGY:  Films were read/reviewed/discussed with radiology shows     CXR yesterday after right sided thoracentesis:          CBC with Differential:    Lab Results   Component Value Date    WBC 11.3 03/31/2022    RBC 3.74 03/31/2022    HGB 10.9 03/31/2022    HCT 34.7 03/31/2022     03/31/2022    MCV 92.8 03/31/2022    MCH 29.1 03/31/2022    MCHC 31.4 03/31/2022 RDW 17.7 03/31/2022    LYMPHOPCT 16.7 03/28/2022    MONOPCT 10.2 03/28/2022    BASOPCT 0.4 03/28/2022    MONOSABS 1.58 03/28/2022    LYMPHSABS 2.58 03/28/2022    EOSABS 0.36 03/28/2022    BASOSABS 0.06 03/28/2022     BMP:    Lab Results   Component Value Date     03/31/2022    K 4.4 03/31/2022    K 4.4 03/31/2022    CL 99 03/31/2022    CO2 29 03/31/2022    BUN 62 03/31/2022    LABALBU 3.6 03/28/2022    CREATININE 3.2 03/31/2022    CALCIUM 8.6 03/31/2022    GFRAA 23 03/31/2022    LABGLOM 19 03/31/2022    GLUCOSE 113 03/31/2022       CLINICAL ASSESMENT:  1. Acute on chronic hypoxemic respiratory failure 2/2 decompensated HFpEF +/- CKD  2. Bilateral pleural effusions, s/p right sided US guided thoracentesis (03/30/22), left sided US guided thoracentesis (03/31/22), appears transudative   3. Dyspnea with exertion  4. KELSEY on CKD, stable  5. Concern for cardiorenal syndrome  6. History of SPEEDY      PLAN: If needed the case was discussed with the care team  1. Patient tolerated left sided US guided thoracentesis well. Post procedure CXR was ordered. 2. Continue diuresis with IV bumex 2mg BID as per nephrology. This is the patient's fourth admission in the last few months with similar symptoms. [facility] does not appear to be able to maintain his volume status outside of the hospital. [facility] be nearing need for hemodialysis for volume removal.  3. Wean FiO2 as tolerated  4. Continue CPAP at at bedtime and with naps   5. Will obtain pfts after left-sided thoracentesis  6. Appreciate cardiology recommendations    Xochilt Reeves MD  PGY-2, [address] [facility]  Department of Pulmonary, Critical Care and Sleep Medicine  [address] [facility]  Department of Internal Medicine      During multidisciplinary team rounds Von Johnson is a 65 y.o. male was seen, examined and discussed.  This is confirmation that I have personally seen and examined the patient and that the key elements of the encounter were performed by me (> 85 % time). The medications & laboratory data was discussed and adjusted where necessary. The radiographic images were reviewed or with radiologist or consultant if felt dis-concordant with the exam or history. The above findings were corroborated, plans confirmed and changes made if needed. Family is updated at the bedside as available. Key issues of the case were discussed among consultants.             Nadege Gambino DO

## 2022-03-31 NOTE — PROGRESS NOTES
Progress Note  Date:3/31/2022       Room:[address] [place] [address]-A  Patient Fausto Montana     YOB: 1945     Age:77 y.o. Patient says breathing is improved today. Subjective    Subjective:  Symptoms:  Improved. He reports shortness of breath. No chest pain. Diet:  Adequate intake. Activity level: Impaired due to weakness. Pain:  He reports no pain. Review of Systems   Constitutional: Negative for activity change and fever. HENT: Negative for congestion. Respiratory: Positive for shortness of breath. Cardiovascular: Positive for leg swelling. Negative for chest pain. Gastrointestinal: Negative for abdominal pain. Neurological: Negative for dizziness. Psychiatric/Behavioral: Negative for agitation. Objective         Vitals Last 24 Hours:  TEMPERATURE:  Temp  Av °F (36.7 °C)  Min: 97.6 °F (36.4 °C)  Max: 98.2 °F (36.8 °C)  RESPIRATIONS RANGE: Resp  Av.2  Min: 20  Max: 26  PULSE OXIMETRY RANGE: SpO2  Av.8 %  Min: 95 %  Max: 98 %  PULSE RANGE: Pulse  Av.7  Min: 72  Max: 80  BLOOD PRESSURE RANGE: Systolic (84RVO), SVZ:360 , Min:108 , BKF:225   ; Diastolic (63WRH), VHN:89, Min:72, Max:90    I/O (24Hr): Intake/Output Summary (Last 24 hours) at 3/31/2022 0651  Last data filed at 3/31/2022 0631  Gross per 24 hour   Intake 420 ml   Output 1100 ml   Net -680 ml     Objective:  General Appearance:  Comfortable. Vital signs: (most recent): Blood pressure 108/72, pulse 72, temperature 98.2 °F (36.8 °C), temperature source Oral, resp. rate 24, height 5' 9\" (1.753 m), weight 268 lb (121.6 kg), SpO2 96 %. No fever. Lungs:  Normal effort and normal respiratory rate. Breath sounds clear to auscultation. Heart: Normal rate. Regular rhythm. S1 normal and S2 normal.    Extremities: There is local swelling.       Labs/Imaging/Diagnostics    Labs:  CBC:  Recent Labs     22  1638 22  0537 22  0438   WBC 15.5* 13.3* 10.3   RBC 4.06 3.96 3.84 HGB 11.6* 11.2* 11.0*   HCT 37.0 36.8* 36.1*   MCV 91.1 92.9 94.0   RDW 18.2* 18.1* 18.1*    409 346     CHEMISTRIES:  Recent Labs     22  1638 22  0537 22  0438    143 145   K 4.5 4.3 4.4  4.4    101 101   CO2 27 29 32*   BUN 57* 54* 57*   CREATININE 3.0* 3.0* 3.2*   GLUCOSE 80 69* 71*     PT/INR:No results for input(s): PROTIME, INR in the last 72 hours. APTT:No results for input(s): APTT in the last 72 hours. LIVER PROFILE:  Recent Labs     22  1638   AST 24   ALT 35   BILITOT 0.5   ALKPHOS 129       Imaging Last 24 Hours:  XR CHEST PORTABLE    Result Date: 2022  EXAMINATION: ONE XRAY VIEW OF THE CHEST 2022 4:49 pm COMPARISON: 2022 HISTORY: ORDERING SYSTEM PROVIDED HISTORY: shortness of breath TECHNOLOGIST PROVIDED HISTORY: Reason for exam:->shortness of breath What reading provider will be dictating this exam?->CRC FINDINGS: There is a large opacity seen within the right lung base. There is a small right pleural effusion. The left upper lobe is clear. There is a small left pleural effusion. The cardiac silhouette is within normals. There is a dual lead cardiac pacer on the left. 1. Large opacity within the right lung base which could represent pneumonia and or atelectasis. 2. Moderate size right pleural effusion. 3. Small left pleural effusion. 4. CT of the thorax is recommended for further evaluation. US DUP LOWER EXTREMITIES BILATERAL VENOUS    Result Date: 2022  Patient MRN:  [id number] : 1945 Age: 65 years Gender: Male Order Date:  2022 5:28 PM EXAM: US DUP LOWER EXTREMITIES BILATERAL VENOUS NUMBER OF IMAGES:  52 INDICATION:  leg swelling leg swelling What reading provider will be dictating this exam?->[facility] COMPARISON: None Within the visualized vessels, there is no evidence for deep venous thrombosis There is good compressibility, there is good augmentation, there is good color flow.      Within the visualized vessels there is no evidence for deep venous thrombosis     Assessment//Plan           Hospital Problems           Last Modified POA    * (Principal) Acute on chronic systolic heart failure (HCC) 3/28/2022 Yes    Pleural effusion 3/30/2022 Yes        Assessment:  (   (Principal) Acute on chronic clinical systolic heart failure ([address] 3/14/2022 Yes     Acute on chronic hypoxic respiratory failure. CHF  Pleural effusion  Acute renal insfficnecy  DM  COPD  HTN  Hyperlipidemia       ). Plan:   (IV diureses  Cardiology, Pulmonary and Renal following. Monitor labs and output. Continue meds. PT/OT).

## 2022-03-31 NOTE — CARE COORDINATION
3/31/2022 - Cardiology, nephrology and pulmonology following. Wearing 6L NC. IV bumex twice daily. Right sided thoracentesis done yesterday and removed 1500 cc. For left side thoracentesis today. Pt is from [facility] - was private pay at facility. Will have therapy evals done and submit for precert to have pt go back to the facility under insurance. Will need a covid test done also to return to facility. Ambulance form completed and in envelope on soft chart. SW/CM will follow.

## 2022-03-31 NOTE — PROGRESS NOTES
[facility]  Nephrology Attending Progress Note  Mary Medina. Carley Mariscal MD        SUBJECTIVE:     3/29: Crow Mejia a 65 y. o. male with a history of chronic kidney disease stage IV (recent recent baseline creatinine 2.5-3), type 2 diabetes mellitus, COPD, chronic HFpEF H/O pacemaker, speedy, gerd, djd, htn, hyperlipidemia, who presented from [facility] with complaints of shortness of breath and hypoxia. He has been admitted twice for this in the last weeks. He has been on bumex drip. Labs at this time show na 143, k 4.3, co2 29, bun 54, cr 3, gfr 20, ca 9, alb 3.6, wbc 13.3, hgb 11.2, plt 409. He has been started on iv bumex 2 iv q 12. He is on cpap and 3 L of o2 at the snf. He has had a large right pleural effusion which has been tapped.      3/30: pt seen in room, awaits thoracentesis    3/31: pt seen, sp thoracentesis wiith 1.5 L off. On bumex 2 mg iv q 12.        PROBLEM LIST:    Patient Active Problem List   Diagnosis    PNA (pneumonia)    Acute on chronic heart failure with preserved ejection fraction ([address]    Coronary artery disease involving native coronary artery of native heart without angina pectoris    Cardiac pacemaker in situ    Primary hypertension    SPEEDY (obstructive sleep apnea)    Chronic respiratory failure with hypoxia (HCC)    CHF (congestive heart failure), NYHA class I, acute on chronic, combined ([address]    Acute on chronic clinical systolic heart failure (HCC)    Acute on chronic systolic heart failure (HCC)    Pleural effusion        PAST MEDICAL HISTORY:    Past Medical History:   Diagnosis Date    Acid reflux     Agent orange exposure     Arthritis     CAD (coronary artery disease)     Congestive heart failure (CHF) ([address]     COPD (chronic obstructive pulmonary disease) ([address]     Depression     Diabetes mellitus ([address]     History of blood transfusion     Hyperlipidemia     Hypertension     MI (myocardial infarction) ([address]     Polio     Sleep apnea        DIET:    ADULT DIET; Regular; 4 carb choices (60 gm/meal);  Low Sodium (2 gm)     PHYSICAL EXAM:     Patient Vitals for the past 24 hrs:   BP Temp Temp src Pulse Resp SpO2 Weight   03/31/22 1118 101/80 97.7 °F (36.5 °C) Axillary 80 -- 96 % --   03/31/22 0739 105/71 97.7 °F (36.5 °C) Axillary 80 18 93 % --   03/31/22 0545 -- -- -- -- -- -- 268 lb (121.6 kg)   03/30/22 2039 -- -- -- -- 24 96 % --   03/30/22 1546 -- -- -- -- 22 95 % --   03/30/22 1439 108/72 98.2 °F (36.8 °C) Oral 72 24 95 % --   03/30/22 1252 116/78 98.2 °F (36.8 °C) Oral 78 20 98 % --   @      Intake/Output Summary (Last 24 hours) at 3/31/2022 1205  Last data filed at 3/31/2022 0830  Gross per 24 hour   Intake 420 ml   Output 900 ml   Net -480 ml         Wt Readings from Last 3 Encounters:   03/31/22 268 lb (121.6 kg)   03/22/22 272 lb (123.4 kg)   03/10/22 264 lb 1.8 oz (119.8 kg)       Constitutional:  Pt is in no acute distress  Head: normocephalic, atraumatic  Neck: no JVD  Cardiovascular: regular rate and rhythm, no murmurs, gallops, or rubs  Respiratory:  Decreased at bases  Gastrointestinal:  Soft, nontender, nondistended, bowel sounds x 4  Ext: edema  Skin: dry, no rash  Neuro: aaox3    MEDS (scheduled):    sodium chloride flush        aspirin  81 mg Oral Daily    atorvastatin  40 mg Oral Nightly    buPROPion  300 mg Oral QAM    cyanocobalamin  1,000 mcg Oral Daily    docusate sodium  100 mg Oral BID    ezetimibe  10 mg Oral Nightly    folic acid  1 mg Oral Daily    insulin glargine-yfgn  50 Units SubCUTAneous Nightly    melatonin  3 mg Oral Nightly    metoprolol succinate  25 mg Oral BID    pantoprazole  40 mg Oral QAM AC    potassium chloride  20 mEq Oral Daily    vitamin C  500 mg Oral BID    vitamin D  2,000 Units Oral Daily    zinc sulfate  50 mg Oral Daily    bumetanide  2 mg IntraVENous BID    insulin lispro  0-6 Units SubCUTAneous TID WC    insulin lispro  0-3 Units SubCUTAneous Nightly    heparin (porcine)  5,000 Units SubCUTAneous Q8H    ipratropium-albuterol  1 ampule Inhalation Q4H WA       MEDS (infusions):   dextrose         MEDS (prn):  acetaminophen, guaiFENesin-dextromethorphan, guaiFENesin, ipratropium-albuterol, loperamide, magnesium hydroxide, glucose, dextrose, glucagon (rDNA), dextrose, perflutren lipid microspheres, white petrolatum    DATA:    Recent Labs     03/29/22  0537 03/30/22  0438 03/31/22  0510   WBC 13.3* 10.3 11.3   HGB 11.2* 11.0* 10.9*   HCT 36.8* 36.1* 34.7*   MCV 92.9 94.0 92.8    346 310     Recent Labs     03/28/22  1638 03/28/22  1638 03/29/22  0537 03/30/22  0438 03/31/22  0510      < > 143 145 140   K 4.5   < > 4.3 4.4  4.4 4.4  4.4      < > 101 101 99   CO2 27   < > 29 32* 29   BUN 57*   < > 54* 57* 62*   CREATININE 3.0*   < > 3.0* 3.2* 3.2*   LABGLOM 20   < > 20 19 19   GLUCOSE 80   < > 69* 71* 113*   CALCIUM 9.0   < > 9.0 9.0 8.6   ALT 35  --   --   --   --    AST 24  --   --   --   --    BILITOT 0.5  --   --   --   --    ALKPHOS 129  --   --   --   --     < > = values in this interval not displayed.        Lab Results   Component Value Date    LABALBU 3.6 03/28/2022    LABALBU 3.4 (L) 03/14/2022    LABALBU 3.5 02/25/2022     Lab Results   Component Value Date    TSH 2.310 01/22/2022       Iron Studies  No results found for: IRON, TIBC, FERRITIN  No results found for: [id number]  No results found for: FOLATE    No results found for: VITD25  PTH   Date Value Ref Range Status   01/24/2022 112 (H) 15 - 65 pg/mL Final       No components found for: URIC    Lab Results   Component Value Date    COLORU Yellow 03/15/2022    NITRU Negative 03/15/2022    GLUCOSEU Negative 03/15/2022    KETUA Negative 03/15/2022    UROBILINOGEN 0.2 03/15/2022    BILIRUBINUR Negative 03/15/2022       No results found for: Jackelyn Caballero      IMPRESSION/RECOMMENDATIONS:     1.  Chronic kidney disease stage IV  due to diabetes mellitus and hypertension  Cr usual baseline 2.5-3  Follow on iv diuretics  Check pth and phos  Not diuresing well and now has pericardial effusion as well  gfr 19  As outpt edema recurs  Will proceed with hd  Consult vascular for temp line     2.  Acute on chronic HFpEF  Continue iv bumex  Strict I/o  Right-sided thoracentesis performed 3/15   Sp thora 3/30 with 1.5 L off on right  Neg 2  L overall  Awaits hd     3. Htn  On Midodrine  Hold parameters on metoprolol     4 mame  On cpap     Brad Congress.  Mary Robin MD

## 2022-04-01 NOTE — PROCEDURES
Liborio Arciniega is a 65 y.o. male patient. 1. Acute on chronic systolic CHF (congestive heart failure) (HCC)      Past Medical History:   Diagnosis Date    Acid reflux     Agent orange exposure     Arthritis     CAD (coronary artery disease)     Congestive heart failure (CHF) (HCC)     COPD [facility] disease) [facility]     Depression     Diabetes mellitus ([facility]     History of blood transfusion     Hyperlipidemia     Hypertension     MI (myocardial infarction) ([facility]     Polio     Sleep apnea      Blood pressure 90/60, pulse 87, temperature 98.2 °F (36.8 °C), temperature source Oral, resp. rate 20, height 5' 9\" (1.753 m), weight 268 lb (121.6 kg), SpO2 95 %. Central Line    Date/Time: 3/31/2022 11:08 PM  Performed by: Sabino Flowers DO  Authorized by: Sabino Flowers DO   Consent: Verbal consent obtained. Written consent obtained.   Risks and benefits: risks, benefits and alternatives were discussed  Consent given by: patient  Patient understanding: patient states understanding of the procedure being performed  Patient consent: the patient's understanding of the procedure matches consent given  Procedure consent: procedure consent matches procedure scheduled  Imaging studies: imaging studies available  Required items: required blood products, implants, devices, and special equipment available  Patient identity confirmed: verbally with patient, arm band and provided demographic data  Indications: vascular access  Anesthesia: local infiltration    Anesthesia:  Local Anesthetic: lidocaine 1% without epinephrine  Anesthetic total: 5 mL  Preparation: skin prepped with 2% chlorhexidine  Skin prep agent dried: skin prep agent completely dried prior to procedure  Sterile barriers: all five maximum sterile barriers used - cap, mask, sterile gown, sterile gloves, and large sterile sheet  Hand hygiene: hand hygiene performed prior to central venous catheter insertion  Location details: right femoral  Catheter type: double lumen  Catheter size: 14 Fr  Pre-procedure: landmarks identified  Ultrasound guidance: yes  Sterile ultrasound techniques: sterile gel and sterile probe covers were used  Number of attempts: 1  Successful placement: yes  Post-procedure: line sutured and dressing applied  Assessment: blood return through all ports and free fluid flow  Patient tolerance: patient tolerated the procedure well with no immediate complications          Melany Welsh, DO  3/31/2022

## 2022-04-01 NOTE — CONSULTS
Vascular Surgery Consultation Note    Reason for Consult:  Temporary HD catheter insertion    HPI:    This is a 65 y.o. male who is admitted to the hospital for treatment of heart failure exacerbation and acute renal insufficiency. The patient has history of stage IV CKD, type 2 diabetes mellitus, COPD, and HFpEF. Nephrology service has been following, attempting diuresis. However, the patient has not been diuresing well, and now has worsening edema and pericardial effusion. The nephrology service as well as primary team is now requesting placement of temporary HD catheter. The patient is currently receiving aspirin, but is not on any other blood thinning medications.   He denies having any prior central line or need for dialysis in the past. He does not ambulate        ROS: Negative if blank [], Positive if [x]  General Vascular   [] Fevers [] Claudication (Blocks)   [] Chills [] Rest Pain   [] Weight Loss [] Tissue Loss   [] Chest Pain [] Clotting Disorder   [] SOB at rest [x] Leg Swelling   [x] SOB with exertion [] DVT/PE      [] Nausea    [] Vomiting [] Stroke/TIA   [] Abdominal Pain [] Focal weakness   [] Melena [] Slurred Speech   [] Hematochezia [] Vision Changes   [] Hematuria    [] Dysuria [] Hx of Central Catheters   [x] Wears Glasses/Contacts [] Dialysis and If so date initiated   [] Blindness    [] Right Hand Dominant   [] Difficulty swallowing        Past Medical History:   Diagnosis Date    Acid reflux     Agent orange exposure     Arthritis     CAD (coronary artery disease)     Congestive heart failure (CHF) (HCC)     COPD (chronic obstructive pulmonary disease) ([facility]     Depression     Diabetes mellitus ([facility]     History of blood transfusion     Hyperlipidemia     Hypertension     MI (myocardial infarction) ([facility]     Polio     Sleep apnea         Past Surgical History:   Procedure Laterality Date    CARDIAC PACEMAKER PLACEMENT      CORONARY ANGIOPLASTY WITH STENT PLACEMENT      HERNIA REPAIR PACEMAKER PLACEMENT         Current Medications:    bumetanide 0.1 mg/mL infusion 1 mg/hr (03/31/22 6527)    dextrose        acetaminophen, guaiFENesin-dextromethorphan, guaiFENesin, ipratropium-albuterol, loperamide, magnesium hydroxide, glucose, dextrose, glucagon (rDNA), dextrose, perflutren lipid microspheres, white petrolatum    aspirin  81 mg Oral Daily    atorvastatin  40 mg Oral Nightly    buPROPion  300 mg Oral QAM    cyanocobalamin  1,000 mcg Oral Daily    docusate sodium  100 mg Oral BID    ezetimibe  10 mg Oral Nightly    folic acid  1 mg Oral Daily    insulin glargine-yfgn  50 Units SubCUTAneous Nightly    melatonin  3 mg Oral Nightly    metoprolol succinate  25 mg Oral BID    pantoprazole  40 mg Oral QAM AC    potassium chloride  20 mEq Oral Daily    vitamin C  500 mg Oral BID    vitamin D  2,000 Units Oral Daily    zinc sulfate  50 mg Oral Daily    insulin lispro  0-6 Units SubCUTAneous TID WC    insulin lispro  0-3 Units SubCUTAneous Nightly    heparin (porcine)  5,000 Units SubCUTAneous Q8H    ipratropium-albuterol  1 ampule Inhalation Q4H WA        Allergies:  Penicillins    Social History     Socioeconomic History    Marital status: Single     Spouse name: Not on file    Number of children: Not on file    Years of education: Not on file    Highest education level: Not on file   Occupational History    Not on file   Tobacco Use    Smoking status: Former Smoker    Smokeless tobacco: Never Used    Tobacco comment: quit in 1985   Vaping Use    Vaping Use: Never used   Substance and Sexual Activity    Alcohol use: Never    Drug use: Never    Sexual activity: Not on file   Other Topics Concern    Not on file   Social History Narrative    occasional iced tea      Social Determinants of Health     Financial Resource Strain:     Difficulty of Paying Living Expenses: Not on file   Food Insecurity:     Worried About Running Out of Food in the Last Year: Not on file    Dale of Food in the Last Year: Not on file   Transportation Needs:     Lack of Transportation (Medical): Not on file    Lack of Transportation (Non-Medical): Not on file   Physical Activity:     Days of Exercise per Week: Not on file    Minutes of Exercise per Session: Not on file   Stress:     Feeling of Stress : Not on file   Social Connections:     Frequency of Communication with Friends and Family: Not on file    Frequency of Social Gatherings with Friends and Family: Not on file    Attends Yarsanism Services: Not on file    Active Member of Clubs or Organizations: Not on file    Attends Club or Organization Meetings: Not on file    Marital Status: Not on file   Intimate Partner Violence:     Fear of Current or Ex-Partner: Not on file    Emotionally Abused: Not on file    Physically Abused: Not on file    Sexually Abused: Not on file   Housing Stability:     Unable to Pay for Housing in the Last Year: Not on file    Number of [place] in the Last Year: Not on file    Unstable Housing in the Last Year: Not on file        History reviewed. No pertinent family history. PHYSICAL EXAM:    BP 90/60   Pulse 87   Temp 98.2 °F (36.8 °C) (Oral)   Resp 20   Ht 5' 9\" (1.753 m)   Wt 268 lb (121.6 kg)   SpO2 95%   BMI 39.58 kg/m²   CONSTITUTIONAL:  awake, alert, cooperative, no apparent distress, and appears stated age  NEURO:  Normal  EYES:  lids and lashes normal, sclera clear and conjunctiva normal  ENT:  normocepalic, without obvious abnormality, external ears without lesions  NECK:  supple, symmetrical, trachea midline   LUNGS:  no increased work of breathing.  On 6L High flow O2  CARDIOVASCULAR:  regular rate and rhythm  ABDOMEN:  soft, obese, non-tender, Aorta is not palpable  SKIN:  Bruising present to abdominal wall    EXTREMITIES:    R UE Swelling present Incisions absent       5/5 Strength    L UE Swelling present Incisions absent       5/5 Strength    R LE Edema present     Incisions absent    Wounds absent    4/5 Strength      L LE Edema present     Incisions absent    Wounds absent    4/5 Strength    R brachial 1+ L brachial 1+   R radial Not palpable L radial Not palpable   R femoral 2+ L femoral 2+   R popliteal 1+ L popliteal 1+   R posterior tibial Not palpable L posterior tibial Not palpable   R dorsalis pedis Not palpable L dorsalis pedis Not palpable       LABS:    Lab Results   Component Value Date    WBC 11.3 03/31/2022    HGB 10.9 (L) 03/31/2022    HCT 34.7 (L) 03/31/2022     03/31/2022    K 4.4 03/31/2022    K 4.4 03/31/2022    BUN 62 (H) 03/31/2022    CREATININE 3.2 (H) 03/31/2022       RADIOLOGY:  Echo Limited    Result Date: 3/29/2022  Transthoracic Echocardiography Report (TTE)  Demographics   Patient Name       Justin Jasmine  Gender               Male                     C   Medical Record     [id number]        Room Number          5571  Number   Account #          [de-identified]       Procedure Date       03/29/2022   Corporate ID                       Ordering Physician   Debbie Starr DO   Accession Number   [id number]      Referring Physician   Date of Birth      1945      Sonographer          Farzad Diaz RDCS   Age                65 year(s)      Interpreting         Debbie Starr DO                                     Physician                                      Any Other  Procedure Type of Study   TTE procedure:Echo Limited Study. Procedure Date Date: 03/29/2022 Start: 03:34 PM Study Location: Echo Lab Technical Quality: Adequate visualization Indications:Pericardial effusion. Patient Status: Routine Height: 69 inches Weight: 270 pounds BSA: 2.35 m^2 BMI: 39.87 kg/m^2  Findings   Left Ventricle  Normal left ventricle size and systolic function. Right Ventricle  Mildly dilated right ventricle. Normal right ventricle systolic function. Pericardial Effusion  Moderate size pericardial effusion. No echocardiographic findings of tamponade. Pleural Effusion  Large right pleural effusion.    Conclusions   Summary Normal left ventricle size and systolic function. Mildly dilated right ventricle. Normal right ventricle systolic function. Moderate size pericardial effusion. No echocardiographic findings of tamponade. Large right pleural effusion. Signature   ----------------------------------------------------------------  Electronically signed by Javan Pablo DO (Interpreting  physician) on 03/29/2022 07:59 PM  ----------------------------------------------------------------  [email or web address] [id number]    XR CHEST (2 VW)    Result Date: 3/28/2022  EXAMINATION: TWO XRAY VIEWS OF THE CHEST 3/28/2022 4:08 pm COMPARISON: 03/21/2022 HISTORY: ORDERING SYSTEM PROVIDED HISTORY: SOB TECHNOLOGIST PROVIDED HISTORY: Reason for exam:->SOB What reading provider will be dictating this exam?->CRC FINDINGS: The cardiac silhouette is within normals. There are findings of CHF. There are bilateral pleural effusions, right greater than left. The right pleural effusion is large in size. There is no pneumothorax. 1. Findings of CHF 2. Bilateral pleural effusions, right greater than left. The right pleural effusion is large         Assesment/Plan  65 y.o. male with worsening KELSEY on CKD. Inadequately responding to diuretics. Admitted for CHF exacerbation. In need of temporary dialysis access    - temporary HD line to be placed at bedside  - will discuss risks and benefits with patient  - please call with any questions or concerns     Mary Mcclure DO  3/31/22  8:33 PM EDT    Patient was seen and examined. Agree with above. .  This is a late entry.     Dulce George

## 2022-04-01 NOTE — PROGRESS NOTES
[facility]  Nephrology Attending Progress Note  Mary Medina. Carley Mariscal MD        SUBJECTIVE:     3/29: Crow Mejia a 65 y. o. male with a history of chronic kidney disease stage IV (recent recent baseline creatinine 2.5-3), type 2 diabetes mellitus, COPD, chronic HFpEF H/O pacemaker, speedy, gerd, djd, htn, hyperlipidemia, who presented from [facility] with complaints of shortness of breath and hypoxia. He has been admitted twice for this in the last weeks. He has been on bumex drip. Labs at this time show na 143, k 4.3, co2 29, bun 54, cr 3, gfr 20, ca 9, alb 3.6, wbc 13.3, hgb 11.2, plt 409. He has been started on iv bumex 2 iv q 12. He is on cpap and 3 L of o2 at the snf. He has had a large right pleural effusion which has been tapped.      3/30: pt seen in room, awaits thoracentesis    3/31: pt seen, sp thoracentesis wiith 1.5 L off.  On bumex 2 mg iv q 12.     4/1: pt seen on hd, first hd today, tolerating ok      PROBLEM LIST:    Patient Active Problem List   Diagnosis    PNA (pneumonia)    Acute on chronic heart failure with preserved ejection fraction ([address]    Coronary artery disease involving native coronary artery of native heart without angina pectoris    Cardiac pacemaker in situ    Primary hypertension    SPEEDY (obstructive sleep apnea)    Chronic respiratory failure with hypoxia (HCC)    CHF (congestive heart failure), NYHA class I, acute on chronic, combined ([address]    Acute on chronic clinical systolic heart failure (HCC)    Acute on chronic systolic heart failure (HCC)    Pleural effusion        PAST MEDICAL HISTORY:    Past Medical History:   Diagnosis Date    Acid reflux     Agent orange exposure     Arthritis     CAD (coronary artery disease)     Congestive heart failure (CHF) ([address]     COPD (chronic obstructive pulmonary disease) ([address]     Depression     Diabetes mellitus ([address]     History of blood transfusion     Hyperlipidemia     Hypertension     [facility] infarction) [facility]     Polio     Sleep apnea        DIET:    ADULT DIET; Regular; 4 carb choices (60 gm/meal);  Low Sodium (2 gm)     PHYSICAL EXAM:     Patient Vitals for the past 24 hrs:   BP Temp Temp src Pulse Resp SpO2 Weight   04/01/22 1404 126/72 -- -- 81 -- -- --   04/01/22 1330 126/68 -- -- 83 -- -- --   04/01/22 1325 127/73 97.7 °F (36.5 °C) -- 83 -- -- 266 lb 8.6 oz (120.9 kg)   04/01/22 1130 98/71 97.7 °F (36.5 °C) Oral 77 20 98 % --   04/01/22 0730 97/74 97.8 °F (36.6 °C) Oral 77 18 98 % --   03/31/22 2030 90/60 98.2 °F (36.8 °C) Oral 87 20 95 % --   03/31/22 2004 -- -- -- -- -- 96 % --   03/31/22 1622 -- -- -- -- -- 96 % --   03/31/22 1540 92/63 98.2 °F (36.8 °C) Oral 82 20 94 % --   @      Intake/Output Summary (Last 24 hours) at 4/1/2022 1414  Last data filed at 4/1/2022 1205  Gross per 24 hour   Intake 600 ml   Output 2450 ml   Net -1850 ml         Wt Readings from Last 3 Encounters:   04/01/22 266 lb 8.6 oz (120.9 kg)   03/22/22 272 lb (123.4 kg)   03/10/22 264 lb 1.8 oz (119.8 kg)       Constitutional:  Pt is in no acute distress  Head: normocephalic, atraumatic  Neck: no JVD  Cardiovascular: regular rate and rhythm, no murmurs, gallops, or rubs  Respiratory:  Decreased at bases  Gastrointestinal:  Soft, nontender, nondistended, bowel sounds x 4  Ext: edema  Skin: dry, no rash  Neuro: aaox3    MEDS (scheduled):    aspirin  81 mg Oral Daily    atorvastatin  40 mg Oral Nightly    buPROPion  300 mg Oral QAM    cyanocobalamin  1,000 mcg Oral Daily    docusate sodium  100 mg Oral BID    ezetimibe  10 mg Oral Nightly    folic acid  1 mg Oral Daily    insulin glargine-yfgn  50 Units SubCUTAneous Nightly    melatonin  3 mg Oral Nightly    metoprolol succinate  25 mg Oral BID    pantoprazole  40 mg Oral QAM AC    potassium chloride  20 mEq Oral Daily    vitamin C  500 mg Oral BID    vitamin D  2,000 Units Oral Daily    zinc sulfate  50 mg Oral Daily    insulin lispro  0-6 Units SubCUTAneous TID WC    insulin lispro  0-3 Units SubCUTAneous Nightly    heparin (porcine)  5,000 Units SubCUTAneous Q8H    ipratropium-albuterol  1 ampule Inhalation Q4H WA       MEDS (infusions):   bumetanide 0.1 mg/mL infusion 1 mg/hr (04/01/22 1305)    dextrose         MEDS (prn):  acetaminophen, guaiFENesin-dextromethorphan, guaiFENesin, ipratropium-albuterol, loperamide, magnesium hydroxide, glucose, dextrose, glucagon (rDNA), dextrose, perflutren lipid microspheres, white petrolatum    DATA:    Recent Labs     03/30/22 0438 03/31/22 0510 04/01/22 0522   WBC 10.3 11.3 11.0   HGB 11.0* 10.9* 10.9*   HCT 36.1* 34.7* 34.0*   MCV 94.0 92.8 91.6    310 281     Recent Labs     03/30/22 0438 03/30/22 0438 03/31/22 0510 04/01/22 0522     --  140 144   K 4.4  4.4   < > 4.4  4.4 4.2     --  99 103   CO2 32*  --  29 27   BUN 57*  --  62* 60*   CREATININE 3.2*  --  3.2* 3.1*   LABGLOM 19  --  19 20   GLUCOSE 71*  --  113* 100*   CALCIUM 9.0  --  8.6 8.4*    < > = values in this interval not displayed.        Lab Results   Component Value Date    LABALBU 3.6 03/28/2022    LABALBU 3.4 (L) 03/14/2022    LABALBU 3.5 02/25/2022     Lab Results   Component Value Date    TSH 2.310 01/22/2022       Iron Studies  No results found for: IRON, TIBC, FERRITIN  No results found for: COLBVNEQ43  No results found for: FOLATE    No results found for: VITD25  PTH   Date Value Ref Range Status   01/24/2022 112 (H) 15 - 65 pg/mL Final       No components found for: URIC    Lab Results   Component Value Date    COLORU Yellow 03/15/2022    NITRU Negative 03/15/2022    GLUCOSEU Negative 03/15/2022    KETUA Negative 03/15/2022    UROBILINOGEN 0.2 03/15/2022    BILIRUBINUR Negative 03/15/2022       No results found for: Jarrett Chapman      IMPRESSION/RECOMMENDATIONS:     1.  Chronic kidney disease stage IV  due to diabetes mellitus and hypertension  Cr usual baseline 2.5-3  Follow on iv diuretics  Check pth and phos  Not diuresing well and now has pericardial effusion as well  gfr 19  As outpt edema recurs  Will proceed with hd 4/1  Temp line placed 3/31     2.  Acute on chronic HFpEF  Continue iv bumex  Strict I/o  Right-sided thoracentesis performed 3/15   Sp thora 3/30 with 1.5 L off on right  Hd today     3. Htn  On Midodrine  Hold parameters on metoprolol     4 mame  On cpap     Dalia Asif.  Evi Ling MD

## 2022-04-01 NOTE — CARE COORDINATION
4/1/2022 - Cardiology, nephrology, pulmonology following. Wearing 15L HF NC. Had a left thoracentesis yesterday and drained 2000 cc. IV bumex infusion at 1 mg/hr. Vascular surgery consult done. Had right groin temporary HD catheter inserted yesterday. First HD treatment done today. Pt is from [facility] Will need PT/OT evals done and will submit for precert to return to facility. Will need a covid test to return. Ambulance form completed and in envelope on soft chart.  SW/CM will follow.

## 2022-04-01 NOTE — PROGRESS NOTES
Ord  Department of Pulmonary, Critical Care and Sleep Medicine  [address] [facility]  Department of Internal Medicine  Progress Note    SUBJECTIVE:    S/p left sided thoracentesis yesterday, 2L removed. HD line was established yesterday to initiate HD. He is going to get HD today. He feels tired, is breathing better, minimal cough, no phlegm. Feels like his legs are leaden due to all the pedal edema. Discussed with him that dialysis would would help with his fluid retention    OBJECTIVE:  Vitals:    03/31/22 1622 03/31/22 2004 03/31/22 2030 04/01/22 0730   BP:   90/60 97/74   Pulse:   87 77   Resp:   20 18   Temp:   98.2 °F (36.8 °C) 97.8 °F (36.6 °C)   TempSrc:   Oral Oral   SpO2: 96% 96% 95% 98%   Weight:       Height:         Constitutional: Alert, well oriented  EENT: EOMI YOSEF. MMM. No icterus. No thrush. Neck: No thyromegaly. No elevated JVP. Trachea was midline. Respiratory: Symmetrical.  Breath sounds still diminished but improving  Cardiovascular: Regular, No murmur. No rubs. Pulses:  Equal bilaterally. Abdomen: Soft without organomegaly. No rebound, rigidity. No guarding. Lymphatic: No lymphadenopathy. Musculoskeletal: Without weakness or gross deficits  Extremities:  Bilateral pedal edema, pitting 2+. Lacy pattern of rash over the lower extremities below knee (livedo reticularis)--appears improving  Skin:  Warm, livedo reticularis  Neurological/Psychiatric: No acute psychosis. Cranial nerves are intact. DATA:    Monitor Strips:  Reviewed & discusses with technical team. No changes noted.     RADIOLOGY:  Films were read/reviewed/discussed with radiology shows     CXR yesterday after left sided thoracentesis:          CBC with Differential:    Lab Results   Component Value Date    WBC 11.0 04/01/2022    RBC 3.71 04/01/2022    HGB 10.9 04/01/2022    HCT 34.0 04/01/2022     04/01/2022    MCV 91.6 04/01/2022    MCH 29.4 04/01/2022    MCHC 32.1 04/01/2022    RDW 17.5 04/01/2022    LYMPHOPCT 16.7 03/28/2022    MONOPCT 10.2 03/28/2022    BASOPCT 0.4 03/28/2022    MONOSABS 1.58 03/28/2022    LYMPHSABS 2.58 03/28/2022    EOSABS 0.36 03/28/2022    BASOSABS 0.06 03/28/2022     BMP:    Lab Results   Component Value Date     04/01/2022    K 4.2 04/01/2022    K 4.4 03/31/2022     04/01/2022    CO2 27 04/01/2022    BUN 60 04/01/2022    LABALBU 3.6 03/28/2022    CREATININE 3.1 04/01/2022    CALCIUM 8.4 04/01/2022    GFRAA 24 04/01/2022    LABGLOM 20 04/01/2022    GLUCOSE 100 04/01/2022       CLINICAL ASSESMENT:  1. Acute on chronic hypoxemic respiratory failure 2/2 decompensated HFpEF and CKD  2. Bilateral pleural effusions, s/p right sided US guided thoracentesis (03/30/22), left sided US guided thoracentesis (03/31/22), appears transudative   3. Dyspnea with exertion  4. CKD stage V, HD started today  5. History of SPEEDY      PLAN: If needed the case was discussed with the care team  1. Continue diuresis with IV bumex 2mg BID as per nephrology. HD line established, to get HD today. Should help improve his volume overload status. 2. Wean FiO2 as tolerated  3. Continue CPAP at at bedtime and with naps   4. We will obtain PFTs at some point once patient is no longer volume overloaded  5. Appreciate cardiology and nephrology recommendations    Kaylin Castaneda MD  PGY-2, [facility]  Department of Pulmonary, Critical Care and Sleep Medicine  [address] [facility]  Department of Internal Medicine      During multidisciplinary team rounds Akila Hope is a 65 y.o. male was seen, examined and discussed. This is confirmation that I have personally seen and examined the patient and that the key elements of the encounter were performed by me (> 85 % time). The medications & laboratory data was discussed and adjusted where necessary.  The radiographic images were reviewed or with radiologist or consultant if felt dis-concordant with the exam or history. The above findings were corroborated, plans confirmed and changes made if needed. Family is updated at the bedside as available. Key issues of the case were discussed among consultants.         Nadege Gambino DO

## 2022-04-01 NOTE — FLOWSHEET NOTE
04/01/22 1530   Vital Signs   /74   Temp 97.8 °F (36.6 °C)   Pulse 81   Weight 265 lb 10.5 oz (120.5 kg)   Weight Method Bed scale   Percent Weight Change -0.33   Post-Hemodialysis Assessment   Post-Treatment Procedures Blood returned;Catheter capped, clamped and heparinized x 2 ports   Machine Disinfection Process Acid/Vinegar Clean;Exterior Machine Disinfection; Heat Disinfect   Rinseback Volume (ml) 300 ml   Total Liters Processed (l/min) 23.7 l/min   Dialyzer Clearance Lightly streaked   Duration of Treatment (minutes) 120 minutes   Hemodialysis Intake (ml) 500 ml   Hemodialysis Output (ml) 300 ml   Tolerated Treatment Good   Patient Response to Treatment pt tx ended, blood rinsed back, pt tolerated tx well, pt transported back to his room via bed

## 2022-04-02 NOTE — PLAN OF CARE
Problem: Falls - Risk of:  Goal: Will remain free from falls  Description: Will remain free from falls  Outcome: Met This Shift  Goal: Absence of physical injury  Description: Absence of physical injury  Outcome: Met This Shift     Problem: Skin Integrity:  Goal: Will show no infection signs and symptoms  Description: Will show no infection signs and symptoms  Outcome: Met This Shift  Goal: Absence of new skin breakdown  Description: Absence of new skin breakdown  Outcome: Met This Shift     Problem: OXYGENATION/RESPIRATORY FUNCTION  Goal: Patient will achieve/maintain normal respiratory rate/effort  Description: Respiratory rate and effort will be within normal limits for the patient  Outcome: Met This Shift  Goal: Patient will maintain patent airway  Outcome: Met This Shift     Problem: HEMODYNAMIC STATUS  Goal: Patient has stable vital signs and fluid balance  Outcome: Met This Shift

## 2022-04-02 NOTE — PROGRESS NOTES
[facility]  Nephrology Attending Progress Note  Loraine Tang. Blaine Moritz, MD        SUBJECTIVE:     3/29: Delaney Moran a 65 y. o. male with a history of chronic kidney disease stage IV (recent recent baseline creatinine 2.5-3), type 2 diabetes mellitus, COPD, chronic HFpEF H/O pacemaker, speedy, gerd, djd, htn, hyperlipidemia, who presented from [facility] with complaints of shortness of breath and hypoxia. He has been admitted twice for this in the last weeks. He has been on bumex drip. Labs at this time show na 143, k 4.3, co2 29, bun 54, cr 3, gfr 20, ca 9, alb 3.6, wbc 13.3, hgb 11.2, plt 409. He has been started on iv bumex 2 iv q 12. He is on cpap and 3 L of o2 at the snf. He has had a large right pleural effusion which has been tapped.      3/30: pt seen in room, awaits thoracentesis    3/31: pt seen, sp thoracentesis wiith 1.5 L off.  On bumex 2 mg iv q 12.     4/1: pt seen on hd, first hd today, tolerating ok    4/2: pt seen on hd, second hd today, no cp or sob      PROBLEM LIST:    Patient Active Problem List   Diagnosis    PNA (pneumonia)    Acute on chronic heart failure with preserved ejection fraction ([address]    Coronary artery disease involving native coronary artery of native heart without angina pectoris    Cardiac pacemaker in situ    Primary hypertension    SPEEDY (obstructive sleep apnea)    Chronic respiratory failure with hypoxia (HCC)    CHF (congestive heart failure), NYHA class I, acute on chronic, combined ([address]    Acute on chronic clinical systolic heart failure (HCC)    Acute on chronic systolic heart failure (HCC)    Pleural effusion        PAST MEDICAL HISTORY:    Past Medical History:   Diagnosis Date    Acid reflux     Agent orange exposure     Arthritis     CAD (coronary artery disease)     Congestive heart failure (CHF) ([address]     COPD (chronic obstructive pulmonary disease) ([address]     Depression     Diabetes mellitus ([address]     History of blood transfusion     Hyperlipidemia  Hypertension     MI (myocardial infarction) ([facility]     Polio     Sleep apnea        DIET:    ADULT DIET; Regular; 4 carb choices (60 gm/meal);  Low Sodium (2 gm)     PHYSICAL EXAM:     Patient Vitals for the past 24 hrs:   BP Temp Temp src Pulse Resp SpO2 Weight   04/02/22 0806 103/65 97.8 °F (36.6 °C) -- 83 16 95 % --   04/02/22 0651 -- -- -- -- -- -- 266 lb 12.1 oz (121 kg)   04/02/22 0645 96/64 97.7 °F (36.5 °C) Oral 84 16 96 % --   04/01/22 2145 98/67 98.7 °F (37.1 °C) Oral 89 18 95 % --   04/01/22 2100 -- -- -- -- 16 97 % --   04/01/22 1645 99/74 97.9 °F (36.6 °C) Oral 85 18 -- --   04/01/22 1530 126/74 97.8 °F (36.6 °C) -- 81 -- -- 265 lb 10.5 oz (120.5 kg)   04/01/22 1504 131/70 -- -- 81 -- -- --   04/01/22 1434 115/69 -- -- 80 -- -- --   04/01/22 1404 126/72 -- -- 81 -- -- --   04/01/22 1330 126/68 -- -- 83 -- -- --   04/01/22 1325 127/73 97.7 °F (36.5 °C) -- 83 -- -- 266 lb 8.6 oz (120.9 kg)   04/01/22 1130 98/71 97.7 °F (36.5 °C) Oral 77 20 98 % --   @      Intake/Output Summary (Last 24 hours) at 4/2/2022 1052  Last data filed at 4/2/2022 0920  Gross per 24 hour   Intake 920 ml   Output 1825 ml   Net -905 ml         Wt Readings from Last 3 Encounters:   04/02/22 266 lb 12.1 oz (121 kg)   03/22/22 272 lb (123.4 kg)   03/10/22 264 lb 1.8 oz (119.8 kg)       Constitutional:  Pt is in no acute distress  Head: normocephalic, atraumatic  Neck: no JVD  Cardiovascular: regular rate and rhythm, no murmurs, gallops, or rubs  Respiratory:  Decreased at bases  Gastrointestinal:  Soft, nontender, nondistended, bowel sounds x 4  Ext: edema  Skin: dry, no rash  Neuro: aaox3    MEDS (scheduled):    aspirin  81 mg Oral Daily    atorvastatin  40 mg Oral Nightly    buPROPion  300 mg Oral QAM    cyanocobalamin  1,000 mcg Oral Daily    docusate sodium  100 mg Oral BID    ezetimibe  10 mg Oral Nightly    folic acid  1 mg Oral Daily    insulin glargine-yfgn  50 Units SubCUTAneous Nightly    melatonin  3 mg Oral Nightly    metoprolol succinate  25 mg Oral BID    pantoprazole  40 mg Oral QAM AC    potassium chloride  20 mEq Oral Daily    vitamin C  500 mg Oral BID    vitamin D  2,000 Units Oral Daily    zinc sulfate  50 mg Oral Daily    insulin lispro  0-6 Units SubCUTAneous TID WC    insulin lispro  0-3 Units SubCUTAneous Nightly    heparin (porcine)  5,000 Units SubCUTAneous Q8H    ipratropium-albuterol  1 ampule Inhalation Q4H WA       MEDS (infusions):   bumetanide 0.1 mg/mL infusion 1 mg/hr (04/02/22 0300)    dextrose         MEDS (prn):  acetaminophen, guaiFENesin-dextromethorphan, guaiFENesin, ipratropium-albuterol, loperamide, magnesium hydroxide, glucose, dextrose, glucagon (rDNA), dextrose, perflutren lipid microspheres, white petrolatum    DATA:    Recent Labs     03/31/22  0510 04/01/22  0522 04/02/22  0506   WBC 11.3 11.0 11.2   HGB 10.9* 10.9* 11.2*   HCT 34.7* 34.0* 36.2*   MCV 92.8 91.6 93.1    281 245     Recent Labs     03/31/22  0510 03/31/22  0510 04/01/22  0522 04/02/22  0506     --  144 143   K 4.4  4.4   < > 4.2 3.8  3.8   CL 99  --  103 101   CO2 29  --  27 26   BUN 62*  --  60* 50*   CREATININE 3.2*  --  3.1* 2.6*   LABGLOM 19  --  20 24   GLUCOSE 113*  --  100* 73*   CALCIUM 8.6  --  8.4* 8.5*   PHOS  --   --   --  3.7    < > = values in this interval not displayed.        Lab Results   Component Value Date    LABALBU 3.6 03/28/2022    LABALBU 3.4 (L) 03/14/2022    LABALBU 3.5 02/25/2022     Lab Results   Component Value Date    TSH 2.310 01/22/2022       Iron Studies  No results found for: IRON, TIBC, FERRITIN  No results found for: PONSJSRJ70  No results found for: FOLATE    No results found for: VITD25  PTH   Date Value Ref Range Status   04/02/2022 104 (H) 15 - 65 pg/mL Final       No components found for: URIC    Lab Results   Component Value Date    COLORU Yellow 03/15/2022    NITRU Negative 03/15/2022    GLUCOSEU Negative 03/15/2022    KETUA Negative 03/15/2022 UROBILINOGEN 0.2 03/15/2022    BILIRUBINUR Negative 03/15/2022       No results found for: William Loreta      IMPRESSION/RECOMMENDATIONS:     1.  Chronic kidney disease stage IV  due to diabetes mellitus and hypertension  Cr usual baseline 2.5-3  Follow on iv diuretics  Check pth and phos  Not diuresing well and now has pericardial effusion as well  Will need to re echo in 1-2 weeks  gfr 19  As outpt edema recurs  Will proceed with hd 4/1  Temp line placed 3/31     2.  Acute on chronic HFpEF  Continue iv bumex  Strict I/o  Right-sided thoracentesis performed 3/15   Sp thora 3/30 with 1.5 L off on right  2nd Hd today     3. Htn  On Midodrine  Hold parameters on metoprolol     4 mame  On cpap     [place] [facility].  Demian Severino MD

## 2022-04-02 NOTE — PLAN OF CARE
Problem: Falls - Risk of:  Goal: Will remain free from falls  Description: Will remain free from falls  4/2/2022 1304 by Yaima Finnegan RN  Outcome: Met This Shift  4/2/2022 0325 by Corie Stokes RN  Outcome: Met This Shift     Problem: Falls - Risk of:  Goal: Absence of physical injury  Description: Absence of physical injury  4/2/2022 1304 by Yaima Finnegan RN  Outcome: Met This Shift  4/2/2022 0325 by Corie Stokes RN  Outcome: Met This Shift     Problem: Skin Integrity:  Goal: Absence of new skin breakdown  Description: Absence of new skin breakdown  4/2/2022 1304 by Yaima Finnegan RN  Outcome: Met This Shift  4/2/2022 0325 by Corie Stokes RN  Outcome: Met This Shift     Problem: OXYGENATION/RESPIRATORY FUNCTION  Goal: Patient will achieve/maintain normal respiratory rate/effort  Description: Respiratory rate and effort will be within normal limits for the patient  4/2/2022 1304 by Yaima Finnegan RN  Outcome: Met This Shift  4/2/2022 0325 by Corie Stokes RN  Outcome: Met This Shift     Problem: OXYGENATION/RESPIRATORY FUNCTION  Goal: Patient will maintain patent airway  4/2/2022 1304 by Yaima Finnegan RN  Outcome: Met This Shift  4/2/2022 0325 by Corie Stokes RN  Outcome: Met This Shift

## 2022-04-02 NOTE — PROGRESS NOTES
[place]  Department of Pulmonary, Critical Care and Sleep Medicine  [address] [facility]  Department of Internal Medicine  Progress Note    SUBJECTIVE:    Patient seen and examined. He reports that he did tolerate dialysis well yesterday. However, that he noticed something wet during the night and there was some bleeding from around the catheter site. Overall he is without complaints    OBJECTIVE:  Vitals:    04/01/22 2145 04/02/22 0645 04/02/22 0651 04/02/22 0806   BP: 98/67 96/64  103/65   Pulse: 89 84  83   Resp: 18 16  16   Temp: 98.7 °F (37.1 °C) 97.7 °F (36.5 °C)  97.8 °F (36.6 °C)   TempSrc: Oral Oral     SpO2: 95% 96%  95%   Weight:   266 lb 12.1 oz (121 kg)    Height:         Constitutional: Alert, well oriented  EENT: EOMI YOSEF. MMM. No icterus. No thrush. Neck: No thyromegaly. No elevated JVP. Trachea was midline. Respiratory: Symmetrical.  Breath sounds still diminished but improving  Cardiovascular: Regular, No murmur. No rubs. Pulses:  Equal bilaterally. Abdomen: Soft without organomegaly. No rebound, rigidity. No guarding. Lymphatic: No lymphadenopathy. Musculoskeletal: Without weakness or gross deficits  Extremities:  Bilateral pedal edema, pitting 2+. L right sided HD cath dressing with bloody drainage present there is currently a sandbag in place does not appear to be actively bleeding at this time. Neurological/Psychiatric: No acute psychosis. Cranial nerves are intact. DATA:    Monitor Strips:  Reviewed & discusses with technical team. No changes noted.     RADIOLOGY:  Films were read/reviewed/discussed with radiology shows     CXR yesterday after left sided thoracentesis:          CBC with Differential:    Lab Results   Component Value Date    WBC 11.2 04/02/2022    RBC 3.89 04/02/2022    HGB 11.2 04/02/2022    HCT 36.2 04/02/2022     04/02/2022    MCV 93.1 04/02/2022    MCH 28.8 04/02/2022    MCHC 30.9 04/02/2022    RDW 17.8 04/02/2022    LYMPHOPCT 16.7 03/28/2022    MONOPCT 10.2 03/28/2022    BASOPCT 0.4 03/28/2022    MONOSABS 1.58 03/28/2022    LYMPHSABS 2.58 03/28/2022    EOSABS 0.36 03/28/2022    BASOSABS 0.06 03/28/2022     BMP:    Lab Results   Component Value Date     04/02/2022    K 3.8 04/02/2022    K 3.8 04/02/2022     04/02/2022    CO2 26 04/02/2022    BUN 50 04/02/2022    LABALBU 3.6 03/28/2022    CREATININE 2.6 04/02/2022    CALCIUM 8.5 04/02/2022    GFRAA 29 04/02/2022    LABGLOM 24 04/02/2022    GLUCOSE 73 04/02/2022       CLINICAL ASSESMENT:  1. Acute on chronic hypoxemic respiratory failure 2/2 decompensated HFpEF and CKD  2. Bilateral pleural effusions, s/p right sided US guided thoracentesis (03/30/22), left sided US guided thoracentesis (03/31/22), appears transudative   3. Dyspnea with exertion  4. CKD stage V, HD started today  5. History of SPEEDY      PLAN: If needed the case was discussed with the care team  1. HD as per nephrology  2. Wean FiO2 as tolerated  3. Continue CPAP at at bedtime and with naps   4. We will obtain PFTs at some point once patient is no longer volume overloaded  5.  Appreciate cardiology and nephrology recommendations        Jen GIRALDO

## 2022-04-02 NOTE — FLOWSHEET NOTE
04/02/22 1514   Vital Signs   BP (!) 111/55   Temp 98.2 °F (36.8 °C)   Pulse 85   Resp 18   Weight 261 lb 3.9 oz (118.5 kg)   Weight Method Bed scale   Percent Weight Change -0.59   Pain Assessment   Pain Assessment 0-10   Pain Level 0   Post-Hemodialysis Assessment   Post-Treatment Procedures Blood returned;Catheter capped, clamped and heparinized x 2 ports   Machine Disinfection Process Acid/Vinegar Clean;Heat Disinfect; Exterior Machine Disinfection   Rinseback Volume (ml) 300 ml   Total Liters Processed (l/min) 35.8 l/min   Dialyzer Clearance Lightly streaked   Duration of Treatment (minutes) 150 minutes   Heparin amount administered during treatment (units) 0 units   Hemodialysis Intake (ml) 300 ml   Hemodialysis Output (ml) 1000 ml   NET Removed (ml) 700 ml   Tolerated Treatment Good   Patient Response to Treatment tolerated well, profile B, refill noted, 700cc fluid removal   Bilateral Breath Sounds Diminished   Edema Right lower extremity; Left lower extremity

## 2022-04-02 NOTE — PROGRESS NOTES
Progress Note  Date:2022       VBII:[id number]  Patient Radha Ohara     YOB: 1945     Age:77 y.o. Patient says breathing is improved today. Subjective    Subjective:  Symptoms:  Improved. He reports shortness of breath. No chest pain. Diet:  Adequate intake. Activity level: Impaired due to weakness. Pain:  He reports no pain. Review of Systems   Constitutional: Negative for activity change and fever. HENT: Negative for congestion. Respiratory: Positive for shortness of breath. Cardiovascular: Positive for leg swelling. Negative for chest pain. Gastrointestinal: Negative for abdominal pain. Neurological: Negative for dizziness. Psychiatric/Behavioral: Negative for agitation. Objective         Vitals Last 24 Hours:  TEMPERATURE:  Temp  Av.9 °F (36.6 °C)  Min: 97.7 °F (36.5 °C)  Max: 98.7 °F (37.1 °C)  RESPIRATIONS RANGE: Resp  Av.7  Min: 16  Max: 20  PULSE OXIMETRY RANGE: SpO2  Av.8 %  Min: 95 %  Max: 98 %  PULSE RANGE: Pulse  Av.9  Min: 77  Max: 89  BLOOD PRESSURE RANGE: Systolic (88BYJ), JHK:982 , Min:96 , ICL:219   ; Diastolic (52MEQ), XPW:76, Min:64, Max:74    I/O (24Hr): Intake/Output Summary (Last 24 hours) at 2022 0725  Last data filed at 2022 0154  Gross per 24 hour   Intake 860 ml   Output 2150 ml   Net -1290 ml     Objective:  General Appearance:  Comfortable. Vital signs: (most recent): Blood pressure 96/64, pulse 84, temperature 97.7 °F (36.5 °C), temperature source Oral, resp. rate 16, height 5' 9\" (1.753 m), weight 266 lb 12.1 oz (121 kg), SpO2 96 %. No fever. Lungs:  Normal effort and normal respiratory rate. Breath sounds clear to auscultation. Heart: Normal rate. Regular rhythm. S1 normal and S2 normal.    Extremities: There is local swelling.       Labs/Imaging/Diagnostics    Labs:  CBC:  Recent Labs     22  0510 22  0522 22  0506   WBC 11.3 11.0 11.2   RBC 3.74* 3.71* 3.89   HGB 10.9* 10.9* 11.2*   HCT 34.7* 34.0* 36.2*   MCV 92.8 91.6 93.1   RDW 17.7* 17.5* 17.8*    281 245     CHEMISTRIES:  Recent Labs     22  0510 22  0522 22  0506    144 143   K 4.4  4.4 4.2 3.8   CL 99 103 101   CO2 29 27 26   BUN 62* 60* 50*   CREATININE 3.2* 3.1* 2.6*   GLUCOSE 113* 100* 73*   PHOS  --   --  3.7     PT/INR:No results for input(s): PROTIME, INR in the last 72 hours. APTT:No results for input(s): APTT in the last 72 hours. LIVER PROFILE:  No results for input(s): AST, ALT, BILIDIR, BILITOT, ALKPHOS in the last 72 hours. Imaging Last 24 Hours:  XR CHEST PORTABLE    Result Date: 2022  EXAMINATION: ONE XRAY VIEW OF THE CHEST 2022 4:49 pm COMPARISON: 2022 HISTORY: ORDERING SYSTEM PROVIDED HISTORY: shortness of breath TECHNOLOGIST PROVIDED HISTORY: Reason for exam:->shortness of breath What reading provider will be dictating this exam?->CRC FINDINGS: There is a large opacity seen within the right lung base. There is a small right pleural effusion. The left upper lobe is clear. There is a small left pleural effusion. The cardiac silhouette is within normals. There is a dual lead cardiac pacer on the left. 1. Large opacity within the right lung base which could represent pneumonia and or atelectasis. 2. Moderate size right pleural effusion. 3. Small left pleural effusion. 4. CT of the thorax is recommended for further evaluation. US DUP LOWER EXTREMITIES BILATERAL VENOUS    Result Date: 2022  Patient MRN:  [id number] : 1945 Age: 65 years Gender: Male Order Date:  2022 5:28 PM EXAM: US DUP LOWER EXTREMITIES BILATERAL VENOUS NUMBER OF IMAGES:  52 INDICATION:  leg swelling leg swelling What reading provider will be dictating this exam?->[facility] COMPARISON: None Within the visualized vessels, there is no evidence for deep venous thrombosis There is good compressibility, there is good augmentation, there is good color flow. Within the visualized vessels there is no evidence for deep venous thrombosis     Assessment//Plan           Hospital Problems           Last Modified POA    * (Principal) Acute on chronic systolic heart failure ([address] 3/28/2022 Yes    Pleural effusion 3/30/2022 Yes        Assessment:  (   (Principal) Acute on chronic clinical systolic heart failure ([address] 3/14/2022 Yes     Acute on chronic hypoxic respiratory failure. CHF  Pleural effusion  Acute renal insfficnecy  DM  COPD  HTN  Hyperlipidemia       ). Plan:   (IV diureses  Cardiology, Pulmonary and Renal following. Monitor labs and output. Continue meds. PT/OT).

## 2022-04-03 NOTE — PROGRESS NOTES
Rechecked pt's fem line dressing, there is some new bleeding, small amount. Notified Dr Leisa Camarillo of the update. Awaiting any orders.

## 2022-04-03 NOTE — PROGRESS NOTES
Progress Note  Enmanuel Briones       [facility]:[id number]  Patient Christi Raymond     YOB: 1945     Age:77 y.o. Patient says breathing is improved today. Subjective    Subjective:  Symptoms:  Improved. He reports shortness of breath. No chest pain. Diet:  Adequate intake. Activity level: Impaired due to weakness. Pain:  He reports no pain. Review of Systems   Constitutional: Negative for activity change and fever. HENT: Negative for congestion. Respiratory: Positive for shortness of breath. Cardiovascular: Positive for leg swelling. Negative for chest pain. Gastrointestinal: Negative for abdominal pain. Neurological: Negative for dizziness. Psychiatric/Behavioral: Negative for agitation. Objective         Vitals Last 24 Hours:  TEMPERATURE:  Temp  Av.9 °F (36.6 °C)  Min: 97.7 °F (36.5 °C)  Max: 98.2 °F (36.8 °C)  RESPIRATIONS RANGE: Resp  Av.5  Min: 16  Max: 18  PULSE OXIMETRY RANGE: SpO2  Av.5 %  Min: 92 %  Max: 96 %  PULSE RANGE: Pulse  Av  Min: 83  Max: 88  BLOOD PRESSURE RANGE: Systolic (01WEC), YVA:517 , Min:93 , ZMX:138   ; Diastolic (60QWO), ZCW:18, Min:31, Max:67    I/O (24Hr): Intake/Output Summary (Last 24 hours) at 4/3/2022 0728  Last data filed at 2022 2241  Gross per 24 hour   Intake 420 ml   Output 2700 ml   Net -2280 ml     Objective:  General Appearance:  Comfortable. Vital signs: (most recent): Blood pressure 93/67, pulse 87, temperature 97.7 °F (36.5 °C), temperature source Oral, resp. rate 18, height 5' 9\" (1.753 m), weight 261 lb 3.9 oz (118.5 kg), SpO2 92 %. No fever. Lungs:  Normal effort and normal respiratory rate. Breath sounds clear to auscultation. Heart: Normal rate. Regular rhythm. S1 normal and S2 normal.    Extremities: There is local swelling.       Labs/Imaging/Diagnostics    Labs:  CBC:  Recent Labs     22  0522 22  0506 22  0551   WBC 11.0 11.2 10.4   RBC 3.71* 3.89 3.91 HGB 10.9* 11.2* 11.2*   HCT 34.0* 36.2* 36.7*   MCV 91.6 93.1 93.9   RDW 17.5* 17.8* 17.6*    245 216     CHEMISTRIES:  Recent Labs     22  0522 22  0506 22  0551    143 143   K 4.2 3.8  3.8 4.0    101 104   CO2 27 26 27   BUN 60* 50* 40*   CREATININE 3.1* 2.6* 2.3*   GLUCOSE 100* 73* 80   PHOS  --  3.7  --      PT/INR:No results for input(s): PROTIME, INR in the last 72 hours. APTT:No results for input(s): APTT in the last 72 hours. LIVER PROFILE:  No results for input(s): AST, ALT, BILIDIR, BILITOT, ALKPHOS in the last 72 hours. Imaging Last 24 Hours:  XR CHEST PORTABLE    Result Date: 2022  EXAMINATION: ONE XRAY VIEW OF THE CHEST 2022 4:49 pm COMPARISON: 2022 HISTORY: ORDERING SYSTEM PROVIDED HISTORY: shortness of breath TECHNOLOGIST PROVIDED HISTORY: Reason for exam:->shortness of breath What reading provider will be dictating this exam?->CRC FINDINGS: There is a large opacity seen within the right lung base. There is a small right pleural effusion. The left upper lobe is clear. There is a small left pleural effusion. The cardiac silhouette is within normals. There is a dual lead cardiac pacer on the left. 1. Large opacity within the right lung base which could represent pneumonia and or atelectasis. 2. Moderate size right pleural effusion. 3. Small left pleural effusion. 4. CT of the thorax is recommended for further evaluation. US DUP LOWER EXTREMITIES BILATERAL VENOUS    Result Date: 2022  Patient MRN:  [id number] : 1945 Age: 65 years Gender: Male Order Date:  2022 5:28 PM EXAM: US DUP LOWER EXTREMITIES BILATERAL VENOUS NUMBER OF IMAGES:  52 INDICATION:  leg swelling leg swelling What reading provider will be dictating this exam?->[facility] COMPARISON: None Within the visualized vessels, there is no evidence for deep venous thrombosis There is good compressibility, there is good augmentation, there is good color flow. Within the visualized vessels there is no evidence for deep venous thrombosis     Assessment//Plan           Hospital Problems           Last Modified POA    * (Principal) Acute on chronic systolic heart failure ([address] 3/28/2022 Yes    Pleural effusion 3/30/2022 Yes        Assessment:  (   (Principal) Acute on chronic clinical systolic heart failure ([address] 3/14/2022 Yes     Acute on chronic hypoxic respiratory failure. CHF  Pleural effusion  Acute renal insfficnecy  DM  COPD  HTN  Hyperlipidemia       ). Plan:   (Plan for tunnel cath per vascular. Cardiology, Pulmonary and Renal following. Monitor labs and output. Continue meds. PT/OT).

## 2022-04-03 NOTE — PROGRESS NOTES
Assessed temporary dialysis catheter in pt's femoral, dressing noted to be saturated in blood. Dressing changed, no active bleeding. Vascular notified, will continue to monitor and recheck.

## 2022-04-03 NOTE — PROGRESS NOTES
Patient examined at bedside for bleeding right femoral temporary HD line. Placed single suture around catheter site which seemed to stop oozing around the catheter. Patient is planned for tunneled line placement tomorrow.      Electronically signed by Dameon Aquino MD on 4/3/2022 at 6:33 PM

## 2022-04-03 NOTE — PROGRESS NOTES
Vascular Surgery Progress Note    Pt is being seen in f/u today regarding hemodialysis access    Subjective  Pt s/e. No acute issues overnight. Underwent dialysis yesterday with 700cc removed. Current Medications:    dextrose        acetaminophen, guaiFENesin-dextromethorphan, guaiFENesin, ipratropium-albuterol, loperamide, magnesium hydroxide, glucose, dextrose, glucagon (rDNA), dextrose, perflutren lipid microspheres, white petrolatum    ceFAZolin  2,000 mg IntraVENous 60 Min Pre-Op    aspirin  81 mg Oral Daily    atorvastatin  40 mg Oral Nightly    buPROPion  300 mg Oral QAM    cyanocobalamin  1,000 mcg Oral Daily    docusate sodium  100 mg Oral BID    ezetimibe  10 mg Oral Nightly    folic acid  1 mg Oral Daily    insulin glargine-yfgn  50 Units SubCUTAneous Nightly    melatonin  3 mg Oral Nightly    metoprolol succinate  25 mg Oral BID    pantoprazole  40 mg Oral QAM AC    potassium chloride  20 mEq Oral Daily    vitamin C  500 mg Oral BID    vitamin D  2,000 Units Oral Daily    zinc sulfate  50 mg Oral Daily    insulin lispro  0-6 Units SubCUTAneous TID WC    insulin lispro  0-3 Units SubCUTAneous Nightly    heparin (porcine)  5,000 Units SubCUTAneous Q8H    ipratropium-albuterol  1 ampule Inhalation Q4H WA        PHYSICAL EXAM:    BP 93/67   Pulse 87   Temp 97.7 °F (36.5 °C) (Oral)   Resp 18   Ht 5' 9\" (1.753 m)   Wt 261 lb 3.9 oz (118.5 kg)   SpO2 92%   BMI 38.58 kg/m²     Intake/Output Summary (Last 24 hours) at 4/3/2022 0646  Last data filed at 4/2/2022 2241  Gross per 24 hour   Intake 420 ml   Output 2700 ml   Net -2280 ml          Gen: awake, alert and oriented x3, no apparent distress  CVS: RR and borderline hypotensive  Resp:  On 5L NC  Abd: Soft, non-tender, non-distended  R LE: Temp line in place with no hematoma    LABS:    Lab Results   Component Value Date    WBC 10.4 04/03/2022    HGB 11.2 (L) 04/03/2022    HCT 36.7 (L) 04/03/2022     04/03/2022    K 3.8 04/02/2022    K 3.8 04/02/2022    BUN 50 (H) 04/02/2022    CREATININE 2.6 (H) 04/02/2022       A/P  65 y.o. male with acute on chronic kidney disease and fluid overload s/p temporary HD catheter insertion 3/31    - Okay for diet today, but NPO at midnight 4/4  - Plan for tunneled HD line tomorrow 4/4      Renee Doan MD

## 2022-04-03 NOTE — PROGRESS NOTES
[facility]  Nephrology Attending Progress Note  Ana Delacruz. Carol Carbajal MD        SUBJECTIVE:     3/29: Devorah Benitez a 65 y. o. male with a history of chronic kidney disease stage IV (recent recent baseline creatinine 2.5-3), type 2 diabetes mellitus, COPD, chronic HFpEF H/O pacemaker, speedy, gerd, djd, htn, hyperlipidemia, who presented from [facility] with complaints of shortness of breath and hypoxia. He has been admitted twice for this in the last weeks. He has been on bumex drip. Labs at this time show na 143, k 4.3, co2 29, bun 54, cr 3, gfr 20, ca 9, alb 3.6, wbc 13.3, hgb 11.2, plt 409. He has been started on iv bumex 2 iv q 12. He is on cpap and 3 L of o2 at the snf. He has had a large right pleural effusion which has been tapped.      3/30: pt seen in room, awaits thoracentesis    3/31: pt seen, sp thoracentesis wiith 1.5 L off. On bumex 2 mg iv q 12.     4/1: pt seen on hd, first hd today, tolerating ok    4/2: pt seen on hd, second hd today, no cp or sob    4/3 :pt sp hd yesterday with 0.7 L off.  For tdc in am      PROBLEM LIST:    Patient Active Problem List   Diagnosis    PNA (pneumonia)    Acute on chronic heart failure with preserved ejection fraction ([address]    Coronary artery disease involving native coronary artery of native heart without angina pectoris    Cardiac pacemaker in situ    Primary hypertension    SPEEDY (obstructive sleep apnea)    Chronic respiratory failure with hypoxia (HCC)    CHF (congestive heart failure), NYHA class I, acute on chronic, combined ([address]    Acute on chronic clinical systolic heart failure (HCC)    Acute on chronic systolic heart failure (HCC)    Pleural effusion        PAST MEDICAL HISTORY:    Past Medical History:   Diagnosis Date    Acid reflux     Agent orange exposure     Arthritis     CAD (coronary artery disease)     Congestive heart failure (CHF) ([address]     COPD (chronic obstructive pulmonary disease) ([address]     Depression     Diabetes mellitus ([facility]     History of blood transfusion     Hyperlipidemia     Hypertension     MI (myocardial infarction) ([facility]     Polio     Sleep apnea        DIET:    ADULT DIET; Regular; 4 carb choices (60 gm/meal);  Low Sodium (2 gm)  Diet NPO Exceptions are: Sips of Water with Meds, Ice Chips     PHYSICAL EXAM:     Patient Vitals for the past 24 hrs:   BP Temp Temp src Pulse Resp SpO2 Weight   04/03/22 0837 97/65 98.5 °F (36.9 °C) Oral 85 18 98 % --   04/02/22 2110 -- -- -- -- -- 92 % --   04/02/22 1937 93/67 97.7 °F (36.5 °C) Oral 87 18 96 % --   04/02/22 1514 (!) 111/55 98.2 °F (36.8 °C) -- 85 18 -- 261 lb 3.9 oz (118.5 kg)   04/02/22 1459 121/65 -- -- 86 -- -- --   04/02/22 1430 100/62 -- -- 85 -- -- --   04/02/22 1400 101/66 -- -- 86 -- -- --   04/02/22 1330 103/63 -- -- 86 -- -- --   04/02/22 1300 (!) 97/51 -- -- 87 -- -- --   04/02/22 1243 (!) 107/57 -- -- 88 -- -- --   04/02/22 1238 (!) 108/31 98 °F (36.7 °C) -- 87 18 -- 262 lb 12.6 oz (119.2 kg)   @      Intake/Output Summary (Last 24 hours) at 4/3/2022 1236  Last data filed at 4/2/2022 2241  Gross per 24 hour   Intake 360 ml   Output 2700 ml   Net -2340 ml         Wt Readings from Last 3 Encounters:   04/02/22 261 lb 3.9 oz (118.5 kg)   03/22/22 272 lb (123.4 kg)   03/10/22 264 lb 1.8 oz (119.8 kg)       Constitutional:  Pt is in no acute distress  Head: normocephalic, atraumatic  Neck: no JVD  Cardiovascular: regular rate and rhythm, no murmurs, gallops, or rubs  Respiratory:  Decreased at bases  Gastrointestinal:  Soft, nontender, nondistended, bowel sounds x 4  Ext: edema  Skin: dry, no rash  Neuro: aaox3    MEDS (scheduled):    ceFAZolin  2,000 mg IntraVENous 60 Min Pre-Op    aspirin  81 mg Oral Daily    atorvastatin  40 mg Oral Nightly    buPROPion  300 mg Oral QAM    cyanocobalamin  1,000 mcg Oral Daily    docusate sodium  100 mg Oral BID    ezetimibe  10 mg Oral Nightly    folic acid  1 mg Oral Daily    insulin glargine-yfgn  50 Units SubCUTAneous Nightly    melatonin  3 mg Oral Nightly    metoprolol succinate  25 mg Oral BID    pantoprazole  40 mg Oral QAM AC    potassium chloride  20 mEq Oral Daily    vitamin C  500 mg Oral BID    vitamin D  2,000 Units Oral Daily    zinc sulfate  50 mg Oral Daily    insulin lispro  0-6 Units SubCUTAneous TID WC    insulin lispro  0-3 Units SubCUTAneous Nightly    heparin (porcine)  5,000 Units SubCUTAneous Q8H    ipratropium-albuterol  1 ampule Inhalation Q4H WA       MEDS (infusions):   dextrose         MEDS (prn):  acetaminophen, guaiFENesin-dextromethorphan, guaiFENesin, ipratropium-albuterol, loperamide, magnesium hydroxide, glucose, dextrose, glucagon (rDNA), dextrose, perflutren lipid microspheres, white petrolatum    DATA:    Recent Labs     04/01/22 0522 04/02/22  0506 04/03/22  0551   WBC 11.0 11.2 10.4   HGB 10.9* 11.2* 11.2*   HCT 34.0* 36.2* 36.7*   MCV 91.6 93.1 93.9    245 216     Recent Labs     04/01/22 0522 04/01/22 0522 04/02/22  0506 04/03/22  0551     --  143 143   K 4.2   < > 3.8  3.8 4.0  4.0     --  101 104   CO2 27  --  26 27   BUN 60*  --  50* 40*   CREATININE 3.1*  --  2.6* 2.3*   LABGLOM 20  --  24 28   GLUCOSE 100*  --  73* 80   CALCIUM 8.4*  --  8.5* 8.4*   PHOS  --   --  3.7  --     < > = values in this interval not displayed.        Lab Results   Component Value Date    LABALBU 3.6 03/28/2022    LABALBU 3.4 (L) 03/14/2022    LABALBU 3.5 02/25/2022     Lab Results   Component Value Date    TSH 2.310 01/22/2022       Iron Studies  No results found for: IRON, TIBC, FERRITIN  No results found for: [id number]  No results found for: FOLATE    No results found for: VITD25  PTH   Date Value Ref Range Status   04/02/2022 104 (H) 15 - 65 pg/mL Final       No components found for: URIC    Lab Results   Component Value Date    COLORU Yellow 03/15/2022    NITRU Negative 03/15/2022    GLUCOSEU Negative 03/15/2022    KETUA Negative 03/15/2022    UROBILINOGEN 0.2 03/15/2022    BILIRUBINUR Negative 03/15/2022       No results found for: [place]      IMPRESSION/RECOMMENDATIONS:     1.  Chronic kidney disease stage IV  due to diabetes mellitus and hypertension  Cr usual baseline 2.5-3  pth 104 p 3.7  Not diuresing well and now has pericardial effusion as well and   Edema recurs as outpt on orals  Will need to re echo in 1-2 weeks  gfr 19  Will proceed with hd 4/1  Temp line placed 3/31  Needs outpt hd  For tdc in am  Sp second hd 4/2     2.  Acute on chronic HFpEF  Strict I/o  Right-sided thoracentesis performed 3/15   Sp thora 3/30 with 1.5 L off on right  2nd Hd 4/2     3. Htn  On Midodrine  Hold parameters on metoprolol     4 mame  On cpap     Shalonda Byrne.  Alexandria Ross MD

## 2022-04-03 NOTE — PROGRESS NOTES
[place]  Department of Pulmonary, Critical Care and Sleep Medicine  [address] [facility]  Department of Internal Medicine  Progress Note    SUBJECTIVE:    Patient seen and examined. He is overall within any new complaints. He is currently on 5 L nasal cannula. OBJECTIVE:  Vitals:    04/02/22 1937 04/02/22 2110 04/03/22 0837 04/03/22 1437   BP: 93/67  97/65 102/70   Pulse: 87  85 87   Resp: 18  18 18   Temp: 97.7 °F (36.5 °C)  98.5 °F (36.9 °C) 98.1 °F (36.7 °C)   TempSrc: Oral  Oral Oral   SpO2: 96% 92% 98% 98%   Weight:       Height:         Constitutional: Alert, well oriented  EENT: EOMI YOSEF. MMM. No icterus. No thrush. Neck: No thyromegaly. No elevated JVP. Trachea was midline. Respiratory: Symmetrical.  Breath sounds still diminished but improving  Cardiovascular: Regular, No murmur. No rubs. Pulses:  Equal bilaterally. Abdomen: Soft without organomegaly. No rebound, rigidity. No guarding. Lymphatic: No lymphadenopathy. Musculoskeletal: Without weakness or gross deficits  Extremities:  Bilateral pedal edema, pitting 2+. Neurological/Psychiatric: No acute psychosis. Cranial nerves are intact. DATA:    Monitor Strips:  Reviewed & discusses with technical team. No changes noted.     RADIOLOGY:  Films were read/reviewed/discussed with radiology shows no new imaging studies      CBC with Differential:    Lab Results   Component Value Date    WBC 10.4 04/03/2022    RBC 3.91 04/03/2022    HGB 11.2 04/03/2022    HCT 36.7 04/03/2022     04/03/2022    MCV 93.9 04/03/2022    MCH 28.6 04/03/2022    MCHC 30.5 04/03/2022    RDW 17.6 04/03/2022    LYMPHOPCT 16.7 03/28/2022    MONOPCT 10.2 03/28/2022    BASOPCT 0.4 03/28/2022    MONOSABS 1.58 03/28/2022    LYMPHSABS 2.58 03/28/2022    EOSABS 0.36 03/28/2022    BASOSABS 0.06 03/28/2022     BMP:    Lab Results   Component Value Date     04/03/2022    K 4.0 04/03/2022    K 4.0 04/03/2022     04/03/2022    CO2 27 04/03/2022    BUN 40 04/03/2022    LABALBU 3.6 03/28/2022    CREATININE 2.3 04/03/2022    CALCIUM 8.4 04/03/2022    GFRAA 33 04/03/2022    LABGLOM 28 04/03/2022    GLUCOSE 80 04/03/2022       CLINICAL ASSESMENT:  1. Acute on chronic hypoxemic respiratory failure 2/2 decompensated HFpEF and CKD  2. Bilateral pleural effusions, s/p right sided US guided thoracentesis (03/30/22), left sided US guided thoracentesis (03/31/22), appears transudative   3. Dyspnea with exertion  4. CKD stage V, HD started today  5. History of SPEEDY      PLAN: If needed the case was discussed with the care team  1. HD as per nephrology  2. Wean FiO2 as tolerated  3. Continue CPAP at at bedtime and with naps   4. We will obtain PFTs at some point once patient is no longer volume overloaded  5.  Appreciate cardiology and nephrology recommendations        Jen GIRALDO

## 2022-04-04 NOTE — ANESTHESIA POSTPROCEDURE EVALUATION
Department of Anesthesiology  Postprocedure Note    Patient: Markie Hilton  MRN: [id number]  [place]: 1945  Date of evaluation: 4/4/2022  Time:  6:26 PM     Procedure Summary     Date: 04/04/22 Room / Location: [place] [address] / [facility]    Anesthesia Start: 3923 Anesthesia Stop: 1692    Procedure: TUNNELED DIALYSIS CATHETER (N/A ) Diagnosis: (VASCULAR INSUFFICIENCY)    Surgeons: Lynnette Benito MD Responsible Provider: Keiry Ervin MD    Anesthesia Type: MAC ASA Status: 4          Anesthesia Type: MAC    Angle Phase I:      Angle Phase II: Angle Score: 10    Last vitals: Reviewed and per EMR flowsheets.        Anesthesia Post Evaluation    Patient location during evaluation: bedside  Patient participation: complete - patient participated  Level of consciousness: awake  Pain score: 0  Airway patency: patent  Nausea & Vomiting: no nausea and no vomiting  Complications: no  Cardiovascular status: hemodynamically stable  Respiratory status: acceptable  Hydration status: euvolemic

## 2022-04-04 NOTE — PROGRESS NOTES
Comprehensive Nutrition Assessment    Type and Reason for Visit:  Initial,RD Nutrition Re-Screen/LOS    Nutrition Recommendations/Plan: Continue current diet and start ONS to aid in surgical wound healing + to promote adequate oral intake; will monitor. Nutrition Assessment:  pt adm d/t SOB; pt w/ acute on chronic systolic CHF & resp failure + pleural effusion; s/p thoracentesis (March/2022); temp HD line placed 3/31 d/t KELSEY on CKD; pt in OR 4/4 for tunneled dialysis cath placement; pt intake avg ~50-70% prior to NPO; will start ONS. Malnutrition Assessment:  Malnutrition Status: At risk for malnutrition (Comment)    Context:  Acute Illness     Findings of the 6 clinical characteristics of malnutrition:  Energy Intake:  Mild decrease in energy intake (Comment)  Weight Loss:  Unable to assess (d/t wt flucc 2/2 fluid shifts 2/2 CHF)   Body Fat Loss:  No significant body fat loss     Muscle Mass Loss:  No significant muscle mass loss    Fluid Accumulation:  No significant fluid accumulation     Strength:  Not Performed    Estimated Daily Nutrient Needs:  Energy (kcal):  16-18kcal/lemKME=[id number]; Weight Used for Energy Requirements:  Current     Protein (g):  1.5-1.8g/[id number]; Weight Used for Protein Requirements:  Ideal (dialysis + surgical wounds)        Fluid (ml/day):  per renal management; Method Used for Fluid Requirements:  Standard Renal      Nutrition Related Findings:  -I/O; alert; active BS      Wounds:  Surgical Incision (groin, chest)       Current Nutrition Therapies:    ADULT ORAL NUTRITION SUPPLEMENT; Breakfast, Lunch; Renal Oral Supplement  ADULT ORAL NUTRITION SUPPLEMENT; Breakfast, Lunch; Renal Oral Supplement  ADULT DIET; Regular; 5 carb choices (75 gm/meal);  Low Fat/Low Chol/High Fiber/2 gm Na    Anthropometric Measures:  · Height: 5' 9\" (175.3 cm)  · Current Body Weight: 262 lb 12.6 oz (119.2 kg) (4/4- BS; note wt flucc range from 270-262# during current adm d/t fluid shifts 2/2 CHF & HD)   · Usual Body Weight:  (UTO d/t wt flucc 2/2 fluid shifs 2/2 CHF; note wt of 264# on 9/29/21 per EMR office visit; note 3 mo wt range of 281-264#)     · Ideal Body Weight: 160 lbs; % Ideal Body Weight 164.2 %   · BMI: 38.8  · BMI Categories: Obese Class 2 (BMI 35.0 -39.9)       Nutrition Diagnosis:   · Increased nutrient needs related to increase demand for energy/nutrients as evidenced by wounds,dialysis      Nutrition Interventions:   Food and/or Nutrient Delivery:  Continue Current Diet,Start Oral Nutrition Supplement (Renal BID)  Nutrition Education/Counseling:  Education not indicated   Coordination of Nutrition Care:  Continue to monitor while inpatient    Goals:  Consumes >75% meals and >50% ONS. Nutrition Monitoring and Evaluation:   Behavioral-Environmental Outcomes:  None Identified   Food/Nutrient Intake Outcomes:  Food and Nutrient Intake,Supplement Intake  Physical Signs/Symptoms Outcomes:  Biochemical Data,Nutrition Focused Physical Findings,Skin,Weight,Chewing or Swallowing,GI Status,Fluid Status or Edema     Discharge Planning:     Too soon to determine     Electronically signed by Zacarias Hernandez RD on 4/4/22 at 12:31 PM EDT    Contact: [phone]

## 2022-04-04 NOTE — FLOWSHEET NOTE
04/04/22 0842   Vital Signs   BP (!) 106/48   Temp 98.1 °F (36.7 °C)   Pulse 87   Weight 262 lb 12.6 oz (119.2 kg)   Weight Method Bed scale   Percent Weight Change -0.08   Post-Hemodialysis Assessment   Post-Treatment Procedures Blood returned;Catheter capped, clamped and heparinized x 2 ports   Machine Disinfection Process Exterior Machine Disinfection   Rinseback Volume (ml) 300 ml   Total Liters Processed (l/min) 15 l/min   Dialyzer Clearance Heavily streaked   Duration of Treatment (minutes) 120 minutes   Heparin amount administered during treatment (units) 0 units   Hemodialysis Intake (ml) 300 ml   Hemodialysis Output (ml) 424 ml   NET Removed (ml) 124 ml   Tolerated Treatment Good   Patient Response to Treatment tx stopped early pt to go to OR, blood returned, cath care per policy/procedure, line flushed, heparin instilled, ports capped, Dr Benton notified      04/04/22 7768   Vital Signs   BP (!) 106/48   Temp 98.1 °F (36.7 °C)   Pulse 87   Weight 262 lb 12.6 oz (119.2 kg)   Weight Method Bed scale   Percent Weight Change -0.08   Post-Hemodialysis Assessment   Post-Treatment Procedures Blood returned;Catheter capped, clamped and heparinized x 2 ports   Machine Disinfection Process Exterior Machine Disinfection   Rinseback Volume (ml) 300 ml   Total Liters Processed (l/min) 15 l/min   Dialyzer Clearance Heavily streaked   Duration of Treatment (minutes) 120 minutes   Heparin amount administered during treatment (units) 0 units   Hemodialysis Intake (ml) 300 ml   Hemodialysis Output (ml) 424 ml   NET Removed (ml) 124 ml   Tolerated Treatment Good   Patient Response to Treatment tx stopped early pt to go to OR, blood returned, cath care per policy/procedure, line flushed, heparin instilled, ports capped, Dr Benton notified

## 2022-04-04 NOTE — PLAN OF CARE
Problem: Falls - Risk of:  Goal: Will remain free from falls  Description: Will remain free from falls  4/3/2022 2110 by Yvette Morris RN  Outcome: Ongoing  4/3/2022 1116 by Amelia Thomas RN  Outcome: Ongoing  Goal: Absence of physical injury  Description: Absence of physical injury  4/3/2022 2110 by Yvette Morris RN  Outcome: Ongoing  4/3/2022 1116 by Amelia Thomas RN  Outcome: Ongoing     Problem: Skin Integrity:  Goal: Will show no infection signs and symptoms  Description: Will show no infection signs and symptoms  4/3/2022 2110 by Yvette Morris RN  Outcome: Ongoing  4/3/2022 1116 by Amelia Thomas RN  Outcome: Ongoing  Goal: Absence of new skin breakdown  Description: Absence of new skin breakdown  4/3/2022 2110 by Yvette Morris RN  Outcome: Ongoing  4/3/2022 1116 by Amelia Thomas RN  Outcome: Ongoing  Goal: Status of oral mucous membranes will improve  Description: Status of oral mucous membranes will improve  Outcome: Ongoing  Goal: Skin integrity will be maintained  Description: Skin integrity will be maintained  Outcome: Ongoing     Problem: OXYGENATION/RESPIRATORY FUNCTION  Goal: Patient will achieve/maintain normal respiratory rate/effort  Description: Respiratory rate and effort will be within normal limits for the patient  4/3/2022 2110 by Yvette Morris RN  Outcome: Ongoing  4/3/2022 1116 by Amelia Thomas RN  Outcome: Ongoing  Goal: Patient will maintain patent airway  4/3/2022 2110 by Yvette Morris RN  Outcome: Ongoing  4/3/2022 1116 by Amelia Thomas RN  Outcome: Ongoing     Problem: HEMODYNAMIC STATUS  Goal: Patient has stable vital signs and fluid balance  4/3/2022 2110 by Yvette Morris RN  Outcome: Ongoing  4/3/2022 1116 by Amelia Thomas RN  Outcome: Ongoing     Problem: ACTIVITY INTOLERANCE/IMPAIRED MOBILITY  Goal: Mobility/activity is maintained at optimum level for patient  4/3/2022 2110 by Yvette Morris RN  Outcome: Ongoing  4/3/2022 1116 by Amelia Thomas RN  Outcome: Ongoing Problem:  Activity:  Goal: Fatigue will decrease  Description: Fatigue will decrease  Outcome: Ongoing  Goal: Risk for activity intolerance will decrease  Description: Risk for activity intolerance will decrease  Outcome: Ongoing     Problem: Coping:  Goal: Ability to cope will improve  Description: Ability to cope will improve  Outcome: Ongoing     Problem: Fluid Volume:  Goal: Will show no signs or symptoms of fluid imbalance  Description: Will show no signs or symptoms of fluid imbalance  Outcome: Ongoing     Problem: Health Behavior:  Goal: Ability to manage health-related needs will improve  Description: Ability to manage health-related needs will improve  Outcome: Ongoing  Goal: Identification of resources available to assist in meeting health care needs will improve  Description: Identification of resources available to assist in meeting health care needs will improve  Outcome: Ongoing     Problem: Nutritional:  Goal: Ability to identify appropriate dietary choices will improve  Description: Ability to identify appropriate dietary choices will improve  Outcome: Ongoing     Problem: Physical Regulation:  Goal: Ability to maintain clinical measurements within normal limits will improve  Description: Ability to maintain clinical measurements within normal limits will improve  Outcome: Ongoing  Goal: Complications related to the disease process, condition or treatment will be avoided or minimized  Description: Complications related to the disease process, condition or treatment will be avoided or minimized  Outcome: Ongoing     Problem: Sensory:  Goal: General experience of comfort will improve  Description: General experience of comfort will improve  Outcome: Ongoing

## 2022-04-04 NOTE — PLAN OF CARE
Problem: OXYGENATION/RESPIRATORY FUNCTION  Goal: Patient will achieve/maintain normal respiratory rate/effort  Description: Respiratory rate and effort will be within normal limits for the patient  Outcome: Met This Shift  Goal: Patient will maintain patent airway  Outcome: Met This Shift     Problem: HEMODYNAMIC STATUS  Goal: Patient has stable vital signs and fluid balance  Outcome: Met This Shift     Problem: ACTIVITY INTOLERANCE/IMPAIRED MOBILITY  Goal: Mobility/activity is maintained at optimum level for patient  Outcome: Met This Shift

## 2022-04-04 NOTE — ANESTHESIA PRE PROCEDURE
Department of Anesthesiology  Preprocedure Note       Name:  Gloria Burgos   Age:  65 y.o.  :  1945                                          MRN:  [id number]         Date:  2022      Surgeon: Carmen Varner):  Alex Ramirez MD    Procedure: Procedure(s):  TUNNELED DIALYSIS CATHETER    Medications prior to admission:   Prior to Admission medications    Medication Sig Start Date End Date Taking?  Authorizing Provider   docusate sodium (COLACE) 100 MG capsule Take 1 capsule by mouth 2 times daily 3/22/22   Chandler Espinoza MD   midodrine (PROAMATINE) 10 MG tablet Take 1 tablet by mouth 3 times daily (with meals) 3/22/22   Chandler Espinoza MD   guaiFENesin 400 MG tablet Take 400 mg by mouth 4 times daily as needed for Cough    Historical Provider, MD   ipratropium-albuterol (DUONEB) 0.5-2.5 (3) MG/3ML SOLN nebulizer solution Take 1 vial by nebulization every 4 hours as needed for Shortness of Breath    Historical Provider, MD   potassium chloride (KLOR-CON M) 10 MEQ extended release tablet Take 20 mEq by mouth daily    Historical Provider, MD   insulin glargine (LANTUS SOLOSTAR) 100 UNIT/ML injection pen Inject 50 Units into the skin nightly    Historical Provider, MD   loperamide (IMODIUM) 2 MG capsule Take 2 mg by mouth 4 times daily as needed for Diarrhea    Historical Provider, MD   buPROPion (WELLBUTRIN XL) 300 MG extended release tablet Take 300 mg by mouth every morning    Historical Provider, MD   ezetimibe (ZETIA) 10 MG tablet Take 10 mg by mouth at bedtime    Historical Provider, MD   Zinc Sulfate 110 MG TABS Take 2 tablets by mouth daily    Historical Provider, MD   bumetanide (BUMEX) 2 MG tablet Take 1 tablet by mouth 2 times daily 3/5/22   Chandler Espinoza MD   magnesium hydroxide (MILK OF MAGNESIA) 400 MG/5ML suspension Take 30 mLs by mouth daily as needed for Constipation    Historical Provider, MD   Dextromethorphan-guaiFENesin  MG/5ML SYRP Take 5 mLs by mouth every 4 hours as needed for Cough    Historical Provider, MD   melatonin 3 MG TABS tablet Take 3 mg by mouth at bedtime    Historical Provider, MD   insulin lispro, 1 Unit Dial, (HUMALOG KWIKPEN) 100 UNIT/ML SOPN Inject 0-10 Units into the skin 4 times daily (before meals and nightly) *Per Sliding Scale*    Historical Provider, MD   metoprolol succinate (TOPROL XL) 25 MG extended release tablet Take 1 tablet by mouth 2 times daily 2/16/22   Govind Sinclair APRN - CNP   acetaminophen (TYLENOL) 325 MG tablet Take 650 mg by mouth every 4 hours as needed for Pain or Fever     Historical Provider, MD   vitamin C (ASCORBIC ACID) 500 MG tablet Take 500 mg by mouth 2 times daily    Historical Provider, MD   vitamin D (CHOLECALCIFEROL) 50 MCG (2000 UT) TABS tablet Take 2,000 Units by mouth daily     Historical Provider, MD   aspirin 81 MG EC tablet Take 1 tablet by mouth daily 2/3/22   Robby Tucker MD   folic acid (FOLVITE) 1 MG tablet Take 1 mg by mouth daily    Historical Provider, MD   cyanocobalamin 1000 MCG tablet Take 1,000 mcg by mouth daily    Historical Provider, MD   atorvastatin (LIPITOR) 40 MG tablet Take 40 mg by mouth at bedtime     Historical Provider, MD   pantoprazole (PROTONIX) 40 MG tablet Take 40 mg by mouth daily    Historical Provider, MD       Current medications:    Current Facility-Administered Medications   Medication Dose Route Frequency Provider Last Rate Last Admin    ceFAZolin (ANCEF) 2000 mg in sterile water 20 mL IV syringe  2,000 mg IntraVENous 60 Min Pre-Op Akhil Morton MD        acetaminophen (TYLENOL) tablet 650 mg  650 mg Oral Q4H PRN Robby Tucker MD        aspirin EC tablet 81 mg  81 mg Oral Daily Robby Tucker MD   81 mg at 04/03/22 0841    atorvastatin (LIPITOR) tablet 40 mg  40 mg Oral Nightly Robby Tucker MD   40 mg at 04/03/22 2117    buPROPion (WELLBUTRIN XL) extended release tablet 300 mg  300 mg Oral QAM Robby Tucker MD 300 mg at 04/03/22 0840    vitamin B-12 (CYANOCOBALAMIN) tablet 1,000 mcg  1,000 mcg Oral Daily Iona Preston MD   1,000 mcg at 04/03/22 0840    guaiFENesin-dextromethorphan (ROBITUSSIN DM) 100-10 MG/5ML syrup 5 mL  5 mL Oral Q4H PRN Iona Preston MD        docusate sodium (COLACE) capsule 100 mg  100 mg Oral BID Iona Preston MD   100 mg at 04/03/22 2117    ezetimibe (ZETIA) tablet 10 mg  10 mg Oral Nightly Iona Preston MD   10 mg at 58/66/56 3382    folic acid (FOLVITE) tablet 1 mg  1 mg Oral Daily Iona Preston MD   1 mg at 04/03/22 9430    guaiFENesin tablet 400 mg  400 mg Oral 4x Daily PRN Iona Preston MD   400 mg at 04/03/22 2116    insulin glargine-yfgn (SEMGLEE-YFGN) injection vial 50 Units  50 Units SubCUTAneous Nightly Iona Preston MD   50 Units at 04/03/22 2116    ipratropium-albuterol (DUONEB) nebulizer solution 3 mL  1 vial Nebulization Q4H PRN Iona Preston MD        loperamide (IMODIUM) capsule 2 mg  2 mg Oral 4x Daily PRN Iona Preston MD        magnesium hydroxide (MILK OF MAGNESIA) 400 MG/5ML suspension 30 mL  30 mL Oral Daily PRN Iona Preston MD        melatonin tablet 3 mg  3 mg Oral Nightly Iona Preston MD   3 mg at 04/03/22 2117    metoprolol succinate (TOPROL XL) extended release tablet 25 mg  25 mg Oral BID Iona Preston MD   25 mg at 04/03/22 2116    pantoprazole (PROTONIX) tablet 40 mg  40 mg Oral QAM AC Iona Preston MD   40 mg at 04/03/22 7791    potassium chloride (KLOR-CON M) extended release tablet 20 mEq  20 mEq Oral Daily Iona Preston MD   20 mEq at 03/31/22 1117    ascorbic acid (VITAMIN C) tablet 500 mg  500 mg Oral BID Iona Preston MD   500 mg at 04/03/22 2116    vitamin D (CHOLECALCIFEROL) tablet 2,000 Units  2,000 Units Oral Daily Iona Preston MD   2,000 Units at 04/03/22 0841    zinc sulfate (ZINCATE) capsule 50 mg  50 mg Oral Daily Danay Hernandez MD   50 mg at 04/03/22 0841    insulin lispro (HUMALOG) injection vial 0-6 Units  0-6 Units SubCUTAneous TID [facility] Danay Hernandez MD        insulin lispro (HUMALOG) injection vial 0-3 Units  0-3 Units SubCUTAneous Nightly Danay Hernandez MD   1 Units at 03/30/22 2116    glucose (GLUTOSE) 40 % oral gel 15 g  15 g Oral PRN Danay Hernandez MD        dextrose 50 % IV solution  12.5 g IntraVENous PRN Danay Hernandez MD        glucagon (rDNA) injection 1 mg  1 mg IntraMUSCular PRN Danay Hernandez MD        dextrose 5 % solution  100 mL/hr IntraVENous PRN Danay Hernandez MD        heparin (porcine) injection 5,000 Units  5,000 Units SubCUTAneous Q8H Daany Hernandez MD   5,000 Units at 04/03/22 2115    perflutren lipid microspheres (DEFINITY) injection 1.65 mg  1.5 mL IntraVENous ONCE PRN Trish Oar, APRN - CNP        white petrolatum ointment   Topical BID PRN Danay Hernandez MD   Given at 03/30/22 0856    ipratropium-albuterol (DUONEB) nebulizer solution 1 ampule  1 ampule Inhalation Q4H NICOLE Gambino DO   1 ampule at 04/03/22 2006       Allergies:     Allergies   Allergen Reactions    Penicillins Anaphylaxis       Problem List:    Patient Active Problem List   Diagnosis Code    PNA (pneumonia) J18.9    Acute on chronic heart failure with preserved ejection fraction ([facility]) I50.33    Coronary artery disease involving native coronary artery of native heart without angina pectoris I25.10    Cardiac pacemaker in situ Z95.0    Primary hypertension I10    SPEEDY (obstructive sleep apnea) G47.33    Chronic respiratory failure with hypoxia ([facility]) J96.11    CHF (congestive heart failure), NYHA class I, acute on chronic, combined ([facility]) I50.43    Acute on chronic clinical systolic heart failure (HCC) I50.23    Acute on chronic systolic heart failure ([facility]) I50.23    Pleural effusion J90       Past Medical History:        Diagnosis Date    Acid reflux     Agent orange exposure     Arthritis     CAD (coronary artery disease)     Congestive heart failure (CHF) (HCC)     [facility]ch[facility]) [facility]     Depression     Diabetes mellitus ([facility]     History of blood transfusion     Hyperlipidemia     Hypertension     MI (myocardial infarction) ([facility]     Polio     Sleep apnea        Past Surgical History:        Procedure Laterality Date    CARDIAC PACEMAKER PLACEMENT      CORONARY ANGIOPLASTY WITH STENT PLACEMENT      HERNIA REPAIR      PACEMAKER PLACEMENT         Social History:    Social History     Tobacco Use    Smoking status: Former Smoker    Smokeless tobacco: Never Used    Tobacco comment: quit in 1985   Substance Use Topics    Alcohol use: Never                                Counseling given: Not Answered  Comment: quit in 1985      Vital Signs (Current):   Vitals:    04/04/22 0714 04/04/22 0730 04/04/22 0830 04/04/22 0842   BP: (!) 100/58 (!) 98/55 (!) 100/58 (!) 106/48   Pulse: 80 78 81 87   Resp:       Temp:    98.1 °F (36.7 °C)   TempSrc:       SpO2:       Weight:    262 lb 12.6 oz (119.2 kg)   Height:                                                  BP Readings from Last 3 Encounters:   04/04/22 (!) 106/48   03/22/22 128/83   03/10/22 105/71       NPO Status:                                                                                 BMI:   Wt Readings from Last 3 Encounters:   04/04/22 262 lb 12.6 oz (119.2 kg)   03/22/22 272 lb (123.4 kg)   03/10/22 264 lb 1.8 oz (119.8 kg)     Body mass index is 38.81 kg/m².     CBC:   Lab Results   Component Value Date    WBC 11.6 04/04/2022    RBC 4.10 04/04/2022    HGB 11.9 04/04/2022    HCT 38.4 04/04/2022    MCV 93.7 04/04/2022    RDW 17.7 04/04/2022     04/04/2022       CMP:   Lab Results   Component Value Date     04/04/2022    K 3.7 04/04/2022    K 3.7 04/04/2022     04/04/2022    CO2 27 04/04/2022 BUN 43 04/04/2022    CREATININE 2.3 04/04/2022    GFRAA 33 04/04/2022    LABGLOM 28 04/04/2022    GLUCOSE 65 04/04/2022    PROT 7.0 03/28/2022    CALCIUM 8.6 04/04/2022    BILITOT 0.5 03/28/2022    ALKPHOS 129 03/28/2022    AST 24 03/28/2022    ALT 35 03/28/2022       POC Tests: No results for input(s): POCGLU, POCNA, POCK, POCCL, POCBUN, POCHEMO, POCHCT in the last 72 hours. Coags: No results found for: PROTIME, INR, APTT    HCG (If Applicable): No results found for: PREGTESTUR, PREGSERUM, HCG, HCGQUANT     ABGs: No results found for: PHART, PO2ART, PTP4GCD, MAL7QMJ, BEART, M6FFEGDC     Type & Screen (If Applicable):  No results found for: LABABO, LABRH    Drug/Infectious Status (If Applicable):  No results found for: HIV, HEPCAB    COVID-19 Screening (If Applicable):   Lab Results   Component Value Date    COVID19 Not Detected 03/22/2022    COVID19 Not Detected 01/25/2022           Anesthesia Evaluation  Patient summary reviewed  Airway: Mallampati: III  TM distance: >3 FB   Neck ROM: full  Mouth opening: > = 3 FB Dental:      Comment: Upper edentulous, lower incisors remain. Denies any loose remaining teeth. Pulmonary: breath sounds clear to auscultation  (+) pneumonia:  COPD:  sleep apnea: on CPAP,  decreased breath sounds,                            ROS comment: Former Smoker: 0.5 - 1.5 PPD x 40 years  Quit Smoking:        Cardiovascular:    (+) hypertension:, past MI:, CAD:, CHF:, hyperlipidemia        Rhythm: regular  Rate: normal                    Neuro/Psych:   (+) psychiatric history:depression/anxiety             GI/Hepatic/Renal:   (+) GERD:, morbid obesity          Endo/Other:    (+) Diabetes, . Abdominal:   (+) obese ( Morbidly obese),           Vascular: Other Findings:           Anesthesia Plan      MAC     ASA 4       Induction: intravenous. MIPS: Postoperative opioids intended and Prophylactic antiemetics administered.   Anesthetic plan and risks discussed with patient. Plan discussed with CRNA.                   Ban Alciia MD   4/4/2022

## 2022-04-04 NOTE — PROGRESS NOTES
[facility]  Nephrology Attending Progress Note          SUBJECTIVE:     3/29: Drea Dean a 65 y. o. male with a history of chronic kidney disease stage IV (recent recent baseline creatinine 2.5-3), type 2 diabetes mellitus, COPD, chronic HFpEF H/O pacemaker, mame, gerd, djd, htn, hyperlipidemia, who presented from [facility] with complaints of shortness of breath and hypoxia. He has been admitted twice for this in the last weeks. He has been on bumex drip. Labs at this time show na 143, k 4.3, co2 29, bun 54, cr 3, gfr 20, ca 9, alb 3.6, wbc 13.3, hgb 11.2, plt 409. He has been started on iv bumex 2 iv q 12. He is on cpap and 3 L of o2 at the [facility]. He has had a large right pleural effusion which has been tapped.      3/30: pt seen in room, awaits thoracentesis    3/31: pt seen, sp thoracentesis wiith 1.5 L off.  On bumex 2 mg iv q 12.     4/1: pt seen on hd, first hd today, tolerating ok    4/2: pt seen on hd, second hd today, no cp or sob    4/4: Attempted RIJ tunneled hemodialysis catheter placement today unsuccessful due to small vein; left CFV tunnel hemodialysis catheter placement performed; patient denies shortness of breath      PROBLEM LIST:    Patient Active Problem List   Diagnosis    PNA (pneumonia)    Acute on chronic heart failure with preserved ejection fraction ([address]    Coronary artery disease involving native coronary artery of native heart without angina pectoris    Cardiac pacemaker in situ    Primary hypertension    MAME (obstructive sleep apnea)    Chronic respiratory failure with hypoxia (HCC)    CHF (congestive heart failure), NYHA class I, acute on chronic, combined ([address]    Acute on chronic clinical systolic heart failure (HCC)    Acute on chronic systolic heart failure (HCC)    Pleural effusion          DIET:    ADULT ORAL NUTRITION SUPPLEMENT; Breakfast, Lunch; Renal Oral Supplement  ADULT ORAL NUTRITION SUPPLEMENT; Breakfast, Lunch; Renal Oral Supplement  ADULT DIET; Regular; 5 carb choices (75 gm/meal);  Low Fat/Low Chol/High Fiber/2 gm Na     PHYSICAL EXAM:     Patient Vitals for the past 24 hrs:   BP Temp Temp src Pulse Resp SpO2 Height Weight   04/04/22 1500 89/69 98 °F (36.7 °C) Oral 81 18 98 % -- --   04/04/22 1219 -- -- -- -- -- -- 5' 9\" (1.753 m) --   04/04/22 1215 90/62 97.9 °F (36.6 °C) Oral 79 18 100 % -- --   04/04/22 1143 95/64 98 °F (36.7 °C) -- 77 17 -- -- --   04/04/22 1100 100/69 -- -- 78 16 99 % -- --   04/04/22 1053 98/60 98.2 °F (36.8 °C) -- 77 17 -- -- --   04/04/22 0842 (!) 106/48 98.1 °F (36.7 °C) -- 87 -- -- -- 262 lb 12.6 oz (119.2 kg)   04/04/22 0830 (!) 100/58 -- -- 81 -- -- -- --   04/04/22 0730 (!) 98/55 -- -- 78 -- -- -- --   04/04/22 0714 (!) 100/58 -- -- 80 -- -- -- --   04/04/22 0709 101/74 97.5 °F (36.4 °C) -- 78 16 -- -- 263 lb 0.1 oz (119.3 kg)   04/03/22 2059 (!) 95/58 98.3 °F (36.8 °C) Oral 87 18 98 % -- --   04/03/22 2007 -- -- -- -- 16 99 % -- --   @      Intake/Output Summary (Last 24 hours) at 4/4/2022 1708  Last data filed at 4/4/2022 1500  Gross per 24 hour   Intake 1060 ml   Output 1064 ml   Net -4 ml         Wt Readings from Last 3 Encounters:   04/04/22 262 lb 12.6 oz (119.2 kg)   03/22/22 272 lb (123.4 kg)   03/10/22 264 lb 1.8 oz (119.8 kg)       Constitutional:  Pt is in no acute distress  Head: normocephalic, atraumatic  Neck: no JVD  Cardiovascular: regular rate and rhythm, no murmurs, gallops, or rubs  Respiratory:  Decreased at bases  Gastrointestinal:  Soft, nontender, nondistended, bowel sounds x 4  Ext: edema  Skin: dry, no rash  Neuro: aaox3    MEDS (scheduled):    aspirin  81 mg Oral Daily    atorvastatin  40 mg Oral Nightly    buPROPion  300 mg Oral QAM    cyanocobalamin  1,000 mcg Oral Daily    docusate sodium  100 mg Oral BID    ezetimibe  10 mg Oral Nightly    folic acid  1 mg Oral Daily    insulin glargine-yfgn  50 Units SubCUTAneous Nightly    melatonin  3 mg Oral Nightly    metoprolol succinate  25 mg Oral BID    pantoprazole  40 mg Oral QAM AC    potassium chloride  20 mEq Oral Daily    vitamin C  500 mg Oral BID    vitamin D  2,000 Units Oral Daily    zinc sulfate  50 mg Oral Daily    insulin lispro  0-6 Units SubCUTAneous TID WC    insulin lispro  0-3 Units SubCUTAneous Nightly    heparin (porcine)  5,000 Units SubCUTAneous Q8H    ipratropium-albuterol  1 ampule Inhalation Q4H WA       MEDS (infusions):   dextrose         MEDS (prn):  acetaminophen, guaiFENesin-dextromethorphan, guaiFENesin, ipratropium-albuterol, loperamide, magnesium hydroxide, glucose, dextrose, glucagon (rDNA), dextrose, perflutren lipid microspheres, white petrolatum    DATA:    Recent Labs     04/02/22 0506 04/03/22 0551 04/04/22 0437   WBC 11.2 10.4 11.6*   HGB 11.2* 11.2* 11.9*   HCT 36.2* 36.7* 38.4   MCV 93.1 93.9 93.7    216 237     Recent Labs     04/02/22  0506 04/02/22  0506 04/03/22 0551 04/04/22 0437     --  143 141   K 3.8  3.8   < > 4.0  4.0 3.7  3.7     --  104 101   CO2 26  --  27 27   BUN 50*  --  40* 43*   CREATININE 2.6*  --  2.3* 2.3*   LABGLOM 24  --  28 28   GLUCOSE 73*  --  80 65*   CALCIUM 8.5*  --  8.4* 8.6   PHOS 3.7  --   --   --     < > = values in this interval not displayed.        Lab Results   Component Value Date    LABALBU 3.6 03/28/2022    LABALBU 3.4 (L) 03/14/2022    LABALBU 3.5 02/25/2022     Lab Results   Component Value Date    TSH 2.310 01/22/2022       Iron Studies  No results found for: IRON, TIBC, FERRITIN  No results found for: LVWGZKPQ96  No results found for: FOLATE    No results found for: VITD25  PTH   Date Value Ref Range Status   04/02/2022 104 (H) 15 - 65 pg/mL Final       No components found for: URIC    Lab Results   Component Value Date    COLORU Yellow 03/15/2022    NITRU Negative 03/15/2022    GLUCOSEU Negative 03/15/2022    KETUA Negative 03/15/2022    UROBILINOGEN 0.2 03/15/2022    BILIRUBINUR Negative 03/15/2022       No results found for: Willam Garnica      IMPRESSION/RECOMMENDATIONS:     1.  Chronic kidney disease stage IV  due to diabetes mellitus and hypertension  Cr usual baseline 2.5-3  Transition to oral diuretics  Pericardial effusion uremia contributing  Will need to repeat echo in 1-2 weeks  Frequent admission for CHF exacerbation in the presence of advanced CKD  HD initiated 4/1  left CFV tunnel hemodialysis catheter placement performed today     2.  Acute on chronic HFpEF  Improved with hemodialysis and diuretics  Strict I/o  Right-sided thoracentesis performed 3/15   Sp thora 3/30 with 1.5 L off on right  Transition to oral diuretics     3.Htn  On Midodrine  Hold parameters on metoprolol     4 mame  On cpap    Discharge planning     Bernie Naidu MD        Department of Internal Medicine  Section of Nephrology  Dialysis Note        PROCEDURE:  Patient seen on hemodialysis    PHYSICIAN:  Lauren Landa M.D., [facility]    INDICATION:  Acute renal failure    RX:  See dialysis flowsheet for specifics on access, blood flow rate, dialysate baths, duration of dialysis, anticoagulation and other technical information.     COMMENTS:  Procedure in progress and tolerated       Bernie Naidu MD, MD

## 2022-04-04 NOTE — PROGRESS NOTES
Progress Note  Francesco Maldonado       USQA:[id number]  Patient Santos Guadalupe     YOB: 1945     Age:77 y.o. Patient says breathing is improved today. Subjective    Subjective:  Symptoms:  Improved. He reports shortness of breath. No chest pain. Diet:  Adequate intake. Activity level: Impaired due to weakness. Pain:  He reports no pain. Review of Systems   Constitutional: Negative for activity change and fever. HENT: Negative for congestion. Respiratory: Positive for shortness of breath. Cardiovascular: Positive for leg swelling. Negative for chest pain. Gastrointestinal: Negative for abdominal pain. Neurological: Negative for dizziness. Psychiatric/Behavioral: Negative for agitation. Objective         Vitals Last 24 Hours:  TEMPERATURE:  Temp  Av.3 °F (36.8 °C)  Min: 98.1 °F (36.7 °C)  Max: 98.5 °F (36.9 °C)  RESPIRATIONS RANGE: Resp  Av.5  Min: 16  Max: 18  PULSE OXIMETRY RANGE: SpO2  Av.3 %  Min: 98 %  Max: 99 %  PULSE RANGE: Pulse  Av.3  Min: 85  Max: 87  BLOOD PRESSURE RANGE: Systolic (69MIR), GNE:78 , Min:95 , ZVM:043   ; Diastolic (81VPK), YOU:34, Min:58, Max:70    I/O (24Hr): Intake/Output Summary (Last 24 hours) at 2022 0654  Last data filed at 4/3/2022 2059  Gross per 24 hour   Intake --   Output 600 ml   Net -600 ml     Objective:  General Appearance:  Comfortable. Vital signs: (most recent): Blood pressure (!) 95/58, pulse 87, temperature 98.3 °F (36.8 °C), temperature source Oral, resp. rate 18, height 5' 9\" (1.753 m), weight 261 lb 3.9 oz (118.5 kg), SpO2 98 %. No fever. Lungs:  Normal effort and normal respiratory rate. Breath sounds clear to auscultation. Heart: Normal rate. Regular rhythm. S1 normal and S2 normal.    Extremities: There is local swelling.       Labs/Imaging/Diagnostics    Labs:  CBC:  Recent Labs     22  0506 22  0551 22  0437   WBC 11.2 10.4 11.6*   RBC 3.89 3.91 4.10 HGB 11.2* 11.2* 11.9*   HCT 36.2* 36.7* 38.4   MCV 93.1 93.9 93.7   RDW 17.8* 17.6* 17.7*    216 237     CHEMISTRIES:  Recent Labs     22  0506 22  0506 22  0551 22  0437     --  143 141   K 3.8  3.8   < > 4.0  4.0 3.7     --  104 101   CO2 26  --  27 27   BUN 50*  --  40* 43*   CREATININE 2.6*  --  2.3* 2.3*   GLUCOSE 73*  --  80 65*   PHOS 3.7  --   --   --     < > = values in this interval not displayed. PT/INR:No results for input(s): PROTIME, INR in the last 72 hours. APTT:No results for input(s): APTT in the last 72 hours. LIVER PROFILE:  No results for input(s): AST, ALT, BILIDIR, BILITOT, ALKPHOS in the last 72 hours. Imaging Last 24 Hours:  XR CHEST PORTABLE    Result Date: 2022  EXAMINATION: ONE XRAY VIEW OF THE CHEST 2022 4:49 pm COMPARISON: 2022 HISTORY: ORDERING SYSTEM PROVIDED HISTORY: shortness of breath TECHNOLOGIST PROVIDED HISTORY: Reason for exam:->shortness of breath What reading provider will be dictating this exam?->CRC FINDINGS: There is a large opacity seen within the right lung base. There is a small right pleural effusion. The left upper lobe is clear. There is a small left pleural effusion. The cardiac silhouette is within normals. There is a dual lead cardiac pacer on the left. 1. Large opacity within the right lung base which could represent pneumonia and or atelectasis. 2. Moderate size right pleural effusion. 3. Small left pleural effusion. 4. CT of the thorax is recommended for further evaluation.      US DUP LOWER EXTREMITIES BILATERAL VENOUS    Result Date: 2022  Patient MRN:  [id number] : 1945 Age: 65 years Gender: Male Order Date:  2022 5:28 PM EXAM: US DUP LOWER EXTREMITIES BILATERAL VENOUS NUMBER OF IMAGES:  52 INDICATION:  leg swelling leg swelling What reading provider will be dictating this exam?->[facility] COMPARISON: None Within the visualized vessels, there is no evidence for deep venous thrombosis There is good compressibility, there is good augmentation, there is good color flow. Within the visualized vessels there is no evidence for deep venous thrombosis     Assessment//Plan           Hospital Problems           Last Modified POA    * (Principal) Acute on chronic systolic heart failure ([address] 3/28/2022 Yes    Pleural effusion 3/30/2022 Yes        Assessment:  (   (Principal) Acute on chronic clinical systolic heart failure ([address] 3/14/2022 Yes     Acute on chronic hypoxic respiratory failure. CHF  Pleural effusion  Acute renal insfficnecy  DM  COPD  HTN  Hyperlipidemia       ). Plan:   (Tunnel cath per vascular. Cardiology, Pulmonary and Renal following. Monitor labs and output. Continue meds. PT/OT).

## 2022-04-04 NOTE — CARE COORDINATION
4/4 Update CM note. Clinicals faxed to [employer] at [phone] to arrange new outpatient HD. Mendoza December, liaison for [employer] aware. Patient in OR for tunneled dialysis catheter. Patient to receive HD post procedure. Patient currently on 3L NC. Pulm states patient will have PFT studies when no longer volume overloaded and to wean O2 as tolerated. Plan remains to return to [facility] when medically stable. Will need negative COVID test completed on day of discharge. PT/OT evals will need updated as well. Completed ambulance paperwork in soft chart. [address] [address] with Cha at [employer] [facility] and she stated that clinicals have not been received yet but she will contact me before 1800 if clinicals need to be re-faxed.     Memo Guo, SONUN, RN  PHYSICIANS [facility] SURGICAL [facility] Case Management   Cell: [phone]

## 2022-04-04 NOTE — OP NOTE
Operative Note      Patient: Chico Brooks  YOB: 1945  MRN: [id number]    Date of procedure: 4/4/2022    Preoperative diagnosis: Renal failure requiring hemodialysis access    Postoperative diagnosis: Same    Findings: The right internal jugular vein was small. We injected contrast demonstrating extravasation from the IJ into the mediastinum therefore the procedure was abandoned. Additional findings left common femoral vein was widely patent    Surgeon: Gagandeep Beard MD    Assistant: Jennifer Salinas    Estimated blood loss: Approximately 20 cc    Procedure:  #1 attempted right internal jugular vein line placement  #2 ultrasound-guided needle access of the right jugular vein  #3 ultrasound-guided needle access of left common femoral vein  #4 placement of a 40 cm tunneled hemodialysis catheter via the left femoral vein    Description of the procedure: After risk benefits and alternatives were discussed with the patient. Consent was achieved. The day the procedure he was brought to the operating room placed in supine position. Is prepped and draped in the standard sterile fashion. A timeout was taken verify the person the operative site as well as the procedure. He has a defibrillator on the left side. Therefore the right side was chosen. The right jugular vein did look small but was patent. Lidocaine was used to Ultroid the skin and subcutaneous tissue. This were then followed by [facility] introducer needle. We accessed the vein without any difficulty on the first attempt. Dark nonpulsatile blood flow was returned. We then try to place a J-wire which would not traverse. We then placed the Glidewire advantage which appeared to traverse without any difficulty but got hung in the mediastinum. Therefore the needle was taken out and a 6 [place] sheath was placed just into the jugular vein and image taken demonstrating some contrast extravasation in the neck and into the mediastinum.   Therefore the catheter wire removed. I evaluated the jugular vein again and again it was small at this point I decided to abandon this portion of the procedure. The left femoral vein was evaluated. Was widely patent. There was no echogenic material.  We punctured this without any difficulty with [facility] introducer needle followed by a Glidewire advantage. Lidocaine was used to Ultroid the skin and subcutaneous tissue. 2 incisions were made one on the thigh and one at the level of the puncture site. The catheter was tunneled it was delivered over the wire. The peel-away sheath was able to be removed and the catheter was placed into the inferior vena cava without any difficulty. At this point is completed the procedure. I had cardiothoracic take a look and at this point the patient was hemodynamically stable. I updated the family on the procedure.       Drains:   Urethral Catheter (Active)   Catheter Indications Need for fluid volume management of the critically ill patient in a critical care setting 04/03/22 2059   Securement Device Date Changed 03/29/22 03/29/22 0811   Site Assessment No urethral drainage 04/03/22 1600   Urine Color Yellow 04/03/22 2059   Urine Appearance Clear 04/03/22 2059   Output (mL) 175 mL 04/03/22 2059       [REMOVED] Urethral Catheter Double-lumen 16 fr (Removed)   Catheter Indications Urinary retention (acute or chronic), continuous bladder irrigation or bladder outlet obstruction 02/27/22 1103   Site Assessment No urethral drainage 02/27/22 1103   Urine Color Yellow 02/27/22 1103   Urine Appearance Cloudy 02/27/22 1103       [REMOVED] Urethral Catheter (Removed)   Catheter Indications Need for fluid volume management of the critically ill patient in a critical care setting 03/17/22 0600   Site Assessment Urethral drainage 03/16/22 2300   Urine Color Yellow 03/17/22 0600   Urine Appearance Cloudy 03/17/22 0600   Urine Odor Malodorous 03/10/22 1603   Output (mL) 600 mL 03/17/22 0600 Electronically signed by Wade Daley MD on 4/4/2022 at 10:43 AM

## 2022-04-04 NOTE — PROGRESS NOTES
Patient seen and examined this morning. Planning for Tesio catheter placement today. Risks and benefits of the procedure discussed with the patient including bleeding, pain, infection, damage to surrounding structures or nearby tissues including pneumothorax and neurovascular structures, and need for additional operations or procedures in the future. Questions answered.      Roberto Schultz MD

## 2022-04-04 NOTE — ANESTHESIA PRE PROCEDURE
Department of Anesthesiology  Preprocedure Note       Name:  Amadeo Infante   Age:  65 y.o.  :  1945                                          MRN:  [id number]         Date:  2022      Surgeon: Katy Kennedy):  Ivonne Hyatt MD    Procedure: Procedure(s):  TUNNELED DIALYSIS CATHETER    Medications prior to admission:   Prior to Admission medications    Medication Sig Start Date End Date Taking?  Authorizing Provider   docusate sodium (COLACE) 100 MG capsule Take 1 capsule by mouth 2 times daily 3/22/22   Mukul Wilkinson MD   midodrine (PROAMATINE) 10 MG tablet Take 1 tablet by mouth 3 times daily (with meals) 3/22/22   Mukul Wilkinson MD   guaiFENesin 400 MG tablet Take 400 mg by mouth 4 times daily as needed for Cough    Historical Provider, MD   ipratropium-albuterol (DUONEB) 0.5-2.5 (3) MG/3ML SOLN nebulizer solution Take 1 vial by nebulization every 4 hours as needed for Shortness of Breath    Historical Provider, MD   potassium chloride (KLOR-CON M) 10 MEQ extended release tablet Take 20 mEq by mouth daily    Historical Provider, MD   insulin glargine (LANTUS SOLOSTAR) 100 UNIT/ML injection pen Inject 50 Units into the skin nightly    Historical Provider, MD   loperamide (IMODIUM) 2 MG capsule Take 2 mg by mouth 4 times daily as needed for Diarrhea    Historical Provider, MD   buPROPion (WELLBUTRIN XL) 300 MG extended release tablet Take 300 mg by mouth every morning    Historical Provider, MD   ezetimibe (ZETIA) 10 MG tablet Take 10 mg by mouth at bedtime    Historical Provider, MD   Zinc Sulfate 110 MG TABS Take 2 tablets by mouth daily    Historical Provider, MD   bumetanide (BUMEX) 2 MG tablet Take 1 tablet by mouth 2 times daily 3/5/22   Mukul Wilkinson MD   magnesium hydroxide (MILK OF MAGNESIA) 400 MG/5ML suspension Take 30 mLs by mouth daily as needed for Constipation    Historical Provider, MD   Dextromethorphan-guaiFENesin  MG/5ML SYRP Take 5 mLs by mouth every 4 hours as needed for Cough    Historical Provider, MD   melatonin 3 MG TABS tablet Take 3 mg by mouth at bedtime    Historical Provider, MD   insulin lispro, 1 Unit Dial, (HUMALOG KWIKPEN) 100 UNIT/ML SOPN Inject 0-10 Units into the skin 4 times daily (before meals and nightly) *Per Sliding Scale*    Historical Provider, MD   metoprolol succinate (TOPROL XL) 25 MG extended release tablet Take 1 tablet by mouth 2 times daily 2/16/22   Macie Ours, APRN - CNP   acetaminophen (TYLENOL) 325 MG tablet Take 650 mg by mouth every 4 hours as needed for Pain or Fever     Historical Provider, MD   vitamin C (ASCORBIC ACID) 500 MG tablet Take 500 mg by mouth 2 times daily    Historical Provider, MD   vitamin D (CHOLECALCIFEROL) 50 MCG (2000 UT) TABS tablet Take 2,000 Units by mouth daily     Historical Provider, MD   aspirin 81 MG EC tablet Take 1 tablet by mouth daily 2/3/22   Jorge Luis Green MD   folic acid (FOLVITE) 1 MG tablet Take 1 mg by mouth daily    Historical Provider, MD   cyanocobalamin 1000 MCG tablet Take 1,000 mcg by mouth daily    Historical Provider, MD   atorvastatin (LIPITOR) 40 MG tablet Take 40 mg by mouth at bedtime     Historical Provider, MD   pantoprazole (PROTONIX) 40 MG tablet Take 40 mg by mouth daily    Historical Provider, MD       Current medications:    Current Facility-Administered Medications   Medication Dose Route Frequency Provider Last Rate Last Admin    ceFAZolin (ANCEF) 2000 mg in sterile water 20 mL IV syringe  2,000 mg IntraVENous 60 Min Pre-Op Jose Worthington MD        acetaminophen (TYLENOL) tablet 650 mg  650 mg Oral Q4H PRN Jorge Luis Green MD        aspirin EC tablet 81 mg  81 mg Oral Daily Jorge Luis Green MD   81 mg at 04/03/22 0841    atorvastatin (LIPITOR) tablet 40 mg  40 mg Oral Nightly Jorge Luis Green MD   40 mg at 04/03/22 2117    buPROPion (WELLBUTRIN XL) extended release tablet 300 mg  300 mg Oral QAM Jorge Luis Green MD 300 mg at 04/03/22 0840    vitamin B-12 (CYANOCOBALAMIN) tablet 1,000 mcg  1,000 mcg Oral Daily Iona Preston MD   1,000 mcg at 04/03/22 0840    guaiFENesin-dextromethorphan (ROBITUSSIN DM) 100-10 MG/5ML syrup 5 mL  5 mL Oral Q4H PRN Iona Preston MD        docusate sodium (COLACE) capsule 100 mg  100 mg Oral BID Iona Preston MD   100 mg at 04/03/22 2117    ezetimibe (ZETIA) tablet 10 mg  10 mg Oral Nightly Iona Preston MD   10 mg at 65/46/41 3922    folic acid (FOLVITE) tablet 1 mg  1 mg Oral Daily Iona Preston MD   1 mg at 04/03/22 1757    guaiFENesin tablet 400 mg  400 mg Oral 4x Daily PRN Iona Preston MD   400 mg at 04/03/22 2116    insulin glargine-yfgn (SEMGLEE-YFGN) injection vial 50 Units  50 Units SubCUTAneous Nightly Iona Preston MD   50 Units at 04/03/22 2116    ipratropium-albuterol (DUONEB) nebulizer solution 3 mL  1 vial Nebulization Q4H PRN Iona Preston MD        loperamide (IMODIUM) capsule 2 mg  2 mg Oral 4x Daily PRN Iona Preston MD        magnesium hydroxide (MILK OF MAGNESIA) 400 MG/5ML suspension 30 mL  30 mL Oral Daily PRN Iona Preston MD        melatonin tablet 3 mg  3 mg Oral Nightly Iona Preston MD   3 mg at 04/03/22 2117    metoprolol succinate (TOPROL XL) extended release tablet 25 mg  25 mg Oral BID Iona Preston MD   25 mg at 04/03/22 2116    pantoprazole (PROTONIX) tablet 40 mg  40 mg Oral QAM AC Iona Preston MD   40 mg at 04/03/22 7258    potassium chloride (KLOR-CON M) extended release tablet 20 mEq  20 mEq Oral Daily Iona Preston MD   20 mEq at 03/31/22 1117    ascorbic acid (VITAMIN C) tablet 500 mg  500 mg Oral BID Iona Preston MD   500 mg at 04/03/22 2116    vitamin D (CHOLECALCIFEROL) tablet 2,000 Units  2,000 Units Oral Daily Iona Preston MD   2,000 Units at 04/03/22 0841    zinc sulfate (ZINCATE) capsule 50 mg  50 mg Oral Daily Danay Hernandez MD   50 mg at 04/03/22 0841    insulin lispro (HUMALOG) injection vial 0-6 Units  0-6 Units SubCUTAneous TID [facility] Danay Hernandez MD        insulin lispro (HUMALOG) injection vial 0-3 Units  0-3 Units SubCUTAneous Nightly Danay Hernandez MD   1 Units at 03/30/22 2116    glucose (GLUTOSE) 40 % oral gel 15 g  15 g Oral PRN Danay Hernandez MD        dextrose 50 % IV solution  12.5 g IntraVENous PRN Danay Hernandez MD        glucagon (rDNA) injection 1 mg  1 mg IntraMUSCular PRN Danay Hernandez MD        dextrose 5 % solution  100 mL/hr IntraVENous PRN Danay Hernandez MD        heparin (porcine) injection 5,000 Units  5,000 Units SubCUTAneous Q8H Danay Hernandez MD   5,000 Units at 04/03/22 2115    perflutren lipid microspheres (DEFINITY) injection 1.65 mg  1.5 mL IntraVENous ONCE PRN Trish Oar, APRN - CNP        white petrolatum ointment   Topical BID PRN Danay Hernandez MD   Given at 03/30/22 0856    ipratropium-albuterol (DUONEB) nebulizer solution 1 ampule  1 ampule Inhalation Q4H NICOLE Gambino DO   1 ampule at 04/03/22 2006       Allergies:     Allergies   Allergen Reactions    Penicillins Anaphylaxis       Problem List:    Patient Active Problem List   Diagnosis Code    PNA (pneumonia) J18.9    Acute on chronic heart failure with preserved ejection fraction ([facility]) I50.33    Coronary artery disease involving native coronary artery of native heart without angina pectoris I25.10    Cardiac pacemaker in situ Z95.0    Primary hypertension I10    SPEEDY (obstructive sleep apnea) G47.33    Chronic respiratory failure with hypoxia ([facility]) J96.11    CHF (congestive heart failure), NYHA class I, acute on chronic, combined ([facility]) I50.43    Acute on chronic clinical systolic heart failure (HCC) I50.23    Acute on chronic systolic heart failure ([facility]) I50.23    Pleural effusion J90       Past Medical History:        Diagnosis Date    Acid reflux     Agent orange exposure     Arthritis     CAD (coronary artery disease)     Congestive heart failure (CHF) (HCC)     COPD (chronic obstructive pulmonary disease) (HCC)     Depression     Diabetes mellitus ([facility] [address]     History of blood transfusion     Hyperlipidemia     Hypertension     MI (myocardial infarction) ([facility] [address]     Polio     Sleep apnea        Past Surgical History:        Procedure Laterality Date    CARDIAC PACEMAKER PLACEMENT      CORONARY ANGIOPLASTY WITH STENT PLACEMENT      HERNIA REPAIR      PACEMAKER PLACEMENT         Social History:    Social History     Tobacco Use    Smoking status: Former Smoker    Smokeless tobacco: Never Used    Tobacco comment: quit in 1985   Substance Use Topics    Alcohol use: Never                                Counseling given: Not Answered  Comment: quit in 1985      Vital Signs (Current):   Vitals:    04/04/22 0714 04/04/22 0730 04/04/22 0830 04/04/22 0842   BP: (!) 100/58 (!) 98/55 (!) 100/58 (!) 106/48   Pulse: 80 78 81 87   Resp:       Temp:    36.7 °C (98.1 °F)   TempSrc:       SpO2:       Weight:    262 lb 12.6 oz (119.2 kg)   Height:                                                  BP Readings from Last 3 Encounters:   04/04/22 (!) 106/48   03/22/22 128/83   03/10/22 105/71       NPO Status:                                                        Echo limited  03/29/2022        Findings      Left Ventricle   Normal left ventricle size and systolic function. Right Ventricle   Mildly dilated right ventricle. Normal right ventricle systolic function. Pericardial Effusion   Moderate size pericardial effusion. No echocardiographic findings of tamponade. Pleural Effusion   Large right pleural effusion. Conclusions      Summary   Normal left ventricle size and systolic function. Mildly dilated right ventricle. Normal right ventricle systolic function.    Moderate size pericardial effusion. No echocardiographic findings of tamponade. Large right pleural effusion. Signature    ekg 12 lead    03/28/2022        EKG 12 Lead  Order: [id number]   Status: Final result     Visible to patient: No (not released)     Next appt: 04/14/2022 at 11:30 AM in Cardiology Lindsay Riddle, APRN - CNP)     0 Result Notes    Component Ref Range & Units 3/28/22 1634 3/14/22 1130 2/23/22 1602 1/22/22 1009 1/21/22 1002   Ventricular Rate BPM 76  74  111  93  92    Atrial Rate BPM 76  74  111  93  92    P-R Interval ms 156  154  144  138  166    QRS Duration ms 124  122  110  122  110    Q-T Interval ms 426  442  370  404  396    QTc Calculation (Bazett) ms 479  490  503  502  489    P Axis degrees 63  59  60  50  72    R Axis degrees 64  67  73  64  76    T Axis degrees 55  59  75  52     Resulting Agency  [facility]             Narrative & Impression    Normal sinus rhythm  Low voltage QRS  Right bundle branch block  Abnormal ECG  When compared with ECG of 14-MAR-2022 11:30,  No significant change was found  Confirmed by [employer] [facility] ([id number]) on 3/29/2022 11:23:30 AM                                      BMI:   Wt Readings from Last 3 Encounters:   04/04/22 262 lb 12.6 oz (119.2 kg)   03/22/22 272 lb (123.4 kg)   03/10/22 264 lb 1.8 oz (119.8 kg)     Body mass index is 38.81 kg/m².     CBC:   Lab Results   Component Value Date    WBC 11.6 04/04/2022    RBC 4.10 04/04/2022    HGB 11.9 04/04/2022    HCT 38.4 04/04/2022    MCV 93.7 04/04/2022    RDW 17.7 04/04/2022     04/04/2022       CMP:   Lab Results   Component Value Date     04/04/2022    K 3.7 04/04/2022    K 3.7 04/04/2022     04/04/2022    CO2 27 04/04/2022    BUN 43 04/04/2022    CREATININE 2.3 04/04/2022    GFRAA 33 04/04/2022    LABGLOM 28 04/04/2022    GLUCOSE 65 04/04/2022    PROT 7.0 03/28/2022    CALCIUM 8.6 04/04/2022    BILITOT 0.5 03/28/2022    ALKPHOS 129 03/28/2022    AST 24 03/28/2022 ALT 35 03/28/2022       POC Tests: No results for input(s): POCGLU, POCNA, POCK, POCCL, POCBUN, POCHEMO, POCHCT in the last 72 hours. Coags: No results found for: PROTIME, INR, APTT    HCG (If Applicable): No results found for: PREGTESTUR, PREGSERUM, HCG, HCGQUANT     ABGs: No results found for: PHART, PO2ART, ZET8SWZ, SOH1ATO, BEART, I1KIPOJS     Type & Screen (If Applicable):  No results found for: LABABO, LABRH    Drug/Infectious Status (If Applicable):  No results found for: HIV, HEPCAB    COVID-19 Screening (If Applicable):   Lab Results   Component Value Date    COVID19 Not Detected 03/22/2022    COVID19 Not Detected 01/25/2022           Anesthesia Evaluation  Patient summary reviewed and Nursing notes reviewed no history of anesthetic complications:   Airway: Mallampati: III  TM distance: >3 FB   Neck ROM: full  Mouth opening: > = 3 FB Dental: normal exam         Pulmonary: breath sounds clear to auscultation  (+) pneumonia (Right side in feb): resolved,  COPD: mild,  shortness of breath: recurrent,  sleep apnea: on CPAP,                             Cardiovascular:    (+) hypertension: mild, pacemaker: pacemaker, past MI (MI 2005): > 6 months, CAD:, CABG/stent (1999 stents x 2):, dysrhythmias:, CHF:,       ECG reviewed      Echocardiogram reviewed         Beta Blocker:  Dose within 24 Hrs         Neuro/Psych:   (+) depression/anxiety             GI/Hepatic/Renal:   (+) GERD: well controlled, PUD (Hemorraging ulcer 2014), renal disease: ESRD,           Endo/Other:    (+) DiabetesType II DM, well controlled, using insulin, . ROS comment: Last bs 129 Abdominal:             Vascular: negative vascular ROS. Other Findings:             Anesthesia Plan      MAC     ASA 4           MIPS: Postoperative opioids intended and Prophylactic antiemetics administered. Anesthetic plan and risks discussed with patient. Use of blood products discussed with patient whom.                    Buck Casey Sen Magallanes RN   4/4/2022

## 2022-04-05 NOTE — CARE COORDINATION
4/5 Update CM note. 4/4/2022 @ 1726  Received after hours call from [address] Breanna Pang at [facility] regarding new outpatient dialysis referral. As of 1726 the referral was not received. Spoke with Barb at [facility] this AM and referral was not received. Information re-faxed this AM at [phone] to [phone]. Barb states he will follow up regarding when clinicals are received. 4/5: Patient underwent tesio placement yesterday and will have dialysis today. CXR today revealed increasing size of right pleural effusion. Patient remains on 3L NC at this time. Plan is to return to [facility] when medically stable and outpatient dialysis arrangements are made. Completed ambulance paperwork in soft chart. Spoke with Benedicto Grandchild in rehab regarding needing updated PT/OT evals.      Delilah Osorio, SONUN, RN  PHYSICIANS [facility] SURGICAL HOSPITAL Case Management   Cell: [phone]

## 2022-04-05 NOTE — PROGRESS NOTES
Progress Note  Date:2022       BNFX:[id number]  Patient Jason Hill     YOB: 1945     Age:77 y.o. Patient says breathing is improved today. Subjective    Subjective:  Symptoms:  Improved. He reports shortness of breath. No chest pain. Diet:  Adequate intake. Activity level: Impaired due to weakness. Pain:  He reports no pain. Review of Systems   Constitutional: Negative for activity change and fever. HENT: Negative for congestion. Respiratory: Positive for shortness of breath. Cardiovascular: Positive for leg swelling. Negative for chest pain. Gastrointestinal: Negative for abdominal pain. Neurological: Negative for dizziness. Psychiatric/Behavioral: Negative for agitation. Objective         Vitals Last 24 Hours:  TEMPERATURE:  Temp  Av.1 °F (36.7 °C)  Min: 97.9 °F (36.6 °C)  Max: 98.2 °F (36.8 °C)  RESPIRATIONS RANGE: Resp  Av.6  Min: 0  Max: 24  PULSE OXIMETRY RANGE: SpO2  Av %  Min: 95 %  Max: 100 %  PULSE RANGE: Pulse  Av  Min: 77  Max: 88  BLOOD PRESSURE RANGE: Systolic (96PTB), HLK:81 , Min:79 , UTO:020   ; Diastolic (84MTI), JEK:45, Min:48, Max:87    I/O (24Hr): Intake/Output Summary (Last 24 hours) at 2022 0730  Last data filed at 2022  Gross per 24 hour   Intake 1300 ml   Output 1039 ml   Net 261 ml     Objective:  General Appearance:  Comfortable. Vital signs: (most recent): Blood pressure 98/75, pulse 88, temperature 98.1 °F (36.7 °C), temperature source Oral, resp. rate 18, height 5' 9\" (1.753 m), weight 262 lb 12.6 oz (119.2 kg), SpO2 98 %. No fever. Lungs:  Normal effort and normal respiratory rate. Breath sounds clear to auscultation. Heart: Normal rate. Regular rhythm. S1 normal and S2 normal.    Extremities: There is local swelling.       Labs/Imaging/Diagnostics    Labs:  CBC:  Recent Labs     22  0551 22  0437 22   WBC 10.4 11.6* 11.8*   RBC 3.91 4.10 3.71* HGB 11.2* 11.9* 10.5*   HCT 36.7* 38.4 34.4*   MCV 93.9 93.7 92.7   RDW 17.6* 17.7* 17.7*    237 202     CHEMISTRIES:  Recent Labs     22  0551 22  0551 227 22     --  141 139   K 4.0  4.0   < > 3.7  3.7 4.1  4.1     --  101 100   CO2 27  --  27 24   BUN 40*  --  43* 41*   CREATININE 2.3*  --  2.3* 2.4*   GLUCOSE 80  --  65* 95    < > = values in this interval not displayed. PT/INR:No results for input(s): PROTIME, INR in the last 72 hours. APTT:No results for input(s): APTT in the last 72 hours. LIVER PROFILE:  No results for input(s): AST, ALT, BILIDIR, BILITOT, ALKPHOS in the last 72 hours. Imaging Last 24 Hours:  XR CHEST PORTABLE    Result Date: 2022  EXAMINATION: ONE XRAY VIEW OF THE CHEST 2022 4:49 pm COMPARISON: 2022 HISTORY: ORDERING SYSTEM PROVIDED HISTORY: shortness of breath TECHNOLOGIST PROVIDED HISTORY: Reason for exam:->shortness of breath What reading provider will be dictating this exam?->CRC FINDINGS: There is a large opacity seen within the right lung base. There is a small right pleural effusion. The left upper lobe is clear. There is a small left pleural effusion. The cardiac silhouette is within normals. There is a dual lead cardiac pacer on the left. 1. Large opacity within the right lung base which could represent pneumonia and or atelectasis. 2. Moderate size right pleural effusion. 3. Small left pleural effusion. 4. CT of the thorax is recommended for further evaluation.      US DUP LOWER EXTREMITIES BILATERAL VENOUS    Result Date: 2022  Patient MRN:  [id number] : 1945 Age: 65 years Gender: Male Order Date:  2022 5:28 PM EXAM: US DUP LOWER EXTREMITIES BILATERAL VENOUS NUMBER OF IMAGES:  52 INDICATION:  leg swelling leg swelling What reading provider will be dictating this exam?->[facility] COMPARISON: None Within the visualized vessels, there is no evidence for deep venous thrombosis There is good compressibility, there is good augmentation, there is good color flow. Within the visualized vessels there is no evidence for deep venous thrombosis     Assessment//Plan           Hospital Problems           Last Modified POA    * (Principal) Acute on chronic systolic heart failure ([address] 3/28/2022 Yes    Pleural effusion 3/30/2022 Yes        Assessment:  (   (Principal) Acute on chronic clinical systolic heart failure ([address] 3/14/2022 Yes     Acute on chronic hypoxic respiratory failure. CHF  Pleural effusion  Acute renal insfficnecy  DM  COPD  HTN  Hyperlipidemia       ). Plan:   (Tunnel cath per vascular. Cardiology, Pulmonary and Renal following. Monitor labs and output. Continue meds. PT/OT).

## 2022-04-05 NOTE — CARE COORDINATION
4/5 Update CM note. Received call from Elsa Allred at [employer] [facility] [employer], referral received. Requested Op note from 4/4 to be faxed to [phone] (completed). Spoke with patient at bedside regarding MWF schedule at 1:40 PM at [facility] on [address] in [place]. Patient in agreement with day and time. Giana Pino, liaison at [facility] made aware and to start pre-cert with patient's insurance. Spoke with Dr. Johann Cruz via PS, patient okay to work with PT/OT. Orders placed and LM with Felix Benito at [phone] regarding need for evals. Patient informed CM that he needed letter sent to [facility] inquiring where patient is. Spoke with Pee Talley at [facility] @ [phone] regarding patient being hospitalized. Letter faxed to [phone]. Completed ambulance paperwork in soft chart. Per Dr. Theresa Dupont, patient for dialysis today and can discharge from nephrology standpoint. Patient is tentatively scheduled for first dialysis on 4/5/2022. [facility] at [phone] if patient does not discharge today. [id number] [address] with Charlene Lacy at [facility] regarding patient not discharging, she will inform [facility] for delay in discharge and CM/SW to [facility] with discharge date to arrange first dialysis appointment.      SONU ThackerN, RN  [facility] Case Management   Cell: [phone]

## 2022-04-05 NOTE — PROGRESS NOTES
[facility]  Nephrology Attending Progress Note          SUBJECTIVE:     3/29: Venkatesh Duncan a 65 y. o. male with a history of chronic kidney disease stage IV (recent recent baseline creatinine 2.5-3), type 2 diabetes mellitus, COPD, chronic HFpEF H/O pacemaker, mame, gerd, djd, htn, hyperlipidemia, who presented from [facility] with complaints of shortness of breath and hypoxia. He has been admitted twice for this in the last weeks. He has been on bumex drip. Labs at this time show na 143, k 4.3, co2 29, bun 54, cr 3, gfr 20, ca 9, alb 3.6, wbc 13.3, hgb 11.2, plt 409. He has been started on iv bumex 2 iv q 12. He is on cpap and 3 L of o2 at the [facility]. He has had a large right pleural effusion which has been tapped.      3/30: pt seen in room, awaits thoracentesis    3/31: pt seen, sp thoracentesis wiith 1.5 L off.  On bumex 2 mg iv q 12.     4/1: pt seen on hd, first hd today, tolerating ok    4/2: pt seen on hd, second hd today, no cp or sob    4/4: Attempted RIJ tunneled hemodialysis catheter placement today unsuccessful due to small vein; left CFV tunnel hemodialysis catheter placement performed; patient denies shortness of breath    4/5: No new c/o; denies sob      PROBLEM LIST:    Patient Active Problem List   Diagnosis    PNA (pneumonia)    Acute on chronic heart failure with preserved ejection fraction ([address]    Coronary artery disease involving native coronary artery of native heart without angina pectoris    Cardiac pacemaker in situ    Primary hypertension    MAME (obstructive sleep apnea)    Chronic respiratory failure with hypoxia (HCC)    CHF (congestive heart failure), NYHA class I, acute on chronic, combined ([address]    Acute on chronic clinical systolic heart failure (HCC)    Acute on chronic systolic heart failure (HCC)    Pleural effusion          DIET:    ADULT ORAL NUTRITION SUPPLEMENT; Breakfast, Lunch; Renal Oral Supplement  ADULT ORAL NUTRITION SUPPLEMENT; Breakfast, Lunch; Renal Oral Supplement  ADULT DIET; Regular; 5 carb choices (75 gm/meal);  Low Fat/Low Chol/High Fiber/2 gm Na     PHYSICAL EXAM:     Patient Vitals for the past 24 hrs:   BP Temp Temp src Pulse Resp SpO2 Height   04/05/22 0731 (!) 96/59 97.8 °F (36.6 °C) Axillary 85 18 95 % --   04/04/22 2006 98/75 98.1 °F (36.7 °C) Oral 88 18 98 % --   04/04/22 1500 89/69 98 °F (36.7 °C) Oral 81 18 98 % --   04/04/22 1219 -- -- -- -- -- -- 5' 9\" (1.753 m)   04/04/22 1215 90/62 97.9 °F (36.6 °C) Oral 79 18 100 % --   04/04/22 1143 95/64 98 °F (36.7 °C) -- 77 17 -- --   04/04/22 1100 100/69 -- -- 78 16 99 % --   04/04/22 1053 98/60 98.2 °F (36.8 °C) -- 77 17 -- --   @      Intake/Output Summary (Last 24 hours) at 4/5/2022 1038  Last data filed at 4/4/2022 2006  Gross per 24 hour   Intake 600 ml   Output 615 ml   Net -15 ml         Wt Readings from Last 3 Encounters:   04/04/22 262 lb 12.6 oz (119.2 kg)   03/22/22 272 lb (123.4 kg)   03/10/22 264 lb 1.8 oz (119.8 kg)       Constitutional:  Pt is in no acute distress  Head: normocephalic, atraumatic  Neck: no JVD  Cardiovascular: regular rate and rhythm, no murmurs, gallops, or rubs  Respiratory:  Decreased at bases  Gastrointestinal:  Soft, nontender, nondistended, bowel sounds x 4  Ext: edema; R CFV TDC  Skin: dry, no rash  Neuro: aaox3    MEDS (scheduled):    aspirin  81 mg Oral Daily    atorvastatin  40 mg Oral Nightly    buPROPion  300 mg Oral QAM    cyanocobalamin  1,000 mcg Oral Daily    docusate sodium  100 mg Oral BID    ezetimibe  10 mg Oral Nightly    folic acid  1 mg Oral Daily    insulin glargine-yfgn  50 Units SubCUTAneous Nightly    melatonin  3 mg Oral Nightly    metoprolol succinate  25 mg Oral BID    pantoprazole  40 mg Oral QAM AC    potassium chloride  20 mEq Oral Daily    vitamin C  500 mg Oral BID    vitamin D  2,000 Units Oral Daily    zinc sulfate  50 mg Oral Daily    insulin lispro  0-6 Units SubCUTAneous TID WC    insulin lispro  0-3 Units SubCUTAneous Nightly    heparin (porcine)  5,000 Units SubCUTAneous Q8H    ipratropium-albuterol  1 ampule Inhalation Q4H WA       MEDS (infusions):   dextrose         MEDS (prn):  acetaminophen, guaiFENesin-dextromethorphan, guaiFENesin, ipratropium-albuterol, loperamide, magnesium hydroxide, glucose, dextrose, glucagon (rDNA), dextrose, perflutren lipid microspheres, white petrolatum    DATA:    Recent Labs     04/03/22 0551 04/04/22 0437 04/05/22 0432   WBC 10.4 11.6* 11.8*   HGB 11.2* 11.9* 10.5*   HCT 36.7* 38.4 34.4*   MCV 93.9 93.7 92.7    237 202     Recent Labs     04/03/22 0551 04/03/22 0551 04/04/22 0437 04/05/22 0432     --  141 139   K 4.0  4.0   < > 3.7  3.7 4.1  4.1     --  101 100   CO2 27  --  27 24   BUN 40*  --  43* 41*   CREATININE 2.3*  --  2.3* 2.4*   LABGLOM 28  --  28 26   GLUCOSE 80  --  65* 95   CALCIUM 8.4*  --  8.6 8.5*    < > = values in this interval not displayed.        Lab Results   Component Value Date    LABALBU 3.6 03/28/2022    LABALBU 3.4 (L) 03/14/2022    LABALBU 3.5 02/25/2022     Lab Results   Component Value Date    TSH 2.310 01/22/2022       Iron Studies  No results found for: IRON, TIBC, FERRITIN  No results found for: UZSZBDDU12  No results found for: FOLATE    No results found for: VITD25  PTH   Date Value Ref Range Status   04/02/2022 104 (H) 15 - 65 pg/mL Final       No components found for: URIC    Lab Results   Component Value Date    COLORU Yellow 03/15/2022    NITRU Negative 03/15/2022    GLUCOSEU Negative 03/15/2022    KETUA Negative 03/15/2022    UROBILINOGEN 0.2 03/15/2022    BILIRUBINUR Negative 03/15/2022       No results found for: Saad Mitchell      IMPRESSION/RECOMMENDATIONS:     1.  Chronic kidney disease stage IV  due to diabetes mellitus and hypertension  Cr usual baseline 2.5-3  Pericardial effusion uremia contributing  Will need to repeat echo in 1-2 weeks  Frequent admission for CHF exacerbation in the presence of advanced CKD  HD initiated 4/1  left CFV tunnel hemodialysis catheter placement performed 4/4     2.  Acute on chronic HFpEF  Improved with hemodialysis and diuretics  Strict I/o  Right-sided thoracentesis performed 3/15   Sp thora 3/30 with 1.5 L off on right  Transitioned to oral diuretics     3.Htn  On Midodrine  Hold parameters on metoprolol     4 mame  On cpap    Discharge planning     Augustin Callahan MD        Department of Internal Medicine  Section of Nephrology  Dialysis Note        PROCEDURE:  Patient seen on hemodialysis    PHYSICIAN:  Yousif Jesus M[facility]    INDICATION:  Acute renal failure    RX:  See dialysis flowsheet for specifics on access, blood flow rate, dialysate baths, duration of dialysis, anticoagulation and other technical information.     COMMENTS:  Procedure in progress and tolerated       Augustin Callahan MD, MD

## 2022-04-05 NOTE — PROGRESS NOTES
Underwent Tesio placement left common femoral vein yesterday. Tolerated procedure well. Plan for dialysis today. Please page with any questions or concerns with the catheter.       Orion Murry MD

## 2022-04-05 NOTE — PROGRESS NOTES
Physical Therapy  Treatment Note    Name: Vasiliy Barrera  : 1945  MRN: [id number]      Date of Service: 2022    Evaluating PT:  Micah Cassidy PT, DPT [id number]    Room #:  1356/7449-S  Diagnosis:  Acute on chronic systolic heart failure (HCC) [I50.23]  Acute on chronic systolic CHF (congestive heart failure) (HCC) [I50.23]  PMHx/PSHx:  Acid reflux, agent orange exposure, arthritis, CAD, CHF, COPD, depression, DM, HLD, HTN, MI, polio, sleep apnea  Procedure/Surgery:  None this admission  Precautions:  Falls, O2  Equipment Needs:  None, patient to return to [facility]. Owns SPC, rollator, manual WC    SUBJECTIVE:    Pt admitted from [address] [place] [address] where he was active with therapy ambulating with [employer]. Previously, Pt lives alone in a 1 story home with 3 stairs to enter and 1 rail, however, does have ramp available. Bed is on 1st floor and bath is on 1st floor. Pt ambulated with SPC, rollator PTA. OBJECTIVE:   Initial Evaluation  Date: 3/29/22 Treatment  Date: 22 Short Term/ Long Term   Goals   AM-PAC 6 Clicks  48/72    Was pt agreeable to Eval/treatment? yes yes    Does pt have pain? No c/o pain No pain    Bed Mobility  Rolling: ModA  Supine to sit: ModA  Sit to supine: ModA  Scooting: ModA Rolling: min A  Supine to sit: min A  Sit to supine: min A  Scooting: min A Rolling: Independent   Supine to sit:  Independent   Sit to supine: Independent   Scooting: Independent    Transfers Sit to stand: MaxA x2  Stand to sit: MaxA x2  Stand pivot: NT Sit to stand: mod/max Ax2  Stand to sit: mod Ax2  Stand pivot: NT Sit to stand: SBA  Stand to sit: SBA  Stand pivot: Mariela with wW   Ambulation    side steps at EOB with [employer] and Mariela 2' with ww min A  (side steps to [facility]) 100 feet with [employer] Supervision    Stair negotiation: ascended and descended  NT NT TBD   ROM BUE:  Defer to OT note  BLE:  WFL     Strength BUE:  Defer to OT note  BLE:  Grossly 3/5  WFL   Balance Sitting EOB:  SBA  Dynamic Standing:  Mariela with [employer] Sitting EOB:  SBA  Dynamic Standing:  min A with ww Sitting EOB:  Independent   Dynamic Standing:  Mariela with [employer]     Pt is A & O x 4  Sensation:  NT  Edema:  BLE pitting edema 3+    Vitals:  SPO2 throughout session:  5L at rest: 93%  6L during activity: 89-94%  6L at rest: 95%    Patient education  Pt educated on role of PT, safety with functional mobility    Patient response to education:   Pt verbalized understanding Pt demonstrated skill Pt requires further education in this area   yes partial yes     ASSESSMENT:  Comments:    Pt supine in bed upon entering, pt agreeable to participate. Pt instructed to transfer to EOB, completing transfer with assist of BLE. Pt sitting upright reporting mild dizziness with position change. Pt provided time to allow symptoms to subside. Pt cued for hand placement and instructed to stand from EOB. Pt completed STS transfer 3x, with increased assist required with the last 2 transfers. Pt able to maintain fair static standing balance once upright with ww. Pt instructed to side step to [facility], able to complete short distance with good ww positioning. Pt sat EOB for A total of 15' in between standing bouts. Pt was in standing for ~5' in total. Pt was returned to supine position in bed at the end of session, positioned for comfort. All needs met and call bell in reach prior to exiting.      Treatment:  Patient practiced and was instructed in the following treatment:     Bed mobility training - pt given verbal and tactile cues to facilitate proper sequencing and safety during rolling and supine>sit as well as provided with physical assistance to complete task    STS and pivot transfer training - pt educated on proper hand and foot placement, safety and sequencing, and use of verbal and tactile cues to safely complete sit<>stand and pivot transfers with hands on assistance to complete task safely    Skilled positioning - Pt placed in the chair position with pillows utilized to facilitate upright posture, joint and skin integrity, and interaction with environment. PLAN:    Patient is making fair progress towards established goals. Will continue with current POC.       Time in  1155  Time out  1225    Total Treatment Time  25 minutes     CPT codes:  [] Gait training 41773 -- minutes  [] Manual therapy [phone] -- minutes  [x] Therapeutic activities 77798 25 minutes  [] Therapeutic exercises 16652 -- minutes  [] Neuromuscular reeducation 29599 -- minutes    Davey Steven, PT, DPT  [id number]

## 2022-04-05 NOTE — FLOWSHEET NOTE
04/05/22 1738   Vital Signs   /72   Temp 98 °F (36.7 °C)   Pulse 84   Weight 263 lb 14.3 oz (119.7 kg)   Weight Method Bed scale   Percent Weight Change -2.44   Post-Hemodialysis Assessment   Post-Treatment Procedures Blood returned;Catheter capped, clamped and heparinized x 2 ports   Machine Disinfection Process Acid/Vinegar Clean;Heat Disinfect; Exterior Machine Disinfection   Rinseback Volume (ml) 300 ml   Total Liters Processed (l/min) 50.9 l/min   Dialyzer Clearance Lightly streaked   Duration of Treatment (minutes) 180 minutes   Heparin amount administered during treatment (units) 0 units   Hemodialysis Intake (ml) 300 ml   Hemodialysis Output (ml) 2500 ml   NET Removed (ml) 2200 ml   Tolerated Treatment Good   Patient Response to Treatment tx complete, blood returned, cath care per policy/procedure, lines flushed, heparin instilled, ports capped, still some bleeding at insertion site

## 2022-04-05 NOTE — PROGRESS NOTES
[address]  [address] [address]                                                  Patient Name: Dwight Doan  MRN: [id number]  : 1945  Room: [address] [place], [place] [address]    Evaluating OT: BIBIANA Graff, OTR/L  # [id number]    Referring Provider:  Toy Evans MD  Specific Provider Orders:  Cathy Evangelista and Treat\"  3-29-22    Diagnosis: Acute on chronic systolic heart failure ([address] [I50.23]  Acute on chronic systolic CHF (congestive heart failure) ([address] [I50.23]    Pt was re-admitted from SNF w/ SOB, hypoxia    Pertinent Medical History:  Pt has a past medical history of Acid reflux, Agent orange exposure, Arthritis, CAD (coronary artery disease), Congestive heart failure (CHF) ([address] COPD (chronic obstructive pulmonary disease) ([address] Depression, Diabetes mellitus ([address] History of blood transfusion, Hyperlipidemia, Hypertension, MI (myocardial infarction) ([address] Polio, and Sleep apnea. ,  has a past surgical history that includes Coronary angioplasty with stent; Cardiac pacemaker placement; pacemaker placement; hernia repair; and vascular surgery (N/A, 2022). Surgeries this admission:   3-30-22:  Thoracentesis  3-31-22:  Thoracentesis  22:   Attempted right internal jugular vein line placement; placement of a 40 cm tunneled hemodialysis catheter via the left femoral vein    Precautions:  Fall Risk  4L O2  Paredes Catheter  Dialysis Catheter Left Femoral vein  HD MWF    Assessment of current deficits   [x] Functional mobility   [x]ADLs  [x] Strength               []Cognition   [x] Functional transfers   [x] IADLs         [x] Safety Awareness   [x]Endurance   [] Fine Coordination              [x] Balance      [] Vision/perception   []Sensation    []Gross Motor Coordination  [] ROM  [] Delirium                   [] Motor Control       OT PLAN OF CARE   OT POC based on physician Participating in Therapy Program  Driving:  ?? Occupation:  Retired     Pain Level:  Denied pain    Additional Complaints:  Mild SOB w/ Ax, general fatigue, weakness, Mild Dizziness w/ change in position/exertion    Cognition: A & O x 4   Able to Follow Multi-Step Commands INDly   Memory:  good    Sequencing:  good    Problem solving:  good    Judgement/safety:  good   Additional Comments:  Pt was pleasant and cooperative.   Bright affect, jokes appropriately w/ staff    Vitals/Lab Values:    O2 sats on 4L in semi-supine prior to ax ~ 96%  O2 sats on 4L seated EOB ~ 94%  O2 sats on 4L static standing ~ 92% ~ 2-3 mins 3x w/ seated rest break b/t trials       Functional Assessment:  AM-PAC Daily Activity Raw Score: 15/24     Initial Eval Status  Date: 3/29/22   Treatment Status  Date:  4-5-22 STGs = LTGs  Time frame: 10-14 days   Feeding IND after set up    In high kan position   NT NA   Grooming IND after set up     in high kan position  Unable to tolerate task seated EOB d/t moderate SOB   SUP/Set up    Seated EOB Set up  Seated    UB Dressing Min A/set up    Simulated - seated EOB  Limited endurance w/ upright ax   Min A/Set up    Seated EOB Set up     LB Dressing Dep    Max A to don socks in supine - Uses Adaptive equipment regularly at baseline - unable to tolerate this session d/t moderate SOB    Max A of 1 for simulated clothing adjustment over hips + Mod A of 1 for safety w/ dynamic standing balance - limited endurance  Pt ed for safety, PLB   Max A    Max A to don socks  Uses Adaptive equipment regularly at baseline    Mod A of 1 for simulated clothing adjustment over hips + Min-Mod A for safety w/ dynamic standing balance for task, VCs for techs to improve safety, safety awareness, VCs for PLB   Mod A     Bathing NT    Unable to tolerate this session   NT Mod A      Toileting Dep    Paredes Catheter  Max A of 1 for simulated clothing adjustment over hips + Mod A of 1 for safety w/ dynamic standing balance - limited endurance  Pt ed for safety, PLB   Max A    Paredes Catheter  Max A for simulated bowel hygiene, Mod A for simulated clothing adjustment over hips + Min-Mod A for safety w/ dynamic standing balance for task, VCs for techs to improve safety, safety awareness, VCs for PLB   Mod A     Bed Mobility  Supine to sit: Mod A   Sit to supine:   Mod A     Repositioning self toward [address] INDly w/ use of head board w/ bed in flat position   Supine to sit: Min A   Sit to supine:  Min A     Repositioning self toward [address] w/ use of head board w/ bed in flat position - Min A Supine to sit: SUP  Sit to supine: SUP     Functional Transfers Dep    Max A of 2 EOB  Pt ed for safety/hand placement   Dep    Mod A of 2 1st trial  Max A of 2 2nd and 3rd trial  Standing from EOB  Min VCs for safety/hand placement   Mod A     Functional Mobility Min A w/ [employer]    1-2 Side-steps along EOB  Pt ed for safety/improved safety awareness, walker safety   Min A w/ [employer]    Side-stepping very short distance along EOB SUP w/ [employer]     Balance Sitting:     Static:  SUP EOB    Dynamic:   SUP EOB w/ functional ax      Standing:     Static:  Min A w/ [employer]    Dynamic:  Max A w/ functional ax/mobility    Sitting:     Static:  SUP EOB    Dynamic:   SUP EOB w/ functional ax      Standing:     Static:  Min A w/ [employer]    Dynamic:  Min-Mod A w/ functional ax/mobility  Sitting:     Static:  Remote SUP    Dynamic:  Remote SUP w/ functional ax    Standing:     Static:  SUP w/ AD PRN    Dynamic:  Min A w/ functional ax/mobility w/ AD PRN   Activity Tolerance Fair    Limited by SOB, Hypoxia  Able to tolerate sitting EOB w/ light ax > 20 mins  Able to tolerate standing ~ 2-3 mins - became moderately SOB   Fair    Limited by Mild SOB, General Weakness   Able to tolerate sitting EOB w/ light ax > 20 mins  Able to tolerate standing ~ 3 mins 3x w/ seated rest break b/t trials - mild SOB   Fair(+)   Visual/  Perceptual    Hearing: WFL   Glasses: Yes    [place] Hearing Aids:  No               Hand Dominance: Right   AROM Strength Additional Info:    RUE  WFL 5-/5 Good ;   Good FMC/dexterity noted during ADL tasks     LUE WFL 5-/5 Good ;    Good FMC/dexterity noted during ADL tasks       Sensation:  Denies numbness or tingling Damián UEs   Tone: WFL Damián UEs   Edema: None Noted Damián UEs     Comments: Upon arrival, patient was found in semi-supine. He was agreeable to participate in therapeutic ax. No Family present during session. Received permission from RN prior to engaging pt in OT services. Educated pt on role of OT services. At the end of the session, patient was properly positioned in high kan position. Call light and phone within reach, all lines and tubes intact. Oriented pt to call bell. Made all appropriate Environmental Modifications to facilitate pt's level of IND and safety. All needs met. Bed Alarm activated. Overall patient demonstrated decreased independence and safety during completion of ADL/functional transfer/mobility tasks. Pt would benefit from continued skilled OT to increase safety and independence with completion of ADL/IADL tasks for functional independence and quality of life.     Treatment: OT treatment provided this date includes:    Instruction/training on safety and adapted techniques for completion of ADLs, use of DME/AD/Adaptive equip:     Instruction/training on safe functional mobility/transfer techniques, use of DME/AD:     Instruction/training on energy conservation techs (EC)/Pursed-Lip Breathing (PLB)/work simplification for completion of ADLs:      Neuromuscular Reeducation to facilitate balance/righting reactions for increased function with ADLs:     Skilled positioning/alignment for Pain Mgmt, Skin Integrity, Edema Control, to maximize Pt's safety and ability to [facility] interact w/ his/her environment, maximize respiratory status   Activity tolerance - Sitting/Standing to improve endurance w/ functional ax    Cognitive retraining -  Cues for safety/safety awareness, sequencing, problem solving     Skilled monitoring of Vitals during session and pt's response to tx ax    Will Issue Theraband for UE Therapeutic Exercise      Consulted RN, PT     Made all appropriate Environmental Modifications to facilitate pt's level of IND and safety.  Recommendations for Continued Participation in OT services during Hospitalization and at D/C - SNF    Pt and/or Family verbalized/demonstrated a Good understanding of education provided. Will Review PRN.       Time In: 1202  Time Out: 1227  Total Treatment Time: 25 minutes    Min Units   OT Eval Low 70069       OT Eval Medium 99475      OT Eval High [id number]      OT Re-Eval [id number]       Therapeutic Ex [id number]       Therapeutic Activities [id number]       ADL/Self Care 34054  25  2   Orthotic Management 68554       Manual [id number]     Neuro Re-Ed [id number]       Non-Billable Time            BIBIANA Donohue, OTR/L  # [id number]

## 2022-04-06 NOTE — CARE COORDINATION
Spoke with patient,  asking for admissions list since the start of the year with discharge plan. I did complete a letter for patient and fax to Noni Campa. Notified him of discharge today to [facility]. [facility] to accept back today. Megan arranged for . Facility notified of discharge time. Patient notified of discharge time. [facility] court notified of discharge today and ok to start HD on Friday per Dr. Coco Garza. Sister, Renetta Ta notified of discharge time. For questions I can be reached at [phone].  Siria Scott, [facility]

## 2022-04-06 NOTE — DISCHARGE SUMMARY
Discharge Summary    Date: 4/6/2022  Patient Name: Brandon Mason YOB: 1945 Age: 65 y.o. Admit Date: 3/28/2022  Discharge Date: 4/6/2022  Discharge Condition: Stable    Admission Diagnosis  Acute on chronic systolic heart failure (HCC) (I50.23); Acute on chronic systolic CHF (congestive heart failure) (HCC) (I50.23)     Discharge Diagnosis  Principal Problem: Acute on chronic systolic heart failure (HCC)Active Problems: Pleural effusionResolved Problems: * No resolved hospital problems. [facility] Stay  Narrative of Hospital Course:  Patient admitted for  Principal) Acute on chronic clinical systolic heart failure ([address]   3/14/2022        Yes     Acute on chronic hypoxic respiratory failure. CHF  Pleural effusion  Acute renal insfficnecy  DM  COPD  HTN  Hyperlipidemia    Had IV diureses  Also thoracentesis. Tolerated well. Renal had vascular place tunnel cath  Agreed to placement. Consultants:  IP [place] CONSULT TO CARDIOLOGYIP CONSULT TO CASE MANAGEMENTIP CONSULT TO RESPIRATORY CAREIP CONSULT TO SOCIAL WORKIP CONSULT TO NEPHROLOGYIP CONSULT TO PULMONOLOGYIP CONSULT TO VASCULAR SURGERYIP CONSULT TO [place] CONSULT TO VASCULAR SURGERYIP CONSULT TO SOCIAL WORK    Surgeries/procedures Performed:       Treatments:            Discharge Plan/Disposition:  To [facility]    Hospital/Incidental Findings Requiring Follow Up:    Patient Instructions:    Diet: Diabetic Diet    Activity:Activity as Tolerated  For number of days (if applicable): Other Instructions:    Provider Follow-Up:   No follow-ups on file. Significant Diagnostic Studies:    Recent Labs:  Admission on 03/28/2022No results displayed because visit has over 200 results. ------------    Radiology last 7 days:  XR CHEST PORTABLEResult Date: 4/4/2022Increasing size of right pleural effusion XR CHEST PORTABLEResult Date: 3/31/2022Decreasing pleural effusions with near complete resolution on the left since the prior study XR CHEST PORTABLEResult Date: 3/30/2022Unchanged bilateral pleural effusions with adjacent atelectasis and or infiltrate. US THORACENTESIS Which side should the procedure be performed? LeftResult Date: 3/31/2022Ultrasound utilized for thoracentesis procedure. For additional information, please refer to operative report. FLUORO FOR SURGICAL PROCEDURESResult Date: 4/4/2022Intraprocedural fluoroscopic spot images as above. See separate procedure report for more information.       Pending Labs   Order Current Status  Cell Count with Differential, Body Fluid Collected (04/01/22 1632)  Culture, Body Fluid Collected (04/01/22 1633)  Cytology, non gyne Collected (03/30/22 1648)  Cytology, non gyne Collected (04/01/22 1635)  Glucose, body fluid Collected (04/01/22 1633)  Gram stain Collected (04/01/22 1633)  KOH (NOT Skin,Hair,Nails) Collected (04/01/22 1634)  Lactate dehydrogenase, body fluid Collected (04/01/22 1633)  Protein, body fluid Collected (04/01/22 1633)  pH, body fluid Collected (04/01/22 1261)  Basic Metabolic Panel w/ Reflex to MG In process  Culture with Smear, Acid Fast Bacillius In process  Culture with Smear, Acid Fast Bacillius In process  Culture with Smear, Acid Fast Bacillius In process  Culture, Fungus In process  Culture, Fungus In process  Culture, Fungus In process  Hepatitis Panel, Acute Preliminary result      Discharge Medications    Current Discharge Medication List    Current Discharge Medication ListCONTINUE these medications which have CHANGEDbumetanide (BUMEX) 2 MG tabletTake 1 tablet by mouth dailyQty: 30 tablet Refills: 3    Current Discharge Medication ListCONTINUE these medications which have NOT CHANGEDdocusate sodium (COLACE) 100 MG capsuleTake 1 capsule by mouth 2 times dailyQty: 60 capsule Refills: 0guaiFENesin 400 MG tabletTake 400 mg by mouth 4 times daily as needed for Coughipratropium-albuterol (DUONEB) 0.5-2.5 (3) MG/3ML SOLN nebulizer solutionTake 1 vial by nebulization every 4 hours as needed for Shortness of Breathpotassium chloride (KLOR-CON M) 10 MEQ extended release tabletTake 20 mEq by mouth dailyinsulin glargine (LANTUS SOLOSTAR) 100 UNIT/ML injection penInject 50 Units into the skin nightlyloperamide (IMODIUM) 2 MG capsuleTake 2 mg by mouth 4 times daily as needed for DiarrheabuPROPion (WELLBUTRIN XL) 300 MG extended release tabletTake 300 mg by mouth every morningezetimibe (ZETIA) 10 MG tabletTake 10 mg by mouth at bedtimeZinc Sulfate 110 MG TABSTake 2 tablets by mouth dailymagnesium hydroxide (MILK OF MAGNESIA) 400 MG/5ML suspensionTake 30 mLs by mouth daily as needed for ConstipationDextromethorphan-guaiFENesin  MG/5ML SYRPTake 5 mLs by mouth every 4 hours as needed for Coughmelatonin 3 MG TABS tabletTake 3 mg by mouth at bedtimeinsulin lispro, 1 Unit Dial, (HUMALOG KWIKPEN) 100 UNIT/ML SOPNInject 0-10 Units into the skin 4 times daily (before meals and nightly) *Per Sliding Scale*metoprolol succinate (TOPROL XL) 25 MG extended release tabletTake 1 tablet by mouth 2 times dailyQty: 30 tablet Refills: 3acetaminophen (TYLENOL) 325 MG tabletTake 650 mg by mouth every 4 hours as needed for Pain or Fever vitamin C (ASCORBIC ACID) 500 MG tabletTake 500 mg by mouth 2 times dailyvitamin D (CHOLECALCIFEROL) 50 MCG (2000 UT) TABS tabletTake 2,000 Units by mouth daily aspirin 81 MG EC tabletTake 1 tablet by mouth dailyQty: 30 tablet Refills: 3folic acid (FOLVITE) 1 MG tabletTake 1 mg by mouth dailycyanocobalamin 1000 MCG tabletTake 1,000 mcg by mouth dailyatorvastatin (LIPITOR) 40 MG tabletTake 40 mg by mouth at bedtime pantoprazole (PROTONIX) 40 MG tabletTake 40 mg by mouth daily    Current Discharge Medication ListSTOP taking these medicationsmidodrine (PROAMATINE) 10 MG tabletComments:Reason for Stopping:    Time Spent on Discharge:1E] minutes were spent in patient examination, evaluation, counseling as well as medication reconciliation, prescriptions for required medications, discharge plan, and follow up.     Electronically signed by Cristina Shin MD on 4/6/22 at 11:47 AM EDT

## 2022-04-06 NOTE — PROGRESS NOTES
[place]  Department of Pulmonary, Critical Care and Sleep Medicine  [address] [facility]  Department of Internal Medicine  Progress Note    SUBJECTIVE:    Patient seen and examined. He states that he did work with therapy yesterday. He is currently on 4 L nasal cannula. Overall reports that his breathing function is stable. He has been tolerating dialysis well. OBJECTIVE:  Vitals:    04/05/22 2215 04/06/22 0756 04/06/22 0847 04/06/22 1207   BP: 90/63 99/75     Pulse: 88 94     Resp: 20 20     Temp: 98.2 °F (36.8 °C) 97.4 °F (36.3 °C)     TempSrc: Oral Oral     SpO2: 99% 98% 97% 96%   Weight:       Height:         Constitutional: Alert, well oriented  EENT: EOMI YOSEF. MMM. No icterus. No thrush. Neck: No thyromegaly. Trachea was midline. Respiratory: Symmetrical.  Breath sounds still diminished but improving  Cardiovascular: Regular, No murmur. No rubs. Pulses:  Equal bilaterally. Abdomen: Soft without organomegaly. No rebound, rigidity. No guarding. Musculoskeletal: Without weakness or gross deficits  Extremities: Continues to have 2+ edema to lower extremities bilaterally. Neurological/Psychiatric: No acute psychosis. Cranial nerves are intact. DATA:    Monitor Strips:  Reviewed & discusses with technical team. No changes noted. RADIOLOGY:  Most recent chest x-ray reviewed which does show show a persistent right-sided pleural effusion left side remains clear.     CBC with Differential:    Lab Results   Component Value Date    WBC 9.1 04/06/2022    RBC 3.81 04/06/2022    HGB 10.9 04/06/2022    HCT 35.4 04/06/2022     04/06/2022    MCV 92.9 04/06/2022    MCH 28.6 04/06/2022    MCHC 30.8 04/06/2022    RDW 17.5 04/06/2022    LYMPHOPCT 16.7 03/28/2022    MONOPCT 10.2 03/28/2022    BASOPCT 0.4 03/28/2022    MONOSABS 1.58 03/28/2022    LYMPHSABS 2.58 03/28/2022    EOSABS 0.36 03/28/2022    BASOSABS 0.06 03/28/2022     BMP:

## 2022-04-06 NOTE — PROGRESS NOTES
Progress Note  Date:2022       [facility]:[id number]  Patient Tonia Whitten     YOB: 1945     Age:77 y.o. Patient says breathing is improved today. Subjective    Subjective:  Symptoms:  Improved. He reports shortness of breath. No chest pain. Diet:  Adequate intake. Activity level: Impaired due to weakness. Pain:  He reports no pain. Review of Systems   Constitutional: Negative for activity change and fever. HENT: Negative for congestion. Respiratory: Positive for shortness of breath. Cardiovascular: Positive for leg swelling. Negative for chest pain. Gastrointestinal: Negative for abdominal pain. Neurological: Negative for dizziness. Psychiatric/Behavioral: Negative for agitation. Objective         Vitals Last 24 Hours:  TEMPERATURE:  Temp  Av.1 °F (36.7 °C)  Min: 97.8 °F (36.6 °C)  Max: 98.2 °F (36.8 °C)  RESPIRATIONS RANGE: Resp  Av  Min: 18  Max: 22  PULSE OXIMETRY RANGE: SpO2  Av %  Min: 95 %  Max: 99 %  PULSE RANGE: Pulse  Av.4  Min: 81  Max: 88  BLOOD PRESSURE RANGE: Systolic (41VKP), OAQ:911 , Min:90 , YCD:493   ; Diastolic (89GCO), LWI:57, Min:54, Max:72    I/O (24Hr): Intake/Output Summary (Last 24 hours) at 2022 0629  Last data filed at 2022 1738  Gross per 24 hour   Intake 660 ml   Output 3000 ml   Net -2340 ml     Objective:  General Appearance:  Comfortable. Vital signs: (most recent): Blood pressure 90/63, pulse 88, temperature 98.2 °F (36.8 °C), temperature source Oral, resp. rate 20, height 5' 9\" (1.753 m), weight 263 lb 14.3 oz (119.7 kg), SpO2 99 %. No fever. Lungs:  Normal effort and normal respiratory rate. Breath sounds clear to auscultation. Heart: Normal rate. Regular rhythm. S1 normal and S2 normal.    Extremities: There is local swelling.       Labs/Imaging/Diagnostics    Labs:  CBC:  Recent Labs     22   WBC 11.6* 11.8*   RBC 4.10 3.71*   HGB 11.9* 10.5*   HCT 38.4 34.4*   MCV 93.7 92.7   RDW 17.7* 17.7*    202     CHEMISTRIES:  Recent Labs     22    139   K 3.7  3.7 4.1  4.1    100   CO2 27 24   BUN 43* 41*   CREATININE 2.3* 2.4*   GLUCOSE 65* 95     PT/INR:No results for input(s): PROTIME, INR in the last 72 hours. APTT:No results for input(s): APTT in the last 72 hours. LIVER PROFILE:  No results for input(s): AST, ALT, BILIDIR, BILITOT, ALKPHOS in the last 72 hours. Imaging Last 24 Hours:  XR CHEST PORTABLE    Result Date: 2022  EXAMINATION: ONE XRAY VIEW OF THE CHEST 2022 4:49 pm COMPARISON: 2022 HISTORY: ORDERING SYSTEM PROVIDED HISTORY: shortness of breath TECHNOLOGIST PROVIDED HISTORY: Reason for exam:->shortness of breath What reading provider will be dictating this exam?->CRC FINDINGS: There is a large opacity seen within the right lung base. There is a small right pleural effusion. The left upper lobe is clear. There is a small left pleural effusion. The cardiac silhouette is within normals. There is a dual lead cardiac pacer on the left. 1. Large opacity within the right lung base which could represent pneumonia and or atelectasis. 2. Moderate size right pleural effusion. 3. Small left pleural effusion. 4. CT of the thorax is recommended for further evaluation. US DUP LOWER EXTREMITIES BILATERAL VENOUS    Result Date: 2022  Patient MRN:  [id number] : 1945 Age: 65 years Gender: Male Order Date:  2022 5:28 PM EXAM: US DUP LOWER EXTREMITIES BILATERAL VENOUS NUMBER OF IMAGES:  52 INDICATION:  leg swelling leg swelling What reading provider will be dictating this exam?->[facility] COMPARISON: None Within the visualized vessels, there is no evidence for deep venous thrombosis There is good compressibility, there is good augmentation, there is good color flow.      Within the visualized vessels there is no evidence for deep venous thrombosis     Assessment//Plan Hospital Problems           Last Modified POA    * (Principal) Acute on chronic systolic heart failure ([address] 3/28/2022 Yes    Pleural effusion 3/30/2022 Yes        Assessment:  (   (Principal) Acute on chronic clinical systolic heart failure ([address] 3/14/2022 Yes     Acute on chronic hypoxic respiratory failure. CHF  Pleural effusion  Acute renal insfficnecy  DM  COPD  HTN  Hyperlipidemia       ). Plan:   (Tunnel cath per vascular. Cardiology, Pulmonary and Renal following. Monitor labs and output. Continue meds. PT/OT).

## 2022-04-07 NOTE — ED PROVIDER NOTES
Chief Complaint   Patient presents with    Shortness of Breath     Pt presents to ED from nursing home with respiratory distress. pt placed on CPAP by EMS, some improvement. No other medications given. Pt DNR-CCA       Patient is a 65-year-old male with a history of end-stage renal disease on dialysis, CHF who presents today for worsening shortness of breath. Symptom started yesterday and continued to progress. He also has lower extremity pitting edema. He was recently discharged in the hospital for treatment of acute CHF exacerbation. Patient arrives via EMS from assisted living facility. Symptoms have been persistent and moderate in severity. No aggravating or alleviating factors. He also nurses lower abdominal pain. He has a Paredes catheter in place. Denies fevers or chills. Denies recent travel or surgery. The history is provided by the patient and the EMS personnel. No  was used. Review of Systems   Constitutional: Positive for fatigue. Negative for chills, diaphoresis and fever. HENT: Negative for facial swelling and trouble swallowing. Eyes: Negative for visual disturbance. Respiratory: Positive for shortness of breath. Negative for cough. Cardiovascular: Positive for leg swelling. Negative for chest pain and palpitations. Gastrointestinal: Positive for abdominal pain. Negative for diarrhea, nausea and vomiting. Genitourinary: Negative for difficulty urinating and flank pain. Musculoskeletal: Negative for back pain. Skin: Negative for wound. Neurological: Negative for dizziness, syncope, weakness, numbness and headaches. Hematological: Negative for adenopathy. Psychiatric/Behavioral: Negative for behavioral problems and confusion. Physical Exam  Vitals and nursing note reviewed. Constitutional:       General: He is in acute distress. Appearance: He is well-developed. He is obese. He is ill-appearing.    HENT:      Head: Normocephalic and atraumatic. Eyes:      Pupils: Pupils are equal, round, and reactive to light. Cardiovascular:      Rate and Rhythm: Normal rate and regular rhythm. Pulses: Normal pulses. Heart sounds: Normal heart sounds. No murmur heard. Pulmonary:      Effort: Respiratory distress present. Breath sounds: No wheezing or rales. Abdominal:      General: Bowel sounds are normal.      Palpations: Abdomen is soft. Tenderness: There is no abdominal tenderness. There is no guarding or rebound. Musculoskeletal:      Cervical back: Normal range of motion and neck supple. No rigidity or tenderness. Right lower leg: Edema present. Left lower leg: Edema present. Comments: Lower extremity pitting edema   Skin:     General: Skin is warm and dry. Neurological:      Mental Status: He is alert and oriented to person, place, and time. Cranial Nerves: No cranial nerve deficit.       Coordination: Coordination normal.          Procedures     Labs Reviewed   CBC WITH AUTO DIFFERENTIAL - Abnormal; Notable for the following components:       Result Value    RBC 3.73 (*)     Hemoglobin 10.7 (*)     Hematocrit 34.6 (*)     MCHC 30.9 (*)     RDW 17.6 (*)     Monocytes % 12.6 (*)     Monocytes Absolute 1.39 (*)     All other components within normal limits   COMPREHENSIVE METABOLIC PANEL W/ REFLEX TO MG FOR LOW K - Abnormal; Notable for the following components:    Glucose 154 (*)     BUN 40 (*)     CREATININE 2.5 (*)     Albumin 3.2 (*)     All other components within normal limits   TROPONIN - Abnormal; Notable for the following components:    Troponin, High Sensitivity 36 (*)     All other components within normal limits   BRAIN NATRIURETIC PEPTIDE - Abnormal; Notable for the following components:    Pro-BNP 1,832 (*)     All other components within normal limits   URINALYSIS WITH MICROSCOPIC - Abnormal; Notable for the following components:    Leukocyte Esterase, Urine TRACE (*)     Bacteria, UA MODERATE (*)     Crystals, UA Few (*)     All other components within normal limits   BLOOD GAS, ARTERIAL - Abnormal; Notable for the following components:    PO2 207.7 (*)     O2 Sat 99.1 (*)     O2Hb 98.4 (*)     tHb (est) 11.1 (*)     All other components within normal limits   BASIC METABOLIC PANEL - Abnormal; Notable for the following components:    BUN 30 (*)     CREATININE 2.0 (*)     All other components within normal limits   CBC - Abnormal; Notable for the following components:    RBC 3.61 (*)     Hemoglobin 10.5 (*)     Hematocrit 33.9 (*)     MCHC 31.0 (*)     RDW 17.7 (*)     All other components within normal limits   BASIC METABOLIC PANEL - Abnormal; Notable for the following components:    Glucose 62 (*)     BUN 25 (*)     CREATININE 2.4 (*)     Calcium 8.5 (*)     All other components within normal limits    Narrative:     Collection has been rescheduled by Maria Ines Garza at 04/10/2022 04:54 Reason: Pt   care    CBC - Abnormal; Notable for the following components:    WBC 12.1 (*)     RBC 3.58 (*)     Hemoglobin 10.3 (*)     Hematocrit 34.1 (*)     MCHC 30.2 (*)     RDW 17.4 (*)     All other components within normal limits    Narrative:     Collection has been rescheduled by Maria Ines Garza at 04/10/2022 04:54 Reason: Pt   care    POCT GLUCOSE - Abnormal; Notable for the following components:    Meter Glucose 108 (*)     All other components within normal limits   POCT GLUCOSE - Abnormal; Notable for the following components:    Meter Glucose 117 (*)     All other components within normal limits   POCT GLUCOSE - Abnormal; Notable for the following components:    Meter Glucose 116 (*)     All other components within normal limits   COVID-19, RAPID   CULTURE, URINE    Narrative:     Source: URINE       Site: Urine Indewelling Cath             LIPASE   LACTIC ACID   POCT GLUCOSE   POCT GLUCOSE     CT ABDOMEN PELVIS WO CONTRAST Additional Contrast? None   Final Result   Colonic fecal retention with mild distension of the sigmoid colon. Cirrhotic morphology of the liver. Small volume upper abdominal ascites. Bilateral pleural effusions with adjacent atelectasis. Small pericardial effusion. Right renal atrophy. XR CHEST PORTABLE   Final Result   Stable moderate-sized right pleural effusion and trace left pleural effusion. Stable atherosclerotic disease and left anterior chest wall cardiac support   device. No new cardiopulmonary pathology. RECOMMENDATION:   Follow-up to resolution suggested           EKG #1:   I personally interpreted this EKG  Time:  1953    Rate: 94  Rhythm: Sinus. Interpretation: Sinus rhythm, right bundle branch block, unchanged from previous, no ST ovation. MDM  Number of Diagnoses or Management Options  Acute on chronic diastolic CHF (congestive heart failure) ([facility] [address]  Diagnosis management comments: Patient 65year-old male presents today for shortness of breath. There is concern for CHF as he has crackles at lung bases bilaterally, he has lower extremity pitting edema. Is hypoxic per EMS, he was placed on BiPAP. Covid testing negative. proBNP mildly elevated. Troponin 36, at baseline. Chest x-ray shows moderate size right pleural effusion and left-sided pleural effusion. CT abdomen shows distention, and redemonstrates the pleural effusions. Also noted cirrhosis of the liver.   With patient being hypoxic  BiPAP will be admitted to the hospital.       Amount and/or Complexity of Data Reviewed  Clinical lab tests: reviewed  Tests in the radiology section of CPT®: reviewed  Tests in the medicine section of CPT®: reviewed  Decide to obtain previous medical records or to obtain history from someone other than the patient: yes                --------------------------------------------- PAST HISTORY ---------------------------------------------  Past Medical History:  has a past medical history of Acid reflux, Agent orange exposure, Arthritis, CAD (coronary artery disease), Congestive heart failure (CHF) ([facility]), COPD (chronic obstructive pulmonary disease) ([facility] [address] Depression, Diabetes mellitus ([facility] [address] History of blood transfusion, Hyperlipidemia, Hypertension, [facility] infarction[facility] Polio, and Sleep apnea. Past Surgical History:  has a past surgical history that includes Coronary angioplasty with stent; Cardiac pacemaker placement; pacemaker placement; hernia repair; and vascular surgery (N/A, 4/4/2022). Social History:  reports that he has quit smoking. He has never used smokeless tobacco. He reports that he does not drink alcohol and does not use drugs. Family History: family history is not on file. The patients home medications have been reviewed.     Allergies: Penicillins    -------------------------------------------------- RESULTS -------------------------------------------------    LABS:  Results for orders placed or performed during the hospital encounter of 04/07/22   COVID-19, Rapid    Specimen: Nasopharyngeal Swab   Result Value Ref Range    SARS-CoV-2, NAAT Not Detected Not Detected   Culture, Urine    Specimen: Urine, clean catch   Result Value Ref Range    Urine Culture, Routine Growth not present, incubation continues    CBC with Auto Differential   Result Value Ref Range    WBC 11.0 4.5 - 11.5 E9/L    RBC 3.73 (L) 3.80 - 5.80 E12/L    Hemoglobin 10.7 (L) 12.5 - 16.5 g/dL    Hematocrit 34.6 (L) 37.0 - 54.0 %    MCV 92.8 80.0 - 99.9 fL    MCH 28.7 26.0 - 35.0 pg    MCHC 30.9 (L) 32.0 - 34.5 %    RDW 17.6 (H) 11.5 - 15.0 fL    Platelets 720 900 - 857 E9/L    MPV 10.9 7.0 - 12.0 fL    Neutrophils % 62.2 43.0 - 80.0 %    Immature Granulocytes % 1.5 0.0 - 5.0 %    Lymphocytes % 20.3 20.0 - 42.0 %    Monocytes % 12.6 (H) 2.0 - 12.0 %    Eosinophils % 2.9 0.0 - 6.0 %    Basophils % 0.5 0.0 - 2.0 %    Neutrophils Absolute 6.85 1.80 - 7.30 E9/L    Immature Granulocytes # 0.16 E9/L    Lymphocytes Absolute 2.23 1.50 - 4.00 E9/L    Monocytes Absolute 1.39 (H) 0.10 - 0.95 E9/L    Eosinophils Absolute 0.32 0.05 - 0.50 E9/L    Basophils Absolute 0.06 0.00 - 0.20 E9/L   Comprehensive Metabolic Panel w/ Reflex to MG   Result Value Ref Range    Sodium 138 132 - 146 mmol/L    Potassium reflex Magnesium 5.0 3.5 - 5.0 mmol/L    Chloride 102 98 - 107 mmol/L    CO2 26 22 - 29 mmol/L    Anion Gap 10 7 - 16 mmol/L    Glucose 154 (H) 74 - 99 mg/dL    BUN 40 (H) 6 - 23 mg/dL    CREATININE 2.5 (H) 0.7 - 1.2 mg/dL    GFR Non-African American 25 >=60 mL/min/1.73    GFR African American 30     Calcium 8.7 8.6 - 10.2 mg/dL    Total Protein 6.4 6.4 - 8.3 g/dL    Albumin 3.2 (L) 3.5 - 5.2 g/dL    Total Bilirubin 0.4 0.0 - 1.2 mg/dL    Alkaline Phosphatase 106 40 - 129 U/L    ALT 28 0 - 40 U/L    AST 32 0 - 39 U/L   Troponin   Result Value Ref Range    Troponin, High Sensitivity 36 (H) 0 - 11 ng/L   Brain Natriuretic Peptide   Result Value Ref Range    Pro-BNP 1,832 (H) 0 - 450 pg/mL   Lipase   Result Value Ref Range    Lipase 25 13 - 60 U/L   Lactic Acid   Result Value Ref Range    Lactic Acid 1.3 0.5 - 2.2 mmol/L   Urinalysis with Microscopic   Result Value Ref Range    Color, UA Yellow Straw/Yellow    Clarity, UA Clear Clear    Glucose, Ur Negative Negative mg/dL    Bilirubin Urine Negative Negative    Ketones, Urine Negative Negative mg/dL    Specific Gravity, UA 1.020 1.005 - 1.030    Blood, Urine Negative Negative    pH, UA 5.5 5.0 - 9.0    Protein, UA Negative Negative mg/dL    Urobilinogen, Urine 0.2 <2.0 E.U./dL    Nitrite, Urine Negative Negative    Leukocyte Esterase, Urine TRACE (A) Negative    WBC, UA 1-3 0 - 5 /HPF    RBC, UA 1-3 0 - 2 /HPF    Bacteria, UA MODERATE (A) None Seen /HPF    Crystals, UA Few (A) None Seen /HPF   Blood Gas, Arterial   Result Value Ref Range    Date Analyzed [id number]     Time Analyzed 1951     Source: Blood Arterial     pH, Blood Gas 7.420 7.350 - 7.450    PCO2 36.8 35.0 - 45.0 mmHg    PO2 207.7 (H) 75.0 - 100.0 mmHg    HCO3 23.3 22.0 - 26.0 mmol/L    B.E. -0.8 -3.0 - 3.0 mmol/L    O2 Sat 99.1 (H) 92.0 - 98.5 %    PO2/FIO2 3.46 mmHg/%    O2Hb 98.4 (H) 94.0 - 97.0 %    COHb 0.3 0.0 - 1.5 %    MetHb 0.4 0.0 - 1.5 %    O2 Content 15.8 mL/dL    HHb 0.9 0.0 - 5.0 %    tHb (est) 11.1 (L) 11.5 - 16.5 g/dL    Mode NIV PAP     FIO2 60.0 %    Comment BIPAP 12/6     Date Of Collection      Time Collected      Pt Temp 37.0 C     ID [id number]     Lab [id number]     Critical(s) Notified .  No Critical Values    Basic Metabolic Panel   Result Value Ref Range    Sodium 138 132 - 146 mmol/L    Potassium 4.0 3.5 - 5.0 mmol/L    Chloride 101 98 - 107 mmol/L    CO2 25 22 - 29 mmol/L    Anion Gap 12 7 - 16 mmol/L    Glucose 84 74 - 99 mg/dL    BUN 30 (H) 6 - 23 mg/dL    CREATININE 2.0 (H) 0.7 - 1.2 mg/dL    GFR Non-African American 32 >=60 mL/min/1.73    GFR African American 39     Calcium 8.7 8.6 - 10.2 mg/dL   CBC   Result Value Ref Range    WBC 10.4 4.5 - 11.5 E9/L    RBC 3.61 (L) 3.80 - 5.80 E12/L    Hemoglobin 10.5 (L) 12.5 - 16.5 g/dL    Hematocrit 33.9 (L) 37.0 - 54.0 %    MCV 93.9 80.0 - 99.9 fL    MCH 29.1 26.0 - 35.0 pg    MCHC 31.0 (L) 32.0 - 34.5 %    RDW 17.7 (H) 11.5 - 15.0 fL    Platelets 441 129 - 416 E9/L    MPV 9.8 7.0 - 12.0 fL   Basic Metabolic Panel   Result Value Ref Range    Sodium 142 132 - 146 mmol/L    Potassium 4.4 3.5 - 5.0 mmol/L    Chloride 101 98 - 107 mmol/L    CO2 26 22 - 29 mmol/L    Anion Gap 15 7 - 16 mmol/L    Glucose 62 (L) 74 - 99 mg/dL    BUN 25 (H) 6 - 23 mg/dL    CREATININE 2.4 (H) 0.7 - 1.2 mg/dL    GFR Non-African American 26 >=60 mL/min/1.73    GFR African American 32     Calcium 8.5 (L) 8.6 - 10.2 mg/dL   CBC   Result Value Ref Range    WBC 12.1 (H) 4.5 - 11.5 E9/L    RBC 3.58 (L) 3.80 - 5.80 E12/L    Hemoglobin 10.3 (L) 12.5 - 16.5 g/dL    Hematocrit 34.1 (L) 37.0 - 54.0 %    MCV 95.3 80.0 - 99.9 fL    MCH 28.8 26.0 - 35.0 pg    MCHC 30.2 (L) 32.0 - 34.5 %    RDW 17.4 (H) 11.5 - 15.0 fL    Platelets 889 880 - 694 E9/L    MPV 9.4 7.0 - 12.0 fL   POCT Glucose   Result Value Ref Range    Meter Glucose 108 (H) 74 - 99 mg/dL   POCT Glucose   Result Value Ref Range    Meter Glucose 94 74 - 99 mg/dL   POCT Glucose   Result Value Ref Range    Meter Glucose 117 (H) 74 - 99 mg/dL   POCT Glucose   Result Value Ref Range    Meter Glucose 79 74 - 99 mg/dL   POCT Glucose   Result Value Ref Range    Meter Glucose 116 (H) 74 - 99 mg/dL   EKG 12 Lead   Result Value Ref Range    Ventricular Rate 94 BPM    Atrial Rate 94 BPM    P-R Interval 156 ms    QRS Duration 114 ms    Q-T Interval 394 ms    QTc Calculation (Bazett) 492 ms    P Axis 45 degrees    R Axis 67 degrees    T Axis 57 degrees       RADIOLOGY:  CT ABDOMEN PELVIS WO CONTRAST Additional Contrast? None   Final Result   Colonic fecal retention with mild distension of the sigmoid colon. Cirrhotic morphology of the liver. Small volume upper abdominal ascites. Bilateral pleural effusions with adjacent atelectasis. Small pericardial effusion. Right renal atrophy. XR CHEST PORTABLE   Final Result   Stable moderate-sized right pleural effusion and trace left pleural effusion. Stable atherosclerotic disease and left anterior chest wall cardiac support   device. No new cardiopulmonary pathology. RECOMMENDATION:   Follow-up to resolution suggested                 ------------------------- NURSING NOTES AND VITALS REVIEWED ---------------------------  Date / Time Roomed:  4/7/2022  7:44 PM  ED Bed Assignment:  [id number]    The nursing notes within the ED encounter and vital signs as below have been reviewed.      Patient Vitals for the past 24 hrs:   BP Temp Temp src Pulse Resp SpO2 Weight   04/10/22 0523 -- -- -- -- -- -- 270 lb 1.6 oz (122.5 kg)   04/10/22 0415 102/64 97.2 °F (36.2 °C) Axillary -- 18 -- --   04/09/22 2303 (!) 84/58 97.9 °F (36.6 °C) Oral 71 16 97 % --   04/09/22 1232 106/76 97.7 °F (36.5 °C) Oral 85 18 97 % --   04/09/22 1131 (!) 109/58 98.3 °F (36.8 °C) -- 78 -- -- 267 lb 13.7 oz (121.5 kg)   04/09/22 1101 106/61 -- -- 79 -- -- --   04/09/22 1033 (!) 107/58 -- -- 79 -- -- --   04/09/22 1003 107/65 -- -- 78 -- -- --   04/09/22 0933 109/71 -- -- 77 -- -- --   04/09/22 0903 111/72 -- -- 77 -- -- --   04/09/22 0833 106/64 -- -- 78 -- -- --   04/09/22 0803 101/65 -- -- 75 -- -- --   04/09/22 0745 115/73 -- -- 83 -- -- --   04/09/22 0731 115/73 -- -- 77 -- -- --   04/09/22 0725 105/73 98 °F (36.7 °C) -- 88 -- -- 274 lb 4 oz (124.4 kg)       Oxygen Saturation Interpretation: Abnormal    ------------------------------------------ PROGRESS NOTES ------------------------------------------    Counseling:  I have spoken with the patient and discussed todays results, in addition to providing specific details for the plan of care and counseling regarding the diagnosis and prognosis. Their questions are answered at this time and they are agreeable with the plan of admission.    --------------------------------- ADDITIONAL PROVIDER NOTES ---------------------------------  Consultations:  Spoke with On Call. Discussed case. They will admit the patient. This patient's ED course included: a personal history and physicial examination    This patient has remained hemodynamically stable during their ED course.       Medications   sodium chloride flush 0.9 % injection 5-40 mL (10 mLs IntraVENous Given 4/9/22 5432)   sodium chloride flush 0.9 % injection 5-40 mL (10 mLs IntraVENous Given 4/10/22 5874)   0.9 % sodium chloride infusion (has no administration in time range)   enoxaparin (LOVENOX) injection 40 mg (40 mg SubCUTAneous Given 4/9/22 1243)   acetaminophen (TYLENOL) tablet 650 mg (has no administration in time range)   ondansetron (ZOFRAN-ODT) disintegrating tablet 4 mg (has no administration in time range)     Or   ondansetron (ZOFRAN) injection 4 mg (has no administration in time range)   aspirin EC tablet 81 mg (81 mg Oral Given 4/9/22 1243)   atorvastatin (LIPITOR) tablet 40 mg (40 mg Oral Given 4/9/22 2304)   bumetanide (BUMEX) tablet 2 mg (2 mg Oral Given 4/9/22 1243)   buPROPion (WELLBUTRIN XL) extended release tablet 300 mg (300 mg Oral Given 4/9/22 1243)   vitamin B-12 (CYANOCOBALAMIN) tablet 1,000 mcg (1,000 mcg Oral Given 4/9/22 1243)   guaiFENesin-dextromethorphan (ROBITUSSIN DM) 100-10 MG/5ML syrup 5 mL (has no administration in time range)   docusate sodium (COLACE) capsule 100 mg (100 mg Oral Given 4/9/22 2255)   ezetimibe (ZETIA) tablet 10 mg (10 mg Oral Given 8/6/65 3484)   folic acid (FOLVITE) tablet 1 mg (1 mg Oral Given 4/9/22 1243)   guaiFENesin tablet 400 mg (has no administration in time range)   insulin glargine-yfgn (SEMGLEE-YFGN) injection vial 50 Units (50 Units SubCUTAneous Given 4/9/22 2328)   ipratropium-albuterol (DUONEB) nebulizer solution 3 mL (has no administration in time range)   loperamide (IMODIUM) capsule 2 mg (has no administration in time range)   magnesium hydroxide (MILK OF MAGNESIA) 400 MG/5ML suspension 30 mL (has no administration in time range)   melatonin tablet 3 mg (3 mg Oral Given 4/9/22 2255)   metoprolol succinate (TOPROL XL) extended release tablet 25 mg (25 mg Oral Not Given 4/9/22 2258)   pantoprazole (PROTONIX) tablet 40 mg (40 mg Oral Given 4/9/22 1243)   potassium chloride (KLOR-CON M) extended release tablet 20 mEq (20 mEq Oral Not Given 4/9/22 1243)   ascorbic acid (VITAMIN C) tablet 500 mg (500 mg Oral Given 4/9/22 2303)   vitamin D (CHOLECALCIFEROL) tablet 2,000 Units (2,000 Units Oral Given 4/9/22 1243)   zinc sulfate (ZINCATE) capsule 50 mg (50 mg Oral Given 4/9/22 1243)   insulin lispro (HUMALOG) injection vial 0-6 Units (0 Units SubCUTAneous Not Given 4/9/22 1844)   insulin lispro (HUMALOG) injection vial 0-3 Units (0 Units SubCUTAneous Not Given 4/9/22 2334)   glucose (GLUTOSE) 40 % oral gel 15 g (has no administration in time range)   dextrose 50 % IV solution (has no administration in time range)   glucagon (rDNA) injection 1 mg (has no administration in time range)   dextrose 5 % solution (has no administration in time range)   midodrine (PROAMATINE) tablet 10 mg (10 mg Oral Given 4/9/22 0668)   furosemide (LASIX) injection 40 mg (40 mg IntraVENous Given 4/8/22 0113)         Diagnosis:  1. Acute on chronic diastolic CHF (congestive heart failure) (HCC)        Disposition:  Patient's disposition: Admit to telemetry  Patient's condition is stable.              Stephenie Nix DO  Resident  04/10/22 2651

## 2022-04-07 NOTE — PROGRESS NOTES
Date: 4/7/2022    Time: 7:55 PM    Patient Placed On BIPAP/CPAP/ Non-Invasive Ventilation? Yes    If no must comment. Facial area red/color change? No           If YES are Blister/Lesion present? No   If yes must notify nursing staff  BIPAP/CPAP skin barrier? Yes    Skin barrier type:mepilexlite       Comments:  Patient placed on bipap per verbal order.       Darby Viera RCP

## 2022-04-07 NOTE — PROGRESS NOTES
[facility]  Nephrology Attending Progress Note          SUBJECTIVE:     3/29: Kirstin Rothman a 65 y. o. male with a history of chronic kidney disease stage IV (recent recent baseline creatinine 2.5-3), type 2 diabetes mellitus, COPD, chronic HFpEF H/O pacemaker, speedy, gerd, djd, htn, hyperlipidemia, who presented from [facility] with complaints of shortness of breath and hypoxia. He has been admitted twice for this in the last weeks. He has been on bumex drip. Labs at this time show na 143, k 4.3, co2 29, bun 54, cr 3, gfr 20, ca 9, alb 3.6, wbc 13.3, hgb 11.2, plt 409. He has been started on iv bumex 2 iv q 12. He is on cpap and 3 L of o2 at the [facility]. He has had a large right pleural effusion which has been tapped.      3/30: pt seen in room, awaits thoracentesis    3/31: pt seen, sp thoracentesis wiith 1.5 L off.  On bumex 2 mg iv q 12.     4/1: pt seen on hd, first hd today, tolerating ok    4/2: pt seen on hd, second hd today, no cp or sob    4/4: Attempted RIJ tunneled hemodialysis catheter placement today unsuccessful due to small vein; left CFV tunnel hemodialysis catheter placement performed; patient denies shortness of breath    4/5: No new c/o; denies sob    4/6: no new c/o      PROBLEM LIST:    Patient Active Problem List   Diagnosis    PNA (pneumonia)    Acute on chronic heart failure with preserved ejection fraction ([address]    Coronary artery disease involving native coronary artery of native heart without angina pectoris    Cardiac pacemaker in situ    Primary hypertension    SPEEDY (obstructive sleep apnea)    Chronic respiratory failure with hypoxia (HCC)    CHF (congestive heart failure), NYHA class I, acute on chronic, combined ([address]    Acute on chronic clinical systolic heart failure (HCC)    Acute on chronic systolic heart failure (HCC)    Pleural effusion          DIET:    No diet orders on file     PHYSICAL EXAM:     Patient Vitals for the past 24 hrs:   BP Temp Temp src Pulse Resp SpO2 04/06/22 1207 -- -- -- -- -- 96 %   04/06/22 0847 -- -- -- -- -- 97 %   04/06/22 0756 99/75 97.4 °F (36.3 °C) Oral 94 20 98 %   @      Intake/Output Summary (Last 24 hours) at 4/7/2022 0003  Last data filed at 4/6/2022 1438  Gross per 24 hour   Intake 480 ml   Output 550 ml   Net -70 ml         Wt Readings from Last 3 Encounters:   04/05/22 263 lb 14.3 oz (119.7 kg)   03/22/22 272 lb (123.4 kg)   03/10/22 264 lb 1.8 oz (119.8 kg)       Constitutional:  Pt is in no acute distress  Head: normocephalic, atraumatic  Neck: no JVD  Cardiovascular: regular rate and rhythm, no murmurs, gallops, or rubs  Respiratory:  Decreased at bases  Gastrointestinal:  Soft, nontender, nondistended, bowel sounds x 4  Ext: edema; R CFV TDC  Skin: dry, no rash  Neuro: aaox3    MEDS (scheduled):       MEDS (infusions):      MEDS (prn):      DATA:    Recent Labs     04/04/22 0437 04/05/22 0432 04/06/22 0459   WBC 11.6* 11.8* 9.1   HGB 11.9* 10.5* 10.9*   HCT 38.4 34.4* 35.4*   MCV 93.7 92.7 92.9    202 180     Recent Labs     04/04/22 0437 04/04/22 0437 04/05/22 0432 04/06/22 0459     --  139 140   K 3.7  3.7   < > 4.1  4.1 3.9  3.9     --  100 102   CO2 27  --  24 27   BUN 43*  --  41* 29*   CREATININE 2.3*  --  2.4* 2.0*   LABGLOM 28  --  26 32   GLUCOSE 65*  --  95 50*   CALCIUM 8.6  --  8.5* 8.5*    < > = values in this interval not displayed.        Lab Results   Component Value Date    LABALBU 3.6 03/28/2022    LABALBU 3.4 (L) 03/14/2022    LABALBU 3.5 02/25/2022     Lab Results   Component Value Date    TSH 2.310 01/22/2022       Iron Studies  No results found for: IRON, TIBC, FERRITIN  No results found for: [id number]  No results found for: FOLATE    No results found for: VITD25  PTH   Date Value Ref Range Status   04/02/2022 104 (H) 15 - 65 pg/mL Final       No components found for: URIC    Lab Results   Component Value Date    COLORU Yellow 03/15/2022    NITRU Negative 03/15/2022    GLUCOSEU Negative 03/15/2022    KETUA Negative 03/15/2022    UROBILINOGEN 0.2 03/15/2022    BILIRUBINUR Negative 03/15/2022       No results found for: Adelso Pascual      IMPRESSION/RECOMMENDATIONS:     1.  Chronic kidney disease stage IV  due to diabetes mellitus and hypertension  Cr usual baseline 2.5-3  Pericardial effusion uremia contributing  Will need to repeat echo in 1-2 weeks  Frequent admission for CHF exacerbation in the presence of advanced CKD  HD initiated 4/1  left CFV tunnel hemodialysis catheter placement performed 4/4  To continue 4/8 HD as OP      2.  Acute on chronic HFpEF  Improved with hemodialysis and diuretics  Strict I/o  Right-sided thoracentesis performed 3/15   Sp thora 3/30 with 1.5 L off on right  Transitioned to oral diuretics     3.Htn  On Midodrine  Hold parameters on metoprolol     4 mame      OK for discharge      Zeke Tucker MD, MD

## 2022-04-07 NOTE — LETTER
[employer]  CERTIFICATION OF NECESSITY  FOR TRANSPORTATION   BY WHEELCHAIR VAN     Individual Information   1. Name: Hardy Rodriguez 2. [place] Medicaid Billing Number:    3. Address: [place] [address]      Transportation Provider Information   4. Provider Name:    5. [place] [employer] Provider Number:  National Provider Identifier (NPI):      Certification  7. Criteria:  By signing this document, the practitioner certifies that two statements are true:  A. This individual must be accompanied by a mobility-related assistive device from the point of pick-up to the point of drop-off. B. Transport of this individual by standard passenger vehicle or common carrier is precluded or contraindicated. 8. Period Beginning Date:    5. Length  [x] Not more than 1 day(s)  [] One Year     Additional Information Relevant to Certification   10. Comments or Explanations, If Necessary or Appropriate   Acute on chronic hypoxic respiratory failure. CHF  Pleural effusion  Acute renal insfficnecy  DM  COPD  HTN  Hyperlipidemia        Certifying Practitioner Information   11. Name of Practitioner: Dr Racquel Denney   12. [place] Medicaid Provider Number, If Applicable:  [address] Provider Identifier (NPI):      Signature Information   14. Date of Signature: Electronically signed by Carroll Cisse RN on 4/11/2022 at 1:40 PM 15. Name of Person Signing: Electronically signed by Carroll Cisse RN on 4/11/2022 at 1:41 PM   16. Signature and Professional Designation: Electronically signed by Carroll Cisse RN on 4/11/2022 at 1:41 PM     ODM 17081  Rev. 7/2015  [facility] Encounter Date/Time: 4/7/2022 [address] [place] [address] Account: [de-identified]    MRN: [id number]    Patient: Jessie Mcintyre Serial #: [id number]      ENCOUNTER          Patient Class: I Private Enc?   No Unit  Briseida Bryan [facility] Service: MED   Encounter DX: Respiratory failure [facility]*   ADM Provider: Shiraz Paulson MD   Procedure:     ATT Provider: Shiraz Paulson MD   REF Provider:        Admission DX: Respiratory failure [facility]), Acute on chronic diastolic CHF (congestive heart failure) [facility] and DX codes: J96.90, I50.33      PATIENT                 Name: Jose Taylor : 1945 (65 yrs)   Address: [place] [address] s * Sex: Male   City: [place] [address]         Marital Status: Single   Employer: RETIRED         Temple:  [email or web address]   Primary Care Provider: Shiraz Paulson, *         Primary Phone: [phone]   EMERGENCY CONTACT   Contact Name Legal Guardian? Relationship to Patient Home Phone Work Phone   1. Nicole Betancourt  2. *No Contact Specified* No    Brother/Sister    [phone]                 GUARANTOR            Guarantor: Jose Taylor     : 1945   Address: [address] [place] [address] Sex: Male   [facility] [place] [address]     Relation to Patient: Self       Home Phone: [phone]   Guarantor ID: [id number]       Work Phone:     Guarantor Employer: RETIRED         Status: RETIRED      COVERAGE        PRIMARY INSURANCE   Payor: [facility] MEDICARE Plan: [facility] MEDICARE COMPLETE   Payor Address: ,          Group Number: [id number] Insurance Type: INDEMNITY   Subscriber Name: Eleuterio Lake : 1945   Subscriber ID: [id number] Pat. Rel. to Sub: Self   SECONDARY INSURANCE   Payor:  Plan:  [employer]*   Payor Address:  C[address]O [address]/, [facility] [place] [id number]          Group Number:   Insurance Type: INDEMNITY   Subscriber Name: Eleuterio Lake : 1945   Subscriber ID: [id number] Pat.  Rel. to Sub: SELF

## 2022-04-08 PROBLEM — J96.90 RESPIRATORY FAILURE (HCC): Status: ACTIVE | Noted: 2022-01-01

## 2022-04-08 NOTE — CONSULTS
Nephrology Consult  [facility]  Breana Simmons MD    CC:   sob    HPI:   Pt is a 66 yo male who left here 4/6 and returned 4/7 from a facility for sob. He has a pmh of copd, depression, speedy, htn, hyperlipidemia, dm, HFpEF, pacer who started dialysis last admission for ckd 5 and fluid overload. He had a thoracentesis on 3/30 on the right and on 3/15/22. Labs show na 138 k 5 co2 26 bun 40 wbc 11 hgb 10.7, plt 202. Right pleural effusion persists on cxr and ct. He is to have dialysis mwf. He is now seen on hd. He said the sob came on last night and he was fine before that. He thinks it is a panic attack.      PMH:    Past Medical History:   Diagnosis Date    Acid reflux     Agent orange exposure     Arthritis     CAD (coronary artery disease)     Congestive heart failure (CHF) (HCC)     COPD (chronic obstructive pulmonary disease) (HCC)     Depression     Diabetes mellitus ([address]     History of blood transfusion     Hyperlipidemia     Hypertension     MI (myocardial infarction) ([address]     Polio     Sleep apnea        Patient Active Problem List   Diagnosis    PNA (pneumonia)    Acute on chronic heart failure with preserved ejection fraction ([address]    Coronary artery disease involving native coronary artery of native heart without angina pectoris    Cardiac pacemaker in situ    Primary hypertension    SPEEDY (obstructive sleep apnea)    Chronic respiratory failure with hypoxia (HCC)    CHF (congestive heart failure), NYHA class I, acute on chronic, combined ([address]    Acute on chronic clinical systolic heart failure (HCC)    Acute on chronic systolic heart failure (HCC)    Pleural effusion    Respiratory failure (HCC)       Meds:     sodium chloride flush  5-40 mL IntraVENous 2 times per day    enoxaparin  40 mg SubCUTAneous Daily    aspirin  81 mg Oral Daily    atorvastatin  40 mg Oral Nightly    bumetanide  2 mg Oral Daily    buPROPion  300 mg Oral QAM    cyanocobalamin  1,000 mcg Oral Daily    docusate sodium  100 mg Oral BID    ezetimibe  10 mg Oral Nightly    folic acid  1 mg Oral Daily    insulin glargine-yfgn  50 Units SubCUTAneous Nightly    melatonin  3 mg Oral Nightly    metoprolol succinate  25 mg Oral BID    pantoprazole  40 mg Oral Daily    potassium chloride  20 mEq Oral Daily    vitamin C  500 mg Oral BID    vitamin D  2,000 Units Oral Daily    zinc sulfate  50 mg Oral Daily    insulin lispro  0-6 Units SubCUTAneous TID WC    insulin lispro  0-3 Units SubCUTAneous Nightly        sodium chloride         Meds prn:     sodium chloride flush, sodium chloride, acetaminophen, ondansetron **OR** ondansetron, guaiFENesin-dextromethorphan, guaiFENesin, ipratropium-albuterol, loperamide, magnesium hydroxide, glucose, glucagon (rDNA)    Meds prior to admission:     No current facility-administered medications on file prior to encounter.      Current Outpatient Medications on File Prior to Encounter   Medication Sig Dispense Refill    bumetanide (BUMEX) 2 MG tablet Take 1 tablet by mouth daily 30 tablet 3    docusate sodium (COLACE) 100 MG capsule Take 1 capsule by mouth 2 times daily 60 capsule 0    guaiFENesin 400 MG tablet Take 400 mg by mouth 4 times daily as needed for Cough      ipratropium-albuterol (DUONEB) 0.5-2.5 (3) MG/3ML SOLN nebulizer solution Take 1 vial by nebulization every 4 hours as needed for Shortness of Breath      potassium chloride (KLOR-CON M) 10 MEQ extended release tablet Take 20 mEq by mouth daily      insulin glargine (LANTUS SOLOSTAR) 100 UNIT/ML injection pen Inject 50 Units into the skin nightly      loperamide (IMODIUM) 2 MG capsule Take 2 mg by mouth 4 times daily as needed for Diarrhea      buPROPion (WELLBUTRIN XL) 300 MG extended release tablet Take 300 mg by mouth every morning      ezetimibe (ZETIA) 10 MG tablet Take 10 mg by mouth at bedtime      Zinc Sulfate 110 MG TABS Take 2 tablets by mouth daily      magnesium hydroxide (MILK OF MAGNESIA) 400 MG/5ML suspension Take 30 mLs by mouth daily as needed for Constipation      Dextromethorphan-guaiFENesin  MG/5ML SYRP Take 5 mLs by mouth every 4 hours as needed for Cough      melatonin 3 MG TABS tablet Take 3 mg by mouth at bedtime      insulin lispro, 1 Unit Dial, (HUMALOG KWIKPEN) 100 UNIT/ML SOPN Inject 0-10 Units into the skin 4 times daily (before meals and nightly) *Per Sliding Scale*      metoprolol succinate (TOPROL XL) 25 MG extended release tablet Take 1 tablet by mouth 2 times daily 30 tablet 3    acetaminophen (TYLENOL) 325 MG tablet Take 650 mg by mouth every 4 hours as needed for Pain or Fever       vitamin C (ASCORBIC ACID) 500 MG tablet Take 500 mg by mouth 2 times daily      vitamin D (CHOLECALCIFEROL) 50 MCG (2000 UT) TABS tablet Take 2,000 Units by mouth daily       aspirin 81 MG EC tablet Take 1 tablet by mouth daily 30 tablet 3    folic acid (FOLVITE) 1 MG tablet Take 1 mg by mouth daily      cyanocobalamin 1000 MCG tablet Take 1,000 mcg by mouth daily      atorvastatin (LIPITOR) 40 MG tablet Take 40 mg by mouth at bedtime       pantoprazole (PROTONIX) 40 MG tablet Take 40 mg by mouth daily         Allergies:    Penicillins    Social History:     reports that he has quit smoking. He has never used smokeless tobacco. He reports that he does not drink alcohol and does not use drugs. Family History:     History reviewed. No pertinent family history.     ROS:     General: no fever, chills   Heent: no nasal congestion, sore throat   Resp: no cough, sob , hemoptysis  Cardiac: no cp , le edema, palpitations  Gi: no nausea, vomiting, melena, abd pain, hematemesis  Gu: no hematuria, dysuria   Neruo: no numbness, weakness, headache, blurry vision   Endocrine:  no h/o dm  Derm: no rash , petechia  Heme: no epistaxis, bruising  All other sx negative     Physical Exam:      Patient Vitals for the past 24 hrs:   BP Temp Temp src Pulse Resp SpO2 Height Weight   04/08/22 1400 108/69 -- -- 75 -- -- -- --   04/08/22 1319 98/63 -- -- 86 -- -- -- --   04/08/22 1310 105/64 97.7 °F (36.5 °C) -- 85 18 -- -- --   04/08/22 1230 112/79 -- -- 85 16 100 % -- --   04/08/22 1155 -- -- -- -- 17 -- -- --   04/08/22 1102 105/65 -- -- 89 -- -- -- --   04/08/22 1030 89/68 -- -- 86 17 100 % -- --   04/08/22 0900 97/72 -- -- 89 17 100 % -- --   04/08/22 0840 -- -- -- -- (!) 33 -- -- --   04/08/22 0830 109/74 -- -- 87 25 -- -- --   04/08/22 0800 92/66 -- -- 82 17 -- -- --   04/08/22 0637 (!) 121/95 -- -- 84 12 100 % -- --   04/08/22 0416 -- -- -- -- 19 -- -- --   04/08/22 0401 93/62 -- -- 82 15 100 % -- --   04/08/22 0114 90/66 -- -- 83 14 100 % -- --   04/07/22 2355 104/81 -- -- 86 20 100 % -- --   04/07/22 2318 -- -- -- -- 20 -- -- --   04/07/22 2310 -- -- -- 85 25 100 % -- --   04/07/22 2200 86/63 97.4 °F (36.3 °C) Axillary 90 17 100 % -- --   04/07/22 1945 132/70 -- -- 82 30 98 % 5' 9\" (1.753 m) 263 lb (119.3 kg)         Intake/Output Summary (Last 24 hours) at 4/8/2022 1431  Last data filed at 4/8/2022 3028  Gross per 24 hour   Intake --   Output 800 ml   Net -800 ml       Constitutional: Patient in no acute distress   Head: normocephalic, atraumatic   Neck: supple, no jvd  Cardiovascular: regular rate and rhythm, no murmurs, gallops, or rubs   Respiratory: decreased bs at bases R>L  Gastrointestinal: soft, nontender, nondistended, no hepatosplenomegaly  Ext: edema  Neuro: aaox3  Skin: dry, no rash   Back: nontender    Data:    Recent Labs     04/06/22 0459 04/07/22 2000   WBC 9.1 11.0   HGB 10.9* 10.7*   HCT 35.4* 34.6*   MCV 92.9 92.8    202       Recent Labs     04/06/22 0459 04/07/22 2000    138   K 3.9  3.9 5.0    102   CO2 27 26   CREATININE 2.0* 2.5*   BUN 29* 40*   LABGLOM 32 25   GLUCOSE 50* 154*   CALCIUM 8.5* 8.7       No results found for: VITD25    PTH   Date Value Ref Range Status   04/02/2022 104 (H) 15 - 65 pg/mL Final       Recent Labs     04/07/22 2000 ALT 28   AST 32   ALKPHOS 106   BILITOT 0.4       Recent Labs     04/07/22 2000   LABALBU 3.2*       No results found for: FERRITIN, IRON, TIBC    No results found for: CYYWDVND27    No results found for: FOLATE      Lab Results   Component Value Date    COLORU Yellow 04/07/2022    NITRU Negative 04/07/2022    GLUCOSEU Negative 04/07/2022    KETUA Negative 04/07/2022    UROBILINOGEN 0.2 04/07/2022    BILIRUBINUR Negative 04/07/2022       Lab Results   Component Value Date/Time    OSMOU 351 01/22/2022 11:24 AM       No components found for: URIC    No results found for: LIPIDPAN      Assessment and Plan:    1. esrd  Hd mwf    2. HFpEF  uf as tolerated with hd  r pleural effusion tapped 3/15 and 3/30    3. htn  On midodrine and metoprolol    4. Anemia  beni protocol if hgb <10    5. Sob  uf as tolerated with hd  Add midodrine  ? Panic attack, per IM        Gato Herman.  Demian Severino MD

## 2022-04-08 NOTE — CARE COORDINATION
MICKY transition of care. Pt presented to [facility] ER secondary to respiratory distress from [facility]. Pt was found to be hypoxic by ems was placed on CPAP with some improvement. Pt remains on BiPAP in the ED. Pt recently d/c from this facility on 4/6 . Pt is admitted with respiratory failure. Pt is a dialysis pt. He is new to [facility] in [facility], however has not been seen there yet d/t discharging from the hospital on 4/6 and being admitted again today. His HD days are MWF chair time is tentatively 1340. Met with pt he plans to return to [facility] at d/c. Left VM message with [facility] from  to check on bed status. Waiting for return call. MICKY/HOMERO to follow. Coco Crouch RN CM.      4318:  Per [facility] with  pt will need insurance auth to return. Pt has PT/OT evals ordered. MICKY/HOMERO to follow.

## 2022-04-08 NOTE — ED NOTES
Pt transported to CT on NRB. Pt tolerated well. Pt placed back on bipap upon return to room. VS obtained.       Radha Bueno RN  04/08/22 6073

## 2022-04-08 NOTE — H&P
for Shortness of Breath    Historical Provider, MD   potassium chloride (KLOR-CON M) 10 MEQ extended release tablet Take 20 mEq by mouth daily    Historical Provider, MD   insulin glargine (LANTUS SOLOSTAR) 100 UNIT/ML injection pen Inject 50 Units into the skin nightly    Historical Provider, MD   loperamide (IMODIUM) 2 MG capsule Take 2 mg by mouth 4 times daily as needed for Diarrhea    Historical Provider, MD   buPROPion (WELLBUTRIN XL) 300 MG extended release tablet Take 300 mg by mouth every morning    Historical Provider, MD   ezetimibe (ZETIA) 10 MG tablet Take 10 mg by mouth at bedtime    Historical Provider, MD   Zinc Sulfate 110 MG TABS Take 2 tablets by mouth daily    Historical Provider, MD   magnesium hydroxide (MILK OF MAGNESIA) 400 MG/5ML suspension Take 30 mLs by mouth daily as needed for Constipation    Historical Provider, MD   Dextromethorphan-guaiFENesin  MG/5ML SYRP Take 5 mLs by mouth every 4 hours as needed for Cough    Historical Provider, MD   melatonin 3 MG TABS tablet Take 3 mg by mouth at bedtime    Historical Provider, MD   insulin lispro, 1 Unit Dial, (HUMALOG KWIKPEN) 100 UNIT/ML SOPN Inject 0-10 Units into the skin 4 times daily (before meals and nightly) *Per Sliding Scale*    Historical Provider, MD   metoprolol succinate (TOPROL XL) 25 MG extended release tablet Take 1 tablet by mouth 2 times daily 2/16/22   GILMAR Iraheta CNP   acetaminophen (TYLENOL) 325 MG tablet Take 650 mg by mouth every 4 hours as needed for Pain or Fever     Historical Provider, MD   vitamin C (ASCORBIC ACID) 500 MG tablet Take 500 mg by mouth 2 times daily    Historical Provider, MD   vitamin D (CHOLECALCIFEROL) 50 MCG (2000 UT) TABS tablet Take 2,000 Units by mouth daily     Historical Provider, MD   aspirin 81 MG EC tablet Take 1 tablet by mouth daily 2/3/22   Alena Iqbal MD   folic acid (FOLVITE) 1 MG tablet Take 1 mg by mouth daily    Historical Provider, MD   cyanocobalamin 1000 MCG tablet Take 1,000 mcg by mouth daily    Historical Provider, MD   atorvastatin (LIPITOR) 40 MG tablet Take 40 mg by mouth at bedtime     Historical Provider, MD   pantoprazole (PROTONIX) 40 MG tablet Take 40 mg by mouth daily    Historical Provider, MD        Allergies   Penicillins    Social History     Social History     Tobacco History     Smoking Status  Former Smoker    Smokeless Tobacco Use  Never Used    Tobacco Comment  quit in 1985          Alcohol History     Alcohol Use Status  Never          Drug Use     Drug Use Status  Never          Sexual Activity     Sexually Active  Not Asked                Family History   History reviewed. No pertinent family history. Review of Systems   Review of Systems   Constitutional: Positive for activity change. Negative for fever. HENT: Positive for congestion. Respiratory: Positive for shortness of breath. Cardiovascular: Negative for chest pain. Gastrointestinal: Negative for abdominal pain. Genitourinary: Negative for difficulty urinating. Neurological: Negative for dizziness. Physical Exam   BP 93/62   Pulse 82   Temp 97.4 °F (36.3 °C) (Axillary)   Resp 19   Ht 5' 9\" (1.753 m)   Wt 263 lb (119.3 kg)   SpO2 100%   BMI 38.84 kg/m²     Physical Exam  HENT:      Head: Normocephalic. Mouth/Throat:      Mouth: Mucous membranes are moist.   Eyes:      Pupils: Pupils are equal, round, and reactive to light. Cardiovascular:      Rate and Rhythm: Normal rate. Pulses: Normal pulses. Pulmonary:      Effort: Pulmonary effort is normal.      Breath sounds: No wheezing. Abdominal:      General: Abdomen is flat. Bowel sounds are normal. There is no distension. Musculoskeletal:      Cervical back: Normal range of motion. Right lower leg: Edema present. Skin:     Capillary Refill: Capillary refill takes less than 2 seconds. Neurological:      General: No focal deficit present.       Mental Status: He is alert and oriented to person, place, and time. Psychiatric:         Mood and Affect: Mood normal.         Behavior: Behavior normal.         Labs      Recent Results (from the past 24 hour(s))   Blood Gas, Arterial    Collection Time: 04/07/22  7:51 PM   Result Value Ref Range    Date Analyzed 20220407     Time Analyzed 1951     Source: Blood Arterial     pH, Blood Gas 7.420 7.350 - 7.450    PCO2 36.8 35.0 - 45.0 mmHg    PO2 207.7 (H) 75.0 - 100.0 mmHg    HCO3 23.3 22.0 - 26.0 mmol/L    B.E. -0.8 -3.0 - 3.0 mmol/L    O2 Sat 99.1 (H) 92.0 - 98.5 %    PO2/FIO2 3.46 mmHg/%    O2Hb 98.4 (H) 94.0 - 97.0 %    COHb 0.3 0.0 - 1.5 %    MetHb 0.4 0.0 - 1.5 %    O2 Content 15.8 mL/dL    HHb 0.9 0.0 - 5.0 %    tHb (est) 11.1 (L) 11.5 - 16.5 g/dL    Mode NIV PAP     FIO2 60.0 %    Comment BIPAP 12/6     Date Of Collection      Time Collected      Pt Temp 37.0 C     ID [id number]     Lab [id number]     Critical(s) Notified .  No Critical Values    EKG 12 Lead    Collection Time: 04/07/22  7:53 PM   Result Value Ref Range    Ventricular Rate 94 BPM    Atrial Rate 94 BPM    P-R Interval 156 ms    QRS Duration 114 ms    Q-T Interval 394 ms    QTc Calculation (Bazett) 492 ms    P Axis 45 degrees    R Axis 67 degrees    T Axis 57 degrees   CBC with Auto Differential    Collection Time: 04/07/22  8:00 PM   Result Value Ref Range    WBC 11.0 4.5 - 11.5 E9/L    RBC 3.73 (L) 3.80 - 5.80 E12/L    Hemoglobin 10.7 (L) 12.5 - 16.5 g/dL    Hematocrit 34.6 (L) 37.0 - 54.0 %    MCV 92.8 80.0 - 99.9 fL    MCH 28.7 26.0 - 35.0 pg    MCHC 30.9 (L) 32.0 - 34.5 %    RDW 17.6 (H) 11.5 - 15.0 fL    Platelets 638 425 - 080 E9/L    MPV 10.9 7.0 - 12.0 fL    Neutrophils % 62.2 43.0 - 80.0 %    Immature Granulocytes % 1.5 0.0 - 5.0 %    Lymphocytes % 20.3 20.0 - 42.0 %    Monocytes % 12.6 (H) 2.0 - 12.0 %    Eosinophils % 2.9 0.0 - 6.0 %    Basophils % 0.5 0.0 - 2.0 %    Neutrophils Absolute 6.85 1.80 - 7.30 E9/L    Immature Granulocytes # 0.16 E9/L    Lymphocytes Absolute Leukocyte Esterase, Urine TRACE (A) Negative    WBC, UA 1-3 0 - 5 /HPF    RBC, UA 1-3 0 - 2 /HPF    Bacteria, UA MODERATE (A) None Seen /HPF    Crystals, UA Few (A) None Seen /HPF        Imaging/Diagnostics Last 24 Hours   XR CHEST PORTABLE    Result Date: 1/21/2022  EXAMINATION: ONE XRAY VIEW OF THE CHEST 1/21/2022 10:02 am COMPARISON: None. HISTORY: ORDERING SYSTEM PROVIDED HISTORY: dyspnea TECHNOLOGIST PROVIDED HISTORY: Reason for exam:->dyspnea FINDINGS: The heart is enlarged. There is a dual lead cardiac pacer on the left There is an infiltrate seen within the right lung base. The left lung is clear. There is a trace right pleural effusion. There is no left pleural effusion. 1. Right lower lobe pneumonia 2. Trace right pleural effusion 3. Cardiomegaly     US DUP LOWER EXTREMITIES BILATERAL VENOUS    Result Date: 1/21/2022  EXAMINATION: DUPLEX VENOUS ULTRASOUND OF THE BILATERAL LOWER EXTREMITIES1/21/2022 10:15 am TECHNIQUE: Duplex ultrasound using B-mode/gray scaled imaging, Doppler spectral analysis and color flow Doppler was obtained of the deep venous structures of the lower bilateral extremities. COMPARISON: None. HISTORY: ORDERING SYSTEM PROVIDED HISTORY: discomfort TECHNOLOGIST PROVIDED HISTORY: Reason for exam:->discomfort What reading provider will be dictating this exam?->CRC FINDINGS: The visualized veins of the bilateral lower extremities are patent and free of echogenic thrombus. The veins demonstrate good compressibility with normal color flow study and spectral analysis. No evidence of DVT in either lower extremity. RECOMMENDATIONS: [facility] Problems           Last Modified POA    * (Principal) Respiratory failure ([address] 4/8/2022 Yes      Acute on chronic hypoxic respiratory failure. CHF  Pleural effusion  Acute renal insfficnecy  DM  COPD  HTN  Hyperlipidemia        Plan   IV bumex. bipap  Monitor labs and exam.  Continue rest of meds.   Renal and Pulmonary to seeto see.     Consultations Ordered:  IP CONSULT TO PRIMARY CARE PROVIDER  IP CONSULT TO PULMONOLOGY  IP CONSULT TO NEPHROLOGY  IP CONSULT TO CASE MANAGEMENT

## 2022-04-09 NOTE — PROGRESS NOTES
NEPHROLOGY Attending   Progress Note  4/9/2022 8:05 AM  Subjective:   Admit Date: 4/7/2022  PCP: Cary Cobb MD    HPI (from 4/9):   Pt is a 66 yo male who left here 4/6 and returned 4/7 from a facility for sob. He has a pmh of copd, depression, mame, htn, hyperlipidemia, dm, HFpEF, pacer who started dialysis last admission for ckd 5 and fluid overload. He had a thoracentesis on 3/30 on the right and on 3/15/22. Labs show na 138 k 5 co2 26 bun 40 wbc 11 hgb 10.7, plt 202. Right pleural effusion persists on cxr and ct. He is to have dialysis mwf. He is now seen on hd. He said the sob came on last night and he was fine before that. He thinks it is a panic attack. Interval History:     4/9/22: Tolerated dialysis well last evening for a net UF of 1.7 L - no adverse events overnight - currently seen on hemodialysis this morning - tolerating well - care reviewed with the dialysis staff      Diet: ADULT DIET;  Regular; Low Fat/Low Chol/High Fiber/DANIEL    Data:   Scheduled Meds:   sodium chloride flush  5-40 mL IntraVENous 2 times per day    enoxaparin  40 mg SubCUTAneous Daily    aspirin  81 mg Oral Daily    atorvastatin  40 mg Oral Nightly    bumetanide  2 mg Oral Daily    buPROPion  300 mg Oral QAM    cyanocobalamin  1,000 mcg Oral Daily    docusate sodium  100 mg Oral BID    ezetimibe  10 mg Oral Nightly    folic acid  1 mg Oral Daily    insulin glargine-yfgn  50 Units SubCUTAneous Nightly    melatonin  3 mg Oral Nightly    metoprolol succinate  25 mg Oral BID    pantoprazole  40 mg Oral Daily    potassium chloride  20 mEq Oral Daily    vitamin C  500 mg Oral BID    vitamin D  2,000 Units Oral Daily    zinc sulfate  50 mg Oral Daily    insulin lispro  0-6 Units SubCUTAneous TID WC    insulin lispro  0-3 Units SubCUTAneous Nightly     Continuous Infusions:   dextrose      sodium chloride       PRN Meds:dextrose, dextrose, sodium chloride flush, sodium chloride, acetaminophen, ondansetron **OR** ondansetron, guaiFENesin-dextromethorphan, guaiFENesin, ipratropium-albuterol, loperamide, magnesium hydroxide, glucose, glucagon (rDNA), midodrine    Intake/Output Summary (Last 24 hours) at 4/9/2022 0805  Last data filed at 4/8/2022 2157  Gross per 24 hour   Intake 300 ml   Output 2400 ml   Net -2100 ml     CBC:   Recent Labs     04/07/22 2000 04/09/22  0514   WBC 11.0 10.4   HGB 10.7* 10.5*    229     BMP:    Recent Labs     04/07/22 2000 04/09/22  0514    138   K 5.0 4.0    101   CO2 26 25   BUN 40* 30*   CREATININE 2.5* 2.0*   GLUCOSE 154* 84     Hepatic:   Recent Labs     04/07/22 2000   AST 32   ALT 28   BILITOT 0.4   ALKPHOS 106     Troponin: No results for input(s): TROPONINI in the last 72 hours. BNP: No results for input(s): BNP in the last 72 hours. Lipids: No results for input(s): CHOL, HDL in the last 72 hours. Invalid input(s): LDLCALCU  ABGs: No results found for: PHART, PO2ART, WYL7QAN  INR: No results for input(s): INR in the last 72 hours. -----------------------------------------------------------------  RAD: CT ABDOMEN PELVIS WO CONTRAST Additional Contrast? None    Result Date: 4/8/2022  EXAMINATION: CT OF THE ABDOMEN AND PELVIS WITHOUT CONTRAST 4/7/2022 11:45 pm TECHNIQUE: CT of the abdomen and pelvis was performed without the administration of intravenous contrast. Multiplanar reformatted images are provided for review. Dose modulation, iterative reconstruction, and/or weight based adjustment of the mA/kV was utilized to reduce the radiation dose to as low as reasonably achievable. COMPARISON: None.  HISTORY: ORDERING SYSTEM PROVIDED HISTORY: Lower abdominal pain TECHNOLOGIST PROVIDED HISTORY: Reason for exam:->Lower abdominal pain Additional Contrast?->None Decision Support Exception - unselect if not a suspected or confirmed emergency medical condition->Emergency Medical Condition (MA) What reading provider will be dictating this exam?->CRC FINDINGS: Lower Chest: Small pleural effusions with adjacent atelectasis. Small pericardial effusion. Organs: Lobulated hepatic contour. The spleen, adrenal glands, pancreas and gallbladder are normal.  Right renal atrophy. GI/Bowel: Colonic fecal retention. Normal small bowel. Pelvis: Paredes catheter in a decompressed urinary bladder. Peritoneum/Retroperitoneum: Small amount of free fluid in the upper abdomen. Bones/Soft Tissues: Mild degenerative changes thoracic spine. Colonic fecal retention with mild distension of the sigmoid colon. Cirrhotic morphology of the liver. Small volume upper abdominal ascites. Bilateral pleural effusions with adjacent atelectasis. Small pericardial effusion. Right renal atrophy. XR CHEST PORTABLE    Result Date: 4/7/2022  EXAMINATION: ONE XRAY VIEW OF THE CHEST 4/7/2022 8:50 pm COMPARISON: Chest series from April 4, 2022 HISTORY: ORDERING SYSTEM PROVIDED HISTORY: CHF TECHNOLOGIST PROVIDED HISTORY: Reason for exam:->CHF What reading provider will be dictating this exam?->CRC FINDINGS: Left anterior chest wall cardiac support device with stable position of distal leads. Atherosclerotic disease. Cardiac silhouette appears within normal limits in size. Stable opacification of the right mid to lower lung. Trace left pleural effusion. No pneumothorax. Osseous and thoracic soft tissue structures demonstrate no acute findings. Stable moderate-sized right pleural effusion and trace left pleural effusion. Stable atherosclerotic disease and left anterior chest wall cardiac support device. No new cardiopulmonary pathology.  RECOMMENDATION: Follow-up to resolution suggested         Objective:   Vitals:   Vitals:    04/09/22 0803   BP: 101/65   Pulse: 75   Resp:    Temp:    SpO2:      Patient Vitals for the past 24 hrs:   BP Temp Temp src Pulse Resp SpO2 Weight   04/09/22 0803 101/65 -- -- 75 -- -- --   04/09/22 0745 115/73 -- -- 83 -- -- --   04/09/22 0731 115/73 -- -- 77 -- -- --   04/09/22 0725 105/73 98 °F (36.7 °C) -- 88 -- -- 274 lb 4 oz (124.4 kg)   04/08/22 2213 -- -- -- -- 16 -- --   04/08/22 1959 107/76 97.5 °F (36.4 °C) Oral 89 16 100 % --   04/08/22 1910 113/73 97.7 °F (36.5 °C) -- 91 17 100 % --   04/08/22 1719 130/79 97.6 °F (36.4 °C) -- 86 -- -- --   04/08/22 1700 126/78 -- -- 86 -- -- --   04/08/22 1630 (!) 89/47 -- -- 86 -- -- --   04/08/22 1606 (!) 92/54 -- -- 87 -- -- --   04/08/22 1530 (!) 112/50 -- -- 93 -- -- --   04/08/22 1500 (!) 116/55 -- -- 92 -- -- --   04/08/22 1430 (!) 104/58 -- -- 86 -- -- --   04/08/22 1400 108/69 -- -- 75 -- -- --   04/08/22 1319 98/63 -- -- 86 -- -- --   04/08/22 1310 105/64 97.7 °F (36.5 °C) -- 85 18 -- --   04/08/22 1230 112/79 -- -- 85 16 100 % --   04/08/22 1155 -- -- -- -- 17 -- --   04/08/22 1102 105/65 -- -- 89 -- -- --   04/08/22 1030 89/68 -- -- 86 17 100 % --   04/08/22 0900 97/72 -- -- 89 17 100 % --   04/08/22 0840 -- -- -- -- (!) 33 -- --   04/08/22 0830 109/74 -- -- 87 25 -- --     General appearance: alert, appears stated age and cooperative  Skin: Skin color, texture, turgor normal. No rashes or lesions  HEENT: Head: Normocephalic, no lesions, without obvious abnormality. Neck: no adenopathy, no carotid bruit, no JVD, supple, symmetrical, trachea midline and thyroid not enlarged, symmetric, no tenderness/mass/nodules  Lungs: decreased at bases R>L  Heart: regular rate and rhythm, S1, S2 normal, no murmur, click, rub or gallop  Abdomen: soft, non-tender; bowel sounds normal; no masses,  no organomegaly  Extremities: 1+ lower extremity edema  Neurologic: Mental status: Alert, oriented, thought content appropriate      Assessment/Plan:   1.  Chronic kidney disease stage IV  due to diabetes mellitus and hypertension  Cr usual baseline 2.5-3  Pericardial effusion uremia contributing (Will need to repeat echo in 1-2 weeks)  Frequent admission for CHF exacerbation in the presence of advanced CKD  HD initiated 4/1/22  L CFV tunnel hemodialysis catheter placement performed 4/4/22 4/9/2022: UF as tolerated today - plan for next hemodialysis on Monday     2. HFpEF  UF as tolerated with HD  r pleural effusion tapped 3/15 and 3/30     3. HTN of CKD  On midodrine and metoprolol     4. Anemia  beni protocol if hgb <10     5. SOB  UF as tolerated with HD  Consider adding midodrine  Pulmonary to follow as well           GILMAR Pizano - CNP     Patient seen and examined all key components of the physical performed independently , case discussed with NP, all pertinent labs and radiologic tests personally reviewed agree with above. Requiring only minimal fluid removal of less than 2 L on initial presentation. Shortness of breath much improved. Cleo Dash MD     Department of Internal Medicine  Section of Nephrology  Dialysis Note        PROCEDURE:  Patient seen on hemodialysis at 7:11 PM    PHYSICIAN:  Zakiya Gandara M.D., [facility]    INDICATION:  End-stage renal disease    RX:  See dialysis flowsheet for specifics on access, blood flow rate, dialysate baths, duration of dialysis, anticoagulation and other technical information.     COMMENTS:  Procedure in progress and tolerated       Akua Rhoades MD, MD

## 2022-04-09 NOTE — FLOWSHEET NOTE
04/09/22 1131   Vital Signs   BP (!) 109/58   Temp 98.3 °F (36.8 °C)   Pulse 78   Weight 267 lb 13.7 oz (121.5 kg)   Weight Method Bed scale   Percent Weight Change -2.33   Post-Hemodialysis Assessment   Post-Treatment Procedures Blood returned;Catheter capped, clamped and heparinized x 2 ports   Machine Disinfection Process Exterior Machine Disinfection   Rinseback Volume (ml) 300 ml   Total Liters Processed (l/min) 75.8 l/min   Dialyzer Clearance Lightly streaked   Duration of Treatment (minutes) 240 minutes   Heparin amount administered during treatment (units) 0 units   Hemodialysis Intake (ml) 300 ml   Hemodialysis Output (ml) 2300 ml   NET Removed (ml) 2000 ml   Tolerated Treatment Good   Patient Response to Treatment tolerated well, blood returned, cath care per policy/procedure, lines flushed, heparin instilled, ports capped

## 2022-04-09 NOTE — PROGRESS NOTES
Progress Note  Date:2022       YUGD:[id number]  Patient Jalen Ho     YOB: 1945     Age:77 y.o. Patient says breathing is improved today. Subjective    Subjective:  Symptoms:  Improved. He reports shortness of breath. No chest pain. Diet:  Adequate intake. Activity level: Impaired due to weakness. Pain:  He reports no pain. Review of Systems   Constitutional: Negative for activity change and fever. HENT: Negative for congestion. Respiratory: Positive for shortness of breath. Cardiovascular: Positive for leg swelling. Negative for chest pain. Gastrointestinal: Negative for abdominal pain. Neurological: Negative for dizziness. Psychiatric/Behavioral: Negative for agitation. Objective         Vitals Last 24 Hours:  TEMPERATURE:  Temp  Av.6 °F (36.4 °C)  Min: 97.5 °F (36.4 °C)  Max: 97.7 °F (36.5 °C)  RESPIRATIONS RANGE: Resp  Av  Min: 16  Max: 33  PULSE OXIMETRY RANGE: SpO2  Av %  Min: 100 %  Max: 100 %  PULSE RANGE: Pulse  Av.7  Min: 75  Max: 93  BLOOD PRESSURE RANGE: Systolic (37BMC), FIB:161 , Min:89 , RWF:424   ; Diastolic (45IIB), WF, Min:47, Max:79    I/O (24Hr): Intake/Output Summary (Last 24 hours) at 2022 0650  Last data filed at 2022 2157  Gross per 24 hour   Intake 300 ml   Output 2400 ml   Net -2100 ml     Objective:  General Appearance:  Comfortable. Vital signs: (most recent): Blood pressure 107/76, pulse 89, temperature 97.5 °F (36.4 °C), temperature source Oral, resp. rate 16, height 5' 9\" (1.753 m), weight 263 lb (119.3 kg), SpO2 100 %. No fever. Lungs:  Normal effort and normal respiratory rate. Breath sounds clear to auscultation. Heart: Normal rate. Regular rhythm. S1 normal and S2 normal.    Extremities: There is local swelling.       Labs/Imaging/Diagnostics    Labs:  CBC:  Recent Labs     22  0514   WBC 11.0 10.4   RBC 3.73* 3.61*   HGB 10.7* 10.5*   HCT 34.6* 33.9*   MCV 92.8 93.9   RDW 17.6* 17.7*    229     CHEMISTRIES:  Recent Labs     22  0514    138   K 5.0 4.0    101   CO2 26 25   BUN 40* 30*   CREATININE 2.5* 2.0*   GLUCOSE 154* 84     PT/INR:No results for input(s): PROTIME, INR in the last 72 hours. APTT:No results for input(s): APTT in the last 72 hours. LIVER PROFILE:  Recent Labs     22   AST 32   ALT 28   BILITOT 0.4   ALKPHOS 106       Imaging Last 24 Hours:  XR CHEST PORTABLE    Result Date: 2022  EXAMINATION: ONE XRAY VIEW OF THE CHEST 2022 4:49 pm COMPARISON: 2022 HISTORY: ORDERING SYSTEM PROVIDED HISTORY: shortness of breath TECHNOLOGIST PROVIDED HISTORY: Reason for exam:->shortness of breath What reading provider will be dictating this exam?->CRC FINDINGS: There is a large opacity seen within the right lung base. There is a small right pleural effusion. The left upper lobe is clear. There is a small left pleural effusion. The cardiac silhouette is within normals. There is a dual lead cardiac pacer on the left. 1. Large opacity within the right lung base which could represent pneumonia and or atelectasis. 2. Moderate size right pleural effusion. 3. Small left pleural effusion. 4. CT of the thorax is recommended for further evaluation. US DUP LOWER EXTREMITIES BILATERAL VENOUS    Result Date: 2022  Patient MRN:  [id number] : 1945 Age: 65 years Gender: Male Order Date:  2022 5:28 PM EXAM: US DUP LOWER EXTREMITIES BILATERAL VENOUS NUMBER OF IMAGES:  52 INDICATION:  leg swelling leg swelling What reading provider will be dictating this exam?->[facility] COMPARISON: None Within the visualized vessels, there is no evidence for deep venous thrombosis There is good compressibility, there is good augmentation, there is good color flow.      Within the visualized vessels there is no evidence for deep venous thrombosis     Assessment//Plan           Hospital Problems Last Modified POA    * (Principal) Respiratory failure ([facility] [address] 4/8/2022 Yes        Assessment:  (   (Principal) Acute on chronic clinical systolic heart failure ([facility] [address] 3/14/2022 Yes     Acute on chronic hypoxic respiratory failure. CHF  Pleural effusion  CKD IV  DM  COPD  HTN  Hyperlipidemia       ). Plan:   (Dialysis per Renal.  Monitor labs and output. Continue meds. PT/OT).

## 2022-04-09 NOTE — PLAN OF CARE
Problem: Skin Integrity:  Goal: Absence of new skin breakdown  Description: Absence of new skin breakdown  4/9/2022 1336 by Love Miller RN  Outcome: Met This Shift  4/8/2022 2350 by Cherie Breen RN  Outcome: Ongoing     Problem: Safety:  Goal: Free from accidental physical injury  Description: Free from accidental physical injury  4/9/2022 1336 by Love Miller RN  Outcome: Met This Shift  4/8/2022 2350 by Cherie Breen RN  Outcome: Ongoing     Problem: Daily Care:  Goal: Daily care needs are met  Description: Daily care needs are met  4/9/2022 1336 by Love Miller RN  Outcome: Met This Shift  4/8/2022 2350 by Cherie Breen RN  Outcome: Ongoing

## 2022-04-10 NOTE — PROGRESS NOTES
VASCULAR SURGERY BRIEF PROGRESS NOTE    Informed that patient's left femoral Tesio line was oozing with clot noted. Area was inspected, no visible oozing. Significant clot removed. One 2-0 silk U-stitch placed at insertion site to prevent further oozing. Patient tolerated well. Rj Maria MD  General Surgery PGY-2      I stopped by prior to the resident placing the stitch. He has a femoral line. There is a little bit of oozing. There is no hematoma the line is in place.     Stitch was placed per the resident    Dulce George

## 2022-04-10 NOTE — PROGRESS NOTES
Progress Note  Date:4/10/2022       RUST:[id number]  Patient Alec Ceballos     YOB: 1945     Age:77 y.o. Patient says breathing is improved today. Subjective    Subjective:  Symptoms:  Improved. He reports shortness of breath. No chest pain. Diet:  Adequate intake. Activity level: Impaired due to weakness. Pain:  He reports no pain. Review of Systems   Constitutional: Negative for activity change and fever. HENT: Negative for congestion. Respiratory: Positive for shortness of breath. Cardiovascular: Positive for leg swelling. Negative for chest pain. Gastrointestinal: Negative for abdominal pain. Neurological: Negative for dizziness. Psychiatric/Behavioral: Negative for agitation. Objective         Vitals Last 24 Hours:  TEMPERATURE:  Temp  Av.8 °F (36.6 °C)  Min: 97.2 °F (36.2 °C)  Max: 98.3 °F (36.8 °C)  RESPIRATIONS RANGE: Resp  Av.3  Min: 16  Max: 18  PULSE OXIMETRY RANGE: SpO2  Av %  Min: 97 %  Max: 97 %  PULSE RANGE: Pulse  Av.2  Min: 71  Max: 85  BLOOD PRESSURE RANGE: Systolic (91EGD), ANAND:795 , Min:84 , YGW:604   ; Diastolic (26ODU), GLN:33, Min:58, Max:76    I/O (24Hr): Intake/Output Summary (Last 24 hours) at 4/10/2022 0738  Last data filed at 2022 2223  Gross per 24 hour   Intake 670 ml   Output 2425 ml   Net -1755 ml     Objective:  General Appearance:  Comfortable. Vital signs: (most recent): Blood pressure 102/64, pulse 71, temperature 97.2 °F (36.2 °C), temperature source Axillary, resp. rate 18, height 5' 9\" (1.753 m), weight 270 lb 1.6 oz (122.5 kg), SpO2 97 %. No fever. Lungs:  Normal effort and normal respiratory rate. Breath sounds clear to auscultation. Heart: Normal rate. Regular rhythm. S1 normal and S2 normal.    Extremities: There is local swelling.       Labs/Imaging/Diagnostics    Labs:  CBC:  Recent Labs     22  0514 04/10/22  0530   WBC 11.0 10.4 12.1*   RBC 3.73* vessels there is no evidence for deep venous thrombosis     Assessment//Plan           Hospital Problems           Last Modified POA    * (Principal) Respiratory failure ([facility] [address] 4/8/2022 Yes        Assessment:  (   (Principal) Acute on chronic clinical systolic heart failure ([facility] [address] 3/14/2022 Yes     Acute on chronic hypoxic respiratory failure. CHF  Pleural effusion  CKD IV  DM  COPD  HTN  Hyperlipidemia       ). Plan:   (Dialysis per Renal.  Monitor labs and output. Continue meds. PT/OT).

## 2022-04-10 NOTE — PROGRESS NOTES
NEPHROLOGY Attending   Progress Note  4/10/2022 9:21 AM  Subjective:   Admit Date: 4/7/2022  PCP: Ronel Ball MD    HPI (from 4/9):   Pt is a 64 yo male who left here 4/6 and returned 4/7 from a facility for sob. He has a pmh of copd, depression, mame, htn, hyperlipidemia, dm, HFpEF, pacer who started dialysis last admission for ckd 5 and fluid overload. He had a thoracentesis on 3/30 on the right and on 3/15/22. Labs show na lab [facility] Dies or recover travel to now where you were plenty busy this weekend k 5 co2 26 bun 40 wbc 11 hgb 10.7, plt 202. Right pleural effusion persists on cxr and ct. He is to have dialysis mwf. He is now seen on hd. He said the sob came on last night and he was fine before that. He thinks it is a panic attack. Interval History:     4/10/22: Tolerated UF well last evening for a net UF of 2.0 L - no adverse events overnight - small amount of bleeding at femoral line insertion site -not meeting of melanoma on the left with part vascular placed single suture      Diet: ADULT DIET;  Regular; Low Fat/Low Chol/High Fiber/DANIEL; Low Sodium (2 gm); 1500 ml    Data:   Scheduled Meds:   sodium chloride flush  5-40 mL IntraVENous 2 times per day    enoxaparin  40 mg SubCUTAneous Daily    aspirin  81 mg Oral Daily    atorvastatin  40 mg Oral Nightly    bumetanide  2 mg Oral Daily    buPROPion  300 mg Oral QAM    cyanocobalamin  1,000 mcg Oral Daily    docusate sodium  100 mg Oral BID    ezetimibe  10 mg Oral Nightly    folic acid  1 mg Oral Daily    insulin glargine-yfgn  50 Units SubCUTAneous Nightly    melatonin  3 mg Oral Nightly    metoprolol succinate  25 mg Oral BID    pantoprazole  40 mg Oral Daily    potassium chloride  20 mEq Oral Daily    vitamin C  500 mg Oral BID    vitamin D  2,000 Units Oral Daily    zinc sulfate  50 mg Oral Daily    insulin lispro  0-6 Units SubCUTAneous TID WC    insulin lispro  0-3 Units SubCUTAneous Nightly     Continuous Infusions:   dextrose      sodium chloride       PRN Meds:dextrose, dextrose, sodium chloride flush, sodium chloride, acetaminophen, ondansetron **OR** ondansetron, guaiFENesin-dextromethorphan, guaiFENesin, ipratropium-albuterol, loperamide, magnesium hydroxide, glucose, glucagon (rDNA), midodrine    Intake/Output Summary (Last 24 hours) at 4/10/2022 0921  Last data filed at 4/9/2022 2223  Gross per 24 hour   Intake 670 ml   Output 2425 ml   Net -1755 ml     CBC:   Recent Labs     04/07/22  2000 04/09/22  0514 04/10/22  0530   WBC 11.0 10.4 12.1*   HGB 10.7* 10.5* 10.3*    229 233     BMP:    Recent Labs     04/07/22 2000 04/09/22 0514 04/10/22  0530    138 142   K 5.0 4.0 4.4    101 101   CO2 26 25 26   BUN 40* 30* 25*   CREATININE 2.5* 2.0* 2.4*   GLUCOSE 154* 84 62*     Hepatic:   Recent Labs     04/07/22 2000   AST 32   ALT 28   BILITOT 0.4   ALKPHOS 106     Troponin: No results for input(s): TROPONINI in the last 72 hours. BNP: No results for input(s): BNP in the last 72 hours. Lipids: No results for input(s): CHOL, HDL in the last 72 hours. Invalid input(s): LDLCALCU  ABGs: No results found for: PHART, PO2ART, VBW0EHI  INR: No results for input(s): INR in the last 72 hours. -----------------------------------------------------------------  RAD: CT ABDOMEN PELVIS WO CONTRAST Additional Contrast? None    Result Date: 4/8/2022  EXAMINATION: CT OF THE ABDOMEN AND PELVIS WITHOUT CONTRAST 4/7/2022 11:45 pm TECHNIQUE: CT of the abdomen and pelvis was performed without the administration of intravenous contrast. Multiplanar reformatted images are provided for review. Dose modulation, iterative reconstruction, and/or weight based adjustment of the mA/kV was utilized to reduce the radiation dose to as low as reasonably achievable. COMPARISON: None.  HISTORY: ORDERING SYSTEM PROVIDED HISTORY: Lower abdominal pain TECHNOLOGIST PROVIDED HISTORY: Reason for exam:->Lower abdominal pain Additional Contrast?->None Decision Support Exception - unselect if not a suspected or confirmed emergency medical condition->Emergency Medical Condition (MA) What reading provider will be dictating this exam?->CRC FINDINGS: Lower Chest: Small pleural effusions with adjacent atelectasis. Small pericardial effusion. Organs: Lobulated hepatic contour. The spleen, adrenal glands, pancreas and gallbladder are normal.  Right renal atrophy. GI/Bowel: Colonic fecal retention. Normal small bowel. Pelvis: Paredes catheter in a decompressed urinary bladder. Peritoneum/Retroperitoneum: Small amount of free fluid in the upper abdomen. Bones/Soft Tissues: Mild degenerative changes thoracic spine. Colonic fecal retention with mild distension of the sigmoid colon. Cirrhotic morphology of the liver. Small volume upper abdominal ascites. Bilateral pleural effusions with adjacent atelectasis. Small pericardial effusion. Right renal atrophy. XR CHEST PORTABLE    Result Date: 4/7/2022  EXAMINATION: ONE XRAY VIEW OF THE CHEST 4/7/2022 8:50 pm COMPARISON: Chest series from April 4, 2022 HISTORY: ORDERING SYSTEM PROVIDED HISTORY: CHF TECHNOLOGIST PROVIDED HISTORY: Reason for exam:->CHF What reading provider will be dictating this exam?->CRC FINDINGS: Left anterior chest wall cardiac support device with stable position of distal leads. Atherosclerotic disease. Cardiac silhouette appears within normal limits in size. Stable opacification of the right mid to lower lung. Trace left pleural effusion. No pneumothorax. Osseous and thoracic soft tissue structures demonstrate no acute findings. Stable moderate-sized right pleural effusion and trace left pleural effusion. Stable atherosclerotic disease and left anterior chest wall cardiac support device. No new cardiopulmonary pathology.  RECOMMENDATION: Follow-up to resolution suggested         Objective:   Vitals:   Vitals:    04/10/22 0415   BP: 102/64   Pulse:    Resp: 18 Temp: 97.2 °F (36.2 °C)   SpO2:      Patient Vitals for the past 24 hrs:   BP Temp Temp src Pulse Resp SpO2 Weight   04/10/22 0523 -- -- -- -- -- -- 270 lb 1.6 oz (122.5 kg)   04/10/22 0415 102/64 97.2 °F (36.2 °C) Axillary -- 18 -- --   04/09/22 2303 (!) 84/58 97.9 °F (36.6 °C) Oral 71 16 97 % --   04/09/22 1232 106/76 97.7 °F (36.5 °C) Oral 85 18 97 % --   04/09/22 1131 (!) 109/58 98.3 °F (36.8 °C) -- 78 -- -- 267 lb 13.7 oz (121.5 kg)   04/09/22 1101 106/61 -- -- 79 -- -- --   04/09/22 1033 (!) 107/58 -- -- 79 -- -- --   04/09/22 1003 107/65 -- -- 78 -- -- --   04/09/22 0933 109/71 -- -- 77 -- -- --     General appearance: alert, appears stated age and cooperative  Skin: Skin color, texture, turgor normal. No rashes or lesions  HEENT: Head: Normocephalic, no lesions, without obvious abnormality. Neck: no adenopathy, no carotid bruit, no JVD, supple, symmetrical, trachea midline and thyroid not enlarged, symmetric, no tenderness/mass/nodules  Lungs: decreased at bases R>L  Heart: regular rate and rhythm, S1, S2 normal, no murmur, click, rub or gallop  Abdomen: soft, non-tender; bowel sounds normal; no masses,  no organomegaly  Extremities: 1+ lower extremity edema  Neurologic: Mental status: Alert, oriented, thought content appropriate      Assessment/Plan:   1.  Chronic kidney disease stage IV  due to diabetes mellitus and hypertension  Cr usual baseline 2.5-3  Pericardial effusion uremia contributing (Will need to repeat echo in 1-2 weeks)  Frequent admission for CHF exacerbation in the presence of advanced CKD  HD initiated 4/1/22  L CFV tunnel hemodialysis catheter placement performed 4/4/22 4/9/2022: UF   plan for next hemodialysis on Monday  D/C planning: He is new to  [facility], however has not been seen there yet d/t discharging from the hospital on 4/6 and being admitted again 4/8/22 - scheduled days are MWF w/ chair time tentatively 1340.     2. HFpEF  UF as tolerated with HD  r pleural effusion tapped 3/15 and 3/30     3. HTN of CKD  On midodrine and metoprolol     4. Anemia  beni protocol if hgb <10     5. SOB  UF as tolerated with HD  Consider adding midodrine to aid in fluid removal             Gary GILMAR Granado - CNP     Patient seen and examined all key components of the physical performed independently , case discussed with NP, all pertinent labs and radiologic tests personally reviewed agree with above.     Seen by Pulmonary, input noted    OK to discharge from a Renal standpoint    Yuni Abdul MD

## 2022-04-10 NOTE — CONSULTS
[facility]  Department of Internal Medicine  Division of Pulmonary, Critical Care and Sleep Medicine  Consult Note    Sisi Crump DO, MPH, Annalee Parra MD, [address] DO, [facility]      Patient: Roseann Parra  MRN: [id number]  : 1945    Encounter Time: 4:10 PM     Date of Admission: 2022  7:44 PM    Primary Care Physician: Catie Yoon MD    Reason for Consultation: Right-sided pleural effusion, shortness of breath. HISTORY OF PRESENT ILLNESS : Roseann Parra 65 y.o. male was seen in consultation regarding the above chief compliant. The patient has been seen by myself and my partners multiple times recently he was discharged on  and return to our facility on . He reports that while at the facility he noticed the bubbler on his oxygen was not bubbling he thinks that his shortness of breath was related to an anxiety attack. He is unsure of whether or not the oxygen may have actually been disconnected. He states that he had difficulty getting somebody to come back quickly to check on his and his breathing got worse and worse. He was brought to the emergency room a CT of the abdomen and pelvis was completed due to unclear reasons. The patient states that he has not had any abdominal pain or constipation. He also did have a chest x-ray done which shows a stable right sided pleural effusion this right-sided pleural effusion has remained stable since the thoracentesis I completed on . Currently he denies any shortness of breath. He does have his home CPAP. He states that he did not wear the hospital CPAP last night because he felt it was uncomfortable. He denies any missed dialysis. He does state that the swelling in his legs is down significantly and his feet are no longer painful.       PAST MEDICAL HISTORY:  has a past medical history of Acid reflux, Agent orange exposure, Arthritis, CAD (coronary artery disease), Congestive heart failure (CHF) ([facility]), COPD (chronic obstructive pulmonary disease) ([facility] [address] Depression, Diabetes mellitus ([facility] [facility] History of blood transfusion, Hyperlipidemia, Hypertension, MI (myocardial infarction) ([facility] [facility] Polio, and Sleep apnea. SURGICAL HISTORY:  has a past surgical history that includes Coronary angioplasty with stent; Cardiac pacemaker placement; pacemaker placement; hernia repair; and vascular surgery (N/A, 4/4/2022). SOCIAL HISTORY:  reports that he has quit smoking. He has never used smokeless tobacco. He reports that he does not drink alcohol and does not use drugs. FAMILY  HISTORY: family history is not on file. MEDICATIONS:    Prior to Admission medications    Medication Sig Start Date End Date Taking?  Authorizing Provider   bumetanide (BUMEX) 2 MG tablet Take 1 tablet by mouth daily 4/6/22   Cristopher Morris MD   docusate sodium (COLACE) 100 MG capsule Take 1 capsule by mouth 2 times daily 3/22/22   Cristopher Morris MD   guaiFENesin 400 MG tablet Take 400 mg by mouth 4 times daily as needed for Cough    Historical Provider, MD   ipratropium-albuterol (DUONEB) 0.5-2.5 (3) MG/3ML SOLN nebulizer solution Take 1 vial by nebulization every 4 hours as needed for Shortness of Breath    Historical Provider, MD   potassium chloride (KLOR-CON M) 10 MEQ extended release tablet Take 20 mEq by mouth daily    Historical Provider, MD   insulin glargine (LANTUS SOLOSTAR) 100 UNIT/ML injection pen Inject 50 Units into the skin nightly    Historical Provider, MD   loperamide (IMODIUM) 2 MG capsule Take 2 mg by mouth 4 times daily as needed for Diarrhea    Historical Provider, MD   buPROPion (WELLBUTRIN XL) 300 MG extended release tablet Take 300 mg by mouth every morning    Historical Provider, MD   ezetimibe (ZETIA) 10 MG tablet Take 10 mg by mouth at bedtime    Historical Provider, MD   Zinc Sulfate 110 MG TABS Take 2 tablets by mouth daily Historical Provider, MD   magnesium hydroxide (MILK OF MAGNESIA) 400 MG/5ML suspension Take 30 mLs by mouth daily as needed for Constipation    Historical Provider, MD   Dextromethorphan-guaiFENesin  MG/5ML SYRP Take 5 mLs by mouth every 4 hours as needed for Cough    Historical Provider, MD   melatonin 3 MG TABS tablet Take 3 mg by mouth at bedtime    Historical Provider, MD   insulin lispro, 1 Unit Dial, (HUMALOG KWIKPEN) 100 UNIT/ML SOPN Inject 0-10 Units into the skin 4 times daily (before meals and nightly) *Per Sliding Scale*    Historical Provider, MD   metoprolol succinate (TOPROL XL) 25 MG extended release tablet Take 1 tablet by mouth 2 times daily 2/16/22   GILMAR Wilson - CNP   acetaminophen (TYLENOL) 325 MG tablet Take 650 mg by mouth every 4 hours as needed for Pain or Fever     Historical Provider, MD   vitamin C (ASCORBIC ACID) 500 MG tablet Take 500 mg by mouth 2 times daily    Historical Provider, MD   vitamin D (CHOLECALCIFEROL) 50 MCG (2000 UT) TABS tablet Take 2,000 Units by mouth daily     Historical Provider, MD   aspirin 81 MG EC tablet Take 1 tablet by mouth daily 2/3/22   Lily Malik MD   folic acid (FOLVITE) 1 MG tablet Take 1 mg by mouth daily    Historical Provider, MD   cyanocobalamin 1000 MCG tablet Take 1,000 mcg by mouth daily    Historical Provider, MD   atorvastatin (LIPITOR) 40 MG tablet Take 40 mg by mouth at bedtime     Historical Provider, MD   pantoprazole (PROTONIX) 40 MG tablet Take 40 mg by mouth daily    Historical Provider, MD       ALLERGIES: Penicillins       REVIEW OF SYSTEMS:  Otherwise negative if not reported or listed below  Constitutional:  Denies weight loss or gain     Positive for difficulty sleeping     No fevers, chills or rigors. Eyes:    No visual changes or diplopia. No scleral icterus. ENT:    No Headaches, hearing loss or vertigo. No mouth sores or sore throat. Cardiovascular:  Denies chest tightness.   Denies palpitations  Respiratory:  Currently denies shortness of breath. Denies cough denies orthopnea. No sputum production. Gastrointestinal:  No abdominal pain, appetite loss, blood in stools. No hematemesis  Genitourinary:  No dysuria, trouble voiding or hematuria. No nocturia. Musculoskeletal:   No weakness or joint complaints. Integumentary: No rashes or pruritis. Neurological:  No headache, numbness or tingling. Psychiatric:   Positive for depression  Endocrine:   No excessive thirst, fluid intake, or urination. No tremor. Hematologic: No abnormal bruising or bleeding. Lymphatic:  No swollen lymph nodes. Immunologic:  No hives or hx of anaphaxsis. OBJECTIVE:     PHYSICAL EXAM:   VITALS:   Vitals:    04/09/22 2303 04/10/22 0415 04/10/22 0523 04/10/22 0940   BP: (!) 84/58 102/64  92/66   Pulse: 71   84   Resp: 16 18  16   Temp: 97.9 °F (36.6 °C) 97.2 °F (36.2 °C)  97.7 °F (36.5 °C)   TempSrc: Oral Axillary  Tympanic   SpO2: 97%   99%   Weight:   270 lb 1.6 oz (122.5 kg)    Height:            Intake/Output Summary (Last 24 hours) at 4/10/2022 1610  Last data filed at 4/10/2022 1422  Gross per 24 hour   Intake 730 ml   Output 50 ml   Net 680 ml        CONSTITUTIONAL:   A&O x 3, no distress  SKIN:     No rash, No suspicious lesions or skin discoloration  HEENT:     EOMI, MMM, No thrush  NECK:    No bruits, no JVD  CV:      Sinus,  No murmur, No rubs, No gallops  PULMONARY:   Breath sounds are clear in upper lobes diminished in bases bilaterally. ABDOMEN:     Soft, non-tender. BS normal. No R/R/G  EXT:    No deformities . No clubbing. lower extremity edema remains present however is significantly reapproved from the last time I had seen him  PULSE:   Appears equal and palpable.   PSYCHIATRIC:  Seems appropriate, No acute psycosis  MS:    No fractures, No gross weakness  NEUROLOGIC:   The clinical assessment is non-focal     DATA: IMAGING & TESTING:     LABORATORY TESTS:    CBC with Differential:    Lab Results   Component Value Date    WBC 12.1 04/10/2022    RBC 3.58 04/10/2022    HGB 10.3 04/10/2022    HCT 34.1 04/10/2022     04/10/2022    MCV 95.3 04/10/2022    MCH 28.8 04/10/2022    MCHC 30.2 04/10/2022    RDW 17.4 04/10/2022    LYMPHOPCT 20.3 04/07/2022    MONOPCT 12.6 04/07/2022    BASOPCT 0.5 04/07/2022    MONOSABS 1.39 04/07/2022    LYMPHSABS 2.23 04/07/2022    EOSABS 0.32 04/07/2022    BASOSABS 0.06 04/07/2022     CMP:    Lab Results   Component Value Date     04/10/2022    K 4.4 04/10/2022    K 5.0 04/07/2022     04/10/2022    CO2 26 04/10/2022    BUN 25 04/10/2022    CREATININE 2.4 04/10/2022    GFRAA 32 04/10/2022    LABGLOM 26 04/10/2022    GLUCOSE 62 04/10/2022    PROT 6.4 04/07/2022    LABALBU 3.2 04/07/2022    CALCIUM 8.5 04/10/2022    BILITOT 0.4 04/07/2022    ALKPHOS 106 04/07/2022    AST 32 04/07/2022    ALT 28 04/07/2022        PRO-BNP:   Lab Results   Component Value Date    PROBNP 1,832 (H) 04/07/2022    PROBNP 2,428 (H) 03/28/2022      ABGs:   Lab Results   Component Value Date    PH 7.420 04/07/2022    PO2 207.7 04/07/2022    PCO2 36.8 04/07/2022     Hemoglobin A1C: No components found for: HGBA1C    IMAGING:  Imaging tests were completed and reviewed and discussed radiology and care team involved and reveals   Echo Limited    Result Date: 3/19/2022  Transthoracic Echocardiography Report (TTE)  Demographics   Patient Name    Mei Ty  Gender            Male                  [address]  [id number]      Room Number       9878  Number   Account #       [de-identified]     Procedure Date    03/19/2022   Corporate ID                  Ordering          Eris Gabriel MD                                Physician   Accession       [id number]    Referring  Number                        Physician   Date of Birth   1945    Sonographer       Yolanda Bowers [facility]   Age             65 year(s)    Interpreting      [address] Physician         Physician Cardiology                                                  Gwyn Londono MD                                 Any Other  Procedure Type of Study   TTE procedure:Echo Limited Study. Procedure Date Date: 03/19/2022 Start: 08:53 AM Study Location: Portable Technical Quality: Adequate visualization Indications:Pericardial effusion. Patient Status: Routine Height: 69 inches Weight: 271 pounds BSA: 2.35 m^2 BMI: 40.02 kg/m^2 BP: 95/68 mmHg  Findings   Left Ventricle  Left ventricle is grossly normal in size. No gross regional wall motion abnormalities seen. Grossly normal left ventricular ejection fraction. Indeterminate diastolic function. Right Ventricle  The right ventricle was not clearly visualized. Grossly normal right ventricular size and function. Pacer wire visualized in right ventricle. Left Atrium  Normal sized left atrium. Interatrial septum appears intact. Right Atrium  Normal right atrium size. Pacemaker lead was visualized in RA cavity. Mitral Valve  Structurally normal mitral valve. Tricuspid Valve  The tricuspid valve was not well visualized. Aortic Valve  The aortic valve leaflets were not well visualized. Pulmonic Valve  The pulmonic valve was not well visualized. Pericardial Effusion  There is a small anterior pericardial effusion noted. Aorta  Aorta was not clearly visualized. Conclusions   Summary  Technically suboptimal and limited study with images limited to subcostal  window. Left ventricle is grossly normal in size. No gross regional wall motion abnormalities seen. Grossly normal left ventricular ejection fraction. There is a small anterior pericardial effusion noted.    Signature   ----------------------------------------------------------------  Electronically signed by Gwyn Londono MD(Interpreting  physician) on 03/19/2022 02:48 PM  ---------------------------------------------------------------- [email or web address] DocKey=[id number]    XR CHEST (2 VW)    Result Date: 3/28/2022  EXAMINATION: TWO XRAY VIEWS OF THE CHEST 3/28/2022 4:08 pm COMPARISON: 03/21/2022 HISTORY: ORDERING SYSTEM PROVIDED HISTORY: SOB TECHNOLOGIST PROVIDED HISTORY: Reason for exam:->SOB What reading provider will be dictating this exam?->CRC FINDINGS: The cardiac silhouette is within normals. There are findings of CHF. There are bilateral pleural effusions, right greater than left. The right pleural effusion is large in size. There is no pneumothorax. 1. Findings of CHF 2. Bilateral pleural effusions, right greater than left. The right pleural effusion is large     XR CHEST (2 VW)    Result Date: 2/28/2022  EXAMINATION: TWO XRAY VIEWS OF THE CHEST 2/28/2022 10:20 am COMPARISON: 2/23/2022 HISTORY: ORDERING SYSTEM PROVIDED HISTORY: follow up pleural effusions TECHNOLOGIST PROVIDED HISTORY: Reason for exam:->follow up pleural effusions What reading provider will be dictating this exam?->CRC FINDINGS: Two-view chest reveals pacer to be in satisfactory position. Patchy ill-defined opacifications in the left lung base with moderate size right pleural effusion and small left pleural effusion. The effusion on the right is slightly increased in size in the interval.     Slight increase seen in size of the right pleural effusion, now moderate in size and small in size on the left. Ill-defined opacification again seen within the lung bases bilaterally, right greater than left. XR CHEST PORTABLE    Result Date: 3/21/2022  EXAMINATION: ONE XRAY VIEW OF THE CHEST 3/21/2022 1:10 pm COMPARISON: Chest radiograph March 15, 2022 HISTORY: ORDERING SYSTEM PROVIDED HISTORY: sob TECHNOLOGIST PROVIDED HISTORY: Reason for exam:->sob What reading provider will be dictating this exam?->CRC FINDINGS: Left cardiac pacing device again identified. Bilateral moderate pleural effusions with compressive atelectasis. There is mild worsened compared to prior exam.  No pneumothorax. The cardiomediastinal silhouette is without acute process. The osseous structures are without acute process. Moderate bilateral pleural effusions are mildly worsened. XR CHEST PORTABLE    Result Date: 3/15/2022  EXAMINATION: ONE XRAY VIEW OF THE CHEST 3/15/2022 4:12 pm COMPARISON: 03/03/2022 HISTORY: ORDERING SYSTEM PROVIDED HISTORY: Thoracentesis TECHNOLOGIST PROVIDED HISTORY: Reason for exam:->Thoracentesis What reading provider will be dictating this exam?->CRC FINDINGS: Bibasilar opacities are seen, which appear somewhat less prominent than yesterday, however, that may be related differences in technique. The current study was obtained in a patel state of inspiration. The cardiomediastinal contour is unremarkable. Pacemaker in situ. No pneumothorax     1. Bibasilar opacities predominantly related to large pleural effusions, decreased as of yesterday. 2. In part at least, the decrease may be artifactual, related to differences in technique. On the right, the decrease is also attributable to recent thoracentesis. 3. No pneumothorax. XR CHEST PORTABLE    Result Date: 3/14/2022  EXAMINATION: ONE XRAY VIEW OF THE CHEST 3/14/2022 12:16 pm COMPARISON: 03/03/2022 and 02/28/2022 HISTORY: ORDERING SYSTEM PROVIDED HISTORY: shortness of breath TECHNOLOGIST PROVIDED HISTORY: Reason for exam:->shortness of breath What reading provider will be dictating this exam?->CRC FINDINGS: The cardiac silhouette is within normals. Bilateral lower lobe airspace disease is noted favored to represent atelectasis. There is a moderate right pleural effusion and small left pleural effusion. 1. Moderate right pleural effusion and small left pleural effusion 2. Bibasilar airspace disease favored to represent atelectasis secondary to the pleural effusions. .     XR CHEST PORTABLE    Result Date: 3/3/2022  EXAMINATION: ONE XRAY VIEW OF THE CHEST 3/3/2022 3:23 pm COMPARISON: 02/28/2022 HISTORY: ORDERING SYSTEM PROVIDED HISTORY: sob, vol overload TECHNOLOGIST PROVIDED HISTORY: Reason for exam:->sob, vol overload What reading provider will be dictating this exam?->CRC FINDINGS: Findings remain compatible with moderate bilateral pleural effusions greater on the right. Heart is normal in size. There is a left dual lead pacemaker. No evidence of pneumothorax. The osseous structures are stable. Unchanged moderate bilateral pleural effusions greater on the right. CTA PULMONARY W CONTRAST    Result Date: 3/14/2022  EXAMINATION: CTA OF THE CHEST 3/14/2022 3:28 pm TECHNIQUE: CTA of the chest was performed after the administration of intravenous contrast.  Multiplanar reformatted images are provided for review. MIP images are provided for review. Dose modulation, iterative reconstruction, and/or weight based adjustment of the mA/kV was utilized to reduce the radiation dose to as low as reasonably achievable. COMPARISON: None. HISTORY: ORDERING SYSTEM PROVIDED HISTORY: elevated dimer TECHNOLOGIST PROVIDED HISTORY: Reason for exam:->elevated dimer Decision Support Exception - unselect if not a suspected or confirmed emergency medical condition->Emergency Medical Condition (MA) What reading provider will be dictating this exam?->CRC FINDINGS: CTA chest exam is technically satisfactory. Pulmonary Arteries: Normal caliber. No PE. Lungs and pleura: Bilateral large pleural effusions with secondary bilateral passive atelectasis. Right middle lobe 2.6 cm thin walled cyst or bulla. No central obstructing lesion identified. Heart and mediastinum: Normal heart size. Coronary artery calcifications. Left subclavian transvenous pacemaker with right atrial and right ventricular leads. Trace pericardial effusion. No lymphadenopathy. The thoracic aorta is atherosclerotic and normal in caliber.  Upper abdomen: Limited visualization. Hepatic cirrhosis. Mild splenomegaly. Small ascites. Bones: No acute osseous abnormality. Chronic appearing Schmorl's node and concave compression deformity of the T3 superior endplate. 1.  No PE. 2.  Bilateral large pleural effusions with secondary bilateral passive atelectasis. 3.  Hepatic cirrhosis. Mild splenomegaly. Small ascites. US THORACENTESIS Which side should the procedure be performed? Right    Result Date: 3/15/2022  PROCEDURE: ULTRASOUNDGUIDED RIGHT THORACENTESIS 3/15/2022 HISTORY: ORDERING SYSTEM PROVIDED HISTORY: thoracentesis TECHNOLOGIST PROVIDED HISTORY: Call dr raya at 1 pm at [facility] [address] for exam:->thoracentesis Which side should the procedure be performed?->Right TECHNIQUE: Ultrasound utilized by Dr. Silvia Go for right-sided thoracentesis. FINDINGS: Ultrasound images show right and left pleural effusions. Ultrasound utilized for thoracentesis procedure. For additional information, please refer to operative report. Assessment:   1. Stable right-sided pleural effusion  2. Dyspnea with exertion  3. 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 History of SPEEDY    Plan:   1. Patient's pleural effusion remains stable from last admission. This has already been tapped on March 30. I would not recommend a repeat thoracentesis at this time as this is likely secondary to his renal failure, he has had multiple thoracentesis, and he is currently asymptomatic. It is unclear to me if the patient's shortness of breath may have been from his oxygen coming disconnected and likely an additional anxiety attack because of this. Would recommend treating for generalized anxiety. 2. Wean FiO2 as tolerated  3. Continue CPAP at at bedtime and with naps, okay to use home CPAP  4. HD as per nephrology.     Pito Campuzano DO   Pulmonary, Critical Care and Sleep Medicine

## 2022-04-10 NOTE — PLAN OF CARE
Problem: Skin Integrity:  Goal: Absence of new skin breakdown  Description: Absence of new skin breakdown  Outcome: Met This Shift     Problem: Infection:  Goal: Will remain free from infection  Description: Will remain free from infection  Outcome: Met This Shift     Problem: Pain:  Goal: Patient's pain/discomfort is manageable  Description: Patient's pain/discomfort is manageable  Outcome: Met This Shift

## 2022-04-11 NOTE — PROGRESS NOTES
Physical Therapy  Physical Therapy Initial Assessment     Name: Markie Hilton  : 1945  MRN: [id number]      Date of Service: 2022    Evaluating PT:  Jackie Rodriguez, PT, DPT [id number]     Room #:  9503/2391-I  Diagnosis:  Respiratory failure ([facility] [J96.90]  Acute on chronic diastolic CHF (congestive heart failure) ([facility] [I50.33]  Reason for admission: SOB   Precautions:  Falls, O2  Procedure/Surgery:  None   Equipment Recommendations:  FWW    SUBJECTIVE:  Pt admitted from [facility]. Pt ambulated with FWW PTA. OBJECTIVE:   Initial Evaluation  Date:  Treatment   Short Term/ Long Term   Goals   AM-PAC 6 Clicks      Was pt agreeable to Eval/treatment? Yes      Does pt have pain?  Denies      Bed Mobility  Rolling: NT  Supine to sit: Mariela  Sit to supine: Mariela  Scooting: MaxA to EOB  Independent    Transfers Sit to stand: MaxA x2  Stand to sit: MaxA  Stand pivot: NT  Mariela   Ambulation    side steps with [employer] ModA    >15 feet with [employer] Mariela   Stair negotiation: ascended and descended  NT  TBD     LE ROM: WFL    LE Strength:   knee ext: 4/5  ankle DF: 4/5    Balance:   Sitting static: Supervision  Sitting dynamic: Supervision  Standing static: Mariela [employer]  Standing dynamic: ModA [employer]    -Pt is A & O x 3  -Sensation:  Pt denies numbness and tingling to extremities  -Edema:  unremarkable     Therapeutic Exercises:  Functional activity     Patient education  Pt educated on safety, sequencing of transfers, and role of PT    Patient response to education:   Pt verbalized understanding Pt demonstrated skill Pt requires further education in this area   Yes  Partial  Yes      ASSESSMENT:  Conditions Requiring Skilled Therapeutic Intervention:  [x]Decreased strength     []Decreased ROM  [x]Decreased functional mobility  [x]Decreased balance   [x]Decreased endurance   []Decreased posture  []Decreased sensation  []Decreased coordination   []Decreased vision  [x]Decreased safety awareness   []Increased pain Comments:  Pt received supine in bed and agreeable to PT session   Pt is awake and alert, following all commands. He requires light assist to bring trunk to upright sitting position at bedside. Sitting balance was fair. Required substantial aid from two persons to stand from bedside. Did improve on second rep with increased momentum. Fair standing balance when supporting self with UEs on walker. Side steps completed with assist for balance. Returned to bed with assist to BLEs. Pt with all needs met and call light in reach. Pt would benefit from continued PT POC to address functional deficits described above. Treatment:  Patient practiced and was instructed in the following treatment:     Therapeutic activity  o Patient education provided continuously throughout session for sequencing, safety maintenance, and improving any deficits found during the evaluation. o Bed mobility training - pt given verbal and tactile cues to facilitate proper sequencing and safety during rolling and supine>sit as well as provided with physical assistance to complete task    o Sitting EOB for >5 minutes for upright tolerance, postural awareness and BLE ROM   o STS and pivot transfer training - pt educated on proper hand and foot placement, safety and sequencing, and use of proper technique to safely complete sit<>stand and pivot transfers with hands on assistance to complete task safely. Side steps along bedside with assist for balance     Pt's/ family goals   1. Rehab    Patient and or family understand(s) diagnosis, prognosis, and plan of care. yes    Prognosis is good for reaching above PT goals.     PHYSICAL THERAPY PLAN OF CARE:    PT POC is established based on physician order and patient diagnosis     Referring provider/PT Order:  04/09/22 0015   PT eval and treat Start: 04/09/22 0015, End: 04/09/22 0015, ONE TIME, Standing Count: 1 Occurrences, Humberto Light MD    Diagnosis:  Respiratory failure ([facility]

## 2022-04-11 NOTE — PROGRESS NOTES
Vascular Surgery Progress Note    CC: ESRD requiring HD    HISTORY:  The patient is awake, alert, and oriented. Currently running on dialysis. Denies pain to the groin.     IMPRESSION:  ESRD requiring HD through L fem tunneled hd catheter    PLAN:   · Pt evaluated on dialysis and there was no active bleeding from the site, treatment running well, no appreciable hematoma   Continue to use femoral tunneled hd catheter   Avoid venipuncture to non-dominant arm   Will arrange outpatient fu    Patient Active Problem List   Diagnosis Code    PNA (pneumonia) J18.9    Acute on chronic heart failure with preserved ejection fraction ([facility]) I50.33    Coronary artery disease involving native coronary artery of native heart without angina pectoris I25.10    Cardiac pacemaker in situ Z95.0    Primary hypertension I10    SPEEDY (obstructive sleep apnea) G47.33    Chronic respiratory failure with hypoxia ([facility]) J96.11    CHF (congestive heart failure), NYHA class I, acute on chronic, combined ([facility]) I50.43    Acute on chronic clinical systolic heart failure ([facility]) I50.23    Acute on chronic systolic heart failure ([facility]) I50.23    Pleural effusion J90    Respiratory failure ([facility]) J96.90       Current Medications:    dextrose      sodium chloride        dextrose, dextrose, sodium chloride flush, sodium chloride, acetaminophen, ondansetron **OR** ondansetron, guaiFENesin-dextromethorphan, guaiFENesin, ipratropium-albuterol, loperamide, magnesium hydroxide, glucose, glucagon (rDNA), midodrine    sodium chloride flush  5-40 mL IntraVENous 2 times per day    enoxaparin  40 mg SubCUTAneous Daily    aspirin  81 mg Oral Daily    atorvastatin  40 mg Oral Nightly    bumetanide  2 mg Oral Daily    buPROPion  300 mg Oral QAM    cyanocobalamin  1,000 mcg Oral Daily    docusate sodium  100 mg Oral BID    ezetimibe  10 mg Oral Nightly    folic acid  1 mg Oral Daily    insulin glargine-yfgn  50 Units SubCUTAneous Nightly    melatonin  3 mg Oral Nightly    metoprolol succinate  25 mg Oral BID    pantoprazole  40 mg Oral Daily    potassium chloride  20 mEq Oral Daily    vitamin C  500 mg Oral BID    vitamin D  2,000 Units Oral Daily    zinc sulfate  50 mg Oral Daily    insulin lispro  0-6 Units SubCUTAneous TID WC    insulin lispro  0-3 Units SubCUTAneous Nightly          PHYSICAL EXAM:    Vitals:    04/11/22 1052   BP: 110/61   Pulse: 80   Resp:    Temp: 98 °F (36.7 °C)   SpO2:      CONSTITUTIONAL:  awake, alert, cooperative, no apparent distress  LUNGS:  no increased work of breathing, good air exchange  CARDIOVASCULAR:  regular rate and rhythm  ABDOMEN:  soft, obese  L groin: no active bleeding present around the catheter, no ttp, no appreciable hematoma    LABS:    Lab Results   Component Value Date    WBC 11.5 04/11/2022    HGB 10.2 (L) 04/11/2022    HCT 33.4 (L) 04/11/2022     04/11/2022    K 4.4 04/11/2022    BUN 32 (H) 04/11/2022    CREATININE 2.8 (H) 04/11/2022       RADIOLOGY:

## 2022-04-11 NOTE — DISCHARGE INSTR - COC
Continuity of Care Form    Patient Name: Aren Barry   :  1945  MRN:  [id number]    Admit date:  2022  Discharge date:  ***    Code Status Order: DNR-CCA   Advance Directives:      Admitting Physician:  Patricia Orona MD  PCP: Patricia Orona MD    Discharging Nurse: [facility] Unit/Room#: 9954/4221-Y  Discharging Unit Phone Number: ***    Emergency Contact:   Extended Emergency Contact Information  Primary Emergency Contact: Erasto Dutta  Phone: [phone]  Mobile Phone: [phone]  Relation: Brother/Sister   needed? No    Past Surgical History:  Past Surgical History:   Procedure Laterality Date    CARDIAC PACEMAKER PLACEMENT      CORONARY ANGIOPLASTY WITH STENT PLACEMENT      HERNIA REPAIR      PACEMAKER PLACEMENT      VASCULAR SURGERY N/A 2022    TUNNELED DIALYSIS CATHETER performed by Kate Howard MD at [address] [place] [address]       Immunization History: There is no immunization history on file for this patient.     Active Problems:  Patient Active Problem List   Diagnosis Code    PNA (pneumonia) J18.9    Acute on chronic heart failure with preserved ejection fraction ([facility]) I50.33    Coronary artery disease involving native coronary artery of native heart without angina pectoris I25.10    Cardiac pacemaker in situ Z95.0    Primary hypertension I10    SPEEDY (obstructive sleep apnea) G47.33    Chronic respiratory failure with hypoxia ([facility]) J96.11    CHF (congestive heart failure), NYHA class I, acute on chronic, combined ([facility]) I50.43    Acute on chronic clinical systolic heart failure ([facility]) I50.23    Acute on chronic systolic heart failure ([facility]) I50.23    Pleural effusion J90    Respiratory failure ([facility]) J96.90       Isolation/Infection:   Isolation            No Isolation          Patient Infection Status       Infection Onset Added Last Indicated Last Indicated By Review Planned Expiration Resolved Resolved By    None active    Resolved    COVID-19 (Rule Out) 04/07/22 04/07/22 04/07/22 COVID-19, Rapid (Ordered)   04/07/22 Rule-Out Test Resulted    COVID-19 (Rule Out) 01/25/22 01/25/22 01/25/22 Respiratory Panel, Molecular, with COVID-19 (Restricted: peds pts or suitable admitted adults) (Ordered)   01/25/22 Rule-Out Test Resulted    COVID-19 (Rule Out) 01/21/22 01/21/22 01/21/22 COVID-19, Rapid (Ordered)   01/21/22 Rule-Out Test Resulted    C-diff Rule Out 04/08/21 04/08/21 04/08/21 Clostridium difficile EIA (Ordered)   04/09/21 Rule-Out Test Resulted            Nurse Assessment:  Last Vital Signs: /65   Pulse 81   Temp 98 °F (36.7 °C)   Resp 18   Ht 5' 9\" (1.753 m)   Wt 273 lb 13 oz (124.2 kg)   SpO2 100%   BMI 40.43 kg/m²     Last documented pain score (0-10 scale): Pain Level: 0  Last Weight:   Wt Readings from Last 1 Encounters:   04/11/22 273 lb 13 oz (124.2 kg)     Mental Status:  {IP PT MENTAL STATUS:90714}    IV Access:  { UMA IV ACCESS:256595715}    Nursing Mobility/ADLs:  Walking   {CHP DME GUVR:099266318}  Transfer  {P DME ATEB:047947835}  Bathing  {CHP DME FHYA:675663470}  Dressing  {CHP DME AMYW:443077652}  Toileting  {CHP DME GNAC:826811400}  Feeding  {P DME TKQN:182016910}  Med Admin  {P DME EUMQ:834653068}  Med Delivery   { UMA MED Delivery:723410115}    Wound Care Documentation and Therapy:        Elimination:  Continence: Bowel: {YES / AY:53861}  Bladder: {YES / VD:09572}  Urinary Catheter: {Urinary Catheter:380659571}   Colostomy/Ileostomy/Ileal Conduit: {YES / LE:84676}       Date of Last BM: ***    Intake/Output Summary (Last 24 hours) at 4/11/2022 0857  Last data filed at 4/11/2022 0514  Gross per 24 hour   Intake 730 ml   Output 225 ml   Net 505 ml     I/O last 3 completed shifts: In: 36 [P.O.:720;  I.V.:10]  Out: 275 [Urine:275]    Safety Concerns:     [address] UMA Safety Concerns:693070249}    Impairments/Disabilities:      [address] UMA Impairments/Disabilities:221617592}    Nutrition Therapy:  Current Nutrition Therapy:   [address] UMA Diet List:412590935}    Routes of Feeding: {CHP DME Other Feedings:065486776}  Liquids: {Slp liquid thickness:08142}  Daily Fluid Restriction: {CHP DME Yes amt example:862486313}  Last Modified Barium Swallow with Video (Video Swallowing Test): {Done Not Done EWZZ:355458073}    Treatments at the Time of Hospital Discharge:   Respiratory Treatments: ***  Oxygen Therapy:  {Therapy; copd oxygen:16805}  Ventilator:    { CC Vent UJCI:352844982}    Rehab Therapies: {THERAPEUTIC INTERVENTION:3542445870}  Weight Bearing Status/Restrictions: { CC Weight Bearin}  Other Medical Equipment (for information only, NOT a DME order):  {EQUIPMENT:486461711}  Other Treatments: ***    Patient's personal belongings (please select all that are sent with patient):  {P DME Belongings:691524366}    RN SIGNATURE:  {Esignature:528007996}    CASE MANAGEMENT/SOCIAL WORK SECTION    Inpatient Status Date: 22    Readmission Risk Assessment Score:  Readmission Risk              Risk of Unplanned Readmission:  40           Discharging to Facility/ Agency   Name: [facility] (skilled)  Address: [address] [place] [address], [place] [address]  Phone: [phone]  Fax:    Dialysis Facility (if applicable)   Name: [address] [place] [address] [address]  Address: [place] [address]  Dialysis Schedule: // 1340  Phone:  Fax:    / signature: Electronically signed by ZULLY Cuenca on 22 at 3:06 PM EDT    PHYSICIAN SECTION    Prognosis: {Prognosis:4925895883}    Condition at Discharge: [address] Patient Condition:220926494}    Rehab Potential (if transferring to Rehab): {Prognosis:7520792178}    Recommended Labs or Other Treatments After Discharge: ***    Physician Certification: I certify the above information and transfer of Trino Boyce  is necessary for the continuing treatment of the diagnosis listed and that he requires {Admit to Appropriate Level of Care:67275} for {GREATER/LESS:137305403} 30 days. Update Admission H&P: {CHP DME Changes in IDI:680433707}    PHYSICIAN SIGNATURE:  Zion Clayton MD    ***HEART FAILURE - CONGESTIVE HEART FAILURE***  DISCHARGE INSTRUCTIONS:  GUIDELINES TO FOLLOW AT DARYN/LTAC/SNF/ Assisted Living    Future Appointments   Date Time Provider Eleno Dejesus   4/28/2022  8:30 AM Chad Hernandez, APRN - CNP [facility]          MEDICATIONS:  Please notify the doctor if patient is not able to take their medications or if medications are being held for any reasons (such as low blood pressure ect.)  Do not give the patient ibuprofen (Advil or Motrin), naproxen (Aleve) without talking to the doctor first. This could make their heart failure worse. WEIGHT MONITORING:   Weigh patient every day in the morning after they void (If patient is able to stand, please get a standing weight.)   Notify the doctor of a weight gain of 3 pounds or more in 1 day   OR  a total of 5 pounds or more in 1 week             DIET   Cardiac heart healthy diet:  Low sodium diet: no  more than 2,000mg (2 grams) of salt / sodium per day (which equals to a little less than  a teaspoon of salt)/ Cardiac Diet: Low saturated / low trans fat, no added salt, caffeine restricted    If patient is there for rehab and will be returning home in the near future; reinforce with the patient and the family to follow a low sodium diet (2,000 mg)- avoid using salt at the table, avoid / limit use of canned soups, processed / packaged foods, salted snacks, olives and pickles. Do not use a salt substitute without checking with the doctor. (Mrs. Dhiraj Richardson is safe to use).        NOTIFY THE DOCTOR THE FIRST DAY OF ONSET OF ANY OF THESE   SYMPTOMS:   Weight gain of 3 pounds or more in 1 day         OR 5 pounds or more in one week  More shortness of breath  More swelling in stomach, legs, ankles or feet  Feeling more tired, No energy  Dry hacky cough  Dizziness  More chest pain / discomfort  Hard time breathing laying down

## 2022-04-11 NOTE — PROGRESS NOTES
Occupational Therapy  OCCUPATIONAL THERAPY INITIAL EVALUATION    MARGARITA Coronado [employer] [address] [address]      ZLGU:                                                Patient Name: Lucian Alonzo  MRN: [id number]  : 1945  Room: [address] [place], [place] [id number]    Evaluating OT: Niurka Beal OTR/L #[id number]     Referring Provider: Salina Miller MD  Specific Provider Orders/Date: OT eval and treat 22    Diagnosis: Respiratory failure ([facility] [J96.90]  Acute on chronic diastolic CHF (congestive heart failure) ([facility] [I50.33]   Pt admitted to hospital due to SOB. Pt with recent hospitalization with thoracentesis     Pertinent Medical History:  has a past medical history of Acid reflux, Agent orange exposure, Arthritis, CAD (coronary artery disease), Congestive heart failure (CHF) ([facility]), COPD (chronic obstructive pulmonary disease) ([facility] Depression, Diabetes mellitus ([facility] History of blood transfusion, Hyperlipidemia, Hypertension, MI (myocardial infarction) ([facility] Polio, and Sleep apnea.        Precautions:  Fall Risk, dialysis, TAPS, O2    Assessment of current deficits    [x] Functional mobility  [x]ADLs  [x] Strength               [x]Cognition    [x] Functional transfers   [x] IADLs         [x] Safety Awareness   [x]Endurance    [] Fine Coordination              [x] Balance      [] Vision/perception   []Sensation     []Gross Motor Coordination  [] ROM  [] Delirium                   [] Motor Control     OT PLAN OF CARE   OT POC based on physician orders, patient diagnosis and results of clinical assessment    Frequency/Duration 1-3 days/wk for 2 weeks PRN   Specific OT Treatment Interventions to include:   * Instruction/training on adapted ADL techniques and AE recommendations to increase functional independence within precautions       * Training on energy conservation strategies, correct breathing pattern and techniques to improve independence/tolerance for self-care routine  * Functional transfer/mobility training/DME recommendations for increased independence, safety, and fall prevention  * Patient/Family education to increase follow through with safety techniques and functional independence  * Recommendation of environmental modifications for increased safety with functional transfers/mobility and ADLs  * Cognitive retraining/development of therapeutic activities to improve problem solving, judgement, memory, and attention for increased safety/participation in ADL/IADL tasks  * Therapeutic exercise to improve motor endurance, ROM, and functional strength for ADLs/functional transfers  * Therapeutic activities to facilitate/challenge dynamic balance, stand tolerance for increased safety and independence with ADLs  * Therapeutic activities to facilitate gross/fine motor skills for increased independence with ADLs  * Neuro-muscular re-education: facilitation of righting/equilibrium reactions, midline orientation, scapular stability/mobility, normalization of muscle tone, and facilitation of volitional active controled movement    Recommended Adaptive Equipment:  TBD     Home Living: Pt admitted from rehab;; pt has been in rehab / hospitals since January 2022.   Pt lives alone in a 1 story home with 3 NIDA    Bathroom setup: walk-in shower    Equipment owned: none    Prior Level of Function: Independent with ADLs , Independent with IADLs; ambulated without AD   Driving: yes   Occupation: retired      Pain Level: Pt denies pain this session    Cognition: A&O: 4/4; Follows 2 step directions   Memory:  good   Sequencing:  good   Problem solving:  good   Judgement/safety:  fair     Functional Assessment:  AM-PAC Daily Activity Raw Score: 14/24   Initial Eval Status  Date: 4/11/22 Treatment Status  Date: STGs = LTGs  Time frame: 10-14 days   Feeding Independent      Grooming Stand by Assist     seated  Modified [place]    UB Dressing Minimal Assist   Modified [place]    LB Dressing Dependent   Moderate Assist    Bathing Maximal Assist  Moderate Assist    Toileting Dependent   Moderate Assist    Bed Mobility  Supine to sit: Maximal Assist   Sit to supine: Maximal Assist   Supine to sit: Moderate Assist   Sit to supine: Moderate Assist    Functional Transfers Maximal Assist x2    Sit to stands  Moderate Assist    Functional Mobility Moderate Assist     Side steps to [facility] with w/w   Minimal Assist    Balance Sitting:     Static:  SBA    Dynamic: min A  Standing: mod A at w/w     Activity Tolerance F-  F+   Visual/  Perceptual Glasses: yes  wfl                  Hand Dominance right   Strength ROM Additional Info:    RUE   4-/5 wfl good  and wfl FMC/dexterity noted during ADL tasks     LUE 4-/5 wfl good  and wfl FMC/dexterity noted during ADL tasks     Hearing: wfl  Sensation:wfl  Tone: wfl  Edema:none noted     Comments: Upon arrival patient supine in bed and agreeable to OT Session. Therapist educated pt on role of OT. At end of session, patient semi-supine in bed with call light and phone within reach, all lines and tubes intact. Overall patient demonstrated decreased independence and safety during completion of ADL/functional transfer/mobility tasks. Pt would benefit from continued skilled OT to increase safety and independence with completion of ADL/IADL tasks for functional independence and quality of life. Treatment: OT treatment provided this date includes: Facilitation of bed mobility, sitting balance (impacting ADLs; addressing posture, weight shifting, dynamic reaching), functional sit to stand transfers, standing tolerance tasks at  (addressing posture, balance and activity tolerance) and side steps with w/w - skilled cuing on hand placement, posture, body mechanics, energy conservation techniques and safety.   Therapist facilitated self-care retraining: UB/LB self-care tasks (socks, simulated gown), toileting hygiene task and grooming tasks while educating pt on modified techniques, posture, safety and energy conservation techniques. Skilled monitoring of HR, O2 sats and pts response to treatment. Rehab Potential: Good  for established goals     Patient / Family Goal: return home      Patient and/or family were instructed on functional diagnosis, prognosis/goals and OT plan of care. Demonstrated fair+ understanding. Eval Complexity: Low    Time In: 1310  Time Out: 1335  Total Treatment Time: 10 minutes    Min Units   OT Eval Low 97165  x  1   OT Eval Medium 39669      OT Eval High 06413      OT Re-Eval [id number]       Therapeutic Ex 22710       Therapeutic Activities 19141 2     ADL/Self Care 62324  8  1   Orthotic Management 86197       Manual 51383     Neuro Re-Ed 85426       Non-Billable Time          Evaluation Time additionally includes thorough review of current medical information, gathering information on past medical history/social history and prior level of function, interpretation of standardized testing/informal observation of tasks, assessment of data and development of plan of care and goals.           Prema Whitten OTR/L #[id number]

## 2022-04-11 NOTE — PROGRESS NOTES
Seen today for oozing at L common femoral temporary dialysis catheter requiring stitch placement to control oozing. Some dried clot at the emanating segment of the catheter without active bleeding. No hematoma. Ok to use for dialysis. Call for any evidence of further bleeding.       Lety Peter MD

## 2022-04-11 NOTE — PROGRESS NOTES
Dr. Katy Sr MD,    Your patient is on a medication that requires a renal and/or weight dose adjustment. Renal/Body Weight Function Assessment:    Date Body Weight IBW  Adjusted BW SCr  CrCl Dialysis status   4/11/2022 268 lb 11.9 oz (121.9 kg) Ideal body weight: 70.7 kg (155 lb 13.8 oz)  Adjusted ideal body weight: 91.2 kg (201 lb 0.2 oz) Serum creatinine: 2.8 mg/dL (H) 04/11/22 0455  Estimated creatinine clearance: 28 mL/min (A) HD       Pharmacy has dose-adjusted the following medication(s):    Date Previous Order Adjusted Order   4/11/2022 Enoxaparin 40 mg sq daily Heparin 5000 units sq q8h       These changes were made per protocol according to the [facility] Renal Dosing Policy/ [facility][1] Pharmacist Anticoagulant Review. *Please note this dose may need readjusted if your patient's condition changes. Please contact pharmacy with any questions regarding these changes.     [facility]  4/11/2022  12:39 PM

## 2022-04-11 NOTE — CARE COORDINATION
Care Coordination: per last note of 4/8/22. Pt just discharged from hospital to [facility]) on 4/6/22 and readmitted on 4/8/22. Pt was set for HD [address]  [phone]. Spoke to Edith, current schedule remains MWF 13:30 and requesting a call at discharge. Per previous cm note, pt will require precert, Evals ordered and Spoke to kristen at [phone] to facilitate evals. Pt is [facility] medicare and will start precert once evals completed. I left a vm with [place] from [facility] to confirm, awaiting return call. Transport envelope completed for ambulette. Attempted to speak to pt in room to discuss transition of care upon discharge. Pt currently in HD.  Will follow    Meche Chao

## 2022-04-11 NOTE — FLOWSHEET NOTE
04/11/22 1052   Vital Signs   /61   Temp 98 °F (36.7 °C)   Pulse 80   Weight 268 lb 11.9 oz (121.9 kg)   Weight Method Bed scale   Percent Weight Change -1.85   Post-Hemodialysis Assessment   Post-Treatment Procedures Blood returned;Catheter capped, clamped and heparinized x 2 ports   Machine Disinfection Process Exterior Machine Disinfection   Rinseback Volume (ml) 300 ml   Total Liters Processed (l/min) 68.3 l/min   Dialyzer Clearance Lightly streaked   Duration of Treatment (minutes) 240 minutes   Heparin amount administered during treatment (units) 0 units   Hemodialysis Intake (ml) 300 ml   Hemodialysis Output (ml) 2000 ml   NET Removed (ml) 1700 ml   Tolerated Treatment Good   Patient Response to Treatment tolerated well, blood returned, cath care per policy/procedure, lines flushed, heparin instilled, ports capped

## 2022-04-11 NOTE — PROGRESS NOTES
Progress Note  Date:2022       [facility]:4734/9919-A  Patient Nafisa Ho     YOB: 1945     Age:77 y.o. Patient says breathing is improved today. Subjective    Subjective:  Symptoms:  Improved. He reports shortness of breath. No chest pain. Diet:  Adequate intake. Activity level: Impaired due to weakness. Pain:  He reports no pain. Review of Systems   Constitutional: Negative for activity change and fever. HENT: Negative for congestion. Respiratory: Positive for shortness of breath. Cardiovascular: Positive for leg swelling. Negative for chest pain. Gastrointestinal: Negative for abdominal pain. Neurological: Negative for dizziness. Psychiatric/Behavioral: Negative for agitation. Objective         Vitals Last 24 Hours:  TEMPERATURE:  Temp  Av.8 °F (36.6 °C)  Min: 97.7 °F (36.5 °C)  Max: 97.8 °F (36.6 °C)  RESPIRATIONS RANGE: Resp  Av  Min: 16  Max: 18  PULSE OXIMETRY RANGE: SpO2  Av.5 %  Min: 99 %  Max: 100 %  PULSE RANGE: Pulse  Av.5  Min: 84  Max: 85  BLOOD PRESSURE RANGE: Systolic (77NLD), INS:98 , Min:92 , AIR:784   ; Diastolic (56KRW), AEM:09, Min:65, Max:66    I/O (24Hr): Intake/Output Summary (Last 24 hours) at 2022 0653  Last data filed at 2022 0514  Gross per 24 hour   Intake 730 ml   Output 225 ml   Net 505 ml     Objective:  General Appearance:  Comfortable. Vital signs: (most recent): Blood pressure 105/65, pulse 85, temperature 97.8 °F (36.6 °C), resp. rate 18, height 5' 9\" (1.753 m), weight 270 lb 1.6 oz (122.5 kg), SpO2 100 %. No fever. Lungs:  Normal effort and normal respiratory rate. Breath sounds clear to auscultation. Heart: Normal rate. Regular rhythm. S1 normal and S2 normal.    Extremities: There is local swelling.       Labs/Imaging/Diagnostics    Labs:  CBC:  Recent Labs     22  0514 04/10/22  0530 22  0456   WBC 10.4 12.1* 11.5   RBC 3.61* 3.58* 3.46*   HGB 10.5* 10.3* 10.2*   HCT 33.9* 34.1* 33.4*   MCV 93.9 95.3 96.5   RDW 17.7* 17.4* 17.6*    233 255     CHEMISTRIES:  Recent Labs     22  0514 04/10/22  0530 22  0455    142 138   K 4.0 4.4 4.4    101 102   CO2 25 26 22   BUN 30* 25* 32*   CREATININE 2.0* 2.4* 2.8*   GLUCOSE 84 62* 56*   PHOS  --   --  4.2   MG  --   --  2.0     PT/INR:No results for input(s): PROTIME, INR in the last 72 hours. APTT:No results for input(s): APTT in the last 72 hours. LIVER PROFILE:  No results for input(s): AST, ALT, BILIDIR, BILITOT, ALKPHOS in the last 72 hours. Imaging Last 24 Hours:  XR CHEST PORTABLE    Result Date: 2022  EXAMINATION: ONE XRAY VIEW OF THE CHEST 2022 4:49 pm COMPARISON: 2022 HISTORY: ORDERING SYSTEM PROVIDED HISTORY: shortness of breath TECHNOLOGIST PROVIDED HISTORY: Reason for exam:->shortness of breath What reading provider will be dictating this exam?->CRC FINDINGS: There is a large opacity seen within the right lung base. There is a small right pleural effusion. The left upper lobe is clear. There is a small left pleural effusion. The cardiac silhouette is within normals. There is a dual lead cardiac pacer on the left. 1. Large opacity within the right lung base which could represent pneumonia and or atelectasis. 2. Moderate size right pleural effusion. 3. Small left pleural effusion. 4. CT of the thorax is recommended for further evaluation. US DUP LOWER EXTREMITIES BILATERAL VENOUS    Result Date: 2022  Patient MRN:  [id number] : 1945 Age: 65 years Gender: Male Order Date:  2022 5:28 PM EXAM: US DUP LOWER EXTREMITIES BILATERAL VENOUS NUMBER OF IMAGES:  52 INDICATION:  leg swelling leg swelling What reading provider will be dictating this exam?->[facility] COMPARISON: None Within the visualized vessels, there is no evidence for deep venous thrombosis There is good compressibility, there is good augmentation, there is good color flow.      Within the visualized vessels there is no evidence for deep venous thrombosis     Assessment//Plan           Hospital Problems           Last Modified POA    * (Principal) Respiratory failure ([facility] [address] 4/8/2022 Yes        Assessment:  (   (Principal) Acute on chronic clinical systolic heart failure ([facility] [address] 3/14/2022 Yes     Acute on chronic hypoxic respiratory failure. CHF  Pleural effusion  CKD IV  DM  COPD  HTN  Hyperlipidemia       ). Plan:   (Dialysis per [facility].  Monitor labs and output. Continue meds. PT/OT).

## 2022-04-11 NOTE — PLAN OF CARE
Patient's chart updated to reflect:      . - HF care plan, HF education points and HF discharge instructions.  -Orders: 2 gram sodium diet, daily weights, I/O.  -PCP and/or Cardiologist appointment to be scheduled within 7 days of hospital discharge. Rescheduled Cardiology APRN appt 4/28/22, now not a candidate for CHF clinic scheduling due to HD. Appointments added to [address]   -Patient has been seen in consult several times by CHF nurse, he resides at a facility and continues to decline.

## 2022-04-11 NOTE — PROGRESS NOTES
Attending made aware of patients low BS this AM and @ lunchtime.     Simeon Hare RN 04/11/22 12:11 PM

## 2022-04-11 NOTE — PROGRESS NOTES
Physical Therapy  Name: Zen Lagunas  Room: 6550/4583-P  Date: 04/11/22    PT eval unable to be completed, Pt is off the unit at dialysis. Will follow and complete once he is back on the unit.     Mariann Briggs, PT, DPT  [id number]

## 2022-04-11 NOTE — PROGRESS NOTES
[facility]  Nephrology Progress Note     HPI from 4/8 Consult Note: \"Pt is a 64 yo male who left here 4/6 and returned 4/7 from a facility for sob. He has a pmh of copd, depression, mame, htn, hyperlipidemia, dm, HFpEF, pacer who started dialysis last admission for ckd 5 and fluid overload. He had a thoracentesis on 3/30 on the right and on 3/15/22. Labs show na 138 k 5 co2 26 bun 40 wbc 11 hgb 10.7, plt 202. Right pleural effusion persists on cxr and ct. He is to have dialysis mwf. He is now seen on hd. He said the sob came on last night and he was fine before that. He thinks it is a panic attack. \"    Subjective:    4/11: Patient was seen and examined on HD. He reports that he feels \"alright. \"  Denies any current chest pain or shortness breath. Denies any abdominal pain or nausea.     PMH:    Past Medical History:   Diagnosis Date    Acid reflux     Agent orange exposure     Arthritis     CAD (coronary artery disease)     Congestive heart failure (CHF) (HCC)     COPD (chronic obstructive pulmonary disease) (HCC)     Depression     Diabetes mellitus ([address]     History of blood transfusion     Hyperlipidemia     Hypertension     MI (myocardial infarction) ([address]     Polio     Sleep apnea      Patient Active Problem List   Diagnosis    PNA (pneumonia)    Acute on chronic heart failure with preserved ejection fraction ([address]    Coronary artery disease involving native coronary artery of native heart without angina pectoris    Cardiac pacemaker in situ    Primary hypertension    MAME (obstructive sleep apnea)    Chronic respiratory failure with hypoxia (HCC)    CHF (congestive heart failure), NYHA class I, acute on chronic, combined ([address]    Acute on chronic clinical systolic heart failure (HCC)    Acute on chronic systolic heart failure (HCC)    Pleural effusion    Respiratory failure (HCC)     Meds:     sodium chloride flush  5-40 mL IntraVENous 2 times per day    enoxaparin  40 mg SubCUTAneous Daily    aspirin  81 mg Oral Daily    atorvastatin  40 mg Oral Nightly    bumetanide  2 mg Oral Daily    buPROPion  300 mg Oral QAM    cyanocobalamin  1,000 mcg Oral Daily    docusate sodium  100 mg Oral BID    ezetimibe  10 mg Oral Nightly    folic acid  1 mg Oral Daily    insulin glargine-yfgn  50 Units SubCUTAneous Nightly    melatonin  3 mg Oral Nightly    metoprolol succinate  25 mg Oral BID    pantoprazole  40 mg Oral Daily    potassium chloride  20 mEq Oral Daily    vitamin C  500 mg Oral BID    vitamin D  2,000 Units Oral Daily    zinc sulfate  50 mg Oral Daily    insulin lispro  0-6 Units SubCUTAneous TID WC    insulin lispro  0-3 Units SubCUTAneous Nightly      dextrose      sodium chloride       Meds prn:     dextrose, dextrose, sodium chloride flush, sodium chloride, acetaminophen, ondansetron **OR** ondansetron, guaiFENesin-dextromethorphan, guaiFENesin, ipratropium-albuterol, loperamide, magnesium hydroxide, glucose, glucagon (rDNA), midodrine    Meds prior to admission:     No current facility-administered medications on file prior to encounter.      Current Outpatient Medications on File Prior to Encounter   Medication Sig Dispense Refill    bumetanide (BUMEX) 2 MG tablet Take 1 tablet by mouth daily 30 tablet 3    docusate sodium (COLACE) 100 MG capsule Take 1 capsule by mouth 2 times daily 60 capsule 0    guaiFENesin 400 MG tablet Take 400 mg by mouth 4 times daily as needed for Cough      ipratropium-albuterol (DUONEB) 0.5-2.5 (3) MG/3ML SOLN nebulizer solution Take 1 vial by nebulization every 4 hours as needed for Shortness of Breath      potassium chloride (KLOR-CON M) 10 MEQ extended release tablet Take 20 mEq by mouth daily      insulin glargine (LANTUS SOLOSTAR) 100 UNIT/ML injection pen Inject 50 Units into the skin nightly      loperamide (IMODIUM) 2 MG capsule Take 2 mg by mouth 4 times daily as needed for Diarrhea      buPROPion (WELLBUTRIN XL) 300 MG extended release tablet Take 300 mg by mouth every morning      ezetimibe (ZETIA) 10 MG tablet Take 10 mg by mouth at bedtime      Zinc Sulfate 110 MG TABS Take 2 tablets by mouth daily      magnesium hydroxide (MILK OF MAGNESIA) 400 MG/5ML suspension Take 30 mLs by mouth daily as needed for Constipation      Dextromethorphan-guaiFENesin  MG/5ML SYRP Take 5 mLs by mouth every 4 hours as needed for Cough      melatonin 3 MG TABS tablet Take 3 mg by mouth at bedtime      insulin lispro, 1 Unit Dial, (HUMALOG KWIKPEN) 100 UNIT/ML SOPN Inject 0-10 Units into the skin 4 times daily (before meals and nightly) *Per Sliding Scale*      metoprolol succinate (TOPROL XL) 25 MG extended release tablet Take 1 tablet by mouth 2 times daily 30 tablet 3    acetaminophen (TYLENOL) 325 MG tablet Take 650 mg by mouth every 4 hours as needed for Pain or Fever       vitamin C (ASCORBIC ACID) 500 MG tablet Take 500 mg by mouth 2 times daily      vitamin D (CHOLECALCIFEROL) 50 MCG (2000 UT) TABS tablet Take 2,000 Units by mouth daily       aspirin 81 MG EC tablet Take 1 tablet by mouth daily 30 tablet 3    folic acid (FOLVITE) 1 MG tablet Take 1 mg by mouth daily      cyanocobalamin 1000 MCG tablet Take 1,000 mcg by mouth daily      atorvastatin (LIPITOR) 40 MG tablet Take 40 mg by mouth at bedtime       pantoprazole (PROTONIX) 40 MG tablet Take 40 mg by mouth daily       Allergies:    Penicillins    Social History:     reports that he has quit smoking. He has never used smokeless tobacco. He reports that he does not drink alcohol and does not use drugs. Family History:     History reviewed. No pertinent family history.     Physical Exam:    Patient Vitals for the past 24 hrs:   BP Temp Pulse Resp SpO2 Weight   04/11/22 0931 (!) 108/55 -- 80 -- -- --   04/11/22 0902 115/65 -- 80 -- -- --   04/11/22 0832 109/65 -- 81 -- -- --   04/11/22 0802 (!) 100/59 -- 81 -- -- --   04/11/22 0722 (!) 98/55 -- 80 -- -- -- 04/11/22 0717 (!) 91/55 98 °F (36.7 °C) 80 -- -- 273 lb 13 oz (124.2 kg)   04/10/22 2204 105/65 97.8 °F (36.6 °C) 85 18 100 % --       Intake/Output Summary (Last 24 hours) at 4/11/2022 0940  Last data filed at 4/11/2022 0514  Gross per 24 hour   Intake 480 ml   Output 225 ml   Net 255 ml     Constitutional: Awake, alert, no acute distress   Neck: no JVD noted  Cardiovascular: regular rate and rhythm, no rub  Respiratory: Clear bilaterally upper, diminished in bases bilaterally. Unlabored  Gastrointestinal: soft, nontender, nondistended, active bowel sounds  Ext: 2+ BLE edema  Neuro: Awake, answers questions appropriately  Skin: dry, no rash on exposed extremities    Data:    Recent Labs     04/09/22  0514 04/10/22  0530 04/11/22  0456   WBC 10.4 12.1* 11.5   HGB 10.5* 10.3* 10.2*   HCT 33.9* 34.1* 33.4*   MCV 93.9 95.3 96.5    233 255     Recent Labs     04/09/22  0514 04/10/22  0530 04/11/22  0455    142 138   K 4.0 4.4 4.4    101 102   CO2 25 26 22   CREATININE 2.0* 2.4* 2.8*   BUN 30* 25* 32*   LABGLOM 32 26 22   GLUCOSE 84 62* 56*   CALCIUM 8.7 8.5* 8.4*   PHOS  --   --  4.2   MG  --   --  2.0     No results found for: VITD25    PTH   Date Value Ref Range Status   04/02/2022 104 (H) 15 - 65 pg/mL Final     No results for input(s): ALT, AST, ALKPHOS, BILITOT, BILIDIR in the last 72 hours. No results for input(s): LABALBU in the last 72 hours. No results found for: FERRITIN, IRON, TIBC    No results found for: XCYVMXTX85    No results found for: FOLATE    Lab Results   Component Value Date    COLORU Yellow 04/07/2022    NITRU Negative 04/07/2022    GLUCOSEU Negative 04/07/2022    KETUA Negative 04/07/2022    UROBILINOGEN 0.2 04/07/2022    BILIRUBINUR Negative 04/07/2022     Lab Results   Component Value Date/Time    YAMILETH 351 01/22/2022 11:24 AM     No components found for: URIC    No results found for: LIPIDPAN    Assessment and Plans:    1.  Chronic kidney disease stage IV  Likely due to diabetes mellitus and hypertension  Cr baseline 2.5-3  Pericardial effusion uremia contributing (Will need to repeat echo in 1-2 weeks)  HD initiated 4/1/22  S/p L CFV tunnel hemodialysis catheter placement performed 4/4/22 4/9/2022: UF   hemodialysis today   D/C planning: He is new to [address] in [facility], however has not been seen there yet d/t discharging from the hospital on 4/6 and being admitted 4/8/22 - scheduled days are MWF @ 1340     2. HFpEF/ SOB   S/p right thoracentesis for pleural effusion 3/15 and 3/30  UF as tolerated with HD     3. HTN   BP goal <140/80  On midodrine and metoprolol  Follow      4.  Anemia  Hgb target 10-12  Hgb 10.2  beni for hgb <10  Follow      GILMAR Rice - CNP     Pt seen and examined agree with above  Seen on hd  Tolerating hd  Ok for Donavan Dickinson MD

## 2022-04-11 NOTE — PLAN OF CARE
Problem: Skin Integrity:  Goal: Absence of new skin breakdown  Description: Absence of new skin breakdown  Outcome: Met This Shift     Problem: Safety:  Goal: Free from accidental physical injury  Description: Free from accidental physical injury  Outcome: Met This Shift     Problem: Pain:  Goal: Patient's pain/discomfort is manageable  Description: Patient's pain/discomfort is manageable  Outcome: Met This Shift

## 2022-04-12 NOTE — PROGRESS NOTES
[facility]  Nephrology Progress Note     HPI from 4/8 Consult Note: \"Pt is a 64 yo male who left here 4/6 and returned 4/7 from a facility for sob. He has a pmh of copd, depression, mame, htn, hyperlipidemia, dm, HFpEF, pacer who started dialysis last admission for ckd 5 and fluid overload. He had a thoracentesis on 3/30 on the right and on 3/15/22. Labs show na 138 k 5 co2 26 bun 40 wbc 11 hgb 10.7, plt 202. Right pleural effusion persists on cxr and ct. He is to have dialysis mwf. He is now seen on hd. He said the sob came on last night and he was fine before that. He thinks it is a panic attack. \"    Subjective:    4/12: Patient was seen and examined. He reports that he feels \"alright. \"  He denies any current chest pain or shortness of breath. Denies any abdominal pain or nausea.     PMH:    Past Medical History:   Diagnosis Date    Acid reflux     Agent orange exposure     Arthritis     CAD (coronary artery disease)     Congestive heart failure (CHF) (HCC)     COPD (chronic obstructive pulmonary disease) (HCC)     Depression     Diabetes mellitus ([address]     History of blood transfusion     Hyperlipidemia     Hypertension     MI (myocardial infarction) ([address]     Polio     Sleep apnea      Patient Active Problem List   Diagnosis    PNA (pneumonia)    Acute on chronic heart failure with preserved ejection fraction ([address]    Coronary artery disease involving native coronary artery of native heart without angina pectoris    Cardiac pacemaker in situ    Primary hypertension    MAME (obstructive sleep apnea)    Chronic respiratory failure with hypoxia (HCC)    CHF (congestive heart failure), NYHA class I, acute on chronic, combined ([address]    Acute on chronic clinical systolic heart failure (HCC)    Acute on chronic systolic heart failure (HCC)    Pleural effusion    Respiratory failure (HCC)     Meds:     insulin glargine-yfgn  40 Units SubCUTAneous Nightly    heparin (porcine)  5,000 Units SubCUTAneous Q8H    sodium chloride flush  5-40 mL IntraVENous 2 times per day    aspirin  81 mg Oral Daily    atorvastatin  40 mg Oral Nightly    bumetanide  2 mg Oral Daily    buPROPion  300 mg Oral QAM    cyanocobalamin  1,000 mcg Oral Daily    docusate sodium  100 mg Oral BID    ezetimibe  10 mg Oral Nightly    folic acid  1 mg Oral Daily    melatonin  3 mg Oral Nightly    metoprolol succinate  25 mg Oral BID    pantoprazole  40 mg Oral Daily    potassium chloride  20 mEq Oral Daily    vitamin C  500 mg Oral BID    vitamin D  2,000 Units Oral Daily    zinc sulfate  50 mg Oral Daily    insulin lispro  0-6 Units SubCUTAneous TID WC    insulin lispro  0-3 Units SubCUTAneous Nightly      dextrose      sodium chloride       Meds prn:     dextrose, dextrose, sodium chloride flush, sodium chloride, acetaminophen, ondansetron **OR** ondansetron, guaiFENesin-dextromethorphan, guaiFENesin, ipratropium-albuterol, loperamide, magnesium hydroxide, glucose, glucagon (rDNA), midodrine    Meds prior to admission:     No current facility-administered medications on file prior to encounter.      Current Outpatient Medications on File Prior to Encounter   Medication Sig Dispense Refill    bumetanide (BUMEX) 2 MG tablet Take 1 tablet by mouth daily 30 tablet 3    docusate sodium (COLACE) 100 MG capsule Take 1 capsule by mouth 2 times daily 60 capsule 0    guaiFENesin 400 MG tablet Take 400 mg by mouth 4 times daily as needed for Cough      ipratropium-albuterol (DUONEB) 0.5-2.5 (3) MG/3ML SOLN nebulizer solution Take 1 vial by nebulization every 4 hours as needed for Shortness of Breath      potassium chloride (KLOR-CON M) 10 MEQ extended release tablet Take 20 mEq by mouth daily      insulin glargine (LANTUS SOLOSTAR) 100 UNIT/ML injection pen Inject 50 Units into the skin nightly      loperamide (IMODIUM) 2 MG capsule Take 2 mg by mouth 4 times daily as needed for Diarrhea      buPROPion (WELLBUTRIN XL) 300 MG extended release tablet Take 300 mg by mouth every morning      ezetimibe (ZETIA) 10 MG tablet Take 10 mg by mouth at bedtime      Zinc Sulfate 110 MG TABS Take 2 tablets by mouth daily      magnesium hydroxide (MILK OF MAGNESIA) 400 MG/5ML suspension Take 30 mLs by mouth daily as needed for Constipation      Dextromethorphan-guaiFENesin  MG/5ML SYRP Take 5 mLs by mouth every 4 hours as needed for Cough      melatonin 3 MG TABS tablet Take 3 mg by mouth at bedtime      insulin lispro, 1 Unit Dial, (HUMALOG KWIKPEN) 100 UNIT/ML SOPN Inject 0-10 Units into the skin 4 times daily (before meals and nightly) *Per Sliding Scale*      metoprolol succinate (TOPROL XL) 25 MG extended release tablet Take 1 tablet by mouth 2 times daily 30 tablet 3    acetaminophen (TYLENOL) 325 MG tablet Take 650 mg by mouth every 4 hours as needed for Pain or Fever       vitamin C (ASCORBIC ACID) 500 MG tablet Take 500 mg by mouth 2 times daily      vitamin D (CHOLECALCIFEROL) 50 MCG (2000 UT) TABS tablet Take 2,000 Units by mouth daily       aspirin 81 MG EC tablet Take 1 tablet by mouth daily 30 tablet 3    folic acid (FOLVITE) 1 MG tablet Take 1 mg by mouth daily      cyanocobalamin 1000 MCG tablet Take 1,000 mcg by mouth daily      atorvastatin (LIPITOR) 40 MG tablet Take 40 mg by mouth at bedtime       pantoprazole (PROTONIX) 40 MG tablet Take 40 mg by mouth daily       Allergies:    Penicillins    Social History:     reports that he has quit smoking. He has never used smokeless tobacco. He reports that he does not drink alcohol and does not use drugs. Family History:     History reviewed. No pertinent family history.     Physical Exam:    Patient Vitals for the past 24 hrs:   BP Temp Temp src Pulse Resp SpO2 Weight   04/11/22 2300 (!) 113/57 98.6 °F (37 °C) Oral 85 19 98 % --   04/11/22 1142 103/64 97.5 °F (36.4 °C) Tympanic 82 18 100 % --   04/11/22 1052 110/61 98 °F (36.7 °C) -- 80 -- -- 268 lb 11.9 oz hypertension  Cr baseline 2.5-3  Pericardial effusion uremia contributing (Will need to repeat echo in 1-2 weeks)  HD initiated 4/1/22  S/p L CFV tunnel hemodialysis catheter placement performed 4/4/22 4/9/2022: UF   S/p hemodialysis 4/11  D/C planning: He is new to [facility] in [address] [place] [address], however has not been seen there yet d/t discharging from the hospital on 4/6 and being admitted 4/8/22 - scheduled days are MWF @ 1340     2. HFpEF/ SOB   S/p right thoracentesis for pleural effusion 3/15 and 3/30  UF as tolerated with HD     3. HTN   BP goal <140/80  On midodrine and metoprolol  Follow      4. Anemia  Hgb target 10-12  Hgb 10.2 today  HAIM for hemoglobin <10  Transfuse for hemoglobin<7  Follow     Discharge planning     Shakila Nash APRN - CNP     Patient seen and examined all key components of the physical performed independently , case discussed with NP, all pertinent labs and radiologic tests personally reviewed agree with above.   OK for discharge      Narayan Fuentes MD

## 2022-04-12 NOTE — DISCHARGE SUMMARY
Discharge Summary    Date: 4/12/2022  Patient Name: Steven Cohen YOB: 1945 Age: 65 y.o. Admit Date: 4/7/2022  Discharge Date: 4/12/2022  Discharge Condition: Stable    Admission Diagnosis  Respiratory failure ([address] (J96.90); Acute on chronic diastolic CHF (congestive heart failure) (HCC) (I50.33)     Discharge Diagnosis  Principal Problem: Respiratory failure (HCC)Resolved Problems: * No resolved hospital problems. [address] Stay  Narrative of Hospital Course:  Patient admitted for recurrent fluid overload. Ultimately had dialysis started per Renal.  Much improved. Consultants:  IP CONSULT TO PRIMARY CARE PROVIDERIP CONSULT TO PULMONOLOGYIP CONSULT TO NEPHROLOGYIP CONSULT TO CASE MANAGEMENTIP CONSULT TO VASCULAR SURGERY    Surgeries/procedures Performed:       Treatments:           Discharge Plan/Disposition:  To [facility]/Incidental Findings Requiring Follow Up:    Patient Instructions:    Diet: Cardiac Diet    Activity:Activity as Tolerated  For number of days (if applicable): Other Instructions:    Provider Follow-Up:   No follow-ups on file.      Significant Diagnostic Studies:    Recent Labs:  Admission on 04/07/2022Ventricular Rate                            Date: 04/07/2022Value: 94          Ref range: BPM                Status: FinalAtrial Rate                                   Date: 04/07/2022Value: 94          Ref range: BPM                Status: FinalP-R Interval                                  Date: 04/07/2022Value: 156         Ref range: ms                 Status: FinalQRS Duration                                  Date: 04/07/2022Value: 114         Ref range: ms                 Status: FinalQ-T Interval                                  Date: 04/07/2022Value: 394         Ref range: ms                 Status: FinalQTc Calculation ([place]ett)                      Date: 04/07/2022Value: 492         Ref range: ms                 Status: FinalP Axis Date: 04/07/2022Value: 39          Ref range: degrees            Status: FinalR Axis                                        Date: 04/07/2022Value: 79          Ref range: degrees            Status: FinalT Axis                                        Date: 04/07/2022Value: 57          Ref range: degrees            Status: [address] [facility]                                           Date: 04/07/2022Value: 11.0        Ref range: 4.5 - 11.5 E9/L    Status: FinalRBC                                           Date: 04/07/2022Value: 3.73*       Ref range: 3.80 - 5.80 E12/L  Status: FinalHemoglobin                                    Date: 04/07/2022Value: 10.7*       Ref range: 12.5 - 16.5 g/dL   Status: FinalHematocrit                                    Date: 04/07/2022Value: 34.6*       Ref range: 37.0 - 54.0 %      Status: FinalMCV                                           Date: 04/07/2022Value: 92.8        Ref range: 80.0 - 99.9 fL     Status: [address]                                           Date: 04/07/2022Value: 28.7        Ref range: 26.0 - 35.0 pg     Status: [address]                                          Date: 04/07/2022Value: 30.9*       Ref range: 32.0 - 34.5 %      Status: FinalRDW                                           Date: 04/07/2022Value: 17.6*       Ref range: 11.5 - 15.0 fL     Status: FinalPlatelets                                     Date: 04/07/2022Value: 202         Ref range: 130 - 450 E9/L     Status: FinalMPV                                           Date: 04/07/2022Value: 10.9        Ref range: 7.0 - 12.0 fL      Status: FinalNeutrophils %                                 Date: 04/07/2022Value: 62.2        Ref range: 43.0 - 80.0 %      Status: FinalImmature Granulocytes %                       Date: 04/07/2022Value: 1.5         Ref range: 0.0 - 5.0 %        Status: FinalLymphocytes %                                 Date: 04/07/2022Value: 20.3        Ref range: 20.0 - 42.0 % Status: FinalMonocytes %                                   Date: 04/07/2022Value: 12.6*       Ref range: 2.0 - 12.0 %       Status: FinalEosinophils %                                 Date: 04/07/2022Value: 2.9         Ref range: 0.0 - 6.0 %        Status: FinalBasophils %                                   Date: 04/07/2022Value: 0.5         Ref range: 0.0 - 2.0 %        Status: FinalNeutrophils Absolute                          Date: 04/07/2022Value: 6.85        Ref range: 1.80 - 7.30 E9/L   Status: FinalImmature Granulocytes #                       Date: 04/07/2022Value: 0.16        Ref range: E9/L               Status: FinalLymphocytes Absolute                          Date: 04/07/2022Value: 2.23        Ref range: 1.50 - 4.00 E9/L   Status: FinalMonocytes Absolute                            Date: 04/07/2022Value: 1.39*       Ref range: 0.10 - 0.95 E9/L   Status: FinalEosinophils Absolute                          Date: 04/07/2022Value: 0.32        Ref range: 0.05 - 0.50 E9/L   Status: FinalBasophils Absolute                            Date: 04/07/2022Value: 0.06        Ref range: 0.00 - 0.20 E9/L   Status: FinalSodium                                        Date: 04/07/2022Value: 138         Ref range: 132 - 146 mmol/L   Status: FinalPotassium reflex Magnesium                    Date: 04/07/2022Value: 5.0         Ref range: 3.5 - 5.0 mmol/L   Status: FinalChloride                                      Date: 04/07/2022Value: 102         Ref range: 98 - 107 mmol/L    Status: FinalCO2                                           Date: 04/07/2022Value: 26          Ref range: 22 - 29 mmol/L     Status: FinalAnion Gap                                     Date: 04/07/2022Value: 10          Ref range: 7 - 16 mmol/L      Status: FinalGlucose                                       Date: 04/07/2022Value: 154*        Ref range: 74 - 99 mg/dL      Status: FinalBUN                                           Date: 04/07/2022Value: 40*         Ref range: 6 - 23 mg/dL       Status: FinalCREATININE                                    Date: 04/07/2022Value: 2.5*        Ref range: 0.7 - 1.2 mg/dL    Status: FinalGFR Non-                      Date: 04/07/2022Value: 25          Ref range: >=60 mL/min/1.73   Status: Final              Comment: Chronic Kidney Disease: less than 60 ml/min/1.73 sq.m. Kidney Failure: less than 15 ml/min/1.73 sq. m. Results valid for patients 18 years and older. GFR                           Date: 04/07/2022Value: 30            Status: FinalCalcium                                       Date: 04/07/2022Value: 8.7         Ref range: 8.6 - 10.2 mg/dL   Status: FinalTotal Protein                                 Date: 04/07/2022Value: 6.4         Ref range: 6.4 - 8.3 g/dL     Status: FinalAlbumin                                       Date: 04/07/2022Value: 3.2*        Ref range: 3.5 - 5.2 g/dL     Status: FinalTotal Bilirubin                               Date: 04/07/2022Value: 0.4         Ref range: 0.0 - 1.2 mg/dL    Status: FinalAlkaline Phosphatase                          Date: 04/07/2022Value: 106         Ref range: 40 - 129 U/L       Status: FinalALT                                           Date: 04/07/2022Value: 28          Ref range: 0 - 40 U/L         Status: FinalAST                                           Date: 04/07/2022Value: 32          Ref range: 0 - 39 U/L         Status: Final              Comment: Specimen is slightly Hemolyzed. Result may be artificially increased. Troponin, High Sensitivity                    Date: 04/07/2022Value: 36*         Ref range: 0 - 11 ng/L        Status: Final              Comment: High Sensitivity Troponin values cannot be compared withother Troponin methodologies. Patients with high levels of Biotin oral intake (i.e. >5 mg/day)may have falsely decreased Troponin levels.  Samples collectedwithin 8 hours of biotin intake may require additional informationfor diagnosis. Pro-BNP                                       Date: 04/07/2022Value: 1,832*      Ref range: 0 - 450 pg/mL      Status: [id number], NAAT                              Date: 04/07/2022Value: Not Detected                   Ref range: Not Detected       Status: Final              Comment: Rapid NAAT:   Negative results should be treated as presumptive and,if inconsistent with clinical signs and symptoms or necessary forpatient management, should be tested with an alternative molecularassay. Negative results do not preclude SARS-CoV-2 infection andshould not be used as the sole basis for patient management decisions. This test has been authorized by the FDA under an Emergency UseAuthorization (EUA) for use by authorized laboratories. Fact sheet for [employer] sheet for Patients: Doug.dk: Isothermal Nucleic Acid AmplificationLipase                                        Date: 04/07/2022Value: 25          Ref range: 13 - 60 U/L        Status: FinalLactic Acid                                   Date: 04/07/2022Value: 1.3         Ref range: 0.5 - 2.2 mmol/L   Status: FinalColor, UA                                     Date: 04/07/2022Value: Yellow      Ref range: Straw/Yellow       Status: FinalClarity, UA                                   Date: 04/07/2022Value: Clear       Ref range: Clear              Status: FinalGlucose, Ur                                   Date: 04/07/2022Value: Negative    Ref range: Negative mg/dL     Status: FinalBilirubin Urine                               Date: 04/07/2022Value: Negative    Ref range: Negative           Status: FinalKetones, Urine                                Date: 04/07/2022Value: Negative    Ref range: Negative mg/dL     Status: FinalSpecific Gravity, UA                          Date: 04/07/2022Value: 1.020       Ref range: 1.005 - 1.030      Status: FinalBlood, Urine                                  Date: 04/07/2022Value: Negative    Ref range: Negative           Status: FinalpH, UA                                        Date: 04/07/2022Value: 5.5         Ref range: 5.0 - 9.0          Status: FinalProtein, UA                                   Date: 04/07/2022Value: Negative    Ref range: Negative mg/dL     Status: FinalUrobilinogen, Urine                           Date: 04/07/2022Value: 0.2         Ref range: <2.0 E.U./dL       Status: FinalNitrite, Urine                                Date: 04/07/2022Value: Negative    Ref range: Negative           Status: FinalLeukocyte Esterase, Urine                     Date: 04/07/2022Value: TRACE*      Ref range: Negative           Status: [address] [facility], [facility]                                       Date: 04/07/2022Value: 1-3         Ref range: 0 - 5 /HPF         Status: FinalRBC, UA                                       Date: 04/07/2022Value: 1-3         Ref range: 0 - 2 /HPF         Status: FinalBacteria, UA                                  Date: 04/07/2022Value: MODERATE*   Ref range: None Seen /HPF     Status: FinalCrystals, UA                                  Date: 04/07/2022Value: Few*        Ref range: None Seen /HPF     Status: Final              Comment: CALCIUM OXALATEUrine Culture, Routine                        Date: 04/07/2022Value: Growth not present                     Status: FinalDate Analyzed                                 Date: 04/07/2022Value: [id number]      Status: FinalTime Analyzed                                 Date: 04/07/2022Value: 1951          Status: FinalSource:                                       Date: 04/07/2022Value: Blood Arterial                     Status: FinalpH, Blood Gas                                 Date: 04/07/2022Value: 7.420       Ref range: 7.350 - 7.450      Status: FinalPCO2                                          Date: 04/07/2022Value: 36.8        Ref range: 35.0 - 45.0 mmHg Status: FinalPO2                                           Date: 04/07/2022Value: 207.7*      Ref range: 75.0 - 100.0 mmHg  Status: FinalHCO3                                          Date: 04/07/2022Value: 23.3        Ref range: 22.0 - 26.0 mmol*  Status: FinalB. E.                                          Date: 04/07/2022Value: -0.8        Ref range: -3.0 - 3.0 mmol/L  Status: FinalO2 Sat                                        Date: 04/07/2022Value: 99.1*       Ref range: 92.0 - 98.5 %      Status: FinalPO2/FIO2                                      Date: 04/07/2022Value: 3.46        Ref range: mmHg/%             Status: [id number]                                          Date: 04/07/2022Value: 98.4*       Ref range: 94.0 - 97.0 %      Status: FinalCOHb                                          Date: 04/07/2022Value: 0.3         Ref range: 0.0 - 1.5 %        Status: FinalMetHb                                         Date: 04/07/2022Value: 0.4         Ref range: 0.0 - 1.5 %        Status: FinalO2 Content                                    Date: 04/07/2022Value: 15.8        Ref range: mL/dL              Status: FinalHHb                                           Date: 04/07/2022Value: 0.9         Ref range: 0.0 - 5.0 %        Status: FinaltHb (est)                                     Date: 04/07/2022Value: 11.1*       Ref range: 11.5 - 16.5 g/dL   Status: FinalMode                                          Date: 04/07/2022Value: NIV PAP       Status: FinalFIO2                                          Date: 04/07/2022Value: 60.0        Ref range: %                  Status: FinalComment                                       Date: 04/07/2022Value: BIPAP 12/6    Status: FinalPt Temp                                       Date: 04/07/2022Value: 37.0        Ref range: C                  Status: FinalOperator ID                                   Date: 04/07/2022Value: [id number]          Status: Cris Phipps Date: 04/07/2022Value: [id number]         Status: FinalCritical(s) Notified                          Date: 04/07/2022Value: . No Critical Values                     Status: FinalMeter Glucose                                 Date: 04/08/2022Value: 108*        Ref range: 74 - 99 mg/dL      Status: FinalMeter Glucose                                 Date: 04/08/2022Value: 94          Ref range: 74 - 99 mg/dL      Status: FinalMeter Glucose                                 Date: 04/08/2022Value: 117*        Ref range: 74 - 99 mg/dL      Status: FinalSodium                                        Date: 04/09/2022Value: 138         Ref range: 132 - 146 mmol/L   Status: FinalPotassium                                     Date: 04/09/2022Value: 4.0         Ref range: 3.5 - 5.0 mmol/L   Status: FinalChloride                                      Date: 04/09/2022Value: 101         Ref range: 98 - 107 mmol/L    Status: FinalCO2                                           Date: 04/09/2022Value: 25          Ref range: 22 - 29 mmol/L     Status: FinalAnion Gap                                     Date: 04/09/2022Value: 12          Ref range: 7 - 16 mmol/L      Status: FinalGlucose                                       Date: 04/09/2022Value: 84          Ref range: 74 - 99 mg/dL      Status: FinalBUN                                           Date: 04/09/2022Value: 30*         Ref range: 6 - 23 mg/dL       Status: FinalCREATININE                                    Date: 04/09/2022Value: 2.0*        Ref range: 0.7 - 1.2 mg/dL    Status: FinalGFR Non-                      Date: 04/09/2022Value: 32          Ref range: >=60 mL/min/1.73   Status: Final              Comment: Chronic Kidney Disease: less than 60 ml/min/1.73 sq.m. Kidney Failure: less than 15 ml/min/1.73 sq. m. Results valid for patients 18 years and older. GFR                           Date: 04/09/2022Value: 39            Status: FinalCalcium                                       Date: 04/09/2022Value: 8.7         Ref range: 8.6 - 10.2 mg/dL   Status: [address] [facility]                                           Date: 04/09/2022Value: 10.4        Ref range: 4.5 - 11.5 E9/L    Status: FinalRBC                                           Date: 04/09/2022Value: 3.61*       Ref range: 3.80 - 5.80 E12/L  Status: FinalHemoglobin                                    Date: 04/09/2022Value: 10.5*       Ref range: 12.5 - 16.5 g/dL   Status: FinalHematocrit                                    Date: 04/09/2022Value: 33.9*       Ref range: 37.0 - 54.0 %      Status: FinalMCV                                           Date: 04/09/2022Value: 93.9        Ref range: 80.0 - 99.9 fL     Status: [address] [place]                                           Date: 04/09/2022Value: 29.1        Ref range: 26.0 - 35.0 pg     Status: [address] [facility]                                          Date: 04/09/2022Value: 31.0*       Ref range: 32.0 - 34.5 %      Status: FinalRDW                                           Date: 04/09/2022Value: 17.7*       Ref range: 11.5 - 15.0 fL     Status: FinalPlatelets                                     Date: 04/09/2022Value: 229         Ref range: 130 - 450 E9/L     Status: FinalMPV                                           Date: 04/09/2022Value: 9.8         Ref range: 7.0 - 12.0 fL      Status: FinalMeter Glucose                                 Date: 04/09/2022Value: 79          Ref range: 74 - 99 mg/dL      Status: FinalSodium                                        Date: 04/10/2022Value: 142         Ref range: 132 - 146 mmol/L   Status: FinalPotassium                                     Date: 04/10/2022Value: 4.4         Ref range: 3.5 - 5.0 mmol/L   Status: FinalChloride                                      Date: 04/10/2022Value: 101         Ref range: 98 - 107 mmol/L    Status: FinalCO2                                           Date: 04/10/2022Value: 26          Ref range: 22 - 29 mmol/L     Status: FinalAnion Gap                                     Date: 04/10/2022Value: 15          Ref range: 7 - 16 mmol/L      Status: FinalGlucose                                       Date: 04/10/2022Value: 62*         Ref range: 74 - 99 mg/dL      Status: FinalBUN                                           Date: 04/10/2022Value: 25*         Ref range: 6 - 23 mg/dL       Status: FinalCREATININE                                    Date: 04/10/2022Value: 2.4*        Ref range: 0.7 - 1.2 mg/dL    Status: FinalGFR Non-                      Date: 04/10/2022Value: 26          Ref range: >=60 mL/min/1.73   Status: Final              Comment: Chronic Kidney Disease: less than 60 ml/min/1.73 sq.m. Kidney Failure: less than 15 ml/min/1.73 sq. m. Results valid for patients 18 years and older. GFR                           Date: 04/10/2022Value: 32            Status: FinalCalcium                                       Date: 04/10/2022Value: 8.5*        Ref range: 8.6 - 10.2 mg/dL   Status: [address] [facility]                                           Date: 04/10/2022Value: 12.1*       Ref range: 4.5 - 11.5 E9/L    Status: FinalRBC                                           Date: 04/10/2022Value: 3.58*       Ref range: 3.80 - 5.80 E12/L  Status: FinalHemoglobin                                    Date: 04/10/2022Value: 10.3*       Ref range: 12.5 - 16.5 g/dL   Status: FinalHematocrit                                    Date: 04/10/2022Value: 34.1*       Ref range: 37.0 - 54.0 %      Status: FinalMCV                                           Date: 04/10/2022Value: 95.3        Ref range: 80.0 - 99.9 fL     Status: [address]                                           Date: 04/10/2022Value: 28.8        Ref range: 26.0 - 35.0 pg     Status: [address]                                          Date: 04/10/2022Value: 30.2*       Ref range: 32.0 - 34.5 %      Status: FinalRDW Date: 04/10/2022Value: 17.4*       Ref range: 11.5 - 15.0 fL     Status: FinalPlatelets                                     Date: 04/10/2022Value: 233         Ref range: 130 - 450 E9/L     Status: FinalMPV                                           Date: 04/10/2022Value: 9.4         Ref range: 7.0 - 12.0 fL      Status: FinalMeter Glucose                                 Date: 04/09/2022Value: 116*        Ref range: 74 - 99 mg/dL      Status: FinalMeter Glucose                                 Date: 04/10/2022Value: 124*        Ref range: 74 - 99 mg/dL      Status: FinalSodium                                        Date: 04/11/2022Value: 138         Ref range: 132 - 146 mmol/L   Status: FinalPotassium                                     Date: 04/11/2022Value: 4.4         Ref range: 3.5 - 5.0 mmol/L   Status: FinalChloride                                      Date: 04/11/2022Value: 102         Ref range: 98 - 107 mmol/L    Status: FinalCO2                                           Date: 04/11/2022Value: 22          Ref range: 22 - 29 mmol/L     Status: FinalAnion Gap                                     Date: 04/11/2022Value: 14          Ref range: 7 - 16 mmol/L      Status: FinalGlucose                                       Date: 04/11/2022Value: 56*         Ref range: 74 - 99 mg/dL      Status: FinalBUN                                           Date: 04/11/2022Value: 32*         Ref range: 6 - 23 mg/dL       Status: FinalCREATININE                                    Date: 04/11/2022Value: 2.8*        Ref range: 0.7 - 1.2 mg/dL    Status: FinalGFR Non-                      Date: 04/11/2022Value: 22          Ref range: >=60 mL/min/1.73   Status: Final              Comment: Chronic Kidney Disease: less than 60 ml/min/1.73 sq.m. Kidney Failure: less than 15 ml/min/1.73 sq. m. Results valid for patients 18 years and older. GFR                           Date: 04/11/2022Value: 27            Status: FinalCalcium                                       Date: 04/11/2022Value: 8.4*        Ref range: 8.6 - 10.2 mg/dL   Status: [address] [facility]                                           Date: 04/11/2022Value: 11.5        Ref range: 4.5 - 11.5 E9/L    Status: FinalRBC                                           Date: 04/11/2022Value: 3.46*       Ref range: 3.80 - 5.80 E12/L  Status: FinalHemoglobin                                    Date: 04/11/2022Value: 10.2*       Ref range: 12.5 - 16.5 g/dL   Status: FinalHematocrit                                    Date: 04/11/2022Value: 33.4*       Ref range: 37.0 - 54.0 %      Status: FinalMCV                                           Date: 04/11/2022Value: 96.5        Ref range: 80.0 - 99.9 fL     Status: [address] [place]                                           Date: 04/11/2022Value: 29.5        Ref range: 26.0 - 35.0 pg     Status: [address] [facility]                                          Date: 04/11/2022Value: 30.5*       Ref range: 32.0 - 34.5 %      Status: FinalRDW                                           Date: 04/11/2022Value: 17.6*       Ref range: 11.5 - 15.0 fL     Status: FinalPlatelets                                     Date: 04/11/2022Value: 255         Ref range: 130 - 450 E9/L     Status: FinalMPV                                           Date: 04/11/2022Value: 9.4         Ref range: 7.0 - 12.0 fL      Status: FinalPhosphorus                                    Date: 04/11/2022Value: 4.2         Ref range: 2.5 - 4.5 mg/dL    Status: FinalMagnesium                                     Date: 04/11/2022Value: 2.0         Ref range: 1.6 - 2.6 mg/dL    Status: FinalMeter Glucose                                 Date: 04/10/2022Value: 140*        Ref range: 74 - 99 mg/dL      Status: FinalMeter Glucose                                 Date: 04/11/2022Value: 57*         Ref range: 74 - 99 mg/dL      Status: FinalMeter Glucose Date: 04/11/2022Value: 127*        Ref range: 74 - 99 mg/dL      Status: FinalMeter Glucose                                 Date: 04/11/2022Value: 53*         Ref range: 74 - 99 mg/dL      Status: FinalMeter Glucose                                 Date: 04/11/2022Value: 52*         Ref range: 74 - 99 mg/dL      Status: FinalMeter Glucose                                 Date: 04/11/2022Value: 123*        Ref range: 74 - 99 mg/dL      Status: FinalSodium                                        Date: 04/12/2022Value: 137         Ref range: 132 - 146 mmol/L   Status: FinalPotassium                                     Date: 04/12/2022Value: 4.6         Ref range: 3.5 - 5.0 mmol/L   Status: FinalChloride                                      Date: 04/12/2022Value: 102         Ref range: 98 - 107 mmol/L    Status: FinalCO2                                           Date: 04/12/2022Value: 25          Ref range: 22 - 29 mmol/L     Status: FinalAnion Gap                                     Date: 04/12/2022Value: 10          Ref range: 7 - 16 mmol/L      Status: FinalGlucose                                       Date: 04/12/2022Value: 97          Ref range: 74 - 99 mg/dL      Status: FinalBUN                                           Date: 04/12/2022Value: 28*         Ref range: 6 - 23 mg/dL       Status: FinalCREATININE                                    Date: 04/12/2022Value: 2.7*        Ref range: 0.7 - 1.2 mg/dL    Status: FinalGFR Non-                      Date: 04/12/2022Value: 23          Ref range: >=60 mL/min/1.73   Status: Final              Comment: Chronic Kidney Disease: less than 60 ml/min/1.73 sq.m. Kidney Failure: less than 15 ml/min/1.73 sq. m. Results valid for patients 18 years and older. GFR                           Date: 04/12/2022Value: 28            Status: FinalCalcium                                       Date: 04/12/2022Value: 8.3*        Ref range: 8.6 - 10.2 mg/dL Status: [address] [facility]                                           Date: 04/12/2022Value: 12.8*       Ref range: 4.5 - 11.5 E9/L    Status: FinalRBC                                           Date: 04/12/2022Value: 3.55*       Ref range: 3.80 - 5.80 E12/L  Status: FinalHemoglobin                                    Date: 04/12/2022Value: 10.2*       Ref range: 12.5 - 16.5 g/dL   Status: FinalHematocrit                                    Date: 04/12/2022Value: 32.9*       Ref range: 37.0 - 54.0 %      Status: FinalMCV                                           Date: 04/12/2022Value: 92.7        Ref range: 80.0 - 99.9 fL     Status: [address]                                           Date: 04/12/2022Value: 28.7        Ref range: 26.0 - 35.0 pg     Status: [address]                                          Date: 04/12/2022Value: 31.0*       Ref range: 32.0 - 34.5 %      Status: FinalRDW                                           Date: 04/12/2022Value: 17.9*       Ref range: 11.5 - 15.0 fL     Status: FinalPlatelets                                     Date: 04/12/2022Value: 240         Ref range: 130 - 450 E9/L     Status: FinalMPV                                           Date: 04/12/2022Value: 9.5         Ref range: 7.0 - 12.0 fL      Status: FinalPhosphorus                                    Date: 04/12/2022Value: 3.8         Ref range: 2.5 - 4.5 mg/dL    Status: FinalMagnesium                                     Date: 04/12/2022Value: 2.2         Ref range: 1.6 - 2.6 mg/dL    Status: FinalMeter Glucose                                 Date: 04/11/2022Value: 178*        Ref range: 74 - 99 mg/dL      Status: FinalMeter Glucose                                 Date: 04/12/2022Value: 102*        Ref range: 74 - 99 mg/dL      Status: Final------------    Radiology last 7 days:  CT ABDOMEN PELVIS WO CONTRAST Additional Contrast? NoneResult Date: 4/8/2022Colonic fecal retention with mild distension of the sigmoid colon.  Cirrhotic morphology of the liver. Small volume upper abdominal ascites. Bilateral pleural effusions with adjacent atelectasis. Small pericardial effusion. Right renal atrophy. XR CHEST PORTABLEResult Date: 4/7/2022Stable moderate-sized right pleural effusion and trace left pleural effusion. Stable atherosclerotic disease and left anterior chest wall cardiac support device. No new cardiopulmonary pathology.  RECOMMENDATION: Follow-up to resolution suggested      [unfilled]    Discharge Medications    Current Discharge Medication ListSTART taking these medicationsondansetron (ZOFRAN-ODT) 4 MG disintegrating tabletTake 1 tablet by mouth every 8 hours as needed for Nausea or VomitingQty: 60 tablet Refills: 0midodrine (PROAMATINE) 10 MG tabletTake 1 tablet by mouth as needed (give 30 min before dialysis)Qty: 90 tablet Refills: 3    Current Discharge Medication List    Current Discharge Medication ListCONTINUE these medications which have NOT CHANGEDbumetanide (BUMEX) 2 MG tabletTake 1 tablet by mouth dailyQty: 30 tablet Refills: 3docusate sodium (COLACE) 100 MG capsuleTake 1 capsule by mouth 2 times dailyQty: 60 capsule Refills: 0guaiFENesin 400 MG tabletTake 400 mg by mouth 4 times daily as needed for Coughipratropium-albuterol (DUONEB) 0.5-2.5 (3) MG/3ML SOLN nebulizer solutionTake 1 vial by nebulization every 4 hours as needed for Shortness of Breathpotassium chloride (KLOR-CON M) 10 MEQ extended release tabletTake 20 mEq by mouth dailyinsulin glargine (LANTUS SOLOSTAR) 100 UNIT/ML injection penInject 50 Units into the skin nightlyloperamide (IMODIUM) 2 MG capsuleTake 2 mg by mouth 4 times daily as needed for DiarrheabuPROPion (WELLBUTRIN XL) 300 MG extended release tabletTake 300 mg by mouth every morningezetimibe (ZETIA) 10 MG tabletTake 10 mg by mouth at bedtimeZinc Sulfate 110 MG TABSTake 2 tablets by mouth dailymagnesium hydroxide (MILK OF MAGNESIA) 400 MG/5ML suspensionTake 30 mLs by mouth daily as needed for ConstipationDextromethorphan-guaiFENesin  MG/5ML SYRPTake 5 mLs by mouth every 4 hours as needed for Coughmelatonin 3 MG TABS tabletTake 3 mg by mouth at bedtimeinsulin lispro, 1 Unit Dial, (HUMALOG KWIKPEN) 100 UNIT/ML SOPNInject 0-10 Units into the skin 4 times daily (before meals and nightly) *Per Sliding Scale*metoprolol succinate (TOPROL XL) 25 MG extended release tabletTake 1 tablet by mouth 2 times dailyQty: 30 tablet Refills: 3acetaminophen (TYLENOL) 325 MG tabletTake 650 mg by mouth every 4 hours as needed for Pain or Fever vitamin C (ASCORBIC ACID) 500 MG tabletTake 500 mg by mouth 2 times dailyvitamin D (CHOLECALCIFEROL) 50 MCG (2000 UT) TABS tabletTake 2,000 Units by mouth daily aspirin 81 MG EC tabletTake 1 tablet by mouth dailyQty: 30 tablet Refills: 3folic acid (FOLVITE) 1 MG tabletTake 1 mg by mouth dailycyanocobalamin 1000 MCG tabletTake 1,000 mcg by mouth dailyatorvastatin (LIPITOR) 40 MG tabletTake 40 mg by mouth at bedtime pantoprazole (PROTONIX) 40 MG tabletTake 40 mg by mouth daily    Current Discharge Medication List    Time Spent on Discharge:1E] minutes were spent in patient examination, evaluation, counseling as well as medication reconciliation, prescriptions for required medications, discharge plan, and follow up.     Electronically signed by Lamont Cameron MD on 4/12/22 at 1:25 PM EDT

## 2022-04-12 NOTE — PROGRESS NOTES
[place]  Department of Pulmonary, Critical Care and Sleep Medicine  [address] [facility]  Department of Internal Medicine  Progress Note    SUBJECTIVE:    Patient seen and examined. Currently denies shortness of breath. Does state that he does feel better with 5L NC with activity. OBJECTIVE:  Vitals:    04/11/22 1030 04/11/22 1052 04/11/22 1142 04/11/22 2300   BP: (!) 141/69 110/61 103/64 (!) 113/57   Pulse: 81 80 82 85   Resp:   18 19   Temp:  98 °F (36.7 °C) 97.5 °F (36.4 °C) 98.6 °F (37 °C)   TempSrc:   Tympanic Oral   SpO2:   100% 98%   Weight:  268 lb 11.9 oz (121.9 kg)     Height:         Constitutional: Alert, well oriented  EENT: EOMI YOSEF. MMM. No icterus. No thrush. Neck: No thyromegaly. Trachea was midline. Respiratory: Symmetrical.  Breath sounds still diminished but improving  Cardiovascular: Regular, No murmur. No rubs. Pulses:  Equal bilaterally. Abdomen: Soft without organomegaly. No rebound, rigidity. No guarding. Musculoskeletal: Without weakness or gross deficits  Extremities: Continues to have 1+ edema bilaterally   Neurological/Psychiatric: No acute psychosis. Cranial nerves are intact. DATA:    Monitor Strips:  Reviewed & discusses with technical team. No changes noted.     RADIOLOGY:  No new imaging    CBC with Differential:    Lab Results   Component Value Date    WBC 11.5 04/11/2022    RBC 3.46 04/11/2022    HGB 10.2 04/11/2022    HCT 33.4 04/11/2022     04/11/2022    MCV 96.5 04/11/2022    MCH 29.5 04/11/2022    MCHC 30.5 04/11/2022    RDW 17.6 04/11/2022    LYMPHOPCT 20.3 04/07/2022    MONOPCT 12.6 04/07/2022    BASOPCT 0.5 04/07/2022    MONOSABS 1.39 04/07/2022    LYMPHSABS 2.23 04/07/2022    EOSABS 0.32 04/07/2022    BASOSABS 0.06 04/07/2022     BMP:    Lab Results   Component Value Date     04/11/2022    K 4.4 04/11/2022    K 5.0 04/07/2022     04/11/2022    CO2 22 04/11/2022    BUN 32 04/11/2022    LABALBU 3.2 04/07/2022    CREATININE 2.8 04/11/2022    CALCIUM 8.4 04/11/2022    GFRAA 27 04/11/2022    LABGLOM 22 04/11/2022    GLUCOSE 56 04/11/2022     Assessment:   1. Stable right-sided pleural effusion  2. Dyspnea with exertion  3. 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 History of SPEEDY     Plan:   1. Patient's pleural effusion remains stable from last admission. This has already been tapped on March 30. I would not recommend a repeat thoracentesis at this time as this is likely secondary to his renal failure, he has had multiple thoracentesis, and he is currently asymptomatic. It is unclear to me if the patient's shortness of breath may have been from his oxygen coming disconnected and likely an additional anxiety attack because of this. Would recommend treating for generalized anxiety. 2. Plan to discharge on 4L NC at rest and 5L with exertion. 3. Continue CPAP at at bedtime and with naps, okay to use home CPAP  4. HD as per nephrology.       Nadege Gambino DO

## 2022-04-12 NOTE — CARE COORDINATION
Care Coordination: Precert obtained from Jaqueline Mahoney.  Will need stat covid, RN is aware, Attending messaged and jennyfer is checking on obtaining transport for wheelchair with 4 ltrs    Libby Lopez    Addendum: spoke to patient ,he is aware of above and  time is 4 pm.  He states he has already notified sister of discharge    Libby Lopez

## 2022-04-12 NOTE — PROGRESS NOTES
[place]  Department of Pulmonary, Critical Care and Sleep Medicine  [address] [facility]  Department of Internal Medicine  Progress Note    SUBJECTIVE:    Patient seen and examined. Currently denies shortness of breath. He is returning to [facility] today. OBJECTIVE:  Vitals:    04/11/22 1052 04/11/22 1142 04/11/22 2300 04/12/22 1026   BP: 110/61 103/64 (!) 113/57 93/69   Pulse: 80 82 85 79   Resp:  18 19 18   Temp: 98 °F (36.7 °C) 97.5 °F (36.4 °C) 98.6 °F (37 °C) 97.5 °F (36.4 °C)   TempSrc:  Tympanic Oral Tympanic   SpO2:  100% 98% 100%   Weight: 268 lb 11.9 oz (121.9 kg)      Height:         Constitutional: Alert, well oriented  EENT: EOMI YOSEF. MMM. No icterus. No thrush. Neck: No thyromegaly. Trachea was midline. Respiratory: Symmetrical.  Breath sounds still diminished but improving  Cardiovascular: Regular, No murmur. No rubs. Pulses:  Equal bilaterally. Abdomen: Soft without organomegaly. No rebound, rigidity. No guarding. Musculoskeletal: Without weakness or gross deficits  Extremities: Continues to have 1+ edema bilaterally   Neurological/Psychiatric: No acute psychosis. Cranial nerves are intact. DATA:    Monitor Strips:  Reviewed & discusses with technical team. No changes noted.     RADIOLOGY:  No new imaging    CBC with Differential:    Lab Results   Component Value Date    WBC 12.8 04/12/2022    RBC 3.55 04/12/2022    HGB 10.2 04/12/2022    HCT 32.9 04/12/2022     04/12/2022    MCV 92.7 04/12/2022    MCH 28.7 04/12/2022    MCHC 31.0 04/12/2022    RDW 17.9 04/12/2022    LYMPHOPCT 20.3 04/07/2022    MONOPCT 12.6 04/07/2022    BASOPCT 0.5 04/07/2022    MONOSABS 1.39 04/07/2022    LYMPHSABS 2.23 04/07/2022    EOSABS 0.32 04/07/2022    BASOSABS 0.06 04/07/2022     BMP:    Lab Results   Component Value Date     04/12/2022    K 4.6 04/12/2022    K 5.0 04/07/2022     04/12/2022    CO2 25 04/12/2022    BUN

## 2022-04-12 NOTE — PROGRESS NOTES
Progress Note  Date:2022       GBV/[id number]  Patient Leonor Blancas     YOB: 1945     Age:77 y.o. Patient says breathing is improved today. Subjective    Subjective:  Symptoms:  Improved. He reports shortness of breath. No chest pain. Diet:  Adequate intake. Activity level: Impaired due to weakness. Pain:  He reports no pain. Review of Systems   Constitutional: Negative for activity change and fever. HENT: Negative for congestion. Respiratory: Positive for shortness of breath. Cardiovascular: Positive for leg swelling. Negative for chest pain. Gastrointestinal: Negative for abdominal pain. Neurological: Negative for dizziness. Psychiatric/Behavioral: Negative for agitation. Objective         Vitals Last 24 Hours:  TEMPERATURE:  Temp  Av °F (36.7 °C)  Min: 97.5 °F (36.4 °C)  Max: 98.6 °F (37 °C)  RESPIRATIONS RANGE: Resp  Av.5  Min: 18  Max: 19  PULSE OXIMETRY RANGE: SpO2  Av %  Min: 98 %  Max: 100 %  PULSE RANGE: Pulse  Av.9  Min: 80  Max: 85  BLOOD PRESSURE RANGE: Systolic (15UHV), OXD:839 , Min:91 , EKY:725   ; Diastolic (33OSI), ZMQ:99, Min:55, Max:69    I/O (24Hr): Intake/Output Summary (Last 24 hours) at 2022 0705  Last data filed at 2022 0459  Gross per 24 hour   Intake 970 ml   Output 2200 ml   Net -1230 ml     Objective:  General Appearance:  Comfortable. Vital signs: (most recent): Blood pressure (!) 113/57, pulse 85, temperature 98.6 °F (37 °C), temperature source Oral, resp. rate 19, height 5' 9\" (1.753 m), weight 268 lb 11.9 oz (121.9 kg), SpO2 98 %. No fever. Lungs:  Normal effort and normal respiratory rate. Breath sounds clear to auscultation. Heart: Normal rate. Regular rhythm. S1 normal and S2 normal.    Extremities: There is local swelling.       Labs/Imaging/Diagnostics    Labs:  CBC:  Recent Labs     04/10/22  0530 22  0456 22  0452   WBC 12.1* 11.5 12.8*   RBC 3.58* 3.46* 3.55*   HGB 10.3* 10.2* 10.2*   HCT 34.1* 33.4* 32.9*   MCV 95.3 96.5 92.7   RDW 17.4* 17.6* 17.9*    255 240     CHEMISTRIES:  Recent Labs     04/10/22  0530 22  0455 22  0452    138 137   K 4.4 4.4 4.6    102 102   CO2 26 22 25   BUN 25* 32* 28*   CREATININE 2.4* 2.8* 2.7*   GLUCOSE 62* 56* 97   PHOS  --  4.2 3.8   MG  --  2.0 2.2     PT/INR:No results for input(s): PROTIME, INR in the last 72 hours. APTT:No results for input(s): APTT in the last 72 hours. LIVER PROFILE:  No results for input(s): AST, ALT, BILIDIR, BILITOT, ALKPHOS in the last 72 hours. Imaging Last 24 Hours:  XR CHEST PORTABLE    Result Date: 2022  EXAMINATION: ONE XRAY VIEW OF THE CHEST 2022 4:49 pm COMPARISON: 2022 HISTORY: ORDERING SYSTEM PROVIDED HISTORY: shortness of breath TECHNOLOGIST PROVIDED HISTORY: Reason for exam:->shortness of breath What reading provider will be dictating this exam?->CRC FINDINGS: There is a large opacity seen within the right lung base. There is a small right pleural effusion. The left upper lobe is clear. There is a small left pleural effusion. The cardiac silhouette is within normals. There is a dual lead cardiac pacer on the left. 1. Large opacity within the right lung base which could represent pneumonia and or atelectasis. 2. Moderate size right pleural effusion. 3. Small left pleural effusion. 4. CT of the thorax is recommended for further evaluation.      US DUP LOWER EXTREMITIES BILATERAL VENOUS    Result Date: 2022  Patient MRN:  [id number] : 1945 Age: 65 years Gender: Male Order Date:  2022 5:28 PM EXAM: US DUP LOWER EXTREMITIES BILATERAL VENOUS NUMBER OF IMAGES:  52 INDICATION:  leg swelling leg swelling What reading provider will be dictating this exam?->[facility] COMPARISON: None Within the visualized vessels, there is no evidence for deep venous thrombosis There is good compressibility, there is good augmentation, there is good color flow. Within the visualized vessels there is no evidence for deep venous thrombosis     Assessment//Plan           Hospital Problems           Last Modified POA    * (Principal) Respiratory failure ([address] 4/8/2022 Yes        Assessment:  (   (Principal) Acute on chronic clinical systolic heart failure ([address] 3/14/2022 Yes     Acute on chronic hypoxic respiratory failure. CHF  Pleural effusion  CKD IV  DM  COPD  HTN  Hyperlipidemia       ). Plan:   (Dialysis per [facility].  Monitor labs and output. Continue meds. PT/OT).

## 2022-04-13 NOTE — TELEPHONE ENCOUNTER
Contacted [facility] and informed nurse Neil Aleman who is in charge of arranging and transporting pt's to appointments of the pt's scheduled appointment on 05/19/2022 at 10:00 a.m. Neil Aleman was also provided the clinic address and phone number. Informed her to contact the clinic if the pt is unable to make the appointment.

## 2022-04-25 PROBLEM — U07.1 COVID-19: Status: ACTIVE | Noted: 2022-01-01

## 2022-04-25 NOTE — ED PROVIDER NOTES
HPI:  4/25/22,   Time: 5:30 PM EDT       Hardy Rodriguez is a 65 y.o. male presenting to the ED for cough/sob, beginning 4 days ago. The complaint has been persistent, moderate in severity, and worsened by nothing. Bib ems, chronic o2 5l. Cough/congestion/nv/d. Denies sick contact. o2 makes better. Got worse during dialysis today. Sub fevers. No rash/skin lesions/cp    Review of Systems:   Pertinent positives and negatives are stated within HPI, all other systems reviewed and are negative.          --------------------------------------------- PAST HISTORY ---------------------------------------------  Past Medical History:  has a past medical history of Acid reflux, Agent orange exposure, Arthritis, CAD (coronary artery disease), Congestive heart failure (CHF) ([facility]), COPD (chronic obstructive pulmonary disease) ([facility] [address] [facility] Depression, Diabetes mellitus [facility] History of blood transfusion, Hyperlipidemia, Hypertension, MI (myocardial infarction) ([facility] Polio, and Sleep apnea. Past Surgical History:  has a past surgical history that includes Coronary angioplasty with stent; Cardiac pacemaker placement; pacemaker placement; hernia repair; and vascular surgery (N/A, 4/4/2022). Social History:  reports that he has quit smoking. He has never used smokeless tobacco. He reports that he does not drink alcohol and does not use drugs. Family History: family history is not on file. The patients home medications have been reviewed. Allergies: Penicillins        ---------------------------------------------------PHYSICAL EXAM--------------------------------------    Constitutional/General: Alert and oriented x3, chronically ill appearing  Head: Normocephalic and atraumatic  Eyes: PERRL, EOMI, conjunctive normal, sclera non icteric  Mouth: Oropharynx clear, handling secretions,   Neck: Supple, full ROM,   Respiratory: Lungs clear to auscultation bilaterally, no wheezes, rales, or rhonchi.  Not in respiratory distress  Cardiovascular:  Regular rate. Regular rhythm. 2+ distal pulses  Chest: No chest wall tenderness  GI:  Abdomen Soft, Non tender, Non distended. +BS. No organomegaly, no palpable masses,  No rebound, guarding, or rigidity. Musculoskeletal: Moves all extremities x 4. Warm and well perfused, . Capillary refill <3 seconds  Integument: skin warm and dry. No rashes. Lymphatic: no lymphadenopathy noted  Neurologic: GCS 15, no focal deficits, s  Psychiatric: Normal Affect    -------------------------------------------------- RESULTS -------------------------------------------------  I have personally reviewed all laboratory and imaging results for this patient. Results are listed below.      LABS:  Results for orders placed or performed during the hospital encounter of 04/25/22   COVID-19, Rapid    Specimen: Nasopharyngeal Swab   Result Value Ref Range    SARS-CoV-2, NAAT DETECTED (A) Not Detected   CBC with Auto Differential   Result Value Ref Range    WBC 11.5 4.5 - 11.5 E9/L    RBC 3.55 (L) 3.80 - 5.80 E12/L    Hemoglobin 10.3 (L) 12.5 - 16.5 g/dL    Hematocrit 34.8 (L) 37.0 - 54.0 %    MCV 98.0 80.0 - 99.9 fL    MCH 29.0 26.0 - 35.0 pg    MCHC 29.6 (L) 32.0 - 34.5 %    RDW 18.0 (H) 11.5 - 15.0 fL    Platelets 542 567 - 443 E9/L    MPV 9.4 7.0 - 12.0 fL    Neutrophils % 70.6 43.0 - 80.0 %    Immature Granulocytes % 2.0 0.0 - 5.0 %    Lymphocytes % 12.9 (L) 20.0 - 42.0 %    Monocytes % 13.2 (H) 2.0 - 12.0 %    Eosinophils % 1.0 0.0 - 6.0 %    Basophils % 0.3 0.0 - 2.0 %    Neutrophils Absolute 8.14 (H) 1.80 - 7.30 E9/L    Immature Granulocytes # 0.23 E9/L    Lymphocytes Absolute 1.48 (L) 1.50 - 4.00 E9/L    Monocytes Absolute 1.52 (H) 0.10 - 0.95 E9/L    Eosinophils Absolute 0.11 0.05 - 0.50 E9/L    Basophils Absolute 0.03 0.00 - 0.20 E9/L    Anisocytosis 1+     Polychromasia 1+     Hypochromia 1+     Poikilocytes 1+     Mac Cells 1+     Ovalocytes 1+     Tear Drop Cells 1+    Comprehensive Metabolic Panel w/ Reflex to MG   Result Value Ref Range    Sodium 142 132 - 146 mmol/L    Potassium reflex Magnesium 5.5 (H) 3.5 - 5.0 mmol/L    Chloride 101 98 - 107 mmol/L    CO2 30 (H) 22 - 29 mmol/L    Anion Gap 11 7 - 16 mmol/L    Glucose 112 (H) 74 - 99 mg/dL    BUN 26 (H) 6 - 23 mg/dL    CREATININE 3.7 (H) 0.7 - 1.2 mg/dL    GFR Non-African American 16 >=60 mL/min/1.73    GFR African American 19     Calcium 8.8 8.6 - 10.2 mg/dL    Total Protein 7.0 6.4 - 8.3 g/dL    Albumin 3.9 3.5 - 5.2 g/dL    Total Bilirubin 0.8 0.0 - 1.2 mg/dL    Alkaline Phosphatase 119 40 - 129 U/L    ALT 35 0 - 40 U/L    AST 47 (H) 0 - 39 U/L   Troponin   Result Value Ref Range    Troponin, High Sensitivity 55 (H) 0 - 11 ng/L   Brain Natriuretic Peptide   Result Value Ref Range    Pro-BNP 5,643 (H) 0 - 450 pg/mL   Protime-INR   Result Value Ref Range    Protime 12.1 9.3 - 12.4 sec    INR 1.1    APTT   Result Value Ref Range    aPTT 35.5 (H) 24.5 - 35.1 sec   Blood Gas, Arterial   Result Value Ref Range    Date Analyzed [id number]     Time Analyzed 1911     Source: Blood Arterial     pH, Blood Gas 7.470 (H) 7.350 - 7.450    PCO2 36.1 35.0 - 45.0 mmHg    PO2 59.8 (L) 75.0 - 100.0 mmHg    HCO3 25.7 22.0 - 26.0 mmol/L    B.E. 2.1 -3.0 - 3.0 mmol/L    O2 Sat 91.1 (L) 92.0 - 98.5 %    O2Hb 90.4 (L) 94.0 - 97.0 %    COHb 0.6 0.0 - 1.5 %    MetHb 0.2 0.0 - 1.5 %    O2 Content 14.0 mL/dL    HHb 8.8 (H) 0.0 - 5.0 %    tHb (est) 11.0 (L) 11.5 - 16.5 g/dL    Mode NC- 6 L     Date Of Collection      Time Collected      Pt Temp 37.0 C     ID [id number]     Lab [id number]     Critical(s) Notified .  No Critical Values    D-Dimer, Quantitative   Result Value Ref Range    D-Dimer, Quant 1802 ng/mL DDU   EKG 12 Lead   Result Value Ref Range    Ventricular Rate 94 BPM    Atrial Rate 94 BPM    P-R Interval 160 ms    QRS Duration 112 ms    Q-T Interval 376 ms    QTc Calculation (Bazett) 470 ms    P Axis 62 degrees    R Axis 86 degrees    T Axis 85 degrees RADIOLOGY:  Interpreted by Radiologist.  CTA PULMONARY W CONTRAST   Final Result   Large bilateral pleural effusions with significant compressive atelectasis of   the lower lobes similar to previous. Small pericardial effusion also unchanged. No identified pulmonary embolism. Findings in the upper abdomen consistent with cirrhosis. The ascites is   similar to possibly slightly increased. Prominent anasarca. RECOMMENDATIONS:   Unavailable         XR CHEST PORTABLE   Final Result   Bilateral pleural effusions right greater than left not significantly changed   compared to prior study             EKG:  This EKG is signed and interpreted by the EP. Time: 1830  Rate: 95  Rhythm: Sinus  Interpretation: non spec  Comparison: None      ------------------------- NURSING NOTES AND VITALS REVIEWED ---------------------------   The nursing notes within the ED encounter and vital signs as below have been reviewed by myself. BP 94/70   Pulse 86   Temp 97.5 °F (36.4 °C)   Resp 16   Ht 5' 9\" (1.753 m)   Wt 250 lb (113.4 kg)   SpO2 98%   BMI 36.92 kg/m²   Oxygen Saturation Interpretation: Normal    The patients available past medical records and past encounters were reviewed. ------------------------------ ED COURSE/MEDICAL DECISION MAKING----------------------  Medications   dexamethasone (PF) (DECADRON) injection 10 mg (10 mg IntraVENous Given 4/25/22 2101)         ED COURSE:       Medical Decision Making:    Hypoxic on home o2, inc o2 to maintain sats, dex given, cta neg for pe, admit for further care      This patient's ED course included: a personal history and physicial examination    This patient has remained hemodynamically stable during their ED course. Re-Evaluations:             Re-evaluation.   Patients symptoms show no change    Re-examination  4/25/22   10 PM EDT          Vital Signs:   Vitals:    04/25/22 1718 04/25/22 2100 04/25/22 2200   BP: 131/74 (!) 93/48 94/70   Pulse: 99 90 86   Resp: 21 20 16   Temp: 97.5 °F (36.4 °C)     SpO2: 95% 98% 98%   Weight: 250 lb (113.4 kg)     Height: 5' 9\" (1.753 m)               Consultations:             Tho Espinoza    Critical Care: 35 min        Counseling: The emergency provider has spoken with the patient and discussed todays results, in addition to providing specific details for the plan of care and counseling regarding the diagnosis and prognosis. Questions are answered at this time and they are agreeable with the plan.       --------------------------------- IMPRESSION AND DISPOSITION ---------------------------------    IMPRESSION  1. Hypoxia    2. COVID    3. Hyperkalemia        DISPOSITION  Disposition: Admit to telemetry  Patient condition is stable    NOTE: This report was transcribed using voice recognition software.  Every effort was made to ensure accuracy; however, inadvertent computerized transcription errors may be present        Akash Hearn MD  04/25/22 2918

## 2022-04-25 NOTE — LETTER
[place] Department Medicaid  CERTIFICATION OF NECESSITY  FOR NON-EMERGENCY TRANSPORTATION   BY GROUND AMBULANCE      Individual Information   1. Name: Skinny Gaona 2. [place] Medicaid Billing Number:    3. Address: [place] [address]      Transportation Provider Information   4. Provider Name:    5. [place] Medicaid Provider Number:  National Provider Identifier (NPI):      Certification  7. Criteria:  During transport, this individual requires:  [x] Medical treatment or continuous     supervision by an EMT. [x] The administration or regulation of oxygen by another person. [] Supervised protective restraint. 8. Period Beginning Date:    5. Length  [x] Not more than 1 day(s)  [] One Year     Additional Information Relevant to Certification   10. Comments or Explanations, If Necessary or Appropriate   Hypoxia   02 dependent   COVID    Hyperkalemia       1. Acute on chronic hypoxemic respiratory failure  2. Positive for COVID-19.  3. Bilateral moderate to large pleural effusions bilaterally. 4. Dyspnea with exertion  5. 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 History of SPEEDY       Certifying Practitioner Information   11. Name of Practitioner:    12. [place] Medicaid Provider Number, If Applicable:  [address] Provider Identifier (NPI):      Signature Information   14. Date of Signature: Electronically signed by Janelle Deshpande RN on 4/26/2022 at 12:05 PM 15. Name of Person Signing: Electronically signed by Janelle Deshpande RN on 4/26/2022 at 12:05 PM   16. Signature and Professional Designation: Electronically signed by Janelle Deshpande RN on 4/26/2022 at 12:05 PM     OD 28006  Rev. 7/2015              [address] Encounter Date/Time: 4/25/2022 [address] [place] [address] Rd Account: [de-identified]    MRN: [id number]    Patient: Anne Brochure Serial #: [id number]      ENCOUNTER          Patient Class: I Private Enc?   No Unit  BDUlyess C[place] [address] 9935/9380-H   Hospital Service: MED   Encounter DX: Hyperkalemia [E87.5]   ADM Provider: Shiraz Paulson MD   Procedure:     ATT Provider: Shiraz Paulson MD   REF Provider:        Admission DX: Hyperkalemia, Hypoxia, COVID, COVID-19 and DX codes: E87.5, R09.02, U07.1, U07.1      PATIENT                 Name: Jose Taylor : 1945 (65 yrs)   Address: [facility] * Sex: Male   City: [place] [address]         Marital Status: Single   Employer: RETIRED         Yaz[employer]:  [employer]   Primary Care Provider: Shiraz Paulson, *         Primary Phone: [phone]   EMERGENCY CONTACT   Contact Name Legal Guardian? Relationship to Patient Home Phone Work Phone   1. Nicole Betancourt  2. *No Contact Specified* No    Brother/Sister    [phone]                 GUARANTOR            Guarantor: Jose Taylor     : 1945   Address: [address] [place] [address] Sex: Male   [address] [facility] [address]     Relation to Patient: Self       Home Phone: [phone]   Guarantor ID: [id number]       Work Phone:     Guarantor Employer: RETIRED         Status: RETIRED      COVERAGE        PRIMARY INSURANCE   Payor: [facility] MEDICARE Plan: [facility] MEDICARE COMPLETE   Payor Address: ,          Group Number: [id number] Insurance Type: INDEMNITY   Subscriber Name: Eleuterio Lake : 1945   Subscriber ID: [id number] Pat. Rel. to Sub: Self   SECONDARY INSURANCE   Payor:  Plan:  [employer]*   Payor Address:  [address] [id number], [place] [address]          Group Number:   Insurance Type: INDEMNITY   Subscriber Name: Eleuterio Lake : 1945   Subscriber ID: [id number] Pat.  Rel. to Sub: SELF           CSN: [id number]

## 2022-04-26 NOTE — H&P
HISTORY AND PHYSICAL             Date: 4/26/2022        Patient Name: Modesta Sanders     YOB: 1945      Age:  65 y.o. Chief Complaint     Chief Complaint   Patient presents with    Shortness of Breath     since this am          History Obtained From   patient    History of Present Illness   Patient came to the ER with shortness of breath and cough. Past Medical History     Past Medical History:   Diagnosis Date    Acid reflux     Agent orange exposure     Arthritis     CAD (coronary artery disease)     Congestive heart failure (CHF) (HCC)     COPD (chronic obstructive pulmonary disease) (HCC)     Depression     [facility] [facility]     History of blood transfusion     Hyperlipidemia     Hypertension     MI [facility]myocardial infarction) [facility] [address] [facility]     Polio     Sleep apnea         Past Surgical History     Past Surgical History:   Procedure Laterality Date    CARDIAC PACEMAKER PLACEMENT      CORONARY ANGIOPLASTY WITH STENT PLACEMENT      HERNIA REPAIR      PACEMAKER PLACEMENT      VASCULAR SURGERY N/A 4/4/2022    TUNNELED DIALYSIS CATHETER performed by Jocelyn Latif MD at [address] [place] [address]        Medications Prior to Admission     Prior to Admission medications    Medication Sig Start Date End Date Taking?  Authorizing Provider   ondansetron (ZOFRAN-ODT) 4 MG disintegrating tablet Take 1 tablet by mouth every 8 hours as needed for Nausea or Vomiting 4/12/22   Danay Hernandez MD   midodrine (PROAMATINE) 10 MG tablet Take 1 tablet by mouth as needed (give 30 min before dialysis) 4/12/22   Danay Hernandez MD   [facility]) 2 MG tablet Take 1 tablet by mouth daily 4/6/22   Danay Hernandez MD   docusate sodium (COLACE) 100 MG capsule Take 1 capsule by mouth 2 times daily 3/22/22   Danay Hernandez MD   guaiFENesin 400 MG tablet Take 400 mg by mouth 4 times daily as needed for Cough    Historical Provider, MD   ipratropium-albuterol (DUONEB) 0.5-2.5 (3) MG/3ML SOLN nebulizer solution Take 1 vial by nebulization every 4 hours as needed for Shortness of Breath    Historical Provider, MD   potassium chloride (KLOR-CON M) 10 MEQ extended release tablet Take 20 mEq by mouth daily    Historical Provider, MD   insulin glargine (LANTUS SOLOSTAR) 100 UNIT/ML injection pen Inject 50 Units into the skin nightly    Historical Provider, MD   loperamide (IMODIUM) 2 MG capsule Take 2 mg by mouth 4 times daily as needed for Diarrhea    Historical Provider, MD   buPROPion (WELLBUTRIN XL) 300 MG extended release tablet Take 300 mg by mouth every morning    Historical Provider, MD   ezetimibe (ZETIA) 10 MG tablet Take 10 mg by mouth at bedtime    Historical Provider, MD   Zinc Sulfate 110 MG TABS Take 2 tablets by mouth daily    Historical Provider, MD   magnesium hydroxide (MILK OF MAGNESIA) 400 MG/5ML suspension Take 30 mLs by mouth daily as needed for Constipation    Historical Provider, MD   Dextromethorphan-guaiFENesin  MG/5ML SYRP Take 5 mLs by mouth every 4 hours as needed for Cough    Historical Provider, MD   melatonin 3 MG TABS tablet Take 3 mg by mouth at bedtime    Historical Provider, MD   insulin lispro, 1 Unit Dial, (HUMALOG KWIKPEN) 100 UNIT/ML SOPN Inject 0-10 Units into the skin 4 times daily (before meals and nightly) *Per Sliding Scale*    Historical Provider, MD   metoprolol succinate (TOPROL XL) 25 MG extended release tablet Take 1 tablet by mouth 2 times daily 2/16/22   GILMAR Wilson - CNP   acetaminophen (TYLENOL) 325 MG tablet Take 650 mg by mouth every 4 hours as needed for Pain or Fever     Historical Provider, MD   vitamin C (ASCORBIC ACID) 500 MG tablet Take 500 mg by mouth 2 times daily    Historical Provider, MD   vitamin D (CHOLECALCIFEROL) 50 MCG (2000 UT) TABS tablet Take 2,000 Units by mouth daily     Historical Provider, MD   aspirin 81 MG EC tablet Take 1 tablet by mouth daily 2/3/22   Lily Malik MD   folic acid (FOLVITE) 1 MG tablet Take 1 mg by mouth daily    Historical Provider, MD   cyanocobalamin 1000 MCG tablet Take 1,000 mcg by mouth daily    Historical Provider, MD   atorvastatin (LIPITOR) 40 MG tablet Take 40 mg by mouth at bedtime     Historical Provider, MD   pantoprazole (PROTONIX) 40 MG tablet Take 40 mg by mouth daily    Historical Provider, MD        Allergies   Penicillins    Social History     Social History     Tobacco History     Smoking Status  Former Smoker    Smokeless Tobacco Use  Never Used    Tobacco Comment  quit in 1985          Alcohol History     Alcohol Use Status  Never          Drug Use     Drug Use Status  Never          Sexual Activity     Sexually Active  Not Asked                Family History   History reviewed. No pertinent family history. Review of Systems   Review of Systems   Constitutional: Positive for activity change. Negative for fever. HENT: Positive for congestion. Respiratory: Positive for shortness of breath. Cardiovascular: Negative for chest pain. Gastrointestinal: Negative for abdominal pain. Genitourinary: Negative for difficulty urinating. Neurological: Negative for dizziness. Physical Exam   BP 97/69   Pulse 80   Temp 97.5 °F (36.4 °C)   Resp 17   Ht 5' 9\" (1.753 m)   Wt 250 lb (113.4 kg)   SpO2 96%   BMI 36.92 kg/m²     Physical Exam  HENT:      Head: Normocephalic. Mouth/Throat:      Mouth: Mucous membranes are moist.   Eyes:      Pupils: Pupils are equal, round, and reactive to light. Cardiovascular:      Rate and Rhythm: Normal rate. Pulses: Normal pulses. Pulmonary:      Effort: Pulmonary effort is normal.      Breath sounds: No wheezing. Abdominal:      General: Abdomen is flat. Bowel sounds are normal. There is no distension. Musculoskeletal:      Cervical back: Normal range of motion. Right lower leg: Edema present. Skin:     Capillary Refill: Capillary refill takes less than 2 seconds.    Neurological:      General: No focal deficit present. Mental Status: He is alert and oriented to person, place, and time.    Psychiatric:         Mood and Affect: Mood normal.         Behavior: Behavior normal.         Labs      Recent Results (from the past 24 hour(s))   EKG 12 Lead    Collection Time: 04/25/22  6:30 PM   Result Value Ref Range    Ventricular Rate 94 BPM    Atrial Rate 94 BPM    P-R Interval 160 ms    QRS Duration 112 ms    Q-T Interval 376 ms    QTc Calculation (Bazett) 470 ms    P Axis 62 degrees    R Axis 86 degrees    T Axis 85 degrees   CBC with Auto Differential    Collection Time: 04/25/22  6:47 PM   Result Value Ref Range    WBC 11.5 4.5 - 11.5 E9/L    RBC 3.55 (L) 3.80 - 5.80 E12/L    Hemoglobin 10.3 (L) 12.5 - 16.5 g/dL    Hematocrit 34.8 (L) 37.0 - 54.0 %    MCV 98.0 80.0 - 99.9 fL    MCH 29.0 26.0 - 35.0 pg    MCHC 29.6 (L) 32.0 - 34.5 %    RDW 18.0 (H) 11.5 - 15.0 fL    Platelets 908 120 - 090 E9/L    MPV 9.4 7.0 - 12.0 fL    Neutrophils % 70.6 43.0 - 80.0 %    Immature Granulocytes % 2.0 0.0 - 5.0 %    Lymphocytes % 12.9 (L) 20.0 - 42.0 %    Monocytes % 13.2 (H) 2.0 - 12.0 %    Eosinophils % 1.0 0.0 - 6.0 %    Basophils % 0.3 0.0 - 2.0 %    Neutrophils Absolute 8.14 (H) 1.80 - 7.30 E9/L    Immature Granulocytes # 0.23 E9/L    Lymphocytes Absolute 1.48 (L) 1.50 - 4.00 E9/L    Monocytes Absolute 1.52 (H) 0.10 - 0.95 E9/L    Eosinophils Absolute 0.11 0.05 - 0.50 E9/L    Basophils Absolute 0.03 0.00 - 0.20 E9/L    Anisocytosis 1+     Polychromasia 1+     Hypochromia 1+     Poikilocytes 1+     [place] Cells 1+     Ovalocytes 1+     Tear Drop Cells 1+    Comprehensive Metabolic Panel w/ Reflex to MG    Collection Time: 04/25/22  6:47 PM   Result Value Ref Range    Sodium 142 132 - 146 mmol/L    Potassium reflex Magnesium 5.5 (H) 3.5 - 5.0 mmol/L    Chloride 101 98 - 107 mmol/L    CO2 30 (H) 22 - 29 mmol/L    Anion Gap 11 7 - 16 mmol/L    Glucose 112 (H) 74 - 99 mg/dL    BUN 26 (H) 6 - 23 mg/dL    CREATININE 3.7 (H) 0.7 - 1.2 mg/dL    GFR Non-African American 16 >=60 mL/min/1.73    GFR African American 19     Calcium 8.8 8.6 - 10.2 mg/dL    Total Protein 7.0 6.4 - 8.3 g/dL    Albumin 3.9 3.5 - 5.2 g/dL    Total Bilirubin 0.8 0.0 - 1.2 mg/dL    Alkaline Phosphatase 119 40 - 129 U/L    ALT 35 0 - 40 U/L    AST 47 (H) 0 - 39 U/L   Troponin    Collection Time: 04/25/22  6:47 PM   Result Value Ref Range    Troponin, High Sensitivity 55 (H) 0 - 11 ng/L   Brain Natriuretic Peptide    Collection Time: 04/25/22  6:47 PM   Result Value Ref Range    Pro-BNP 5,643 (H) 0 - 450 pg/mL   Protime-INR    Collection Time: 04/25/22  6:47 PM   Result Value Ref Range    Protime 12.1 9.3 - 12.4 sec    INR 1.1    APTT    Collection Time: 04/25/22  6:47 PM   Result Value Ref Range    aPTT 35.5 (H) 24.5 - 35.1 sec   COVID-19, Rapid    Collection Time: 04/25/22  6:47 PM    Specimen: Nasopharyngeal Swab   Result Value Ref Range    SARS-CoV-2, NAAT DETECTED (A) Not Detected   D-Dimer, Quantitative    Collection Time: 04/25/22  6:47 PM   Result Value Ref Range    D-Dimer, Quant 1802 ng/mL DDU   Blood Gas, Arterial    Collection Time: 04/25/22  7:11 PM   Result Value Ref Range    Date Analyzed [id number]     Time Analyzed 1911     Source: Blood Arterial     pH, Blood Gas 7.470 (H) 7.350 - 7.450    PCO2 36.1 35.0 - 45.0 mmHg    PO2 59.8 (L) 75.0 - 100.0 mmHg    HCO3 25.7 22.0 - 26.0 mmol/L    B.E. 2.1 -3.0 - 3.0 mmol/L    O2 Sat 91.1 (L) 92.0 - 98.5 %    O2Hb 90.4 (L) 94.0 - 97.0 %    COHb 0.6 0.0 - 1.5 %    MetHb 0.2 0.0 - 1.5 %    O2 Content 14.0 mL/dL    HHb 8.8 (H) 0.0 - 5.0 %    tHb (est) 11.0 (L) 11.5 - 16.5 g/dL    Mode NC- 6 L     Date Of Collection      Time Collected      Pt Temp 37.0 C     ID [id number]     Lab [id number]     Critical(s) Notified . No Critical Values         Imaging/Diagnostics Last 24 Hours   XR CHEST PORTABLE    Result Date: 1/21/2022  EXAMINATION: ONE XRAY VIEW OF THE CHEST 1/21/2022 10:02 am COMPARISON: None.  HISTORY: ORDERING SYSTEM PROVIDED HISTORY: dyspnea TECHNOLOGIST PROVIDED HISTORY: Reason for exam:->dyspnea FINDINGS: The heart is enlarged. There is a dual lead cardiac pacer on the left There is an infiltrate seen within the right lung base. The left lung is clear. There is a trace right pleural effusion. There is no left pleural effusion. 1. Right lower lobe pneumonia 2. Trace right pleural effusion 3. Cardiomegaly     US DUP LOWER EXTREMITIES BILATERAL VENOUS    Result Date: 1/21/2022  EXAMINATION: DUPLEX VENOUS ULTRASOUND OF THE BILATERAL LOWER EXTREMITIES1/21/2022 10:15 am TECHNIQUE: Duplex ultrasound using B-mode/gray scaled imaging, Doppler spectral analysis and color flow Doppler was obtained of the deep venous structures of the lower bilateral extremities. COMPARISON: None. HISTORY: ORDERING SYSTEM PROVIDED HISTORY: discomfort TECHNOLOGIST PROVIDED HISTORY: Reason for exam:->discomfort What reading provider will be dictating this exam?->CRC FINDINGS: The visualized veins of the bilateral lower extremities are patent and free of echogenic thrombus. The veins demonstrate good compressibility with normal color flow study and spectral analysis. No evidence of DVT in either lower extremity. RECOMMENDATIONS: [facility] DEPART[facility] Problems           Last Modified POA    * (Principal) COVID-19 4/25/2022 Yes       chronic hypoxic respiratory failure. CHF  Pleural effusion  ESRD  DM  COPD  HTN  Hyperlipidemia        Plan   Pleural effusions appear stable  Oxygen appears back to his recent baseline. Pulmonary to see, though not sure what needs to be changed at this time.     Consultations Ordered:  IP CONSULT TO INTERNAL MEDICINE  IP CONSULT TO PULMONOLOGY  IP CONSULT TO NEPHROLOGY  IP CONSULT TO CASE MANAGEMENT

## 2022-04-26 NOTE — PLAN OF CARE
Problem: Chronic Conditions and Co-morbidities  Goal: Patient's chronic conditions and co-morbidity symptoms are monitored and maintained or improved  4/26/2022 1906 by Lawanda Callahan RN  Outcome: Progressing  4/26/2022 1253 by Rad Walter RN  Outcome: Progressing     Problem: Discharge Planning  Goal: Discharge to home or other facility with appropriate resources  4/26/2022 1906 by Lawanda Callahan RN  Outcome: Progressing  4/26/2022 1253 by Rad Walter RN  Outcome: Progressing     Problem: Skin/Tissue Integrity  Goal: Absence of new skin breakdown  Description: 1. Monitor for areas of redness and/or skin breakdown  2. Assess vascular access sites hourly  3. Every 4-6 hours minimum:  Change oxygen saturation probe site  4. Every 4-6 hours:  If on nasal continuous positive airway pressure, respiratory therapy assess nares and determine need for appliance change or resting period.   Outcome: Progressing     Problem: ABCDS Injury Assessment  Goal: Absence of physical injury  Outcome: Progressing

## 2022-04-26 NOTE — PROGRESS NOTES
Report to relief Nurse. Pharmacy to review home medications for approp times. - per Pharmacist. Pt had 2 large soft/loose stools. Colace held.

## 2022-04-26 NOTE — ED NOTES
Asked patient for a med list and he stated they are in the computer. Unable to complete med rec.       Madhu Barrios RN  04/26/22 2677

## 2022-04-26 NOTE — ED NOTES
When initially assessing patient, patient complained of SOB. Upon observation, there was a tear in patient's oxygen tubing. Not sure if tubing was from facility or from hospital. New tubing applied to patient. Patient states he felt immediate relief when new tubing was applied.      Carlene Stokes RN  04/25/22 2934

## 2022-04-26 NOTE — ED NOTES
Radiology Procedure Waiver   Name: Hardy Rodriguez  : 1945  MRN: [id number]    Date:  22    Time: 8:32 PM EDT    Benefits of immediately proceeding with Radiology exam(s) without pre-testing outweigh the risks or are not indicated as specified below and therefore the following is/are being waived:    [] Pregnancy test   [] Patients LMP on-time and regular.   [] Patient had Tubal Ligation or has other Contraception Device. [] Patient  is Menopausal or Premenarcheal.    [] Patient had Full or Partial Hysterectomy. [] Protocol for Iodine allergy    [] MRI Questionnaire     [] BUN/Creatinine   [] Patient age w/no hx of renal dysfunction. [x] Patient on Dialysis. [] Recent Normal Labs.   Electronically signed by Winston Salomon MD on 22 at 8:32 PM EDT               Winston Salomon MD  22

## 2022-04-26 NOTE — PLAN OF CARE
Problem: Chronic Conditions and Co-morbidities  Goal: Patient's chronic conditions and co-morbidity symptoms are monitored and maintained or improved  Outcome: Progressing     Problem: Discharge Planning  Goal: Discharge to home or other facility with appropriate resources  Outcome: Progressing

## 2022-04-26 NOTE — CARE COORDINATION
Care Coordination: pt readmitted with SOB from [facility]. Pt just discharged for same complaints of SOB. PT is Now Covid positive. I spoke with Manjula Vines at [facility]. They are moving beds, will accept back, no precert and no therapy. Per nursing, pt is at baseline 02, will check dc disposition. Transport envelope completed. [facility] court  [phone].  Spoke to nakia, current schedule MWF 13:30- she is aware of covid positive and they can accommodate, just need called for dc to set up room      [facility] Single

## 2022-04-27 NOTE — PROGRESS NOTES
Occupational Therapy  OCCUPATIONAL THERAPY INITIAL EVALUATION     [facility] [address] [address]      Date:2022                                                Patient Name: Maia Dalton  MRN: [id number]  : 1945  Room: 6405/6405-[address]    Referring Provider: Ella Venegas MD  Specific Provider Orders/Date: OT eval and treat 22    Diagnosis: Hyperkalemia [E87.5]  Hypoxia [R09.02]  COVID [U07.1]  COVID-19 [U07.1]   Pt admitted to hospital on 22 for cough and SOB    Pertinent Medical History:  has a past medical history of Acid reflux, Agent orange exposure, Arthritis, CAD (coronary artery disease), Congestive heart failure (CHF) ([facility] [address] COPD (chronic obstructive pulmonary disease) ([facility] [address] Depression, Diabetes mellitus ([facility] [address] History of blood transfusion, Hyperlipidemia, Hypertension, MI (myocardial infarction) ([facility] [address] Polio, and Sleep apnea.        Precautions:  Fall Risk, HFNC, continuous pulse ox, TAPS, droplet+ (Covid+), bed alarm    Assessment of current deficits   [x] Functional mobility  [x]ADLs  [x] Strength               []Cognition    [x] Functional transfers   [x] IADLs         [x] Safety Awareness   [x]Endurance    [] Fine Coordination              [x] Balance      [] Vision/perception   []Sensation     []Gross Motor Coordination  [] ROM  [] Delirium                   [] Motor Control     OT PLAN OF CARE   OT POC based on physician orders, patient diagnosis and results of clinical assessment    Frequency/Duration 1-3 days/wk for 2 weeks PRN   Specific OT Treatment Interventions to include:   * Instruction/training on adapted ADL techniques and AE recommendations to increase functional independence within precautions       * Training on energy conservation strategies, correct breathing pattern and techniques to improve independence/tolerance for self-care routine  * Functional transfer/mobility training/DME recommendations for increased independence, safety, and fall prevention  * Patient/Family education to increase follow through with safety techniques and functional independence  * Recommendation of environmental modifications for increased safety with functional transfers/mobility and ADLs  * Therapeutic exercise to improve motor endurance, ROM, and functional strength for ADLs/functional transfers  * Therapeutic activities to facilitate/challenge dynamic balance, stand tolerance for increased safety and independence with ADLs  * Therapeutic activities to facilitate gross/fine motor skills for increased independence with ADLs  * Positioning to improve skin integrity, interaction with environment and functional independence    Recommended Adaptive Equipment: TBD     Home Living: [facility] [address] owned: O2 (5-6L cannula during day, CPAP at night)    Prior Level of Function (at [facility]): independent/mod I w/ grooming and feeding, supervision/setup for UB ADL's, assist to dependent for LB ADLs; recently anneliese lift><w/c. Assist for w/c management.    Active w/ PT/OT - pt verbalized that was sitting EOB w/ therapy staff    Pain Level: Pt c/o no pain this session    Cognition: A&O: 4/4; Follows 1-2 step directions   Memory:  good    Sequencing:  fair    Problem solving:  fair    Judgement/safety:  fair      Functional Assessment:  AM-PAC Daily Activity Raw Score: 12/24   Initial Eval Status  Date: 4/27/22 Treatment Status  Date: STGs = LTGs  Time frame: 10-14 days   Feeding Stand by Assist   Modified [place]    Grooming Minimal Assist   Seated EOB to wash hands, face and comb hair  Modified [place]    UB Dressing Moderate Assist   Donned/doffed gown  Supervision    LB Dressing Dependent   B socks  Maximal Assist    Bathing Maximal Assist  Moderate Assist    Toileting Dependent   Including hygiene (bed rolling L/R)  Moderate Assist    Bed Mobility  Rolling: Min A  Supine to sit: Moderate Assist   Sit to supine: Moderate Assist  Lateral scooting to HOB: Max A   Rolling: Supervision  Supine to sit: Minimal Assist   Sit to supine: Minimal Assist    Functional Transfers NT  Deferred d/t noted SOB w/ sitting tasks. Increased recovery time  Moderate Assist    Functional Mobility NT  -  Will continue to assess   Balance Sitting:     Static:  SBA    Dynamic:Mod A  Standing: NT  Sitting: Supervision   Activity Tolerance Fair-  Noted SOB w/ majority of functional tasks. Frequent, extensive rest breaks provided w/ cues for breathing technique. See below for O2 sat  Fair   Visual/  Perceptual Glasses: yes                  Hand Dominance R   AROM (PROM) Strength Additional Info:    RUE  WFL 3+/5 good  and wfl FMC/dexterity noted during ADL tasks       LUE WFL 3+/5 good  and wfl FMC/dexterity noted during ADL tasks       Hearing: WFL   Sensation:  c/o numbness/tingling B feet (chronic)  Tone: WFL   Edema: B LE's    Comments: Upon arrival patient lying in bed. RN clearance. Therapist educated pt on role of OT. At end of session, patient lying in bed (bed alarm on) with call light and phone within reach, all lines and tubes intact. Overall patient demonstrated decreased independence and safety during completion of ADL/functional transfer/mobility tasks. Pt would benefit from continued skilled OT to increase safety and independence with completion of ADL/IADL tasks for functional independence and quality of life. Treatment: OT treatment provided this date includes: Facilitation of bed mobility (rolling L/R w/ education/cues for body mechanics and for initiation of rest breaks. Increased time required d/t +SOB). Therapist then facilitated supine>sit EOB, unsupported sitting balance (education/cues for safety, posture, weight shifting, dynamic reaching to prep for ADL's.  Pt tolerated ~13-14 minutes EOB for static and dynamic tasks - increased time required d/t noted SOB) and lateral scooting to HOB (rest breaks reinforced). Therapist facilitated self-care retraining: UB self-care tasks (gown), toileting task and seated grooming tasks while educating/cuing pt on modified techniques, posture, safety and energy conservation techniques. At end of session, facilitated return to supine position and optimal bed repositioning w/ education on benefits for skin and joint integrity (B LE's elevated w/ 2 pillows) - rest breaks also provided. Thorough education was provided on self-monitoring of O2 sat, importance of pacing self, rest breaks and pursed lip breathing. Skilled consistent monitoring of HR, O2 sats and pts response to treatment. Pt on 6L HFNC  At rest:O2 sat=98%, HR=79 bpm  Bed rolling to R: O2 sat=^95%  Roll to L: O2 sat=86% to 92% (quick recovery <1 minute to 92%. However, increased SOB noted w/ cues for pursed lip breathing. Increased time required for SOB to subside)  Seated EOB: O2 sat=^90-94%  Return to supine position: O2 sat=88% to 93%  End of session (semi-supine position): O2 sat=95%    Rehab Potential: Good for established goals     Patient / Family Goal: to increase independence and endurance      Patient and/or family were instructed on functional diagnosis, prognosis/goals and OT plan of care. Demonstrated good understanding.      Eval Complexity: Low    Time In: 10:30  Time Out: 11:11  Total Treatment Time: 26 minutes    Min Units   OT Eval Low 97165  X 1   OT Eval Medium 62137      OT Eval High 76095      OT Re-Eval [id number]       Therapeutic Ex 49726       Therapeutic Activities 05914  14  1   ADL/Self Care 05582  12  1   Orthotic Management [id number]       Manual 68830     Neuro Re-Ed 05433       Non-Billable Time          Evaluation Time additionally includes thorough review of current medical information, gathering information on past medical history/social history and prior level of function, interpretation of standardized testing/informal observation of tasks, assessment of data and development of plan of care and goals.         Basilia, OTR/L #[id number]

## 2022-04-27 NOTE — FLOWSHEET NOTE
04/27/22 1656   Vital Signs   BP (!) 113/52   Temp 96.3 °F (35.7 °C)   Pulse 86   Resp 18   Post-Hemodialysis Assessment   Post-Treatment Procedures Blood returned;Catheter capped, clamped and heparinized x 2 ports   Machine Disinfection Process Acid/Vinegar Clean;Heat Disinfect; Exterior Machine Disinfection   Rinseback Volume (ml) 300 ml   Total Liters Processed (l/min) 96.2 l/min   Dialyzer Clearance Lightly streaked   Duration of Treatment (minutes) 240 minutes   Heparin amount administered during treatment (units) 0 units   Hemodialysis Intake (ml) 400 ml   Hemodialysis Output (ml) 1400 ml   NET Removed (ml) 1000 ml   Tolerated Treatment Good   Patient Response to Treatment 1000ml removed

## 2022-04-27 NOTE — PLAN OF CARE
Patient's chart updated to reflect:      . - HF care plan, HF education points and HF discharge instructions.  -Orders: 2 gram sodium diet, daily weights, I/O.  -PCP and/or Cardiologist appointment to be scheduled within 7 days of hospital discharge.  -History of HF, not primary admission Dx.   Patient admitted for treatment of Covid-19  Due to the need to preserve PPE for other caregivers, a face-to-face encounter with the patient was not performed    Keshawn Samuels RN RN, BSN  Heart Failure Navigator

## 2022-04-27 NOTE — PROGRESS NOTES
Blood sugar checked 139. Previously below 60. Contacted dr Kathleen Boles instructed to hold lantus.

## 2022-04-27 NOTE — PROGRESS NOTES
Progress Note  Date:2022       Room:6405/6405-  Patient Alis Montoya     YOB: 1945     Age:77 y.o. Patient says breathing is improved today. Subjective    Subjective:  Symptoms:  Improved. He reports shortness of breath. No chest pain. Diet:  Adequate intake. Activity level: Impaired due to weakness. Pain:  He reports no pain. Review of Systems   Constitutional: Negative for activity change and fever. HENT: Negative for congestion. Respiratory: Positive for shortness of breath. Cardiovascular: Positive for leg swelling. Negative for chest pain. Gastrointestinal: Negative for abdominal pain. Neurological: Negative for dizziness. Psychiatric/Behavioral: Negative for agitation. Objective         Vitals Last 24 Hours:  TEMPERATURE:  Temp  Av.9 °F (36.6 °C)  Min: 97.5 °F (36.4 °C)  Max: 98.1 °F (36.7 °C)  RESPIRATIONS RANGE: Resp  Av.3  Min: 16  Max: 18  PULSE OXIMETRY RANGE: SpO2  Av.5 %  Min: 98 %  Max: 99 %  PULSE RANGE: Pulse  Av.3  Min: 80  Max: 91  BLOOD PRESSURE RANGE: Systolic (94CQV), IGJ:328 , Min:94 , ALEISHA:948   ; Diastolic (19QIK), MAHESH:99, Min:54, Max:75    I/O (24Hr): Intake/Output Summary (Last 24 hours) at 2022 0650  Last data filed at 2022  Gross per 24 hour   Intake 100 ml   Output 200 ml   Net -100 ml     Objective:  General Appearance:  Comfortable. Vital signs: (most recent): Blood pressure (!) 94/54, pulse 91, temperature 98.1 °F (36.7 °C), temperature source Oral, resp. rate 18, height 5' 9\" (1.753 m), weight 250 lb (113.4 kg), SpO2 99 %. No fever. Lungs:  Normal effort and normal respiratory rate. Breath sounds clear to auscultation. Heart: Normal rate. Regular rhythm. S1 normal and S2 normal.    Extremities: There is local swelling.       Labs/Imaging/Diagnostics    Labs:  CBC:  Recent Labs     22  1847   WBC 11.5   RBC 3.55*   HGB 10.3*   HCT 34.8*   MCV 98.0   RDW 18.0*      CHEMISTRIES:  Recent Labs     22      K 5.5*      CO2 30*   BUN 26*   CREATININE 3.7*   GLUCOSE 112*     PT/INR:  Recent Labs     22   PROTIME 12.1   INR 1.1     APTT:  Recent Labs     22   APTT 35.5*     LIVER PROFILE:  Recent Labs     22   AST 47*   ALT 35   BILITOT 0.8   ALKPHOS 119       Imaging Last 24 Hours:  XR CHEST PORTABLE    Result Date: 2022  EXAMINATION: ONE XRAY VIEW OF THE CHEST 2022 4:49 pm COMPARISON: 2022 HISTORY: ORDERING SYSTEM PROVIDED HISTORY: shortness of breath TECHNOLOGIST PROVIDED HISTORY: Reason for exam:->shortness of breath What reading provider will be dictating this exam?->CRC FINDINGS: There is a large opacity seen within the right lung base. There is a small right pleural effusion. The left upper lobe is clear. There is a small left pleural effusion. The cardiac silhouette is within normals. There is a dual lead cardiac pacer on the left. 1. Large opacity within the right lung base which could represent pneumonia and or atelectasis. 2. Moderate size right pleural effusion. 3. Small left pleural effusion. 4. CT of the thorax is recommended for further evaluation. US DUP LOWER EXTREMITIES BILATERAL VENOUS    Result Date: 2022  Patient MRN:  [id number] : 1945 Age: 65 years Gender: Male Order Date:  2022 5:28 PM EXAM: US DUP LOWER EXTREMITIES BILATERAL VENOUS NUMBER OF IMAGES:  52 INDICATION:  leg swelling leg swelling What reading provider will be dictating this exam?->[facility] COMPARISON: None Within the visualized vessels, there is no evidence for deep venous thrombosis There is good compressibility, there is good augmentation, there is good color flow.      Within the visualized vessels there is no evidence for deep venous thrombosis     Assessment//Plan           Hospital Problems           Last Modified POA    * (Principal) COVID-19 2022 Yes Assessment:  ((Principal) COVID-19 4/25/2022 Yes      chronic hypoxic respiratory failure. CHF  Pleural effusion  ESRD  DM  COPD  HTN  Hyperlipidemia        ). Plan:   (Pulmonary following  Wean oxygen  Dialysis per Renal.  Monitor labs and output. Continue meds. PT/OT).

## 2022-04-27 NOTE — CONSULTS
[facility]  Nephrology Consult Note    Patient's Name: Quincy Lira     Reason for Consult:   ESRD     Chief Complaint:   Shortness of breath and cough  History Obtained From:  past medical records and EMR (patient in [facility] isolation)     History of Present Illness:    Quincy Lira is a 65 y.o. male with a past medical history of MI, hypertension, hyperlipidemia, depression, COPD, coronary artery disease, and diabetes mellitus. He presented to the ED on 4/25 with complaints of cough and shortness of breath. Vital signs at presentation to the ED include temperature 97.5, respirations 21, pulse 99, /74, and he was 98% on 5 L. Lab data at presentation to the ED include potassium 5.5, CO2 30, BUN 26, creatinine 3.7, and hemoglobin 10.3. Patient was tested for COVID and was positive. CTA of the chest showed \"large bilateral pleural effusions with significant compressive atelectasis of the lower lobes. \"   We were consulted to see the patient for ESRD. Patient is known to our service and patient is a CKD patient with HFpEF; he dialyzes at [facility] second shift. At present, patient not seen or examined by me due to [facility] isolation. Discussed with bedside nurse, who explained that the patient is doing good and denied any issues; she explained that he is eating okay.     PMH:    Past Medical History:   Diagnosis Date    Acid reflux     Agent orange exposure     Arthritis     CAD (coronary artery disease)     Congestive heart failure (CHF) (HCC)     COPD (chronic obstructive pulmonary disease) (HCC)     Depression     Diabetes mellitus ([address]     History of blood transfusion     Hyperlipidemia     Hypertension     MI (myocardial infarction) ([address]     Polio     Sleep apnea      Patient Active Problem List   Diagnosis    PNA (pneumonia)    Acute on chronic heart failure with preserved ejection fraction ([address]    Coronary artery disease involving native coronary artery of native heart without angina pectoris    Cardiac pacemaker in situ    Primary hypertension    SPEEDY (obstructive sleep apnea)    Chronic respiratory failure with hypoxia (HCC)    CHF (congestive heart failure), NYHA class I, acute on chronic, combined (HCC)    Acute on chronic clinical systolic heart failure (HCC)    Acute on chronic systolic heart failure (HCC)    Pleural effusion    Respiratory failure (HCC)    COVID-19     Meds:     aspirin  81 mg Oral Daily    atorvastatin  40 mg Oral Nightly    bumetanide  2 mg Oral Daily    buPROPion  300 mg Oral QAM    cyanocobalamin  1,000 mcg Oral Daily    docusate sodium  100 mg Oral BID    ezetimibe  10 mg Oral Nightly    folic acid  1 mg Oral Daily    insulin glargine-yfgn  50 Units SubCUTAneous Nightly    melatonin  3 mg Oral Nightly    metoprolol succinate  25 mg Oral BID    pantoprazole  40 mg Oral Daily    vitamin C  500 mg Oral BID    vitamin D  2,000 Units Oral Daily    zinc sulfate  50 mg Oral Daily    insulin lispro  0-6 Units SubCUTAneous TID WC    insulin lispro  0-3 Units SubCUTAneous Nightly        dextrose       Meds prn:     acetaminophen, guaiFENesin-dextromethorphan, guaiFENesin, ipratropium-albuterol, loperamide, magnesium hydroxide, midodrine, ondansetron, dextrose, glucagon (rDNA), dextrose, glucose    Meds prior to admission:     No current facility-administered medications on file prior to encounter.      Current Outpatient Medications on File Prior to Encounter   Medication Sig Dispense Refill    ondansetron (ZOFRAN-ODT) 4 MG disintegrating tablet Take 1 tablet by mouth every 8 hours as needed for Nausea or Vomiting 60 tablet 0    midodrine (PROAMATINE) 10 MG tablet Take 1 tablet by mouth as needed (give 30 min before dialysis) 90 tablet 3    bumetanide (BUMEX) 2 MG tablet Take 1 tablet by mouth daily 30 tablet 3    docusate sodium (COLACE) 100 MG capsule Take 1 capsule by mouth 2 times daily 60 capsule 0    guaiFENesin 400 MG tablet Take 400 mg by mouth 4 times daily as needed for Cough      ipratropium-albuterol (DUONEB) 0.5-2.5 (3) MG/3ML SOLN nebulizer solution Take 1 vial by nebulization every 4 hours as needed for Shortness of Breath      potassium chloride (KLOR-CON M) 10 MEQ extended release tablet Take 20 mEq by mouth daily      insulin glargine (LANTUS SOLOSTAR) 100 UNIT/ML injection pen Inject 50 Units into the skin nightly      loperamide (IMODIUM) 2 MG capsule Take 2 mg by mouth 4 times daily as needed for Diarrhea      buPROPion (WELLBUTRIN XL) 300 MG extended release tablet Take 300 mg by mouth every morning      ezetimibe (ZETIA) 10 MG tablet Take 10 mg by mouth at bedtime      Zinc Sulfate 110 MG TABS Take 2 tablets by mouth daily      magnesium hydroxide (MILK OF MAGNESIA) 400 MG/5ML suspension Take 30 mLs by mouth daily as needed for Constipation      melatonin 3 MG TABS tablet Take 3 mg by mouth at bedtime      insulin lispro, 1 Unit Dial, (HUMALOG KWIKPEN) 100 UNIT/ML SOPN Inject 0-10 Units into the skin 4 times daily (before meals and nightly) *Per Sliding Scale*      metoprolol succinate (TOPROL XL) 25 MG extended release tablet Take 1 tablet by mouth 2 times daily 30 tablet 3    acetaminophen (TYLENOL) 325 MG tablet Take 650 mg by mouth every 4 hours as needed for Pain or Fever       vitamin C (ASCORBIC ACID) 500 MG tablet Take 500 mg by mouth 2 times daily      vitamin D (CHOLECALCIFEROL) 50 MCG (2000 UT) TABS tablet Take 2,000 Units by mouth daily       aspirin 81 MG EC tablet Take 1 tablet by mouth daily 30 tablet 3    folic acid (FOLVITE) 1 MG tablet Take 1 mg by mouth daily      cyanocobalamin 1000 MCG tablet Take 1,000 mcg by mouth daily      atorvastatin (LIPITOR) 40 MG tablet Take 40 mg by mouth at bedtime       pantoprazole (PROTONIX) 40 MG tablet Take 40 mg by mouth daily       Allergies:    Penicillins    Social History:     reports that he has quit smoking.  He has never used smokeless tobacco. He reports that he does not drink alcohol and does not use drugs. Family History:     History reviewed. No pertinent family history. Review of Systems:   Review of systems not obtained due to patient factors. Patient is in [facility] isolation. Physical Exam:    Patient Vitals for the past 24 hrs:   BP Temp Temp src Pulse Resp SpO2   04/27/22 0815 91/64 97.3 °F (36.3 °C) Axillary 77 22 100 %   04/27/22 0000 (!) 94/54 98.1 °F (36.7 °C) Oral 91 18 99 %   04/26/22 1700 116/75 98.1 °F (36.7 °C) Oral 82 16 98 %   04/26/22 1130 118/73 97.5 °F (36.4 °C) Tympanic 80 18 --       Intake/Output Summary (Last 24 hours) at 4/27/2022 0915  Last data filed at 4/26/2022 2036  Gross per 24 hour   Intake 100 ml   Output 200 ml   Net -100 ml     Due to the current efforts to prevent transmission of COVID-19 and also the need to preserve PPE for other caregivers, a face-to-face encounter with the patient was not performed. That being said, all relevant records and diagnostic tests were reviewed, including laboratory results and imaging. Please reference any relevant documentation elsewhere. Care will be coordinated with the primary service.     Data:    Recent Labs     04/25/22 1847 04/27/22  0630   WBC 11.5 10.0   HGB 10.3* 9.8*   HCT 34.8* 35.2*   MCV 98.0 107.0*    236     Recent Labs     04/25/22 1847 04/27/22  0631    135   K 5.5* 6.1*    99   CO2 30* 23   CREATININE 3.7* 5.3*   BUN 26* 51*   LABGLOM 16 11   GLUCOSE 112* 89   CALCIUM 8.8 8.5*     No results found for: VITD25    PTH   Date Value Ref Range Status   04/02/2022 104 (H) 15 - 65 pg/mL Final     Recent Labs     04/25/22 1847   ALT 35   AST 47*   ALKPHOS 119   BILITOT 0.8     Recent Labs     04/25/22 1847   LABALBU 3.9     Ferritin   Date Value Ref Range Status   04/27/2022 537 ng/mL Final     Comment:     FERRITIN Reference Ranges:  Adult Males   20 - 60 years:    27 - 400 ng/mL  Adult females 16 - 61 years:    15 - 150 ng/mL  Adults greater than 60 years:   no established reference range  Pediatrics:                     no established reference range       No results found for: Vanice Venus    No results found for: FOLATE    Lab Results   Component Value Date    COLORU Yellow 04/07/2022    NITRU Negative 04/07/2022    GLUCOSEU Negative 04/07/2022    KETUA Negative 04/07/2022    UROBILINOGEN 0.2 04/07/2022    BILIRUBINUR Negative 04/07/2022     Lab Results   Component Value Date/Time    OSMOU 351 01/22/2022 11:24 AM     No components found for: URIC    No results found for: LIPIDPAN    Assessment and Plan:    1. CKD V  Baseline creatinine 2.5-3  dialyzes at [address] [place] MWF second shift  On Bumex 2 mg oral daily  Continue HD MWF    2.  COVID-19  CTA chest \"large bilateral pleural effusions with significant compressive atelectasis of the lower lobes\"   On supplemental O2   CPAP at night  Pulmonology following    3. Hyperkalemia  Likely in setting of CKD  K+ 6.1 today  Low potassium diet  For HD today  Follow with HD    4. Anemia  Hemoglobin 9.8 today  Transfuse for hemoglobin<7  Monitor labs  Follow-up    5.   Secondary hyperparathyroidism of renal origin   from 4/2/2022  Phosphorus 3.8 from 4/12/2022  Calcium 8.5  Check PTH, Phos, ionized calcium   Follow    Elisabet Malone, APRN - CNP     Pt seen through window of hd due to covid  Tolerating hd  Agree with above  Alfonso Howard MD

## 2022-04-27 NOTE — PROGRESS NOTES
Patient returned from dialysis at this time, blood sugar is 56. The patient is alert and talking. Glucose tabs given at this time, will recheck blood sugar in 15 minutes.

## 2022-04-28 NOTE — PROGRESS NOTES
[facility]  Nephrology Progress Note    Patient's Name: Yanira Kirk     History of Present Illness from 4/27 Consult Note:    Fam Rubio is a 65 y.o. male with a past medical history of MI, hypertension, hyperlipidemia, depression, COPD, coronary artery disease, and diabetes mellitus. He presented to the ED on 4/25 with complaints of cough and shortness of breath. Vital signs at presentation to the ED include temperature 97.5, respirations 21, pulse 99, /74, and he was 98% on 5 L. Lab data at presentation to the ED include potassium 5.5, CO2 30, BUN 26, creatinine 3.7, and hemoglobin 10.3. Patient was tested for COVID and was positive. CTA of the chest showed \"large bilateral pleural effusions with significant compressive atelectasis of the lower lobes. \"   We were consulted to see the patient for ESRD. Patient is known to our service and patient is a CKD patient with HFpEF; he dialyzes at [facility] second shift. At present, patient not seen or examined by me due to [place] isolation. Discussed with bedside nurse, who explained that the patient is doing good and denied any issues; she explained that he is eating okay. \"    Subjective:    4/28: Patient was not personally seen or examined due to COVID isolation. Discussed with his bedside nurse, who reports that the patient is doing good, tolerating food, and no issues overnight.     PMH:    Past Medical History:   Diagnosis Date    Acid reflux     Agent orange exposure     Arthritis     CAD (coronary artery disease)     Congestive heart failure (CHF) (HCC)     COPD (chronic obstructive pulmonary disease) (HCC)     Depression     Diabetes mellitus ([facility] [address]     History of blood transfusion     Hyperlipidemia     Hypertension     MI (myocardial infarction) ([facility] [address]     Polio     Sleep apnea      Patient Active Problem List   Diagnosis    PNA (pneumonia)    Acute on chronic heart failure with preserved ejection fraction [facility])    Coronary artery disease involving native coronary artery of native heart without angina pectoris    Cardiac pacemaker in situ    Primary hypertension    SPEEDY (obstructive sleep apnea)    Chronic respiratory failure with hypoxia (HCC)    CHF (congestive heart failure), NYHA class I, acute on chronic, combined (HCC)    Acute on chronic clinical systolic heart failure (HCC)    Acute on chronic systolic heart failure (HCC)    Pleural effusion    Respiratory failure (HCC)    COVID-19     Meds:     aspirin  81 mg Oral Daily    atorvastatin  40 mg Oral Nightly    bumetanide  2 mg Oral Daily    buPROPion  300 mg Oral QAM    cyanocobalamin  1,000 mcg Oral Daily    docusate sodium  100 mg Oral BID    ezetimibe  10 mg Oral Nightly    folic acid  1 mg Oral Daily    insulin glargine-yfgn  50 Units SubCUTAneous Nightly    melatonin  3 mg Oral Nightly    metoprolol succinate  25 mg Oral BID    pantoprazole  40 mg Oral Daily    vitamin C  500 mg Oral BID    vitamin D  2,000 Units Oral Daily    zinc sulfate  50 mg Oral Daily    insulin lispro  0-6 Units SubCUTAneous TID WC    insulin lispro  0-3 Units SubCUTAneous Nightly      dextrose       Meds prn:     acetaminophen, guaiFENesin-dextromethorphan, guaiFENesin, ipratropium-albuterol, loperamide, magnesium hydroxide, midodrine, ondansetron, dextrose, glucagon (rDNA), dextrose, glucose    Meds prior to admission:     No current facility-administered medications on file prior to encounter.      Current Outpatient Medications on File Prior to Encounter   Medication Sig Dispense Refill    ondansetron (ZOFRAN-ODT) 4 MG disintegrating tablet Take 1 tablet by mouth every 8 hours as needed for Nausea or Vomiting 60 tablet 0    midodrine (PROAMATINE) 10 MG tablet Take 1 tablet by mouth as needed (give 30 min before dialysis) 90 tablet 3    bumetanide (BUMEX) 2 MG tablet Take 1 tablet by mouth daily 30 tablet 3    docusate sodium (COLACE) 100 MG capsule Take 1 capsule by mouth 2 times daily 60 capsule 0    guaiFENesin 400 MG tablet Take 400 mg by mouth 4 times daily as needed for Cough      ipratropium-albuterol (DUONEB) 0.5-2.5 (3) MG/3ML SOLN nebulizer solution Take 1 vial by nebulization every 4 hours as needed for Shortness of Breath      potassium chloride (KLOR-CON M) 10 MEQ extended release tablet Take 20 mEq by mouth daily      insulin glargine (LANTUS SOLOSTAR) 100 UNIT/ML injection pen Inject 50 Units into the skin nightly      loperamide (IMODIUM) 2 MG capsule Take 2 mg by mouth 4 times daily as needed for Diarrhea      buPROPion (WELLBUTRIN XL) 300 MG extended release tablet Take 300 mg by mouth every morning      ezetimibe (ZETIA) 10 MG tablet Take 10 mg by mouth at bedtime      Zinc Sulfate 110 MG TABS Take 2 tablets by mouth daily      magnesium hydroxide (MILK OF MAGNESIA) 400 MG/5ML suspension Take 30 mLs by mouth daily as needed for Constipation      melatonin 3 MG TABS tablet Take 3 mg by mouth at bedtime      insulin lispro, 1 Unit Dial, (HUMALOG KWIKPEN) 100 UNIT/ML SOPN Inject 0-10 Units into the skin 4 times daily (before meals and nightly) *Per Sliding Scale*      metoprolol succinate (TOPROL XL) 25 MG extended release tablet Take 1 tablet by mouth 2 times daily 30 tablet 3    acetaminophen (TYLENOL) 325 MG tablet Take 650 mg by mouth every 4 hours as needed for Pain or Fever       vitamin C (ASCORBIC ACID) 500 MG tablet Take 500 mg by mouth 2 times daily      vitamin D (CHOLECALCIFEROL) 50 MCG (2000 UT) TABS tablet Take 2,000 Units by mouth daily       aspirin 81 MG EC tablet Take 1 tablet by mouth daily 30 tablet 3    folic acid (FOLVITE) 1 MG tablet Take 1 mg by mouth daily      cyanocobalamin 1000 MCG tablet Take 1,000 mcg by mouth daily      atorvastatin (LIPITOR) 40 MG tablet Take 40 mg by mouth at bedtime       pantoprazole (PROTONIX) 40 MG tablet Take 40 mg by mouth daily       Allergies:    Penicillins    Social History:     reports that he has quit smoking. He has never used smokeless tobacco. He reports that he does not drink alcohol and does not use drugs. Family History:     History reviewed. No pertinent family history. Physical Exam:    Patient Vitals for the past 24 hrs:   BP Temp Temp src Pulse Resp SpO2 Weight   04/28/22 0355 -- -- -- -- -- -- 262 lb 9.6 oz (119.1 kg)   04/28/22 0107 -- -- -- -- 24 -- --   04/28/22 0000 (!) 83/57 97.6 °F (36.4 °C) Oral 79 18 100 % --   04/27/22 2005 -- -- -- -- 26 -- --   04/27/22 1745 90/62 97.5 °F (36.4 °C) Oral 78 18 -- --   04/27/22 1656 (!) 113/52 96.3 °F (35.7 °C) Tympanic 86 18 -- 276 lb 7.3 oz (125.4 kg)   04/27/22 1650 (!) 103/50 -- -- 76 -- -- --   04/27/22 1630 (!) 97/50 -- -- 76 -- -- --   04/27/22 1600 (!) 97/50 -- -- 75 -- -- --   04/27/22 1530 (!) 92/47 -- -- 74 -- -- --   04/27/22 1500 (!) 88/39 -- -- 73 -- -- --   04/27/22 1430 92/62 -- -- 78 -- -- --   04/27/22 1411 (!) 99/45 -- -- 77 -- -- --   04/27/22 1330 (!) 147/54 -- -- 75 -- -- --   04/27/22 1300 (!) 102/54 -- -- 77 -- -- --   04/27/22 1240 106/69 97.3 °F (36.3 °C) -- 79 16 -- --       Intake/Output Summary (Last 24 hours) at 4/28/2022 0905  Last data filed at 4/28/2022 0518  Gross per 24 hour   Intake 520 ml   Output 1400 ml   Net -880 ml     Due to the current efforts to prevent transmission of COVID-19 and also the need to preserve PPE for other caregivers, a face-to-face encounter with the patient was not performed. That being said, all relevant records and diagnostic tests were reviewed, including laboratory results and imaging. Please reference any relevant documentation elsewhere. Care will be coordinated with the primary service.     Data:    Recent Labs     04/25/22 1847 04/27/22  0630 04/28/22  0459   WBC 11.5 10.0 9.6   HGB 10.3* 9.8* 9.3*   HCT 34.8* 35.2* 32.6*   MCV 98.0 107.0* 101.9*    236 197     Recent Labs     04/25/22 1847 04/27/22  0631 04/28/22  0459    135 135   K 5. 5* 6.1* 5.3*    99 99   CO2 30* 23 24   CREATININE 3.7* 5.3* 4.2*   BUN 26* 51* 39*   LABGLOM 16 11 14   GLUCOSE 112* 89 61*   CALCIUM 8.8 8.5* 7.9*   PHOS  --   --  4.4     No results found for: VITD25    PTH   Date Value Ref Range Status   04/28/2022 118 (H) 15 - 65 pg/mL Final     Recent Labs     04/25/22 1847   ALT 35   AST 47*   ALKPHOS 119   BILITOT 0.8     Recent Labs     04/25/22 1847   LABALBU 3.9     Ferritin   Date Value Ref Range Status   04/27/2022 537 ng/mL Final     Comment:     FERRITIN Reference Ranges:  Adult Males   20 - 60 years:    30 - 400 ng/mL  Adult females 16 - 61 years:    15 - 150 ng/mL  Adults greater than 60 years:   no established reference range  Pediatrics:                     no established reference range       No results found for: ODSWYPOJ03    No results found for: FOLATE    Lab Results   Component Value Date    COLORU Yellow 04/07/2022    NITRU Negative 04/07/2022    GLUCOSEU Negative 04/07/2022    KETUA Negative 04/07/2022    UROBILINOGEN 0.2 04/07/2022    BILIRUBINUR Negative 04/07/2022     Lab Results   Component Value Date/Time    OSMOU 351 01/22/2022 11:24 AM     No components found for: URIC    No results found for: LIPIDPAN    Assessment and Plan:    1. CKD V  Baseline creatinine 2.5-3  dialyzes at [facility] MWF second shift  On Bumex 2 mg oral daily  Continue HD MWF    2.  COVID-19  CTA chest \"large bilateral pleural effusions with significant compressive atelectasis of the lower lobes\"   On supplemental O2   CPAP at night  Pulmonology following    3. Hyperkalemia  Likely in setting of CKD  K+ 5.3 today  Low potassium diet  Dose Lokelma 10 g oral once today   Follow with HD    4. Anemia  Hemoglobin 9.3 today  Transfuse for hemoglobin<7  Monitor labs  Follow    5.   Secondary hyperparathyroidism of renal origin  Calcium 7.9 and ionized calcium 1.09   , phos 4.4  Recheck ionized calcium tomorrow  Follow    Glorious Dumas, APRN - CNP     Pt not seen due to covid isolation  Agree with above  Chart reviewed  Caity Cornelius MD

## 2022-04-28 NOTE — DISCHARGE SUMMARY
Discharge Summary    Date: 4/28/2022  Patient Name: Chico Brooks YOB: 1945 Age: 65 y.o. Admit Date: 4/25/2022  Discharge Date: 4/28/2022  Discharge Condition: Stable    Admission Diagnosis  Hyperkalemia (E87.5); Hypoxia (R09.02);COVID (U07.1);COVID-19 (U07.1)     Discharge Diagnosis  Principal Problem: COVID-19Resolved Problems: * No resolved hospital problems. [facility] Stay  Narrative of Hospital Course:  Patient admitted for hypoxia from covid  Pulmonary followed. Oxygen remained closed to baseline. Returned to facility. Consultants:  IP CONSULT TO INTERNAL MEDICINEIP CONSULT TO PULMONOLOGYIP CONSULT TO NEPHROLOGYIP CONSULT TO CASE MANAGEMENTIP CONSULT TO NEPHROLOGY    Surgeries/procedures Performed:       Treatments:           Discharge Plan/Disposition:  To [facility]    Hospital/Incidental Findings Requiring Follow Up:    Patient Instructions:    Diet: Diabetic Diet    Activity:Activity as Tolerated  For number of days (if applicable): Other Instructions:    Provider Follow-Up:   No follow-ups on file.      Significant Diagnostic Studies:    Recent Labs:  Admission on 04/25/2022Ventricular Rate                           Date: 04/25/2022Value: 94          Ref range: BPM                Status: FinalAtrial Rate                                   Date: 04/25/2022Value: 94          Ref range: BPM                Status: FinalP-R Interval                                  Date: 04/25/2022Value: 160         Ref range: ms                 Status: FinalQRS Duration                                  Date: 04/25/2022Value: 112         Ref range: ms                 Status: FinalQ-T Interval                                  Date: 04/25/2022Value: 376         Ref range: ms                 Status: FinalQTc Calculation (Bazett)                      Date: 04/25/2022Value: 470         Ref range: ms                 Status: FinalP Axis                                        Date: 04/25/2022Value: 62          Ref range: degrees            Status: FinalR Axis                                        Date: 04/25/2022Value: 86          Ref range: degrees            Status: FinalT Axis                                        Date: 04/25/2022Value: 85          Ref range: degrees            Status: [address] [facility]                                           Date: 04/25/2022Value: 11.5        Ref range: 4.5 - 11.5 E9/L    Status: FinalRBC                                           Date: 04/25/2022Value: 3.55*       Ref range: 3.80 - 5.80 E12/L  Status: FinalHemoglobin                                    Date: 04/25/2022Value: 10.3*       Ref range: 12.5 - 16.5 g/dL   Status: FinalHematocrit                                    Date: 04/25/2022Value: 34.8*       Ref range: 37.0 - 54.0 %      Status: FinalMCV                                           Date: 04/25/2022Value: 98.0        Ref range: 80.0 - 99.9 fL     [facility]                                           Date: 04/25/2022Value: 29.0        Ref range: 26.0 - 35.0 pg     Status: [address]                                          Date: 04/25/2022Value: 29.6*       Ref range: 32.0 - 34.5 %      Status: FinalRDW                                           Date: 04/25/2022Value: 18.0*       Ref range: 11.5 - 15.0 fL     Status: FinalPlatelets                                     Date: 04/25/2022Value: 218         Ref range: 130 - 450 E9/L     Status: FinalMPV                                           Date: 04/25/2022Value: 9.4         Ref range: 7.0 - 12.0 fL      Status: FinalNeutrophils %                                 Date: 04/25/2022Value: 70.6        Ref range: 43.0 - 80.0 %      Status: FinalImmature Granulocytes %                       Date: 04/25/2022Value: 2.0         Ref range: 0.0 - 5.0 %        Status: FinalLymphocytes %                                 Date: 04/25/2022Value: 12.9*       Ref range: 20.0 - 42.0 %      Status: FinalMonocytes % Date: 04/25/2022Value: 13.2*       Ref range: 2.0 - 12.0 %       Status: FinalEosinophils %                                 Date: 04/25/2022Value: 1.0         Ref range: 0.0 - 6.0 %        Status: FinalBasophils %                                   Date: 04/25/2022Value: 0.3         Ref range: 0.0 - 2.0 %        Status: FinalNeutrophils Absolute                          Date: 04/25/2022Value: 8.14*       Ref range: 1.80 - 7.30 E9/L   Status: FinalImmature Granulocytes #                       Date: 04/25/2022Value: 0.23        Ref range: E9/L               Status: FinalLymphocytes Absolute                          Date: 04/25/2022Value: 1.48*       Ref range: 1.50 - 4.00 E9/L   Status: FinalMonocytes Absolute                            Date: 04/25/2022Value: 1.52*       Ref range: 0.10 - 0.95 E9/L   Status: FinalEosinophils Absolute                          Date: 04/25/2022Value: 0.11        Ref range: 0.05 - 0.50 E9/L   Status: FinalBasophils Absolute                            Date: 04/25/2022Value: 0.03        Ref range: 0.00 - 0.20 E9/L   Status: FinalAnisocytosis                                  Date: 04/25/2022Value: 1+            Status: FinalPolychromasia                                 Date: 04/25/2022Value: 1+            Status: FinalHypochromia                                   Date: 04/25/2022Value: 1+            Status: FinalPoikilocytes                                  Date: 04/25/2022Value: 1+            Status: FinalBurr Cells                                    Date: 04/25/2022Value: 1+            Status: FinalOvalocytes                                    Date: 04/25/2022Value: 1+            Status: FinalTear Drop Cells                               Date: 04/25/2022Value: 1+            Status: FinalSodium                                        Date: 04/25/2022Value: 142         Ref range: 132 - 146 mmol/L   Status: FinalPotassium reflex Magnesium                    Date: 04/25/2022Value: 5.5*        Ref range: 3.5 - 5.0 mmol/L   Status: FinalChloride                                      Date: 04/25/2022Value: 101         Ref range: 98 - 107 mmol/L    Status: FinalCO2                                           Date: 04/25/2022Value: 30*         Ref range: 22 - 29 mmol/L     Status: FinalAnion Gap                                     Date: 04/25/2022Value: 11          Ref range: 7 - 16 mmol/L      Status: FinalGlucose                                       Date: 04/25/2022Value: 112*        Ref range: 74 - 99 mg/dL      Status: FinalBUN                                           Date: 04/25/2022Value: 26*         Ref range: 6 - 23 mg/dL       Status: FinalCREATININE                                    Date: 04/25/2022Value: 3.7*        Ref range: 0.7 - 1.2 mg/dL    Status: FinalGFR Non-                      Date: 04/25/2022Value: 16          Ref range: >=60 mL/min/1.73   Status: Final              Comment: Chronic Kidney Disease: less than 60 ml/min/1.73 sq.m. Kidney Failure: less than 15 ml/min/1.73 sq. m. Results valid for patients 18 years and older. GFR                           Date: 04/25/2022Value: 19            Status: FinalCalcium                                       Date: 04/25/2022Value: 8.8         Ref range: 8.6 - 10.2 mg/dL   Status: FinalTotal Protein                                 Date: 04/25/2022Value: 7.0         Ref range: 6.4 - 8.3 g/dL     Status: FinalAlbumin                                       Date: 04/25/2022Value: 3.9         Ref range: 3.5 - 5.2 g/dL     Status: FinalTotal Bilirubin                               Date: 04/25/2022Value: 0.8         Ref range: 0.0 - 1.2 mg/dL    Status: FinalAlkaline Phosphatase                          Date: 04/25/2022Value: 119         Ref range: 40 - 129 U/L       Status: FinalALT                                           Date: 04/25/2022Value: 35          Ref range: 0 - 40 U/L Status: FinalAST                                           Date: 04/25/2022Value: 52*         Ref range: 0 - 39 U/L         Status: FinalTroponin, High Sensitivity                    Date: 04/25/2022Value: 55*         Ref range: 0 - 11 ng/L        Status: Final              Comment: High Sensitivity Troponin values cannot be compared withother Troponin methodologies. Patients with high levels of Biotin oral intake (i.e. >5 mg/day)may have falsely decreased Troponin levels. Samples collectedwithin 8 hours of biotin intake may require additional informationfor diagnosis. Pro-BNP                                       Date: 04/25/2022Value: 5,643*      Ref range: 0 - 450 pg/mL      Status: FinalProtime                                       Date: 04/25/2022Value: 12.1        Ref range: 9.3 - 12.4 sec     Status: FinalINR                                           Date: 04/25/2022Value: 1.1           Status: FinalaPTT                                          Date: 04/25/2022Value: 35.5*       Ref range: 24.5 - 35.1 sec    Status: [id number], NAAT                              Date: 04/25/2022Value: DETECTED*   Ref range: Not Detected       Status: Final              Comment: Rapid NAAT:   Negative results should be treated as presumptive and,if inconsistent with clinical signs and symptoms or necessary forpatient management, should be tested with an alternative molecularassay. Negative results do not preclude SARS-CoV-2 infection andshould not be used as the sole basis for patient management decisions. This test has been authorized by the FDA under an Emergency UseAuthorization (EUA) for use by authorized laboratories. Fact sheet for [employer] sheet for Patients: Doug.dk: Isothermal Nucleic Acid AmplificationDate Analyzed                                 Date: 04/25/2022Value: [id number]      Status: FinalTime Analyzed Date: 04/25/2022Value: 1911          Status: FinalSource:                                       Date: 04/25/2022Value: Blood Arterial                     Status: FinalpH, Blood Gas                                 Date: 04/25/2022Value: 7.470*      Ref range: 7.350 - 7.450      Status: FinalPCO2                                          Date: 04/25/2022Value: 36.1        Ref range: 35.0 - 45.0 mmHg   Status: FinalPO2                                           Date: 04/25/2022Value: 59.8*       Ref range: 75.0 - 100.0 mmHg  Status: FinalHCO3                                          Date: 04/25/2022Value: 25.7        Ref range: 22.0 - 26.0 mmol*  Status: FinalB. E.                                          Date: 04/25/2022Value: 2.1         Ref range: -3.0 - 3.0 mmol/L  Status: FinalO2 Sat                                        Date: 04/25/2022Value: 91.1*       Ref range: 92.0 - 98.5 %      Status: [id number]                                          Date: 04/25/2022Value: 90.4*       Ref range: 94.0 - 97.0 %      Status: FinalCOHb                                          Date: 04/25/2022Value: 0.6         Ref range: 0.0 - 1.5 %        Status: FinalMetHb                                         Date: 04/25/2022Value: 0.2         Ref range: 0.0 - 1.5 %        Status: FinalO2 Content                                    Date: 04/25/2022Value: 14.0        Ref range: mL/dL              Status: FinalHHb                                           Date: 04/25/2022Value: 8.8*        Ref range: 0.0 - 5.0 %        Status: FinaltHb (est)                                     Date: 04/25/2022Value: 11.0*       Ref range: 11.5 - 16.5 g/dL   Status: FinalMode                                          Date: 04/25/2022Value: NC- 6 L       Status: FinalPt Temp                                       Date: 04/25/2022Value: 37.0        Ref range: C                  Status: FinalOperator ID Date: 04/25/2022Value: [id number]        Status: FinalLab                                           Date: 04/25/2022Value: [id number]         Status: FinalCritical(s) Notified                          Date: 04/25/2022Value: . No Critical Values                     Status: FinalD-Dimer, Quant                                Date: 04/25/2022Value: 1802        Ref range: ng/mL DDU          Status: Final              Comment: D-DIMER Interpretation:<  230  ng/mL (D-DU)  Indicates low probability for PE/DVT = 232  ng/mL (D-DU)  Upper Limit of Normal>= 4000 ng/mL (D-DU)  This level could suggest the presence                      of DIC. Clinical correlation may be                      helpful. Meter Glucose                                 Date: 04/26/2022Value: 129*        Ref range: 74 - 99 mg/dL      Status: FinalCRP                                           Date: 04/27/2022Value: 1.4*        Ref range: 0.0 - 0.4 mg/dL    Status: FinalFerritin                                      Date: 04/27/2022Value: 537         Ref range: ng/mL              Status: Final              Comment: FERRITIN Reference Ranges:Adult Males   20 - 60 years:    30 - 400 ng/mLAdult females 16 - 60 years:    15 - 150 ng/mLAdults greater than 60 years:   no established reference rangePediatrics:                     no established reference rangeWBC                                           Date: 04/27/2022Value: 10.0        Ref range: 4.5 - 11.5 E9/L    Status: FinalRBC                                           Date: 04/27/2022Value: 3.29*       Ref range: 3.80 - 5.80 E12/L  Status: FinalHemoglobin                                    Date: 04/27/2022Value: 9.8*        Ref range: 12.5 - 16.5 g/dL   Status: FinalHematocrit                                    Date: 04/27/2022Value: 35.2*       Ref range: 37.0 - 54.0 %      Status: FinalMCV                                           Date: 04/27/2022Value: 107.0*      Ref range: 80.0 - 99.9 fL     [facility] Date: 04/27/20[facility]Value: 29.8        Ref range: 26.0 - 35.0 pg     Status: [address]                                          Date: 04/27/2022Value: 27.8*       Ref range: 32.0 - 34.5 %      Status: FinalRDW                                           Date: 04/27/2022Value: 18.2*       Ref range: 11.5 - 15.0 fL     Status: FinalPlatelets                                     Date: 04/27/2022Value: 236         Ref range: 130 - 450 E9/L     Status: FinalMPV                                           Date: 04/27/2022Value: 10.2        Ref range: 7.0 - 12.0 fL      Status: FinalSodium                                        Date: 04/27/2022Value: 135         Ref range: 132 - 146 mmol/L   Status: FinalPotassium                                     Date: 04/27/2022Value: 6.1*        Ref range: 3.5 - 5.0 mmol/L   Status: FinalChloride                                      Date: 04/27/2022Value: 99          Ref range: 98 - 107 mmol/L    Status: FinalCO2                                           Date: 04/27/2022Value: 23          Ref range: 22 - 29 mmol/L     Status: FinalAnion Gap                                     Date: 04/27/2022Value: 13          Ref range: 7 - 16 mmol/L      Status: FinalGlucose                                       Date: 04/27/2022Value: 89          Ref range: 74 - 99 mg/dL      Status: FinalBUN                                           Date: 04/27/2022Value: 51*         Ref range: 6 - 23 mg/dL       Status: FinalCREATININE                                    Date: 04/27/2022Value: 5.3*        Ref range: 0.7 - 1.2 mg/dL    Status: FinalGFR Non-                      Date: 04/27/2022Value: 11          Ref range: >=60 mL/min/1.73   Status: Final              Comment: Chronic Kidney Disease: less than 60 ml/min/1.73 sq.m. Kidney Failure: less than 15 ml/min/1.73 sq. m. Results valid for patients 18 years and older. GFR  Date: 04/27/2022Value: 13            Status: FinalCalcium                                       Date: 04/27/2022Value: 8.5*        Ref range: 8.6 - 10.2 mg/dL   Status: FinalMeter Glucose                                 Date: 04/26/2022Value: 178*        Ref range: 74 - 99 mg/dL      Status: FinalMeter Glucose                                 Date: 04/26/2022Value: 164*        Ref range: 74 - 99 mg/dL      Status: FinalMeter Glucose                                 Date: 04/27/2022Value: 103*        Ref range: 74 - 99 mg/dL      Status: FinalCalcium, Ion                                  Date: 04/27/2022Value: 1.17        Ref range: 1.15 - 1.33 mmol*  Status: FinalMeter Glucose                                 Date: 04/27/2022Value: 56*         Ref range: 74 - 99 mg/dL      Status: [address] [facility]                                           Date: 04/28/2022Value: 9.6         Ref range: 4.5 - 11.5 E9/L    Status: FinalRBC                                           Date: 04/28/2022Value: 3.20*       Ref range: 3.80 - 5.80 E12/L  Status: FinalHemoglobin                                    Date: 04/28/2022Value: 9.3*        Ref range: 12.5 - 16.5 g/dL   Status: FinalHematocrit                                    Date: 04/28/2022Value: 32.6*       Ref range: 37.0 - 54.0 %      Status: FinalMCV                                           Date: 04/28/2022Value: 101.9*      Ref range: 80.0 - 99.9 fL     Status: [address] [place]                                           Date: 04/28/2022Value: 29.1        Ref range: 26.0 - 35.0 pg     Status: [address]                                          Date: 04/28/2022Value: 28.5*       Ref range: 32.0 - 34.5 %      Status: FinalRDW                                           Date: 04/28/2022Value: 17.9*       Ref range: 11.5 - 15.0 fL     Status: FinalPlatelets                                     Date: 04/28/2022Value: 197         Ref range: 130 - 450 E9/L     Status: FinalMPV Date: 04/28/2022Value: 9.7         Ref range: 7.0 - 12.0 fL      Status: FinalSodium                                        Date: 04/28/2022Value: 135         Ref range: 132 - 146 mmol/L   Status: FinalPotassium                                     Date: 04/28/2022Value: 5.3*        Ref range: 3.5 - 5.0 mmol/L   Status: FinalChloride                                      Date: 04/28/2022Value: 99          Ref range: 98 - 107 mmol/L    Status: FinalCO2                                           Date: 04/28/2022Value: 24          Ref range: 22 - 29 mmol/L     Status: FinalAnion Gap                                     Date: 04/28/2022Value: 12          Ref range: 7 - 16 mmol/L      Status: FinalGlucose                                       Date: 04/28/2022Value: 61*         Ref range: 74 - 99 mg/dL      Status: FinalBUN                                           Date: 04/28/2022Value: 39*         Ref range: 6 - 23 mg/dL       Status: FinalCREATININE                                    Date: 04/28/2022Value: 4.2*        Ref range: 0.7 - 1.2 mg/dL    Status: FinalGFR Non-                      Date: 04/28/2022Value: 14          Ref range: >=60 mL/min/1.73   Status: Final              Comment: Chronic Kidney Disease: less than 60 ml/min/1.73 sq.m. Kidney Failure: less than 15 ml/min/1.73 sq. m. Results valid for patients 18 years and older. GFR                           Date: 04/28/2022Value: 17            Status: FinalCalcium                                       Date: 04/28/2022Value: 7.9*        Ref range: 8.6 - 10.2 mg/dL   Status: FinalCalcium, Ion                                  Date: 04/28/2022Value: 1.09*       Ref range: 1.15 - 1.33 mmol*  Status: FinalPhosphorus                                    Date: 04/28/2022Value: 4.4         Ref range: 2.5 - 4.5 mg/dL    Status: [address]                                           Date: 04/28/2022Value: 118*        Ref range: 15 - 65 pg/mL      Status: FinalMeter Glucose                                 Date: 04/27/2022Value: 69*         Ref range: 74 - 99 mg/dL      Status: FinalMeter Glucose                                 Date: 04/27/2022Value: 139*        Ref range: 74 - 99 mg/dL      Status: FinalMeter Glucose                                 Date: 04/28/2022Value: 78          Ref range: 74 - 99 mg/dL      Status: Final------------    Radiology last 7 days:  XR CHEST PORTABLEResult Date: 4/25/2022Bilateral pleural effusions right greater than left not significantly changed compared to prior study CTA PULMONARY W CONTRASTResult Date: 4/25/2022Large bilateral pleural effusions with significant compressive atelectasis of the lower lobes similar to previous. Small pericardial effusion also unchanged. No identified pulmonary embolism. Findings in the upper abdomen consistent with cirrhosis. The ascites is similar to possibly slightly increased. Prominent anasarca.  RECOMMENDATIONS: Unavailable      Pending Labs   Order Current Status  Arterial Blood Gas, Respiratory Only Collected (04/25/22 1911)      Discharge Medications    Current Discharge Medication List    Current Discharge Medication List    Current Discharge Medication ListCONTINUE these medications which have NOT CHANGEDondansetron (ZOFRAN-ODT) 4 MG disintegrating tabletTake 1 tablet by mouth every 8 hours as needed for Nausea or VomitingQty: 60 tablet Refills: 0midodrine (PROAMATINE) 10 MG tabletTake 1 tablet by mouth as needed (give 30 min before dialysis)Qty: 90 tablet Refills: 3bumetanide (BUMEX) 2 MG tabletTake 1 tablet by mouth dailyQty: 30 tablet Refills: 3docusate sodium (COLACE) 100 MG capsuleTake 1 capsule by mouth 2 times dailyQty: 60 capsule Refills: 0guaiFENesin 400 MG tabletTake 400 mg by mouth 4 times daily as needed for Coughipratropium-albuterol (DUONEB) 0.5-2.5 (3) MG/3ML SOLN nebulizer solutionTake 1 vial by nebulization every 4 hours as needed for Shortness of Breathpotassium chloride (KLOR-CON M) 10 MEQ extended release tabletTake 20 mEq by mouth dailyinsulin glargine (LANTUS SOLOSTAR) 100 UNIT/ML injection penInject 50 Units into the skin nightlyloperamide (IMODIUM) 2 MG capsuleTake 2 mg by mouth 4 times daily as needed for DiarrheabuPROPion (WELLBUTRIN XL) 300 MG extended release tabletTake 300 mg by mouth every morningezetimibe (ZETIA) 10 MG tabletTake 10 mg by mouth at bedtimeZinc Sulfate 110 MG TABSTake 2 tablets by mouth dailymagnesium hydroxide (MILK OF MAGNESIA) 400 MG/5ML suspensionTake 30 mLs by mouth daily as needed for Constipationmelatonin 3 MG TABS tabletTake 3 mg by mouth at bedtimeinsulin lispro, 1 Unit Dial, (HUMALOG KWIKPEN) 100 UNIT/ML SOPNInject 0-10 Units into the skin 4 times daily (before meals and nightly) *Per Sliding Scale*metoprolol succinate (TOPROL XL) 25 MG extended release tabletTake 1 tablet by mouth 2 times dailyQty: 30 tablet Refills: 3acetaminophen (TYLENOL) 325 MG tabletTake 650 mg by mouth every 4 hours as needed for Pain or Fever vitamin C (ASCORBIC ACID) 500 MG tabletTake 500 mg by mouth 2 times dailyvitamin D (CHOLECALCIFEROL) 50 MCG (2000 UT) TABS tabletTake 2,000 Units by mouth daily aspirin 81 MG EC tabletTake 1 tablet by mouth dailyQty: 30 tablet Refills: 3folic acid (FOLVITE) 1 MG tabletTake 1 mg by mouth dailycyanocobalamin 1000 MCG tabletTake 1,000 mcg by mouth dailyatorvastatin (LIPITOR) 40 MG tabletTake 40 mg by mouth at bedtime pantoprazole (PROTONIX) 40 MG tabletTake 40 mg by mouth daily    Current Discharge Medication ListSTOP taking these medicationsDextromethorphan-guaiFENesin  MG/5ML SYRPComments:Reason for Stopping:    Time Spent on Discharge:E] minutes were spent in patient examination, evaluation, counseling as well as medication reconciliation, prescriptions for required medications, discharge plan, and follow up.     Electronically signed by Cristopher Morris MD on 4/28/22 at 11:46 AM EDT

## 2022-04-28 NOTE — PROGRESS NOTES
Date: 4/27/2022    Time: 8:08 PM    Patient Placed On BIPAP/CPAP/ Non-Invasive Ventilation? Yes    If no must comment. Facial area red/color change? No           If YES are Blister/Lesion present? No   If yes must notify nursing staff  BIPAP/CPAP skin barrier?   Yes    Skin barrier type:mepilexlite       Comments:        Jeff Moreno RCP

## 2022-04-28 NOTE — PROGRESS NOTES
Progress Note  Date:2022       Room:6405/6405-  Patient Shy Prater     YOB: 1945     Age:77 y.o. Patient says breathing is improved today. Subjective    Subjective:  Symptoms:  Improved. He reports shortness of breath. No chest pain. Diet:  Adequate intake. Activity level: Impaired due to weakness. Pain:  He reports no pain. Review of Systems   Constitutional: Negative for activity change and fever. HENT: Negative for congestion. Respiratory: Positive for shortness of breath. Cardiovascular: Positive for leg swelling. Negative for chest pain. Gastrointestinal: Negative for abdominal pain. Neurological: Negative for dizziness. Psychiatric/Behavioral: Negative for agitation. Objective         Vitals Last 24 Hours:  TEMPERATURE:  Temp  Av.1 °F (36.2 °C)  Min: 96.3 °F (35.7 °C)  Max: 97.5 °F (36.4 °C)  RESPIRATIONS RANGE: Resp  Av.7  Min: 16  Max: 26  PULSE OXIMETRY RANGE: SpO2  Av %  Min: 100 %  Max: 100 %  PULSE RANGE: Pulse  Av  Min: 73  Max: 86  BLOOD PRESSURE RANGE: Systolic (39LDK), WJZ:080 , Min:88 , PIQ:778   ; Diastolic (16VPB), UAN:79, Min:39, Max:69    I/O (24Hr): Intake/Output Summary (Last 24 hours) at 2022 0642  Last data filed at 2022 0518  Gross per 24 hour   Intake 520 ml   Output 1400 ml   Net -880 ml     Objective:  General Appearance:  Comfortable. Vital signs: (most recent): Blood pressure 90/62, pulse 78, temperature 97.5 °F (36.4 °C), temperature source Oral, resp. rate 24, height 5' 9\" (1.753 m), weight 262 lb 9.6 oz (119.1 kg), SpO2 100 %. No fever. Lungs:  Normal effort and normal respiratory rate. Breath sounds clear to auscultation. Heart: Normal rate. Regular rhythm. S1 normal and S2 normal.    Extremities: There is local swelling.       Labs/Imaging/Diagnostics    Labs:  CBC:  Recent Labs     22  1847 22  0630   WBC 11.5 10.0   RBC 3.55* 3.29*   HGB 10.3* 9.8* HCT 34.8* 35.2*   MCV 98.0 107.0*   RDW 18.0* 18.2*    236     CHEMISTRIES:  Recent Labs     22  0631    135   K 5.5* 6.1*    99   CO2 30* 23   BUN 26* 51*   CREATININE 3.7* 5.3*   GLUCOSE 112* 89     PT/INR:  Recent Labs     22   PROTIME 12.1   INR 1.1     APTT:  Recent Labs     22   APTT 35.5*     LIVER PROFILE:  Recent Labs     22   AST 47*   ALT 35   BILITOT 0.8   ALKPHOS 119       Imaging Last 24 Hours:  XR CHEST PORTABLE    Result Date: 2022  EXAMINATION: ONE XRAY VIEW OF THE CHEST 2022 4:49 pm COMPARISON: 2022 HISTORY: ORDERING SYSTEM PROVIDED HISTORY: shortness of breath TECHNOLOGIST PROVIDED HISTORY: Reason for exam:->shortness of breath What reading provider will be dictating this exam?->CRC FINDINGS: There is a large opacity seen within the right lung base. There is a small right pleural effusion. The left upper lobe is clear. There is a small left pleural effusion. The cardiac silhouette is within normals. There is a dual lead cardiac pacer on the left. 1. Large opacity within the right lung base which could represent pneumonia and or atelectasis. 2. Moderate size right pleural effusion. 3. Small left pleural effusion. 4. CT of the thorax is recommended for further evaluation. US DUP LOWER EXTREMITIES BILATERAL VENOUS    Result Date: 2022  Patient MRN:  [id number] : 1945 Age: 65 years Gender: Male Order Date:  2022 5:28 PM EXAM: US DUP LOWER EXTREMITIES BILATERAL VENOUS NUMBER OF IMAGES:  52 INDICATION:  leg swelling leg swelling What reading provider will be dictating this exam?->[facility] COMPARISON: None Within the visualized vessels, there is no evidence for deep venous thrombosis There is good compressibility, there is good augmentation, there is good color flow.      Within the visualized vessels there is no evidence for deep venous thrombosis     Assessment//Plan           BRANDON [facility]OS [facility] - [facility] Problems           Last Modified POA    * (Principal) COVID-19 4/25/2022 Yes        Assessment:  ((Principal) COVID-19 4/25/2022 Yes      chronic hypoxic respiratory failure. CHF  Pleural effusion  ESRD  DM  COPD  HTN  Hyperlipidemia        ). Plan:   (Pulmonary following  Wean oxygen  Dialysis per Renal.  Monitor labs and output. Continue meds. PT/OT).

## 2022-04-28 NOTE — CARE COORDINATION
Care Coordination: received call back from Louann Fuentes at [facility], they do not have a covid bed today. It will be available in the morning and she will set up for dc.   Unit is aware and pt is aware as well    Carolyn Rahman

## 2022-04-28 NOTE — DISCHARGE INSTR - COC
Continuity of Care Form    Patient Name: Quincy Lira   :  1945  MRN:  [id number]    Admit date:  2022  Discharge date:  22    Code Status Order: Prior   Advance Directives:      Admitting Physician:  Mahendra Grewal MD  PCP: Mahendra Grewal MD    Discharging Nurse: [facility] Unit/Room#: 9386/0760-M  Discharging Unit Phone Number: [phone]    Emergency Contact:   Extended Emergency Contact Information  Primary Emergency Contact: [address] Phone: [phone]  Mobile Phone: [phone]  Relation: Brother/Sister   needed? No    Past Surgical History:  Past Surgical History:   Procedure Laterality Date    CARDIAC PACEMAKER PLACEMENT      CORONARY ANGIOPLASTY WITH STENT PLACEMENT      HERNIA REPAIR      PACEMAKER PLACEMENT      VASCULAR SURGERY N/A 2022    TUNNELED DIALYSIS CATHETER performed by Demar Perry MD at [address] [place] [address]       Immunization History: There is no immunization history on file for this patient.     Active Problems:  Patient Active Problem List   Diagnosis Code    PNA (pneumonia) J18.9    Acute on chronic heart failure with preserved ejection fraction ([facility]) I50.33    Coronary artery disease involving native coronary artery of native heart without angina pectoris I25.10    Cardiac pacemaker in situ Z95.0    Primary hypertension I10    SPEEDY (obstructive sleep apnea) G47.33    Chronic respiratory failure with hypoxia ([facility]) J96.11    CHF (congestive heart failure), NYHA class I, acute on chronic, combined ([facility]) I50.43    Acute on chronic clinical systolic heart failure ([facility]) I50.23    Acute on chronic systolic heart failure ([facility]) I50.23    Pleural effusion J90    Respiratory failure ([facility]) J96.90    COVID-19 U07.1       Isolation/Infection:   Isolation            Droplet Plus          Patient Infection Status       Infection Onset Added Last Indicated Last Indicated By Review Planned Expiration Resolved Resolved By    COVID-19 04/25/22 04/25/22 04/25/22 COVID-19, Rapid 05/02/22 05/09/22      Resolved    COVID-19 (Rule Out) 04/25/22 04/25/22 04/25/22 COVID-19, Rapid (Ordered)   04/25/22 Rule-Out Test Resulted    COVID-19 (Rule Out) 04/07/22 04/07/22 04/07/22 COVID-19, Rapid (Ordered)   04/07/22 Rule-Out Test Resulted    COVID-19 (Rule Out) 01/25/22 01/25/22 01/25/22 Respiratory Panel, Molecular, with COVID-19 (Restricted: peds pts or suitable admitted adults) (Ordered)   01/25/22 Rule-Out Test Resulted    COVID-19 (Rule Out) 01/21/22 01/21/22 01/21/22 COVID-19, Rapid (Ordered)   01/21/22 Rule-Out Test Resulted    C-diff Rule Out 04/08/21 04/08/21 04/08/21 Clostridium difficile EIA (Ordered)   04/09/21 Rule-Out Test Resulted            Nurse Assessment:  Last Vital Signs: BP (!) 94/59   Pulse 74   Temp 97.5 °F (36.4 °C) (Oral)   Resp 19   Ht 5' 9\" (1.753 m)   Wt 262 lb 9.6 oz (119.1 kg)   SpO2 100%   BMI 38.78 kg/m²     Last documented pain score (0-10 scale): Pain Level: 0  Last Weight:   Wt Readings from Last 1 Encounters:   04/28/22 262 lb 9.6 oz (119.1 kg)     Mental Status:  A+ O x 4    IV Access:  N/A    Nursing Mobility/ADLs:  Walking  Max assist   Transfer  Max assist   Bathing  Max assist   Dressing  Max assist   Toileting  Max assist   Feeding Set up   Med Admin Nurse  Med Delivery   Nurse    Wound Care Documentation and Therapy:  Incision 04/04/22 Groin Proximal;Left (Active)   Dressing Status Clean;Dry; Intact 04/10/22 2155   Dressing/Treatment Open to air 04/11/22 2300   Number of days: 24       Incision 04/04/22 Chest Right;Upper (Active)   Dressing Status Clean;Dry; Intact 04/06/22 0756   Dressing/Treatment Surgical glue 04/06/22 0756   Number of days: 24        Elimination:  Continence:    Bowel: Cont   Bladder: Cont with assist   Urinary Catheter: No  Colostomy/Ileostomy/Ileal Conduit: No       Date of Last BM: 04/27/2022    Intake/Output Summary (Last 24 hours) at 4/28/2022 1144  Last data filed at 4/28/2022 5118  Gross per 24 hour   Intake 520 ml   Output 1600 ml   Net -1080 ml     I/O last 3 completed shifts: In: 65 [P.O.:220]  Out: 1600 [Urine:200]    Safety Concerns:     Skin care, HD management     Impairments/Disabilities:        Nutrition Therapy:  Current Nutrition Therapy:   ADA diet     Routes of Feeding: By mouth   Liquids:Regular   Daily Fluid Restriction:none  Last Modified Barium Swallow with Video (Video Swallowing Test):     Treatments at the Time of Hospital Discharge:   Respiratory Treatments:  Oxygen Therapy:  O2 at 5 liters via n.c   Ventilator:   Bipap at HS     Rehab Therapies: PT/OT   Weight Bearing Status/Restrictions: As tolerated   Other Medical Equipment (for information only, NOT a DME order): Other Treatments: HD - M-W-F    Patient's personal belongings (please select all that are sent with patient):  briana    RN SIGNATURE:  Otilio العراقي RN    CASE MANAGEMENT/SOCIAL WORK SECTION    Inpatient Status Date: ***    Readmission Risk Assessment Score:  Readmission Risk              Risk of Unplanned Readmission:  46           Discharging to Facility/ Agency   Name:   Address:  Phone:  Fax:    Dialysis Facility (if applicable)   Name:  Address:  Dialysis Schedule:  Phone:  Fax:    / signature: {Esignature:[id number]}    PHYSICIAN SECTION    Prognosis: {Prognosis:2630255339}    Condition at Discharge: [address] [place] [address] Patient Condition:[id number]}    Rehab Potential (if transferring to Rehab): {Prognosis:5918891982}    Recommended Labs or Other Treatments After Discharge: ***    Physician Certification: I certify the above information and transfer of Steven Cohen  is necessary for the continuing treatment of the diagnosis listed and that he requires {Admit to Appropriate Level of Care:21061} for {GREATER/LESS:470360163} 30 days.      Update Admission H&P: {CHP DME Changes in TGLOR:961709458}    PHYSICIAN SIGNATURE:  Williams Pereira MD

## 2022-04-28 NOTE — PROGRESS NOTES
Per pulmonology and nephrology consults okay for discharge from their standpoint.     Barbara Martin RN

## 2022-04-28 NOTE — PLAN OF CARE
Problem: Chronic Conditions and Co-morbidities  Goal: Patient's chronic conditions and co-morbidity symptoms are monitored and maintained or improved  Outcome: Adequate for Discharge     Problem: Discharge Planning  Goal: Discharge to home or other facility with appropriate resources  Outcome: Adequate for Discharge     Problem: Skin/Tissue Integrity  Goal: Absence of new skin breakdown  Description: 1. Monitor for areas of redness and/or skin breakdown  2. Assess vascular access sites hourly  3. Every 4-6 hours minimum:  Change oxygen saturation probe site  4. Every 4-6 hours:  If on nasal continuous positive airway pressure, respiratory therapy assess nares and determine need for appliance change or resting period.   Outcome: Adequate for Discharge     Problem: ABCDS Injury Assessment  Goal: Absence of physical injury  Outcome: Adequate for Discharge     Problem: Safety - Adult  Goal: Free from fall injury  Outcome: Adequate for Discharge     Problem: Pain  Goal: Verbalizes/displays adequate comfort level or baseline comfort level  Outcome: Adequate for Discharge

## 2022-04-28 NOTE — CARE COORDINATION
Care Coordination: checking with [facility] to see if they can provide transport for pt back to facility today. Dc order on chart. Setting up with ambulance.     Cyn Neumann

## 2022-04-28 NOTE — PROGRESS NOTES
Physical Therapy  Physical Therapy Initial Assessment     Name: Iggy Pereira  : 1945  MRN: [id number]      Date of Service: 2022    Evaluating PT:  Jose Tellez, PT, DPT    Room #:  5699/6365-X  Diagnosis:  Hyperkalemia [E87.5]  Hypoxia [R09.02]  COVID [U07.1]  COVID-19 [U07.1]  PMHx/PSHx:  Arthritis, CAD, CHF, COPD, depression, DM, HLD, HTN, MI, polio  Procedure/Surgery:  N/A  Precautions:  COVID+, fall risk, O2  Equipment Needs:  TBD    SUBJECTIVE:    Pt admitted from [place] [address], reported he required Ax1 for transfers and ambulation with ww PTA. OBJECTIVE:   Initial Evaluation  Date: 22 Treatment Short Term/ Long Term   Goals   AM-PAC 6 Clicks      Was pt agreeable to Eval/treatment?  yes     Does pt have pain? unrated BLE     Bed Mobility  Rolling: min A  Supine to sit: min A  Sit to supine: min A  Scooting: min A  Rolling: mod I  Supine to sit: mod I  Sit to supine: mod I  Scooting: mod I   Transfers Sit to stand: mod A  Stand to sit: min A  Stand pivot: NT  Sit to stand: mod I  Stand to sit: mod I  Stand pivot: mod I with AAD   Ambulation    2' with ww min A  (side steps to [facility])  25'x2 with AAD mod I   Stair negotiation: ascended and descended  NT       Strength/ROM:   LLE grossly 4/5  RLE grossly 3+/5  BLE AROM WFL    Balance:   Static Sitting: SBA  Dynamic Sitting: CGA  Static Standing: CGA with ww  Dynamic Standing: min A with ww    Pt is A & O x 3  Sensation:  Pt denies numbness and tingling to extremities  Edema:  BLE 1+    Therapeutic Exercises:    Bed mobility: supine<>sit, cued for EOB positioning  Transfers: STSx2, cued for hand placement and postural correction  Ambulation: 2' with ww, [facility] to [facility]  BLE AROM    Patient education  Pt educated on role of PT, importance of functional mobility during hospital stay, safety with functional mobility    Patient response to education:   Pt verbalized understanding Pt demonstrated skill Pt requires further education in this area   yes yes reinforce     ASSESSMENT:    Conditions Requiring Skilled Therapeutic Intervention:    [x]Decreased strength     []Decreased ROM  [x]Decreased functional mobility  [x]Decreased balance   [x]Decreased endurance   [x]Decreased posture  []Decreased sensation  []Decreased coordination   []Decreased vision  []Decreased safety awareness   [x]Increased pain       Comments:    Pt supine in bed upon entering, agreeable to participate. Pt instructed to transfer to EOB, completing transfer with assist of trunk. Pt sitting upright with good static sitting balance. Pt's SpO2 monitored throughout while on supplemental O2. Pt cued for hand placement and instructed to stand from EOB. Pt required increased assistance to complete transfer. Pt had difficulty maintaining his balance with ww and had to sit at EOB to rest. Pt agreeable to attempt again, pt was able to maintain balance after second STS transfer and side step to [facility]. Pt cued for postioning and ww spacing. Pt then sat at EOB for short period to recover. Pt's SpO2 reading 84% at the lowest, recovering to >90% after ~20\". Pt was then assisted back to supine position and made comfortable in bed. Pt remained in bed with all needs met and call bell in reach prior to exiting. Treatment:  Patient practiced and was instructed in the following treatment:     Bed mobility training - pt given verbal and tactile cues to facilitate proper sequencing and safety during rolling and supine<>sit as well as provided with physical assistance to complete task    STS and pivot transfer training - pt educated on proper hand and foot placement, safety and sequencing, and use of verbal and tactile cues to safely complete sit<>stand and pivot transfers with physical assistance to complete task safely    Skilled positioning - Pt placed in the chair position with pillows utilized to facilitate upright posture, joint and skin integrity, and interaction with environment.        Pt's/ family goals   1. Return to [facility]    Prognosis is fair for reaching above PT goals. Patient and or family understand(s) diagnosis, prognosis, and plan of care. yes    PHYSICAL THERAPY PLAN OF CARE:    PT POC is established based on physician order and patient diagnosis     Referring provider/PT Order:  Vivian Powers MD  Diagnosis:  Hyperkalemia [E87.5]  Hypoxia [R09.02]  COVID [U07.1]  COVID-19 [U07.1]  Specific instructions for next treatment:  Progress as tolerated, gait and transfer training    Current Treatment Recommendations:     [x] Strengthening to improve independence with functional mobility   [] ROM to improve independence with functional mobility   [x] Balance Training to improve static/dynamic balance and to reduce fall risk  [x] Endurance Training to improve activity tolerance during functional mobility   [x] Transfer Training to improve safety and independence with all functional transfers   [x] Gait Training to improve gait mechanics, endurance and assess need for appropriate assistive device  [] Stair Training in preparation for safe discharge home and/or into the community   [x] Positioning to prevent skin breakdown and contractures  [x] Safety and Education Training   [x] Patient/Caregiver Education   [] HEP  [] Other     PT long term treatment goals are located in above grid    Frequency of treatments: 2-5x/week x 1-2 weeks. Time in  1055  Time out  1125    Total Treatment Time  15 minutes     Evaluation Time includes thorough review of current medical information, gathering information on past medical history/social history and prior level of function, completion of standardized testing/informal observation of tasks, assessment of data and education on plan of care and goals.     CPT codes:  [x] Low Complexity PT evaluation 89192  [] Moderate Complexity PT evaluation 74633  [] High Complexity PT evaluation 42724  [] PT Re-evaluation 55607  [] Gait training 01923 -- minutes  [] Manual therapy 35136 -- minutes  [x] Therapeutic activities 42180 15 minutes  [] Therapeutic exercises 38058 -- minutes  [] Neuromuscular reeducation 85275 -- minutes     Jacqui Sawant, PT, DPT  [id number]

## 2022-04-28 NOTE — PROGRESS NOTES
[facility]  Department of Pulmonary, Critical Care and Sleep Medicine  [address] [facility]  Department of Internal Medicine  Progress Note    SUBJECTIVE:    Patient seen and examined. Overall reports that his breathing is stable however does state that he does have a cough at times today he is currently on 6 L nasal cannula. OBJECTIVE:  Vitals:    04/28/22 0107 04/28/22 0355 04/28/22 0900 04/28/22 1535   BP:   (!) 94/59 84/73   Pulse:   74 79   Resp: 24  19 20   Temp:   97.5 °F (36.4 °C) 97.7 °F (36.5 °C)   TempSrc:   Oral Oral   SpO2:   100%    Weight:  262 lb 9.6 oz (119.1 kg)     Height:         Constitutional: Alert,     EENT: EOMI YOSEF. MMM. No icterus. No thrush. Neck: No thyromegaly. . Trachea was midline. Respiratory: Breath sounds diminished throughout. No use of accessory muscles at time of my visit. Overall breathing is unlabored  Cardiovascular: Regular, No murmur. No rubs. Pulses:  Equal bilaterally. Abdomen: Soft without organomegaly. No rebound, rigidity. No guarding. Lymphatic: No lymphadenopathy. Musculoskeletal: Without weakness or gross deficits  Extremities:  No lower extremity edema. Reflexes appear adequate. Skin:  Warm and dry. No skin rashes. Neurological/Psychiatric: No acute psychosis. Cranial nerves are intact. DATA:    Monitor Strips:  Reviewed & discusses with technical team. No changes noted.     RADIOLOGY:  Films were read/reviewed/discussed with radiology shows no new imaging today      CBC with Differential:    Lab Results   Component Value Date    WBC 9.6 04/28/2022    RBC 3.20 04/28/2022    HGB 9.3 04/28/2022    HCT 32.6 04/28/2022     04/28/2022    .9 04/28/2022    MCH 29.1 04/28/2022    MCHC 28.5 04/28/2022    RDW 17.9 04/28/2022    LYMPHOPCT 12.9 04/25/2022    MONOPCT 13.2 04/25/2022    BASOPCT 0.3 04/25/2022    MONOSABS 1.52 04/25/2022    LYMPHSABS 1.48 04/25/2022    EOSABS 0.11 04/25/2022    BASOSABS 0.03 04/25/2022     CMP:    Lab Results   Component Value Date     04/28/2022    K 5.3 04/28/2022    K 5.5 04/25/2022    CL 99 04/28/2022    CO2 24 04/28/2022    BUN 39 04/28/2022    CREATININE 4.2 04/28/2022    GFRAA 17 04/28/2022    LABGLOM 14 04/28/2022    GLUCOSE 61 04/28/2022    PROT 7.0 04/25/2022    LABALBU 3.9 04/25/2022    CALCIUM 7.9 04/28/2022    BILITOT 0.8 04/25/2022    ALKPHOS 119 04/25/2022    AST 47 04/25/2022    ALT 35 04/25/2022          Assessment:   1. Acute on chronic hypoxemic respiratory failure  2. Positive for COVID-19.  3. Bilateral moderate to large pleural effusions bilaterally. 4. Dyspnea with exertion  5. 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 History of SPEEDY  7. Cough     Plan:   1. Obtain CRP, ferritin. Patient CT of the chest reviewed while the CT does show moderate to large pleural effusions bilaterally these do appear to be stable from prior scans no plan for thoracentesis at this time. .  Also no groundglass opacities identified consistent with COVID-19 pneumonia. 2. Wean FiO2 as tolerated  3. Continue CPAP at at bedtime and with naps, okay to use home CPAP  4. HD as per nephrology. 5.  Ferritin and CRP reviewed  6.   Repeat chest x-ray in a.m.  7.  Continue guaifenesin     Ritta Ripper, DO   Pulmonary, Critical Care and Sleep Medicine

## 2022-04-28 NOTE — CARE COORDINATION
Care Coordination: spoke to pt regarding dc orders, he was surprised he was going back with Covid. [facility] [phone] with Billie Dodge was able to set up ambulance  for 430-5 pm. I spoke with pt and he notified sister of need to bring cpap to facility. He is aware transport was moved up and I called lisette from [facility] and cancelled trip.  Nursing notified    Jose Melo

## 2022-04-29 NOTE — FLOWSHEET NOTE
04/29/22 1240   Vital Signs   /67   Temp 96.7 °F (35.9 °C)   Pulse 77   Resp 20   Post-Hemodialysis Assessment   Post-Treatment Procedures Catheter capped, clamped and heparinized x 2 ports   Machine Disinfection Process Exterior Machine Disinfection   Rinseback Volume (ml) 400 ml   Total Liters Processed (l/min) 97.7 l/min   Duration of Treatment (minutes) 240 minutes   Heparin amount administered during treatment (units) 0 units   Hemodialysis Intake (ml) 400 ml   Hemodialysis Output (ml) 2500 ml   NET Removed (ml) 2100 ml   Tolerated Treatment Good   Patient Response to Treatment tolerated well   Bilateral Breath Sounds Diminished   Edema Right lower extremity; Left lower extremity   RLE Edema +1   LLE Edema +1   Patient Disposition Return to room

## 2022-04-29 NOTE — CARE COORDINATION
Care Coordination: Messaged Leora Gift from [facility] regarding time to be set up.  Will check back and if no response, set pt up after rounds    Libby Lopez

## 2022-04-29 NOTE — PROGRESS NOTES
[facility]  Nephrology Progress Note    Patient's Name: Vasiliy Barrera     History of Present Illness from 4/27 Consult Note:    Hong Camara is a 65 y.o. male with a past medical history of MI, hypertension, hyperlipidemia, depression, COPD, coronary artery disease, and diabetes mellitus. He presented to the ED on 4/25 with complaints of cough and shortness of breath. Vital signs at presentation to the ED include temperature 97.5, respirations 21, pulse 99, /74, and he was 98% on 5 L. Lab data at presentation to the ED include potassium 5.5, CO2 30, BUN 26, creatinine 3.7, and hemoglobin 10.3. Patient was tested for COVID and was positive. CTA of the chest showed \"large bilateral pleural effusions with significant compressive atelectasis of the lower lobes. \"   We were consulted to see the patient for ESRD. Patient is known to our service and patient is a CKD patient with HFpEF; he dialyzes at [address] [facility] second shift. At present, patient not seen or examined by me due to [place] isolation. Discussed with bedside nurse, who explained that the patient is doing good and denied any issues; she explained that he is eating okay. \"    Subjective:    4/29: Patient not personally examined due to COVID isolation. Patient was seen through the window at dialysis, asleep, no acute distress. Discussed with bedside nurse, no issues were reported overnight.     PMH:    Past Medical History:   Diagnosis Date    Acid reflux     Agent orange exposure     Arthritis     CAD (coronary artery disease)     Congestive heart failure (CHF) (HCC)     COPD (chronic obstructive pulmonary disease) (HCC)     Depression     Diabetes mellitus [facility]     History of blood transfusion     Hyperlipidemia     Hypertension     MI (myocardial infarction) ([facility]     Polio     Sleep apnea      Patient Active Problem List   Diagnosis    PNA (pneumonia)    Acute on chronic heart failure with preserved ejection fraction [facility]    Coronary artery disease involving native coronary artery of native heart without angina pectoris    Cardiac pacemaker in situ    Primary hypertension    SPEEDY (obstructive sleep apnea)    Chronic respiratory failure with hypoxia (HCC)    CHF (congestive heart failure), NYHA class I, acute on chronic, combined (HCC)    Acute on chronic clinical systolic heart failure (HCC)    Acute on chronic systolic heart failure (HCC)    Pleural effusion    Respiratory failure (HCC)    COVID-19     Meds:     aspirin  81 mg Oral Daily    atorvastatin  40 mg Oral Nightly    bumetanide  2 mg Oral Daily    buPROPion  300 mg Oral QAM    cyanocobalamin  1,000 mcg Oral Daily    docusate sodium  100 mg Oral BID    ezetimibe  10 mg Oral Nightly    folic acid  1 mg Oral Daily    insulin glargine-yfgn  50 Units SubCUTAneous Nightly    melatonin  3 mg Oral Nightly    metoprolol succinate  25 mg Oral BID    pantoprazole  40 mg Oral Daily    vitamin C  500 mg Oral BID    vitamin D  2,000 Units Oral Daily    zinc sulfate  50 mg Oral Daily    insulin lispro  0-6 Units SubCUTAneous TID WC    insulin lispro  0-3 Units SubCUTAneous Nightly      dextrose       Meds prn:     acetaminophen, guaiFENesin-dextromethorphan, guaiFENesin, ipratropium-albuterol, loperamide, magnesium hydroxide, midodrine, ondansetron, dextrose, glucagon (rDNA), dextrose, glucose    Meds prior to admission:     No current facility-administered medications on file prior to encounter.      Current Outpatient Medications on File Prior to Encounter   Medication Sig Dispense Refill    ondansetron (ZOFRAN-ODT) 4 MG disintegrating tablet Take 1 tablet by mouth every 8 hours as needed for Nausea or Vomiting 60 tablet 0    midodrine (PROAMATINE) 10 MG tablet Take 1 tablet by mouth as needed (give 30 min before dialysis) 90 tablet 3    bumetanide (BUMEX) 2 MG tablet Take 1 tablet by mouth daily 30 tablet 3    docusate sodium (COLACE) 100 MG capsule Take 1 capsule by mouth 2 times daily 60 capsule 0    guaiFENesin 400 MG tablet Take 400 mg by mouth 4 times daily as needed for Cough      ipratropium-albuterol (DUONEB) 0.5-2.5 (3) MG/3ML SOLN nebulizer solution Take 1 vial by nebulization every 4 hours as needed for Shortness of Breath      potassium chloride (KLOR-CON M) 10 MEQ extended release tablet Take 20 mEq by mouth daily      insulin glargine (LANTUS SOLOSTAR) 100 UNIT/ML injection pen Inject 50 Units into the skin nightly      loperamide (IMODIUM) 2 MG capsule Take 2 mg by mouth 4 times daily as needed for Diarrhea      buPROPion (WELLBUTRIN XL) 300 MG extended release tablet Take 300 mg by mouth every morning      ezetimibe (ZETIA) 10 MG tablet Take 10 mg by mouth at bedtime      Zinc Sulfate 110 MG TABS Take 2 tablets by mouth daily      magnesium hydroxide (MILK OF MAGNESIA) 400 MG/5ML suspension Take 30 mLs by mouth daily as needed for Constipation      melatonin 3 MG TABS tablet Take 3 mg by mouth at bedtime      insulin lispro, 1 Unit Dial, (HUMALOG KWIKPEN) 100 UNIT/ML SOPN Inject 0-10 Units into the skin 4 times daily (before meals and nightly) *Per Sliding Scale*      metoprolol succinate (TOPROL XL) 25 MG extended release tablet Take 1 tablet by mouth 2 times daily 30 tablet 3    acetaminophen (TYLENOL) 325 MG tablet Take 650 mg by mouth every 4 hours as needed for Pain or Fever       vitamin C (ASCORBIC ACID) 500 MG tablet Take 500 mg by mouth 2 times daily      vitamin D (CHOLECALCIFEROL) 50 MCG (2000 UT) TABS tablet Take 2,000 Units by mouth daily       aspirin 81 MG EC tablet Take 1 tablet by mouth daily 30 tablet 3    folic acid (FOLVITE) 1 MG tablet Take 1 mg by mouth daily      cyanocobalamin 1000 MCG tablet Take 1,000 mcg by mouth daily      atorvastatin (LIPITOR) 40 MG tablet Take 40 mg by mouth at bedtime       pantoprazole (PROTONIX) 40 MG tablet Take 40 mg by mouth daily Allergies:    Penicillins    Social History:     reports that he has quit smoking. He has never used smokeless tobacco. He reports that he does not drink alcohol and does not use drugs. Family History:     History reviewed. No pertinent family history. Physical Exam:    Patient Vitals for the past 24 hrs:   BP Temp Temp src Pulse Resp SpO2 Weight   04/29/22 0820 98/67 96.3 °F (35.7 °C) Tympanic 77 20 -- 270 lb (122.5 kg)   04/29/22 0649 -- -- -- -- -- -- 270 lb 3.2 oz (122.6 kg)   04/29/22 0545 81/62 97.1 °F (36.2 °C) Oral 82 20 98 % --   04/28/22 1930 81/63 98.3 °F (36.8 °C) Axillary 75 20 100 % --   04/28/22 1535 84/73 97.7 °F (36.5 °C) Oral 79 20 -- --   04/28/22 0900 (!) 94/59 97.5 °F (36.4 °C) Oral 74 19 100 % --       Intake/Output Summary (Last 24 hours) at 4/29/2022 6661  Last data filed at 4/28/2022 1500  Gross per 24 hour   Intake 240 ml   Output 200 ml   Net 40 ml     Due to the current efforts to prevent transmission of COVID-19 and also the need to preserve PPE for other caregivers, a face-to-face encounter with the patient was not performed. That being said, all relevant records and diagnostic tests were reviewed, including laboratory results and imaging. Please reference any relevant documentation elsewhere. Care will be coordinated with the primary service. Data:    Recent Labs     04/27/22  0630 04/28/22 0459 04/29/22 0439   WBC 10.0 9.6 9.4   HGB 9.8* 9.3* 9.4*   HCT 35.2* 32.6* 31.2*   .0* 101.9* 97.8    197 219     Recent Labs     04/27/22  0631 04/28/22 0459 04/29/22 0439    135 138   K 6.1* 5.3* 5.2*   CL 99 99 97*   CO2 23 24 27   CREATININE 5.3* 4.2* 5.1*   BUN 51* 39* 48*   LABGLOM 11 14 11   GLUCOSE 89 61* 78   CALCIUM 8.5* 7.9* 8.0*   PHOS  --  4.4  --      No results found for: VITD25    PTH   Date Value Ref Range Status   04/28/2022 118 (H) 15 - 65 pg/mL Final     No results for input(s): ALT, AST, ALKPHOS, BILITOT, BILIDIR in the last 72 hours.   No results for input(s): LABALBU in the last 72 hours. Ferritin   Date Value Ref Range Status   04/27/2022 537 ng/mL Final     Comment:     FERRITIN Reference Ranges:  Adult Males   20 - 60 years:    27 - 400 ng/mL  Adult females 16 - 61 years:    15 - 150 ng/mL  Adults greater than 60 years:   no established reference range  Pediatrics:                     no established reference range       No results found for: NIOPWUOU30    No results found for: FOLATE    Lab Results   Component Value Date    COLORU Yellow 04/07/2022    NITRU Negative 04/07/2022    GLUCOSEU Negative 04/07/2022    KETUA Negative 04/07/2022    UROBILINOGEN 0.2 04/07/2022    BILIRUBINUR Negative 04/07/2022     Lab Results   Component Value Date/Time    OSMOU 351 01/22/2022 11:24 AM     No components found for: URIC    No results found for: LIPIDPAN    Assessment and Plan:    1. CKD V  Baseline creatinine 2.5-3  dialyzes at [facility] MWF second shift  On Bumex 2 mg oral daily  Continue HD MWF    2.  COVID-19  CTA chest \"large bilateral pleural effusions with significant compressive atelectasis of the lower lobes\"   On supplemental O2   CPAP at night  Pulmonology following    3. Hyperkalemia  Likely in setting of CKD  K+ 5.2 today  Low potassium diet  For HD today   Follow with HD    4. Anemia  Hemoglobin 9.4 today  Transfuse for hemoglobin<7  Monitor labs  Follow    5.   Secondary hyperparathyroidism of renal origin  Calcium 8.0 and ionized calcium 1.09   , phos 4.4 on 4/28  Follow    Yecenia Gale, APRN - CNP     Pt seen through window of dialysis room due to covid  Chart reviewed  Discussed with nurse   Nehemias Pedroza MD

## 2022-05-04 PROBLEM — J96.20 ACUTE ON CHRONIC RESPIRATORY FAILURE (HCC): Status: ACTIVE | Noted: 2022-01-01

## 2022-05-04 NOTE — ED NOTES
Called ultrasound, they are coming to bedside to do ultrasound study.      Hollis Phillips RN  05/04/22 6449

## 2022-05-04 NOTE — CONSULTS
[facility]  Nephrology Consult Note    Patient's Name: Vinny Dean     Reason for Consult:  HD      Chief Complaint:   Shortness of breath     History of Present Illness:    Vinny Dean is a 65 y.o. male with a past medical history of hypertension, hyperlipidemia, COPD, coronary artery disease, and CHF. He presented to the ED on 5/4 with complaints of shortness of breath. Vital signs at presentation to the ED include temperature 98, respirations 20, pulse 88, /80, and he was 95% on 6 L. Lab data on presentation to the ED include BUN 34, creatinine 5.3, proBNP 9247, and hemoglobin 9.6. Chest x-ray showed \"bilateral pleural effusion. \"  We were consulted to see the patient for HD. Patient is known to our service and dialyzes at [facility] MWF second shift via a right thigh tunneled dialysis catheter. Patient not personally seen or examined due to COVID isolation, subsequently a review of systems was not obtained due to Health system isolation.     PMH:    Past Medical History:   Diagnosis Date    Acid reflux     Agent orange exposure     Arthritis     CAD (coronary artery disease)     Congestive heart failure (CHF) (HCC)     COPD (chronic obstructive pulmonary disease) (HCC)     Depression     Diabetes mellitus (HCC)     History of blood transfusion     Hyperlipidemia     Hypertension     MI (myocardial infarction) ([address]     Polio     Sleep apnea      Patient Active Problem List   Diagnosis    PNA (pneumonia)    Acute on chronic heart failure with preserved ejection fraction ([address]    Coronary artery disease involving native coronary artery of native heart without angina pectoris    Cardiac pacemaker in situ    Primary hypertension    SPEEDY (obstructive sleep apnea)    Chronic respiratory failure with hypoxia (HCC)    CHF (congestive heart failure), NYHA class I, acute on chronic, combined ([address]    Acute on chronic clinical systolic heart failure ([address]    Acute on chronic systolic heart failure ([address] Pleural effusion    Respiratory failure ([facility]    COVID-19     Meds:    Meds prn:     Meds prior to admission:     No current facility-administered medications on file prior to encounter.      Current Outpatient Medications on File Prior to Encounter   Medication Sig Dispense Refill    ondansetron (ZOFRAN-ODT) 4 MG disintegrating tablet Take 1 tablet by mouth every 8 hours as needed for Nausea or Vomiting 60 tablet 0    midodrine (PROAMATINE) 10 MG tablet Take 1 tablet by mouth as needed (give 30 min before dialysis) 90 tablet 3    bumetanide (BUMEX) 2 MG tablet Take 1 tablet by mouth daily 30 tablet 3    docusate sodium (COLACE) 100 MG capsule Take 1 capsule by mouth 2 times daily 60 capsule 0    guaiFENesin 400 MG tablet Take 400 mg by mouth 4 times daily as needed for Cough      ipratropium-albuterol (DUONEB) 0.5-2.5 (3) MG/3ML SOLN nebulizer solution Take 1 vial by nebulization every 4 hours as needed for Shortness of Breath      potassium chloride (KLOR-CON M) 10 MEQ extended release tablet Take 20 mEq by mouth daily      insulin glargine (LANTUS SOLOSTAR) 100 UNIT/ML injection pen Inject 50 Units into the skin nightly      loperamide (IMODIUM) 2 MG capsule Take 2 mg by mouth 4 times daily as needed for Diarrhea      buPROPion (WELLBUTRIN XL) 300 MG extended release tablet Take 300 mg by mouth every morning      ezetimibe (ZETIA) 10 MG tablet Take 10 mg by mouth at bedtime      Zinc Sulfate 110 MG TABS Take 2 tablets by mouth daily      magnesium hydroxide (MILK OF MAGNESIA) 400 MG/5ML suspension Take 30 mLs by mouth daily as needed for Constipation      melatonin 3 MG TABS tablet Take 3 mg by mouth at bedtime      insulin lispro, 1 Unit Dial, (HUMALOG KWIKPEN) 100 UNIT/ML SOPN Inject 0-10 Units into the skin 4 times daily (before meals and nightly) *Per Sliding Scale*      metoprolol succinate (TOPROL XL) 25 MG extended release tablet Take 1 tablet by mouth 2 times daily 30 tablet 3    acetaminophen (TYLENOL) CALCIUM 8.2*     No results found for: VITD25    PTH   Date Value Ref Range Status   04/28/2022 118 (H) 15 - 65 pg/mL Final     Recent Labs     05/04/22  1022   ALT 21   AST 23   ALKPHOS 90   BILITOT 0.5     Recent Labs     05/04/22  1022   LABALBU 3.1*     Ferritin   Date Value Ref Range Status   05/04/2022 795 ng/mL Final     Comment:     FERRITIN Reference Ranges:  Adult Males   20 - 60 years:    30 - 400 ng/mL  Adult females 16 - 61 years:    15 - 150 ng/mL  Adults greater than 60 years:   no established reference range  Pediatrics:                     no established reference range       No results found for: UGPVYVSB65    No results found for: FOLATE    Lab Results   Component Value Date    COLORU Yellow 04/07/2022    NITRU Negative 04/07/2022    GLUCOSEU Negative 04/07/2022    KETUA Negative 04/07/2022    UROBILINOGEN 0.2 04/07/2022    BILIRUBINUR Negative 04/07/2022     Lab Results   Component Value Date/Time    OSMOU 351 01/22/2022 11:24 AM     No components found for: URIC    No results found for: LIPIDPAN    Assessment and Plan:    1. Shortness of breath   History of COPD  Chest x-ray showed \"bilateral pleural effusion\"  COVID positive on 4/25  On oxygen via nasal cannula   Management per primary    2. CKD V  [address] MWF second shift  via a right thigh TDC  For HD today  Continue HD MWF    3. Hypotension  SBPs 80s-90s   On midodrine as an outpatient  Avoid further hypotension  Monitor Bps    4. Anemia  Hemoglobin 9.6 today  Transfuse for hemoglobin<7  Monitor H&H    5. Secondary hyperparathyroidism of renal origin  With hypocalcemia and hypoalbuminemia- calcium 8.2 and albumin 3.1   on 4/28, phosphorus 4.4 on 4/28  Monitor labs    GILMAR Guerrero - CNP    Face to face encounter  Patient seen and examined all key components of the physical performed independently , case discussed with NP, all pertinent labs and radiologic tests personally reviewed agree with above. Patient in moderate resp distress; nonrebreather mask applied  Coarse breath sounds; CV RRR; 2 + pitting legs edema    Bernie Naidu MD    Department of Internal Medicine  Section of Nephrology  Dialysis Note        PROCEDURE:  Patient seen on hemodialysis     PHYSICIAN:  Lauren Landa M.D., [facility]    INDICATION:  Congestive heart failure, End-stage renal disease    RX:  See dialysis flowsheet for specifics on access, blood flow rate, dialysate baths, duration of dialysis, anticoagulation and other technical information.     COMMENTS:  Procedure in progress and tolerated       Bernie Naidu MD, MD

## 2022-05-04 NOTE — FLOWSHEET NOTE
05/04/22 1731   Vital Signs   /60   Temp 97.9 °F (36.6 °C)   Pulse 70   Post-Hemodialysis Assessment   Post-Treatment Procedures Blood returned;Catheter capped, clamped and heparinized x 2 ports   Machine Disinfection Process Acid/Vinegar Clean;Exterior Machine Disinfection; Heat Disinfect   Dialyzer Clearance Moderately streaked   Duration of Treatment (minutes) 210 minutes   Heparin amount administered during treatment (units) 0 units   Hemodialysis Intake (ml) 600 ml   Hemodialysis Output (ml) 2600 ml   NET Removed (ml) 2000 ml   Tolerated Treatment Good   Patient Response to Treatment Pt toleratedt tx well. Fluid balance -2000ml.  Pt stable   Bilateral Breath Sounds Diminished   Edema Generalized   Edema Generalized +2   Patient Disposition Return to room

## 2022-05-04 NOTE — ED NOTES
Notified dialysis to transport patient to [facility] when dialysis is finished     Lexy Beach, RN  05/04/22 [address], RN  05/04/22 6976

## 2022-05-04 NOTE — LETTER
[place] Department Medicaid  CERTIFICATION OF NECESSITY  FOR NON-EMERGENCY TRANSPORTATION   BY GROUND AMBULANCE      Individual Information   1. Name: Amadeo Infante 2. [place] Medicaid Billing Number:   3. Address: [address] [place] [address] [address] [address]      Transportation Provider Information   4. Provider Name: [facility]    5. [place] Medicaid Provider Number:  National Provider Identifier (NPI):      Certification  7. Criteria:  During transport, this individual requires:  [] Medical treatment or continuous     supervision by an EMT. [x] The administration or regulation of oxygen by another person. [] Supervised protective restraint. 8. Period Beginning Date: 5/9/22   9. Length  [x] Not more than 10 day(s)  [] One Year     Additional Information Relevant to Certification   The patient is on 8 liters n/c + covid acute respiratory failure. The patient has a decrease in balance and decrease in Endurance. Certifying Practitioner Information   11. Name of Practitioner: Dr Joss Lizama   12. [place] Medicaid Provider Number, If Applicable:  [address] Provider Identifier (NPI):      Signature Information   14. Date of Signature:5/9/22 13. Name of Person Signing: Kelsi Otoole   33. Signature and Professional Designation Electronically signed by Kelsi Otoole RN on 5/9/2022 at 1:09 PM     [facility] [id number]  Rev. 7/2015      [facility] [address]10. Comments or Explanations, If Necessary or Appropriate     ***stephanie Machado Encounter Date/Time: 5/4/2022 [address] [place] [address] Account: [de-identified]    MRN: [id number]    Patient: Amadeo Infante    Contact Serial #: [id number]      ENCOUNTER          Patient Class: I Private Enc?   No Unit [facility] Service: MED   Encounter DX: Acute on chronic respira*   ADM Provider: Joss Lizama MD   Procedure:     ATT Provider: Joss Lizama MD   REF Provider:        Admission DX: Acute on chronic respiratory failure ([facility] Acute on chronic respiratory failure with hypoxia ([facility] and DX codes: J96.20, J96.21      PATIENT                 Name: Jose Taylor : 1945 (77 yrs)   Address: [facility] * Sex: Male   City: [place] [address]         Marital Status: Single   Employer: RETIRED         Religious:  [employer]   Primary Care Provider: Shiraz Paulson, *         Primary Phone: [phone]   EMERGENCY CONTACT   Contact Name Legal Guardian? Relationship to Patient Home Phone Work Phone   1. Nicole Betancourt  2. *No Contact Specified* No    Brother/Sister    [phone]                 GUARANTOR            Guarantor: Jose Taylor     : 1945   Address: [address] [place] [address] Sex: Male   [facility] [address]     Relation to Patient: Self       Home Phone: [phone]   Guarantor ID: [id number]       Work Phone:     Guarantor Employer: RETIRED         Status: RETIRED      COVERAGE        PRIMARY INSURANCE   Payor: [facility] MEDICARE Plan: Tru Mahajan MEDICARE ADVANT*   Payor Address: ,          Group Number: [id number] Insurance Type: INDEMNITY   Subscriber Name: Eleuterio Lake : 1945   Subscriber ID: [id number] Pat. Rel. to Sub: Self   SECONDARY INSURANCE   Payor:  Plan:  [employer] MEDICAR*   Payor Address:  C[address] [facility] [place] [id number]          Group Number:   Insurance Type: INDEMNITY   Subscriber Name: Eleuterio Lake : 1945   Subscriber ID: [id number] Pat.  Rel. to Sub: SELF           CSN: [id number]

## 2022-05-04 NOTE — TELEPHONE ENCOUNTER
Writer contacted ED provider Hemant Sutton  to inform of 30 day readmission risk. ED provider informed writer of possible readmission.     Call Back: If you need to call back to inform of disposition you can contact me at [phone]

## 2022-05-04 NOTE — ED PROVIDER NOTES
Kiki Castro is a 65year old male with PMH of ESRD on HD, COVID one week ago, SPEEDY, DM, COPD, CHF, from [facility] with increasing shortness of breath patient is chronic respiratory failure is on 4 to 5 L nasal cannula at baseline. Patient arrived on 8 L to emergency department. Patient was breathing heavy for the past 2 days at facility initially refused transfer to ED until today. Patient was diagnosed with COVID 1 week ago and has had increasing shortness of breath since that time. Patient denies fever, chills, nausea, vomiting. Patient is on dialysis and is due today. Patient's symptoms are moderate in severity and constant. Nothing makes symptoms better or worse. Patient denies hx of VTE. Patient reported that he has been short of breath since January February of this year when he was started on oxygen. The history is provided by the patient and medical records. Review of Systems   Constitutional: Positive for fatigue. Negative for chills, diaphoresis and fever. Eyes: Negative for photophobia and visual disturbance. Respiratory: Positive for shortness of breath. Negative for cough and chest tightness. Cardiovascular: Positive for leg swelling. Negative for chest pain and palpitations. Gastrointestinal: Negative for abdominal distention, abdominal pain, diarrhea, nausea and vomiting. Genitourinary: Negative for dysuria. Musculoskeletal: Negative for back pain, neck pain and neck stiffness. Skin: Negative for pallor and rash. Neurological: Negative for headaches. Psychiatric/Behavioral: Negative for confusion. Physical Exam  Vitals and nursing note reviewed. Constitutional:       General: He is in acute distress. Appearance: He is ill-appearing. HENT:      Head: Normocephalic and atraumatic. Eyes:      General: No scleral icterus. Conjunctiva/sclera: Conjunctivae normal.      Pupils: Pupils are equal, round, and reactive to light.    Cardiovascular: Rate and Rhythm: Normal rate and regular rhythm. Pulmonary:      Effort: Tachypnea and accessory muscle usage present. Abdominal:      General: Bowel sounds are normal. There is no distension. Palpations: Abdomen is soft. Tenderness: There is no abdominal tenderness. There is no guarding or rebound. Musculoskeletal:      Cervical back: Normal range of motion and neck supple. No rigidity or tenderness. No muscular tenderness. Right lower leg: Edema present. Left lower leg: Edema present. Skin:     General: Skin is warm and dry. Capillary Refill: Capillary refill takes less than 2 seconds. Coloration: Skin is not pale. Findings: No erythema or rash. Neurological:      Mental Status: He is alert and oriented to person, place, and time. Psychiatric:         Mood and Affect: Mood normal.          Procedures     MDM  Number of Diagnoses or Management Options  Acute on chronic respiratory failure with hypoxia (HCC)  COVID  Pleural effusion  Diagnosis management comments: Pennie Cyr is a 65year old male who presented to ED with shortness of breath and increased oxygen requirement  Patient was found to have pleural effusions and known COVID  Patient was previously admitted for COVID hypoxia  Patient was stable on 8L while in ED  And found to have bilateral pleural effusions, patient given decadron, patient had recent negative CTA chest and additional causes of hypoxia, LE doppler unremarkable for VTE less likely symptoms due to PE  Nephrology was consulted and will arrange HD  Patient will be admitted   Patient was initially hypotensive but improved following midodrine 10mg x2  Per nephrology patient is typically hypotensive  systolic at baseline initial 10mg dose was given discussed with nephrology and second 10mg dose was given  Patient stable at time of re-evaluation and improved         ED Course as of 05/04/22 2332   Wed May 04, 2022   1025 EKG:   This EKG is signed and interpreted by me. Rate: 89  Rhythm: Sinus  Interpretation: non-specific EKG, RBBB, septal infarct, no St elevation  Comparison: stable as compared to patient's most recent EKG   [SS]   26 Spoke with  []      ED Course User Index  [SS] Dion Reddy MD        ED Course as of 05/04/22 2332   Wed May 04, 2022   1025 EKG: This EKG is signed and interpreted by me. Rate: 89  Rhythm: Sinus  Interpretation: non-specific EKG, RBBB, septal infarct, no St elevation  Comparison: stable as compared to patient's most recent EKG   [SS]   26 Spoke with  []      ED Course User Index  [SS] Dion Reddy MD       --------------------------------------------- PAST HISTORY ---------------------------------------------  Past Medical History:  has a past medical history of Acid reflux, Agent orange exposure, Arthritis, CAD (coronary artery disease), Congestive heart failure (CHF) ([facility] [address] [address] COPD (chronic obstructive pulmonary disease) ([facility] Depression, Diabetes mellitus ([facility] History of blood transfusion, Hyperlipidemia, Hypertension, MI (myocardial infarction) ([facility] Polio, and Sleep apnea. Past Surgical History:  has a past surgical history that includes Coronary angioplasty with stent; Cardiac pacemaker placement; pacemaker placement; hernia repair; and vascular surgery (N/A, 4/4/2022). Social History:  reports that he has quit smoking. He has never used smokeless tobacco. He reports that he does not drink alcohol and does not use drugs. Family History: family history is not on file. The patients home medications have been reviewed.     Allergies: Penicillins    -------------------------------------------------- RESULTS -------------------------------------------------    LABS:  Results for orders placed or performed during the hospital encounter of 05/04/22   CBC with Auto Differential   Result Value Ref Range    WBC 10.1 4.5 - 11.5 E9/L    RBC 3.32 (L) 3.80 - 5.80 E12/L    Hemoglobin 9.6 (L) 12.5 - 16.5 g/dL    Hematocrit 31.1 (L) 37.0 - 54.0 %    MCV 93.7 80.0 - 99.9 fL    MCH 28.9 26.0 - 35.0 pg    MCHC 30.9 (L) 32.0 - 34.5 %    RDW 17.2 (H) 11.5 - 15.0 fL    Platelets 909 551 - 674 E9/L    MPV 9.4 7.0 - 12.0 fL    Neutrophils % 61.7 43.0 - 80.0 %    Immature Granulocytes % 2.0 0.0 - 5.0 %    Lymphocytes % 24.3 20.0 - 42.0 %    Monocytes % 10.8 2.0 - 12.0 %    Eosinophils % 1.0 0.0 - 6.0 %    Basophils % 0.2 0.0 - 2.0 %    Neutrophils Absolute 6.21 1.80 - 7.30 E9/L    Immature Granulocytes # 0.20 E9/L    Lymphocytes Absolute 2.45 1.50 - 4.00 E9/L    Monocytes Absolute 1.09 (H) 0.10 - 0.95 E9/L    Eosinophils Absolute 0.10 0.05 - 0.50 E9/L    Basophils Absolute 0.02 0.00 - 0.20 E9/L   Comprehensive Metabolic Panel w/ Reflex to MG   Result Value Ref Range    Sodium 137 132 - 146 mmol/L    Potassium reflex Magnesium 4.8 3.5 - 5.0 mmol/L    Chloride 97 (L) 98 - 107 mmol/L    CO2 28 22 - 29 mmol/L    Anion Gap 12 7 - 16 mmol/L    Glucose 79 74 - 99 mg/dL    BUN 34 (H) 6 - 23 mg/dL    CREATININE 5.3 (H) 0.7 - 1.2 mg/dL    GFR Non-African American 11 >=60 mL/min/1.73    GFR African American 13     Calcium 8.2 (L) 8.6 - 10.2 mg/dL    Total Protein 6.3 (L) 6.4 - 8.3 g/dL    Albumin 3.1 (L) 3.5 - 5.2 g/dL    Total Bilirubin 0.5 0.0 - 1.2 mg/dL    Alkaline Phosphatase 90 40 - 129 U/L    ALT 21 0 - 40 U/L    AST 23 0 - 39 U/L   Troponin   Result Value Ref Range    Troponin, High Sensitivity 65 (H) 0 - 11 ng/L   Brain Natriuretic Peptide   Result Value Ref Range    Pro-BNP 9,247 (H) 0 - 450 pg/mL   Lactate, Sepsis   Result Value Ref Range    Lactic Acid, Sepsis 1.2 0.5 - 1.9 mmol/L   Lactate, Sepsis   Result Value Ref Range    Lactic Acid, Sepsis 1.3 0.5 - 1.9 mmol/L   C-Reactive Protein   Result Value Ref Range    CRP 5.5 (H) 0.0 - 0.4 mg/dL   Ferritin   Result Value Ref Range    Ferritin 795 ng/mL   Procalcitonin   Result Value Ref Range    Procalcitonin 0.42 (H) 0.00 - 0.08 ng/mL   Blood Gas, Arterial Result Value Ref Range    Date Analyzed 20220504     Time Analyzed 1100     Source: Blood Arterial     pH, Blood Gas 7.404 7.350 - 7.450    PCO2 43.0 35.0 - 45.0 mmHg    PO2 89.9 75.0 - 100.0 mmHg    HCO3 26.3 (H) 22.0 - 26.0 mmol/L    B.E. 1.3 -3.0 - 3.0 mmol/L    O2 Sat 95.8 92.0 - 98.5 %    O2Hb 95.7 94.0 - 97.0 %    COHb 0.1 0.0 - 1.5 %    MetHb 0.0 0.0 - 1.5 %    O2 Content 14.2 mL/dL    HHb 4.2 0.0 - 5.0 %    tHb (est) 10.5 (L) 11.5 - 16.5 g/dL    Mode 9L NRB     Date Of Collection      Time Collected      Pt Temp 37.0 C     ID [id number]     Lab 28945     Critical(s) Notified . No Critical Values    Troponin   Result Value Ref Range    Troponin, High Sensitivity 63 (H) 0 - 11 ng/L   POCT Glucose   Result Value Ref Range    Meter Glucose 110 (H) 74 - 99 mg/dL   EKG 12 Lead   Result Value Ref Range    Ventricular Rate 89 BPM    Atrial Rate 89 BPM    P-R Interval 132 ms    QRS Duration 122 ms    Q-T Interval 408 ms    QTc Calculation (Bazett) 496 ms    P Axis 29 degrees    R Axis 57 degrees    T Axis 24 degrees       RADIOLOGY:  US DUP LOWER EXTREMITIES BILATERAL VENOUS   Final Result   No evidence of DVT in either lower extremity. There is a venous catheter in   the left common femoral vein. XR CHEST PORTABLE   Final Result   Bilateral pleural effusion. For impose underlying infiltrates in the mid and   lower lungs cannot be excluded. US DUP LOWER EXTREMITY RIGHT GEORGIE    (Results Pending)           ------------------------- NURSING NOTES AND VITALS REVIEWED ---------------------------  Date / Time Roomed:  5/4/2022 10:02 AM  ED Bed Assignment:  [id number]/1147-C    The nursing notes within the ED encounter and vital signs as below have been reviewed.      Patient Vitals for the past 24 hrs:   BP Temp Temp src Pulse Resp SpO2 Weight   05/04/22 2005       251 lb (113.9 kg)   05/04/22 1907 (!) 131/90 97.5 °F (36.4 °C) Oral 95 22 97 %    05/04/22 1731 121/60 97.9 °F (36.6 °C)  70    05/04/22 1725 (!) 119/59   70      05/04/22 1700 (!) 112/55   68      05/04/22 1630 114/60   78      05/04/22 1600 (!) 112/58   79      05/04/22 1530 120/61   78      05/04/22 1500 112/60   78      05/04/22 1430 117/71   80      05/04/22 1400 123/68   70      05/04/22 1355 126/67   81      05/04/22 1340 121/76 98.2 °F (36.8 °C)   22     05/04/22 1303 118/69   79 20 98 %    05/04/22 1232 106/77   81 19 98 %    05/04/22 1200 99/66   81 22 97 %    05/04/22 1100 (!) 89/48   85 20 98 %    05/04/22 1003 122/80 98 °F (36.7 °C)  88 20 95 % 251 lb (113.9 kg)       Oxygen Saturation Interpretation: Normal    ------------------------------------------ PROGRESS NOTES ------------------------------------------  Re-evaluation(s):  Time: 12pm  Patients symptoms show no change  Repeat physical examination is not changed    Counseling:  I have spoken with the patient and discussed todays results, in addition to providing specific details for the plan of care and counseling regarding the diagnosis and prognosis. Their questions are answered at this time and they are agreeable with the plan of admission.    --------------------------------- ADDITIONAL PROVIDER NOTES ---------------------------------  Consultations:   Spoke with Dr. Elsa Rose. Discussed case. They will admit the patient. This patient's ED course included: a personal history and physicial examination, re-evaluation prior to disposition, multiple bedside re-evaluations, IV medications, cardiac monitoring, continuous pulse oximetry and complex medical decision making and emergency management    This patient has remained hemodynamically stable during their ED course. Please note that the withdrawal or failure to initiate urgent interventions for this patient would likely result in a life threatening deterioration or permanent disability.       Accordingly this patient received 30 minutes of critical care time, excluding separately billable procedures. Diagnosis:  1. Acute on chronic respiratory failure with hypoxia (HCC)    2. Pleural effusion    3. COVID        Disposition:  Patient's disposition: Admit to telemetry  Patient's condition is stable.            Marleny Ybarra MD  05/04/22 3405

## 2022-05-05 NOTE — PLAN OF CARE
Problem: Chronic Conditions and Co-morbidities  Goal: Patient's chronic conditions and co-morbidity symptoms are monitored and maintained or improved  Outcome: Progressing  Flowsheets (Taken 5/4/2022 2010)  Care Plan - Patient's Chronic Conditions and Co-Morbidity Symptoms are Monitored and Maintained or Improved: Monitor and assess patient's chronic conditions and comorbid symptoms for stability, deterioration, or improvement     Problem: Discharge Planning  Goal: Discharge to home or other facility with appropriate resources  Outcome: Progressing  Flowsheets (Taken 5/4/2022 2010)  Discharge to home or other facility with appropriate resources: Identify barriers to discharge with patient and caregiver     Problem: Safety - Adult  Goal: Free from fall injury  Outcome: Progressing     Problem: Pain  Goal: Verbalizes/displays adequate comfort level or baseline comfort level  Outcome: Progressing  Flowsheets (Taken 5/4/2022 2333)  Verbalizes/displays adequate comfort level or baseline comfort level:   Encourage patient to monitor pain and request assistance   Assess pain using appropriate pain scale   Administer analgesics based on type and severity of pain and evaluate response   Implement non-pharmacological measures as appropriate and evaluate response   Consider cultural and social influences on pain and pain management   Notify Licensed Independent Practitioner if interventions unsuccessful or patient reports new pain     Problem: Skin/Tissue Integrity  Goal: Absence of new skin breakdown  Description: 1. Monitor for areas of redness and/or skin breakdown  2. Assess vascular access sites hourly  3. Every 4-6 hours minimum:  Change oxygen saturation probe site  4. Every 4-6 hours:  If on nasal continuous positive airway pressure, respiratory therapy assess nares and determine need for appliance change or resting period.   Outcome: Progressing

## 2022-05-05 NOTE — PLAN OF CARE
Patient's chart updated to reflect:      . - HF care plan, HF education points and HF discharge instructions.  -Orders: 2 gram sodium diet, daily weights, I/O.  -PCP and/or Cardiologist appointment to be scheduled within 7 days of hospital discharge.  -History of HF, not primary admission Dx.   Patient admitted for treatment of Covid-19 and HD  Alfreida Siemens, RN RN, BSN  Heart Failure Navigator

## 2022-05-05 NOTE — PROGRESS NOTES
OCCUPATIONAL THERAPY INITIAL EVALUATION    [address] [address] [address]      Date:2022                                                  Patient Name: Skinny Gaona  MRN: [id number]  : 1945  Room: 5439/0004-I    Evaluating OT: Nicolasa Rides, [address], OTR/L [id number]  Referring Digna Xavier MD  Specific Provider Orders: OT eval and treat   Recommended Adaptive Equipment: 24 hr physical assist     Diagnosis: respiratory failure   Surgery: none  Pertinent Medical History: DM, HTN, HLD, CAD, CHF, COPD   Precautions:  Fall Risk, droplet +, dialysis, continuous pulse ox    Assessment of current deficits   [x] Functional mobility  [x]ADLs  [x] Strength               [x]Cognition   [x] Functional transfers   [x] IADLs         [x] Safety Awareness   [x]Endurance   [] Fine Coordination              [x] Balance      [] Vision/perception   [x]Sensation    []Gross Motor Coordination  [] ROM  [] Delirium                   [] Motor Control     OT PLAN OF CARE   OT POC based on physician orders, patient diagnosis and results of clinical assessment    Frequency/Duration: 2-3 days/wk for 2 weeks PRN   Specific OT Treatment to include:   * Instruction/training on adapted ADL techniques and AE recommendations to increase functional independence within precautions       * Training on energy conservation strategies, correct breathing pattern and techniques to improve independence/tolerance for self-care routine  * Functional transfer/mobility training/DME recommendations for increased independence, safety, and fall prevention  * Patient/Family education to increase follow through with safety techniques and functional independence  * Recommendation of environmental modifications for increased safety with functional transfers/mobility and ADLs  * Cognitive retraining/development of therapeutic activities to improve problem solving, judgement, memory, and attention for increased safety/participation in ADL/IADL tasks  * Therapeutic exercise to improve motor endurance, ROM, and functional strength for ADLs/functional transfers  * Therapeutic activities to facilitate/challenge dynamic balance, stand tolerance for increased safety and independence with ADLs  * Neuro-muscular re-education: facilitation of righting/equilibrium reactions, midline orientation, scapular stability/mobility, normalization of muscle tone, and facilitation of volitional active controled movement    Home Living: Pt presents from a [facility]. He required assist with ADLs. Pain Level: 0/10  Cognition: A&O: 3/4; Follows 1 step directions, with repetition and increased time   Memory:  fair    Sequencing:  fair    Problem solving:  fair    Judgement/safety:  fair     Functional Assessment:  AM-PAC Daily Activity Raw Score: 12/24   Initial Eval Status  Date: 5/5/22 Treatment Status  Date: STGs=LTGs  Time Frame: 10-14 days   Feeding Min A (bed level)   SBA   Grooming Min A (seated EOB for oral care)   SBA   UB Dressing Mod A (due to limited ROM)   Min A   LB Dressing Dep (assist with socks)  Max A    Bathing Max A (simulated)  Mod A    Toileting dep  Max A   Bed Mobility  Log roll: Mod A  Supine to sit: Mod A   Sit to supine: Mod A   Log roll CGA  Supine to sit: CGA   Sit to supine: CGA   Functional Transfers Sit to stand:NT   Stand to sit:NT  Commode: NT  Mod A   Functional Mobility NT  TBA   Balance Sitting: Min A (progressed to SBA)  Standing: NT     Activity Tolerance Fair-     Visual/  Perceptual Glasses: yes              UE ROM: BUE: elbow flex WFL, shoulder flex grossly 130'  Strength: RUE: grossly 3+/5 LUE: grossly 3+/5   Strength: B WFL  Fine Motor Coordination:  WFL     Hearing: WFL  Sensation:  No c/o numbness/tingling   Tone:  WFL  Edema: BLEs                            Comments:Cleared by RN to see pt. Upon arrival, patient supine in bed and agreeable to OT session. Sister present.  At end of session, patient supine in bed with call light and phone within reach, all lines and tubes intact. Pt would benefit from continued OT to increase functional independence and quality of life. Treatment: Pt appeared latent with following commands and answering questions. Pt required vc's and physical assist for proper technique/safety with hand placement/body mechanics/posture for bed mobility/ADLs. Pt required vc's for sequencing/initiation of ADLs/bed mobility. Pt able to  sit EOB ~10 mins to increase core strength/balance/activity tolerance for ease with ADLs. Pt required increased time to complete ADLs/functional transfers due to management of multiple lines, rest breaks, and cognition. Pt required skilled monitoring of SpO2 during session, which was >90% 7L. Pt appeared to have tolerated session fairly and appears cooperative/pleasant . Pt instructed on use of call light for assistance and fall prevention. Pt demo'ing fair understanding of education provided. Continue to educate. Eval Complexity: moderate  · History: Expanded chart review of medical records and additional review of physical, cognitive, or psychosocial history related to current functional performance  · Exam: 3+ performance deficits  · Assistance/Modification: mod/max assistance or modifications required to perform tasks. May have comorbidities that affect occupational performance. Rehab Potential: Good for established goals, pt. assisted in establishment of goals. LTG: maximize independence with ADLs to return to PLOF    Patient and/or family were instructed on diagnosis, prognosis/goals and plan of care. Demonstrated fair understanding. [] Malnutrition indicators have been identified and nursing has been notified to ensure a dietitian consult is ordered.      Evaluation time includes thorough review of current medical information, gathering information on past medical & social history & PLOF, completion of standardized testing, informal observation of tasks, consultation with other medical professions/disciplines, assessment of data & development of POC/goals.      Time In: 1:15       Time Out: 1:40     Total treatment time: 15       Treatment Charges: Mins Units   OT Eval Low 68748     OT Eval Medium 35856 X    OT Eval High 36163     OT Re-Eval [id number]     Ther Ex  73224       Manual Therapy 54659       Thera Activities 56120       ADL/Home Mgt [id number] 15 1   Neuro Re-ed 94463       Group Therapy        Orthotic manage/training  19066       Non-Billable Time           Krishna White OTR/L [id number]

## 2022-05-05 NOTE — H&P
HISTORY AND PHYSICAL             Date: 5/5/2022        Patient Name: Vasiliy Barrera     YOB: 1945      Age:  65 y.o. Chief Complaint     Chief Complaint   Patient presents with    Shortness of Breath     + covid 1 wk ago           History Obtained From   patient    History of Present Illness   Patient came to the ER with shortness of breath and cough. Past Medical History     Past Medical History:   Diagnosis Date    Acid reflux     Agent orange exposure     Arthritis     CAD (coronary artery disease)     Congestive heart failure (CHF) (HCC)     COPD (chronic obstructive pulmonary disease) (HCC)     Depression     [facility]     History of blood transfusion     Hyperlipidemia     Hypertension     MI (myocardial infarction) ([facility]     Polio     Sleep apnea         Past Surgical History     Past Surgical History:   Procedure Laterality Date    CARDIAC PACEMAKER PLACEMENT      CORONARY ANGIOPLASTY WITH STENT PLACEMENT      HERNIA REPAIR      PACEMAKER PLACEMENT      VASCULAR SURGERY N/A 4/4/2022    TUNNELED DIALYSIS CATHETER performed by Renee Altamirano MD at [address] [place] [address]        Medications Prior to Admission     Prior to Admission medications    Medication Sig Start Date End Date Taking?  Authorizing Provider   ondansetron (ZOFRAN-ODT) 4 MG disintegrating tablet Take 1 tablet by mouth every 8 hours as needed for Nausea or Vomiting 4/12/22   Sharifa Raman MD   midodrine (PROAMATINE) 10 MG tablet Take 1 tablet by mouth as needed (give 30 min before dialysis) 4/12/22   Sharifa Raman MD   [facility]) 2 MG tablet Take 1 tablet by mouth daily 4/6/22   Sharifa Raman MD   docusate sodium (COLACE) 100 MG capsule Take 1 capsule by mouth 2 times daily 3/22/22   Sharifa Raman MD   guaiFENesin 400 MG tablet Take 400 mg by mouth 4 times daily as needed for Cough    Historical Provider, MD   ipratropium-albuterol (DUONEB) 0.5-2.5 (3) MG/3ML SOLN nebulizer solution Take 1 vial by nebulization every 4 hours as needed for Shortness of Breath    Historical Provider, MD   potassium chloride (KLOR-CON M) 10 MEQ extended release tablet Take 20 mEq by mouth daily    Historical Provider, MD   insulin glargine (LANTUS SOLOSTAR) 100 UNIT/ML injection pen Inject 50 Units into the skin nightly    Historical Provider, MD   loperamide (IMODIUM) 2 MG capsule Take 2 mg by mouth 4 times daily as needed for Diarrhea    Historical Provider, MD   buPROPion (WELLBUTRIN XL) 300 MG extended release tablet Take 300 mg by mouth every morning    Historical Provider, MD   ezetimibe (ZETIA) 10 MG tablet Take 10 mg by mouth at bedtime    Historical Provider, MD   Zinc Sulfate 110 MG TABS Take 2 tablets by mouth daily    Historical Provider, MD   magnesium hydroxide (MILK OF MAGNESIA) 400 MG/5ML suspension Take 30 mLs by mouth daily as needed for Constipation    Historical Provider, MD   melatonin 3 MG TABS tablet Take 3 mg by mouth at bedtime    Historical Provider, MD   insulin lispro, 1 Unit Dial, (HUMALOG KWIKPEN) 100 UNIT/ML SOPN Inject 0-10 Units into the skin 4 times daily (before meals and nightly) *Per Sliding Scale*    Historical Provider, MD   metoprolol succinate (TOPROL XL) 25 MG extended release tablet Take 1 tablet by mouth 2 times daily 2/16/22   GILMAR Vázquez CNP   acetaminophen (TYLENOL) 325 MG tablet Take 650 mg by mouth every 4 hours as needed for Pain or Fever     Historical Provider, MD   vitamin C (ASCORBIC ACID) 500 MG tablet Take 500 mg by mouth 2 times daily    Historical Provider, MD   vitamin D (CHOLECALCIFEROL) 50 MCG (2000 UT) TABS tablet Take 2,000 Units by mouth daily     Historical Provider, MD   aspirin 81 MG EC tablet Take 1 tablet by mouth daily 2/3/22   Javed Fry MD   folic acid (FOLVITE) 1 MG tablet Take 1 mg by mouth daily    Historical Provider, MD   cyanocobalamin 1000 MCG tablet Take 1,000 mcg by mouth daily Historical Provider, MD   atorvastatin (LIPITOR) 40 MG tablet Take 40 mg by mouth at bedtime     Historical Provider, MD   pantoprazole (PROTONIX) 40 MG tablet Take 40 mg by mouth daily    Historical Provider, MD        Allergies   Penicillins    Social History     Social History     Tobacco History     Smoking Status  Former Smoker    Smokeless Tobacco Use  Never Used    Tobacco Comment  quit in 1985          Alcohol History     Alcohol Use Status  Never          Drug Use     Drug Use Status  Never          Sexual Activity     Sexually Active  Not Asked                Family History   History reviewed. No pertinent family history. Review of Systems   Review of Systems   Constitutional: Positive for activity change. Negative for fever. HENT: Positive for congestion. Respiratory: Positive for shortness of breath. Cardiovascular: Negative for chest pain. Gastrointestinal: Negative for abdominal pain. Genitourinary: Negative for difficulty urinating. Neurological: Negative for dizziness. Physical Exam   /78   Pulse 78   Temp 97.8 °F (36.6 °C) (Oral)   Resp 18   Wt 252 lb (114.3 kg)   SpO2 98%   BMI 37.21 kg/m²     Physical Exam  HENT:      Head: Normocephalic. Mouth/Throat:      Mouth: Mucous membranes are moist.   Eyes:      Pupils: Pupils are equal, round, and reactive to light. Cardiovascular:      Rate and Rhythm: Normal rate. Pulses: Normal pulses. Pulmonary:      Effort: Pulmonary effort is normal.      Breath sounds: No wheezing. Abdominal:      General: Abdomen is flat. Bowel sounds are normal. There is no distension. Musculoskeletal:      Cervical back: Normal range of motion. Right lower leg: Edema present. Skin:     Capillary Refill: Capillary refill takes less than 2 seconds. Neurological:      General: No focal deficit present. Mental Status: He is alert and oriented to person, place, and time.    Psychiatric:         Mood and Affect: Mood normal.         Behavior: Behavior normal.         Labs      Recent Results (from the past 24 hour(s))   EKG 12 Lead    Collection Time: 05/04/22 10:14 AM   Result Value Ref Range    Ventricular Rate 89 BPM    Atrial Rate 89 BPM    P-R Interval 132 ms    QRS Duration 122 ms    Q-T Interval 408 ms    QTc Calculation (Bazett) 496 ms    P Axis 29 degrees    R Axis 57 degrees    T Axis 24 degrees   CBC with Auto Differential    Collection Time: 05/04/22 10:22 AM   Result Value Ref Range    WBC 10.1 4.5 - 11.5 E9/L    RBC 3.32 (L) 3.80 - 5.80 E12/L    Hemoglobin 9.6 (L) 12.5 - 16.5 g/dL    Hematocrit 31.1 (L) 37.0 - 54.0 %    MCV 93.7 80.0 - 99.9 fL    MCH 28.9 26.0 - 35.0 pg    MCHC 30.9 (L) 32.0 - 34.5 %    RDW 17.2 (H) 11.5 - 15.0 fL    Platelets 867 553 - 495 E9/L    MPV 9.4 7.0 - 12.0 fL    Neutrophils % 61.7 43.0 - 80.0 %    Immature Granulocytes % 2.0 0.0 - 5.0 %    Lymphocytes % 24.3 20.0 - 42.0 %    Monocytes % 10.8 2.0 - 12.0 %    Eosinophils % 1.0 0.0 - 6.0 %    Basophils % 0.2 0.0 - 2.0 %    Neutrophils Absolute 6.21 1.80 - 7.30 E9/L    Immature Granulocytes # 0.20 E9/L    Lymphocytes Absolute 2.45 1.50 - 4.00 E9/L    Monocytes Absolute 1.09 (H) 0.10 - 0.95 E9/L    Eosinophils Absolute 0.10 0.05 - 0.50 E9/L    Basophils Absolute 0.02 0.00 - 0.20 E9/L   Comprehensive Metabolic Panel w/ Reflex to MG    Collection Time: 05/04/22 10:22 AM   Result Value Ref Range    Sodium 137 132 - 146 mmol/L    Potassium reflex Magnesium 4.8 3.5 - 5.0 mmol/L    Chloride 97 (L) 98 - 107 mmol/L    CO2 28 22 - 29 mmol/L    Anion Gap 12 7 - 16 mmol/L    Glucose 79 74 - 99 mg/dL    BUN 34 (H) 6 - 23 mg/dL    CREATININE 5.3 (H) 0.7 - 1.2 mg/dL    GFR Non-African American 11 >=60 mL/min/1.73    GFR African American 13     Calcium 8.2 (L) 8.6 - 10.2 mg/dL    Total Protein 6.3 (L) 6.4 - 8.3 g/dL    Albumin 3.1 (L) 3.5 - 5.2 g/dL    Total Bilirubin 0.5 0.0 - 1.2 mg/dL    Alkaline Phosphatase 90 40 - 129 U/L    ALT 21 0 - 40 U/L    AST 23 0 - 39 U/L   Troponin    Collection Time: 05/04/22 10:22 AM   Result Value Ref Range    Troponin, High Sensitivity 65 (H) 0 - 11 ng/L   Brain Natriuretic Peptide    Collection Time: 05/04/22 10:22 AM   Result Value Ref Range    Pro-BNP 9,247 (H) 0 - 450 pg/mL   Lactate, Sepsis    Collection Time: 05/04/22 10:22 AM   Result Value Ref Range    Lactic Acid, Sepsis 1.2 0.5 - 1.9 mmol/L   C-Reactive Protein    Collection Time: 05/04/22 10:22 AM   Result Value Ref Range    CRP 5.5 (H) 0.0 - 0.4 mg/dL   Ferritin    Collection Time: 05/04/22 10:22 AM   Result Value Ref Range    Ferritin 795 ng/mL   Procalcitonin    Collection Time: 05/04/22 10:22 AM   Result Value Ref Range    Procalcitonin 0.42 (H) 0.00 - 0.08 ng/mL   Blood Gas, Arterial    Collection Time: 05/04/22 11:00 AM   Result Value Ref Range    Date Analyzed 20220504     Time Analyzed 1100     Source: Blood Arterial     pH, Blood Gas 7.404 7.350 - 7.450    PCO2 43.0 35.0 - 45.0 mmHg    PO2 89.9 75.0 - 100.0 mmHg    HCO3 26.3 (H) 22.0 - 26.0 mmol/L    B.E. 1.3 -3.0 - 3.0 mmol/L    O2 Sat 95.8 92.0 - 98.5 %    O2Hb 95.7 94.0 - 97.0 %    COHb 0.1 0.0 - 1.5 %    MetHb 0.0 0.0 - 1.5 %    O2 Content 14.2 mL/dL    HHb 4.2 0.0 - 5.0 %    tHb (est) 10.5 (L) 11.5 - 16.5 g/dL    Mode 9L NRB     Date Of Collection      Time Collected      Pt Temp 37.0 C     ID [id number]     Lab [id number]     Critical(s) Notified .  No Critical Values    Troponin    Collection Time: 05/04/22  1:08 PM   Result Value Ref Range    Troponin, High Sensitivity 63 (H) 0 - 11 ng/L   POCT Glucose    Collection Time: 05/04/22  8:57 PM   Result Value Ref Range    Meter Glucose 110 (H) 74 - 99 mg/dL   Lactate, Sepsis    Collection Time: 05/04/22  9:04 PM   Result Value Ref Range    Lactic Acid, Sepsis 1.3 0.5 - 1.9 mmol/L   POCT Glucose    Collection Time: 05/05/22  5:03 AM   Result Value Ref Range    Meter Glucose 78 74 - 99 mg/dL   CBC    Collection Time: 05/05/22  5:10 AM   Result Value Ref Range    WBC 7.5 4.5 - 11.5 E9/L    RBC 3.28 (L) 3.80 - 5.80 E12/L    Hemoglobin 9.6 (L) 12.5 - 16.5 g/dL    Hematocrit 31.8 (L) 37.0 - 54.0 %    MCV 97.0 80.0 - 99.9 fL    MCH 29.3 26.0 - 35.0 pg    MCHC 30.2 (L) 32.0 - 34.5 %    RDW 16.9 (H) 11.5 - 15.0 fL    Platelets 908 271 - 021 E9/L    MPV 9.1 7.0 - 12.0 fL   Basic Metabolic Panel    Collection Time: 05/05/22  5:10 AM   Result Value Ref Range    Sodium 140 132 - 146 mmol/L    Potassium 4.7 3.5 - 5.0 mmol/L    Chloride 99 98 - 107 mmol/L    CO2 27 22 - 29 mmol/L    Anion Gap 14 7 - 16 mmol/L    Glucose 80 74 - 99 mg/dL    BUN 25 (H) 6 - 23 mg/dL    CREATININE 4.0 (H) 0.7 - 1.2 mg/dL    GFR Non-African American 15 >=60 mL/min/1.73    GFR African American 18     Calcium 8.3 (L) 8.6 - 10.2 mg/dL        Imaging/Diagnostics Last 24 Hours   XR CHEST PORTABLE    Result Date: 1/21/2022  EXAMINATION: ONE XRAY VIEW OF THE CHEST 1/21/2022 10:02 am COMPARISON: None. HISTORY: ORDERING SYSTEM PROVIDED HISTORY: dyspnea TECHNOLOGIST PROVIDED HISTORY: Reason for exam:->dyspnea FINDINGS: The heart is enlarged. There is a dual lead cardiac pacer on the left There is an infiltrate seen within the right lung base. The left lung is clear. There is a trace right pleural effusion. There is no left pleural effusion. 1. Right lower lobe pneumonia 2. Trace right pleural effusion 3. Cardiomegaly     US DUP LOWER EXTREMITIES BILATERAL VENOUS    Result Date: 1/21/2022  EXAMINATION: DUPLEX VENOUS ULTRASOUND OF THE BILATERAL LOWER EXTREMITIES1/21/2022 10:15 am TECHNIQUE: Duplex ultrasound using B-mode/gray scaled imaging, Doppler spectral analysis and color flow Doppler was obtained of the deep venous structures of the lower bilateral extremities. COMPARISON: None.  HISTORY: ORDERING SYSTEM PROVIDED HISTORY: discomfort TECHNOLOGIST PROVIDED HISTORY: Reason for exam:->discomfort What reading provider will be dictating this exam?->CRC FINDINGS: The visualized veins of the bilateral lower extremities are patent and free of echogenic thrombus. The veins demonstrate good compressibility with normal color flow study and spectral analysis. No evidence of DVT in either lower extremity. RECOMMENDATIONS: GELACIO MILLER [facility] Problems           Last Modified POA    * (Principal) Acute on chronic respiratory failure ([facility] [address] 5/4/2022 Yes       covid 19  chronic hypoxic respiratory failure. CHF  Pleural effusion  ESRD  DM  COPD  HTN  Hyperlipidemia        Plan   Pleural effusions appear stable  Renal following for dialysis  Pulmonary to see. On steroids. Abx?     Consultations Ordered:  IP CONSULT TO NEPHROLOGY  IP CONSULT TO INTERNAL MEDICINE  IP CONSULT TO PULMONOLOGY

## 2022-05-05 NOTE — PROGRESS NOTES
Date: 5/5/2022    Time: 1:05 AM    Patient Placed On BIPAP/CPAP/ Non-Invasive Ventilation? Yes    If no must comment. Facial area red/color change? Yes and No           If YES are Blister/Lesion present? Yes   If yes must notify nursing staff  BIPAP/CPAP skin barrier? Yes    Skin barrier type:mepilexlite       Comments:Pt has a bad skin breakdown on the nose. RN has assessed the area and documented.         Sheridan Fagan RCP

## 2022-05-05 NOTE — PROGRESS NOTES
[facility]  Nephrology Progress Note    Patient's Name: Jelena Geiger     History of Present Illness from 5/4 consult note:    Burton Mejias is a 65 y.o. male with a past medical history of hypertension, hyperlipidemia, COPD, coronary artery disease, and CHF. He presented to the ED on 5/4 with complaints of shortness of breath. Vital signs at presentation to the ED include temperature 98, respirations 20, pulse 88, /80, and he was 95% on 6 L. Lab data on presentation to the ED include BUN 34, creatinine 5.3, proBNP 9247, and hemoglobin 9.6. Chest x-ray showed \"bilateral pleural effusion. \"  We were consulted to see the patient for HD. Patient is known to our service and dialyzes at [facility] 6 MWF second shift via a right thigh tunneled dialysis catheter. Patient not personally seen or examined due to COVID isolation, subsequently a review of systems was not obtained due to [facility] isolation. \"    Subjective:    5/5: Patient was not personally seen or examined due to COVID isolation. Discussed with his bedside nurse, who reports that he is on 7 L of oxygen and that he is doing okay today; no issues overnight.     PMH:    Past Medical History:   Diagnosis Date    Acid reflux     Agent orange exposure     Arthritis     CAD (coronary artery disease)     Congestive heart failure (CHF) (HCC)     COPD (chronic obstructive pulmonary disease) (HCC)     Depression     Diabetes mellitus ([address]     History of blood transfusion     Hyperlipidemia     Hypertension     MI (myocardial infarction) ([address]     Polio     Sleep apnea      Patient Active Problem List   Diagnosis    PNA (pneumonia)    Acute on chronic heart failure with preserved ejection fraction ([address]    Coronary artery disease involving native coronary artery of native heart without angina pectoris    Cardiac pacemaker in situ    Primary hypertension    SPEEDY (obstructive sleep apnea)    Chronic respiratory failure with [facility]    CHF (congestive heart failure), NYHA class I, acute on chronic, combined ([facility]    Acute on chronic clinical systolic heart failure (HCC)    Acute on chronic systolic heart failure (HCC)    Pleural effusion    Respiratory failure ([facility]    COVID-19    Acute on chronic respiratory failure (HCC)     Meds:    Meds prn:     Meds prior to admission:     No current facility-administered medications on file prior to encounter.      Current Outpatient Medications on File Prior to Encounter   Medication Sig Dispense Refill    ondansetron (ZOFRAN-ODT) 4 MG disintegrating tablet Take 1 tablet by mouth every 8 hours as needed for Nausea or Vomiting 60 tablet 0    midodrine (PROAMATINE) 10 MG tablet Take 1 tablet by mouth as needed (give 30 min before dialysis) 90 tablet 3    bumetanide (BUMEX) 2 MG tablet Take 1 tablet by mouth daily 30 tablet 3    docusate sodium (COLACE) 100 MG capsule Take 1 capsule by mouth 2 times daily 60 capsule 0    guaiFENesin 400 MG tablet Take 400 mg by mouth 4 times daily as needed for Cough      ipratropium-albuterol (DUONEB) 0.5-2.5 (3) MG/3ML SOLN nebulizer solution Take 1 vial by nebulization every 4 hours as needed for Shortness of Breath      potassium chloride (KLOR-CON M) 10 MEQ extended release tablet Take 20 mEq by mouth daily      insulin glargine (LANTUS SOLOSTAR) 100 UNIT/ML injection pen Inject 50 Units into the skin nightly      loperamide (IMODIUM) 2 MG capsule Take 2 mg by mouth 4 times daily as needed for Diarrhea      buPROPion (WELLBUTRIN XL) 300 MG extended release tablet Take 300 mg by mouth every morning      ezetimibe (ZETIA) 10 MG tablet Take 10 mg by mouth at bedtime      Zinc Sulfate 110 MG TABS Take 2 tablets by mouth daily      magnesium hydroxide (MILK OF MAGNESIA) 400 MG/5ML suspension Take 30 mLs by mouth daily as needed for Constipation      melatonin 3 MG TABS tablet Take 3 mg by mouth at bedtime      insulin lispro, 1 Unit Dial, (HUMALOG KWIKPEN) 100 UNIT/ML SOPN Inject 0-10 Units into the skin 4 times daily (before meals and nightly) *Per Sliding Scale*      metoprolol succinate (TOPROL XL) 25 MG extended release tablet Take 1 tablet by mouth 2 times daily 30 tablet 3    acetaminophen (TYLENOL) 325 MG tablet Take 650 mg by mouth every 4 hours as needed for Pain or Fever       vitamin C (ASCORBIC ACID) 500 MG tablet Take 500 mg by mouth 2 times daily      vitamin D (CHOLECALCIFEROL) 50 MCG (2000 UT) TABS tablet Take 2,000 Units by mouth daily       aspirin 81 MG EC tablet Take 1 tablet by mouth daily 30 tablet 3    folic acid (FOLVITE) 1 MG tablet Take 1 mg by mouth daily      cyanocobalamin 1000 MCG tablet Take 1,000 mcg by mouth daily      atorvastatin (LIPITOR) 40 MG tablet Take 40 mg by mouth at bedtime       pantoprazole (PROTONIX) 40 MG tablet Take 40 mg by mouth daily       Allergies:    Penicillins    Social History:     reports that he has quit smoking. He has never used smokeless tobacco. He reports that he does not drink alcohol and does not use drugs. Family History:     History reviewed. No pertinent family history.     Physical Exam:    Patient Vitals for the past 24 hrs:   BP Temp Temp src Pulse Resp SpO2 Weight   05/05/22 0815      99 %    05/05/22 0800 100/71 97.6 °F (36.4 °C) Axillary 78 14 100 %    05/05/22 0759     12     05/05/22 0415 108/78 97.8 °F (36.6 °C) Oral 78 18 98 %    05/05/22 0054     21     05/04/22 2333 136/89 98.2 °F (36.8 °C) Oral 83 22 98 % 252 lb (114.3 kg)   05/04/22 2005       251 lb (113.9 kg)   05/04/22 1907 (!) 131/90 97.5 °F (36.4 °C) Oral 95 22 97 %    05/04/22 1731 121/60 97.9 °F (36.6 °C)  70      05/04/22 1725 (!) 119/59   70      05/04/22 1700 (!) 112/55   68      05/04/22 1630 114/60   78      05/04/22 1600 (!) 112/58   79      05/04/22 1530 120/61   78      05/04/22 1500 112/60   78      05/04/22 1430 117/71   80    05/04/22 1400 123/68   70      05/04/22 1355 126/67   81      05/04/22 1340 121/76 98.2 °F (36.8 °C)   22     05/04/22 1303 118/69   79 20 98 %    05/04/22 1232 106/77   81 19 98 %    05/04/22 1200 99/66   81 22 97 %    05/04/22 1100 (!) 89/48   85 20 98 %    05/04/22 1003 122/80 98 °F (36.7 °C)  88 20 95 % 251 lb (113.9 kg)       Intake/Output Summary (Last 24 hours) at 5/5/2022 0911  Last data filed at 5/5/2022 0054  Gross per 24 hour   Intake 760 ml   Output 2600 ml   Net -1840 ml      Not personally examined due to COVID isolation. Due to the current efforts to prevent transmission of COVID-19 and also the need to preserve PPE for other caregivers, a face-to-face encounter with the patient was not performed. That being said, all relevant records and diagnostic tests were reviewed, including laboratory results and imaging. Please reference any relevant documentation elsewhere. Care will be coordinated with the primary service.     Data:    Recent Labs     05/04/22  1022 05/05/22  0510   WBC 10.1 7.5   HGB 9.6* 9.6*   HCT 31.1* 31.8*   MCV 93.7 97.0    214     Recent Labs     05/04/22  1022 05/05/22  0510    140   K 4.8 4.7   CL 97* 99   CO2 28 27   CREATININE 5.3* 4.0*   BUN 34* 25*   LABGLOM 11 15   GLUCOSE 79 80   CALCIUM 8.2* 8.3*     No results found for: VITD25    PTH   Date Value Ref Range Status   04/28/2022 118 (H) 15 - 65 pg/mL Final     Recent Labs     05/04/22  1022   ALT 21   AST 23   ALKPHOS 90   BILITOT 0.5     Recent Labs     05/04/22  1022   LABALBU 3.1*     Ferritin   Date Value Ref Range Status   05/04/2022 795 ng/mL Final     Comment:     FERRITIN Reference Ranges:  Adult Males   20 - 60 years:    30 - 400 ng/mL  Adult females 16 - 60 years:    13 - 150 ng/mL  Adults greater than 60 years:   no established reference range  Pediatrics:                     no established reference range       No results found for: VUKUSVWQ42    No results found for: FOLATE    Lab Results   Component Value Date    COLORU Yellow 04/07/2022    NITRU Negative 04/07/2022    GLUCOSEU Negative 04/07/2022    KETUA Negative 04/07/2022    UROBILINOGEN 0.2 04/07/2022    BILIRUBINUR Negative 04/07/2022     Lab Results   Component Value Date/Time    YAMILETH Brady 01/22/2022 11:24 AM     No components found for: URIC    No results found for: LIPIDPAN    Assessment and Plan:    1. Shortness of breath   History of COPD  Chest x-ray showed \"bilateral pleural effusion\"  COVID positive on 4/25  On oxygen via nasal cannula  Pulmonology consulted  Management per primary/pulmonology    2. ESRD, HD dependent  OP [address] MWF second shift  via a right thigh TDC  Continue HD MWF    3. Hypotension  SBPs 80s90s intermittently   On midodrine as an outpatient  Avoid further hypotension  Monitor BPs    4. Anemia  Hemoglobin 9.6 today  Transfuse for hemoglobin<7  Monitor H&H    5. Secondary hyperparathyroidism of renal origin  With hypocalcemia/ hypoalbuminemia- calcium 8.2 and albumin 3.1 on 5/4   on 4/28, phosphorus 4.4 on 4/28  Check phos in the am   Monitor labs    [facility]     Patient seen and examined all key components of the physical performed independently , case discussed with NP, all pertinent labs and radiologic tests personally reviewed agree with above.     SOB, due to volume overload and  COVID-19 infection  Will continue fluid removal with diaphysis as BP allows     Omid Serna MD

## 2022-05-06 NOTE — PROGRESS NOTES
Physician Progress Note      PATIENT:               Lili Hunter  CSN #:                  [id number]  :                       1945  ADMIT DATE:       2022 10:02 AM  DISCH DATE:  RESPONDING  PROVIDER #:        Jessie Lira MD          QUERY TEXT:    Pt admitted with SOB,COVID 19 and has CHF documented. If possible, please   document in progress notes and discharge summary further specificity regarding   the type and acuity of CHF:    The medical record reflects the following:  Risk Factors: agd ESRD CHF HTN DM  Clinical Indicators: BNP 9247 SOB CXR: Bilateral pleural effusion. ? For impose   underlying infiltrates in the mid and  lower lungs cannot be excluded      Treatment: Hemodialysis PO Bumex monitor I&O's high ramses O2    Maliaka Munoz BSN CCDS  Options provided:  -- Acute on Chronic Systolic CHF/HFrEF  -- Acute on Chronic Diastolic CHF/HFpEF  -- Acute on Chronic Systolic and Diastolic CHF  -- Acute Systolic CHF/HFrEF  -- Acute Diastolic CHF/HFpEF  -- Acute Systolic and Diastolic CHF  -- Chronic Systolic CHF/HFrEF  -- Chronic Diastolic CHF/HFpEF  -- Chronic Systolic and Diastolic CHF  -- Other - I will add my own diagnosis  -- Disagree - Not applicable / Not valid  -- Disagree - Clinically unable to determine / Unknown  -- Refer to Clinical Documentation Reviewer    PROVIDER RESPONSE TEXT:    This patient is in acute on chronic systolic CHF/HFrEF.     Query created by: Jovanny Argueta on 2022 8:19 AM      Electronically signed by:  Jessie Lira MD 2022 8:22 AM

## 2022-05-06 NOTE — PROGRESS NOTES
[facility]  Nephrology Progress Note    Patient's Name: Rika Santiago     History of Present Illness from 5/4 consult note:    Mary Ragsdale is a 65 y.o. male with a past medical history of hypertension, hyperlipidemia, COPD, coronary artery disease, and CHF. He presented to the ED on 5/4 with complaints of shortness of breath. Vital signs at presentation to the ED include temperature 98, respirations 20, pulse 88, /80, and he was 95% on 6 L. Lab data on presentation to the ED include BUN 34, creatinine 5.3, proBNP 9247, and hemoglobin 9.6. Chest x-ray showed \"bilateral pleural effusion. \"  We were consulted to see the patient for HD. Patient is known to our service and dialyzes at [facility] 6 MWF second shift via a right thigh tunneled dialysis catheter. Patient not personally seen or examined due to COVID isolation, subsequently a review of systems was not obtained due to [facility] isolation. \"    Subjective:    5/6: Patient was not personally seen or examined due to COVID isolation. Discussed with bedside nurse, who reports that the patient is doing okay, no issues overnight, and on 7 L of O2.     PMH:    Past Medical History:   Diagnosis Date    Acid reflux     Agent orange exposure     Arthritis     CAD (coronary artery disease)     Congestive heart failure (CHF) (HCC)     COPD (chronic obstructive pulmonary disease) (HCC)     Depression     Diabetes mellitus ([address]     History of blood transfusion     Hyperlipidemia     Hypertension     MI (myocardial infarction) ([address]     Polio     Sleep apnea      Patient Active Problem List   Diagnosis    PNA (pneumonia)    Acute on chronic heart failure with preserved ejection fraction ([address]    Coronary artery disease involving native coronary artery of native heart without angina pectoris    Cardiac pacemaker in situ    Primary hypertension    SPEEDY (obstructive sleep apnea)    Chronic respiratory failure with hypoxia ([address]    CHF (congestive heart failure), NYHA class I, acute on chronic, combined ([facility]    Acute on chronic clinical systolic heart failure (HCC)    Acute on chronic systolic heart failure (HCC)    Pleural effusion    Respiratory failure ([facility]    COVID-19    Acute on chronic respiratory failure (HCC)     Meds:    Meds prn:     Meds prior to admission:     No current facility-administered medications on file prior to encounter.      Current Outpatient Medications on File Prior to Encounter   Medication Sig Dispense Refill    ondansetron (ZOFRAN-ODT) 4 MG disintegrating tablet Take 1 tablet by mouth every 8 hours as needed for Nausea or Vomiting 60 tablet 0    midodrine (PROAMATINE) 10 MG tablet Take 1 tablet by mouth as needed (give 30 min before dialysis) 90 tablet 3    bumetanide (BUMEX) 2 MG tablet Take 1 tablet by mouth daily 30 tablet 3    docusate sodium (COLACE) 100 MG capsule Take 1 capsule by mouth 2 times daily 60 capsule 0    guaiFENesin 400 MG tablet Take 400 mg by mouth 4 times daily as needed for Cough      ipratropium-albuterol (DUONEB) 0.5-2.5 (3) MG/3ML SOLN nebulizer solution Take 1 vial by nebulization every 4 hours as needed for Shortness of Breath      potassium chloride (KLOR-CON M) 10 MEQ extended release tablet Take 20 mEq by mouth daily      insulin glargine (LANTUS SOLOSTAR) 100 UNIT/ML injection pen Inject 50 Units into the skin nightly      loperamide (IMODIUM) 2 MG capsule Take 2 mg by mouth 4 times daily as needed for Diarrhea      buPROPion (WELLBUTRIN XL) 300 MG extended release tablet Take 300 mg by mouth every morning      ezetimibe (ZETIA) 10 MG tablet Take 10 mg by mouth at bedtime      Zinc Sulfate 110 MG TABS Take 2 tablets by mouth daily      magnesium hydroxide (MILK OF MAGNESIA) 400 MG/5ML suspension Take 30 mLs by mouth daily as needed for Constipation      melatonin 3 MG TABS tablet Take 3 mg by mouth at bedtime      insulin lispro, 1 Unit Dial, (HUMALOG KWIKPEN) 100 UNIT/ML SOPN Inject 0-10 Units into the skin 4 times daily (before meals and nightly) *Per Sliding Scale*      metoprolol succinate (TOPROL XL) 25 MG extended release tablet Take 1 tablet by mouth 2 times daily 30 tablet 3    acetaminophen (TYLENOL) 325 MG tablet Take 650 mg by mouth every 4 hours as needed for Pain or Fever       vitamin C (ASCORBIC ACID) 500 MG tablet Take 500 mg by mouth 2 times daily      vitamin D (CHOLECALCIFEROL) 50 MCG (2000 UT) TABS tablet Take 2,000 Units by mouth daily       aspirin 81 MG EC tablet Take 1 tablet by mouth daily 30 tablet 3    folic acid (FOLVITE) 1 MG tablet Take 1 mg by mouth daily      cyanocobalamin 1000 MCG tablet Take 1,000 mcg by mouth daily      atorvastatin (LIPITOR) 40 MG tablet Take 40 mg by mouth at bedtime       pantoprazole (PROTONIX) 40 MG tablet Take 40 mg by mouth daily       Allergies:    Penicillins    Social History:     reports that he has quit smoking. He has never used smokeless tobacco. He reports that he does not drink alcohol and does not use drugs. Family History:     History reviewed. No pertinent family history. Physical Exam:    Patient Vitals for the past 24 hrs:   BP Temp Temp src Pulse Resp SpO2 Weight   05/06/22 0815 (!) 116/91 97.9 °F (36.6 °C) Oral 79 18 97 %    05/06/22 0600       254 lb (115.2 kg)   05/06/22 0410 101/78 98.2 °F (36.8 °C) Oral 75 18 99 %    05/05/22 2145 100/62 98.1 °F (36.7 °C) Axillary 78 18 99 %    05/05/22 1515 102/76 98 °F (36.7 °C) Axillary 84 16 100 %        Intake/Output Summary (Last 24 hours) at 5/6/2022 0836  Last data filed at 5/5/2022 2145  Gross per 24 hour   Intake 200 ml   Output    Net 200 ml      Not personally examined due to COVID isolation. Due to the current efforts to prevent transmission of COVID-19 and also the need to preserve PPE for other caregivers, a face-to-face encounter with the patient was not performed.  That being said, all relevant records and diagnostic hypotension  Monitor BPs    4. Anemia  Hemoglobin 9.9 today  Transfuse for hemoglobin<7  Monitor H&H    5. Secondary hyperparathyroidism of renal origin  With hypocalcemia/ hypoalbuminemia- calcium 8.2 and albumin 3.1 on 5/4   on 4/28, phosphorus 4.4 on 4/28  phos 5.8 today  Low phos diet   Monitor labs    6.   Hyperkalemia  Likely in setting of ESRD  K+ 5.9 today  For HD today  Low potassium diet  Monitor labs    Nayeli Mullins, APRN - CNP     Pt not seen due to covid  Chart reviewed  Agree with above  Hd mwf  Being treated for covid  Jaime De La Cruz MD

## 2022-05-06 NOTE — PROGRESS NOTES
Progress Note  Date:2022       [facility]:[id number]  Patient Amanda Walker     YOB: 1945     Age:77 y.o. Patient sleeping. Subjective    Subjective:  Symptoms:  He reports shortness of breath and weakness. No chest pain. Diet:  Adequate intake. Activity level: Impaired due to weakness. Pain:  He reports no pain. Review of Systems   Constitutional: Negative for activity change and fever. HENT: Negative for congestion. Respiratory: Positive for shortness of breath. Cardiovascular: Negative for chest pain. Gastrointestinal: Negative for abdominal pain. Neurological: Positive for weakness. Negative for dizziness. Objective         Vitals Last 24 Hours:  TEMPERATURE:  Temp  Av °F (36.7 °C)  Min: 97.6 °F (36.4 °C)  Max: 98.2 °F (36.8 °C)  RESPIRATIONS RANGE: Resp  Avg: 15.6  Min: 12  Max: 18  PULSE OXIMETRY RANGE: SpO2  Av.4 %  Min: 99 %  Max: 100 %  PULSE RANGE: Pulse  Av.8  Min: 75  Max: 84  BLOOD PRESSURE RANGE: Systolic (55MNY), QLN:519 , Min:100 , WMJ:685   ; Diastolic (55BID), YUT:63, Min:62, Max:78    I/O (24Hr): Intake/Output Summary (Last 24 hours) at 2022 0645  Last data filed at 2022 2145  Gross per 24 hour   Intake 440 ml   Output    Net 440 ml     Objective:  General Appearance:  Comfortable. Vital signs: (most recent): Blood pressure 101/78, pulse 75, temperature 98.2 °F (36.8 °C), temperature source Oral, resp. rate 18, weight 252 lb (114.3 kg), SpO2 99 %. No fever. Lungs: There are decreased breath sounds. Heart: Normal rate. Regular rhythm.   S1 normal and S2 normal.      Labs/Imaging/Diagnostics    Labs:  CBC:  Recent Labs     22  1022 22  0510   WBC 10.1 7.5   RBC 3.32* 3.28*   HGB 9.6* 9.6*   HCT 31.1* 31.8*   MCV 93.7 97.0   RDW 17.2* 16.9*    214     CHEMISTRIES:  Recent Labs     22  1022 22  0510    140   K 4.8 4.7   CL 97* 99   CO2 28 27   BUN 34* 25*   CREATININE 5. 3* 4.0*   GLUCOSE 79 80     PT/INR:No results for input(s): PROTIME, INR in the last 72 hours. APTT:No results for input(s): APTT in the last 72 hours. LIVER PROFILE:  Recent Labs     05/04/22  1022   AST 23   ALT 21   BILITOT 0.5   ALKPHOS 90       Imaging Last 24 Hours:  XR CHEST PORTABLE    Result Date: 5/4/2022  EXAMINATION: ONE XRAY VIEW OF THE CHEST 5/4/2022 10:49 am HISTORY: ORDERING SYSTEM PROVIDED HISTORY: shortness of breath TECHNOLOGIST PROVIDED HISTORY: Reason for exam:->shortness of breath What reading provider will be dictating this exam?->CRC FINDINGS: There are opacities in the mid and lower lungs. There is moderate right pleural effusion. No pneumothorax. The heart is not enlarged. Pacemaker in place. Bilateral pleural effusion. For impose underlying infiltrates in the mid and lower lungs cannot be excluded. US DUP LOWER EXTREMITIES BILATERAL VENOUS    Result Date: 5/4/2022  EXAMINATION: DUPLEX VENOUS ULTRASOUND OF THE BILATERAL LOWER EXTREMITIES5/4/2022 1:37 pm TECHNIQUE: Duplex ultrasound using B-mode/gray scaled imaging, Doppler spectral analysis and color flow Doppler was obtained of the deep venous structures of the lower bilateral extremities. COMPARISON: None. HISTORY: ORDERING SYSTEM PROVIDED HISTORY: dvt TECHNOLOGIST PROVIDED HISTORY: Reason for exam:->dvt What reading provider will be dictating this exam?->CRC FINDINGS: The visualized veins of the bilateral lower extremities are patent and free of echogenic thrombus. The veins demonstrate good compressibility with normal color flow study and spectral analysis. No evidence of DVT in either lower extremity. There is a venous catheter in the left common femoral vein.      Assessment//Plan           Hospital Problems           Last Modified POA    * (Principal) Acute on chronic respiratory failure ([address] 5/4/2022 Yes        Assessment:  ((Principal) Acute on chronic respiratory failure ([address] 5/4/2022 Yes      covid 19  chronic hypoxic respiratory failure. CHF  Pleural effusion  ESRD  DM  COPD  HTN  Hyperlipidemia           Plan  Pleural effusions appear stable  Renal following for dialysis  Pulmonary to see. On steroids. Abx?  ).        Electronically signed by Catie Yoon MD on 5/6/22 at 6:45 AM EDT

## 2022-05-06 NOTE — FLOWSHEET NOTE
Inpatient Wound Care (Initial consult) [id number]    Admit Date: 5/4/2022 10:02 AM    Reason for consult:  Nose, Coccyx    Significant history: Per H&P    Chief Complaint   Patient presents with    Shortness of Breath       + covid 1 wk ago          Findings:      05/06/22 1140   Skin Integumentary    Skin Integrity   (dry flaky, cool purple)   Location   (bilateral feet)   Skin Integrity Site 2   Skin Integrity Location 2 Bruising   Location 2 BUE, abdomen   Skin Integrity Site 3   Skin Integrity Location 3 Excoriation    Location 3   (abdominal fold)   Skin Integrity Site 4   Skin Integrity Location 4   (redness, blanchable)   Location 4   (bilateral buttocks)   Wound 05/04/22 Nose Mid   Date First Assessed: 05/04/22   Present on Hospital Admission: Yes  Location: Nose  Wound Location Orientation: Mid   Wound Image    Wound Etiology Pressure Unstageable   Dressing/Treatment Foam   Wound Length (cm) 1 cm   Wound Width (cm) 1 cm   Wound Depth (cm)   (unable to determine)   Wound Surface Area (cm^2) 1 cm^2   Wound Assessment   (black)   Drainage Amount None   Odor None   Honey-wound Assessment Dry/flaky      **Informed Consent**    The patient has given verbal consent to have photos taken of wound and inserted into their chart as part of their permanent medical record for purposes of documentation, treatment management and/or medical review. All Images taken on 5/6/22 of patient name: Yanira Kirk were transmitted and stored on secured [employer] located within [facility] by a registered [employer] Application Device. Impression:  Mid nose: unstageable    Plan: Antifungal powder  Protective cream  TAPS  Heel protectors  Foam ear wraps: spoke with patients nurse Jack Nolan to obtain from respiratory.   Patient will need continued preventative care      Marimar Patel RN 5/6/2022 12:07 PM

## 2022-05-06 NOTE — FLOWSHEET NOTE
05/06/22 1723   Vital Signs   /62   Temp 97.5 °F (36.4 °C)   Pulse 86   Resp 20   Weight 249 lb 1.9 oz (113 kg)   Weight Method Bed scale   Percent Weight Change -1.91   Pain Assessment   Pain Assessment None - Denies Pain   Post-Hemodialysis Assessment   Machine Disinfection Process Acid/Vinegar Clean;Heat Disinfect   Rinseback Volume (ml) 300 ml   Total Liters Processed (l/min) 65 l/min   Dialyzer Clearance Moderately streaked   Duration of Treatment (minutes) 210 minutes   Hemodialysis Intake (ml) 300 ml   Hemodialysis Output (ml) 2300 ml   NET Removed (ml) 2000 ml   Tolerated Treatment Good   Patient Response to Treatment Tolerated tx well   Bilateral Breath Sounds Diminished   Edema Generalized

## 2022-05-06 NOTE — CONSULTS
[place]  Department of Internal Medicine  Division of Pulmonary, Critical Care and Sleep Medicine  Consult Note    Dk Hunt DO, MPH, [facility], [facility], [facility]  Ivan Ramirez DO, [facility]      Patient: Yanira Kirk  MRN: [id number]  : 1945    Encounter Time: 1:42 PM     Date of Admission: 2022 10:02 AM    Primary Care Physician: Leora Luo MD    Reason for Consultation:Chronic pleural effusion, shortness of breath, cough     HISTORY OF PRESENT ILLNESS : Yanira Kirk 65 y.o. male was seen in consultation regarding the above chief compliant. The patient has been seen by myself and my partners multiple times recently he was discharged on  and return to our facility on . He reports that while at the facility he noticed the bubbler on his oxygen was not bubbling he thinks that his shortness of breath was related to an anxiety attack. He is unsure of whether or not the oxygen may have actually been disconnected. He states that he had difficulty getting somebody to come back quickly to check on his and his breathing got worse and worse. He was brought to the emergency room a CT of the abdomen and pelvis was completed due to unclear reasons. The patient states that he has not had any abdominal pain or constipation. He also did have a chest x-ray done which shows a stable right sided pleural effusion this right-sided pleural effusion has remained stable since the thoracentesis I completed on . Currently he denies any shortness of breath. He does have his home CPAP. He states that he did not wear the hospital CPAP last night because he felt it was uncomfortable. He denies any missed dialysis. He does state that the swelling in his legs is down significantly and his feet are no longer painful.     Recently discharged to [facility] from [facility] with increasing shortness of breath patient is chronic respiratory failure is on 4 to 5 L nasal cannula at baseline. Patient arrived on 8 L to emergency department. Patient was breathing heavy for the past 2 days at facility initially refused transfer to ED until today. Data presentation to the ED include BUN 34, creatinine 5.3, proBNP 9247, and hemoglobin 9.6. Chest x-ray showed \"bilateral pleural effusion. \"     PAST MEDICAL HISTORY:  has a past medical history of Acid reflux, Agent orange exposure, Arthritis, CAD (coronary artery disease), Congestive heart failure (CHF) ([facility]), COPD (chronic obstructive pulmonary disease) ([facility] Depression, Diabetes mellitus ([facility] History of blood transfusion, Hyperlipidemia, Hypertension, MI (myocardial infarction) ([facility] [address] Polio, and Sleep apnea. SURGICAL HISTORY:  has a past surgical history that includes Coronary angioplasty with stent; Cardiac pacemaker placement; pacemaker placement; hernia repair; and vascular surgery (N/A, 4/4/2022). SOCIAL HISTORY:  reports that he has quit smoking. He has never used smokeless tobacco. He reports that he does not drink alcohol and does not use drugs. FAMILY  HISTORY: family history is not on file. MEDICATIONS:    Prior to Admission medications    Medication Sig Start Date End Date Taking?  Authorizing Provider   ondansetron (ZOFRAN-ODT) 4 MG disintegrating tablet Take 1 tablet by mouth every 8 hours as needed for Nausea or Vomiting 4/12/22   Varsha Koch MD   midodrine (PROAMATINE) 10 MG tablet Take 1 tablet by mouth as needed (give 30 min before dialysis) 4/12/22   aVrsha Koch MD   bumetanide [facility]) 2 MG tablet Take 1 tablet by mouth daily 4/6/22   Varsha Koch MD   docusate sodium (COLACE) 100 MG capsule Take 1 capsule by mouth 2 times daily 3/22/22   Varsha Koch MD   guaiFENesin 400 MG tablet Take 400 mg by mouth 4 times daily as needed for Cough    Historical Provider, MD   ipratropium-albuterol (DUONEB) 0.5-2.5 (3) MG/3ML SOLN nebulizer solution Take 1 vial by nebulization every 4 hours as needed for Shortness of Breath    Historical Provider, MD   potassium chloride (KLOR-CON M) 10 MEQ extended release tablet Take 20 mEq by mouth daily    Historical Provider, MD   insulin glargine (LANTUS SOLOSTAR) 100 UNIT/ML injection pen Inject 50 Units into the skin nightly    Historical Provider, MD   loperamide (IMODIUM) 2 MG capsule Take 2 mg by mouth 4 times daily as needed for Diarrhea    Historical Provider, MD   buPROPion (WELLBUTRIN XL) 300 MG extended release tablet Take 300 mg by mouth every morning    Historical Provider, MD   ezetimibe (ZETIA) 10 MG tablet Take 10 mg by mouth at bedtime    Historical Provider, MD   Zinc Sulfate 110 MG TABS Take 2 tablets by mouth daily    Historical Provider, MD   magnesium hydroxide (MILK OF MAGNESIA) 400 MG/5ML suspension Take 30 mLs by mouth daily as needed for Constipation    Historical Provider, MD   melatonin 3 MG TABS tablet Take 3 mg by mouth at bedtime    Historical Provider, MD   insulin lispro, 1 Unit Dial, (HUMALOG KWIKPEN) 100 UNIT/ML SOPN Inject 0-10 Units into the skin 4 times daily (before meals and nightly) *Per Sliding Scale*    Historical Provider, MD   metoprolol succinate (TOPROL XL) 25 MG extended release tablet Take 1 tablet by mouth 2 times daily 2/16/22   GILMAR Simpson CNP   acetaminophen (TYLENOL) 325 MG tablet Take 650 mg by mouth every 4 hours as needed for Pain or Fever     Historical Provider, MD   vitamin C (ASCORBIC ACID) 500 MG tablet Take 500 mg by mouth 2 times daily    Historical Provider, MD   vitamin D (CHOLECALCIFEROL) 50 MCG (2000 UT) TABS tablet Take 2,000 Units by mouth daily     Historical Provider, MD   aspirin 81 MG EC tablet Take 1 tablet by mouth daily 2/3/22   Ovi Monreal MD   folic acid (FOLVITE) 1 MG tablet Take 1 mg by mouth daily    Historical Provider, MD   cyanocobalamin 1000 MCG tablet Take 1,000 mcg by mouth daily    Historical Provider, MD   atorvastatin (LIPITOR) 40 MG tablet Take 40 mg by mouth at bedtime     Historical Provider, MD   pantoprazole (PROTONIX) 40 MG tablet Take 40 mg by mouth daily    Historical Provider, MD       ALLERGIES: Penicillins       REVIEW OF SYSTEMS:  Otherwise negative if not reported or listed below  Constitutional:  Denies weight loss or gain     Positive for difficulty sleeping     No fevers, chills or rigors. Eyes:    No visual changes or diplopia. No scleral icterus. ENT:    No Headaches, hearing loss or vertigo. No mouth sores or sore throat. Cardiovascular:  Denies chest tightness. Denies palpitations  Respiratory:   Shortness of breath. Denies cough denies orthopnea. No sputum production. Gastrointestinal:  No abdominal pain, appetite loss, blood in stools. No hematemesis  Genitourinary:  No dysuria, trouble voiding or hematuria. No nocturia. Musculoskeletal:   No weakness or joint complaints. Integumentary: No rashes or pruritis. Neurological:  No headache, numbness or tingling. Psychiatric:   Positive for depression  Endocrine:   No excessive thirst, fluid intake, or urination. No tremor. Hematologic:  No abnormal bruising or bleeding. Lymphatic:  No swollen lymph nodes. Immunologic:  No hives or hx of anaphaxsis.       OBJECTIVE:     PHYSICAL EXAM:   VITALS:   Vitals:    05/05/22 2145 05/06/22 0410 05/06/22 0600 05/06/22 0815   BP: 100/62 101/78  (!) 116/91   Pulse: 78 75  79   Resp: 18 18  18   Temp: 98.1 °F (36.7 °C) 98.2 °F (36.8 °C)  97.9 °F (36.6 °C)   TempSrc: Axillary Oral  Oral   SpO2: 99% 99%  97%   Weight:   254 lb (115.2 kg)         Intake/Output Summary (Last 24 hours) at 5/6/2022 1342  Last data filed at 5/5/2022 2145  Gross per 24 hour   Intake 200 ml   Output    Net 200 ml        CONSTITUTIONAL:    A&O x 3, no distress  SKIN:     Chronic  skin discoloration  HEENT:     EOMI, MMM, No thrush  NECK:    No bruits, no JVD  CV:      Sinus,  No murmur, No rubs, No gallops  PULMONARY:   Clear in upper lobes diminished in bases bilaterally. ABDOMEN:     Soft, non-tender. BS normal. No R/R/G  EXT:    No deformities . No clubbing. Edema remains   PULSE:   Appears equal and palpable. PSYCHIATRIC:  Seems appropriate, No acute psycosis  MS:    No fractures, Weakness  NEUROLOGIC:   The clinical assessment is non-focal     DATA: IMAGING & TESTING:     LABORATORY TESTS:    CBC with Differential:    Lab Results   Component Value Date    WBC 10.5 05/06/2022    RBC 3.45 05/06/2022    HGB 9.9 05/06/2022    HCT 34.3 05/06/2022     05/06/2022    MCV 99.4 05/06/2022    MCH 28.7 05/06/2022    MCHC 28.9 05/06/2022    RDW 16.7 05/06/2022    LYMPHOPCT 24.3 05/04/2022    MONOPCT 10.8 05/04/2022    BASOPCT 0.2 05/04/2022    MONOSABS 1.09 05/04/2022    LYMPHSABS 2.45 05/04/2022    EOSABS 0.10 05/04/2022    BASOSABS 0.02 05/04/2022     CMP:    Lab Results   Component Value Date     05/06/2022    K 5.9 05/06/2022    K 4.8 05/04/2022     05/06/2022    CO2 23 05/06/2022    BUN 43 05/06/2022    CREATININE 5.3 05/06/2022    GFRAA 13 05/06/2022    LABGLOM 11 05/06/2022    GLUCOSE 104 05/06/2022    PROT 6.3 05/04/2022    LABALBU 3.1 05/04/2022    CALCIUM 8.8 05/06/2022    BILITOT 0.5 05/04/2022    ALKPHOS 90 05/04/2022    AST 23 05/04/2022    ALT 21 05/04/2022        PRO-BNP:   Lab Results   Component Value Date    PROBNP 9,247 (H) 05/04/2022    PROBNP 5,643 (H) 04/25/2022      ABGs:   Lab Results   Component Value Date    PH 7.404 05/04/2022    PO2 89.9 05/04/2022    PCO2 43.0 05/04/2022     Hemoglobin A1C: No components found for: HGBA1C    IMAGING:  Imaging tests were completed and reviewed and discussed radiology and care team involved and reveals     Echo Limited  Result Date: 3/19/2022  Findings   Left Ventricle  Left ventricle is grossly normal in size. No gross regional wall motion abnormalities seen. Grossly normal left ventricular ejection fraction.   Indeterminate diastolic function. Right Ventricle  The right ventricle was not clearly visualized. Grossly normal right ventricular size and function. Pacer wire visualized in right ventricle. Left Atrium  Normal sized left atrium. Interatrial septum appears intact. Right Atrium  Normal right atrium size. Pacemaker lead was visualized in RA cavity. Mitral Valve  Structurally normal mitral valve. Tricuspid Valve  The tricuspid valve was not well visualized. Aortic Valve  The aortic valve leaflets were not well visualized. Pulmonic Valve  The pulmonic valve was not well visualized. Pericardial Effusion  There is a small anterior pericardial effusion noted. Aorta  Aorta was not clearly visualized. Conclusions   Summary  Technically suboptimal and limited study with images limited to subcostal  window. Left ventricle is grossly normal in size. No gross regional wall motion abnormalities seen. Grossly normal left ventricular ejection fraction. There is a small anterior pericardial effusion noted. Assessment:   1. COugh  2. COVID   3. Chronic SOB  4. ELEVATED BNP  5. ESRD   6. VOlume overload  7. Oxygen dependence  8. 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 History of SPEEDY    Plan:   1. Check antibodies and Ig G levels for the COVID issues   2. Wean FiO2 as tolerated  3. Continue CPAP at at bedtime and with naps, okay to use home CPAP  4. Stop Steroids if for COPD  5. Bronchodilators for symptoms  6. HD as per nephrology.     Kassandra Dupree,    Pulmonary, Critical Care and Sleep Medicine

## 2022-05-06 NOTE — PLAN OF CARE
Problem: Chronic Conditions and Co-morbidities  Goal: Patient's chronic conditions and co-morbidity symptoms are monitored and maintained or improved  Outcome: Progressing     Problem: Discharge Planning  Goal: Discharge to home or other facility with appropriate resources  Outcome: Progressing     Problem: Safety - Adult  Goal: Free from fall injury  Outcome: Progressing     Problem: Pain  Goal: Verbalizes/displays adequate comfort level or baseline comfort level  Outcome: Progressing     Problem: Skin/Tissue Integrity  Goal: Absence of new skin breakdown  Description: 1. Monitor for areas of redness and/or skin breakdown  2. Assess vascular access sites hourly  3. Every 4-6 hours minimum:  Change oxygen saturation probe site  4. Every 4-6 hours:  If on nasal continuous positive airway pressure, respiratory therapy assess nares and determine need for appliance change or resting period.   Outcome: Progressing     Problem: Cardiovascular - Adult  Goal: Maintains optimal cardiac output and hemodynamic stability  Outcome: Progressing     Problem: Metabolic/Fluid and Electrolytes - Adult  Goal: Hemodynamic stability and optimal renal function maintained  Outcome: Progressing     Problem: ABCDS Injury Assessment  Goal: Absence of physical injury  Outcome: Progressing

## 2022-05-06 NOTE — PROGRESS NOTES
Physical Therapy  Physical Therapy Initial Assessment     Name: Von Johnson  : 1945  MRN: [id number]      Date of Service: 2022    Evaluating PT:  Annalise Alston PT, DPT [id number]     Room #:  1949/7380-O  Diagnosis:  [facility] [J96.20]  Acute on chronic respiratory failure with hypoxia ([facility] [J96.21]  Reason for admission: SOB   Precautions:  Falls, COVID+, O2  Procedure/Surgery:  None   Equipment Recommendations:  FWW     SUBJECTIVE:  Pt admitted from a [facility] [place]. Not active with PT currently d/t his COVID infection. [facility], working with PT on standing. Pt ambulating little to none with WW and assist PTA. OBJECTIVE:   Initial Evaluation  Date:  Treatment   Short Term/ Long Term   Goals   AM-PAC 6 Clicks      Was pt agreeable to Eval/treatment? Yes      Does pt have pain?  Denies pain     Bed Mobility  Rolling: NT  Supine to sit: ModA  Sit to supine: ModA  Scooting: MaxA at EOB  SBA   Transfers Sit to stand: MaxA  Stand to sit: MaxA  Stand pivot: NT  Mariela   Ambulation    NT     >10 feet with [employer] ModA   Stair negotiation: ascended and descended  NT  NA     LE ROM: WFL    LE Strength:   knee ext: 4/5  ankle DF: 4/5    Balance:   Sitting static: SBA  Sitting dynamic: SBA  Standing static: ModA  Standing dynamic: NT    -Pt is A & O x 3  -Sensation:  Pt denies numbness and tingling to extremities  -Edema:  Increased to BLEs     Therapeutic Exercises:  Functional activity     Patient education  Pt educated on safety, sequencing of transfers, and role of PT    Patient response to education:   Pt verbalized understanding Pt demonstrated skill Pt requires further education in this area   Yes  With assist Yes      ASSESSMENT:  Conditions Requiring Skilled Therapeutic Intervention:  [x]Decreased strength     []Decreased ROM  [x]Decreased functional mobility  [x]Decreased balance   [x]Decreased endurance   []Decreased posture  []Decreased sensation  []Decreased coordination   []Decreased vision  [x]Decreased safety awareness   []Increased pain       Comments:  Pt received supine in bed and agreeable to PT session   Pt presents with weakness and global deconditioning, significantly impairing his functional capabilities. He is SOB with activity and has a frequent cough. SpO2 levels fluctuating 90-98% on 7 L flow. He requires instructions and heavy assistance with extra time for mobility. His sitting balance was fair. Reported some mild dizziness which subsided with time. Stood with lift assist and rocking to gain momentum. Unable to weight shift to take any steps d/t heavy posterior leaning. Returned to bed and repositioned. Pt with all needs met and call light in reach. Pt would benefit from continued PT POC to address functional deficits described above. Treatment:  Patient practiced and was instructed in the following treatment:     Therapeutic activity  o Patient education provided continuously throughout session for sequencing, safety maintenance, and improving any deficits found during the evaluation. o Bed mobility training - pt given verbal and tactile cues to facilitate proper sequencing and safety during rolling and supine>sit as well as provided with physical assistance to complete task    o Sitting EOB for >13 minutes for upright tolerance, postural awareness and BLE ROM   o STS and pivot transfer training - pt educated on proper hand and foot placement, safety and sequencing, and use of proper technique to safely complete sit<>stand and pivot transfers with hands on assistance to complete task safely   o Gait training- pt was given verbal and tactile cues to facilitate improved endurance and balance during ambulation as well as provided with physical assistance. Pt's/ family goals   1. Get stronger     Patient and or family understand(s) diagnosis, prognosis, and plan of care. yes    Prognosis is fair for reaching above PT goals.     PHYSICAL THERAPY PLAN OF CARE:    PT POC is established based on physician order and patient diagnosis     Referring provider/PT Order: 05/05/22 1000   PT eval and treat Start: 05/05/22 1000, End: 05/05/22 1000, ONE TIME, Standing Count: 1 Occurrences, Cee Morris MD     Diagnosis:  Acute on chronic respiratory failure ([facility] [J96.20]  Acute on chronic respiratory failure with hypoxia ([facility] [J96.21]  Specific instructions for next treatment:  Progress transfers as able. Stepping attempts. Current Treatment Recommendations:   [x] Strengthening to improve independence with functional mobility   [] ROM to improve independence with functional mobility   [x] Balance Training to improve static/dynamic balance and to reduce fall risk  [x] Endurance Training to improve activity tolerance during functional mobility   [x] Transfer Training to improve safety and independence with all functional transfers   [x] Gait Training to improve gait mechanics, endurance and asses need for appropriate assistive device  [] Stair Training in preparation for safe discharge home and/or into the community   [x] Positioning to prevent skin breakdown and contractures  [x] Safety and Education Training   [] Patient/Caregiver Education   [] HEP  [] Other     PT long term treatment goals are located in above grid    Frequency of treatments: 2-5x/week x 1-2 weeks. Time in  0725  Time out  0800    Total Treatment Time  25 minutes     Evaluation Time includes thorough review of current medical information, gathering information on past medical history/social history and prior level of function, completion of standardized testing/informal observation of tasks, assessment of data and education on plan of care and goals.     CPT codes:  [x] Low Complexity PT evaluation 22553  [] Moderate Complexity PT evaluation 42148  [] High Complexity PT evaluation 88512  [] PT Re-evaluation 43780  [] Gait training 71980 - minutes  [] Manual therapy 95623 - minutes  [x] Therapeutic activities 11078 25 minutes  [] Therapeutic exercises 27205 - minutes  [] Neuromuscular reeducation 38634 - minutes     Annalise Alston, PT, DPT  [id number]

## 2022-05-06 NOTE — CONSULTS
Comprehensive Nutrition Assessment    Type and Reason for Visit:  Initial,Consult    Nutrition Recommendations/Plan:   1. Start Sam/Nepro BID w/ increased nutrient needs & decreased PO.   2. Continue to monitor. Malnutrition Assessment:  Malnutrition Status: At risk for malnutrition (Comment) (05/06/22 8613)    Context:  Chronic Illness     Findings of the 6 clinical characteristics of malnutrition:  Energy Intake:  Mild decrease in energy intake (Comment)  Weight Loss:  Unable to assess (d/t fluid shifts/HD)     Body Fat Loss:  Unable to assess     Muscle Mass Loss:  Unable to assess    Fluid Accumulation:  No significant fluid accumulation     Strength:  Not Performed    Nutrition Assessment:    Pt adm d/t SOB w/ COVID, COPD/chronic resp failure, ESRD on HD, CHF. Hx DM. Pt w/ increased nutrient needs d/t wound. Start ONS & monitor. Nutrition Related Findings:    A&Ox4, soft/distended abd, active BS, +2BLE/trace BUE edema, -I/Os, HD Wound Type: Pressure Injury,Unstageable       Current Nutrition Intake & Therapies:    Average Meal Intake: 1-25%  Average Supplements Intake: None Ordered  ADULT DIET; Regular; 4 carb choices (60 gm/meal); Low Sodium (2 gm); Low Potassium (Less than 3000 mg/day);  Low Phosphorus (Less than 1000 mg)    Anthropometric Measures:  Height: 5' 9\" (175.3 cm)  Ideal Body Weight (IBW): 160 lbs (73 kg)    Admission Body Weight: 251 lb (113.9 kg) (5/4 bed scale)  Current Body Weight: 253 lb 15.5 oz (115.2 kg) (5/6 bed scale), 158.7 % IBW  Current BMI (kg/m2): 37.5  Usual Body Weight:  (DAMIAN d/t fluid shifts- HD/CHF)  Weight Adjustment For: No Adjustment    Estimated Daily Nutrient Needs:  Energy Requirements Based On: Formula  Weight Used for Energy Requirements: Admission  Energy (kcal/day): 5430-4261  Weight Used for Protein Requirements: Ideal  Protein (g/day): 110-120  Method Used for Fluid Requirements: Standard Renal  Fluid (ml/day): per renal management    Nutrition Diagnosis: · Inadequate oral intake related to impaired respiratory function as evidenced by intake 0-25%    Nutrition Interventions:   Food and/or Nutrient Delivery: Continue Current Diet,Start Oral Nutrition Supplement (Sam/Nepro BID)  Nutrition Education/Counseling: No recommendation at this time  Coordination of Nutrition Care: Continue to monitor while inpatient    Goals:  Goals: PO intake 50% or greater    Nutrition Monitoring and Evaluation:   Behavioral-Environmental Outcomes: None Identified  Food/Nutrient Intake Outcomes: Food and Nutrient Intake,Supplement Intake  Physical Signs/Symptoms Outcomes: Biochemical Data,Chewing or Swallowing,Nutrition Focused Physical Findings,Skin,Weight,GI Status,Fluid Status or Edema    Discharge Planning:     Too soon to determine     Cloyde Alexandra MS, RD, LD  Contact: [phone]

## 2022-05-07 NOTE — PLAN OF CARE
Problem: Chronic Conditions and Co-morbidities  Goal: Patient's chronic conditions and co-morbidity symptoms are monitored and maintained or improved  Outcome: Progressing  Flowsheets (Taken 5/7/2022 2057)  Care Plan - Patient's Chronic Conditions and Co-Morbidity Symptoms are Monitored and Maintained or Improved: Monitor and assess patient's chronic conditions and comorbid symptoms for stability, deterioration, or improvement     Problem: Safety - Adult  Goal: Free from fall injury  Outcome: Progressing     Problem: Pain  Goal: Verbalizes/displays adequate comfort level or baseline comfort level  Outcome: Progressing     Problem: Skin/Tissue Integrity  Goal: Absence of new skin breakdown  Description: 1. Monitor for areas of redness and/or skin breakdown  2. Assess vascular access sites hourly  3. Every 4-6 hours minimum:  Change oxygen saturation probe site  4. Every 4-6 hours:  If on nasal continuous positive airway pressure, respiratory therapy assess nares and determine need for appliance change or resting period.   Outcome: Progressing

## 2022-05-07 NOTE — PROGRESS NOTES
[place]  Department of Internal Medicine  Division of Pulmonary, Critical Care and Sleep Medicine  Progress Note    Kellie Irwin DO, MPH, [facility], [place], [address], [facility]      Patient: Gloria Burgos  MRN: [id number]  : 1945    Encounter Time: 7:20 PM     Date of Admission: 2022 10:02 AM    Primary Care Physician: Chandler Espinoza MD    Reason for Consultation:Chronic pleural effusion, shortness of breath, cough     SUBJECTIVE:    Ig G normal  NON Reactive COVID ? Mean he never had the infection or not mounting immune respose to it    OBJECTIVE:     PHYSICAL EXAM:   VITALS:   Vitals:    22 0915 22 0933 22 1212 22 1431   BP:  103/83 98/65 95/67   Pulse:  93 94 98   Resp: 14 18  18   Temp:  97.3 °F (36.3 °C)  97.8 °F (36.6 °C)   TempSrc:  Axillary  Axillary   SpO2:  100%     Weight:       Height:            Intake/Output Summary (Last 24 hours) at 2022  Last data filed at 2022 1518  Gross per 24 hour   Intake 680 ml   Output 150 ml   Net 530 ml        CONSTITUTIONAL:    A&O x 3, no distress  SKIN:     Chronic  skin discoloration  HEENT:     EOMI, MMM, No thrush  NECK:    No bruits, no JVD  CV:      Sinus,  No murmur, No rubs, No gallops  PULMONARY:   Clear in upper lobes diminished in bases bilaterally. ABDOMEN:     Soft, non-tender. BS normal. No R/R/G  EXT:    No deformities . No clubbing. Edema remains   PULSE:   Appears equal and palpable.   PSYCHIATRIC:  Seems appropriate, No acute psycosis  MS:    No fractures, Weakness  NEUROLOGIC:   The clinical assessment is non-focal     DATA: IMAGING & TESTING:     LABORATORY TESTS:    CBC with Differential:    Lab Results   Component Value Date    WBC 11.0 2022    RBC 3.38 2022    HGB 9.7 2022    HCT 31.7 2022     2022    MCV 93.8 2022    MCH 28.7 2022    MCHC 30.6 2022    RDW 16.7 2022 LYMPHOPCT 24.3 05/04/2022    MONOPCT 10.8 05/04/2022    BASOPCT 0.2 05/04/2022    MONOSABS 1.09 05/04/2022    LYMPHSABS 2.45 05/04/2022    EOSABS 0.10 05/04/2022    BASOSABS 0.02 05/04/2022     CMP:    Lab Results   Component Value Date     05/07/2022    K 4.1 05/07/2022    K 4.8 05/04/2022    CL 97 05/07/2022    CO2 27 05/07/2022    BUN 31 05/07/2022    CREATININE 3.8 05/07/2022    GFRAA 19 05/07/2022    LABGLOM 15 05/07/2022    GLUCOSE 112 05/07/2022    PROT 6.3 05/04/2022    LABALBU 3.1 05/04/2022    CALCIUM 8.3 05/07/2022    BILITOT 0.5 05/04/2022    ALKPHOS 90 05/04/2022    AST 23 05/04/2022    ALT 21 05/04/2022        PRO-BNP:   Lab Results   Component Value Date    PROBNP 9,247 (H) 05/04/2022    PROBNP 5,643 (H) 04/25/2022      ABGs:   Lab Results   Component Value Date    PH 7.404 05/04/2022    PO2 89.9 05/04/2022    PCO2 43.0 05/04/2022     Hemoglobin A1C: No components found for: HGBA1C    IMAGING:  Imaging tests were completed and reviewed and discussed radiology and care team involved and reveals     Echo Limited  Result Date: 3/19/2022  Findings   Left Ventricle  Left ventricle is grossly normal in size. No gross regional wall motion abnormalities seen. Grossly normal left ventricular ejection fraction. Indeterminate diastolic function. Right Ventricle  The right ventricle was not clearly visualized. Grossly normal right ventricular size and function. Pacer wire visualized in right ventricle. Left Atrium  Normal sized left atrium. Interatrial septum appears intact. Right Atrium  Normal right atrium size. Pacemaker lead was visualized in RA cavity. Mitral Valve  Structurally normal mitral valve. Tricuspid Valve  The tricuspid valve was not well visualized. Aortic Valve  The aortic valve leaflets were not well visualized. Pulmonic Valve  The pulmonic valve was not well visualized. Pericardial Effusion  There is a small anterior pericardial effusion noted.    Aorta  Aorta was not clearly visualized. Conclusions   Summary  Technically suboptimal and limited study with images limited to subcostal  window. Left ventricle is grossly normal in size. No gross regional wall motion abnormalities seen. Grossly normal left ventricular ejection fraction. There is a small anterior pericardial effusion noted. Assessment:   1. COugh  2. COVID   3. Chronic SOB  4. ELEVATED BNP  5. ESRD   6. VOlume overload  7. Oxygen dependence  8. 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 History of SPEEDY    Plan:   1. Check antibodies = Negative ? 2. Ig G levels are normal  3. Wean FiO2 as tolerated  4. Continue CPAP at at bedtime and with naps, okay to use home CPAP  5. Stop Steroids if for COPD  6. Bronchodilators for symptoms  7. HD as per nephrology.   8. Consider convalesant plasma for Annette HU, DO   Pulmonary, Critical Care and Sleep Medicine

## 2022-05-07 NOTE — PROGRESS NOTES
Progress Note  Date:2022       XCIJ:[id number]  Patient Yanique Abdalla     YOB: 1945     Age:77 y.o. Patient sleeping. Subjective    Subjective:  Symptoms:  He reports shortness of breath and weakness. No chest pain. Diet:  Adequate intake. Activity level: Impaired due to weakness. Pain:  He reports no pain. Review of Systems   Constitutional: Negative for activity change and fever. HENT: Negative for congestion. Respiratory: Positive for shortness of breath. Cardiovascular: Negative for chest pain. Gastrointestinal: Negative for abdominal pain. Neurological: Positive for weakness. Negative for dizziness. Objective         Vitals Last 24 Hours:  TEMPERATURE:  Temp  Av.6 °F (36.4 °C)  Min: 97.3 °F (36.3 °C)  Max: 97.9 °F (36.6 °C)  RESPIRATIONS RANGE: Resp  Av.4  Min: 8  Max: 20  PULSE OXIMETRY RANGE: SpO2  Av.8 %  Min: 97 %  Max: 99 %  PULSE RANGE: Pulse  Av.2  Min: 76  Max: 89  BLOOD PRESSURE RANGE: Systolic (07CHD), DNV:653 , Min:98 , NRK:236   ; Diastolic (62RHJ), BJU:31, Min:43, Max:91    I/O (24Hr): Intake/Output Summary (Last 24 hours) at 2022 0724  Last data filed at 2022 0417  Gross per 24 hour   Intake 560 ml   Output 2300 ml   Net -1740 ml     Objective:  General Appearance:  Comfortable. Vital signs: (most recent): Blood pressure 98/70, pulse 76, temperature 97.3 °F (36.3 °C), temperature source Oral, resp. rate 8, height 5' 9\" (1.753 m), weight 249 lb 1.9 oz (113 kg), SpO2 99 %. No fever. Lungs: There are decreased breath sounds. Heart: Normal rate. Regular rhythm.   S1 normal and S2 normal.      Labs/Imaging/Diagnostics    Labs:  CBC:  Recent Labs     22  0510 22  0608 22  0550   WBC 7.5 10.5 11.0   RBC 3.28* 3.45* 3.38*   HGB 9.6* 9.9* 9.7*   HCT 31.8* 34.3* 31.7*   MCV 97.0 99.4 93.8   RDW 16.9* 16.7* 16.7*    248 254     CHEMISTRIES:  Recent Labs     22  0580 05/06/22  0608 05/07/22  0550    138 138   K 4.7 5.9* 4.1   CL 99 100 97*   CO2 27 23 27   BUN 25* 43* 31*   CREATININE 4.0* 5.3* 3.8*   GLUCOSE 80 104* 112*   PHOS  --  5.8*  --      PT/INR:No results for input(s): PROTIME, INR in the last 72 hours. APTT:No results for input(s): APTT in the last 72 hours. LIVER PROFILE:  Recent Labs     05/04/22  1022   AST 23   ALT 21   BILITOT 0.5   ALKPHOS 90       Imaging Last 24 Hours:  XR CHEST PORTABLE    Result Date: 5/4/2022  EXAMINATION: ONE XRAY VIEW OF THE CHEST 5/4/2022 10:49 am HISTORY: ORDERING SYSTEM PROVIDED HISTORY: shortness of breath TECHNOLOGIST PROVIDED HISTORY: Reason for exam:->shortness of breath What reading provider will be dictating this exam?->CRC FINDINGS: There are opacities in the mid and lower lungs. There is moderate right pleural effusion. No pneumothorax. The heart is not enlarged. Pacemaker in place. Bilateral pleural effusion. For impose underlying infiltrates in the mid and lower lungs cannot be excluded. US DUP LOWER EXTREMITIES BILATERAL VENOUS    Result Date: 5/4/2022  EXAMINATION: DUPLEX VENOUS ULTRASOUND OF THE BILATERAL LOWER EXTREMITIES5/4/2022 1:37 pm TECHNIQUE: Duplex ultrasound using B-mode/gray scaled imaging, Doppler spectral analysis and color flow Doppler was obtained of the deep venous structures of the lower bilateral extremities. COMPARISON: None. HISTORY: ORDERING SYSTEM PROVIDED HISTORY: dvt TECHNOLOGIST PROVIDED HISTORY: Reason for exam:->dvt What reading provider will be dictating this exam?->CRC FINDINGS: The visualized veins of the bilateral lower extremities are patent and free of echogenic thrombus. The veins demonstrate good compressibility with normal color flow study and spectral analysis. No evidence of DVT in either lower extremity. There is a venous catheter in the left common femoral vein.      Assessment//Plan           Hospital Problems           Last Modified POA    * (Principal) Acute on chronic respiratory failure ([facility] [address] 5/4/2022 Yes        Assessment:  ((Principal) Acute on chronic respiratory failure ([facility] [address] 5/4/2022 Yes      covid 19  chronic hypoxic respiratory failure. CHF  Pleural effusion  ESRD  DM  COPD  HTN  Hyperlipidemia           Plan  Pleural effusions appear stable  Renal following for dialysis  Pulmonary following. On steroids. ).

## 2022-05-07 NOTE — PROGRESS NOTES
Progress Note  5/7/2022 9:18 AM  Subjective:   Admit Date: 5/4/2022  PCP: Toy Evans MD  Interval History: Patient examined , on BIPAP, lethargic     Diet: ADULT DIET; Regular; 4 carb choices (60 gm/meal); Low Sodium (2 gm); Low Potassium (Less than 3000 mg/day); Low Phosphorus (Less than 1000 mg)  ADULT ORAL NUTRITION SUPPLEMENT; Breakfast, Dinner; Wound Healing Oral Supplement  ADULT ORAL NUTRITION SUPPLEMENT; Lunch, Dinner; Renal Oral Supplement    Data:   Scheduled Meds:   miconazole   Topical BID    benzonatate  100 mg Oral TID    levalbuterol  0.63 mg Nebulization q8h    aspirin  81 mg Oral Daily    atorvastatin  40 mg Oral Nightly    bumetanide  2 mg Oral Daily    buPROPion  300 mg Oral QAM    cyanocobalamin  1,000 mcg Oral Daily    docusate sodium  100 mg Oral BID    ezetimibe  10 mg Oral Nightly    folic acid  1 mg Oral Daily    insulin glargine-yfgn  50 Units SubCUTAneous Nightly    melatonin  3 mg Oral Nightly    metoprolol succinate  25 mg Oral BID    pantoprazole  40 mg Oral Daily    potassium chloride  20 mEq Oral Daily    vitamin C  500 mg Oral BID    vitamin D  2,000 Units Oral Daily    zinc sulfate  50 mg Oral Daily    insulin lispro  0-6 Units SubCUTAneous TID WC    insulin lispro  0-3 Units SubCUTAneous Nightly     Continuous Infusions:   dextrose       PRN Meds:acetaminophen, guaiFENesin, ipratropium-albuterol, loperamide, magnesium hydroxide, midodrine, ondansetron, dextrose, glucagon (rDNA), dextrose, glucose  I/O last 3 completed shifts: In: 760 [P.O.:460]  Out: 2300   No intake/output data recorded.     Intake/Output Summary (Last 24 hours) at 5/7/2022 0918  Last data filed at 5/7/2022 0417  Gross per 24 hour   Intake 560 ml   Output 2300 ml   Net -1740 ml     CBC:   Recent Labs     05/05/22  0510 05/06/22  0608 05/07/22  0550   WBC 7.5 10.5 11.0   HGB 9.6* 9.9* 9.7*    248 254     BMP:    Recent Labs     05/05/22  0510 05/06/22  9529 05/07/22  0550  138 138   K 4.7 5.9* 4.1   CL 99 100 97*   CO2 27 23 27   BUN 25* 43* 31*   CREATININE 4.0* 5.3* 3.8*   GLUCOSE 80 104* 112*     Hepatic:   Recent Labs     05/04/22  1022   AST 23   ALT 21   BILITOT 0.5   ALKPHOS 90     Troponin: No results for input(s): TROPONINI in the last 72 hours. BNP: No results for input(s): BNP in the last 72 hours. Lipids: No results for input(s): CHOL, HDL in the last 72 hours. Invalid input(s): LDLCALCU  ABGs: No results found for: PHART, PO2ART, [id number]  INR: No results for input(s): INR in the last 72 hours. -----------------------------------------------------------------  RAD: XR CHEST PORTABLE    Result Date: 5/4/2022  EXAMINATION: ONE XRAY VIEW OF THE CHEST 5/4/2022 10:49 am HISTORY: ORDERING SYSTEM PROVIDED HISTORY: shortness of breath TECHNOLOGIST PROVIDED HISTORY: Reason for exam:->shortness of breath What reading provider will be dictating this exam?->CRC FINDINGS: There are opacities in the mid and lower lungs. There is moderate right pleural effusion. No pneumothorax. The heart is not enlarged. Pacemaker in place. Bilateral pleural effusion. For impose underlying infiltrates in the mid and lower lungs cannot be excluded. US DUP LOWER EXTREMITIES BILATERAL VENOUS    Result Date: 5/4/2022  EXAMINATION: DUPLEX VENOUS ULTRASOUND OF THE BILATERAL LOWER EXTREMITIES5/4/2022 1:37 pm TECHNIQUE: Duplex ultrasound using B-mode/gray scaled imaging, Doppler spectral analysis and color flow Doppler was obtained of the deep venous structures of the lower bilateral extremities. COMPARISON: None. HISTORY: ORDERING SYSTEM PROVIDED HISTORY: dvt TECHNOLOGIST PROVIDED HISTORY: Reason for exam:->dvt What reading provider will be dictating this exam?->CRC FINDINGS: The visualized veins of the bilateral lower extremities are patent and free of echogenic thrombus. The veins demonstrate good compressibility with normal color flow study and spectral analysis.      No evidence of DVT in either lower extremity. There is a venous catheter in the left common femoral vein. Objective:   Vitals: BP 98/70   Pulse 76   Temp 97.3 °F (36.3 °C) (Oral)   Resp 14   Ht 5' 9\" (1.753 m)   Wt 249 lb 1.9 oz (113 kg)   SpO2 99%   BMI 36.79 kg/m²   General appearance: appears stated age   Skin:  No rashes or lesions  HEENT: Head: Normocephalic, no lesions, without obvious abnormality. Neck: no adenopathy, no carotid bruit, no JVD, supple, symmetrical, trachea midline and thyroid not enlarged, symmetric, no tenderness/mass/nodules  Lungs: diminished breath sounds bibasilar  Heart: regular rate and rhythm, S1, S2 normal, no murmur, click, rub or gallop  Abdomen: soft, non-tender; bowel sounds normal; no masses,  no organomegaly  Extremities: edema trace  Neurologic: Mental status: alertness: lethargic    Assessment:   Patient Active Problem List:     PNA (pneumonia)     Acute on chronic heart failure with preserved ejection fraction (HCC)     Coronary artery disease involving native coronary artery of native heart without angina pectoris     Cardiac pacemaker in situ     Primary hypertension     SPEEDY (obstructive sleep apnea)     Chronic respiratory failure with hypoxia (HCC)     CHF (congestive heart failure), NYHA class I, acute on chronic, combined (HCC)     Acute on chronic clinical systolic heart failure (HCC)     Acute on chronic systolic heart failure (HCC)     Pleural effusion     Respiratory failure ([address]     COVID-19     Acute on chronic respiratory failure ([address]    Plan:     Assessment and Plan:     1. Shortness of breath   History of COPD  Chest x-ray showed \"bilateral pleural effusion\"  COVID positive on 4/25  On oxygen via nasal cannula  Management per primary/pulmonology     2. ESRD, HD dependent  OP [address] [place] MWF second shift  via a right thigh TDC  Continue HD MWF     3.   Hypotension  SBPs 80s90s intermittently   On midodrine as an outpatient  Avoid further hypotension  Monitor BPs     4. Anemia  Hemoglobin 9.9 today  Transfuse for hemoglobin<7  Monitor H&H     5.  Secondary hyperparathyroidism of renal origin  With hypocalcemia/ hypoalbuminemia- calcium 8.2 and albumin 3.1 on 5/4   on 4/28, phosphorus 4.4 on 4/28  phos 5.8 today  Low phos diet   Monitor labs     6.   Hyperkalemia -- better  Likely in setting of ESRD  K+ 5.9 today  For HD today  Low potassium diet  Monitor labs       Jalen Dickson MD

## 2022-05-08 NOTE — PROGRESS NOTES
Progress Note  Date:2022       XUNT:[id number]  Patient Fausto Montana     YOB: 1945     Age:77 y.o. Patient says he feels decent. Subjective    Subjective:  Symptoms:  He reports shortness of breath and weakness. No chest pain. Diet:  Adequate intake. Activity level: Impaired due to weakness. Pain:  He reports no pain. Review of Systems   Constitutional: Negative for activity change and fever. HENT: Negative for congestion. Respiratory: Positive for shortness of breath. Cardiovascular: Negative for chest pain. Gastrointestinal: Negative for abdominal pain. Neurological: Positive for weakness. Negative for dizziness. Objective         Vitals Last 24 Hours:  TEMPERATURE:  Temp  Av.7 °F (36.5 °C)  Min: 97.3 °F (36.3 °C)  Max: 98 °F (36.7 °C)  RESPIRATIONS RANGE: Resp  Avg: 15.8  Min: 14  Max: 18  PULSE OXIMETRY RANGE: SpO2  Av.3 %  Min: 99 %  Max: 100 %  PULSE RANGE: Pulse  Av.6  Min: 78  Max: 98  BLOOD PRESSURE RANGE: Systolic (95ELX), HVE:101 , Min:95 , REL:545   ; Diastolic (77OMQ), FYK:20, Min:65, Max:83    I/O (24Hr): Intake/Output Summary (Last 24 hours) at 2022 0715  Last data filed at 2022 1518  Gross per 24 hour   Intake 420 ml   Output 150 ml   Net 270 ml     Objective:  General Appearance:  Comfortable. Vital signs: (most recent): Blood pressure 102/66, pulse 78, temperature 98 °F (36.7 °C), temperature source Axillary, resp. rate 14, height 5' 9\" (1.753 m), weight 249 lb 1.9 oz (113 kg), SpO2 99 %. No fever. Lungs: There are decreased breath sounds. Heart: Normal rate. Regular rhythm.   S1 normal and S2 normal.      Labs/Imaging/Diagnostics    Labs:  CBC:  Recent Labs     22  0608 22  0550 22  0636   WBC 10.5 11.0 10.1   RBC 3.45* 3.38* 3.35*   HGB 9.9* 9.7* 9.7*   HCT 34.3* 31.7* 31.9*   MCV 99.4 93.8 95.2   RDW 16.7* 16.7* 16.6*    254 225     CHEMISTRIES:  Recent Labs 05/06/22  0608 05/07/22  0550    138   K 5.9* 4.1    97*   CO2 23 27   BUN 43* 31*   CREATININE 5.3* 3.8*   GLUCOSE 104* 112*   PHOS 5.8*  --      PT/INR:No results for input(s): PROTIME, INR in the last 72 hours. APTT:No results for input(s): APTT in the last 72 hours. LIVER PROFILE:  No results for input(s): AST, ALT, BILIDIR, BILITOT, ALKPHOS in the last 72 hours. Imaging Last 24 Hours:  XR CHEST PORTABLE    Result Date: 5/4/2022  EXAMINATION: ONE XRAY VIEW OF THE CHEST 5/4/2022 10:49 am HISTORY: ORDERING SYSTEM PROVIDED HISTORY: shortness of breath TECHNOLOGIST PROVIDED HISTORY: Reason for exam:->shortness of breath What reading provider will be dictating this exam?->CRC FINDINGS: There are opacities in the mid and lower lungs. There is moderate right pleural effusion. No pneumothorax. The heart is not enlarged. Pacemaker in place. Bilateral pleural effusion. For impose underlying infiltrates in the mid and lower lungs cannot be excluded. US DUP LOWER EXTREMITIES BILATERAL VENOUS    Result Date: 5/4/2022  EXAMINATION: DUPLEX VENOUS ULTRASOUND OF THE BILATERAL LOWER EXTREMITIES5/4/2022 1:37 pm TECHNIQUE: Duplex ultrasound using B-mode/gray scaled imaging, Doppler spectral analysis and color flow Doppler was obtained of the deep venous structures of the lower bilateral extremities. COMPARISON: None. HISTORY: ORDERING SYSTEM PROVIDED HISTORY: dvt TECHNOLOGIST PROVIDED HISTORY: Reason for exam:->dvt What reading provider will be dictating this exam?->CRC FINDINGS: The visualized veins of the bilateral lower extremities are patent and free of echogenic thrombus. The veins demonstrate good compressibility with normal color flow study and spectral analysis. No evidence of DVT in either lower extremity. There is a venous catheter in the left common femoral vein.      Assessment//Plan           Hospital Problems           Last Modified POA    * (Principal) Acute on chronic respiratory failure ([facility] [address] 5/4/2022 Yes        Assessment:  ((Principal) Acute on chronic respiratory failure ([facility] [address] 5/4/2022 Yes      covid 19  chronic hypoxic respiratory failure. CHF  Pleural effusion  ESRD  DM  COPD  HTN  Hyperlipidemia           Plan  Pleural effusions appear stable  Renal following for dialysis  Pulmonary following. On steroids. ).

## 2022-05-08 NOTE — PROGRESS NOTES
Progress Note  5/8/2022 10:54 AM  Subjective:   Admit Date: 5/4/2022  PCP: Aniya Ahumada MD  Interval History: patient in isolation , much the same     Diet: ADULT DIET; Regular; 4 carb choices (60 gm/meal); Low Sodium (2 gm); Low Potassium (Less than 3000 mg/day); Low Phosphorus (Less than 1000 mg)  ADULT ORAL NUTRITION SUPPLEMENT; Breakfast, Dinner; Wound Healing Oral Supplement  ADULT ORAL NUTRITION SUPPLEMENT; Lunch, Dinner; Renal Oral Supplement    Data:   Scheduled Meds:   miconazole   Topical BID    benzonatate  100 mg Oral TID    levalbuterol  0.63 mg Nebulization q8h    aspirin  81 mg Oral Daily    atorvastatin  40 mg Oral Nightly    bumetanide  2 mg Oral Daily    buPROPion  300 mg Oral QAM    cyanocobalamin  1,000 mcg Oral Daily    docusate sodium  100 mg Oral BID    ezetimibe  10 mg Oral Nightly    folic acid  1 mg Oral Daily    insulin glargine-yfgn  50 Units SubCUTAneous Nightly    melatonin  3 mg Oral Nightly    metoprolol succinate  25 mg Oral BID    pantoprazole  40 mg Oral Daily    potassium chloride  20 mEq Oral Daily    vitamin C  500 mg Oral BID    vitamin D  2,000 Units Oral Daily    zinc sulfate  50 mg Oral Daily    insulin lispro  0-6 Units SubCUTAneous TID WC    insulin lispro  0-3 Units SubCUTAneous Nightly     Continuous Infusions:   dextrose       PRN Meds:acetaminophen, guaiFENesin, ipratropium-albuterol, loperamide, magnesium hydroxide, midodrine, ondansetron, dextrose, glucagon (rDNA), dextrose, glucose  I/O last 3 completed shifts: In: 840 [P.O.:840]  Out: 150 [Urine:150]  No intake/output data recorded.     Intake/Output Summary (Last 24 hours) at 5/8/2022 1054  Last data filed at 5/8/2022 0430  Gross per 24 hour   Intake 520 ml   Output 150 ml   Net 370 ml     CBC:   Recent Labs     05/06/22  0608 05/07/22  0550 05/08/22  0636   WBC 10.5 11.0 10.1   HGB 9.9* 9.7* 9.7*    254 225     BMP:    Recent Labs     05/06/22  0608 05/07/22  0550 05/08/22  0636    138 142   K 5.9* 4.1 4.2    97* 101   CO2 23 27 28   BUN 43* 31* 41*   CREATININE 5.3* 3.8* 5.1*   GLUCOSE 104* 112* 83     Hepatic: No results for input(s): AST, ALT, ALB, BILITOT, ALKPHOS in the last 72 hours. Troponin: No results for input(s): TROPONINI in the last 72 hours. BNP: No results for input(s): BNP in the last 72 hours. Lipids: No results for input(s): CHOL, HDL in the last 72 hours. Invalid input(s): LDLCALCU  ABGs: No results found for: PHART, PO2ART, [id number]  INR: No results for input(s): INR in the last 72 hours. -----------------------------------------------------------------  RAD: XR CHEST PORTABLE    Result Date: 5/4/2022  EXAMINATION: ONE XRAY VIEW OF THE CHEST 5/4/2022 10:49 am HISTORY: ORDERING SYSTEM PROVIDED HISTORY: shortness of breath TECHNOLOGIST PROVIDED HISTORY: Reason for exam:->shortness of breath What reading provider will be dictating this exam?->CRC FINDINGS: There are opacities in the mid and lower lungs. There is moderate right pleural effusion. No pneumothorax. The heart is not enlarged. Pacemaker in place. Bilateral pleural effusion. For impose underlying infiltrates in the mid and lower lungs cannot be excluded. US DUP LOWER EXTREMITIES BILATERAL VENOUS    Result Date: 5/4/2022  EXAMINATION: DUPLEX VENOUS ULTRASOUND OF THE BILATERAL LOWER EXTREMITIES5/4/2022 1:37 pm TECHNIQUE: Duplex ultrasound using B-mode/gray scaled imaging, Doppler spectral analysis and color flow Doppler was obtained of the deep venous structures of the lower bilateral extremities. COMPARISON: None. HISTORY: ORDERING SYSTEM PROVIDED HISTORY: dvt TECHNOLOGIST PROVIDED HISTORY: Reason for exam:->dvt What reading provider will be dictating this exam?->CRC FINDINGS: The visualized veins of the bilateral lower extremities are patent and free of echogenic thrombus. The veins demonstrate good compressibility with normal color flow study and spectral analysis. No evidence of DVT in either lower extremity. There is a venous catheter in the left common femoral vein.        Objective:   Vitals: BP 89/64   Pulse 93   Temp 97.9 °F (36.6 °C)   Resp 18   Ht 5' 9\" (1.753 m)   Wt 240 lb (108.9 kg)   SpO2 92%   BMI 35.44 kg/m²   General appearance: appears stated age   Not examined today , Covid isolation       Assessment:   Patient Active Problem List:     PNA (pneumonia)     Acute on chronic heart failure with preserved ejection fraction (HCC)     Coronary artery disease involving native coronary artery of native heart without angina pectoris     Cardiac pacemaker in situ     Primary hypertension     SPEEDY (obstructive sleep apnea)     Chronic respiratory failure with hypoxia (HCC)     CHF (congestive heart failure), NYHA class I, acute on chronic, combined ([address]     Acute on chronic clinical systolic heart failure (HCC)     Acute on chronic systolic heart failure (HCC)     Pleural effusion     Respiratory failure (HCC)     COVID-19     Acute on chronic respiratory failure ([address]    Plan:   Assessment and Plan:     1.  Shortness of breath   History of COPD  Chest x-ray showed \"bilateral pleural effusion\"  COVID positive on 4/25  On oxygen via nasal cannula  Management per primary/pulmonology     2.  ESRD, HD dependent  OP [facility] MWF second shift  via a right thigh TDC  Continue HD MWF     3.  Hypotension  SBPs 80s90s intermittently   On midodrine as an outpatient  Avoid further hypotension  Monitor BPs     4.  Anemia  Hemoglobin 9.9 today  Transfuse for hemoglobin<7  Monitor H&H     5.  Secondary hyperparathyroidism of renal origin  With hypocalcemia/ hypoalbuminemia- calcium 8.2 and albumin 3.1 on 5/4   on 4/28, phosphorus 4.4 on 4/28  phos 5.8 today  Low phos diet   Monitor labs     6.  Hyperkalemia -- better  Likely in setting of ESRD  K+ 5.9 today  For HD today  Low potassium diet  Monitor labs    Sandra Nesbitt MD

## 2022-05-08 NOTE — PROGRESS NOTES
[place]  Department of Internal Medicine  Division of Pulmonary, Critical Care and Sleep Medicine  Progress Note    Kellie Irwin DO, MPH, [facility], [place], [address] [facility], [facility]      Patient: Gloria Burgos  MRN: [id number]  : 1945    Encounter Time: 5:16 PM     Date of Admission: 2022 10:02 AM    Primary Care Physician: Chandler Espinoza MD    Reason for Consultation:Chronic pleural effusion, shortness of breath, cough     SUBJECTIVE:    Ig G normal  NON Reactive COVID ? Mean he never had the infection or not mounting immune respose to it    OBJECTIVE:     PHYSICAL EXAM:   VITALS:   Vitals:    22 0430 22 0600 22 0941 22 1646   BP: 102/66  89/64 (!) 94/56   Pulse: 78  93 94   Resp: 14  18 20   Temp: 98 °F (36.7 °C)  97.9 °F (36.6 °C) 97.9 °F (36.6 °C)   TempSrc: Axillary      SpO2: 99%  92% 97%   Weight:  240 lb (108.9 kg)     Height:            Intake/Output Summary (Last 24 hours) at 2022 1716  Last data filed at 2022 1100  Gross per 24 hour   Intake 160 ml   Output    Net 160 ml        CONSTITUTIONAL:    A&O x 3, no distress  SKIN:     Chronic  skin discoloration  HEENT:     EOMI, MMM, No thrush  NECK:    No bruits, no JVD  CV:      Sinus,  No murmur, No rubs, No gallops  PULMONARY:   Clear in upper lobes diminished in bases bilaterally. ABDOMEN:     Soft, non-tender. BS normal. No R/R/G  EXT:    No deformities . No clubbing. Edema remains   PULSE:   Appears equal and palpable.   PSYCHIATRIC:  Seems appropriate, No acute psycosis  MS:    No fractures, Weakness  NEUROLOGIC:   The clinical assessment is non-focal     DATA: IMAGING & TESTING:     LABORATORY TESTS:    CBC with Differential:    Lab Results   Component Value Date    WBC 10.1 2022    RBC 3.35 2022    HGB 9.7 2022    HCT 31.9 2022     2022    MCV 95.2 2022    MCH 29.0 2022    MCHC 30.4 2022 RDW 16.6 05/08/2022    LYMPHOPCT 24.3 05/04/2022    MONOPCT 10.8 05/04/2022    BASOPCT 0.2 05/04/2022    MONOSABS 1.09 05/04/2022    LYMPHSABS 2.45 05/04/2022    EOSABS 0.10 05/04/2022    BASOSABS 0.02 05/04/2022     CMP:    Lab Results   Component Value Date     05/08/2022    K 4.2 05/08/2022    K 4.8 05/04/2022     05/08/2022    CO2 28 05/08/2022    BUN 41 05/08/2022    CREATININE 5.1 05/08/2022    GFRAA 13 05/08/2022    LABGLOM 11 05/08/2022    GLUCOSE 83 05/08/2022    PROT 6.3 05/04/2022    LABALBU 3.1 05/04/2022    CALCIUM 8.4 05/08/2022    BILITOT 0.5 05/04/2022    ALKPHOS 90 05/04/2022    AST 23 05/04/2022    ALT 21 05/04/2022        PRO-BNP:   Lab Results   Component Value Date    PROBNP 9,247 (H) 05/04/2022    PROBNP 5,643 (H) 04/25/2022      ABGs:   Lab Results   Component Value Date    PH 7.404 05/04/2022    PO2 89.9 05/04/2022    PCO2 43.0 05/04/2022     Hemoglobin A1C: No components found for: HGBA1C    IMAGING:  Imaging tests were completed and reviewed and discussed radiology and care team involved and reveals     Echo Limited  Result Date: 3/19/2022  Findings   Left Ventricle  Left ventricle is grossly normal in size. No gross regional wall motion abnormalities seen. Grossly normal left ventricular ejection fraction. Indeterminate diastolic function. Right Ventricle  The right ventricle was not clearly visualized. Grossly normal right ventricular size and function. Pacer wire visualized in right ventricle. Left Atrium  Normal sized left atrium. Interatrial septum appears intact. Right Atrium  Normal right atrium size. Pacemaker lead was visualized in RA cavity. Mitral Valve  Structurally normal mitral valve. Tricuspid Valve  The tricuspid valve was not well visualized. Aortic Valve  The aortic valve leaflets were not well visualized. Pulmonic Valve  The pulmonic valve was not well visualized.    Pericardial Effusion  There is a small anterior pericardial effusion noted. Aorta  Aorta was not clearly visualized. Conclusions   Summary  Technically suboptimal and limited study with images limited to subcostal  window. Left ventricle is grossly normal in size. No gross regional wall motion abnormalities seen. Grossly normal left ventricular ejection fraction. There is a small anterior pericardial effusion noted. Assessment:   1. COugh  2. COVID   3. Chronic SOB  4. ELEVATED BNP  5. ESRD   6. VOlume overload  7. Oxygen dependence  8. 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 History of SPEEDY    Plan:   1. Check antibodies = Negative ? 2. Ig G levels are normal  3. Wean FiO2 as tolerated  4. Continue CPAP at at bedtime and with naps, okay to use home CPAP  5. Stop Steroids if for COPD  6. Bronchodilators for symptoms  7. HD as per nephrology. 8. Consider convalesant plasma for COVID,  9.  Once he is discharged - he should be the mix antibody infusion     Rafaela Renner, DO   Pulmonary, Critical Care and Sleep Medicine

## 2022-05-09 NOTE — PROGRESS NOTES
Progress Note  Date:2022       UNTR:[id number]  Patient Gina Rosenberg     YOB: 1945     Age:77 y.o. Patient says he feels decent. Subjective    Subjective:  Symptoms:  He reports shortness of breath and weakness. No chest pain. Diet:  Adequate intake. Activity level: Impaired due to weakness. Pain:  He reports no pain. Review of Systems   Constitutional: Negative for activity change and fever. HENT: Negative for congestion. Respiratory: Positive for shortness of breath. Cardiovascular: Negative for chest pain. Gastrointestinal: Negative for abdominal pain. Neurological: Positive for weakness. Negative for dizziness. Objective         Vitals Last 24 Hours:  TEMPERATURE:  Temp  Av.8 °F (36.6 °C)  Min: 97.5 °F (36.4 °C)  Max: 97.9 °F (36.6 °C)  RESPIRATIONS RANGE: Resp  Av.4  Min: 15  Max: 21  PULSE OXIMETRY RANGE: SpO2  Av.5 %  Min: 92 %  Max: 99 %  PULSE RANGE: Pulse  Av.3  Min: 93  Max: 94  BLOOD PRESSURE RANGE: Systolic (27TSF), UMF:02 , Min:89 , TMB:502   ; Diastolic (49DPA), URZ:45, Min:56, Max:73    I/O (24Hr): Intake/Output Summary (Last 24 hours) at 2022 0635  Last data filed at 2022 2321  Gross per 24 hour   Intake 50 ml   Output 0 ml   Net 50 ml     Objective:  General Appearance:  Comfortable. Vital signs: (most recent): Blood pressure 100/73, pulse 93, temperature 97.5 °F (36.4 °C), temperature source Tympanic, resp. rate 21, height 5' 9\" (1.753 m), weight 241 lb 3.2 oz (109.4 kg), SpO2 99 %. No fever. Lungs: There are decreased breath sounds. Heart: Normal rate. Regular rhythm.   S1 normal and S2 normal.      Labs/Imaging/Diagnostics    Labs:  CBC:  Recent Labs     22  0550 22  0636 22  0512   WBC 11.0 10.1 10.9   RBC 3.38* 3.35* 3.31*   HGB 9.7* 9.7* 9.5*   HCT 31.7* 31.9* 31.2*   MCV 93.8 95.2 94.3   RDW 16.7* 16.6* 16.5*    225 213     CHEMISTRIES:  Recent Labs 05/07/22  0550 05/08/22  0636 05/09/22  0512    142 138   K 4.1 4.2 4.2   CL 97* 101 99   CO2 27 28 28   BUN 31* 41* 52*   CREATININE 3.8* 5.1* 6.0*   GLUCOSE 112* 83 74     PT/INR:No results for input(s): PROTIME, INR in the last 72 hours. APTT:No results for input(s): APTT in the last 72 hours. LIVER PROFILE:  No results for input(s): AST, ALT, BILIDIR, BILITOT, ALKPHOS in the last 72 hours. Imaging Last 24 Hours:  XR CHEST PORTABLE    Result Date: 5/4/2022  EXAMINATION: ONE XRAY VIEW OF THE CHEST 5/4/2022 10:49 am HISTORY: ORDERING SYSTEM PROVIDED HISTORY: shortness of breath TECHNOLOGIST PROVIDED HISTORY: Reason for exam:->shortness of breath What reading provider will be dictating this exam?->CRC FINDINGS: There are opacities in the mid and lower lungs. There is moderate right pleural effusion. No pneumothorax. The heart is not enlarged. Pacemaker in place. Bilateral pleural effusion. For impose underlying infiltrates in the mid and lower lungs cannot be excluded. US DUP LOWER EXTREMITIES BILATERAL VENOUS    Result Date: 5/4/2022  EXAMINATION: DUPLEX VENOUS ULTRASOUND OF THE BILATERAL LOWER EXTREMITIES5/4/2022 1:37 pm TECHNIQUE: Duplex ultrasound using B-mode/gray scaled imaging, Doppler spectral analysis and color flow Doppler was obtained of the deep venous structures of the lower bilateral extremities. COMPARISON: None. HISTORY: ORDERING SYSTEM PROVIDED HISTORY: dvt TECHNOLOGIST PROVIDED HISTORY: Reason for exam:->dvt What reading provider will be dictating this exam?->CRC FINDINGS: The visualized veins of the bilateral lower extremities are patent and free of echogenic thrombus. The veins demonstrate good compressibility with normal color flow study and spectral analysis. No evidence of DVT in either lower extremity. There is a venous catheter in the left common femoral vein.      Assessment//Plan           Hospital Problems           Last Modified POA    * (Principal) Acute on chronic respiratory failure ([facility] [address] 5/4/2022 Yes        Assessment:  ((Principal) Acute on chronic respiratory failure ([facility] [address] 5/4/2022 Yes      covid 19  chronic hypoxic respiratory failure. CHF  Pleural effusion  ESRD  DM  COPD  HTN  Hyperlipidemia           Plan  Pleural effusions appear stable  Renal following for dialysis  Pulmonary following. On steroids. ).

## 2022-05-09 NOTE — PROGRESS NOTES
Physical Therapy  Name: Stoney Francois  Room: 2479/3104-B  Date: 05/09/22    PT called to see pt STAT. pt is off the unit at dialysis.    Will follow up with pt for updated notes Tuesday Dasha Perez, PT, DPT  [id number]

## 2022-05-09 NOTE — PROGRESS NOTES
[facility]  Nephrology Progress Note    Patient's Name: Camilla Morrow     History of Present Illness from 5/4 consult note:    Christine Manning is a 65 y.o. male with a past medical history of hypertension, hyperlipidemia, COPD, coronary artery disease, and CHF. He presented to the ED on 5/4 with complaints of shortness of breath. Vital signs at presentation to the ED include temperature 98, respirations 20, pulse 88, /80, and he was 95% on 6 L. Lab data on presentation to the ED include BUN 34, creatinine 5.3, proBNP 9247, and hemoglobin 9.6. Chest x-ray showed \"bilateral pleural effusion. \"  We were consulted to see the patient for HD. Patient is known to our service and dialyzes at [facility] 6 MWF second shift via a right thigh tunneled dialysis catheter. Patient not personally seen or examined due to COVID isolation, subsequently a review of systems was not obtained due to [facility] isolation. \"    Subjective:    5/9: Patient was not personally seen or examined due to COVID isolation. Discussed with his bedside nurse, who reports that the patient is fine, but he has been coughing. She explained that he is on 7 L nasal cannula and she reports that there were no issues overnight.     PMH:    Past Medical History:   Diagnosis Date    Acid reflux     Agent orange exposure     Arthritis     CAD (coronary artery disease)     Congestive heart failure (CHF) (HCC)     COPD (chronic obstructive pulmonary disease) (HCC)     Depression     Diabetes mellitus ([address]     History of blood transfusion     Hyperlipidemia     Hypertension     MI (myocardial infarction) ([address]     Polio     Sleep apnea      Patient Active Problem List   Diagnosis    PNA (pneumonia)    Acute on chronic heart failure with preserved ejection fraction ([address]    Coronary artery disease involving native coronary artery of native heart without angina pectoris    Cardiac pacemaker in situ    Primary hypertension    SPEEDY (obstructive sleep apnea)    Chronic respiratory failure with hypoxia (HCC)    CHF (congestive heart failure), NYHA class I, acute on chronic, combined (HCC)    Acute on chronic clinical systolic heart failure (HCC)    Acute on chronic systolic heart failure (HCC)    Pleural effusion    Respiratory failure (HCC)    COVID-19    Acute on chronic respiratory failure (HCC)     Meds:    Meds prn:     Meds prior to admission:     No current facility-administered medications on file prior to encounter.      Current Outpatient Medications on File Prior to Encounter   Medication Sig Dispense Refill    ondansetron (ZOFRAN-ODT) 4 MG disintegrating tablet Take 1 tablet by mouth every 8 hours as needed for Nausea or Vomiting 60 tablet 0    midodrine (PROAMATINE) 10 MG tablet Take 1 tablet by mouth as needed (give 30 min before dialysis) 90 tablet 3    bumetanide (BUMEX) 2 MG tablet Take 1 tablet by mouth daily 30 tablet 3    docusate sodium (COLACE) 100 MG capsule Take 1 capsule by mouth 2 times daily 60 capsule 0    guaiFENesin 400 MG tablet Take 400 mg by mouth 4 times daily as needed for Cough      ipratropium-albuterol (DUONEB) 0.5-2.5 (3) MG/3ML SOLN nebulizer solution Take 1 vial by nebulization every 4 hours as needed for Shortness of Breath      potassium chloride (KLOR-CON M) 10 MEQ extended release tablet Take 20 mEq by mouth daily      insulin glargine (LANTUS SOLOSTAR) 100 UNIT/ML injection pen Inject 50 Units into the skin nightly      loperamide (IMODIUM) 2 MG capsule Take 2 mg by mouth 4 times daily as needed for Diarrhea      buPROPion (WELLBUTRIN XL) 300 MG extended release tablet Take 300 mg by mouth every morning      ezetimibe (ZETIA) 10 MG tablet Take 10 mg by mouth at bedtime      Zinc Sulfate 110 MG TABS Take 2 tablets by mouth daily      magnesium hydroxide (MILK OF MAGNESIA) 400 MG/5ML suspension Take 30 mLs by mouth daily as needed for Constipation      melatonin 3 MG TABS tablet Take 3 mg by mouth at bedtime      insulin lispro, 1 Unit Dial, (HUMALOG KWIKPEN) 100 UNIT/ML SOPN Inject 0-10 Units into the skin 4 times daily (before meals and nightly) *Per Sliding Scale*      metoprolol succinate (TOPROL XL) 25 MG extended release tablet Take 1 tablet by mouth 2 times daily 30 tablet 3    acetaminophen (TYLENOL) 325 MG tablet Take 650 mg by mouth every 4 hours as needed for Pain or Fever       vitamin C (ASCORBIC ACID) 500 MG tablet Take 500 mg by mouth 2 times daily      vitamin D (CHOLECALCIFEROL) 50 MCG (2000 UT) TABS tablet Take 2,000 Units by mouth daily       aspirin 81 MG EC tablet Take 1 tablet by mouth daily 30 tablet 3    folic acid (FOLVITE) 1 MG tablet Take 1 mg by mouth daily      cyanocobalamin 1000 MCG tablet Take 1,000 mcg by mouth daily      atorvastatin (LIPITOR) 40 MG tablet Take 40 mg by mouth at bedtime       pantoprazole (PROTONIX) 40 MG tablet Take 40 mg by mouth daily       Allergies:    Penicillins    Social History:     reports that he has quit smoking. He has never used smokeless tobacco. He reports that he does not drink alcohol and does not use drugs. Family History:     History reviewed. No pertinent family history. Physical Exam:    Patient Vitals for the past 24 hrs:   BP Temp Temp src Pulse Resp SpO2 Weight   05/09/22 0558      99 %    05/09/22 0447       241 lb 3.2 oz (109.4 kg)   05/09/22 0227     21     05/08/22 2013     15     05/08/22 1930 100/73 97.5 °F (36.4 °C) Tympanic 93 18 98 %    05/08/22 1646 (!) 94/56 97.9 °F (36.6 °C)  94 20 97 %    05/08/22 0941 89/64 97.9 °F (36.6 °C)  93 18 92 %        Intake/Output Summary (Last 24 hours) at 5/9/2022 0853  Last data filed at 5/8/2022 2321  Gross per 24 hour   Intake 50 ml   Output 0 ml   Net 50 ml      Not personally examined due to COVID isolation.    Due to the current efforts to prevent transmission of COVID-19 and also the need to preserve PPE for other caregivers, a face-to-face encounter with the patient was not performed. That being said, all relevant records and diagnostic tests were reviewed, including laboratory results and imaging. Please reference any relevant documentation elsewhere. Care will be coordinated with the primary service. Data:    Recent Labs     05/07/22  0550 05/08/22  0636 05/09/22  0512   WBC 11.0 10.1 10.9   HGB 9.7* 9.7* 9.5*   HCT 31.7* 31.9* 31.2*   MCV 93.8 95.2 94.3    225 213     Recent Labs     05/07/22  0550 05/08/22  0636 05/09/22  0512    142 138   K 4.1 4.2 4.2   CL 97* 101 99   CO2 27 28 28   CREATININE 3.8* 5.1* 6.0*   BUN 31* 41* 52*   LABGLOM 15 11 9   GLUCOSE 112* 83 74   CALCIUM 8.3* 8.4* 8.1*     No results found for: VITD25    PTH   Date Value Ref Range Status   04/28/2022 118 (H) 15 - 65 pg/mL Final     No results for input(s): ALT, AST, ALKPHOS, BILITOT, BILIDIR in the last 72 hours. No results for input(s): LABALBU in the last 72 hours. Ferritin   Date Value Ref Range Status   05/04/2022 795 ng/mL Final     Comment:     FERRITIN Reference Ranges:  Adult Males   20 - 60 years:    30 - 400 ng/mL  Adult females 16 - 61 years:    15 - 150 ng/mL  Adults greater than 60 years:   no established reference range  Pediatrics:                     no established reference range       No results found for: VKGILVQW80    No results found for: FOLATE    Lab Results   Component Value Date    COLORU Yellow 04/07/2022    NITRU Negative 04/07/2022    GLUCOSEU Negative 04/07/2022    KETUA Negative 04/07/2022    UROBILINOGEN 0.2 04/07/2022    BILIRUBINUR Negative 04/07/2022     Lab Results   Component Value Date/Time    OSMOU 351 01/22/2022 11:24 AM     No components found for: URIC    No results found for: LIPIDPAN    Assessment and Plan:    1.   Shortness of breath   History of COPD  Chest x-ray showed \"bilateral pleural effusion\"  COVID positive on 4/25  On oxygen via nasal cannula  Pulmonology consulted  Management per primary/pulmonology    2. ESRD on HD  OP [facility] MWF second shift  via a right thigh TDC  Continue HD MWF    3. Hypotension  SBPs 80s90s intermittently   On midodrine as an outpatient  Avoid further hypotension  Monitor BPs    4. Anemia  Hemoglobin 9.5 today  Transfuse for hemoglobin<7  Monitor H&H    5. Secondary hyperparathyroidism of renal origin  With hypocalcemia/ hypoalbuminemia- calcium 8.2 and albumin 3.1 on 5/4   on 4/28, phosphorus 4.4 on 4/28  phos 5.8 on 5/6  Low phos diet   Monitor labs    6. Hyperkalemia  Likely in setting of ESRD  K+ 4.2 today  Low potassium diet  Monitor labs    Pam Awad, APRN - CNP     Patient seen and examined all key components of the physical performed independently , case discussed with NP, all pertinent labs and radiologic tests personally reviewed agree with above. Omid Serna MD      Department of Internal Medicine  Section of Nephrology  Dialysis Note        PROCEDURE:  Patient seen on hemodialysis    PHYSICIAN:  Cornelio Long M.D., FACP    INDICATION:  End-stage renal disease    RX:  See dialysis flowsheet for specifics on access, blood flow rate, dialysate baths, duration of dialysis, anticoagulation and other technical information.     COMMENTS:  Procedure in progress and tolerated       Omid Serna MD, MD

## 2022-05-09 NOTE — PROGRESS NOTES
The patient's blood sugar is 55 at this time, patient given IV dextrose. Dr. Herbie Page made aware of the patient's low blood sugars and decreased appetite. New orders given at this time to decrease the patients Lantus to 25 units SQ at HS.

## 2022-05-09 NOTE — PROGRESS NOTES
Patients blood sugar is 56 at this time. Dextrose 50% IV push 12.5 g given at this time, will recheck blood sugar in 15 minutes.

## 2022-05-09 NOTE — PROGRESS NOTES
Date: 5/8/2022    Time: 8:28 PM    Patient Placed On BIPAP/CPAP/ Non-Invasive Ventilation? Yes    If no must comment. Facial area red/color change? Yes           If YES are Blister/Lesion present? Yes, patient has skin breakdown on nose. Nursing staff knows    If yes must notify nursing staff  BIPAP/CPAP skin barrier?  Yes   Skin barrier type: Mepilex      Comments:        Francesca Moeller RCP

## 2022-05-09 NOTE — PLAN OF CARE
Problem: Chronic Conditions and Co-morbidities  Goal: Patient's chronic conditions and co-morbidity symptoms are monitored and maintained or improved  Outcome: Progressing     Problem: Discharge Planning  Goal: Discharge to home or other facility with appropriate resources  Outcome: Progressing     Problem: Safety - Adult  Goal: Free from fall injury  Outcome: Progressing     Problem: Pain  Goal: Verbalizes/displays adequate comfort level or baseline comfort level  Outcome: Progressing     Problem: Skin/Tissue Integrity  Goal: Absence of new skin breakdown  Description: 1. Monitor for areas of redness and/or skin breakdown  2. Assess vascular access sites hourly  3. Every 4-6 hours minimum:  Change oxygen saturation probe site  4. Every 4-6 hours:  If on nasal continuous positive airway pressure, respiratory therapy assess nares and determine need for appliance change or resting period.   Outcome: Progressing     Problem: Cardiovascular - Adult  Goal: Maintains optimal cardiac output and hemodynamic stability  Outcome: Progressing     Problem: Metabolic/Fluid and Electrolytes - Adult  Goal: Hemodynamic stability and optimal renal function maintained  Outcome: Progressing     Problem: ABCDS Injury Assessment  Goal: Absence of physical injury  Outcome: Progressing     Problem: Nutrition Deficit:  Goal: Optimize nutritional status  Outcome: Progressing

## 2022-05-09 NOTE — FLOWSHEET NOTE
05/09/22 1705   Vital Signs   /69   Temp 98.9 °F (37.2 °C)   Pulse 89   Resp 20   Weight 250 lb 14.4 oz (113.8 kg)   Weight Method Bed scale   Percent Weight Change -1.99   Pain Assessment   Pain Assessment None - Denies Pain   Post-Hemodialysis Assessment   Post-Treatment Procedures Blood returned;Catheter capped, clamped and heparinized x 2 ports   Machine Disinfection Process Acid/Vinegar Clean;Heat Disinfect; Exterior Machine Disinfection   Rinseback Volume (ml) 300 ml   Total Liters Processed (l/min) 59.3 l/min   Dialyzer Clearance Moderately streaked   Duration of Treatment (minutes) 210 minutes   Hemodialysis Intake (ml) 300 ml   Hemodialysis Output (ml) 2600 ml   NET Removed (ml) 2000 ml   Tolerated Treatment Good   Interventions Taken Fluid bolus   Patient Response to Treatment patient alert, BP stable, returned to floor   Bilateral Breath Sounds Diminished   Edema Generalized;Right lower extremity; Left lower extremity   RUE Edema Trace   LUE Edema Trace   RLE Edema +3   LLE Edema +3   Time Off 1700   Patient Disposition Return to room   2000mL removed BP stable

## 2022-05-09 NOTE — CARE COORDINATION
Care Coordination = covid  The patient  Is on 8 liters heated high flow as of this am. The patient get hemo dialysis @ [facility] in [place] phone is [phone] MWF and chair time is 1 40 pm. Per Alfonzo Jones at [facility] she was notified of the patients admission. I have asked for update pt ot francine so Moriah Wu at [facility]  can start the precert today . Return envelope is done.

## 2022-05-10 NOTE — PROGRESS NOTES
Progress Note  Date:5/10/2022       [id number]  Patient Janet Vasquez     YOB: 1945     Age:77 y.o. Patient says he feels decent. Subjective    Subjective:  Symptoms:  He reports shortness of breath and weakness. No chest pain. Diet:  Adequate intake. Activity level: Impaired due to weakness. Pain:  He reports no pain. Review of Systems   Constitutional: Negative for activity change and fever. HENT: Negative for congestion. Respiratory: Positive for shortness of breath. Cardiovascular: Negative for chest pain. Gastrointestinal: Negative for abdominal pain. Neurological: Positive for weakness. Negative for dizziness. Objective         Vitals Last 24 Hours:  TEMPERATURE:  Temp  Av.4 °F (36.9 °C)  Min: 97.3 °F (36.3 °C)  Max: 98.9 °F (37.2 °C)  RESPIRATIONS RANGE: Resp  Av.3  Min: 14  Max: 22  PULSE OXIMETRY RANGE: SpO2  Av %  Min: 92 %  Max: 94 %  PULSE RANGE: Pulse  Av.8  Min: 78  Max: 96  BLOOD PRESSURE RANGE: Systolic (34KLG), GZL:972 , Min:88 , LIJ:150   ; Diastolic (60HMB), BRX:27, Min:49, Max:78    I/O (24Hr): Intake/Output Summary (Last 24 hours) at 5/10/2022 0703  Last data filed at 5/10/2022 0650  Gross per 24 hour   Intake 470 ml   Output 2600 ml   Net -2130 ml     Objective:  General Appearance:  Comfortable. Vital signs: (most recent): Blood pressure 88/64, pulse 88, temperature 97.9 °F (36.6 °C), temperature source Axillary, resp. rate 14, height 5' 9\" (1.753 m), weight 250 lb (113.4 kg), SpO2 93 %. No fever. Lungs: There are decreased breath sounds. Heart: Normal rate. Regular rhythm.   S1 normal and S2 normal.      Labs/Imaging/Diagnostics    Labs:  CBC:  Recent Labs     22  0636 22  0512   WBC 10.1 10.9   RBC 3.35* 3.31*   HGB 9.7* 9.5*   HCT 31.9* 31.2*   MCV 95.2 94.3   RDW 16.6* 16.5*    213     CHEMISTRIES:  Recent Labs     22  0636 22  0512    138   K 4.2 4.2  99   CO2 28 28   BUN 41* 52*   CREATININE 5.1* 6.0*   GLUCOSE 83 74     PT/INR:No results for input(s): PROTIME, INR in the last 72 hours. APTT:No results for input(s): APTT in the last 72 hours. LIVER PROFILE:  No results for input(s): AST, ALT, BILIDIR, BILITOT, ALKPHOS in the last 72 hours. Imaging Last 24 Hours:  XR CHEST PORTABLE    Result Date: 5/4/2022  EXAMINATION: ONE XRAY VIEW OF THE CHEST 5/4/2022 10:49 am HISTORY: ORDERING SYSTEM PROVIDED HISTORY: shortness of breath TECHNOLOGIST PROVIDED HISTORY: Reason for exam:->shortness of breath What reading provider will be dictating this exam?->CRC FINDINGS: There are opacities in the mid and lower lungs. There is moderate right pleural effusion. No pneumothorax. The heart is not enlarged. Pacemaker in place. Bilateral pleural effusion. For impose underlying infiltrates in the mid and lower lungs cannot be excluded. US DUP LOWER EXTREMITIES BILATERAL VENOUS    Result Date: 5/4/2022  EXAMINATION: DUPLEX VENOUS ULTRASOUND OF THE BILATERAL LOWER EXTREMITIES5/4/2022 1:37 pm TECHNIQUE: Duplex ultrasound using B-mode/gray scaled imaging, Doppler spectral analysis and color flow Doppler was obtained of the deep venous structures of the lower bilateral extremities. COMPARISON: None. HISTORY: ORDERING SYSTEM PROVIDED HISTORY: dvt TECHNOLOGIST PROVIDED HISTORY: Reason for exam:->dvt What reading provider will be dictating this exam?->CRC FINDINGS: The visualized veins of the bilateral lower extremities are patent and free of echogenic thrombus. The veins demonstrate good compressibility with normal color flow study and spectral analysis. No evidence of DVT in either lower extremity. There is a venous catheter in the left common femoral vein.      Assessment//Plan           Hospital Problems           Last Modified POA    * (Principal) Acute on chronic respiratory failure ([address] 5/4/2022 Yes        Assessment:  ((Principal) Acute on chronic respiratory failure ([facility] 5/4/2022 Yes      covid 19  chronic hypoxic respiratory failure. CHF  Pleural effusion  ESRD  DM  COPD  HTN  Hyperlipidemia           Plan  Pleural effusions appear stable  Renal following for dialysis  Pulmonary following. ).

## 2022-05-10 NOTE — PLAN OF CARE
Problem: Chronic Conditions and Co-morbidities  Goal: Patient's chronic conditions and co-morbidity symptoms are monitored and maintained or improved  Outcome: Progressing  Flowsheets (Taken 5/10/2022 0120)  Care Plan - Patient's Chronic Conditions and Co-Morbidity Symptoms are Monitored and Maintained or Improved: Monitor and assess patient's chronic conditions and comorbid symptoms for stability, deterioration, or improvement     Problem: Discharge Planning  Goal: Discharge to home or other facility with appropriate resources  Outcome: Progressing  Flowsheets (Taken 5/10/2022 0120)  Discharge to home or other facility with appropriate resources: Identify barriers to discharge with patient and caregiver     Problem: Safety - Adult  Goal: Free from fall injury  Outcome: Progressing     Problem: Pain  Goal: Verbalizes/displays adequate comfort level or baseline comfort level  Outcome: Progressing     Problem: Skin/Tissue Integrity  Goal: Absence of new skin breakdown  Outcome: Progressing     Problem: Metabolic/Fluid and Electrolytes - Adult  Goal: Hemodynamic stability and optimal renal function maintained  Outcome: Progressing  Flowsheets (Taken 5/10/2022 0120)  Hemodynamic stability and optimal renal function maintained: Monitor labs and assess for signs and symptoms of volume excess or deficit     Problem: ABCDS Injury Assessment  Goal: Absence of physical injury  Outcome: Progressing  Flowsheets (Taken 5/10/2022 0110)  Absence of Physical Injury: Implement safety measures based on patient assessment     Problem: Nutrition Deficit:  Goal: Optimize nutritional status  Outcome: Progressing

## 2022-05-10 NOTE — PROGRESS NOTES
[place]  Department of Internal Medicine  Division of Pulmonary, Critical Care and Sleep Medicine  Progress Note    Rafaela Renner DO, MPH, FCCP, [facility], [address] [facility], [facility]      Patient: Modesta Sanders  MRN: [id number]  : 1945    Encounter Time: 4:26 PM     Date of Admission: 2022 10:02 AM    Primary Care Physician: Danay Hernandez MD    Reason for Consultation:Chronic pleural effusion, shortness of breath, cough     SUBJECTIVE: Patient seen and examined. Reports that he does continue to have a cough. He currently denies fever, chills, reports that his breathing is \"about the same \"        OBJECTIVE:     PHYSICAL EXAM:   VITALS:   Vitals:    05/10/22 0604 05/10/22 0945 05/10/22 1435 05/10/22 1444   BP:  106/66  138/80   Pulse:  88  78   Resp:  18 18 16   Temp:  98.1 °F (36.7 °C)  97.4 °F (36.3 °C)   TempSrc:  Oral  Oral   SpO2:  93% 92% 92%   Weight: 250 lb (113.4 kg)      Height:            Intake/Output Summary (Last 24 hours) at 5/10/2022 1626  Last data filed at 5/10/2022 1444  Gross per 24 hour   Intake 470 ml   Output 2600 ml   Net -2130 ml        CONSTITUTIONAL:    A&O x 3, no distress  SKIN:     Chronic  skin discoloration. Skin breakdown on nose bridge noted  HEENT:     EOMI, MMM, No thrush  NECK:    No bruits, no JVD  CV:      Sinus,  No murmur, No rubs, No gallops  PULMONARY:   Clear in upper lobes diminished in bases bilaterally. ABDOMEN:     Soft, non-tender. BS normal. No R/R/G  EXT:    No deformities . No clubbing. Edema remains   PULSE:   Appears equal and palpable.   PSYCHIATRIC:  Seems appropriate, No acute psycosis  MS:    No fractures, Weakness  NEUROLOGIC:   The clinical assessment is non-focal     DATA: IMAGING & TESTING:     LABORATORY TESTS:    CBC with Differential:    Lab Results   Component Value Date    WBC 11.1 05/10/2022    RBC 3.29 05/10/2022    HGB 9.5 05/10/2022    HCT 31.5 05/10/2022     05/10/2022    MCV 95.7 05/10/2022    MCH 28.9 05/10/2022    MCHC 30.2 05/10/2022    RDW 16.5 05/10/2022    LYMPHOPCT 24.3 05/04/2022    MONOPCT 10.8 05/04/2022    BASOPCT 0.2 05/04/2022    MONOSABS 1.09 05/04/2022    LYMPHSABS 2.45 05/04/2022    EOSABS 0.10 05/04/2022    BASOSABS 0.02 05/04/2022     CMP:    Lab Results   Component Value Date     05/10/2022    K 4.2 05/10/2022    K 4.8 05/04/2022    CL 97 05/10/2022    CO2 29 05/10/2022    BUN 40 05/10/2022    CREATININE 5.1 05/10/2022    GFRAA 13 05/10/2022    LABGLOM 11 05/10/2022    GLUCOSE 69 05/10/2022    PROT 6.3 05/04/2022    LABALBU 3.1 05/04/2022    CALCIUM 8.1 05/10/2022    BILITOT 0.5 05/04/2022    ALKPHOS 90 05/04/2022    AST 23 05/04/2022    ALT 21 05/04/2022        PRO-BNP:   Lab Results   Component Value Date    PROBNP 9,247 (H) 05/04/2022    PROBNP 5,643 (H) 04/25/2022      ABGs:   Lab Results   Component Value Date    PH 7.404 05/04/2022    PO2 89.9 05/04/2022    PCO2 43.0 05/04/2022     Hemoglobin A1C: No components found for: HGBA1C    IMAGING:  Imaging tests were completed and reviewed and discussed radiology and care team involved and reveals     Echo Limited  Result Date: 3/19/2022  Findings   Left Ventricle  Left ventricle is grossly normal in size. No gross regional wall motion abnormalities seen. Grossly normal left ventricular ejection fraction. Indeterminate diastolic function. Right Ventricle  The right ventricle was not clearly visualized. Grossly normal right ventricular size and function. Pacer wire visualized in right ventricle. Left Atrium  Normal sized left atrium. Interatrial septum appears intact. Right Atrium  Normal right atrium size. Pacemaker lead was visualized in RA cavity. Mitral Valve  Structurally normal mitral valve. Tricuspid Valve  The tricuspid valve was not well visualized. Aortic Valve  The aortic valve leaflets were not well visualized.    Pulmonic Valve  The pulmonic valve was not well visualized. Pericardial Effusion  There is a small anterior pericardial effusion noted. Aorta  Aorta was not clearly visualized. Conclusions   Summary  Technically suboptimal and limited study with images limited to subcostal  window. Left ventricle is grossly normal in size. No gross regional wall motion abnormalities seen. Grossly normal left ventricular ejection fraction. There is a small anterior pericardial effusion noted. Assessment:   1. Cough  2. COVIDlikely asymptomatic viral shedding at this point  3. Chronic SOB  4. ELEVATED BNP  5. ESRD   6. VOlume overload  7. Oxygen dependence  8. 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 History of SPEEDY    Plan:   1. Check antibodies = Negative ? 2. Ig G levels are normal  3. Wean FiO2 as tolerated  4. Continue CPAP at at bedtime and with naps, okay to use home CPAP  5. Stop Steroids if for COPD  6. Bronchodilators for symptoms  7. HD as per nephrology.   8. We will obtain PA and lateral chest x-ray based upon this consider plus or minus thoracentesis    Britton Litter, DO   Pulmonary, Critical Care and Sleep Medicine

## 2022-05-10 NOTE — PROGRESS NOTES
[facility]  Nephrology Progress Note    Patient's Name: Jelena Geiger     History of Present Illness from 5/4 consult note:    uBrton Mejias is a 65 y.o. male with a past medical history of hypertension, hyperlipidemia, COPD, coronary artery disease, and CHF. He presented to the ED on 5/4 with complaints of shortness of breath. Vital signs at presentation to the ED include temperature 98, respirations 20, pulse 88, /80, and he was 95% on 6 L. Lab data on presentation to the ED include BUN 34, creatinine 5.3, proBNP 9247, and hemoglobin 9.6. Chest x-ray showed \"bilateral pleural effusion. \"  We were consulted to see the patient for HD. Patient is known to our service and dialyzes at [facility] 6 MWF second shift via a right thigh tunneled dialysis catheter. Patient not personally seen or examined due to COVID isolation, subsequently a review of systems was not obtained due to [facility] isolation. \"    Subjective:    5/10: Patient was not personally seen or examined due to COVID isolation. Discussed with bedside nurse who explained that the patient  declined breakfast today. His bedside nurse also explained that patient has been experiencing issues with low blood sugars.     PMH:    Past Medical History:   Diagnosis Date    Acid reflux     Agent orange exposure     Arthritis     CAD (coronary artery disease)     Congestive heart failure (CHF) (HCC)     COPD (chronic obstructive pulmonary disease) (HCC)     Depression     Diabetes mellitus ([address]     History of blood transfusion     Hyperlipidemia     Hypertension     MI (myocardial infarction) ([address]     Polio     Sleep apnea      Patient Active Problem List   Diagnosis    PNA (pneumonia)    Acute on chronic heart failure with preserved ejection fraction ([address]    Coronary artery disease involving native coronary artery of native heart without angina pectoris    Cardiac pacemaker in situ    Primary hypertension    SPEEDY (obstructive sleep apnea)    Chronic respiratory failure with hypoxia (HCC)    CHF (congestive heart failure), NYHA class I, acute on chronic, combined (HCC)    Acute on chronic clinical systolic heart failure (HCC)    Acute on chronic systolic heart failure (HCC)    Pleural effusion    Respiratory failure (HCC)    COVID-19    Acute on chronic respiratory failure (HCC)     Meds:    Meds prn:     Meds prior to admission:     No current facility-administered medications on file prior to encounter.      Current Outpatient Medications on File Prior to Encounter   Medication Sig Dispense Refill    ondansetron (ZOFRAN-ODT) 4 MG disintegrating tablet Take 1 tablet by mouth every 8 hours as needed for Nausea or Vomiting 60 tablet 0    midodrine (PROAMATINE) 10 MG tablet Take 1 tablet by mouth as needed (give 30 min before dialysis) 90 tablet 3    bumetanide (BUMEX) 2 MG tablet Take 1 tablet by mouth daily 30 tablet 3    docusate sodium (COLACE) 100 MG capsule Take 1 capsule by mouth 2 times daily 60 capsule 0    guaiFENesin 400 MG tablet Take 400 mg by mouth 4 times daily as needed for Cough      ipratropium-albuterol (DUONEB) 0.5-2.5 (3) MG/3ML SOLN nebulizer solution Take 1 vial by nebulization every 4 hours as needed for Shortness of Breath      potassium chloride (KLOR-CON M) 10 MEQ extended release tablet Take 20 mEq by mouth daily      insulin glargine (LANTUS SOLOSTAR) 100 UNIT/ML injection pen Inject 50 Units into the skin nightly      loperamide (IMODIUM) 2 MG capsule Take 2 mg by mouth 4 times daily as needed for Diarrhea      buPROPion (WELLBUTRIN XL) 300 MG extended release tablet Take 300 mg by mouth every morning      ezetimibe (ZETIA) 10 MG tablet Take 10 mg by mouth at bedtime      Zinc Sulfate 110 MG TABS Take 2 tablets by mouth daily      magnesium hydroxide (MILK OF MAGNESIA) 400 MG/5ML suspension Take 30 mLs by mouth daily as needed for Constipation      melatonin 3 MG TABS tablet Take 3 mg by mouth at bedtime      insulin lispro, 1 Unit Dial, (HUMALOG KWIKPEN) 100 UNIT/ML SOPN Inject 0-10 Units into the skin 4 times daily (before meals and nightly) *Per Sliding Scale*      metoprolol succinate (TOPROL XL) 25 MG extended release tablet Take 1 tablet by mouth 2 times daily 30 tablet 3    acetaminophen (TYLENOL) 325 MG tablet Take 650 mg by mouth every 4 hours as needed for Pain or Fever       vitamin C (ASCORBIC ACID) 500 MG tablet Take 500 mg by mouth 2 times daily      vitamin D (CHOLECALCIFEROL) 50 MCG (2000 UT) TABS tablet Take 2,000 Units by mouth daily       aspirin 81 MG EC tablet Take 1 tablet by mouth daily 30 tablet 3    folic acid (FOLVITE) 1 MG tablet Take 1 mg by mouth daily      cyanocobalamin 1000 MCG tablet Take 1,000 mcg by mouth daily      atorvastatin (LIPITOR) 40 MG tablet Take 40 mg by mouth at bedtime       pantoprazole (PROTONIX) 40 MG tablet Take 40 mg by mouth daily       Allergies:    Penicillins    Social History:     reports that he has quit smoking. He has never used smokeless tobacco. He reports that he does not drink alcohol and does not use drugs. Family History:     History reviewed. No pertinent family history.     Physical Exam:    Patient Vitals for the past 24 hrs:   BP Temp Temp src Pulse Resp SpO2 Weight   05/10/22 0604       250 lb (113.4 kg)   05/10/22 0110 88/64 97.9 °F (36.6 °C) Axillary 88 14 93 %    05/09/22 2133     16     05/09/22 1935 104/78 97.3 °F (36.3 °C) Axillary 95 18 94 %    05/09/22 1810      93 %    05/09/22 1705 128/69 98.9 °F (37.2 °C)  89 20  250 lb 14.4 oz (113.8 kg)   05/09/22 1645 (!) 112/51   78      05/09/22 1630 109/60   82      05/09/22 1615 (!) 108/57   87      05/09/22 1600 (!) 92/53   96      05/09/22 1545 (!) 95/55   85      05/09/22 1530 104/62   81      05/09/22 1515 (!) 102/53   82      05/09/22 1500 (!) 88/52   82      05/09/22 1430 103/63   81    Results   Component Value Date    COLORU Yellow 04/07/2022    NITRU Negative 04/07/2022    GLUCOSEU Negative 04/07/2022    KETUA Negative 04/07/2022    UROBILINOGEN 0.2 04/07/2022    BILIRUBINUR Negative 04/07/2022     Lab Results   Component Value Date/Time    YAMILETH Brady 01/22/2022 11:24 AM     No components found for: URIC    No results found for: LIPIDPAN    Assessment and Plan:    1. Shortness of breath   History of COPD  Chest x-ray showed \"bilateral pleural effusion\"  COVID positive on 4/25  On oxygen via nasal cannula  Pulmonology consulted  Management per primary/pulmonology    2. ESRD on HD  OP [facility] MWF second shift  via a right thigh TDC  Continue HD MWF    3. Hypotension  SBPs 80s90s intermittently   On midodrine as an outpatient  Avoid further hypotension  Monitor BPs    4. Anemia  Hemoglobin 9.5 today  Transfuse for hemoglobin<7  Monitor H&H    5. Secondary hyperparathyroidism of renal origin  With hypocalcemia/ hypoalbuminemia- calcium 8.2 and albumin 3.1 on 5/4   on 4/28, phos 5.8 on 5/6  Low phos diet   Monitor labs    6. Hyperkalemia  Likely in setting of ESRD  K+ 4.2 today  Low potassium diet  Monitor labs    GILMAR Martinez - CNP     Patient seen and examined all key components of the physical performed independently , case discussed with NP, all pertinent labs and radiologic tests personally reviewed agree with above.       Estell Claude, MD

## 2022-05-10 NOTE — PROGRESS NOTES
Physical Therapy  Physical Therapy Treatment     Name: Lucian Alonzo  : 1945  MRN: [id number]      Date of Service: 5/10/2022    Evaluating PT:  Rosales Smith PT, DPLULY [id number]     Room #:  6104/4206-P  Diagnosis:  Acute on chronic respiratory failure ([facility] [J96.20]  Acute on chronic respiratory failure with hypoxia ([facility] [J96.21]  Reason for admission: SOB   Precautions:  Falls, COVID+, O2  Procedure/Surgery:  None   Equipment Recommendations:  FWW     SUBJECTIVE:  Pt admitted from a [facility]. Not active with PT currently d/t his COVID infection. Prior to [facility], working with PT on standing. Pt ambulating little to none with WW and assist PTA. OBJECTIVE:   Initial Evaluation  Date:  Treatment  5/10 Short Term/ Long Term   Goals   AM-PAC 6 Clicks     Was pt agreeable to Eval/treatment? Yes  Yes     Does pt have pain?  Denies pain No     Bed Mobility  Rolling: NT  Supine to sit: ModA  Sit to supine: ModA  Scooting: MaxA at EOB Rolling: NT  Supine to sit: MaxA  Sit to supine: MaxA x2  Scooting: MaxA at EOB SBA   Transfers Sit to stand: MaxA  Stand to sit: MaxA  Stand pivot: NT Sit to stand: MaxA x2  Stand to sit: MaxA  Stand pivot: NT Mariela   Ambulation    NT    NT >10 feet with [employer] ModA   Stair negotiation: ascended and descended  NT NT NA     LE ROM: WFL    LE Strength:   knee ext: 4/5  ankle DF: 4/5    Balance:   Sitting static: SBA  Sitting dynamic: SBA  Standing static: ModA  Standing dynamic: NT    -Pt is A & O x 3  -Sensation:  Pt denies numbness and tingling to extremities  -Edema:  Increased to BLEs     Therapeutic Exercises:  Functional activity     Patient education  Pt educated on safety, sequencing of transfers, and role of PT    Patient response to education:   Pt verbalized understanding Pt demonstrated skill Pt requires further education in this area   Yes  With assist Yes      ASSESSMENT:  Conditions Requiring Skilled Therapeutic Intervention:  [x]Decreased strength     []Decreased ROM  [x]Decreased functional mobility  [x]Decreased balance   [x]Decreased endurance   []Decreased posture  []Decreased sensation  []Decreased coordination   []Decreased vision  [x]Decreased safety awareness   []Increased pain     Comments:  Pt received supine in bed and agreeable to PT session   Pt reports feeling worse than previous session. He continues to have coughing fits and respiratory distress. Extended time and heavy assist for all mobility. He desaturates to ~88% with activity and recovers to low 90s with cues to deep breath. Stood with heavy lift assist and held x30 seconds. Incontinent of BM while standing. Returned to supine, rolling completed for hygiene purposes. Pt with all needs met and call light in reach. Pt would benefit from continued PT POC to address functional deficits described above. Treatment:  Patient practiced and was instructed in the following treatment:     Therapeutic activity  o Patient education provided continuously throughout session for sequencing, safety maintenance, and improving any deficits found during the evaluation.   o Bed mobility training - pt given verbal and tactile cues to facilitate proper sequencing and safety during rolling and supine>sit as well as provided with physical assistance to complete task    o Sitting EOB for >10 minutes for upright tolerance, postural awareness and BLE ROM   o STS and pivot transfer training - pt educated on proper hand and foot placement, safety and sequencing, and use of proper technique to safely complete sit<>stand and pivot transfers with hands on assistance to complete task safely   o Hygiene and linen changes d/t incontinence    PHYSICAL THERAPY PLAN OF CARE:    PT POC is established based on physician order and patient diagnosis     Referring provider/PT Order: 05/05/22 1000   PT eval and treat Start: 05/05/22 1000, End: 05/05/22 1000, ONE TIME, Standing Count: 1 Occurrences, Linn Richard MD Diagnosis:  Acute on chronic respiratory failure (HCC) [J96.20]  Acute on chronic respiratory failure with hypoxia (HCC) [J96.21]  Specific instructions for next treatment:  Progress transfers as able. Stepping attempts. Current Treatment Recommendations:   Making slow progress, continue PT POC    Time in  0840  Time out  0905    Total Treatment Time  25 minutes     Evaluation Time includes thorough review of current medical information, gathering information on past medical history/social history and prior level of function, completion of standardized testing/informal observation of tasks, assessment of data and education on plan of care and goals.     CPT codes:  [] Low Complexity PT evaluation 42656  [] Moderate Complexity PT evaluation 00053  [] High Complexity PT evaluation 25132  [] PT Re-evaluation 91009  [] Gait training 48779 - minutes  [] Manual therapy 90624 - minutes  [x] Therapeutic activities 64275 25 minutes  [] Therapeutic exercises 00244 - minutes  [] Neuromuscular reeducation 02804 - minutes     Joseph Howe, PT, DPT  [id number]

## 2022-05-10 NOTE — PROGRESS NOTES
Date: 5/9/2022    Time: 9:33 PM    Patient Placed On BIPAP/CPAP/ Non-Invasive Ventilation? Yes    If no must comment. Facial area red/color change? Yes           If YES are Blister/Lesion present? Yes   If yes must notify nursing staff  BIPAP/CPAP skin barrier?   Yes    Skin barrier type:mepilexlite       Comments:    Breakdown noted on nose, mask adjusted to be loose, and off of area of redness and breakdown    Lynn Perdue RCP

## 2022-05-10 NOTE — PROGRESS NOTES
[address] [id number] [place]      Date:5/10/2022                                                  Patient Name: Skinny Gaona  MRN: [id number]  : 1945  Room: [address] [place] [address]    Evaluating OT: Nicolasa Rides, [address] [place], OTR/L [id number]  Referring Digna Xavier MD  Specific Provider Orders: OT eval and treat   Recommended Adaptive Equipment: 24 hr physical assist     Diagnosis: respiratory failure   Surgery: none  Pertinent Medical History: DM, HTN, HLD, CAD, CHF, COPD   Precautions:  Fall Risk, droplet +, dialysis, continuous pulse ox, O2, TAPS    Assessment of current deficits   [x] Functional mobility  [x]ADLs  [x] Strength               [x]Cognition   [x] Functional transfers   [x] IADLs         [x] Safety Awareness   [x]Endurance   [] Fine Coordination              [x] Balance      [] Vision/perception   [x]Sensation    []Gross Motor Coordination  [] ROM  [] Delirium                   [] Motor Control     OT PLAN OF CARE   OT POC based on physician orders, patient diagnosis and results of clinical assessment    Frequency/Duration: 2-3 days/wk for 2 weeks PRN   Specific OT Treatment to include:   * Instruction/training on adapted ADL techniques and AE recommendations to increase functional independence within precautions       * Training on energy conservation strategies, correct breathing pattern and techniques to improve independence/tolerance for self-care routine  * Functional transfer/mobility training/DME recommendations for increased independence, safety, and fall prevention  * Patient/Family education to increase follow through with safety techniques and functional independence  * Recommendation of environmental modifications for increased safety with functional transfers/mobility and ADLs  * Cognitive retraining/development of therapeutic activities to improve problem solving, judgement, memory, and attention for increased safety/participation in ADL/IADL tasks  * Therapeutic exercise to improve motor endurance, ROM, and functional strength for ADLs/functional transfers  * Therapeutic activities to facilitate/challenge dynamic balance, stand tolerance for increased safety and independence with ADLs  * Neuro-muscular re-education: facilitation of righting/equilibrium reactions, midline orientation, scapular stability/mobility, normalization of muscle tone, and facilitation of volitional active controled movement    Home Living: Pt presents from a [facility]. He required assist with ADLs. Pain Level:  Pt denies pain this session    Cognition: A&O: x3; Follows 1 step directions, with repetition and increased time   Memory:  fair    Sequencing:  fair    Problem solving:  fair    Judgement/safety:  fair     Functional Assessment:  AM-PAC Daily Activity Raw Score: 12/24   Initial Eval Status  Date: 5/5/22 Treatment Status  Date: 5/10/22 STGs=LTGs  Time Frame: 10-14 days   Feeding Min A (bed level)  Set-up Independent    Updated 5/10/22   Grooming Min A (seated EOB for oral care)  Min A    Seated at EOB SBA   UB Dressing Mod A (due to limited ROM)  Mod A    Assist threading UE and around back  Min A   LB Dressing Dep (assist with socks) Dep Max A    Bathing Max A (simulated) Max A Mod A    Toileting dep Dep    Incontinent of BM ; dependent for hygiene  Max A   Bed Mobility  Log roll: Mod A  Supine to sit: Mod A   Sit to supine: Mod A  Supine to sit:  Max A   Sit to supine: Max A x2     Log roll CGA  Supine to sit: CGA   Sit to supine: CGA   Functional Transfers Sit to stand:NT   Stand to sit:NT  Commode: NT Max A x2   Sit to stands Mod A   Functional Mobility NT NT TBA   Balance Sitting: Min A (progressed to SBA)  Standing: NT Min A  (sitting)    Max A x2  (standing)    Activity Tolerance Fair- F-    Limited due to fatigue and weakness; + desaturation to 84%    Visual/  Perceptual Glasses: yes wfl              Comments: Upon arrival pt supine in bed and agreeable to OT Session. At end of session, pt semi-supine in bed with  all lines and tubes intact, call light within reach. Treatment: Facilitation of bed mobility, unsupported sitting balance at EOB (impacting ADLs; addressing posture, weight shifting, dynamic reaching), functional sit to stand transfers, standing tolerance tasks with B UE support (addressing posture, balance and activity tolerance; impacting ADLs and functional activity) - skilled cuing on sequencing, hand placement, posture, body mechanics, energy conservation techniques and safety. Therapist facilitated self-care retraining: UB/LB self-care tasks (gown, socks, partial bathing task), toileting hygiene task (+ incontinence) and grooming tasks while educating pt on sequencing, modified techniques, posture, safety and energy conservation techniques. Skilled monitoring of HR, O2 sats and pts response to treatment (Pt O2 sat 92-84% throughout session - cuing on pursed lip breathing techniques). · Pt has made fair progress towards set goals.    · Continue with current plan of care: addressing adl retraining, transfer training       Treatment Time In:850            Treatment Time Out: 355              Treatment Charges: Mins Units   Ther Ex  [id number]     Manual Therapy [phone]     Thera Activities 17863 11 1   ADL/Home Mgt [id number] 56 1   Neuro Re-ed [id number]     Group Therapy      Orthotic manage/training  20125     Non-Billable Time     Total Timed Treatment 25 2        [facility] OTR/L #[id number]

## 2022-05-11 NOTE — PROGRESS NOTES
[facility]  Nephrology Progress Note    Patient's Name: Jelena Geiger     History of Present Illness from 5/4 consult note:    Burton Mejias is a 65 y.o. male with a past medical history of hypertension, hyperlipidemia, COPD, coronary artery disease, and CHF. He presented to the ED on 5/4 with complaints of shortness of breath. Vital signs at presentation to the ED include temperature 98, respirations 20, pulse 88, /80, and he was 95% on 6 L. Lab data on presentation to the ED include BUN 34, creatinine 5.3, proBNP 9247, and hemoglobin 9.6. Chest x-ray showed \"bilateral pleural effusion. \"  We were consulted to see the patient for HD. Patient is known to our service and dialyzes at [facility] 6 MWF second shift via a right thigh tunneled dialysis catheter. Patient not personally seen or examined due to COVID isolation, subsequently a review of systems was not obtained due to [facility] isolation. \"    Subjective:    5/11: Patient was not personally seen or examined due to COVID isolation. Discussed with the bedside nurse, who reports that the patient had issues with his oxygen saturations, subsequently has been wearing the BiPAP.      PMH:    Past Medical History:   Diagnosis Date    Acid reflux     Agent orange exposure     Arthritis     CAD (coronary artery disease)     Congestive heart failure (CHF) (HCC)     COPD (chronic obstructive pulmonary disease) (HCC)     Depression     Diabetes mellitus ([address]     History of blood transfusion     Hyperlipidemia     Hypertension     MI (myocardial infarction) ([address]     Polio     Sleep apnea      Patient Active Problem List   Diagnosis    PNA (pneumonia)    Acute on chronic heart failure with preserved ejection fraction ([address]    Coronary artery disease involving native coronary artery of native heart without angina pectoris    Cardiac pacemaker in situ    Primary hypertension    SPEEDY (obstructive sleep apnea)    Chronic respiratory failure with hypoxia ([facility]    CHF (congestive heart failure), NYHA class I, acute on chronic, combined ([facility]    Acute on chronic clinical systolic heart failure (HCC)    Acute on chronic systolic heart failure (HCC)    Pleural effusion    Respiratory failure (HCC)    COVID-19    Acute on chronic respiratory failure (HCC)     Meds:    Meds prn:     Meds prior to admission:     No current facility-administered medications on file prior to encounter.      Current Outpatient Medications on File Prior to Encounter   Medication Sig Dispense Refill    ondansetron (ZOFRAN-ODT) 4 MG disintegrating tablet Take 1 tablet by mouth every 8 hours as needed for Nausea or Vomiting 60 tablet 0    midodrine (PROAMATINE) 10 MG tablet Take 1 tablet by mouth as needed (give 30 min before dialysis) 90 tablet 3    bumetanide (BUMEX) 2 MG tablet Take 1 tablet by mouth daily 30 tablet 3    docusate sodium (COLACE) 100 MG capsule Take 1 capsule by mouth 2 times daily 60 capsule 0    guaiFENesin 400 MG tablet Take 400 mg by mouth 4 times daily as needed for Cough      ipratropium-albuterol (DUONEB) 0.5-2.5 (3) MG/3ML SOLN nebulizer solution Take 1 vial by nebulization every 4 hours as needed for Shortness of Breath      potassium chloride (KLOR-CON M) 10 MEQ extended release tablet Take 20 mEq by mouth daily      insulin glargine (LANTUS SOLOSTAR) 100 UNIT/ML injection pen Inject 50 Units into the skin nightly      loperamide (IMODIUM) 2 MG capsule Take 2 mg by mouth 4 times daily as needed for Diarrhea      buPROPion (WELLBUTRIN XL) 300 MG extended release tablet Take 300 mg by mouth every morning      ezetimibe (ZETIA) 10 MG tablet Take 10 mg by mouth at bedtime      Zinc Sulfate 110 MG TABS Take 2 tablets by mouth daily      magnesium hydroxide (MILK OF MAGNESIA) 400 MG/5ML suspension Take 30 mLs by mouth daily as needed for Constipation      melatonin 3 MG TABS tablet Take 3 mg by mouth at bedtime      insulin lispro, 1 Unit Dial, (HUMALOG KWIKPEN) 100 UNIT/ML SOPN Inject 0-10 Units into the skin 4 times daily (before meals and nightly) *Per Sliding Scale*      metoprolol succinate (TOPROL XL) 25 MG extended release tablet Take 1 tablet by mouth 2 times daily 30 tablet 3    acetaminophen (TYLENOL) 325 MG tablet Take 650 mg by mouth every 4 hours as needed for Pain or Fever       vitamin C (ASCORBIC ACID) 500 MG tablet Take 500 mg by mouth 2 times daily      vitamin D (CHOLECALCIFEROL) 50 MCG (2000 UT) TABS tablet Take 2,000 Units by mouth daily       aspirin 81 MG EC tablet Take 1 tablet by mouth daily 30 tablet 3    folic acid (FOLVITE) 1 MG tablet Take 1 mg by mouth daily      cyanocobalamin 1000 MCG tablet Take 1,000 mcg by mouth daily      atorvastatin (LIPITOR) 40 MG tablet Take 40 mg by mouth at bedtime       pantoprazole (PROTONIX) 40 MG tablet Take 40 mg by mouth daily       Allergies:    Penicillins    Social History:     reports that he has quit smoking. He has never used smokeless tobacco. He reports that he does not drink alcohol and does not use drugs. Family History:     History reviewed. No pertinent family history. Physical Exam:    Patient Vitals for the past 24 hrs:   BP Temp Temp src Pulse Resp SpO2 Weight   05/11/22 0615 105/73 97.7 °F (36.5 °C) Axillary 92 19     05/11/22 0452       253 lb 3.2 oz (114.9 kg)   05/11/22 0127      90 %    05/11/22 0051     20     05/10/22 2330 132/82 97.9 °F (36.6 °C) Axillary 91 22 91 %    05/10/22 2000 102/75 98.4 °F (36.9 °C) Axillary 94 19 100 %    05/10/22 1950     24 92 %    05/10/22 1444 138/80 97.4 °F (36.3 °C) Oral 78 16 92 %    05/10/22 1435     18 92 %        Intake/Output Summary (Last 24 hours) at 5/11/2022 1112  Last data filed at 5/11/2022 0452  Gross per 24 hour   Intake 0 ml   Output 0 ml   Net 0 ml      Not personally examined due to COVID isolation.    Due to the current efforts to prevent transmission of COVID-19 and also the need to preserve PPE for other caregivers, a face-to-face encounter with the patient was not performed. That being said, all relevant records and diagnostic tests were reviewed, including laboratory results and imaging. Please reference any relevant documentation elsewhere. Care will be coordinated with the primary service. Data:    Recent Labs     05/09/22  0512 05/10/22  0629 05/11/22  0450   WBC 10.9 11.1 14.2*   HGB 9.5* 9.5* 9.9*   HCT 31.2* 31.5* 31.6*   MCV 94.3 95.7 92.9    219 267     Recent Labs     05/09/22  0512 05/10/22  0629 05/11/22  0450    138 137   K 4.2 4.2 4.7   CL 99 97* 97*   CO2 28 29 26   CREATININE 6.0* 5.1* 5.5*   BUN 52* 40* 51*   LABGLOM 9 11 10   GLUCOSE 74 69* 63*   CALCIUM 8.1* 8.1* 8.5*     No results found for: VITD25    PTH   Date Value Ref Range Status   04/28/2022 118 (H) 15 - 65 pg/mL Final     No results for input(s): ALT, AST, ALKPHOS, BILITOT, BILIDIR in the last 72 hours. No results for input(s): LABALBU in the last 72 hours. Ferritin   Date Value Ref Range Status   05/04/2022 795 ng/mL Final     Comment:     FERRITIN Reference Ranges:  Adult Males   20 - 60 years:    30 - 400 ng/mL  Adult females 16 - 61 years:    15 - 150 ng/mL  Adults greater than 60 years:   no established reference range  Pediatrics:                     no established reference range       No results found for: VYBDZCYC43    No results found for: FOLATE    Lab Results   Component Value Date    COLORU Yellow 04/07/2022    NITRU Negative 04/07/2022    GLUCOSEU Negative 04/07/2022    KETUA Negative 04/07/2022    UROBILINOGEN 0.2 04/07/2022    BILIRUBINUR Negative 04/07/2022     Lab Results   Component Value Date/Time    OSMOU 351 01/22/2022 11:24 AM     No components found for: URIC    No results found for: LIPIDPAN    Assessment and Plan:    1.   Shortness of breath   History of COPD  Chest x-ray showed \"bilateral pleural effusion\"  COVID positive on 4/25  On oxygen via nasal cannula/ intermittent BIPAP use   Pulmonology consulted  Management per primary/pulmonology    2. ESRD on HD  OP [facility] MWF second shift  via a right thigh TDC  Continue HD MWF    3. Hypotension  SBPs 80s90s intermittently   On midodrine as an outpatient  Avoid further hypotension  Monitor BPs    4. Anemia  Hemoglobin 9.9 today  Transfuse for hemoglobin<7  Monitor H&H    5. Secondary hyperparathyroidism of renal origin  With hypocalcemia/ hypoalbuminemia- calcium 8.2 and albumin 3.1 on 5/4   on 4/28, phos 5.8 on 5/6  Low phos diet   Monitor labs    6. Hyperkalemia  Likely in setting of ESRD  K+ 4.7 today  Low potassium diet  Monitor labs    GILMAR Hamilton - CNP     Patient seen and examined all key components of the physical performed independently , case discussed with NP, all pertinent labs and radiologic tests personally reviewed agree with above.     More hypoxic today; will plaowsaren for more aggressive fluid removal during hemodialysis  D/W Dr. Tammy Dolan MD

## 2022-05-11 NOTE — PROCEDURES
Spoke with nurse about this patient having to come down the the xray department for the two view chest xray. She stated that she would have to talk with respiratory about a good time for them to come down with patient. If unable to have respiratory come down today we will try again tomorrow 5/12.

## 2022-05-11 NOTE — PROGRESS NOTES
Progress Note  Date:2022       [id number]  Patient Gabriel Scott     YOB: 1945     Age:77 y.o. Patient says he feels decent. Subjective    Subjective:  Symptoms:  He reports shortness of breath and weakness. No chest pain. Diet:  Adequate intake. Activity level: Impaired due to weakness. Pain:  He reports no pain. Review of Systems   Constitutional: Negative for activity change and fever. HENT: Negative for congestion. Respiratory: Positive for shortness of breath. Cardiovascular: Negative for chest pain. Gastrointestinal: Negative for abdominal pain. Neurological: Positive for weakness. Negative for dizziness. Objective         Vitals Last 24 Hours:  TEMPERATURE:  Temp  Av.9 °F (36.6 °C)  Min: 97.4 °F (36.3 °C)  Max: 98.4 °F (36.9 °C)  RESPIRATIONS RANGE: Resp  Av.5  Min: 16  Max: 24  PULSE OXIMETRY RANGE: SpO2  Av.9 %  Min: 90 %  Max: 100 %  PULSE RANGE: Pulse  Av.6  Min: 78  Max: 94  BLOOD PRESSURE RANGE: Systolic (97ZMX), BCD:069 , Min:102 , MNB:335   ; Diastolic (71PJC), LKS:74, Min:66, Max:82    I/O (24Hr): Intake/Output Summary (Last 24 hours) at 2022 0656  Last data filed at 2022 0452  Gross per 24 hour   Intake 0 ml   Output 0 ml   Net 0 ml     Objective:  General Appearance:  Comfortable. Vital signs: (most recent): Blood pressure 105/73, pulse 92, temperature 97.7 °F (36.5 °C), temperature source Axillary, resp. rate 19, height 5' 9\" (1.753 m), weight 253 lb 3.2 oz (114.9 kg), SpO2 90 %. No fever. Lungs: There are decreased breath sounds. Heart: Normal rate. Regular rhythm.   S1 normal and S2 normal.      Labs/Imaging/Diagnostics    Labs:  CBC:  Recent Labs     22  0512 05/10/22  0629 22  0450   WBC 10.9 11.1 14.2*   RBC 3.31* 3.29* 3.40*   HGB 9.5* 9.5* 9.9*   HCT 31.2* 31.5* 31.6*   MCV 94.3 95.7 92.9   RDW 16.5* 16.5* 16.5*    219 267     CHEMISTRIES:  Recent Labs 05/09/22  0512 05/10/22  0629 05/11/22  0450    138 137   K 4.2 4.2 4.7   CL 99 97* 97*   CO2 28 29 26   BUN 52* 40* 51*   CREATININE 6.0* 5.1* 5.5*   GLUCOSE 74 69* 63*     PT/INR:No results for input(s): PROTIME, INR in the last 72 hours. APTT:No results for input(s): APTT in the last 72 hours. LIVER PROFILE:  No results for input(s): AST, ALT, BILIDIR, BILITOT, ALKPHOS in the last 72 hours. Imaging Last 24 Hours:  XR CHEST PORTABLE    Result Date: 5/4/2022  EXAMINATION: ONE XRAY VIEW OF THE CHEST 5/4/2022 10:49 am HISTORY: ORDERING SYSTEM PROVIDED HISTORY: shortness of breath TECHNOLOGIST PROVIDED HISTORY: Reason for exam:->shortness of breath What reading provider will be dictating this exam?->CRC FINDINGS: There are opacities in the mid and lower lungs. There is moderate right pleural effusion. No pneumothorax. The heart is not enlarged. Pacemaker in place. Bilateral pleural effusion. For impose underlying infiltrates in the mid and lower lungs cannot be excluded. US DUP LOWER EXTREMITIES BILATERAL VENOUS    Result Date: 5/4/2022  EXAMINATION: DUPLEX VENOUS ULTRASOUND OF THE BILATERAL LOWER EXTREMITIES5/4/2022 1:37 pm TECHNIQUE: Duplex ultrasound using B-mode/gray scaled imaging, Doppler spectral analysis and color flow Doppler was obtained of the deep venous structures of the lower bilateral extremities. COMPARISON: None. HISTORY: ORDERING SYSTEM PROVIDED HISTORY: dvt TECHNOLOGIST PROVIDED HISTORY: Reason for exam:->dvt What reading provider will be dictating this exam?->CRC FINDINGS: The visualized veins of the bilateral lower extremities are patent and free of echogenic thrombus. The veins demonstrate good compressibility with normal color flow study and spectral analysis. No evidence of DVT in either lower extremity. There is a venous catheter in the left common femoral vein.      Assessment//Plan           Hospital Problems           Last Modified POA    * (Principal) Acute on chronic respiratory failure ([facility] 5/4/2022 Yes        Assessment:  ((Principal) Acute on chronic respiratory failure ([facility] 5/4/2022 Yes      covid 19  chronic hypoxic respiratory failure. CHF  Pleural effusion  ESRD  DM  COPD  HTN  Hyperlipidemia           Plan  Pleural effusions appear stable  Renal following for dialysis  Pulmonary following. ).

## 2022-05-11 NOTE — FLOWSHEET NOTE
05/11/22 1709   Vital Signs   BP (!) 137/52   Temp 98.1 °F (36.7 °C)   Pulse 90   Resp 22   SpO2 100 %   Weight 244 lb 0.8 oz (110.7 kg)   Weight Method Bed scale   Percent Weight Change -1.77   Pain Assessment   Pain Assessment None - Denies Pain   Post-Hemodialysis Assessment   Post-Treatment Procedures Blood returned;Catheter capped, clamped and heparinized x 2 ports   Machine Disinfection Process Acid/Vinegar Clean;Heat Disinfect; Exterior Machine Disinfection   Rinseback Volume (ml) 300 ml   Total Liters Processed (l/min) 2300 l/min   Dialyzer Clearance Moderately streaked   Duration of Treatment (minutes) 210 minutes   NET Removed (ml) 2000 ml   Tolerated Treatment Fair   Patient Response to Treatment Pt hypoxic andput on cpap at 1530. Also hypotensive throughout tx. Bilateral Breath Sounds Diminished;Rhonchi   Edema Generalized   Edema Generalized +2   Patient Disposition Return to room   Pt tolerated 3.5 hr HD tx fair. 2L removed. Catheter closed with heparin to dwell using aseptic technique, clamped and closed. Report to floor RN given.

## 2022-05-11 NOTE — PROGRESS NOTES
Cpap found off, pt does not want to go back on at this time, prn duo given, breath sounds as charted, o2 increased to 15L, spo2 88-90%      Pt continued to desat in low 80s, pt agreeable to wear cpap    cpap of 12/75%, spo2 91

## 2022-05-11 NOTE — PROGRESS NOTES
Upon assessment of patient, bilateral lower extremities noted to have mottling from mid shin to thigh area. Then from mid shin down the patients extremities are red, warm, and painful to touch. Attending notified.  No new orders given

## 2022-05-11 NOTE — PROGRESS NOTES
Patricia served Dr Regina Yoo re: bp 83/70, glucose 63    Perfect served Palliative care with consult.

## 2022-05-11 NOTE — PROGRESS NOTES
Date: 5/11/2022    Time: 12:52 AM    Patient Placed On BIPAP/CPAP/ Non-Invasive Ventilation? No    If no must comment. Facial area red/color change? Yes           If YES are Blister/Lesion present? yes If yes must notify nursing staff  BIPAP/CPAP skin barrier?   Yes    Skin barrier type:mepilexlite       Comments:    Remains on from previous shift, mask adjusted, loosened      Linda Hughes RCP

## 2022-05-11 NOTE — PROGRESS NOTES
Dialysis called and said patient needed placed on a mask. ..respiratory notified that is in dialysis and needs placed on bipap. Dialysis nurse notified.

## 2022-05-11 NOTE — PROGRESS NOTES
[place]  Department of Internal Medicine  Division of Pulmonary, Critical Care and Sleep Medicine  Progress Note    Sisi Crump DO, MPH, [facility], [facility], [address] [facility], [facility]      Patient: Roseann Parra  MRN: [id number]  : 1945    Encounter Time: 5:53 PM     Date of Admission: 2022 10:02 AM    Primary Care Physician: Catie Yoon MD    Reason for Consultation:Chronic pleural effusion, shortness of breath, cough     SUBJECTIVE: Patient seen and examined this afternoon while on dialysis. He does appear to be more dyspneic. Spoke with the dialysis nurse and Dr. Juan Danielson he had been dropping his saturation while on a high flow nasal cannula and was started on his CPAP. Currently the patient does report continued symptoms of shortness of breath however denies coughing, wheezing, chest pain, chest pressure, he does feel that the CPAP is helping. OBJECTIVE:     PHYSICAL EXAM:   VITALS:   Vitals:    22 1600 22 1629 22 1705 22 1709   BP: 134/85 130/70 (!) 137/52 (!) 137/52   Pulse: 93 92 90 90   Resp:   18 22   Temp:   98.1 °F (36.7 °C) 98.1 °F (36.7 °C)   TempSrc:       SpO2:    100%   Weight:    244 lb 0.8 oz (110.7 kg)   Height:            Intake/Output Summary (Last 24 hours) at 2022 1753  Last data filed at 2022 0452  Gross per 24 hour   Intake    Output 0 ml   Net 0 ml        CONSTITUTIONAL:    A&O x 3, mild respiratory distress  SKIN:     Chronic  skin discoloration. Skin breakdown on nose bridge noted  HEENT:     EOMI, MMM, No thrush  NECK:    No bruits, no JVD  CV:      Sinus,  No murmur, No rubs, No gallops  PULMONARY:   Clear in upper lobes diminished in bases bilaterally. ABDOMEN:     Soft, non-tender. BS normal. No R/R/G  EXT:    No deformities . No clubbing. Edema remains 3+ bilaterally, sacral edema present  PULSE:   Appears equal and palpable.   PSYCHIATRIC:  Seems appropriate, No acute psycosis  MS:    No fractures, Weakness  NEUROLOGIC:   The clinical assessment is non-focal     DATA: IMAGING & TESTING:     LABORATORY TESTS:    CBC with Differential:    Lab Results   Component Value Date    WBC 14.2 05/11/2022    RBC 3.40 05/11/2022    HGB 9.9 05/11/2022    HCT 31.6 05/11/2022     05/11/2022    MCV 92.9 05/11/2022    MCH 29.1 05/11/2022    MCHC 31.3 05/11/2022    RDW 16.5 05/11/2022    LYMPHOPCT 24.3 05/04/2022    MONOPCT 10.8 05/04/2022    BASOPCT 0.2 05/04/2022    MONOSABS 1.09 05/04/2022    LYMPHSABS 2.45 05/04/2022    EOSABS 0.10 05/04/2022    BASOSABS 0.02 05/04/2022     CMP:    Lab Results   Component Value Date     05/11/2022    K 4.7 05/11/2022    K 4.8 05/04/2022    CL 97 05/11/2022    CO2 26 05/11/2022    BUN 51 05/11/2022    CREATININE 5.5 05/11/2022    GFRAA 12 05/11/2022    LABGLOM 10 05/11/2022    GLUCOSE 63 05/11/2022    PROT 6.3 05/04/2022    LABALBU 3.1 05/04/2022    CALCIUM 8.5 05/11/2022    BILITOT 0.5 05/04/2022    ALKPHOS 90 05/04/2022    AST 23 05/04/2022    ALT 21 05/04/2022        PRO-BNP:   Lab Results   Component Value Date    PROBNP 9,247 (H) 05/04/2022    PROBNP 5,643 (H) 04/25/2022      ABGs:   Lab Results   Component Value Date    PH 7.425 05/11/2022    PO2 105.6 05/11/2022    PCO2 39.3 05/11/2022     Hemoglobin A1C: No components found for: HGBA1C    IMAGING:  Imaging tests were completed and reviewed and discussed radiology and care team involved and reveals     Echo Limited  Result Date: 3/19/2022  Findings   Left Ventricle  Left ventricle is grossly normal in size. No gross regional wall motion abnormalities seen. Grossly normal left ventricular ejection fraction. Indeterminate diastolic function. Right Ventricle  The right ventricle was not clearly visualized. Grossly normal right ventricular size and function. Pacer wire visualized in right ventricle. Left Atrium  Normal sized left atrium. Interatrial septum appears intact.    Right Atrium Normal right atrium size. Pacemaker lead was visualized in RA cavity. Mitral Valve  Structurally normal mitral valve. Tricuspid Valve  The tricuspid valve was not well visualized. Aortic Valve  The aortic valve leaflets were not well visualized. Pulmonic Valve  The pulmonic valve was not well visualized. Pericardial Effusion  There is a small anterior pericardial effusion noted. Aorta  Aorta was not clearly visualized. Conclusions   Summary  Technically suboptimal and limited study with images limited to subcostal  window. Left ventricle is grossly normal in size. No gross regional wall motion abnormalities seen. Grossly normal left ventricular ejection fraction. There is a small anterior pericardial effusion noted. Assessment:   1. Cough  2. COVIDlikely asymptomatic viral shedding at this point  3. Chronic SOB  4. ELEVATED BNP  5. ESRD   6. VOlume overload  7. Oxygen dependence  8. 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 History of SPEEDY    Plan:   1. ABG ordered and reviewed. Does not show hypercapnia. 2. 2 view chest x-ray is pending to evaluate for pleural effusions consider thoracentesis in a.m.  3. Wean FiO2 as tolerated  4. Continue bronchodilators  5. HD as per nephrology, discussed with Dr. Roni Lisa patient's volume status need for possible ultrafiltration on days that patient is not receiving hemodialysis. They will continue to discuss this based upon the patient's blood pressure.       Domenic Tang, DO   Pulmonary, Critical Care and Sleep Medicine

## 2022-05-11 NOTE — PLAN OF CARE
Problem: Chronic Conditions and Co-morbidities  Goal: Patient's chronic conditions and co-morbidity symptoms are monitored and maintained or improved  5/10/2022 2026 by Rudi Mota RN  Outcome: Progressing  5/10/2022 1133 by Bette Jacobsen RN  Outcome: Progressing     Problem: Discharge Planning  Goal: Discharge to home or other facility with appropriate resources  5/10/2022 2026 by Rudi Mota RN  Outcome: Progressing  5/10/2022 1133 by Bette Jacobsen RN  Outcome: Progressing     Problem: Safety - Adult  Goal: Free from fall injury  5/10/2022 2026 by Rudi Mota RN  Outcome: Progressing  5/10/2022 1133 by Bette Jacobsen RN  Outcome: Progressing     Problem: Pain  Goal: Verbalizes/displays adequate comfort level or baseline comfort level  5/10/2022 2026 by Rudi Mota RN  Outcome: Progressing  5/10/2022 1133 by Bette Jacobsen RN  Outcome: Progressing     Problem: Skin/Tissue Integrity  Goal: Absence of new skin breakdown  Description: 1. Monitor for areas of redness and/or skin breakdown  2. Assess vascular access sites hourly  3. Every 4-6 hours minimum:  Change oxygen saturation probe site  4. Every 4-6 hours:  If on nasal continuous positive airway pressure, respiratory therapy assess nares and determine need for appliance change or resting period.   5/10/2022 2026 by Rudi Mota RN  Outcome: Progressing  5/10/2022 1133 by Bette Jacobsen RN  Outcome: Progressing     Problem: Cardiovascular - Adult  Goal: Maintains optimal cardiac output and hemodynamic stability  5/10/2022 2026 by Rudi Mota RN  Outcome: Progressing  5/10/2022 1133 by Bette Jacobsen RN  Outcome: Progressing     Problem: Metabolic/Fluid and Electrolytes - Adult  Goal: Hemodynamic stability and optimal renal function maintained  5/10/2022 2026 by Rudi Mota RN  Outcome: Progressing  5/10/2022 1133 by Bette Jacobsen RN  Outcome: Progressing     Problem: ABCDS Injury Assessment  Goal: Absence of physical injury  5/10/2022 2026 by Rudi Mota RN  Outcome: Progressing  5/10/2022 1133 by Adamaris Oliva RN  Outcome: Progressing     Problem: Nutrition Deficit:  Goal: Optimize nutritional status  5/10/2022 2026 by Asuncion Vaca RN  Outcome: Progressing  5/10/2022 1133 by Adamaris Oliva RN  Outcome: Progressing

## 2022-05-11 NOTE — PROGRESS NOTES
Date: 5/11/2022    Time: 5:39 PM    Patient Placed On BIPAP/CPAP/ Non-Invasive Ventilation? Yes    If no must comment. Facial area red/color change? Yes           If YES are Blister/Lesion present? Yes   If yes must notify nursing staff  BIPAP/CPAP skin barrier?   No    Skin barrier type:othertotal face mask       Comments:        Fadia Luciano

## 2022-05-12 NOTE — PROGRESS NOTES
This therapist reapplied total face mask after patient broke the first total face mask. Patient educated on not attempting to pull mask off.

## 2022-05-12 NOTE — PLAN OF CARE
Problem: Nutrition Deficit:  Goal: Optimize nutritional status  Outcome: Progressing  Flowsheets (Taken 5/12/2022 1122)  Nutrient intake appropriate for improving, restoring, or maintaining nutritional needs:   Monitor oral intake, labs, and treatment plans   Recommend appropriate diets, oral nutritional supplements, and vitamin/mineral supplements

## 2022-05-12 NOTE — PROGRESS NOTES
Progress Note  Date:2022       [id number]  Patient Gabriel Scott     YOB: 1945     Age:77 y.o. Patient says he feels decent. Subjective    Subjective:  Symptoms:  He reports shortness of breath and weakness. No chest pain. Diet:  Adequate intake. Activity level: Impaired due to weakness. Pain:  He reports no pain. Review of Systems   Constitutional: Negative for activity change and fever. HENT: Negative for congestion. Respiratory: Positive for shortness of breath. Cardiovascular: Negative for chest pain. Gastrointestinal: Negative for abdominal pain. Neurological: Positive for weakness. Negative for dizziness. Objective         Vitals Last 24 Hours:  TEMPERATURE:  Temp  Av.3 °F (36.8 °C)  Min: 98 °F (36.7 °C)  Max: 99.3 °F (37.4 °C)  RESPIRATIONS RANGE: Resp  Av.1  Min: 16  Max: 22  PULSE OXIMETRY RANGE: SpO2  Av.2 %  Min: 90 %  Max: 100 %  PULSE RANGE: Pulse  Av.2  Min: 82  Max: 105  BLOOD PRESSURE RANGE: Systolic (76FTA), JYX:231 , Min:83 , HGF:653   ; Diastolic (76LDM), FO, Min:42, Max:85    I/O (24Hr): Intake/Output Summary (Last 24 hours) at 2022 0650  Last data filed at 2022 0521  Gross per 24 hour   Intake    Output 0 ml   Net 0 ml     Objective:  General Appearance:  Comfortable. Vital signs: (most recent): Blood pressure (!) 137/59, pulse 97, temperature 98.4 °F (36.9 °C), temperature source Axillary, resp. rate 16, height 5' 9\" (1.753 m), weight 240 lb (108.9 kg), SpO2 90 %. No fever. Lungs: There are decreased breath sounds. Heart: Normal rate. Regular rhythm.   S1 normal and S2 normal.      Labs/Imaging/Diagnostics    Labs:  CBC:  Recent Labs     220 22   WBC 14.2* 10.0 9.0   RBC 3.40* 3.38* 3.23*   HGB 9.9* 10.0* 9.3*   HCT 31.6* 31.4* 30.0*   MCV 92.9 92.9 92.9   RDW 16.5* 16.4* 16.3*    225 204     CHEMISTRIES:  Recent Labs 05/11/22 0450 05/11/22 2033 05/12/22 0450    136 137   K 4.7 4.4 4.1   CL 97* 96* 98   CO2 26 28 28   BUN 51* 35* 39*   CREATININE 5.5* 4.6* 5.2*   GLUCOSE 63* 133* 71*     PT/INR:No results for input(s): PROTIME, INR in the last 72 hours. APTT:No results for input(s): APTT in the last 72 hours. LIVER PROFILE:  Recent Labs     05/11/22 2033   AST 28   ALT 23   BILITOT 0.7   ALKPHOS 106       Imaging Last 24 Hours:  XR CHEST PORTABLE    Result Date: 5/4/2022  EXAMINATION: ONE XRAY VIEW OF THE CHEST 5/4/2022 10:49 am HISTORY: ORDERING SYSTEM PROVIDED HISTORY: shortness of breath TECHNOLOGIST PROVIDED HISTORY: Reason for exam:->shortness of breath What reading provider will be dictating this exam?->CRC FINDINGS: There are opacities in the mid and lower lungs. There is moderate right pleural effusion. No pneumothorax. The heart is not enlarged. Pacemaker in place. Bilateral pleural effusion. For impose underlying infiltrates in the mid and lower lungs cannot be excluded. US DUP LOWER EXTREMITIES BILATERAL VENOUS    Result Date: 5/4/2022  EXAMINATION: DUPLEX VENOUS ULTRASOUND OF THE BILATERAL LOWER EXTREMITIES5/4/2022 1:37 pm TECHNIQUE: Duplex ultrasound using B-mode/gray scaled imaging, Doppler spectral analysis and color flow Doppler was obtained of the deep venous structures of the lower bilateral extremities. COMPARISON: None. HISTORY: ORDERING SYSTEM PROVIDED HISTORY: dvt TECHNOLOGIST PROVIDED HISTORY: Reason for exam:->dvt What reading provider will be dictating this exam?->CRC FINDINGS: The visualized veins of the bilateral lower extremities are patent and free of echogenic thrombus. The veins demonstrate good compressibility with normal color flow study and spectral analysis. No evidence of DVT in either lower extremity. There is a venous catheter in the left common femoral vein.      Assessment//Plan           Hospital Problems           Last Modified POA    * (Principal) Acute on chronic respiratory failure ([facility] 5/4/2022 Yes        Assessment:  ((Principal) Acute on chronic respiratory failure ([facility] 5/4/2022 Yes      covid 19  chronic hypoxic respiratory failure. CHF  Pleural effusion  ESRD  DM  COPD  HTN  Hyperlipidemia           Plan  Pleural effusions appear stable  Renal following for dialysis  Pulmonary following. ).

## 2022-05-12 NOTE — CONSULTS
Palliative Care Department  [phone]  Palliative Care Initial Consult  Provider GILMAR Bravo CNP      PATIENT: Rudy Umanzor  : 1945  MRN: [id number]  ADMISSION DATE: 2022 10:02 AM  Referring Provider: Jorge Luis Green MD    Palliative Medicine was consulted on hospital day 8 for assistance with Goals of care, Family support    HPI:     Dexter Joseph is a 65 y.o. y/o male with a history of copd, depression, mame, htn, hyperlipidemia, dm, HFpEF, pacer, ESRD on hemodilaysis who [facility] to [facility] on 2022 with shortness of breath and hypoxia. He tested positive for COVID-19 a week prior. He was found to have bilateral pleural effusions. He was admitted to telemetry and has had increasing oxygen requirements. He is on the BiPAP with 90% FiO2. ASSESSMENT/PLAN:     Pertinent Hospital Diagnoses      Acute on chronic hypoxic respiratory failure   COVID-19   Pleural effusions    Palliative Care Encounter / Counseling Regarding Goals of Care  Please see detailed goals of care discussion as below   At this time, Rudy Umanzor, Does have capacity for medical decision-making. Capacity is time limited and situation/question specific   During encounter Caden Hernandez was surrogate medical decision-maker   Outcome of goals of care meeting: The patient has decided to transition to a [facility] and have hospice consulted. He does not want any further treatment and just wants comfort measures.  Code status DNR-CC   Advanced Directives: no POA or living will in [facility]   Surrogate/Legal NOK:  o Sly Haver (sister/ HPOA) [phone]    Spiritual assessment: no spiritual distress identified  Bereavement and grief: to be determined  Referrals to: none today    Thank you for the opportunity to participate in the care of Rudy Umanzor.      GILMAR Bravo CNP   Palliative Medicine     SUBJECTIVE:     Details of Conversation: Chart reviewed and met with the patient at bedside. We had a goals of care conversation. We discussed the reoccurring pleural effusions, respiratory failure, and dialysis. Patient expressed that he has no quality of life. We discussed comfort measures and hospice. 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 He stated that he would like to see his sister and speak with her. I called his sister Claude Tucker, provided an update and explained our conversation. She expressed that she would be up to speak with him. Received a message from the patient's nurse explaining that the patient and family have decided on hospice. I met with the patient and his sister at the bedside. We discussed comfort care, medications for comfort and BiPAP removal.  Mr. Dhruv Smith wishes for his code status to be changed to a [facility] and start medications for comfort. Once he is comfortable he wishes for BiPAP to be removed. His sister is supportive of his wishes. Comfort and support provided. Prognosis: Guarded    OBJECTIVE:     /77   Pulse 97   Temp 97.9 °F (36.6 °C) (Axillary)   Resp 17   Ht 5' 9\" (1.753 m)   Wt 240 lb (108.9 kg)   SpO2 94%   BMI 35.44 kg/m²     Physical Examination:  Gen: elderly, awake, alert, BiPAP  HEENT: normocephalic, atraumatic   Neck: trachea midline, no JVD  Lungs: Tachypnea  Heart: regular rate and rhythm, distant heart tones,   Abdomen: normoactive bowel sounds, soft, non-tender  Extremities: no clubbing, cyanosis or edema, moving all extremities    Skin: warm, dry without rashes, lesions  Neuro: awake, alert, oriented x 3, follows commands, no gross neurologic deficit    Objective data reviewed: labs, images, records, medication use, vitals and chart    Time/Communication  Greater than 50% of time spent, total 70 minutes in counseling and coordination of care at the bedside regarding goals of care.     Thank you for allowing Palliative Medicine to participate in the care of Delilah Cary Diana Sanchez. Note: This report was completed using [employer] voiced recognition software. Every effort has been made to ensure accuracy; however, inadvertent computerized transcription errors may be present.

## 2022-05-12 NOTE — PROGRESS NOTES
Patient's bipap settings changed per verbal order with read back. Patient found with total face mask broken. Quickly switched to full face mask due to not being able to come off the BiPAP. Patient tolerated.

## 2022-05-12 NOTE — PROGRESS NOTES
[address] [facility]     Liaison Information Visit Note              Patient Name: Juanjo Moya    Terminal Diagnosis acute on chronic hypoxic respiratory failure due to recurring pleural effusions per Floretta Hammans, NP Palliaitive care  Code Status Order: DNR-CC   Allergies:  Penicillins  Family Goal: comfort  Meeting held with patient and then sister in hallway. The hospice benefit and philosophy were explained including that hospice is end of life care in which, per Medicare, a patient has a terminal diagnosis that life expectancy would be 6 months or less. Hospice care is a service that is covered by most insurance plans. Explained hospice services at home, at [facility] with room/board private pay unless patient has Medicaid and the [facility]. Explained that once in hospice care, all aggressive treatments would be stopped and allow nature to takes its course with focus on comfort care for the patient. Visit to patient first as sister was not in the room. Unable to understand much of what patient said. Met sister Suraj Kaye in the shaffer. Plan is to remove bipap later when brother around 200. Suraj Kaye would like a follow up tomorrow morning from hospice if patient survives off the bipap. I did offer to have a hospice nurse follow up later once off bipap but Suraj Kaye wants follow up tomorrow. I will follow up tomorrow. RN updated and asked to call [facility] with any further questions once patient is off the bipap. Discharge Plan:  Discharge Disposition; unknown  [facility] plan:  1. Will follow up tomorrow. 2. Please call [facility] [phone] with any questions. 3. Patient not currently under the care of hospice.     Electronically signed by Jodi Luciano RN on 5/12/2022 at 4:22 PM

## 2022-05-12 NOTE — PROGRESS NOTES
[facility]  Nephrology Progress Note    Patient's Name: Vinny Dean     History of Present Illness from 5/4 consult note:    Carson Callow is a 65 y.o. male with a past medical history of hypertension, hyperlipidemia, COPD, coronary artery disease, and CHF. He presented to the ED on 5/4 with complaints of shortness of breath. Vital signs at presentation to the ED include temperature 98, respirations 20, pulse 88, /80, and he was 95% on 6 L. Lab data on presentation to the ED include BUN 34, creatinine 5.3, proBNP 9247, and hemoglobin 9.6. Chest x-ray showed \"bilateral pleural effusion. \"  We were consulted to see the patient for HD. Patient is known to our service and dialyzes at [facility] 6 MWF second shift via a right thigh tunneled dialysis catheter. Patient not personally seen or examined due to COVID isolation, subsequently a review of systems was not obtained due to [facility] isolation. \"    Subjective:    5/12: Patient was not personally seen or examined due to COVID isolation. Discussed with his bedside nurse, who explained that the patient is not eating at this time and remains wearing the BiPAP.     PMH:    Past Medical History:   Diagnosis Date    Acid reflux     Agent orange exposure     Arthritis     CAD (coronary artery disease)     Congestive heart failure (CHF) (HCC)     COPD (chronic obstructive pulmonary disease) (HCC)     Depression     Diabetes mellitus ([address]     History of blood transfusion     Hyperlipidemia     Hypertension     MI (myocardial infarction) ([address]     Polio     Sleep apnea      Patient Active Problem List   Diagnosis    PNA (pneumonia)    Acute on chronic heart failure with preserved ejection fraction ([address]    Coronary artery disease involving native coronary artery of native heart without angina pectoris    Cardiac pacemaker in situ    Primary hypertension    SPEEDY (obstructive sleep apnea)    Chronic respiratory failure with hypoxia ([address]  CHF (congestive heart failure), NYHA class I, acute on chronic, combined (HCC)    Acute on chronic clinical systolic heart failure (HCC)    Acute on chronic systolic heart failure (HCC)    Pleural effusion    Respiratory failure (HCC)    COVID-19    Acute on chronic respiratory failure (HCC)     Meds:    Meds prn:     Meds prior to admission:     No current facility-administered medications on file prior to encounter.      Current Outpatient Medications on File Prior to Encounter   Medication Sig Dispense Refill    ondansetron (ZOFRAN-ODT) 4 MG disintegrating tablet Take 1 tablet by mouth every 8 hours as needed for Nausea or Vomiting 60 tablet 0    midodrine (PROAMATINE) 10 MG tablet Take 1 tablet by mouth as needed (give 30 min before dialysis) 90 tablet 3    bumetanide (BUMEX) 2 MG tablet Take 1 tablet by mouth daily 30 tablet 3    docusate sodium (COLACE) 100 MG capsule Take 1 capsule by mouth 2 times daily 60 capsule 0    guaiFENesin 400 MG tablet Take 400 mg by mouth 4 times daily as needed for Cough      ipratropium-albuterol (DUONEB) 0.5-2.5 (3) MG/3ML SOLN nebulizer solution Take 1 vial by nebulization every 4 hours as needed for Shortness of Breath      potassium chloride (KLOR-CON M) 10 MEQ extended release tablet Take 20 mEq by mouth daily      insulin glargine (LANTUS SOLOSTAR) 100 UNIT/ML injection pen Inject 50 Units into the skin nightly      loperamide (IMODIUM) 2 MG capsule Take 2 mg by mouth 4 times daily as needed for Diarrhea      buPROPion (WELLBUTRIN XL) 300 MG extended release tablet Take 300 mg by mouth every morning      ezetimibe (ZETIA) 10 MG tablet Take 10 mg by mouth at bedtime      Zinc Sulfate 110 MG TABS Take 2 tablets by mouth daily      magnesium hydroxide (MILK OF MAGNESIA) 400 MG/5ML suspension Take 30 mLs by mouth daily as needed for Constipation      melatonin 3 MG TABS tablet Take 3 mg by mouth at bedtime      insulin lispro, 1 Unit Dial, (HUMALOG 121/65 98.1 °F (36.7 °C)  95 18  248 lb 7.3 oz (112.7 kg)   05/11/22 1125 98/66 98 °F (36.7 °C) Axillary 91 22 91 %        Intake/Output Summary (Last 24 hours) at 5/12/2022 0791  Last data filed at 5/12/2022 4397  Gross per 24 hour   Intake    Output 0 ml   Net 0 ml      Not personally examined due to COVID isolation. Due to the current efforts to prevent transmission of COVID-19 and also the need to preserve PPE for other caregivers, a face-to-face encounter with the patient was not performed. That being said, all relevant records and diagnostic tests were reviewed, including laboratory results and imaging. Please reference any relevant documentation elsewhere. Care will be coordinated with the primary service.     Data:    Recent Labs     05/11/22 0450 05/11/22 2033 05/12/22 0450   WBC 14.2* 10.0 9.0   HGB 9.9* 10.0* 9.3*   HCT 31.6* 31.4* 30.0*   MCV 92.9 92.9 92.9    225 204     Recent Labs     05/11/22 0450 05/11/22 2033 05/12/22  0450    136 137   K 4.7 4.4 4.1   CL 97* 96* 98   CO2 26 28 28   CREATININE 5.5* 4.6* 5.2*   BUN 51* 35* 39*   LABGLOM 10 12 11   GLUCOSE 63* 133* 71*   CALCIUM 8.5* 8.5* 8.3*     No results found for: VITD25    PTH   Date Value Ref Range Status   04/28/2022 118 (H) 15 - 65 pg/mL Final     Recent Labs     05/11/22 2033   ALT 23   AST 28   ALKPHOS 106   BILITOT 0.7     Recent Labs     05/11/22 2033   LABALBU 3.4*     Ferritin   Date Value Ref Range Status   05/04/2022 795 ng/mL Final     Comment:     FERRITIN Reference Ranges:  Adult Males   20 - 60 years:    30 - 400 ng/mL  Adult females 16 - 60 years:    13 - 150 ng/mL  Adults greater than 60 years:   no established reference range  Pediatrics:                     no established reference range       No results found for: WIBNQRSU63    No results found for: FOLATE    Lab Results   Component Value Date    COLORU Yellow 04/07/2022    NITRU Negative 04/07/2022    GLUCOSEU Negative 04/07/2022    KETUA Negative

## 2022-05-12 NOTE — PROGRESS NOTES
Comprehensive Nutrition Assessment    Type and Reason for Visit:  Reassess    Nutrition Recommendations/Plan:   1. Recommend to continue Nepro renal supplement daily and Sam wound healing supplement BID to help meet increased nutritional needs from wound healing and known losses from dialysis. Malnutrition Assessment:  Malnutrition Status: At risk for malnutrition (Comment) (05/12/22 1125)    Context:  Acute Illness     Findings of the 6 clinical characteristics of malnutrition:  Energy Intake:  75% or less of estimated energy requirements for 7 or more days  Weight Loss:  Unable to assess (d/t possible fluid shifts ; hx of HD)     Body Fat Loss:  Unable to assess (d/t COVID isolation)     Muscle Mass Loss:  Unable to assess (d/t COVID isolation)    Fluid Accumulation:  No significant fluid accumulation     Strength:  Not Performed    Nutrition Assessment:    Patient adm w/ SOB and pleural effusions ; hx of ESRD w/ HD ; pt also COVID-19 positive ; hx of DM and COPD ; hx of CAD and CHF ; wound noted ; will continue ONS    Nutrition Related Findings:    -I&Os (-4.7 L), 3+ edema, active BS, rounded/distended abd, A&O x 4, HD, obesity Wound Type: Open Wounds,Unstageable (wound x 1 noted to nose)       Current Nutrition Intake & Therapies:    Average Meal Intake: 26-50%,51-75%  Average Supplements Intake: 26-50%,51-75%  ADULT DIET; Regular; 4 carb choices (60 gm/meal); Low Sodium (2 gm); Low Potassium (Less than 3000 mg/day); Low Phosphorus (Less than 1000 mg)  ADULT ORAL NUTRITION SUPPLEMENT; Breakfast, Dinner; Wound Healing Oral Supplement  ADULT ORAL NUTRITION SUPPLEMENT; Lunch, Dinner; Renal Oral Supplement    Anthropometric Measures:  Height: 5' 9\" (175.3 cm)  Ideal Body Weight (IBW): 160 lbs (73 kg)    Admission Body Weight: 251 lb (113.9 kg) (5/4 bed scale)  Current Body Weight: 240 lb (108.9 kg) (5/12, no method), 150 % IBW.     Current BMI (kg/m2): 35.4  Usual Body Weight:  (DAMIAN d/t fluid shifts- HD/CHF)     Weight Adjustment For: No Adjustment                 BMI Categories: Obese Class 2 (BMI 35.0 -39.9)    Estimated Daily Nutrient Needs:  Energy Requirements Based On: Formula  Weight Used for Energy Requirements: Current  Energy (kcal/day): 8006-4675 (REE 1801 x 1.2 SF)  Weight Used for Protein Requirements: Ideal  Protein (g/day): 110-130 (1.5-1.8g/kg IBW)  Method Used for Fluid Requirements: Standard Renal  Fluid (ml/day): per renal    Nutrition Diagnosis:   · Increased nutrient needs related to increase demand for energy/nutrients as evidenced by wounds,dialysis      Nutrition Interventions:   Food and/or Nutrient Delivery: Continue Current Diet,Continue Oral Nutrition Supplement  Nutrition Education/Counseling: No recommendation at this time  Coordination of Nutrition Care: Continue to monitor while inpatient       Goals:  Previous Goal Met: Progressing toward Goal(s)  Goals: PO intake 75% or greater,by next RD assessment       Nutrition Monitoring and Evaluation:   Behavioral-Environmental Outcomes: None Identified  Food/Nutrient Intake Outcomes: Food and Nutrient Intake,Supplement Intake  Physical Signs/Symptoms Outcomes: Biochemical Data,Chewing or Swallowing,GI Status,Fluid Status or Edema,Hemodynamic Status,Meal Time Behavior,Nutrition Focused Physical Findings,Weight,Skin    Discharge Planning:     Too soon to determine     Brannon Thompson RD, LD  Contact: [phone]

## 2022-05-12 NOTE — PROGRESS NOTES
[facility]  Department of Internal Medicine  Division of Pulmonary, Critical Care and Sleep Medicine  Progress Note    Kassandra Dupree DO, MPH, [facility], MICHELE Palomo  [place], [facility]      Patient: Juanjo Moya  MRN: [id number]  : 1945    Encounter Time: 4:53 PM     Date of Admission: 2022 10:02 AM    Primary Care Physician: Katy Sr MD    Reason for Consultation:Chronic pleural effusion, shortness of breath, cough     SUBJECTIVE: Patient's respiratory status has continued to decline. Despite aggressive therapies over the last few months his overall deconditioning has worsened. He met with palliative care and hospice earlier today. Apparently his sister did most of the talking with hospice and the patient is now pursuing palliative care and they will follow-up with hospice in the morning        OBJECTIVE:     PHYSICAL EXAM:   VITALS:   Vitals:    22 0912 22 0915 22 1122 22 1249   BP:       Pulse:       Resp: 18 17  17   Temp:       TempSrc:       SpO2: 94%      Weight:       Height:   5' 9\" (1.753 m)         Intake/Output Summary (Last 24 hours) at 2022 1653  Last data filed at 2022 1016  Gross per 24 hour   Intake 0 ml   Output 0 ml   Net 0 ml        CONSTITUTIONAL:    Alert, difficult to assess orientation, mild respiratory distress  SKIN:     Chronic  skin discoloration. Skin breakdown on nose bridge noted  HEENT:     EOMI, MMM, No thrush  NECK:    No bruits, no JVD  CV:      Sinus,  No murmur, No rubs, No gallops  PULMONARY:   Clear in upper lobes diminished in bases bilaterally. ABDOMEN:     Soft, non-tender. BS normal. No R/R/G  EXT:    No deformities . No clubbing. Edema remains 3+ bilaterally, sacral edema present  PULSE:   Appears equal and palpable.   PSYCHIATRIC:  Seems appropriate, No acute psycosis  MS:    No fractures, Weakness  NEUROLOGIC:   The clinical assessment is non-focal DATA: IMAGING & TESTING:     LABORATORY TESTS:    CBC with Differential:    Lab Results   Component Value Date    WBC 9.0 05/12/2022    RBC 3.23 05/12/2022    HGB 9.3 05/12/2022    HCT 30.0 05/12/2022     05/12/2022    MCV 92.9 05/12/2022    MCH 28.8 05/12/2022    MCHC 31.0 05/12/2022    RDW 16.3 05/12/2022    LYMPHOPCT 18.2 05/11/2022    MONOPCT 10.0 05/11/2022    BASOPCT 0.3 05/11/2022    MONOSABS 1.00 05/11/2022    LYMPHSABS 1.82 05/11/2022    EOSABS 0.08 05/11/2022    BASOSABS 0.03 05/11/2022     CMP:    Lab Results   Component Value Date     05/12/2022    K 4.1 05/12/2022    K 4.8 05/04/2022    CL 98 05/12/2022    CO2 28 05/12/2022    BUN 39 05/12/2022    CREATININE 5.2 05/12/2022    GFRAA 13 05/12/2022    LABGLOM 11 05/12/2022    GLUCOSE 71 05/12/2022    PROT 6.5 05/11/2022    LABALBU 3.4 05/11/2022    CALCIUM 8.3 05/12/2022    BILITOT 0.7 05/11/2022    ALKPHOS 106 05/11/2022    AST 28 05/11/2022    ALT 23 05/11/2022        PRO-BNP:   Lab Results   Component Value Date    PROBNP 9,247 (H) 05/04/2022    PROBNP 5,643 (H) 04/25/2022      ABGs:   Lab Results   Component Value Date    PH 7.428 05/11/2022    PO2 82.8 05/11/2022    PCO2 38.2 05/11/2022     Hemoglobin A1C: No components found for: HGBA1C    IMAGING:  Imaging tests were completed and reviewed and discussed radiology and care team involved and reveals     Echo Limited  Result Date: 3/19/2022  Findings   Left Ventricle  Left ventricle is grossly normal in size. No gross regional wall motion abnormalities seen. Grossly normal left ventricular ejection fraction. Indeterminate diastolic function. Right Ventricle  The right ventricle was not clearly visualized. Grossly normal right ventricular size and function. Pacer wire visualized in right ventricle. Left Atrium  Normal sized left atrium. Interatrial septum appears intact. Right Atrium  Normal right atrium size. Pacemaker lead was visualized in RA cavity.    Mitral Valve Structurally normal mitral valve. Tricuspid Valve  The tricuspid valve was not well visualized. Aortic Valve  The aortic valve leaflets were not well visualized. Pulmonic Valve  The pulmonic valve was not well visualized. Pericardial Effusion  There is a small anterior pericardial effusion noted. Aorta  Aorta was not clearly visualized. Conclusions   Summary  Technically suboptimal and limited study with images limited to subcostal  window. Left ventricle is grossly normal in size. No gross regional wall motion abnormalities seen. Grossly normal left ventricular ejection fraction. There is a small anterior pericardial effusion noted. Assessment:   1. Cough  2. COVIDlikely asymptomatic viral shedding at this point  3. Chronic SOB  4. ELEVATED BNP  5. ESRD   6. VOlume overload  7. Oxygen dependence  8. 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 History of SPEEDY    Plan:   Currently patient remains on BiPAP for comfort. They are planning to take the BiPAP off at some point tonight when his brother arrives for visitation. He and his family have decided to pursue palliative care. Thank you for allowing us to care for this patient.     Monty Jain, DO   Pulmonary, Critical Care and Sleep Medicine

## 2022-05-12 NOTE — PROGRESS NOTES
WENDYW met with pt and his sister/ Bran Sanchez to discuss hospice choices, agency list provided. Plan is to remove bipap after his brothers arrive this evening. If needed they choose [facility].  Referral made to Anni Sommer

## 2022-05-13 NOTE — PLAN OF CARE
Problem: Chronic Conditions and Co-morbidities  Goal: Patient's chronic conditions and co-morbidity symptoms are monitored and maintained or improved  Outcome: Completed     Problem: Discharge Planning  Goal: Discharge to home or other facility with appropriate resources  5/13/2022 1131 by Chao Vann RN  Outcome: Completed  5/13/2022 0437 by Ze Mendoza RN  Outcome: Progressing     Problem: Safety - Adult  Goal: Free from fall injury  5/13/2022 1131 by Chao Vann RN  Outcome: Completed  5/13/2022 0437 by Ze Mendoza RN  Outcome: Progressing     Problem: Pain  Goal: Verbalizes/displays adequate comfort level or baseline comfort level  5/13/2022 1131 by Chao Vann RN  Outcome: Completed  5/13/2022 0437 by Ze Mendoza RN  Outcome: Progressing     Problem: Skin/Tissue Integrity  Goal: Absence of new skin breakdown  Description: 1. Monitor for areas of redness and/or skin breakdown  2. Assess vascular access sites hourly  3. Every 4-6 hours minimum:  Change oxygen saturation probe site  4. Every 4-6 hours:  If on nasal continuous positive airway pressure, respiratory therapy assess nares and determine need for appliance change or resting period.   5/13/2022 1131 by Chao Vann RN  Outcome: Completed  5/13/2022 0437 by Ze Mendoza RN  Outcome: Progressing     Problem: Cardiovascular - Adult  Goal: Maintains optimal cardiac output and hemodynamic stability  Outcome: Completed     Problem: Metabolic/Fluid and Electrolytes - Adult  Goal: Hemodynamic stability and optimal renal function maintained  Outcome: Completed     Problem: ABCDS Injury Assessment  Goal: Absence of physical injury  5/13/2022 1131 by Chao Vann RN  Outcome: Completed  5/13/2022 0437 by Ze Mendoza RN  Outcome: Progressing     Problem: Nutrition Deficit:  Goal: Optimize nutritional status  Outcome: Completed

## 2022-05-13 NOTE — DISCHARGE SUMMARY
Discharge Summary    Date: 5/13/2022  Patient Name: Akila Hope YOB: 1945 Age: 65 y.o. Admit Date: 5/4/2022  Discharge Date: 5/13/2022  Discharge Condition: [address]    Admission Diagnosis  Acute on chronic respiratory failure ([address] (J96.20); Acute on chronic respiratory failure with hypoxia (HCC) (J96.21)     Discharge Diagnosis  Principal Problem: Acute on chronic respiratory failure (HCC)Resolved Problems: * No resolved hospital problems. [facility] Stay  Narrative of Hospital Course:  Patient admitted with worsening hypoxia, covid, and effusions. Patient also ESRD. After declining, hospice was decided upon. Consultants:  IP CONSULT TO NEPHROLOGYIP CONSULT TO INTERNAL MEDICINEIP CONSULT TO PULMONOLOGYIP CONSULT TO PULMONOLOGYIP CONSULT TO [address] CONSULT TO [address] CONSULT TO PALLIATIVE CAREIP CONSULT TO HOSPICE    Surgeries/procedures Performed:       Treatments:           Discharge Plan/Disposition:  To [facility]/Incidental Findings Requiring Follow Up:    Patient Instructions:    Diet: Regular Diet    Activity:Bedrest  For number of days (if applicable): Other Instructions:    Provider Follow-Up:   No follow-ups on file. Significant Diagnostic Studies:    Recent Labs:  Admission on 05/04/2022No results displayed because visit has over 200 results. ------------    Radiology last 7 days:  XR CHEST PORTABLEResult Date: 5/11/2022Essentially stable study, again demonstrating bilateral parenchymal consolidation and underlying pleural effusions. No significant change.      Pending Labs   Order Current Status  Arterial Blood Gas, Respiratory Only Collected  Arterial Blood Gas, Respiratory Only Collected (05/04/22 1057)  COVID-19, Rapid In process      Discharge Medications    Current Discharge Medication List    Current Discharge Medication List    Current Discharge Medication ListCONTINUE these medications which have NOT CHANGEDondansetron (ZOFRAN-ODT) 4 MG disintegrating tabletTake 1 tablet by mouth every 8 hours as needed for Nausea or VomitingQty: 60 tablet Refills: 0midodrine (PROAMATINE) 10 MG tabletTake 1 tablet by mouth as needed (give 30 min before dialysis)Qty: 90 tablet Refills: 3bumetanide (BUMEX) 2 MG tabletTake 1 tablet by mouth dailyQty: 30 tablet Refills: 3docusate sodium (COLACE) 100 MG capsuleTake 1 capsule by mouth 2 times dailyQty: 60 capsule Refills: 0guaiFENesin 400 MG tabletTake 400 mg by mouth 4 times daily as needed for Coughipratropium-albuterol (DUONEB) 0.5-2.5 (3) MG/3ML SOLN nebulizer solutionTake 1 vial by nebulization every 4 hours as needed for Shortness of Breathpotassium chloride (KLOR-CON M) 10 MEQ extended release tabletTake 20 mEq by mouth dailyinsulin glargine (LANTUS SOLOSTAR) 100 UNIT/ML injection penInject 50 Units into the skin nightlyloperamide (IMODIUM) 2 MG capsuleTake 2 mg by mouth 4 times daily as needed for DiarrheabuPROPion (WELLBUTRIN XL) 300 MG extended release tabletTake 300 mg by mouth every morningezetimibe (ZETIA) 10 MG tabletTake 10 mg by mouth at bedtimeZinc Sulfate 110 MG TABSTake 2 tablets by mouth dailymagnesium hydroxide (MILK OF MAGNESIA) 400 MG/5ML suspensionTake 30 mLs by mouth daily as needed for Constipationmelatonin 3 MG TABS tabletTake 3 mg by mouth at bedtimeinsulin lispro, 1 Unit Dial, (HUMALOG KWIKPEN) 100 UNIT/ML SOPNInject 0-10 Units into the skin 4 times daily (before meals and nightly) *Per Sliding Scale*metoprolol succinate (TOPROL XL) 25 MG extended release tabletTake 1 tablet by mouth 2 times dailyQty: 30 tablet Refills: 3acetaminophen (TYLENOL) 325 MG tabletTake 650 mg by mouth every 4 hours as needed for Pain or Fever vitamin C (ASCORBIC ACID) 500 MG tabletTake 500 mg by mouth 2 times dailyvitamin D (CHOLECALCIFEROL) 50 MCG (2000 UT) TABS tabletTake 2,000 Units by mouth daily aspirin 81 MG EC tabletTake 1 tablet by mouth dailyQty: 30 tablet Refills: 3folic acid (FOLVITE) 1 MG tabletTake 1 mg by mouth dailycyanocobalamin 1000 MCG tabletTake 1,000 mcg by mouth dailyatorvastatin (LIPITOR) 40 MG tabletTake 40 mg by mouth at bedtime pantoprazole (PROTONIX) 40 MG tabletTake 40 mg by mouth daily    Current Discharge Medication List    Time Spent on Discharge:1E] minutes were spent in patient examination, evaluation, counseling as well as medication reconciliation, prescriptions for required medications, discharge plan, and follow up.     Electronically signed by Lamont Cameron MD on 5/13/22 at 11:02 AM EDT

## 2022-05-13 NOTE — PROGRESS NOTES
Pt did not want assessments, tele monitor displayed 21 RR, ,SpO2 82, pt reports being comfortable. Pt did change from Hi-flow NC on 15L to his BiPap from home.

## 2022-05-13 NOTE — PROGRESS NOTES
Progress Note  Date:2022       [facility]:[id number]  Patient Charis Rodriguez     YOB: 1945     Age:77 y.o. Patient sleeping. Subjective    Subjective:  Symptoms:  Worsening. He reports shortness of breath and weakness. No chest pain. Diet:  Adequate intake. Activity level: Impaired due to weakness. Pain:  He reports no pain. Review of Systems   Constitutional: Negative for activity change and fever. HENT: Negative for congestion. Respiratory: Positive for shortness of breath. Cardiovascular: Negative for chest pain. Gastrointestinal: Negative for abdominal pain. Neurological: Positive for weakness. Negative for dizziness. Objective         Vitals Last 24 Hours:  TEMPERATURE:  Temp  Av.9 °F (36.6 °C)  Min: 97.9 °F (36.6 °C)  Max: 97.9 °F (36.6 °C)  RESPIRATIONS RANGE: Resp  Av.8  Min: 16  Max: 21  PULSE OXIMETRY RANGE: SpO2  Av.5 %  Min: 82 %  Max: 97 %  PULSE RANGE: Pulse  Av  Min: 97  Max: 97  BLOOD PRESSURE RANGE: Systolic (54IRO), LAS:069 , Min:109 , JCF:610   ; Diastolic (16YUS), BSZ:35, Min:77, Max:77    I/O (24Hr): Intake/Output Summary (Last 24 hours) at 2022 0706  Last data filed at 2022 1016  Gross per 24 hour   Intake 0 ml   Output    Net 0 ml     Objective:  General Appearance:  Comfortable. Vital signs: (most recent): Blood pressure 109/77, pulse 97, temperature 97.9 °F (36.6 °C), temperature source Axillary, resp. rate 21, height 5' 9\" (1.753 m), weight 245 lb (111.1 kg), SpO2 (!) 88 %. No fever. Lungs: There are decreased breath sounds. Heart: Normal rate. Regular rhythm.   S1 normal and S2 normal.      Labs/Imaging/Diagnostics    Labs:  CBC:  Recent Labs     223 22  0450 22  0619   WBC 10.0 9.0 10.6   RBC 3.38* 3.23* 3.30*   HGB 10.0* 9.3* 9.4*   HCT 31.4* 30.0* 30.9*   MCV 92.9 92.9 93.6   RDW 16.4* 16.3* 15.9*    204 243     CHEMISTRIES:  Recent Labs 05/11/22 0450 05/11/22 2033 05/12/22 0450    136 137   K 4.7 4.4 4.1   CL 97* 96* 98   CO2 26 28 28   BUN 51* 35* 39*   CREATININE 5.5* 4.6* 5.2*   GLUCOSE 63* 133* 71*     PT/INR:No results for input(s): PROTIME, INR in the last 72 hours. APTT:No results for input(s): APTT in the last 72 hours. LIVER PROFILE:  Recent Labs     05/11/22 2033   AST 28   ALT 23   BILITOT 0.7   ALKPHOS 106       Imaging Last 24 Hours:  XR CHEST PORTABLE    Result Date: 5/4/2022  EXAMINATION: ONE XRAY VIEW OF THE CHEST 5/4/2022 10:49 am HISTORY: ORDERING SYSTEM PROVIDED HISTORY: shortness of breath TECHNOLOGIST PROVIDED HISTORY: Reason for exam:->shortness of breath What reading provider will be dictating this exam?->CRC FINDINGS: There are opacities in the mid and lower lungs. There is moderate right pleural effusion. No pneumothorax. The heart is not enlarged. Pacemaker in place. Bilateral pleural effusion. For impose underlying infiltrates in the mid and lower lungs cannot be excluded. US DUP LOWER EXTREMITIES BILATERAL VENOUS    Result Date: 5/4/2022  EXAMINATION: DUPLEX VENOUS ULTRASOUND OF THE BILATERAL LOWER EXTREMITIES5/4/2022 1:37 pm TECHNIQUE: Duplex ultrasound using B-mode/gray scaled imaging, Doppler spectral analysis and color flow Doppler was obtained of the deep venous structures of the lower bilateral extremities. COMPARISON: None. HISTORY: ORDERING SYSTEM PROVIDED HISTORY: dvt TECHNOLOGIST PROVIDED HISTORY: Reason for exam:->dvt What reading provider will be dictating this exam?->CRC FINDINGS: The visualized veins of the bilateral lower extremities are patent and free of echogenic thrombus. The veins demonstrate good compressibility with normal color flow study and spectral analysis. No evidence of DVT in either lower extremity. There is a venous catheter in the left common femoral vein.      Assessment//Plan           Hospital Problems           Last Modified POA    * (Principal) Acute on chronic respiratory failure ([facility] [address] 5/4/2022 Yes        Assessment:  ((Principal) Acute on chronic respiratory failure ([facility] [address] 5/4/2022 Yes      covid 19  chronic hypoxic respiratory failure. CHF  Pleural effusion  ESRD  DM  COPD  HTN  Hyperlipidemia           Plan  Pleural effusions appear stable  Renal following for dialysis  Pulmonary following. Considering hospice. ).

## 2022-05-17 LAB
AFB CULTURE (MYCOBACTERIA): NORMAL
AFB SMEAR: NORMAL

## 2024-06-14 NOTE — PROGRESS NOTES
Chronic, controlled. Latest blood pressure and vitals reviewed-     Temp:  [98.3 °F (36.8 °C)]   Pulse:  [50-57]   Resp:  [18-25]   BP: (148-164)/(64-85)   SpO2:  [94 %-97 %] .   Home meds for hypertension were reviewed and noted below.   Hypertension Medications               amLODIPine (NORVASC) 10 MG tablet Take 1 tablet (10 mg total) by mouth once daily.            While in the hospital, will manage blood pressure as follows; Adjust home antihypertensive regimen as follows- hold all orals meds for now while being on cardene ggt.      Progress Note  Date:2022       [id number]  Patient Lashay Hewitt     YOB: 1945     Age:77 y.o. Patient in deep sleep on bipap. Subjective    Subjective:  Symptoms:  Stable. He reports shortness of breath. No chest pain. Diet:  Adequate intake. Activity level: Impaired due to weakness. Pain:  He reports no pain. Review of Systems   Constitutional: Negative for activity change and fever. HENT: Negative for congestion. Respiratory: Positive for shortness of breath. Cardiovascular: Positive for leg swelling. Negative for chest pain. Gastrointestinal: Negative for abdominal pain. Genitourinary: Negative for difficulty urinating. Neurological: Negative for dizziness. Objective         Vitals Last 24 Hours:  TEMPERATURE:  Temp  Av.1 °F (36.2 °C)  Min: 96.9 °F (36.1 °C)  Max: 97.5 °F (36.4 °C)  RESPIRATIONS RANGE: Resp  Av.8  Min: 16  Max: 22  PULSE OXIMETRY RANGE: SpO2  Av.6 %  Min: 92 %  Max: 96 %  PULSE RANGE: Pulse  Av.3  Min: 98  Max: 104  BLOOD PRESSURE RANGE: Systolic (96GTK), BRAVO:215 , Min:119 , BEE:275   ; Diastolic (51ANL), QPF:28, Min:66, Max:84    I/O (24Hr): Intake/Output Summary (Last 24 hours) at 2022 0616  Last data filed at 2022 2107  Gross per 24 hour   Intake 200 ml   Output 2375 ml   Net -2175 ml     Objective:  General Appearance:  Comfortable. Vital signs: (most recent): Blood pressure 129/75, pulse 102, temperature 97 °F (36.1 °C), temperature source Temporal, resp. rate 18, height 5' 9\" (1.753 m), weight 287 lb 6.4 oz (130.4 kg), SpO2 92 %. No fever. Lungs:  Normal effort and normal respiratory rate. Breath sounds clear to auscultation. Heart: Normal rate. Regular rhythm. S1 normal and S2 normal.    Extremities: There is local swelling.       Labs/Imaging/Diagnostics    Labs:  CBC:  Recent Labs     22  0641 22  0600 22  0532   WBC 15.1* 15.1* 13.0*   RBC 4.01 4.01 4.18   HGB 11.5* 11.7* 11.9*   HCT 37.2 37.6 38.8   MCV 92.8 93.8 92.8   RDW 14.8 15.0 15.3*    287 323     CHEMISTRIES:  Recent Labs     01/25/22  0641 01/26/22  0600 01/27/22  0532 01/27/22  1143    141 140  --    K 3.8 3.7 3.5  --     103 98  --    CO2 22 21* 26  --    BUN 42* 53* 56*  --    CREATININE 2.7* 3.0* 2.8*  --    GLUCOSE 179* 122* 117*  --    MG  --   --   --  2.0     PT/INR:No results for input(s): PROTIME, INR in the last 72 hours. APTT:No results for input(s): APTT in the last 72 hours. LIVER PROFILE:  No results for input(s): AST, ALT, BILIDIR, BILITOT, ALKPHOS in the last 72 hours. Imaging Last 24 Hours:  XR CHEST PORTABLE    Result Date: 1/21/2022  EXAMINATION: ONE XRAY VIEW OF THE CHEST 1/21/2022 10:02 am COMPARISON: None. HISTORY: ORDERING SYSTEM PROVIDED HISTORY: dyspnea TECHNOLOGIST PROVIDED HISTORY: Reason for exam:->dyspnea FINDINGS: The heart is enlarged. There is a dual lead cardiac pacer on the left There is an infiltrate seen within the right lung base. The left lung is clear. There is a trace right pleural effusion. There is no left pleural effusion. 1. Right lower lobe pneumonia 2. Trace right pleural effusion 3. Cardiomegaly     US DUP LOWER EXTREMITIES BILATERAL VENOUS    Result Date: 1/21/2022  EXAMINATION: DUPLEX VENOUS ULTRASOUND OF THE BILATERAL LOWER EXTREMITIES1/21/2022 10:15 am TECHNIQUE: Duplex ultrasound using B-mode/gray scaled imaging, Doppler spectral analysis and color flow Doppler was obtained of the deep venous structures of the lower bilateral extremities. COMPARISON: None. HISTORY: ORDERING SYSTEM PROVIDED HISTORY: discomfort TECHNOLOGIST PROVIDED HISTORY: Reason for exam:->discomfort What reading provider will be dictating this exam?->CRC FINDINGS: The visualized veins of the bilateral lower extremities are patent and free of echogenic thrombus.  The veins demonstrate good compressibility with normal color flow study and spectral analysis. No evidence of DVT in either lower extremity. RECOMMENDATIONS: Unavailable     Assessment//Plan           Hospital Problems           Last Modified POA    PNA (pneumonia) 1/21/2022 Yes        Assessment:  ( PNA (pneumonia) 1/21/2022 Yes     CHF  Pleural effusion  Acute renal insfficnecy  DM  COPD  HTN  Hyperlipidemia     ). Plan:   (Renal for diureses. Now on bumex gtt. Considering dialysis as needed. Pulmonary following, feels less likely pneumonia  Follows cultures  Monitor labs and output. Continue rest of meds. ECHO reviewed. ).

## 2025-04-03 NOTE — PROCEDURES
Attempted echo but patient said to come back when he was done eating PPD Reading Note  PPD read and results entered in [employer].  Result: 0 mm induration.  Interpretation: negative  If test not read within 48-72 hours of initial placement, patient advised to repeat in other arm 1-3 weeks after this test.  Allergic reaction: no

## (undated) DEVICE — SYRINGE MED 10ML POLYPR LUERSLIP TIP FLAT TOP W/O SFTY DISP

## (undated) DEVICE — GARMENT,MEDLINE,DVT,INT,CALF,MED, GEN2: Brand: MEDLINE

## (undated) DEVICE — CHECK-FLO PERFORMER INTRODUCER: Brand: PERFORMER

## (undated) DEVICE — GLOVE SURG SZ 75 L12IN FNGR THK94MIL TRNSLUC YEL LTX

## (undated) DEVICE — SHEET, T, LAPAROTOMY, STERILE: Brand: MEDLINE

## (undated) DEVICE — SYRINGE MED 10ML LUERLOCK TIP W/O SFTY DISP

## (undated) DEVICE — RADIFOCUS GLIDEWIRE ADVANTAGE GUIDEWIRE: Brand: GLIDEWIRE ADVANTAGE

## (undated) DEVICE — MAGNETIC INSTR DRAPE 20X16: Brand: MEDLINE INDUSTRIES, INC.

## (undated) DEVICE — SET HAD CATH 40CMX15.5FR LNG TERM STR W/ SIDE H HI DENS

## (undated) DEVICE — APPLICATOR MEDICATED 26 CC SOLUTION HI LT ORNG CHLORAPREP

## (undated) DEVICE — CATHETER HAD AD L40CM INSRT L23CM DIA14.5FR POLYUR PRE CRV

## (undated) DEVICE — 3M™ MEDIPORE™ + PAD 3564: Brand: 3M™ MEDIPORE™

## (undated) DEVICE — 18 GA N.G. KIT, 10 PACK: Brand: SITE-RITE

## (undated) DEVICE — MINOR VASCULAR: Brand: MEDLINE INDUSTRIES, INC.

## (undated) DEVICE — SOLUTION IV 500ML 0.9% SOD CHL PH 5 INJ USP VIAFLX PLAS

## (undated) DEVICE — GOWN,SIRUS,FABRNF,XL,20/CS: Brand: MEDLINE